# Patient Record
Sex: MALE | Race: WHITE | NOT HISPANIC OR LATINO | Employment: FULL TIME | URBAN - METROPOLITAN AREA
[De-identification: names, ages, dates, MRNs, and addresses within clinical notes are randomized per-mention and may not be internally consistent; named-entity substitution may affect disease eponyms.]

---

## 2017-01-04 ENCOUNTER — ALLSCRIPTS OFFICE VISIT (OUTPATIENT)
Dept: OTHER | Facility: OTHER | Age: 27
End: 2017-01-04

## 2017-01-20 ENCOUNTER — ALLSCRIPTS OFFICE VISIT (OUTPATIENT)
Dept: OTHER | Facility: OTHER | Age: 27
End: 2017-01-20

## 2017-02-16 ENCOUNTER — ALLSCRIPTS OFFICE VISIT (OUTPATIENT)
Dept: OTHER | Facility: OTHER | Age: 27
End: 2017-02-16

## 2017-03-17 ENCOUNTER — ALLSCRIPTS OFFICE VISIT (OUTPATIENT)
Dept: OTHER | Facility: OTHER | Age: 27
End: 2017-03-17

## 2017-04-07 ENCOUNTER — ALLSCRIPTS OFFICE VISIT (OUTPATIENT)
Dept: OTHER | Facility: OTHER | Age: 27
End: 2017-04-07

## 2017-04-27 ENCOUNTER — TRANSCRIBE ORDERS (OUTPATIENT)
Dept: ADMINISTRATIVE | Facility: HOSPITAL | Age: 27
End: 2017-04-27

## 2017-04-27 DIAGNOSIS — G47.33 OBSTRUCTIVE SLEEP APNEA (ADULT) (PEDIATRIC): Primary | ICD-10-CM

## 2017-04-28 ENCOUNTER — ALLSCRIPTS OFFICE VISIT (OUTPATIENT)
Dept: OTHER | Facility: OTHER | Age: 27
End: 2017-04-28

## 2017-05-11 ENCOUNTER — ALLSCRIPTS OFFICE VISIT (OUTPATIENT)
Dept: OTHER | Facility: OTHER | Age: 27
End: 2017-05-11

## 2017-05-30 ENCOUNTER — ALLSCRIPTS OFFICE VISIT (OUTPATIENT)
Dept: OTHER | Facility: OTHER | Age: 27
End: 2017-05-30

## 2017-06-08 ENCOUNTER — ALLSCRIPTS OFFICE VISIT (OUTPATIENT)
Dept: OTHER | Facility: OTHER | Age: 27
End: 2017-06-08

## 2017-06-23 ENCOUNTER — ALLSCRIPTS OFFICE VISIT (OUTPATIENT)
Dept: OTHER | Facility: OTHER | Age: 27
End: 2017-06-23

## 2017-07-26 ENCOUNTER — ALLSCRIPTS OFFICE VISIT (OUTPATIENT)
Dept: OTHER | Facility: OTHER | Age: 27
End: 2017-07-26

## 2017-07-27 ENCOUNTER — TRANSCRIBE ORDERS (OUTPATIENT)
Dept: ADMINISTRATIVE | Facility: HOSPITAL | Age: 27
End: 2017-07-27

## 2017-07-27 DIAGNOSIS — R20.0 TACTILE ANESTHESIA: Primary | ICD-10-CM

## 2017-08-09 ENCOUNTER — OFFICE VISIT (OUTPATIENT)
Dept: RADIOLOGY | Facility: CLINIC | Age: 27
End: 2017-08-09
Payer: COMMERCIAL

## 2017-08-09 DIAGNOSIS — R20.0 TACTILE ANESTHESIA: ICD-10-CM

## 2017-08-09 PROCEDURE — 95909 NRV CNDJ TST 5-6 STUDIES: CPT

## 2017-08-09 PROCEDURE — 95886 MUSC TEST DONE W/N TEST COMP: CPT

## 2017-09-12 ENCOUNTER — ALLSCRIPTS OFFICE VISIT (OUTPATIENT)
Dept: OTHER | Facility: OTHER | Age: 27
End: 2017-09-12

## 2017-09-14 ENCOUNTER — GENERIC CONVERSION - ENCOUNTER (OUTPATIENT)
Dept: OTHER | Facility: OTHER | Age: 27
End: 2017-09-14

## 2017-09-14 ENCOUNTER — LAB CONVERSION - ENCOUNTER (OUTPATIENT)
Dept: OTHER | Facility: OTHER | Age: 27
End: 2017-09-14

## 2017-09-14 LAB — QUESTION/PROBLEM (HISTORICAL): NORMAL

## 2017-09-16 ENCOUNTER — GENERIC CONVERSION - ENCOUNTER (OUTPATIENT)
Dept: OTHER | Facility: OTHER | Age: 27
End: 2017-09-16

## 2017-09-16 ENCOUNTER — LAB CONVERSION - ENCOUNTER (OUTPATIENT)
Dept: OTHER | Facility: OTHER | Age: 27
End: 2017-09-16

## 2017-09-16 LAB
CONTACT: (HISTORICAL): NORMAL
CULTURE RESULT (HISTORICAL): NORMAL
TEST INFORMATION (HISTORICAL): NORMAL
TEST NAME (HISTORICAL): NORMAL

## 2017-11-21 ENCOUNTER — ALLSCRIPTS OFFICE VISIT (OUTPATIENT)
Dept: OTHER | Facility: OTHER | Age: 27
End: 2017-11-21

## 2017-11-22 ENCOUNTER — GENERIC CONVERSION - ENCOUNTER (OUTPATIENT)
Dept: OTHER | Facility: OTHER | Age: 27
End: 2017-11-22

## 2017-11-22 ENCOUNTER — LAB CONVERSION - ENCOUNTER (OUTPATIENT)
Dept: OTHER | Facility: OTHER | Age: 27
End: 2017-11-22

## 2017-11-22 LAB
A/G RATIO (HISTORICAL): 1.1 (CALC) (ref 1–2.5)
ALBUMIN SERPL BCP-MCNC: 4 G/DL (ref 3.6–5.1)
ALP SERPL-CCNC: 84 U/L (ref 40–115)
ALT SERPL W P-5'-P-CCNC: 27 U/L (ref 9–46)
AST SERPL W P-5'-P-CCNC: 18 U/L (ref 10–40)
BASOPHILS # BLD AUTO: 0.3 %
BASOPHILS # BLD AUTO: 17 CELLS/UL (ref 0–200)
BILIRUB SERPL-MCNC: 0.5 MG/DL (ref 0.2–1.2)
BUN SERPL-MCNC: 18 MG/DL (ref 7–25)
BUN/CREA RATIO (HISTORICAL): NORMAL (CALC) (ref 6–22)
CALCIUM SERPL-MCNC: 9.4 MG/DL (ref 8.6–10.3)
CHLORIDE SERPL-SCNC: 103 MMOL/L (ref 98–110)
CO2 SERPL-SCNC: 31 MMOL/L (ref 20–31)
CREAT SERPL-MCNC: 0.98 MG/DL (ref 0.6–1.35)
DEPRECATED RDW RBC AUTO: 12.8 % (ref 11–15)
EGFR AFRICAN AMERICAN (HISTORICAL): 122 ML/MIN/1.73M2
EGFR-AMERICAN CALC (HISTORICAL): 105 ML/MIN/1.73M2
EOSINOPHIL # BLD AUTO: 1.4 %
EOSINOPHIL # BLD AUTO: 80 CELLS/UL (ref 15–500)
GAMMA GLOBULIN (HISTORICAL): 3.7 G/DL (CALC) (ref 1.9–3.7)
GLUCOSE (HISTORICAL): 87 MG/DL (ref 65–99)
HCT VFR BLD AUTO: 45.6 % (ref 38.5–50)
HGB BLD-MCNC: 14.9 G/DL (ref 13.2–17.1)
LYMPHOCYTES # BLD AUTO: 1351 CELLS/UL (ref 850–3900)
LYMPHOCYTES # BLD AUTO: 23.7 %
MCH RBC QN AUTO: 28.6 PG (ref 27–33)
MCHC RBC AUTO-ENTMCNC: 32.7 G/DL (ref 32–36)
MCV RBC AUTO: 87.5 FL (ref 80–100)
MONOCYTES # BLD AUTO: 456 CELLS/UL (ref 200–950)
MONOCYTES (HISTORICAL): 8 %
NEUTROPHILS # BLD AUTO: 3796 CELLS/UL (ref 1500–7800)
NEUTROPHILS # BLD AUTO: 66.6 %
PLATELET # BLD AUTO: 250 THOUSAND/UL (ref 140–400)
PMV BLD AUTO: 9.5 FL (ref 7.5–12.5)
POTASSIUM SERPL-SCNC: 4.5 MMOL/L (ref 3.5–5.3)
RBC # BLD AUTO: 5.21 MILLION/UL (ref 4.2–5.8)
SODIUM SERPL-SCNC: 139 MMOL/L (ref 135–146)
T4 FREE SERPL-MCNC: 1.1 NG/DL (ref 0.8–1.8)
TOTAL PROTEIN (HISTORICAL): 7.7 G/DL (ref 6.1–8.1)
TSH SERPL DL<=0.05 MIU/L-ACNC: 4.63 MIU/L (ref 0.4–4.5)
VIT B12 SERPL-MCNC: 481 PG/ML (ref 200–1100)
WBC # BLD AUTO: 5.7 THOUSAND/UL (ref 3.8–10.8)

## 2017-12-04 ENCOUNTER — APPOINTMENT (OUTPATIENT)
Dept: PHYSICAL THERAPY | Facility: REHABILITATION | Age: 27
End: 2017-12-04
Payer: COMMERCIAL

## 2017-12-04 ENCOUNTER — GENERIC CONVERSION - ENCOUNTER (OUTPATIENT)
Dept: FAMILY MEDICINE CLINIC | Facility: CLINIC | Age: 27
End: 2017-12-04

## 2017-12-04 PROCEDURE — G8991 OTHER PT/OT GOAL STATUS: HCPCS

## 2017-12-04 PROCEDURE — 97162 PT EVAL MOD COMPLEX 30 MIN: CPT

## 2017-12-04 PROCEDURE — G8990 OTHER PT/OT CURRENT STATUS: HCPCS

## 2017-12-12 ENCOUNTER — APPOINTMENT (OUTPATIENT)
Dept: PHYSICAL THERAPY | Facility: REHABILITATION | Age: 27
End: 2017-12-12
Payer: COMMERCIAL

## 2017-12-13 ENCOUNTER — APPOINTMENT (OUTPATIENT)
Dept: PHYSICAL THERAPY | Facility: REHABILITATION | Age: 27
End: 2017-12-13
Payer: COMMERCIAL

## 2017-12-20 ENCOUNTER — APPOINTMENT (OUTPATIENT)
Dept: PHYSICAL THERAPY | Facility: REHABILITATION | Age: 27
End: 2017-12-20
Payer: COMMERCIAL

## 2017-12-22 ENCOUNTER — GENERIC CONVERSION - ENCOUNTER (OUTPATIENT)
Dept: OTHER | Facility: OTHER | Age: 27
End: 2017-12-22

## 2018-01-09 NOTE — RESULT NOTES
Message   Can you please let patient know that his back x-ray was normal?   Thank you     Verified Results  * XR SPINE LUMBAR MINIMUM 4 VIEWS NON INJURY 10Oct2016 04:30PM Sandro Finn Order Number: [id number]     Test Name Result Flag Reference   XR SPINE LUMBAR MINIMUM 4 VIEWS (Report)     LUMBAR SPINE     INDICATION: Lower back pain  COMPARISON: CT AP 1/21/2016     VIEWS: AP, lateral and bilateral oblique projections; 4 images     FINDINGS:     Alignment is unremarkable  There is no radiographic evidence of acute fracture or destructive osseous lesion  No significant lumbar degenerative change noted  Visualized soft tissues appear unremarkable  IMPRESSION:     Normal examination         Workstation performed: [id number]     Signed by:   Marimar Barnard MD   10/11/16

## 2018-01-10 ENCOUNTER — ALLSCRIPTS OFFICE VISIT (OUTPATIENT)
Dept: OTHER | Facility: OTHER | Age: 28
End: 2018-01-10

## 2018-01-10 ENCOUNTER — TRANSCRIBE ORDERS (OUTPATIENT)
Dept: ADMINISTRATIVE | Facility: HOSPITAL | Age: 28
End: 2018-01-10

## 2018-01-10 ENCOUNTER — HOSPITAL ENCOUNTER (OUTPATIENT)
Dept: RADIOLOGY | Facility: HOSPITAL | Age: 28
Discharge: HOME | End: 2018-01-10
Payer: COMMERCIAL

## 2018-01-10 ENCOUNTER — GENERIC CONVERSION - ENCOUNTER (OUTPATIENT)
Dept: FAMILY MEDICINE CLINIC | Facility: CLINIC | Age: 28
End: 2018-01-10

## 2018-01-10 DIAGNOSIS — M54.5 LOW BACK PAIN, UNSPECIFIED BACK PAIN LATERALITY, UNSPECIFIED CHRONICITY, WITH SCIATICA PRESENCE UNSPECIFIED: ICD-10-CM

## 2018-01-10 DIAGNOSIS — M54.5 LOW BACK PAIN, UNSPECIFIED BACK PAIN LATERALITY, UNSPECIFIED CHRONICITY, WITH SCIATICA PRESENCE UNSPECIFIED: Primary | ICD-10-CM

## 2018-01-10 LAB
BILIRUB UR QL STRIP: NORMAL
CLARITY UR: NORMAL
COLOR UR: YELLOW
GLUCOSE (HISTORICAL): NORMAL
HGB UR QL STRIP.AUTO: NORMAL
KETONES UR STRIP-MCNC: NORMAL MG/DL
LEUKOCYTE ESTERASE UR QL STRIP: NORMAL
NITRITE UR QL STRIP: NORMAL
PH UR STRIP.AUTO: 8 [PH]
PROT UR STRIP-MCNC: NORMAL MG/DL
SP GR UR STRIP.AUTO: 1
UROBILINOGEN UR QL STRIP.AUTO: 0.2

## 2018-01-10 PROCEDURE — 72110 X-RAY EXAM L-2 SPINE 4/>VWS: CPT

## 2018-01-10 NOTE — PSYCH
History of Present Illness  [facility]:   Specialized Services Documentation - Therapist must complete separate progress note for each specific clinical activity in which the client participated during the day  ([phone]) Group Psychotherapy: (9:30-10:30) Janae John participated in psychotherapy group focused on finding emotional comfort when dealing with difficult emotions  Janae John identified his finances as a stressor  He was an active participant in group discussion, and talked about how he utilizes quiet time with his cat to help self-soothe when he is anxious  Some mild progress noted toward goals  Continue psychotherapy group to encourage Janae John to explore stressors and coping  Treatment Plan Problem(s): 1 1, 1 2  Leonora CARRENO, LSW     (831) Group Psychotherapy: ([phone])  Janae John sat quietly throughout most of the group on fair fighting/communication skills, and only felt comfortable sharing with a peer  He made minimal progress towards his goal    Gloria Diego [facility]/AKILA  Treatment Plan Problem(s): 1 2      (567) Group Psychotherapy: 2078-8507 Janae John participated in wellness group where each individual completed a handout with statements explaining what their mental illness is and what they have been working on to make improvements to their wellness  Janae John briefly shared that he has both anxiety as well as depression and has been coping with both, on and off, since childhood  He stated that he is trying to learn how to "not think about hurting myself when I get depressed but that it comes automatically " Janae John made mild progress toward goal  Continue group to provide both education and opportunity to practice verbalizing individual illness, coping strategies and improvements made toward recovery  Treatment Plan Problem(s): 1 1,1 2  Alex Trivedi RN     (895) Education Therapy Goals set - write, clean    Treatment Plan Problem(s): 1 1, 1 2     Education Therapy Time - 0900 - 8611 Previous goal was met  Readiness to Learning:  He is receptive to learning  There are  no barriers to learning  Learning Assessment Time - 1330 - 1400   Education completed on  illness and wellness tools  The teaching method was  verbal and written  Shared area of learning: Yes  Madonna Nageotte MSW, LSW         Case Management Note:   [id number] Rickey Alvarado met with this CM to discuss progress in [address] [place] [address] Main stated that he feels tired "all the time " He mentioned that he does not adhere to a regular sleeping routine, although he had been working on this with his OP therapist  He stated that he is often fatigued and feeling "drained of energy" and added that he was not sure if it was depression or not enough quality sleep  Rickey Alvarado was encouraged to return to sleeping, according to the routine he originally was using, to go to bed at 10PM every night and he agreed that he would try to resume this schedule  Rickey Alvarado stated that he feels he is learning new strategies to cope with his depression and anxiety at [facility]  Discussed D/C plan which is for Rickey Alvarado to return to his OP therapist at [address] [place] [address] and to his PCP for continued medication monitoring  Rickey Alvarado stated that he had started the Wellbutrin XL as directed and would report how he is tolerating this additional medication  Discussed that this CM is scheduled to review tomorrow with Aniya Herman and Rickey Alvarado had requested to stay in Program and continuing working on his goals  Will advise Rickey Alvarado of [facility] after reviewing 12/16/16  TREATMENT SESSION NUMBER: 3   Current suicide risk is low  Medications not changed/added/denied  Chau Martin RN      Active Problems    1  Abdominal pain (789 00) (R10 9)   2  Acute pharyngitis (462) (J02 9)   3  Allergic rhinitis (477 9) (J30 9)   4  Ankle pain, right (719 47) (M25 571)   5  Anxiety disorder, unspecified type (300 00) (F41 9)   6  Arthralgia of right shoulder region (719 41) (M25 511)   7   Atypical chest pain (786 59) (R07 89)   8  Back muscle spasm (724 8) (M62 830)   9  Back strain (847 9) (S39 012A)   10  BMI 45 0-49 9, adult (V85 42) (Z68 42)   11  Chest pain (786 50) (R07 9)   12  Constipation (564 00) (K59 00)   13  Contact dermatitis (692 9) (L25 9)   14  Cyst of spleen (289 59) (D73 4)   15  Depression with anxiety (300 4) (F41 8)   16  Encounter for dietary counseling and surveillance (V65 3) (Z71 3)   17  Fatigue (780 79) (R53 83)   18  Foamy urine (791 9) (R82 99)   19  Gastroenteritis (558 9) (K52 9)   20  GERD without esophagitis (530 81) (K21 9)   21  Hematochezia (578 1) (K92 1)   22  Insomnia (780 52) (G47 00)   23  Low back pain (724 2) (M54 5)   24  Major depressive disorder, recurrent severe without psychotic features (296 33) (F33 2)   25  Nausea and vomiting (787 01) (R11 2)   26  Obesity (278 00) (E66 9)   27  Pain on swallowing (787 20) (R13 10)   28  Postprandial epigastric pain (789 06) (R10 13)   29  Proteinuria (791 0) (R80 9)   30  Rectal bleeding (569 3) (K62 5)   31  Screening for lipoid disorders (V77 91) (Z13 220)   32  Skin lesion (709 9) (L98 9)   33  Skin rash (782 1) (R21)   34  Skipped heart beats (427 9) (I45 9)   35  URTI (acute upper respiratory infection) (465 9) (J06 9)   36  Vitamin D deficiency disease (268 9) (E55 9)   37  Weight gain (783 1) (R63 5)    Past Medical History    1  Denied: History of Head trauma   2  History of abscess of skin and subcutaneous tissue (V13 3) (Z87 2)   3  History of pilonidal cyst (V13 3) (Z87 2)   4  History of Pilonidal cyst with abscess (685 0) (L05 01)   5  Denied: History of Seizure    Allergies    1  No Known Drug Allergies    Current Meds   1  Amoxicillin-Pot Clavulanate 875-125 MG Oral Tablet; TAKE 1 TABLET EVERY 12 HOURS   DAILY; Therapy: 88ZDU8713 to (Evaluate:23Omu7307)  Requested for: 25GBE9902; Last   Rx:28Gxx2626 Ordered   2  BuPROPion HCl ER (XL) 150 MG Oral Tablet Extended Release 24 Hour; Take 1 tablet   daily;    Therapy: 09OAL3800 to (Evaluate:2017)  Requested for: 57Veo8079; Last   Rx:57Dnz1058 Ordered   3  Fluticasone Propionate 50 MCG/ACT Nasal Suspension; USE 2 SPRAYS IN EACH   NOSTRIL ONCE DAILY; Therapy: 98XSV5255 to (Last Rx:2015)  Requested for: 2015 Ordered   4  Meloxicam 15 MG Oral Tablet; TAKE 1 TABLET DAILY WITH FOOD; Therapy: 73EEF0379 to (Complete:2017); Last Rx:58Ayz7042 Ordered   5  Methocarbamol 500 MG Oral Tablet; one po q hs;   Therapy: 18JCU3739 to (Last Rx:50Jzo9693) Ordered   6  Omeprazole 40 MG Oral Capsule Delayed Release; take 1 capsule daily; Therapy: 95TEN1507 to (Evaluate:2017)  Requested for: 48JCS5535; Last   Rx:2016 Ordered   7  One-A-Day Mens Oral Tablet; Therapy: 55Uza1588 to Recorded   8  Sertraline HCl - 100 MG Oral Tablet; Take 1 tablet daily; Therapy: 71GPG4340 to (Last Rx:94Pjv2216) Ordered   9  Sertraline HCl - 50 MG Oral Tablet; TAKE 1/2 TABLET DAILY for 1 week then take 1 tab   daily; Therapy: 80OSW0332 to (Last Rx:2016)  Requested for: 38Nzi0311; Status: ACTIVE   - Renewal Denied Ordered   10  Vitamin D 2000 UNIT Oral Capsule; take 1 capsule daily; Therapy: 19TED0272 to (Last Rx:2015)  Requested for: 83ZEK6028 Ordered    Family Psych History  Mother    1  Family history of depression (V17 0) (Z81 8)   2  Family history of Father  At Age 48   1  Family history of Obesity   4  Family history of Stroke Syndrome (V17 1)  Father    5  Family history of Alcohol Abuse   6  Family history of Father  At Age ___   9  Family history of Hepatic Failure   8  Family history of Hypertension (V17 49)  Brother    5  Family history of depression (V17 0) (Z81 8)  Maternal Uncle    10   Family history of depression (V17 0) (Z81 8)    Social History    · Daily caffeine consumption   · Education Level: Some college   · Employed   ·    · Never A Smoker   · Never Drank Alcohol    Future Appointments    Date/Time Provider Specialty Site 12/16/2016 10:00 AM MARCIANO Gil   Psychiatry [facility]   12/19/2016 03:30 PM Jeovany Anna MD Family Medicine   [facility] [address]   Electronically signed by : Pauly Parekh LCSW; Dec 15 2016 12:37PM EST                       (Author)    Electronically signed by : COLIN Price; Dec 15 2016  2:37PM EST                       (Author)    Electronically signed by : COLIN Price; Dec 15 2016  2:38PM EST                       (Author)    Electronically signed by : Neel Berg RN; Dec 16 2016  3:41PM EST                       (Author)

## 2018-01-10 NOTE — PSYCH
History of Present Illness  [facility] Progress [facility]:   Specialized Services Documentation - Therapist must complete separate progress note for each specific clinical activity in which the client participated during the day  (811 20 021) Group Psychotherapy: (9:30-10:30) Maty Damian participated in group  The theme was self-sabotage  Maty Damian was engaging and active in exploring the ways in which he creates obstacles for himself  Moderate progress noted toward goals  Continue group to encourage Maty Damian to explore coping strategies and learn varied self-help tools  Kathi Gerard, MSW Candidate)  Treatment Plan Problem(s): 1 1, 1 2      (227) Group Psychotherapy: 10:45 to 11:45 Maty Damian participated in a group playing dooub's Box  The question asked was about the difficulties of [place]  He participated in the discussion but it did not appear that he was very connected to the others in the group  After some thought, he stated that it was good to learn different ways to deal with the holidays  He liked being able to discuss the holiday  He could benefit from continued participation in a group  [facility]  Treatment Plan Problem(s): 1 1, 1 2      (404) Group Psychotherapy: 2640-2443 Maty Damian participated in wellness group how they spend their free time, outside of structure of [facility], especially on the weekends  Maty Damian shared that he is planning to get some rest this weekend as he has not been sleeping well  He shared that his wife is visiting her sister in [place] and he will be getting back on a sleeping schedule he was previously following and did well on at one time but stayed up late several times this week and this has affected not only his sleep but his mood  Discussed healthier ways to pass the time rather than "sleeping all day" and being up all night   Maty Damian made mild progress toward goal  Continue to offer this group that teaches recognizing how free time is utilized in both healthy and unhealthy ways and peer discussion to raise individual consideration of more healthy ways to spend time in healthy ways, especially unstructured time such as weekends  Treatment Plan Problem(s): 1 1,1 2  Vinay Mendoza RN     (222) Education Therapy Goals set - try and clean bird cage    Treatment Plan Problem(s): 1 1, 1 2  Education Therapy Time - 0900 - 0930 Previous goal was not met  Readiness to Learning:  He is receptive to learning  There are  no barriers to learning  Learning Assessment Time - 1330 - 1400   Education completed on  illness and wellness tools  The teaching method was  verbal and written  Shared area of learning: Yes  Liss Ashley MSW, LSW       ( ) Other [phone] This CM reviewed today with Laura Brink at [facility] and five additional days were authorized  LCD will be Friday, 12/23/16  Patient was informed of same  Vinay Mendoza RN     Case Management Note:   [phone] Kirsten Anglin met with this CM to review progress in Program  He also wrote on his CM update that "Still having thoughts about being better off dead, in the back of my mind " Kirsten Anglin stated he does not have ideas, plan or intent of hurting himself or anyone else at this time and denied manic or psychotic symptoms but stated that when something stressful happens to him he has this thought automatically "just come into my mind--I am not going to do anything and I am working on not thinking this way but I thought I should tell you " Kirsten Anglin stated that he is working on using cognitive techniques to talk back to this negative thoughts and it does work most of the time  Kirsten Anglin related that he feels stressed due to his mother asking if they will have enough money to pay the mortgage 1/2017  Kirsten Anglin stated that it makes him anxious to think about money as he has not received his STD payments as yet and the majority of the mortgage money comes from his salary  He will follow up on when he will receive this money today   He stated that finances are very tight at this time with his wife also out on disability as she has had back surgery  Elton Duncan stated that he was thinking about other things such as quitting his job, that he does not like, and just returning to school to study something that he would like to do but that "I am not stupid--I wouldn't quit my job  We need the money now but sometimes I think about doing these things " Elton Duncan was encouraged to look at how he can problem solve with wellness strategies learned in Program and to include these strategies in his WRAP    Mariela 25 was informed that this CM reviewed with his insurance company this afternoon and that LOS has been extended with D/C planned for Friday, 12/23/16  Elton Duncan stated that he has arranged to return to work on 12/26/16 and would make OP provider appointments to plan for D/C follow ups  TREATMENT SESSION NUMBER: 4   Current suicide risk is low  Medications not changed/added/denied  Kati Magana RN      Active Problems    1  Abdominal pain (789 00) (R10 9)   2  Acute pharyngitis (462) (J02 9)   3  Allergic rhinitis (477 9) (J30 9)   4  Ankle pain, right (719 47) (M25 571)   5  Anxiety disorder, unspecified type (300 00) (F41 9)   6  Arthralgia of right shoulder region (719 41) (M25 511)   7  Atypical chest pain (786 59) (R07 89)   8  Back muscle spasm (724 8) (M62 830)   9  Back strain (847 9) (S39 012A)   10  BMI 45 0-49 9, adult (V85 42) (Z68 42)   11  Chest pain (786 50) (R07 9)   12  Constipation (564 00) (K59 00)   13  Contact dermatitis (692 9) (L25 9)   14  Cyst of spleen (289 59) (D73 4)   15  Depression with anxiety (300 4) (F41 8)   16  Encounter for dietary counseling and surveillance (V65 3) (Z71 3)   17  Fatigue (780 79) (R53 83)   18  Foamy urine (791 9) (R82 99)   19  Gastroenteritis (558 9) (K52 9)   20  GERD without esophagitis (530 81) (K21 9)   21  Hematochezia (578 1) (K92 1)   22  Insomnia (780 52) (G47 00)   23  Low back pain (724 2) (M54 5)   24   Major depressive disorder, recurrent severe without psychotic features (296 33) (F33 2)   25  Nausea and vomiting (787 01) (R11 2)   26  Obesity (278 00) (E66 9)   27  Pain on swallowing (787 20) (R13 10)   28  Postprandial epigastric pain (789 06) (R10 13)   29  Proteinuria (791 0) (R80 9)   30  Rectal bleeding (569 3) (K62 5)   31  Screening for lipoid disorders (V77 91) (Z13 220)   32  Skin lesion (709 9) (L98 9)   33  Skin rash (782 1) (R21)   34  Skipped heart beats (427 9) (I45 9)   35  URTI (acute upper respiratory infection) (465 9) (J06 9)   36  Vitamin D deficiency disease (268 9) (E55 9)   37  Weight gain (783 1) (R63 5)    Past Medical History    1  Denied: History of Head trauma   2  History of abscess of skin and subcutaneous tissue (V13 3) (Z87 2)   3  History of pilonidal cyst (V13 3) (Z87 2)   4  History of Pilonidal cyst with abscess (685 0) (L05 01)   5  Denied: History of Seizure    Allergies    1  No Known Drug Allergies    Current Meds   1  Amoxicillin-Pot Clavulanate 875-125 MG Oral Tablet; TAKE 1 TABLET EVERY 12 HOURS   DAILY; Therapy: 92ZJE2119 to (Evaluate:12Dec2016)  Requested for: 27WGY9394; Last   Rx:86Xkz8308 Ordered   2  BuPROPion HCl ER (XL) 150 MG Oral Tablet Extended Release 24 Hour; Take 1 tablet   daily; Therapy: 34WLZ0567 to (Evaluate:12Jan2017)  Requested for: 87Jsj0447; Last   Rx:13Gdm5672 Ordered   3  Fluticasone Propionate 50 MCG/ACT Nasal Suspension; USE 2 SPRAYS IN EACH   NOSTRIL ONCE DAILY; Therapy: 04TWH1248 to (Last Rx:21Oct2015)  Requested for: 21Oct2015 Ordered   4  Meloxicam 15 MG Oral Tablet; TAKE 1 TABLET DAILY WITH FOOD; Therapy: 92XUV2507 to (Complete:01Feb2017); Last Rx:87Oxg2695 Ordered   5  Methocarbamol 500 MG Oral Tablet; one po q hs;   Therapy: 83ITW1417 to (Last Rx:69Gtz1516) Ordered   6  Omeprazole 40 MG Oral Capsule Delayed Release; take 1 capsule daily; Therapy: 02RJB0222 to (Evaluate:30Mar2017)  Requested for: 63STT3978; Last   Rx:30Nov2016 Ordered   7  One-A-Day Mens Oral Tablet; Therapy: 80Ria1786 to Recorded   8  Sertraline HCl - 100 MG Oral Tablet; Take 1 tablet daily; Therapy: 21KJN4763 to (Last Rx:84Rkg0228) Ordered   9  Sertraline HCl - 50 MG Oral Tablet; TAKE 1/2 TABLET DAILY for 1 week then take 1 tab   daily; Therapy: 61DRQ2582 to (Last Rx:17Iis6157)  Requested for: 72Pgf0577; Status: ACTIVE   - Renewal Denied Ordered   10  Vitamin D 2000 UNIT Oral Capsule; take 1 capsule daily; Therapy: 85EYQ7150 to (Last Rx:2015)  Requested for: 66IQN0621 Ordered    Family Psych History  Mother    1  Family history of depression (V17 0) (Z81 8)   2  Family history of Father  At Age 48   1  Family history of Obesity   4  Family history of Stroke Syndrome (V17 1)  Father    5  Family history of Alcohol Abuse   6  Family history of Father  At Age ___   9  Family history of Hepatic Failure   8  Family history of Hypertension (V17 49)  Brother    5  Family history of depression (V17 0) (Z81 8)  Maternal Uncle    10  Family history of depression (V17 0) (Z81 8)    Social History    · Daily caffeine consumption   · Education Level: Some college   · Employed   ·    · Never A Smoker   · Never Drank Alcohol    Future Appointments    Date/Time Provider Specialty Site   2016 03:30 PM Kailash Gomez MD Family Medicine   [facility] [address]   Electronically signed by : DONALDO Wyatt; Dec 16 2016  1:04PM EST                       (Author)    Electronically signed by : NEIL Reyes; Dec 16 2016  2:06PM EST                       (Author)    Electronically signed by :  Yifan Avila [facility]; Dec 16 2016  3:36PM EST                       (Author)    Electronically signed by : Servando Hewitt RN; Dec 16 2016  3:44PM EST                       (Author)    Electronically signed by : Servando Hewitt RN; Dec 19 2016  4:21PM EST                       (Author)

## 2018-01-11 ENCOUNTER — GENERIC CONVERSION - ENCOUNTER (OUTPATIENT)
Dept: OTHER | Facility: OTHER | Age: 28
End: 2018-01-11
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
## 2018-01-11 NOTE — MISCELLANEOUS
Message  Patient is excused from work on 11/21/17  Return to work or school:   Allan Rice is under my professional care   He was seen in my office on 11/21/17             Signatures   Electronically signed by : Lebron Savage; Nov 21 2017 12:45PM EST                       (Author)

## 2018-01-11 NOTE — PSYCH
History of Present Illness  [facility] Clinical Progress Note [facility]:   Specialized Services Documentation - Therapist must complete separate progress note for each specific clinical activity in which the client participated during the day  ([phone]) Group Psychotherapy: (9:30-10:30) Arley Riedel participated in psychotherapy group focused on managing anxiety during the holidays and feelings of being a burden on supports  Arley Riedel identified his finances as a stressor  He was mostly quiet during group discussion, but did appear to be attentive to peers, as he added agreement to certain talking points at times  Minimal progress toward goals noted  Continue psychotherapy group to encourage Arley Riedel to explore stressors and coping  Treatment Plan Problem(s): 1 1, 1 2  [facility] MSW, LSW     (163) Group Psychotherapy: [phone] Arley Riedel participated in wellness group focused on things that patient has put off this year and considering how procrastination affects you individually; emotionally, physically, psychologically and recovery-wise  Included in discussion was being putting off working on treatment goals and WRAP  shared  Arley Riedel shared that he has been putting off physical activity and exercising and identified "I feel like crap that I haven't been doing anything " Arley Riedel discussed exercising for a brief time then letting it go and not returning to it  Arley Riedel is having challenges motivating himself he stated as well as staying with goals identified  Arley Riedel made mild progress toward goal  Continue group to provide both education, and practice in skills that foster wellness and decrease self-defeating behaviors such procrastination  Treatment Plan Problem(s): 1 1,1 2  Sonya Carter RN       (075) Education Therapy Goals set - work on relapse prevention plan    Treatment Plan Problem(s): 1 1, 1 2  Education Therapy Time - 0900 - 0930 Previous goal was not met  Readiness to Learning:  He is receptive to learning     There are no barriers to learning  Learning Assessment Time - 1330 - 1400   Education completed on  illness and wellness tools  The teaching method was  verbal  Shared area of learning: Yes  Cristino Sales MSW, LSW     [phone] actively shared in [facility] group exploring self-worth  He engaged in [employer] art experience exploring aspects of self  During discussion, he identified he is more aware of his own need to actively work toward his own self-worth  âDeclaration of Self-Esteemâ by KATIE Marcano read and given to him with encouragement to read consistently  Beginning progress toward goal noted  Continue AT to increase self-awareness and skills that promote wellness  Oliver Caba MT-BC       Case Management Note:   [phone] Betito Alonso met with this CM to review treatment progress and D/C plan  Betito Alonso was given Relapse Prevention Plan to complete for D/C tomorrow  Treatment plan was updated with Betito Alonso who signed and received a copy  TREATMENT SESSION NUMBER: 7   Current suicide risk is low  Medications not changed/added/denied  Quyen Holley, RN      Active Problems    1  Abdominal pain (789 00) (R10 9)   2  Acute pharyngitis (462) (J02 9)   3  Allergic rhinitis (477 9) (J30 9)   4  Ankle pain, right (719 47) (M25 571)   5  Anxiety disorder, unspecified type (300 00) (F41 9)   6  Arthralgia of right shoulder region (719 41) (M25 511)   7  Atypical chest pain (786 59) (R07 89)   8  Back muscle spasm (724 8) (M62 830)   9  Back strain (847 9) (S39 012A)   10  BMI 45 0-49 9, adult (V85 42) (Z68 42)   11  Chest pain (786 50) (R07 9)   12  Constipation (564 00) (K59 00)   13  Contact dermatitis (692 9) (L25 9)   14  Cyst of spleen (289 59) (D73 4)   15  Depression with anxiety (300 4) (F41 8)   16  Encounter for dietary counseling and surveillance (V65 3) (Z71 3)   17  Fatigue (780 79) (R53 83)   18  Foamy urine (791 9) (R82 99)   19  Gastroenteritis (558 9) (K52 9)   20   GERD without esophagitis (530 81) (K21 9)   21  Hematochezia (578 1) (K92 1)   22  Insomnia (780 52) (G47 00)   23  Low back pain (724 2) (M54 5)   24  Major depressive disorder, recurrent severe without psychotic features (296 33) (F33 2)   25  Nausea and vomiting (787 01) (R11 2)   26  Obesity (278 00) (E66 9)   27  Pain on swallowing (787 20) (R13 10)   28  Postprandial epigastric pain (789 06) (R10 13)   29  Proteinuria (791 0) (R80 9)   30  Rectal bleeding (569 3) (K62 5)   31  Screening for lipoid disorders (V77 91) (Z13 220)   32  Skin lesion (709 9) (L98 9)   33  Skin rash (782 1) (R21)   34  Skipped heart beats (427 9) (I45 9)   35  URTI (acute upper respiratory infection) (465 9) (J06 9)   36  Vitamin D deficiency disease (268 9) (E55 9)   37  Weight gain (783 1) (R63 5)    Past Medical History    1  Denied: History of Head trauma   2  History of abscess of skin and subcutaneous tissue (V13 3) (Z87 2)   3  History of pilonidal cyst (V13 3) (Z87 2)   4  History of Pilonidal cyst with abscess (685 0) (L05 01)   5  Denied: History of Seizure    Allergies    1  No Known Drug Allergies    Current Meds   1  Amoxicillin-Pot Clavulanate 875-125 MG Oral Tablet; TAKE 1 TABLET EVERY 12 HOURS   DAILY; Therapy: 21KHX5091 to (Evaluate:80Kyg5058)  Requested for: 07OGY3326; Last   Rx:18Jia0193 Ordered   2  BuPROPion HCl ER (XL) 150 MG Oral Tablet Extended Release 24 Hour; Take 1 tablet   daily; Therapy: 05VDF1021 to (Evaluate:12Jan2017)  Requested for: 74Wky9096; Last   Rx:71Yry8774 Ordered   3  Fluticasone Propionate 50 MCG/ACT Nasal Suspension; USE 2 SPRAYS IN EACH   NOSTRIL ONCE DAILY; Therapy: 49JID8379 to (Last Rx:21Oct2015)  Requested for: 21Oct2015 Ordered   4  Meloxicam 15 MG Oral Tablet; TAKE 1 TABLET DAILY WITH FOOD; Therapy: 91IVA6154 to (Complete:01Feb2017); Last Rx:03Dec2016 Ordered   5  Methocarbamol 500 MG Oral Tablet; one po q hs;   Therapy: 37GFK3660 to (Last Rx:03Dec2016) Ordered   6   Omeprazole 40 MG Oral Capsule Delayed Release; take 1 capsule daily; Therapy: 71LGB3906 to (Evaluate:2017)  Requested for: 32BTU7254; Last   Rx:2016 Ordered   7  One-A-Day Mens Oral Tablet; Therapy: 06Hqh5051 to Recorded   8  Sertraline HCl - 100 MG Oral Tablet; Take 1 tablet daily; Therapy: 79SET4172 to (Last Rx:95Axu6792) Ordered   9  Sertraline HCl - 50 MG Oral Tablet; TAKE 1/2 TABLET DAILY for 1 week then take 1 tab   daily; Therapy: 30GXP2498 to (Last Rx:2016)  Requested for: 50Zfv9904; Status: ACTIVE   - Renewal Denied Ordered   10  Vitamin D 2000 UNIT Oral Capsule; take 1 capsule daily; Therapy: 14XDP9192 to (Last Rx:2015)  Requested for: 70WWB8532 Ordered    Family Psych History  Mother    1  Family history of depression (V17 0) (Z81 8)   2  Family history of Father  At Age 48   1  Family history of Obesity   4  Family history of Stroke Syndrome (V17 1)  Father    5  Family history of Alcohol Abuse   6  Family history of Father  At Age ___   9  Family history of Hepatic Failure   8  Family history of Hypertension (V17 49)  Brother    5  Family history of depression (V17 0) (Z81 8)  Maternal Uncle    10  Family history of depression (V17 0) (Z81 8)    Social History    · Daily caffeine consumption   · Education Level: Some college   · Employed   ·    · Never A Smoker   · Never Drank Alcohol    Future Appointments    Date/Time Provider Specialty Site   2016 10:00 AM MARCIANO Duckworth   Psychiatry [facility] PARTIAL HOSPITALIZATION   2016 02:30 PM Dmitriy Peoples MD Family Medicine [address],   Electronically signed by : COLIN Ricks; Dec 22 2016 11:12AM EST                       (Author)    Electronically signed by : Kati Magana RN; Dec 22 2016 12:03PM EST                       (Author)    Electronically signed by : Kati Magana RN; Dec 22 2016 12:47PM EST                       (Author)    Electronically signed by : GERALDINE Beck; Dec 22 2016  1:52PM EST                       (Author)    Electronically signed by : COLIN Martinez; Dec 22 2016  1:55PM EST                       (Author)

## 2018-01-12 VITALS
TEMPERATURE: 96.8 F | HEIGHT: 73 IN | BODY MASS INDEX: 41.75 KG/M2 | DIASTOLIC BLOOD PRESSURE: 62 MMHG | HEART RATE: 68 BPM | WEIGHT: 315 LBS | SYSTOLIC BLOOD PRESSURE: 128 MMHG | RESPIRATION RATE: 16 BRPM

## 2018-01-12 VITALS
SYSTOLIC BLOOD PRESSURE: 122 MMHG | HEART RATE: 82 BPM | BODY MASS INDEX: 41.75 KG/M2 | WEIGHT: 315 LBS | TEMPERATURE: 98.5 F | HEIGHT: 73 IN | DIASTOLIC BLOOD PRESSURE: 76 MMHG

## 2018-01-12 NOTE — RESULT NOTES
Verified Results  (Q) LIPID PANEL WITH REFLEX TO DIRECT LDL [id number] 11:19AM Ramandeep Moore     Test Name Result Flag Reference   CHOLESTEROL, TOTAL 150 mg/dL  125-200   HDL CHOLESTEROL 41 mg/dL  > OR = 40   TRIGLICERIDES 69 mg/dL  <832   LDL-CHOLESTEROL 95 mg/dL (calc)  <130   Desirable range <100 mg/dL for patients with CHD or  diabetes and <70 mg/dL for diabetic patients with  known heart disease  CHOL/HDLC RATIO 3 7 (calc)  < OR = 5 0   NON HDL CHOLESTEROL 109 mg/dL (calc)     Target for non-HDL cholesterol is 30 mg/dL higher than   LDL cholesterol target  (1) COMPREHENSIVE METABOLIC PANEL 62KRL8678 97:44IC Elizabeth Kumar     Test Name Result Flag Reference   GLUCOSE 88 mg/dL  65-99   Fasting reference interval   UREA NITROGEN (BUN) 22 mg/dL  7-25   CREATININE 1 07 mg/dL  0 60-1 35   eGFR NON-AFR   AMERICAN 96 mL/min/1 73m2  > OR = 60   eGFR AFRICAN AMERICAN 111 mL/min/1 73m2  > OR = 60   BUN/CREATININE RATIO   7-45   NOT APPLICABLE (calc)   SODIUM 139 mmol/L  135-146   POTASSIUM 3 9 mmol/L  3 5-5 3   CHLORIDE 106 mmol/L     CARBON DIOXIDE 23 mmol/L  19-30   CALCIUM 9 2 mg/dL  8 6-10 3   PROTEIN, TOTAL 7 6 g/dL  6 1-8 1   ALBUMIN 4 4 g/dL  3 6-5 1   GLOBULIN 3 2 g/dL (calc)  1 9-3 7   ALBUMIN/GLOBULIN RATIO 1 4 (calc)  1 0-2 5   BILIRUBIN, TOTAL 0 6 mg/dL  0 2-1 2   ALKALINE PHOSPHATASE 69 U/L     AST 17 U/L  10-40   ALT 20 U/L  9-46     (1) CBC/PLT/DIFF [id number] 11:19AM Ramandeep Moore     Test Name Result Flag Reference   WHITE BLOOD CELL COUNT 5 2 Thousand/uL  3 8-10 8   RED BLOOD CELL COUNT 5 28 Million/uL  4 20-5 80   HEMOGLOBIN 15 2 g/dL  13 2-17 1   HEMATOCRIT 46 6 %  38 5-50 0   MCV 88 2 fL  80 0-100 0   MCH 28 7 pg  27 0-33 0   MCHC 32 5 g/dL  32 0-36 0   RDW 13 5 %  11 0-15 0   PLATELET COUNT 695 Thousand/uL  140-400   MPV 7 9 fL  7 5-11 5   ABSOLUTE NEUTROPHILS 3338 cells/uL  5773-0919   ABSOLUTE LYMPHOCYTES 1404 cells/uL  850-3900   ABSOLUTE MONOCYTES 374 cells/uL  200-950 ABSOLUTE EOSINOPHILS 62 cells/uL     ABSOLUTE BASOPHILS 21 cells/uL  0-200   NEUTROPHILS 64 2 %     LYMPHOCYTES 27 0 %     MONOCYTES 7 2 %     EOSINOPHILS 1 2 %     BASOPHILS 0 4 %       *(Q) VITAMIN D, 25-HYDROXY, LC/MS/MS [id number] 11:19AM Ramandeep Moore     Test Name Result Flag Reference   VITAMIN D, 25-OH, TOTAL 40 ng/mL     Vitamin D Status         25-OH Vitamin D:     Deficiency:                    <20 ng/mL  Insufficiency:             20 - 29 ng/mL  Optimal:                 > or = 30 ng/mL     For 25-OH Vitamin D testing on patients on   D2-supplementation and patients for whom quantitation   of D2 and D3 fractions is required, the QuestAssureD(TM)  25-OH VIT D, (D2,D3), LC/MS/MS is recommended: order   code 18946 (patients >2yrs)  For more information on this test, go to:  http://TechShop/faq/RUA737  (This link is being provided for   informational/educational purposes only )     (Q) TSH, 3RD GENERATION W/REFLEX TO FT4 [id number] 11:19AM Ramandeep Moore   REPORT COMMENT:  FASTING:YES     Test Name Result Flag Reference   TSH W/REFLEX TO FT4 1 92 mIU/L  0 40-4 50

## 2018-01-12 NOTE — RESULT NOTES
Verified Results  (1) THROAT CULTURE (CULTURE, UPPER RESPIRATORY) 12Sep2017 12:00AM Ramandeep Moore     Test Name Result Flag Reference   CULTURE, THROAT      CULTURE, THROAT         MICRO NUMBER:      [id number]    TEST STATUS:       FINAL    SPECIMEN SOURCE:   THROAT    SPECIMEN QUALITY:  ADEQUATE    RESULT:            No oropharyngeal pathogens recovered  (Q) TEST AUTHORIZATION 12Sep2017 12:00AM Oscar Ramandeep     Test Name Result Flag Reference   TEST(S) ORDERED ON$REQUISITION CULTURE, THROAT     TEST CODE: 394X     CLIENT CONTACT: HUYEN PIPER     REPORT ALWAYS MESSAGE$SIGNATURE See Below     The laboratory testing on this patient was verbally requested  or confirmed by the ordering physician or his or her authorized  representative after contact with an employee of [employer]  Federal regulations require that we maintain on file written  authorization for all laboratory testing  Accordingly we are asking  that the ordering physician or his or her authorized representative  sign a copy of this report and promptly return it to the client            Signature:____________________________________________________

## 2018-01-12 NOTE — PSYCH
History of Present Illness  [facility] Clinical Progress Note [facility]:   Specialized Services Documentation - Therapist must complete separate progress note for each specific clinical activity in which the client participated during the day  Case Management Note:   0800 Daniel Aguilera called and stated he will not be in Program today due to a problem with his dog  TREATMENT SESSION NUMBER: 5     Medications not changed/added/denied  Sangeeta Aburto RN      Active Problems    1  Abdominal pain (789 00) (R10 9)   2  Acute pharyngitis (462) (J02 9)   3  Allergic rhinitis (477 9) (J30 9)   4  Ankle pain, right (719 47) (M25 571)   5  Anxiety disorder, unspecified type (300 00) (F41 9)   6  Arthralgia of right shoulder region (719 41) (M25 511)   7  Atypical chest pain (786 59) (R07 89)   8  Back muscle spasm (724 8) (M62 830)   9  Back strain (847 9) (S39 012A)   10  BMI 45 0-49 9, adult (V85 42) (Z68 42)   11  Chest pain (786 50) (R07 9)   12  Constipation (564 00) (K59 00)   13  Contact dermatitis (692 9) (L25 9)   14  Cyst of spleen (289 59) (D73 4)   15  Depression with anxiety (300 4) (F41 8)   16  Encounter for dietary counseling and surveillance (V65 3) (Z71 3)   17  Fatigue (780 79) (R53 83)   18  Foamy urine (791 9) (R82 99)   19  Gastroenteritis (558 9) (K52 9)   20  GERD without esophagitis (530 81) (K21 9)   21  Hematochezia (578 1) (K92 1)   22  Insomnia (780 52) (G47 00)   23  Low back pain (724 2) (M54 5)   24  Major depressive disorder, recurrent severe without psychotic features (296 33) (F33 2)   25  Nausea and vomiting (787 01) (R11 2)   26  Obesity (278 00) (E66 9)   27  Pain on swallowing (787 20) (R13 10)   28  Postprandial epigastric pain (789 06) (R10 13)   29  Proteinuria (791 0) (R80 9)   30  Rectal bleeding (569 3) (K62 5)   31  Screening for lipoid disorders (V71 91) (Z13 220)   32  Skin lesion (709 9) (L98 9)   33  Skin rash (782 1) (R21)   34   Skipped heart beats (427 9) (I45 9) 35  URTI (acute upper respiratory infection) (465 9) (J06 9)   36  Vitamin D deficiency disease (268 9) (E55 9)   37  Weight gain (783 1) (R63 5)    Past Medical History    1  Denied: History of Head trauma   2  History of abscess of skin and subcutaneous tissue (V13 3) (Z87 2)   3  History of pilonidal cyst (V13 3) (Z87 2)   4  History of Pilonidal cyst with abscess (685 0) (L05 01)   5  Denied: History of Seizure    Allergies    1  No Known Drug Allergies    Current Meds   1  Amoxicillin-Pot Clavulanate 875-125 MG Oral Tablet; TAKE 1 TABLET EVERY 12 HOURS   DAILY; Therapy: 39JJY9342 to (Evaluate:12Dec2016)  Requested for: 56VIJ7467; Last   Rx:64Qqt5593 Ordered   2  BuPROPion HCl ER (XL) 150 MG Oral Tablet Extended Release 24 Hour; Take 1 tablet   daily; Therapy: 54NTY6085 to (Evaluate:12Jan2017)  Requested for: 54Jms3244; Last   Rx:97Xyq9832 Ordered   3  Fluticasone Propionate 50 MCG/ACT Nasal Suspension; USE 2 SPRAYS IN EACH   NOSTRIL ONCE DAILY; Therapy: 36RVM5728 to (Last Rx:21Oct2015)  Requested for: 21Oct2015 Ordered   4  Meloxicam 15 MG Oral Tablet; TAKE 1 TABLET DAILY WITH FOOD; Therapy: 80YZE4918 to (Complete:01Feb2017); Last Rx:94Omj8806 Ordered   5  Methocarbamol 500 MG Oral Tablet; one po q hs;   Therapy: 11KPW4363 to (Last Rx:57Ils3831) Ordered   6  Omeprazole 40 MG Oral Capsule Delayed Release; take 1 capsule daily; Therapy: 39ZMB0682 to (Evaluate:30Mar2017)  Requested for: 20MKN1034; Last   Rx:30Nov2016 Ordered   7  One-A-Day Mens Oral Tablet; Therapy: 45Wju8299 to Recorded   8  Sertraline HCl - 100 MG Oral Tablet; Take 1 tablet daily; Therapy: 29PCA0179 to (Last Rx:21Sbn0138) Ordered   9  Sertraline HCl - 50 MG Oral Tablet; TAKE 1/2 TABLET DAILY for 1 week then take 1 tab   daily; Therapy: 82IKM3784 to (Last Rx:10Oct2016)  Requested for: 41Wwd3505; Status: ACTIVE   - Renewal Denied Ordered   10  Vitamin D 2000 UNIT Oral Capsule; take 1 capsule daily;     Therapy: 33YFE0992 to (Last Rx:48Det0372)  Requested for: 25DMU7526 Ordered    Family Psych History  Mother    1  Family history of depression (V17 0) (Z81 8)   2  Family history of Father  At Age 48   1  Family history of Obesity   4  Family history of Stroke Syndrome (V17 1)  Father    5  Family history of Alcohol Abuse   6  Family history of Father  At Age ___   9  Family history of Hepatic Failure   8  Family history of Hypertension (V17 49)  Brother    5  Family history of depression (V17 0) (Z81 8)  Maternal Uncle    10  Family history of depression (V17 0) (Z81 8)    Social History    · Daily caffeine consumption   · Education Level: Some college   · Employed   ·    · Never A Smoker   · Never Drank Alcohol    Future Appointments    Date/Time Provider Specialty Site   2016 10:00 AM MARCIANO Hoyos  Psychiatry [facility]   2016 10:00 AM MARCIANO Hoyos  Psychiatry [facility]   2016 10:00 AM MARCIANO Hoyos  Psychiatry [facility]   2016 10:00 AM MARCIANO Hoyos   [facility]iatry [facility]   2016 02:30 PM Yonis Ramírez MD Family Medicine [address] [place] [address]     Signatures   Electronically signed by : Alex Trivedi RN; Dec 19 2016  2:44PM EST                       (Author)

## 2018-01-13 VITALS
SYSTOLIC BLOOD PRESSURE: 136 MMHG | DIASTOLIC BLOOD PRESSURE: 82 MMHG | TEMPERATURE: 98.4 F | HEIGHT: 73 IN | HEART RATE: 92 BPM | WEIGHT: 315 LBS | BODY MASS INDEX: 41.75 KG/M2 | RESPIRATION RATE: 16 BRPM

## 2018-01-13 NOTE — PROGRESS NOTES
Message  Reviewed VLCD diet principles  Developed meal plan and reviewed product use  Ordered 2 week supply  Gave patient written VLCD packet and left patient contact number  Will call patent in 2-3 days to see how tolerating the VLCD diet  Active Problems    1  Abdominal pain (789 00) (R10 9)   2  Acute pharyngitis (462) (J02 9)   3  Allergic rhinitis (477 9) (J30 9)   4  Ankle pain, right (719 47) (M25 571)   5  Anxiety disorder, unspecified type (300 00) (F41 9)   6  Arthralgia of right shoulder region (719 41) (M25 511)   7  Atypical chest pain (786 59) (R07 89)   8  Back muscle spasm (724 8) (M62 830)   9  Back strain (847 9) (S39 012A)   10  Blood typing encounter (V72 86) (Z01 83)   11  BMI 45 0-49 9, adult (V85 42) (Z68 42)   12  Chest pain (786 50) (R07 9)   13  Constipation (564 00) (K59 00)   14  Contact dermatitis (692 9) (L25 9)   15  Cough (786 2) (R05)   16  Cyst   17  Cyst of spleen (289 59) (D73 4)   18  Depression with anxiety (300 4) (F41 8)   19  Encounter for dietary counseling and surveillance (V65 3) (Z71 3)   20  Fatigue (780 79) (R53 83)   21  Foamy urine (791 9) (R82 99)   22  Frequent bowel movements (787 99) (R19 4)   23  Gastroenteritis (558 9) (K52 9)   24  GERD without esophagitis (530 81) (K21 9)   25  Hematochezia (578 1) (K92 1)   26  Insomnia (780 52) (G47 00)   27  Low back pain (724 2) (M54 5)   28  Major depressive disorder, recurrent severe without psychotic features (296 33) (F33 2)   29  Morbid obesity due to excess calories (278 01) (E66 01)   30  Nausea (787 02) (R11 0)   31  Nausea and vomiting (787 01) (R11 2)   32  Need for prophylactic vaccination and inoculation against influenza (V04 81) (Z23)   33  Numbness and tingling in both hands (782 0) (R20 0,R20 2)   34  Pain on swallowing (787 20) (R13 10)   35  Postprandial epigastric pain (789 06) (R10 13)   36  Proteinuria (791 0) (R80 9)   37  Rectal bleeding (569 3) (K62 5)   38   Screening for lipoid disorders (V77 91) (Z13 220)   39  Skin lesion (709 9) (L98 9)   40  Skin rash (782 1) (R21)   41  Skipped heart beats (427 9) (I45 9)   42  Snoring (786 09) (R06 83)   43  Tonsillitis (463) (J03 90)   44  URTI (acute upper respiratory infection) (465 9) (J06 9)   45  Vitamin D deficiency disease (268 9) (E55 9)   46  Weight gain (783 1) (R63 5)    Current Meds   1  BuPROPion HCl ER (XL) 150 MG Oral Tablet Extended Release 24 Hour; Take 1 tablet   daily; Therapy: 42VVS7808 to (Evaluate:59Igd3459)  Requested for: 02FPJ7411; Last   Rx:32Dnt5829 Ordered   2  Dicyclomine HCl - 10 MG Oral Capsule; TAKE 1 CAPSULE 3 times daily PRN; Therapy: 98WAC3015 to (Evaluate:12Apr2017)  Requested for: 76Wgi5208; Last   Rx:09Ovk5083 Ordered   3  Fluticasone Propionate 50 MCG/ACT Nasal Suspension; USE 2 SPRAYS IN EACH   NOSTRIL ONCE DAILY; Therapy: 25NTG1043 to (Last Rx:21Oct2015)  Requested for: 51Hcs1964 Ordered   4  Omeprazole 20 MG Oral Capsule Delayed Release; take 1 capsule by mouth every   morning BEFORE BREAKFAST; Therapy: 26SLL9335 to (Evaluate:04Tfv5062)  Requested for: 35UIT1133; Last   Rx:66Owe6213 Ordered   5  Ondansetron 4 MG Oral Tablet Disintegrating (Zofran ODT); TAKE 1 TABLET EVERY 8   HOURS AS NEEDED FOR NAUSEA; Therapy: 25Pfc4622 to (Last Rx:10Fqo5706)  Requested for: 17Ixx8805 Ordered   6  One-A-Day Mens Oral Tablet; Therapy: 45Elg0511 to Recorded   7  ProAir  (90 Base) MCG/ACT Inhalation Aerosol Solution; INHALE 1 TO 2 PUFFS   EVERY 4 TO 6 HOURS AS NEEDED; Therapy: 03FCB8166 to (Last JY:67KAK8713)  Requested for: 22SGU4990 Ordered   8  Sertraline HCl - 100 MG Oral Tablet; Take 1 tablet daily; Therapy: 14KEH1157 to (Evaluate:06Nov2017)  Requested for: 06GHL9611; Last   Rx:87Ghk6499 Ordered   9  Vitamin D 2000 UNIT Oral Capsule; take 1 capsule daily; Therapy: 89TMN9226 to (Last Rx:17Mar2015)  Requested for: 33POG5010 Ordered    Allergies    1   No Known Drug Allergies    Signatures   Electronically signed by : Antonio Tanner, ; May 30 2017 10:47AM EST                       (Author)

## 2018-01-13 NOTE — PSYCH
Assessment    1  Anxiety disorder, unspecified type (300 00) (F41 9)   2  Family history of depression (V17 0) (Z81 8) : Brother, Maternal Uncle, Mother   3  Education Level: Some college   4  Daily caffeine consumption   5  Employed   6      Plan    1  BuPROPion HCl ER (XL) 150 MG Oral Tablet Extended Release 24 Hour; TAKE 1   TABLET Daily    [facility] Physician's Orders  ADMIT TO: Partial Hospitalization 5 x per week for 15 days  Vital signs routine  Diet: regular  Group Psychotherapy 9 x per week    Allied Therapy Group 6 x per week     Diagnosis: F 33 2, F 41 9  Medications: As per medication list     âI certify that the continuation of Partial Hospitalization services is medically necessary to improve and/or maintain the patient's condition and functional level, and to prevent relapse or hospitalization, and that this could not be done at a less intensive level of care  â     Physician Signature: Eamon Vasquez MD     Nurse Signature: Urszula Montaño RN      Chief Complaint  This is a 32year old male referred by Wagner Nash LCSW because he is feeling very anxious and depressed  History of Present Illness  Yashira Diaz is a 32year old male with depression and anxiety referred by Wagner Nash LCSW after he was seen in the outpatient because patient is feeling very anxious and depressed for some time but recently has become more difficult to manage  He has increased in vague suicidal thoughts about been better off dead and not wanted to live like that any longer, denies any plan or intent  He has been under lot of stress due to financial and his wife medical issues  He has poor concentration and sleep disturbances  He is in medical leave from his job at this moment  Today he feels very anxious, low energy level, he denies suicidal thoughts, denies any plan or intent, he denies any homicidal ideas, plan or intent, denies any psychotic symptoms   He denies any history of manic or hypomanic episodes  His PHQ-9 is 25  Review of Systems  depression, sleep disturbances and decreased functioning ability    The patient presents with complaints of gradual onset of intermittent episodes of moderate anxiety  His symptoms are probably caused by family event  Symptoms are improved by meditation  Symptoms are made worse by stress  Constitutional: no fever or chills, feels well, no tiredness, no recent weight loss or weight gain  ENT: no complaints of earache, no loss of hearing, no nosebleeds or nasal discharge, no sore throat or hoarseness  Cardiovascular: no complaints of slow or fast heart rate, no chest pain, no palpitations, no leg claudication or lower extremity edema  Respiratory: no complaints of shortness of breath, no wheezing or cough, no dyspnea on exertion, no orthopnea or PND  Gastrointestinal: no complaints of abdominal pain, no constipation, no nausea or vomiting, no diarrhea or bloody stools  Genitourinary: no complaints of dysuria or incontinence, no hesitancy, no nocturia, no genital lesion, no inadequacy of penile erection  Musculoskeletal: myalgias  Integumentary: no complaints of skin rash or lesion, no itching or dry skin, no skin wounds  Neurological: no complaints of headache, no confusion, no numbness or tingling, no dizziness or fainting  Other Symptoms: Endocrine is negative  ROS reviewed  Past Psychiatric History    Past Psychiatric History: The patient has depression and anxiety since age 15, denies any impatient psychiatric admission  He denies any history of suicide attempt, denies any violence history  He follows with his PCP and sees a therapist at [facility]  He has been in [facility]  Substance Abuse     Substance Abuse History: The patient denies any history of alcohol,denies any drugs and tobacco           Active Problems    1  Abdominal pain (789 00) (R10 9)   2  Acute pharyngitis (462) (J02 9)   3   Allergic rhinitis (477 9) (J30 9)   4  Ankle pain, right (719 47) (M25 571)   5  Arthralgia of right shoulder region (719 41) (M25 511)   6  Atypical chest pain (786 59) (R07 89)   7  Back muscle spasm (724 8) (M62 830)   8  Back strain (847 9) (S39 012A)   9  BMI 45 0-49 9, adult (V85 42) (Z68 42)   10  Chest pain (786 50) (R07 9)   11  Constipation (564 00) (K59 00)   12  Contact dermatitis (692 9) (L25 9)   13  Cyst of spleen (289 59) (D73 4)   14  Depression with anxiety (300 4) (F41 8)   15  Encounter for dietary counseling and surveillance (V65 3) (Z71 3)   16  Fatigue (780 79) (R53 83)   17  Foamy urine (791 9) (R82 99)   18  Gastroenteritis (558 9) (K52 9)   19  GERD without esophagitis (530 81) (K21 9)   20  Hematochezia (578 1) (K92 1)   21  Insomnia (780 52) (G47 00)   22  Low back pain (724 2) (M54 5)   23  Nausea and vomiting (787 01) (R11 2)   24  Obesity (278 00) (E66 9)   25  Pain on swallowing (787 20) (R13 10)   26  Postprandial epigastric pain (789 06) (R10 13)   27  Proteinuria (791 0) (R80 9)   28  Rectal bleeding (569 3) (K62 5)   29  Screening for lipoid disorders (V77 91) (Z13 220)   30  Skin lesion (709 9) (L98 9)   31  Skin rash (782 1) (R21)   32  Skipped heart beats (427 9) (I45 9)   33  URTI (acute upper respiratory infection) (465 9) (J06 9)   34  Vitamin D deficiency disease (268 9) (E55 9)   35  Weight gain (783 1) (R63 5)    Past Medical History    1  Denied: History of Head trauma   2  History of abscess of skin and subcutaneous tissue (V13 3) (Z87 2)   3  History of pilonidal cyst (V13 3) (Z87 2)   4  History of Pilonidal cyst with abscess (685 0) (L05 01)   5  Denied: History of Seizure    The active problems and past medical history were reviewed and updated today  Surgical History    The surgical history was reviewed and updated today  Allergies    1  No Known Drug Allergies    Current Meds   1   Amoxicillin-Pot Clavulanate 875-125 MG Oral Tablet; TAKE 1 TABLET EVERY 12 HOURS   DAILY; Therapy: 67LEC7730 to (Evaluate:46Tfa9602)  Requested for: 10VUR0694; Last   Rx:70Igs1567 Ordered   2  Fluticasone Propionate 50 MCG/ACT Nasal Suspension; USE 2 SPRAYS IN EACH   NOSTRIL ONCE DAILY; Therapy: 24TDP6309 to (Last Rx:2015)  Requested for: 2015 Ordered   3  Meloxicam 15 MG Oral Tablet; TAKE 1 TABLET DAILY WITH FOOD; Therapy: 28HGK4736 to (Complete:2017); Last Rx:09Uud4917 Ordered   4  Methocarbamol 500 MG Oral Tablet; one po q hs;   Therapy: 94OXO2592 to (Last Rx:00Tbo3199) Ordered   5  Omeprazole 40 MG Oral Capsule Delayed Release; take 1 capsule daily; Therapy: 54QOT4757 to (Evaluate:2017)  Requested for: 69USL6463; Last   Rx:2016 Ordered   6  One-A-Day Mens Oral Tablet; Therapy: 97Qbw0291 to Recorded   7  Sertraline HCl - 100 MG Oral Tablet; Take 1 tablet daily; Therapy: 03HOP6987 to (Last Rx:50Byu2336) Ordered   8  Sertraline HCl - 50 MG Oral Tablet; TAKE 1/2 TABLET DAILY for 1 week then take 1 tab   daily; Therapy: 43YDD8854 to (Last Rx:2016)  Requested for: 10Hwl4377; Status: ACTIVE   - Renewal Denied Ordered   9  Vitamin D 2000 UNIT Oral Capsule; take 1 capsule daily; Therapy: 55GEF9006 to (Last Rx:2015)  Requested for: 39BEE0181 Ordered    The medication list was reviewed and updated today  Family Psych History  Mother    1  Family history of depression (V17 0) (Z81 8)   2  Family history of Father  At Age 48   1  Family history of Obesity   4  Family history of Stroke Syndrome (V17 1)  Father    5  Family history of Alcohol Abuse   6  Family history of Father  At Age ___   9  Family history of Hepatic Failure   8  Family history of Hypertension (V17 49)  Brother    5  Family history of depression (V17 0) (Z81 8)  Maternal Uncle    10  Family history of depression (V17 0) (Z81 8)  His father had issues with alcohol  Mother, brother and maternal uncle all depression  His maternal uncle completed suicide       The family history was reviewed and updated today  Social History    · Daily caffeine consumption   · Education Level: Some college   · Employed   ·    · Never A Smoker   · Never Drank Alcohol  The social history was reviewed and updated today  The patient is , lives with his wife, no children, 2 brothers , some college and is employed at [employer]  He denies history of legal problems  No  history  History Of Phys/Sex Abuse Or Perpetration    History Of Phys/Sex Abuse or Perpetration: He denies any history of physical abuse, denies history sexual abuse  Vitals  Signs   Recorded: 27Guc1497 10:12AM   Temperature: 97 9 F, Oral  Heart Rate: 67, L Radial  Pulse Quality: Regular, L Radial  Respiration Quality: Normal  Respiration: 18  Systolic: 968, LUE, Sitting  Diastolic: 85, LUE, Sitting  Height: 6 ft 1 in  Weight: 351 lb   BMI Calculated: 46 31  BSA Calculated: 2 73    Physical Exam    Appearance: was calm and cooperative, adequate hygiene and grooming and good eye contact  Observed mood: depressed and anxious  Observed mood: affect was constricted  Speech: a normal rate  Thought processes: coherent/organized  Hallucinations: no hallucinations present  Thought Content: no delusions  Abnormal Thoughts: The patient has death wish, but no suicidal thoughts and no homicidal thoughts  Orientation: The patient is oriented to person, place and time  Recent and Remote Memory: short term memory intact and long term memory intact  Attention Span And Concentration: concentration impaired  Insight: Insight intact  Judgment: His judgment was intact  Muscle Strength And Tone  Muscle strength and tone were normal  Normal gait and station  Language: no difficulty naming common objects, no difficulty repeating a phrase and no difficulty writing a sentence     Fund of knowledge: Patient displays adequate knowledge of current events, adequate fund of knowledge regarding past history and adequate fund of knowledge regarding vocabulary  The patient is experiencing no localized pain  On a scale of 0 - 10 the pain severity is a 0  Treatment Recommendations: 1  Admit to [place] 2  Medication Management 3  Group Therapy  Risks, Benefits And Possible Side Effects Of Medications: Risks, benefits, and possible side effects of medications explained to patient and patient verbalizes understanding  No records found for controlled prescriptions according to [facility]         Results/Data  (Q) LIPID PANEL WITH REFLEX TO DIRECT LDL 70ZCP5342 11:19AM Ramandeep Moore     Test Name Result Flag Reference   CHOLESTEROL, TOTAL 150 mg/dL  125-200   HDL CHOLESTEROL 41 mg/dL  > OR = 40   TRIGLICERIDES 69 mg/dL  <892   LDL-CHOLESTEROL 95 mg/dL (calc)  <130   Desirable range <100 mg/dL for patients with CHD or  diabetes and <70 mg/dL for diabetic patients with  known heart disease  CHOL/HDLC RATIO 3 7 (calc)  < OR = 5 0   NON HDL CHOLESTEROL 109 mg/dL (calc)     Target for non-HDL cholesterol is 30 mg/dL higher than   LDL cholesterol target  (1) COMPREHENSIVE METABOLIC PANEL 90CNI8493 00:98DY Nohemi Silence     Test Name Result Flag Reference   GLUCOSE 88 mg/dL  65-99   Fasting reference interval   UREA NITROGEN (BUN) 22 mg/dL  7-25   CREATININE 1 07 mg/dL  0 60-1 35   eGFR NON-AFR   AMERICAN 96 mL/min/1 73m2  > OR = 60   eGFR AFRICAN AMERICAN 111 mL/min/1 73m2  > OR = 60   BUN/CREATININE RATIO   7-59   NOT APPLICABLE (calc)   SODIUM 139 mmol/L  135-146   POTASSIUM 3 9 mmol/L  3 5-5 3   CHLORIDE 106 mmol/L     CARBON DIOXIDE 23 mmol/L  19-30   CALCIUM 9 2 mg/dL  8 6-10 3   PROTEIN, TOTAL 7 6 g/dL  6 1-8 1   ALBUMIN 4 4 g/dL  3 6-5 1   GLOBULIN 3 2 g/dL (calc)  1 9-3 7   ALBUMIN/GLOBULIN RATIO 1 4 (calc)  1 0-2 5   BILIRUBIN, TOTAL 0 6 mg/dL  0 2-1 2   ALKALINE PHOSPHATASE 69 U/L     AST 17 U/L  10-40   ALT 20 U/L  9-46     (1) CBC/PLT/DIFF 40UIC9274 11:19AM Ramandeep Moore     Test Name Result Flag Reference   WHITE BLOOD CELL COUNT 5 2 Thousand/uL  3 8-10 8   RED BLOOD CELL COUNT 5 28 Million/uL  4 20-5 80   HEMOGLOBIN 15 2 g/dL  13 2-17 1   HEMATOCRIT 46 6 %  38 5-50 0   MCV 88 2 fL  80 0-100 0   MCH 28 7 pg  27 0-33 0   MCHC 32 5 g/dL  32 0-36 0   RDW 13 5 %  11 0-15 0   PLATELET COUNT 125 Thousand/uL  140-400   MPV 7 9 fL  7 5-11 5   ABSOLUTE NEUTROPHILS 3338 cells/uL  5172-7936   ABSOLUTE LYMPHOCYTES 1404 cells/uL  850-3900   ABSOLUTE MONOCYTES 374 cells/uL  200-950   ABSOLUTE EOSINOPHILS 62 cells/uL     ABSOLUTE BASOPHILS 21 cells/uL  0-200   NEUTROPHILS 64 2 %     LYMPHOCYTES 27 0 %     MONOCYTES 7 2 %     EOSINOPHILS 1 2 %     BASOPHILS 0 4 %         DSM    Provisional Diagnosis: Major Depression recurrent severe without psychotic symptoms    Anxiety Disorder unspecified     End of Encounter Meds    1  Amoxicillin-Pot Clavulanate 875-125 MG Oral Tablet; TAKE 1 TABLET EVERY 12 HOURS   DAILY; Therapy: 15XFO1161 to (Evaluate:62Zol4595)  Requested for: 61FRD7942; Last   Rx:14Yla6859 Ordered    2  Fluticasone Propionate 50 MCG/ACT Nasal Suspension; USE 2 SPRAYS IN EACH   NOSTRIL ONCE DAILY; Therapy: 25UXX3831 to (Last Rx:21Oct2015)  Requested for: 21Oct2015 Ordered    3  Meloxicam 15 MG Oral Tablet; TAKE 1 TABLET DAILY WITH FOOD; Therapy: 99ZDD8989 to (Complete:88Wer8044); Last Rx:39Pst1368 Ordered   4  Methocarbamol 500 MG Oral Tablet; one po q hs;   Therapy: 35XMC5703 to (Last Rx:88Tyw3604) Ordered    5  Sertraline HCl - 100 MG Oral Tablet; Take 1 tablet daily; Therapy: 77QJO1827 to (Last Rx:90Asc6017) Ordered   6  Sertraline HCl - 50 MG Oral Tablet (Zoloft); TAKE 1/2 TABLET DAILY for 1 week then take 1   tab daily; Therapy: 05VLQ6021 to (Last Rx:10Oct2016)  Requested for: 81Pda8890; Status: ACTIVE   - Renewal Denied Ordered    7  Omeprazole 40 MG Oral Capsule Delayed Release; take 1 capsule daily;    Therapy: 40QMN6255 to (Evaluate:30Mar2017)  Requested for: 44EFO6049; Last   Rx:30Nov2016 Ordered    8  One-A-Day Mens Oral Tablet; Therapy: 60Qxj1988 to Recorded    9  BuPROPion HCl ER (XL) 150 MG Oral Tablet Extended Release 24 Hour; TAKE 1   TABLET Daily; Therapy: 24UOR9951 to (Evaluate:12Jan2017); Last Rx:10Hrg5076 Ordered    10  Vitamin D 2000 UNIT Oral Capsule; take 1 capsule daily;     Therapy: 91EPY5896 to (Last Rx:17Mar2015)  Requested for: 64YWQ6905 Ordered    Future Appointments    Date/Time Provider Specialty Site   12/19/2016 03:30 PM Daksha Cantu MD Family Medicine [address] [place] [address]     Signatures   Electronically signed by : MARCIANO Sibley ; Dec 13 2016 10:28AM EST                       (Author)    Electronically signed by : Urszula Montaño RN; Dec 13 2016 10:37AM EST                       (Author)    Electronically signed by : MARCIANO Sibley ; Dec 13 2016 10:39AM EST                       (Author)    Electronically signed by : MARCIANO Sibley ; Dec 13 2016 10:39AM EST                       (Author)

## 2018-01-13 NOTE — PSYCH
Assessment    1  Anxiety disorder, unspecified type (300 00) (F41 9)   2  Family history of depression (V17 0) (Z81 8) : Brother, Maternal Uncle, Mother   3  Education Level: Some college   4  Daily caffeine consumption   5  Employed   6      Plan    1  BuPROPion HCl ER (XL) 150 MG Oral Tablet Extended Release 24 Hour; Take 1   tablet daily    [facility] Treatment Plan    [facility] Treatment Plan   AREAS OF NEEDS: Anxiety as manifested by an increase in anxiety and depression and having more difficulty in managing depression and anxiety including increased suicidal thoughts about "being better off dead and not wanting to live this way any longer", poor sleep, poor concentration, on medical leave from job at this time, feels stressed by wife's medical issues and financial issues  Date Initiated: 12/13/16     LONG TERM GOAL: 1 0 I will identify three signs my mood is more stable and I am in more productive in my daily life  Date Initiated: 12/13/16   Target Date: 1/24/17      Date Initiated: 12/13/16    1 1 I will identify three things I need to do to support my own wellness every day and I will begin to list these things in my WRAP  Target Date: 12/22/16    Date Initiated: 12/13/16    1 2 I will identify two triggers for my depression and anxiety and state three ways I can cope, in healthy ways, with heightened symptoms  Target Date: 12/22/16    Date Initiated: 12/13/16    1 3 I will begin to take Wellbutrin XR as ordered and report any side effects from my medications when and if they arise  Target Date: 12/22/16    Date Initiated: 12/13/16    1 4 I will name four supports and describe how each support will participate in my recovery  Target Date: 12/22/16           7 DAY REVISION: 1 1,1 2,1 3,1 4 Continue goals as applicable but unmet  Date Initiated: 12/22/16    Target Date: 1/3/17                  PSYCHIATRY: Medication management and education   Continue medication management and education  Date Initiated: 2016   Revision Date: 2016   The person(s) responsible for carrying out the plan is Tree Colón MD    NURSIN 1,1 2,1 3,1 4 This RN will provide daily wellness group to educate Brissa Vogel on his diagnoses and medications used in treatment  1 1,1 2,1 3,1 4 This RN will continue to provide daily wellness group, Mon to Fri, to [employer] on S/S of his diagnoses and medications used in his treatment  Date Initiated: 16     Revision Date: 16     The person(s) responsible for carrying out the plan is Miriam Bah RN    PSYCHOLOGY: 1 1, 1 2, 1 4 Provide psychotherapy group 5 times per week to allow opportunity for patient to explore stressors and ways of coping  1 1, 1 2, 1 4 Continue to provide psychotherapy group each program day to encourage Brissa Vogel to further explore stressors and healthy ways of coping  Date Initiated: 2016   Revision Date: 2016   The person(s) responsible for carrying out the plan is [facility]  ALLIED THERAPY: 1 1, 1 2 Engage Reynaldo in AT group 5 times weekly to address the development and use of wellness tools to encourage management of symptoms and support recovery through meaningful activity  1 1,1 2 Continue to encourage Brissa Vogel to recognize personal role in skill development and application through healthy tasks in and out of program  SMM     Date Initiated: 16   Revision Date: 16   The person(s) responsible for carrying out the plan is GERALDINE Brock  CASE MANAGEMENT: 1 0 This CM will meet with Brissa Vogel three times weekly to assess treatment progress, D/C planning, UR and to build supports  2 0 This CM will meet with Brissa Vogel three times weekly to continue to assess treatment progress, D/C planning, UR and building supports for recovery   Date Initiated: 16   Revision Date: 16   The person(s) responsible for carrying out the plan is Miriam Bah RN  DISCHARGE CRITERIA: I will identify three signs that I am doing better and I will complete Relapse Prevention Plan   DISCHARGE PLAN: Will return to PCP for medication management and OP therapist   Estimated Length of Stay: 7-10 days   Strengths:   Limitations:     CLIENT COMMENTS / Please share your thoughts, feelings, need and/or experiences regarding your treatment plan: _____________________________________________________________________________________________________________________________________________________________________________________________________________________________________________________________________________________________________________________ Date/Time: ______________           Patient Signature: _________________________________ Date/Time: ______________            Signatures   Electronically signed by : MARCIANO Humphreys ; Dec 13 2016 10:29AM EST                       (Author)    Electronically signed by : Emaline Holstein, MSWLSW; Dec 13 2016 11:25AM EST                       (Author)    Electronically signed by : GERALDINE Stephens; Dec 13 2016  1:36PM EST                       (Author)    Electronically signed by : Lasalle Oppenheim, RN; Dec 14 2016  3:50PM EST                       (Author)    Electronically signed by : Lasalle Oppenheim, RN; Dec 22 2016 12:11PM EST                       (Author)    Electronically signed by : GERALDINE Stephens; Dec 22 2016 12:26PM EST                       (Author)    Electronically signed by : Emaline Holstein, MSWLSW; Dec 22 2016 12:57PM EST                       (Author)    Electronically signed by : MARCIANO Humphreys ; Dec 23 2016  7:28AM EST                       (Author)

## 2018-01-13 NOTE — PSYCH
History of Present Illness  [facility] Clinical Progress Note [facility]:   Specialized Services Documentation - Therapist must complete separate progress note for each specific clinical activity in which the client participated during the day  [phone] Group Psychotherapy: (9:30-10:30) Bry Whitney was excused from psychotherapy group due to initial evaluation with psychiatrist  Emaline Holstein MSW, LSW     (236) Group Psychotherapy: [id number] Bry Whitney participated in wellness group focused on working on recovery from mental illness; anticipating potential things that can cause relapses and identifying healthy coping strategies to replace unhealthy strategies  Bry Whitney listened attentively to educational session and referred to handouts used in group but did not contribute to peer discussion  Bry Whitney made no visible progress toward goals  Continue group and offer encourage to educate patient on the recovery process visualized as a road to better wellness and how to [facility] on or to get back onâ the road to recovery and overcome obstacles along the way  Treatment Plan Problem(s): 1 1,1 2  Lasalle Oppenheim, RN       (041) Education Therapy Goals set - work on poetry for 5-10 minutes    Treatment Plan Problem(s): 1 1, 1 2  Education Therapy Time - 0900 - 0930, Time first treatment day   Readiness to Learning:  He is receptive to learning  There are  no barriers to learning  Learning Assessment Time - 1330 - 1400   Education completed on  illness and wellness tools  The teaching method was  verbal and written  Shared area of learning: Yes  Emaline Holstein MSW, LSW     [phone] [facility] quietly shared in [facility] group focused on holiday stress and use of relaxation strategies  He appeared engaged in PMR, breath counting and visualization exercises  During discussion, he was attentive yet did not share  Group reinforced strategies to manage holiday season and value of daily relaxation   Slow initial progress toward goal noted  Continue AT to encourage skill development and personal practice  Treatment Plan Problem(s): 1 2  Rebecca Kinney, MT-BC     ( ) Other 1500 CM received return call from Kalin Hale at [place] to pre-auth  My [facility] for [facility] services  She took clinical information and stated that she would have to discuss case with "her doctor"   before authorizing any PHP days  Waiting for return call from [place]  1600 VM left by Kalin Hale at [place] that four days LOS has been approved from 12/13/16 to LCD of 12/16/16 to review on LCD  Auth #[id number]  Macarena Banuelos RN     Case Management Note:   [id number] My Evans met with this CM to complete initial evaluation and review [facility] Program  ROIs signed for wife, emergency contact, PCP and OP therapist  My Evans received a card with emergency contact information  Dr Maritza Wolfe has added Wellbutrin ER  mg PO  yM Evans is to take one tablet every morning  He stated that he understands the medication purposes and will advise when/if any side effects are experienced  My Evans denies any SE from Sertraline at this time  TREATMENT SESSION NUMBER: 1   Current suicide risk is low  Medications changed/added/denied: See Above Macarena Banuelos RN      Active Problems    1  Abdominal pain (789 00) (R10 9)   2  Acute pharyngitis (462) (J02 9)   3  Allergic rhinitis (477 9) (J30 9)   4  Ankle pain, right (719 47) (M25 571)   5  Arthralgia of right shoulder region (719 41) (M25 511)   6  Atypical chest pain (786 59) (R07 89)   7  Back muscle spasm (724 8) (M62 830)   8  Back strain (847 9) (S39 012A)   9  BMI 45 0-49 9, adult (V85 42) (Z68 42)   10  Chest pain (786 50) (R07 9)   11  Constipation (564 00) (K59 00)   12  Contact dermatitis (692 9) (L25 9)   13  Cyst of spleen (289 59) (D73 4)   14  Depression with anxiety (300 4) (F41 8)   15  Encounter for dietary counseling and surveillance (V65 3) (Z71 3)   16  Fatigue (780 79) (R53 83)   17  Foamy urine (791 9) (R82 99)   18  Gastroenteritis (558 9) (K52 9)   19  GERD without esophagitis (530 81) (K21 9)   20  Hematochezia (578 1) (K92 1)   21  Insomnia (780 52) (G47 00)   22  Low back pain (724 2) (M54 5)   23  Nausea and vomiting (787 01) (R11 2)   24  Obesity (278 00) (E66 9)   25  Pain on swallowing (787 20) (R13 10)   26  Postprandial epigastric pain (789 06) (R10 13)   27  Proteinuria (791 0) (R80 9)   28  Rectal bleeding (569 3) (K62 5)   29  Screening for lipoid disorders (V77 91) (Z13 220)   30  Skin lesion (709 9) (L98 9)   31  Skin rash (782 1) (R21)   32  Skipped heart beats (427 9) (I45 9)   33  URTI (acute upper respiratory infection) (465 9) (J06 9)   34  Vitamin D deficiency disease (268 9) (E55 9)   35  Weight gain (783 1) (R63 5)    Past Medical History    1  History of abscess of skin and subcutaneous tissue (V13 3) (Z87 2)   2  History of pilonidal cyst (V13 3) (Z87 2)   3  History of Pilonidal cyst with abscess (685 0) (L05 01)    Allergies    1  No Known Drug Allergies    Current Meds   1  Amoxicillin-Pot Clavulanate 875-125 MG Oral Tablet; TAKE 1 TABLET EVERY 12 HOURS   DAILY; Therapy: 65GXB9188 to (Evaluate:89Olv2983)  Requested for: 47IST1970; Last   Rx:06Opx6166 Ordered   2  Fluticasone Propionate 50 MCG/ACT Nasal Suspension; USE 2 SPRAYS IN EACH   NOSTRIL ONCE DAILY; Therapy: 55BEV6257 to (Last Rx:21Oct2015)  Requested for: 21Oct2015 Ordered   3  Meloxicam 15 MG Oral Tablet; TAKE 1 TABLET DAILY WITH FOOD; Therapy: 87NFG2788 to (Complete:42Sxs5317); Last Rx:79Cmj1384 Ordered   4  Methocarbamol 500 MG Oral Tablet; one po q hs;   Therapy: 36JCQ3388 to (Last Rx:72App0676) Ordered   5  Omeprazole 40 MG Oral Capsule Delayed Release; take 1 capsule daily; Therapy: 62HQX8077 to (Evaluate:30Mar2017)  Requested for: 06JRH1765; Last   Rx:30Nov2016 Ordered   6  One-A-Day Mens Oral Tablet; Therapy: 55Hir5556 to Recorded   7  Sertraline HCl - 100 MG Oral Tablet; Take 1 tablet daily;    Therapy: 12ELY4070 to (Last Rx:86Iti4104) Ordered   8  Sertraline HCl - 50 MG Oral Tablet; TAKE 1/2 TABLET DAILY for 1 week then take 1 tab   daily; Therapy: 19EYU9268 to (Last Rx:2016)  Requested for: 41Bju2762; Status: ACTIVE   - Renewal Denied Ordered   9  Vitamin D 2000 UNIT Oral Capsule; take 1 capsule daily; Therapy: 86WRZ6957 to (Last Rx:2015)  Requested for: 63UOC0730 Ordered    Family Psych History  Mother    1  Family history of Father  At Age 50   2  Family history of Obesity   3  Family history of Stroke Syndrome (V17 1)  Father    4  Family history of Alcohol Abuse   5  Family history of Father  At Age ___   10  Family history of Hepatic Failure   7   Family history of Hypertension (V17 49)    Social History    · Never A Smoker   · Never Drank Alcohol    Future Appointments    Date/Time Provider Specialty Site   2016 03:30 PM Aniya Roberts MD Family Medicine [address] [place] [address]     Signatures   Electronically signed by : Trey Nation RN; Dec 13 2016  9:18AM EST                       (Author)    Electronically signed by : GERALDINE Mazariegos; Dec 13 2016  1:36PM EST                       (Author)    Electronically signed by : GERALDINE Mazariegos; Dec 13 2016  1:42PM EST                       (Author)    Electronically signed by : COLIN Aguilar; Dec 13 2016  2:03PM EST                       (Author)    Electronically signed by : Trey Nation RN; Dec 13 2016  2:43PM EST                       (Author)    Electronically signed by : Trey Nation RN; Dec 13 2016  3:08PM EST                       (Author)    Electronically signed by : Trey Nation RN; Dec 13 2016  4:02PM EST                       (Author)

## 2018-01-13 NOTE — RESULT NOTES
Verified Results  Urine Dip Non-Automated- POC 64UPC8746 03:46PM Ramandeep Lubin     Test Name Result Flag Reference   Color Yellow     Clarity Transparent     Leukocytes negative     Nitrite negative     Blood negative     Bilirubin negative     Urobilinogen 0 2     Protein negative     Ph 5 0     Specific Gravity 1 020     Ketone negative     Glucose negative     Color Yellow     Clarity Transparent     Leukocytes negative     Nitrite negative     Blood negative     Bilirubin negative     Urobilinogen 0 2     Protein negative     Ph 5 0     Specific Gravity 1 020     Ketone negative     Glucose negative             EKG/ECG- POC 10SBU4346 03:46PM June Olea     Test Name Result Flag Reference   EKG/ECG 6/8/2016

## 2018-01-13 NOTE — PSYCH
Risk Assessment    The following ratings are based on my interview(s) with Initial Evaluation  Risk of Harm to Self:   Demographic risk factors include , alaskan, or native [place], lowest socioeconomic class and male  Historical Risk Factors include: a relative or close friend who  by suicide, chronic psychiatric problems and self-mutilating behaviors  Recent Specific Risk Factors include: passive death wishes, made an attempt of Denied lethality, sense of hopelessness/helplessness, unable to visualize a realistic positive future, feelings of guilt or self blame, recent losses: Denied, worries about finances or work, chronic pain or health problems and diagnosis of depression  Risk of Harm to Others:   Demographic Risk Factors include: male  Recent Specific Risk Factors include: multiple stressors  Access to Weapons: The patient has access to the following weapons: [place] states there are guns and knives pocket knife ([employer]) in house  Retayo states he would never use them to hurt himself or someone else  He is going to buy a safe  The following interventions are recommended: referral to [facility]  Active Problems    1  Abdominal pain (789 00) (R10 9)   2  Acute pharyngitis (462) (J02 9)   3  Allergic rhinitis (477 9) (J30 9)   4  Ankle pain, right (719 47) (M25 571)   5  Arthralgia of right shoulder region (719 41) (M25 511)   6  Atypical chest pain (786 59) (R07 89)   7  Back muscle spasm (724 8) (M62 830)   8  Back strain (847 9) (S39 012A)   9  BMI 45 0-49 9, adult (V85 42) (Z68 42)   10  Chest pain (786 50) (R07 9)   11  Constipation (564 00) (K59 00)   12  Contact dermatitis (692 9) (L25 9)   13  Cyst of spleen (289 59) (D73 4)   14  Depression with anxiety (300 4) (F41 8)   15  Encounter for dietary counseling and surveillance (V65 3) (Z71 3)   16  Fatigue (780 79) (R53 83)   17  Foamy urine (791 9) (R82 99)   18  Gastroenteritis (558 9) (K52 9)   19   GERD without esophagitis (530 81) (K21 9)   20  Hematochezia (578 1) (K92 1)   21  Insomnia (780 52) (G47 00)   22  Low back pain (724 2) (M54 5)   23  Nausea and vomiting (787 01) (R11 2)   24  Obesity (278 00) (E66 9)   25  Pain on swallowing (787 20) (R13 10)   26  Postprandial epigastric pain (789 06) (R10 13)   27  Proteinuria (791 0) (R80 9)   28  Rectal bleeding (569 3) (K62 5)   29  Screening for lipoid disorders (V77 91) (Z13 220)   30  Skin lesion (709 9) (L98 9)   31  Skin rash (782 1) (R21)   32  Skipped heart beats (427 9) (I45 9)   33  URTI (acute upper respiratory infection) (465 9) (J06 9)   34  Vitamin D deficiency disease (268 9) (E55 9)   35  Weight gain (783 1) (R63 5)    Past Medical History    1  History of abscess of skin and subcutaneous tissue (V13 3) (Z87 2)   2  History of pilonidal cyst (V13 3) (Z87 2)   3   History of Pilonidal cyst with abscess (685 0) (L05 01)    Future Appointments    Date/Time Provider Specialty Site   12/19/2016 03:30 PM Kailash Gomez MD Family Medicine [facility]   Electronically signed by : Servando Hewitt RN; Dec 13 2016 12:49PM EST                       (Author)

## 2018-01-13 NOTE — PROGRESS NOTES
Assessment   1  Low back pain (724 2) (M54 5)   2  BMI 45 0-49 9, adult (V85 42) (Z68 42)   3  Elevated TSH (794 5) (R94 6)    Plan   Health Maintenance    · (1) ABO/RH(D); Status:Active; Requested UZD:[id number]; Low back pain    · Meloxicam 15 MG Oral Tablet; TAKE 1 TABLET DAILY WITH FOOD   · Methocarbamol 500 MG Oral Tablet; TAKE 1 TABLET Bedtime PRN Back pain   · Ketorolac Tromethamine 30 MG/ML Injection Solution   · * XR SPINE LUMBAR MINIMUM 4 VIEWS NON INJURY; Status:Active; Requested    AZS:[id number];    · Urine Dip Non-Automated- POC; Status:Complete;   Done: 99RJO9268 02:10PM   · *1 - [facility]  *  Status: Active  Requested for:    10NYF3487  Care Summary provided  : Yes    Discussion/Summary      49-year-old male presents with low back pain x2 months  was in lower back in mid spine region  started on meloxicam, methocarbamol  Did go to PT once, was given exercises to do  Pt cannot afford deductibles ibuprofen 2 days ago   the holidays, LBP improved as he was not working as much Monday, 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, back started hurting again   early from work to [employer] to do training for minute drills  After the drills, the back pain worsened  in and out of the car, bending over, lifting things, going to the bathroom worsens back pain  pain is localized and described as throbbing dull  bowel or bladder incontinence, tingling, weakness, numbness in lower extremities  I would like to order a lumbosacral spine x-ray  I will refer to Orthopedics for further evaluation  I feel patient would benefit from further physical therapy as patient only went once  No red flag signs noted  I have strongly urged patient to work on weight loss and follow up with [facility] weight loss management, Dr Kenneth Mc  is agreeable with this plan  states his work is very active and is requesting restrictions  would like a note for this   after specialist appointments or sooner if needed notify patient of x-ray results  spent 25 minutes with the patient with greater than 50 percent of the time spent on counseling  The patient was counseled regarding diagnostic results,-- instructions for management,-- risk factor reductions,-- prognosis,-- patient and family education,-- impressions,-- risks and benefits of treatment options,-- importance of compliance with treatment  Possible side effects of new medications were reviewed with the patient/guardian today  The treatment plan was reviewed with the patient/guardian  The patient/guardian understands and agrees with the treatment plan      Chief Complaint   Pt is here w/ c/o lower back pain times 2 months  All meds/allergies reviewed and updated w/ pt  History of Present Illness   HPI: 49-year-old male presents with low back pain x2 months  was in lower back in mid spine region  started on meloxicam, methocarbamol  Did go to PT once, was given exercises to do  Pt cannot afford deductibles ibuprofen 2 days ago   the holidays, LBP improved as he was not working as much Monday, 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, back started hurting again   [facility] to do training for minute drills  After the drills, the back pain worsened  in and out of the car, bending over, lifting things, going to the bathroom worsens back pain  pain is localized and described as throbbing dull  bowel or bladder incontinence, tingling, weakness, numbness in lower extremities  Review of Systems        Constitutional: recent weight gain  Gastrointestinal: no abdominal pain-- and-- no constipation  Genitourinary: no dysuria-- and-- no incontinence  Musculoskeletal: arthralgias-- and-- myalgias, but-- as noted in HPI  Neurological: no numbness-- and-- no tingling  Active Problems   1  Acute pharyngitis, unspecified etiology (462) (J02 9)   2  Allergic rhinitis (477 9) (J30 9)   3   Anxiety disorder, unspecified type (300 00) (F41 9)   4  Bilateral carpal tunnel syndrome (354 0) (G56 03)   5  BMI 45 0-49 9, adult (V85 42) (Z68 42)   6  Cyst of spleen (289 59) (D73 4)   7  Depression with anxiety (300 4) (F41 8)   8  Elevated TSH (794 5) (R94 6)   9  GERD without esophagitis (530 81) (K21 9)   10  Left otitis media (382 9) (H66 92)   11  Low back pain (724 2) (M54 5)   12  Major depressive disorder, recurrent severe without psychotic features (296 33) (F33 2)   13  Morbid obesity due to excess calories (278 01) (E66 01)   14  Numbness and tingling in both hands (782 0) (R20 0,R20 2)   15  Proteinuria (791 0) (R80 9)   16  Skipped heart beats (427 9) (I45 9)   17  Snoring (786 09) (R06 83)   18  Vitamin D deficiency disease (268 9) (E55 9)   19  Weight gain (783 1) (R63 5)    Past Medical History   1  History of Ankle pain, right (719 47) (M25 571)   2  History of Arthralgia of right shoulder region (719 41) (M25 511)   3  History of Atypical chest pain (786 59) (R07 89)   4  History of Back muscle spasm (724 8) (M62 830)   5  History of Bilateral carpal tunnel syndrome (354 0) (G56 03)   6  History of Blood typing encounter (V72 86) (Z01 83)   7  History of Cyst   8  History of Encounter for dietary counseling and surveillance (V65 3) (Z71 3)   9  History of Foamy urine (791 9) (R82 99)   10  History of Frequent bowel movements (787 99) (R19 4)   11  Denied: History of Head trauma   12  History of abdominal pain (V13 89) (Z87 898)   13  History of abscess of skin and subcutaneous tissue (V13 3) (Z87 2)   14  History of bloody stools (V12 79) (Z87 19)   15  History of chest pain (V13 89) (Z87 898)   16  History of constipation (V12 79) (Z87 19)   17  History of contact dermatitis (V13 3) (Z87 2)   18  History of fatigue (V13 89) (Z87 898)   19  History of gastroenteritis (V12 79) (Z87 19)   20  History of gastroenteritis (V12 79) (Z87 19)   21  History of influenza vaccination (V49 89) (Z92 29)   22   History of insomnia (V13 89) (Z87 898)   23  History of nausea (V12 79) (Z87 898)   24  History of nausea and vomiting (V12 79) (Z87 898)   25  History of odynophagia (V15 89) (Z87 898)   26  History of pilonidal cyst (V13 3) (Z87 2)   27  History of rectal bleeding (V12 79) (Z87 19)   28  History of strain of back (V13 59) (Z87 39)   29  History of tonsillitis (V12 69) (Z87 09)   30  History of Pilonidal cyst with abscess (685 0) (L05 01)   31  History of Postprandial epigastric pain (789 06) (R10 13)   32  History of Screening for lipoid disorders (V77 91) (Z13 220)   33  Denied: History of Seizure   34  History of Skin lesion (709 9) (L98 9)   35  History of Skin lesion (709 9) (L98 9)   36  History of Skin rash (782 1) (R21)   37  History of URTI (acute upper respiratory infection) (465 9) (J06 9)    Family History   Mother    1  Family history of depression (V17 0) (Z81 8)   2  Family history of Father  At Age 48   1  Family history of Obesity   4  Family history of Stroke Syndrome (V17 1)  Father    5  Family history of Alcohol Abuse   6  Family history of Father  At Age ___   9  Family history of Hepatic Failure   8  Family history of Hypertension (V17 49)  Brother    5  Family history of depression (V17 0) (Z81 8)  Maternal Uncle    10  Family history of depression (V17 0) (Z81 8)    Social History    · Daily caffeine consumption   · Education Level: Some college   · Employed   ·    · Never A Smoker   · Never Drank Alcohol    Surgical History   1  History of Foot Surgery   2  History of Incision And Drainage Of Pilonidal Cyst    Current Meds    1  BuPROPion HCl ER (XL) 150 MG Oral Tablet Extended Release 24 Hour; Take 1 tablet     daily; Therapy: 05BEY0335 to (Evaluate:05Kpp8164)  Requested for: 84Wbh4580; Last     Rx:62Wwb2208 Ordered   2  Fluticasone Propionate 50 MCG/ACT Nasal Suspension; USE 2 SPRAYS IN EACH     NOSTRIL ONCE DAILY;      Therapy: 79NZL7297 to (Last SA:93NSV3202)  Requested for: 049 51 30 85 Ordered   3  One-A-Day Mens Oral Tablet; Therapy: 37Pfu5894 to Recorded   4  ProAir  (90 Base) MCG/ACT Inhalation Aerosol Solution; INHALE 1 TO 2 PUFFS     EVERY 4 TO 6 HOURS AS NEEDED; Therapy: 84GHA7038 to (Last SH:30TEN1189)  Requested for: 61ALP2450 Ordered   5  Sertraline HCl - 100 MG Oral Tablet; Take 1 tablet daily; Therapy: 77PFM5824 to (Evaluate:68Wnm5030)  Requested for: 21Mmz4933; Last     Rx:97Dix1497 Ordered   6  Vitamin D 2000 UNIT Oral Capsule; take 1 capsule daily; Therapy: 68WWD3308 to (Last Rx:17Mar2015)  Requested for: 27DFZ4871 Ordered    Allergies   1  No Known Drug Allergies    Vitals    Recorded: 88MOJ0511 12:26PM Recorded: 44KPQ1548 11:44AM   Temperature  96 7 F   Heart Rate  72   Respiration  16   Systolic 356 699   Diastolic 86 90   Height  6 ft 0 5 in   Weight  371 lb    BMI Calculated  49 63   BSA Calculated  2 78     Physical Exam        Constitutional      General appearance: No acute distress, well appearing and well nourished  Ears, Nose, Mouth, and Throat      Oropharynx: Normal with no erythema, edema, exudate or lesions  Pulmonary      Respiratory effort: No increased work of breathing or signs of respiratory distress  Auscultation of lungs: Clear to auscultation, equal breath sounds bilaterally, no wheezes, no rales, no rhonci  Cardiovascular      Auscultation of heart: Normal rate and rhythm, normal S1 and S2, without murmurs  Abdomen      Abdomen: Non-tender, no masses  Liver and spleen: No hepatomegaly or splenomegaly  Lymphatic      Palpation of lymph nodes in neck: No lymphadenopathy  Musculoskeletal Minimally tender palpation of low mid back  motor strength 5/5  Neurologic      Cranial nerves: Cranial nerves 2-12 intact         Psychiatric      Mood and affect: Normal           Results/Data   Urine Dip Non-Automated- POC 14XFG4860 02:10PM Jese Argueta      Test Name Result Flag Reference   Color Yellow     Clarity Transparent     Leukocytes -     Nitrite -     Blood -     Bilirubin -     Urobilinogen 0 2     Protein -     Ph 8 0     Specific Gravity 1 000     Ketone -     Glucose -          Future Appointments      Date/Time Provider Specialty Site   01/17/2018 11:20 AM Pool [facility] [place]     Signatures    Electronically signed by : Julio Baker MD; Jan 12 2018  6:28PM EST                       (Author)

## 2018-01-13 NOTE — RESULT NOTES
Message   can you please let pt know that his holter monitor was overall normal  Thank you     Verified Results  HOLTER MONITOR - 24 HOUR 09SAL1952 02:40PM Ramandeep Moore     Test Name Result Flag Reference   HOLTER MONITOR - 24 HOUR (Report)     Indication: conduction disorder     Patient was monitored for a total of 24 hours from 2:54 PM on 6/28/2016  A total of 032030 beats were monitored  The image quality was Good  The predominant rhythm was Normal sinus rhythm  Average heart rate was 78 bpm  Minimum heart rate was 45 bpm which was Sinus bradycardia at 4:28 AM  Maximum heart rate was 130 bpm which was Sinus tachycardia at 7:37 PM       There was a total of 2 hours of tachycardia which was Sinus tachycardia  There was a total of 3 hours of bradycardia which was Sinus bradycardia at night  Ventricular ectopy consisted of One PVC only  Supraventricular ectopy consisted of 32 PACs only  There was no Atrial fibrillation during the monitored period  There were no other arrhythmias other than as described above  A symptom diary was not returned for correlation  Impression:     Overall Normal 24 hrs of holter monitoring  Predominant rhythm was Normal sinus rhythm  Normal diurnal variation of heart rate   Minimal supraventricular ectopy during the monitored period  Minimal ventricular ectopy during the monitored period  A symptom diary was not returned for correlation         Signatures   Electronically signed by : Dillan Macdonald MD; Jul 6 2016 12:17PM EST                       (Author)

## 2018-01-13 NOTE — PSYCH
History of Present Illness    Pre-morbid level of function and History of Present Illness:  Maya Aleman is a 32year old male with depression and anxiety referred by Leo Milton LCSW after he was seen in the outpatient because patient is feeling very anxious and depressed for some time but recently has become more difficult to manage  He has increased in vague suicidal thoughts about been better off dead and not wanted to live like that any longer, denies any plan or intent  He has been under lot of stress due to financial and his wife medical issues  He has poor concentration and sleep disturbances  He is in medical leave from his job at this moment  Today he feels very anxious, low energy level, he denies suicidal thoughts, denies any plan or intent, he denies any homicidal ideas, plan or intent, denies any psychotic symptoms  He denies any history of manic or hypomanic episodes  His PHQ-9 is 25  Reason for evaluation and partial hospitalization as an alternative to inpatient hospitalization:   PHP is medically necessary to prevent IP admission as depressive S/S are not stabilizing in OP level of care  Milieu therapy to address wellness tools, monitor medication, connection to supports and explore stressors in group therapy, case management and psychiatric medication monitoring  Previous Psychiatric/psychological treatment/year: None  Current Psychiatrist/Therapist: [facility] - Kymberly Altamirano, OP Counselor - Patient is going to return to her  Outpatient and/or Partial and Other [employer] Used ([facility], ICM, [facility]NA): Inpatient: Denied , Outpatient: See Above - In past saw someone OP therapist for anger issues  and Partial: Denied   Problem Assessment:   SOCIAL/VOCATION:   Family Constellation (include parents, relationship with each and pertinent Psych/Medical History):    Mother: Fransisca Hart - Lives with patient - Works at [employer] with developmentally delayed children and adults    Spouse: Issac - 16 underwent spinal fusion for fractured spine - walking now - not working now    Father:  of liver failure  - Alcoholism, depression and anxiety    Children: Denied    Sibling: Brothers; 2 older - Opal Robertson is the youngest  Second oldest is supportive but oldest brother not talking to him for a while because he is "an addict" on house arrest and Opla Robertson was helping him out and he OD'd on Easter and they are not speaking    The patient relates best to Combination of four people; wife, mother, Bhavna Carrizales and friend Gio Kohler He lives with wife and mother  He does not live alone  Domestic Violence: No past history of domestic violence  The patient is not currently experiencing domestic violence  There is not suspected domestic violence  There is no history of child abuse  Denied  There is no history of sexual abuse  Denied  If yes, options/resources discussed  Denied   Additional Comments related to family/relationships/peer support: Recently bought a house with his mother and wife  All three live in home together  Wife and mother get along well  Finances are very tight due to purchase of new home and furniture and wife not presently working (recovering from back surgery  )  School or Work History (strengths/limitations/needs: Would like to return to college  His highest grade level achieved was  graduated from high school, Was attending college for psychology  Wanted to be a counselor  Wants to return to college   history includes Denied  Financial status includes A little strained on finances being he is on disability and wife not working   LEISURE ASSESSMENT (Include past and present hobbies/interests and level of involvement (Ex: Group/Club Affiliations): Poetry, music, video games, working out but not now, being with friends, wants to train for wrestling -did this in HS  Lack of energy  Sits around watches TV and tries not to spend money  His primary language is  [place]   Ethnic considerations are   Religions affiliations and level of involvement - Angelic but spiritual   Spirituality and anne have not helped him cope with difficult situations in his life  FUNCTIONAL STATUS: There has been a recent change in the patient's ability to do the following:  He does not need [employer]  Level of Assistance Needed/By Whom?: Independent  SUBSTANCE ABUSE ASSESSMENT: no current substance abuse and no past substance abuse  Substance/Route/Age/Amount/Frequency/Last Use: Denied  Denied  HEALTH ASSESSMENT: He has lost 10 lbs or more in the last 6 months without trying  He has not had decreased appetite for 5 days or more  He has not gained 10 lbs or more in the last 6 months without trying  no nausea  no vomiting  diarrhea  no referral to PCP needed  no referral to nutritionist needed  no pregnancy  He is not receiving prenatal care  not referred to PCP  Current PCP: Dr Hansen Rather  PCP notified  LEGAL: No Mental Health Advance Directive or Power of  on file  He does not want an information packet about Mental Health Advance Directives  Diagnosis and Treatment Plan explained to patient, patient relates understanding diagnosis and is agreeable to Treatment Plan  Prognosis: Strengths - Sense of humor, willing to work on goals in Program      Weakness - To decrease depression, anxiety and stress and manage it better  Active Problems    1  Abdominal pain (789 00) (R10 9)   2  Acute pharyngitis (462) (J02 9)   3  Allergic rhinitis (477 9) (J30 9)   4  Ankle pain, right (719 47) (M25 571)   5  Arthralgia of right shoulder region (719 41) (M25 511)   6  Atypical chest pain (786 59) (R07 89)   7  Back muscle spasm (724 8) (M62 830)   8  Back strain (847 9) (S39 012A)   9  BMI 45 0-49 9, adult (V85 42) (Z68 42)   10  Chest pain (786 50) (R07 9)   11  Constipation (564 00) (K59 00)   12  Contact dermatitis (692 9) (L25 9)   13   Cyst of spleen (289 59) (D73 4)   14  Depression with anxiety (300 4) (F41 8)   15  Encounter for dietary counseling and surveillance (V65 3) (Z71 3)   16  Fatigue (780 79) (R53 83)   17  Foamy urine (791 9) (R82 99)   18  Gastroenteritis (558 9) (K52 9)   19  GERD without esophagitis (530 81) (K21 9)   20  Hematochezia (578 1) (K92 1)   21  Insomnia (780 52) (G47 00)   22  Low back pain (724 2) (M54 5)   23  Nausea and vomiting (787 01) (R11 2)   24  Obesity (278 00) (E66 9)   25  Pain on swallowing (787 20) (R13 10)   26  Postprandial epigastric pain (789 06) (R10 13)   27  Proteinuria (791 0) (R80 9)   28  Rectal bleeding (569 3) (K62 5)   29  Screening for lipoid disorders (V77 91) (Z13 220)   30  Skin lesion (709 9) (L98 9)   31  Skin rash (782 1) (R21)   32  Skipped heart beats (427 9) (I45 9)   33  URTI (acute upper respiratory infection) (465 9) (J06 9)   34  Vitamin D deficiency disease (268 9) (E55 9)   35  Weight gain (783 1) (R63 5)    Past Medical History    1  History of abscess of skin and subcutaneous tissue (V13 3) (Z87 2)   2  History of pilonidal cyst (V13 3) (Z87 2)   3  History of Pilonidal cyst with abscess (685 0) (L05 01)    Allergies    1  No Known Drug Allergies    Current Meds   1  Amoxicillin-Pot Clavulanate 875-125 MG Oral Tablet; TAKE 1 TABLET EVERY 12 HOURS   DAILY; Therapy: 93FVN2313 to (Evaluate:41Zsz5770)  Requested for: 77IMM2627; Last   Rx:22Pzf7978 Ordered   2  Fluticasone Propionate 50 MCG/ACT Nasal Suspension; USE 2 SPRAYS IN EACH   NOSTRIL ONCE DAILY; Therapy: 49UQZ0479 to (Last Rx:21Oct2015)  Requested for: 21Oct2015 Ordered   3  Meloxicam 15 MG Oral Tablet; TAKE 1 TABLET DAILY WITH FOOD; Therapy: 89HOU3385 to (Complete:20Tmf6116); Last Rx:03Dec2016 Ordered   4  Methocarbamol 500 MG Oral Tablet; one po q hs;   Therapy: 52FHJ9956 to (Last Rx:03Dec2016) Ordered   5  Omeprazole 40 MG Oral Capsule Delayed Release; take 1 capsule daily;    Therapy: 82YFD9014 to (Evaluate:30Mar2017)  Requested for: 45DXK4630; Last   Rx:2016 Ordered   6  One-A-Day Mens Oral Tablet; Therapy: 68Mxk1211 to Recorded   7  Sertraline HCl - 100 MG Oral Tablet; Take 1 tablet daily; Therapy: 21KLG1066 to (Last Rx:76Dax3099) Ordered   8  Sertraline HCl - 50 MG Oral Tablet; TAKE 1/2 TABLET DAILY for 1 week then take 1 tab   daily; Therapy: 49GFG1296 to (Last Rx:2016)  Requested for: 35Ixc0222; Status: ACTIVE   - Renewal Denied Ordered   9  Vitamin D 2000 UNIT Oral Capsule; take 1 capsule daily; Therapy: 23ZTG7013 to (Last Rx:2015)  Requested for: 78IJU4828 Ordered    Family Psych History  Mother    1  Family history of Father  At Age 50   2  Family history of Obesity   3  Family history of Stroke Syndrome (V17 1)  Father    4  Family history of Alcohol Abuse   5  Family history of Father  At Age ___   10  Family history of Hepatic Failure   7  Family history of Hypertension (V17 49)    Social History    · Never A Smoker   · Never Drank Alcohol    Vitals  Signs   Recorded: 2016 10:12AM   Temperature: 97 9 F, Oral  Heart Rate: 67, L Radial  Pulse Quality: Regular, L Radial  Respiration Quality: Normal  Respiration: 18  Systolic: 148, LUE, Sitting  Diastolic: 85, LUE, Sitting  Height: 6 ft 1 in  Weight: 351 lb   BMI Calculated: 46 31  BSA Calculated: 2 73    Assessment    1  Anxiety disorder, unspecified type (300 00) (F41 9)   2  Family history of depression (V17 0) (Z81 8) : Brother, Maternal Uncle, Mother   3  Education Level: Some college   4  Daily caffeine consumption   5  Employed   6       Future Appointments    Date/Time Provider Specialty Site   2016 03:30 PM Stan Leiva MD Family Medicine [address] [place] [address]     Signatures   Electronically signed by : Colt Barnhart RN; Dec 13 2016 12:53PM EST                       (Author)    Electronically signed by : Colt Barnhart RN; Dec 13 2016  2:32PM EST                       (Author)    Electronically signed by : Bhavna Blanchard MARCIANO Colón ; Dec 13 2016  3:55PM EST                       (Author)

## 2018-01-13 NOTE — CONSULTS
Chief Complaint  Chief Complaint Free Text Note Form: Patient is here today for MWM consultation  Stop Ban/8  History of Present Illness  Free Text HPI: Obesity-  Severity: Severe  Onset: Since childhood  Modifiers: Worse with emotional eating, worse with night shift  Associated Symptoms: Get short of breath with activity feels tired  Past Medical History    1  Denied: History of Head trauma   2  History of abscess of skin and subcutaneous tissue (V13 3) (Z87 2)   3  History of gastroenteritis (V12 79) (Z87 19)   4  History of pilonidal cyst (V13 3) (Z87 2)   5  History of Pilonidal cyst with abscess (685 0) (L05 01)   6  Denied: History of Seizure   7  History of Skin lesion (709 9) (L98 9)  Active Problems And Past Medical History Reviewed: The active problems and past medical history were reviewed and updated today  Assessment    1  Morbid obesity due to excess calories (278 01) (E66 01)   2  Depression with anxiety (300 4) (F41 8)   3  GERD without esophagitis (530 81) (K21 9)   4  Weight gain (783 1) (R63 5)    Discussion/Summary  Discussion Summary:   26-year-old male with depression/anxiety, GERD and morbid obesity here to pursue medical weight management to improve weight and health  Obesity class 3:  -discussed options of conservative vs MAXIMO program +/- meal replacement vs VLCD and bariatric surgery  -initial focus of 5-10% weight loss over 3-6 mos for improved health  -screening labs-labs nml from 2016, consider insulin level    Depression/anxiety:  -on welbutrin and zoloft    GERD:  -on PPI  -diet changes and weight loss may help    Patient is interested in very low calorie diet  We will set up visit with dietitian  Patient's Capacity to Self-Care: Patient is able to Self-Care  Understands and agrees with treatment plan: The treatment plan was reviewed with the patient/guardian   The patient/guardian understands and agrees with the treatment plan      Review of Systems  Focused-Male:   Constitutional: feeling tired  ENT: no sore throat  Cardiovascular: chest pain  Respiratory: shortness of breath during exertion  Gastrointestinal: constipation  Genitourinary: no dysuria  Musculoskeletal: arthralgias  Integumentary: a rash  Neurological: headache  Other Symptoms: Psych:+ Depression/anxiety  Active Problems    1  Abdominal pain (789 00) (R10 9)   2  Acute pharyngitis (462) (J02 9)   3  Allergic rhinitis (477 9) (J30 9)   4  Ankle pain, right (719 47) (M25 571)   5  Anxiety disorder, unspecified type (300 00) (F41 9)   6  Arthralgia of right shoulder region (719 41) (M25 511)   7  Atypical chest pain (786 59) (R07 89)   8  Back muscle spasm (724 8) (M62 830)   9  Back strain (847 9) (S39 012A)   10  Blood typing encounter (V72 86) (Z01 83)   11  BMI 45 0-49 9, adult (V85 42) (Z68 42)   12  Chest pain (786 50) (R07 9)   13  Constipation (564 00) (K59 00)   14  Contact dermatitis (692 9) (L25 9)   15  Cough (786 2) (R05)   16  Cyst   17  Cyst of spleen (289 59) (D73 4)   18  Depression with anxiety (300 4) (F41 8)   19  Encounter for dietary counseling and surveillance (V65 3) (Z71 3)   20  Fatigue (780 79) (R53 83)   21  Foamy urine (791 9) (R82 99)   22  Frequent bowel movements (787 99) (R19 4)   23  Gastroenteritis (558 9) (K52 9)   24  GERD without esophagitis (530 81) (K21 9)   25  Hematochezia (578 1) (K92 1)   26  Insomnia (780 52) (G47 00)   27  Low back pain (724 2) (M54 5)   28  Major depressive disorder, recurrent severe without psychotic features (296 33) (F33 2)   29  Morbid obesity due to excess calories (278 01) (E66 01)   30  Nausea (787 02) (R11 0)   31  Nausea and vomiting (787 01) (R11 2)   32  Need for prophylactic vaccination and inoculation against influenza (V04 81) (Z23)   33  Numbness and tingling in both hands (782 0) (R20 0,R20 2)   34  Pain on swallowing (787 20) (R13 10)   35   Postprandial epigastric pain (789 06) (R10 13) 36  Proteinuria (791 0) (R80 9)   37  Rectal bleeding (569 3) (K62 5)   38  Screening for lipoid disorders (V77 91) (Z13 220)   39  Skin lesion (709 9) (L98 9)   40  Skin rash (782 1) (R21)   41  Skipped heart beats (427 9) (I45 9)   42  Snoring (786 09) (R06 83)   43  Tonsillitis (463) (J03 90)   44  URTI (acute upper respiratory infection) (465 9) (J06 9)   45  Vitamin D deficiency disease (268 9) (E55 9)   46  Weight gain (783 1) (R63 5)    Surgical History    1  Encounter for dietary counseling and surveillance (V65 3) (Z71 3)   2  History of Foot Surgery   3  History of Incision And Drainage Of Pilonidal Cyst  Surgical History Reviewed: The surgical history was reviewed and updated today  Family History  Mother    1  Family history of depression (V17 0) (Z81 8)   2  Family history of Father  At Age 48   1  Family history of Obesity   4  Family history of Stroke Syndrome (V17 1)  Father    5  Family history of Alcohol Abuse   6  Family history of Father  At Age ___   9  Family history of Hepatic Failure   8  Family history of Hypertension (V17 49)  Brother    5  Family history of depression (V17 0) (Z81 8)  Maternal Uncle    10  Family history of depression (V17 0) (Z81 8)  Family History Reviewed: The family history was reviewed and updated today  Social History    · Daily caffeine consumption   · Education Level: Some college   · Employed   ·    · Never A Smoker   · Never Drank Alcohol  Social History Reviewed: The social history was reviewed and updated today  Current Meds   1  BuPROPion HCl ER (XL) 150 MG Oral Tablet Extended Release 24 Hour; Take 1 tablet   daily; Therapy: 51CUN2390 to (Evaluate:80Wey2337)  Requested for: 14MDC1222; Last   Rx:86Car2309 Ordered   2  Dicyclomine HCl - 10 MG Oral Capsule; TAKE 1 CAPSULE 3 times daily PRN; Therapy: 65AZB8924 to (Evaluate:18Wvi5224)  Requested for: 41Lnt3909; Last   Rx:2017 Ordered   3   Fluticasone Propionate 50 MCG/ACT Nasal Suspension; USE 2 SPRAYS IN EACH   NOSTRIL ONCE DAILY; Therapy: 46RER2256 to (Last Rx:55Fwx6475)  Requested for: 21Oct2015 Ordered   4  Omeprazole 20 MG Oral Capsule Delayed Release; take 1 capsule by mouth every   morning BEFORE BREAKFAST; Therapy: 27DSI3240 to (Evaluate:01Gqs6374)  Requested for: 92JTS4162; Last   Rx:17Mar2017 Ordered   5  Ondansetron 4 MG Oral Tablet Dispersible; TAKE 1 TABLET EVERY 8 HOURS AS   NEEDED FOR NAUSEA; Therapy: 15Jal7764 to (Last Rx:14Zrv0838)  Requested for: 43Akm0826 Ordered   6  One-A-Day Mens Oral Tablet; Therapy: 46Dmb4713 to Recorded   7  ProAir  (90 Base) MCG/ACT Inhalation Aerosol Solution; INHALE 1 TO 2 PUFFS   EVERY 4 TO 6 HOURS AS NEEDED; Therapy: 34DDL0424 to (Last MZ:01ZAR5200)  Requested for: 72OJV2286 Ordered   8  Sertraline HCl - 100 MG Oral Tablet; Take 1 tablet daily; Therapy: 70GPX7382 to (Evaluate:06Nov2017)  Requested for: 24FZF3446; Last   Rx:61Zki5956 Ordered   9  Vitamin D 2000 UNIT Oral Capsule; take 1 capsule daily; Therapy: 49DPZ3272 to (Last Rx:17Mar2015)  Requested for: 17RXU1014 Ordered    Allergies    1  No Known Drug Allergies    Vitals  Vital Signs    Recorded: 04MAI1739 01:36PM   Temperature 97 1 F, Tympanic    Heart Rate 76    Systolic 340, LUE, Sitting    Diastolic 64, LUE, Sitting    Height 6 ft 0 5 in    Weight 356 lb 8 0 oz    BMI Calculated 47 69    BSA Calculated 2 74    Waist Circumference  56 in  Neck Circumference  18 in  Physical Exam    Constitutional   General appearance: Abnormal   well developed and morbidly obese  Eyes No conjunctival pallor  Ears, Nose, Mouth, and Throat Moist oral mucosa  Pulmonary   Respiratory effort: No increased work of breathing or signs of respiratory distress  Cardiovascular   Auscultation of heart: Normal rate and rhythm, normal S1 and S2, without murmurs  Abdomen   Abdomen: Abnormal   The abdomen was obese  The abdomen was soft and nontender  Musculoskeletal   Gait and station: Normal     Psychiatric   Orientation to person, place and time: Normal     Mood and affect: Normal          Results/Data  STOP BANG Questionnaire [id number] 01:48PM User, Ahs     Test Name Result Flag Reference   STOP BANG Questionnaire Risk of JIMI Intermediate Risk     Snoring: No  Tired: Yes  Observed: No  Blood Pressure: No  BMI: Yes  Age: No  Neck Circumference: Yes  Gender: Yes   STOP BANG Questionnaire JIMI Total Score 4     Snoring: No  Tired: Yes  Observed: No  Blood Pressure: No  BMI: Yes  Age: No  Neck Circumference: Yes  Gender: Yes       Signatures   Electronically signed by : MARCIANO Berg ; May 11 2017  2:47PM EST                       (Author)

## 2018-01-13 NOTE — RESULT NOTES
Message   spoke with pt re  ct abd/pelvis whuch shows cyst vs lymphangioma of the spleen  I would like patient to schedule appointment with GI, Dr Venus Haines for further assessment of this  Patient is agreeable and will schedule an appointment     Verified Results  * CT ABDOMEN PELVIS W CONTRAST 21Jun2016 02:50PM Robyn Batch ORDER NUMBER: [id number]     Test Name Result Flag Reference   CT ABDOMEN PELVIS W CONTRAST (Report)     CT ABDOMEN AND PELVIS WITH IV CONTRAST     INDICATION: Left lower quadrant pain for about a year     COMPARISON: None  TECHNIQUE: CT examination of the abdomen and pelvis was performed after the administration of intravenous contrast  This examination, like all CT scans performed in the [facility], was performed utilizing techniques to minimize    radiation dose exposure, including the use of iterative reconstruction and automated exposure control  Axial, sagittal, and coronal reformatted images were submitted for interpretation  100 cc of intravenous Omnipaque 350 was administered for this    examination  Enteric contrast was administered  FINDINGS:     ABDOMEN     LOWER CHEST: No significant abnormalities identified in the lower chest      LIVER/BILIARY TREE: Unremarkable  GALLBLADDER: No calcified gallstones  No pericholecystic inflammatory change  SPLEEN: Towards the lower margin of the spleen there is a small round low-density lesion which measures 12 mm and is probably a cyst or lymphangioma or other benign type lesion  It is circumscribed  PANCREAS: Unremarkable  ADRENAL GLANDS: Unremarkable  KIDNEYS/URETERS: There are no mass lesions  There is no hydronephrosis or nephrolithiasis  The left kidney has more renal sinus fat than the right  This is probably not of any clinical significance  STOMACH AND BOWEL: Unremarkable  APPENDIX: A normal appendix was visualized       ABDOMINOPELVIC CAVITY: No ascites or free intraperitoneal air  No lymphadenopathy  VESSELS: Unremarkable for patient's age  PELVIS     REPRODUCTIVE ORGANS: Unremarkable for patient's age  URINARY BLADDER: Empty  ABDOMINAL WALL/INGUINAL REGIONS: Unremarkable  OSSEOUS STRUCTURES: No acute fracture or destructive osseous lesion  IMPRESSION:     No suspicious findings  Small cyst or lymphangioma inferiorly in the spleen         Workstation performed: [id number]     Signed by:   Ibrahima Grove MD   6/23/16       Signatures   Electronically signed by : See Correia MD; Jun 24 2016  4:23PM EST                       (Author)

## 2018-01-13 NOTE — PSYCH
History of Present Illness  [facility] Clinical Progress Note [facility]:   Specialized Services Documentation - Therapist must complete separate progress note for each specific clinical activity in which the client participated during the day  [phone]) Group Psychotherapy: 10:45-11:45 Deidre Cheng participated in psychotherapy group focused on managing anxiety during the holidays and communicating with supports  Deidre Cheng identified his financing and needing to return to work as a stressor  He was mostly quiet throughout group discussion, but did relate to peers regarding topic of people being insensitive at times  No progress noted toward goals  Continue psychotherapy group to encourage Deidre Cheng to explore stressors and coping  Treatment Plan Problem(s): 1 1, 1 2, 1 4  Anton Velez MSW, LSW     (532) Group Psychotherapy: [phone] Deidre Cheng participated in wellness group focused on talking about your mental health difficulties to friends, family, peers, or neighbors  Deidre Cheng shared that he feels he can discuss his recovery with his wife primarily and perhaps his mother, who Deidre Cheng and his wife share one home with  Deidre Cheng participated only briefly in discussion about educating supports on potential relapse yet appeared to be listening to education although distracted at times during group  Deidre Cheng made slow progress toward goals  Continue group to explore challenges on what patient feels comfortable in sharing with supports and other individuals and how to handle challenging situations regarding sharing information on your diagnosis and treatment and boundary setting  Treatment Plan Problem(s): 1 1,1 2,1 4  Alva Steele RN       (072) Education Therapy Goals set - work on poetry book    Treatment Plan Problem(s): 1 2  Education Therapy Time - 0900 - 0930 Previous goal was not met  Readiness to Learning:  He is receptive to learning  There are  no barriers to learning    Learning Assessment Time - 8710 - 1400   Education completed on  illness and wellness tools  The teaching method was  verbal  Shared area of learning: Yes  GERALDINE Ulrich     (597) Allied Therapy [id number] Wilber Leóntt moderately shared in [facility] group focused on relaxation techniques and anxiety reduction  He was not observed to be engaged in therapist led relaxation exercises and he did not share feedback (he sat quietly with eyes closed)  Group discussed specific items that could help self soothe if in an âanxiety crisisâ with encouragement to use these things regularly to manage stressors consistently  When prompted he stated he benefited from the muscle relaxation exercise  Slow effort noted toward tx goal  Continue AT to encourage development of wellness skills and consistent practice  Treatment Plan Problem(s): 1 2  GERALDINE Ulrich       Case Management Note:   [phone] Wilberedwin Hope met with this CM briefly to review progress in Program  Wilber Hope discussed that he did not have a good day prior and was absent from Program on Monday due to his dog being injured and that he could not be left alone  Discussed investment in working goals at [facility] and only two days of treatment remain with D/C planned for Friday, 12/23/16  Wilberedwin Leóntt denied any suicidal or homicidal ideas and denied any psychotic or manic symptoms  He stated that he is taking medication as prescribed and denied any SE  He stated that his wife has returned from [facility] and that his mood is "better " Wilberedwin Leóntt stated that he has only worked "a little" on URX and will work more on URX this evening  TREATMENT SESSION NUMBER: 6   Current suicide risk is low  Medications not changed/added/denied  Kannan Arellano RN      Active Problems    1  Abdominal pain (789 00) (R10 9)   2  Acute pharyngitis (462) (J02 9)   3  Allergic rhinitis (477 9) (J30 9)   4  Ankle pain, right (719 47) (M25 571)   5  Anxiety disorder, unspecified type (300 00) (F41 9)   6   Arthralgia of right shoulder region (719 41) (M25 511)   7  Atypical chest pain (786 59) (R07 89)   8  Back muscle spasm (724 8) (M62 830)   9  Back strain (847 9) (S39 012A)   10  BMI 45 0-49 9, adult (V85 42) (Z68 42)   11  Chest pain (786 50) (R07 9)   12  Constipation (564 00) (K59 00)   13  Contact dermatitis (692 9) (L25 9)   14  Cyst of spleen (289 59) (D73 4)   15  Depression with anxiety (300 4) (F41 8)   16  Encounter for dietary counseling and surveillance (V65 3) (Z71 3)   17  Fatigue (780 79) (R53 83)   18  Foamy urine (791 9) (R82 99)   19  Gastroenteritis (558 9) (K52 9)   20  GERD without esophagitis (530 81) (K21 9)   21  Hematochezia (578 1) (K92 1)   22  Insomnia (780 52) (G47 00)   23  Low back pain (724 2) (M54 5)   24  Major depressive disorder, recurrent severe without psychotic features (296 33) (F33 2)   25  Nausea and vomiting (787 01) (R11 2)   26  Obesity (278 00) (E66 9)   27  Pain on swallowing (787 20) (R13 10)   28  Postprandial epigastric pain (789 06) (R10 13)   29  Proteinuria (791 0) (R80 9)   30  Rectal bleeding (569 3) (K62 5)   31  Screening for lipoid disorders (V77 91) (Z13 220)   32  Skin lesion (709 9) (L98 9)   33  Skin rash (782 1) (R21)   34  Skipped heart beats (427 9) (I45 9)   35  URTI (acute upper respiratory infection) (465 9) (J06 9)   36  Vitamin D deficiency disease (268 9) (E55 9)   37  Weight gain (783 1) (R63 5)    Past Medical History    1  Denied: History of Head trauma   2  History of abscess of skin and subcutaneous tissue (V13 3) (Z87 2)   3  History of pilonidal cyst (V13 3) (Z87 2)   4  History of Pilonidal cyst with abscess (685 0) (L05 01)   5  Denied: History of Seizure    Allergies    1  No Known Drug Allergies    Current Meds   1  Amoxicillin-Pot Clavulanate 875-125 MG Oral Tablet; TAKE 1 TABLET EVERY 12 HOURS   DAILY; Therapy: 03AWQ8278 to (Evaluate:83Twq2307)  Requested for: 54JDQ6177; Last   Rx:25Win5108 Ordered   2  BuPROPion HCl ER (XL) 150 MG Oral Tablet Extended Release 24 Hour;  Take 1 tablet   daily; Therapy: 47TIE8608 to (Evaluate:2017)  Requested for: 20Twa1366; Last   Rx:62Zwa6891 Ordered   3  Fluticasone Propionate 50 MCG/ACT Nasal Suspension; USE 2 SPRAYS IN EACH   NOSTRIL ONCE DAILY; Therapy: 42LGN4229 to (Last Rx:2015)  Requested for: 2015 Ordered   4  Meloxicam 15 MG Oral Tablet; TAKE 1 TABLET DAILY WITH FOOD; Therapy: 20SZP3286 to (Complete:2017); Last Rx:65Gqd4695 Ordered   5  Methocarbamol 500 MG Oral Tablet; one po q hs;   Therapy: 42XSQ7281 to (Last Rx:60Plq1417) Ordered   6  Omeprazole 40 MG Oral Capsule Delayed Release; take 1 capsule daily; Therapy: 11ZFA4530 to (Evaluate:2017)  Requested for: 40IBK6234; Last   Rx:2016 Ordered   7  One-A-Day Mens Oral Tablet; Therapy: 80Uwt5440 to Recorded   8  Sertraline HCl - 100 MG Oral Tablet; Take 1 tablet daily; Therapy: 82PTD8228 to (Last Rx:26Ekg4291) Ordered   9  Sertraline HCl - 50 MG Oral Tablet; TAKE 1/2 TABLET DAILY for 1 week then take 1 tab   daily; Therapy: 18JMQ7346 to (Last Rx:2016)  Requested for: 10Flh2938; Status: ACTIVE   - Renewal Denied Ordered   10  Vitamin D 2000 UNIT Oral Capsule; take 1 capsule daily; Therapy: 08AVT7041 to (Last Rx:2015)  Requested for: 19SRW9136 Ordered    Family Psych History  Mother    1  Family history of depression (V17 0) (Z81 8)   2  Family history of Father  At Age 48   1  Family history of Obesity   4  Family history of Stroke Syndrome (V17 1)  Father    5  Family history of Alcohol Abuse   6  Family history of Father  At Age ___   9  Family history of Hepatic Failure   8  Family history of Hypertension (V17 49)  Brother    5  Family history of depression (V17 0) (Z81 8)  Maternal Uncle    10   Family history of depression (V17 0) (Z81 8)    Social History    · Daily caffeine consumption   · Education Level: Some college   · Employed   ·    · Never A Smoker   · Never Drank Alcohol    Future Appointments    Date/Time Provider Specialty Site   12/22/2016 10:00 AM MARCIANO Quinn  Psychiatry  [facility]   12/23/2016 10:00 AM MARCIANO Quinn   Psychiatry [facility]   12/30/2016 02:30 PM López Almonte MD Family Medicine   [address]   Electronically signed by : GERALDINE Ramirez; Dec 21 2016  2:11PM EST                       (Author)    Electronically signed by : COLIN Kincaid; Dec 21 2016  2:30PM EST                       (Author)    Electronically signed by : Lizy Stephens RN; Dec 22 2016 10:02AM EST                       (Author)    Electronically signed by : Lizy Stephens RN; Dec 22 2016 10:09AM EST                       (Author)

## 2018-01-14 VITALS
HEART RATE: 86 BPM | TEMPERATURE: 98.2 F | WEIGHT: 315 LBS | RESPIRATION RATE: 16 BRPM | BODY MASS INDEX: 41.75 KG/M2 | HEIGHT: 73 IN | SYSTOLIC BLOOD PRESSURE: 142 MMHG | DIASTOLIC BLOOD PRESSURE: 84 MMHG

## 2018-01-14 VITALS
SYSTOLIC BLOOD PRESSURE: 120 MMHG | HEIGHT: 73 IN | TEMPERATURE: 97.1 F | DIASTOLIC BLOOD PRESSURE: 64 MMHG | BODY MASS INDEX: 41.75 KG/M2 | HEART RATE: 76 BPM | WEIGHT: 315 LBS

## 2018-01-14 VITALS
DIASTOLIC BLOOD PRESSURE: 84 MMHG | BODY MASS INDEX: 41.75 KG/M2 | TEMPERATURE: 96.7 F | SYSTOLIC BLOOD PRESSURE: 138 MMHG | HEIGHT: 73 IN | WEIGHT: 315 LBS | HEART RATE: 82 BPM

## 2018-01-14 VITALS
BODY MASS INDEX: 41.75 KG/M2 | SYSTOLIC BLOOD PRESSURE: 140 MMHG | DIASTOLIC BLOOD PRESSURE: 82 MMHG | HEIGHT: 73 IN | WEIGHT: 315 LBS | HEART RATE: 84 BPM | RESPIRATION RATE: 18 BRPM | TEMPERATURE: 98.3 F

## 2018-01-14 VITALS
BODY MASS INDEX: 41.75 KG/M2 | HEIGHT: 73 IN | HEART RATE: 96 BPM | RESPIRATION RATE: 18 BRPM | WEIGHT: 315 LBS | DIASTOLIC BLOOD PRESSURE: 82 MMHG | TEMPERATURE: 98.7 F | SYSTOLIC BLOOD PRESSURE: 136 MMHG

## 2018-01-14 VITALS
HEART RATE: 74 BPM | WEIGHT: 315 LBS | BODY MASS INDEX: 41.75 KG/M2 | TEMPERATURE: 96.4 F | RESPIRATION RATE: 16 BRPM | DIASTOLIC BLOOD PRESSURE: 72 MMHG | HEIGHT: 73 IN | SYSTOLIC BLOOD PRESSURE: 116 MMHG

## 2018-01-14 VITALS
DIASTOLIC BLOOD PRESSURE: 80 MMHG | SYSTOLIC BLOOD PRESSURE: 136 MMHG | HEIGHT: 73 IN | BODY MASS INDEX: 41.75 KG/M2 | WEIGHT: 315 LBS | TEMPERATURE: 98.2 F | HEART RATE: 78 BPM

## 2018-01-14 NOTE — RESULT NOTES
Verified Results  TEST IN QUESTION- AMBIGUOUS ORDER [id number] 12:00AM Diana Yadav     Test Name Result Flag Reference   We are unable to ascertain the test(s) you desire  for the irreplaceable specimen you submitted     UNCLEAR ORDER:      CULTURE, UPPER RESPIRATORY   SPECIMEN(S) SUBMITTED: BLUE TOP AMIES GEL     RESOLUTION:      **     **     *******     *******     *********    **     **   ******   **     **     **   **     **          **           ****   **     **   **     **     ** ***      **          **           **  ** **     **   **     **     *****       ** *****    *******      **    ***     **   **     **     **   **     **     **   **           **     **     **   **     **     **    **    **     **   **           **     **     **   *********     **    **    *********   *********    **     **     **

## 2018-01-14 NOTE — RESULT NOTES
Message   Can you please let patient know that his echocardiogram was overall normal?   Thank you     Verified Results  ECHO COMPLETE WITH CONTRAST IF INDICATED [id number] 02:36PM Larry Curtis Ramandeep     Test Name Result Flag Reference   ECHO COMPLETE WITH CONTRAST IF INDICATED (Report)     [facility] [address]   [phone]     Transthoracic Echocardiogram   2D, M-mode, Doppler, and Color Doppler     Study date: 18-Aug-2016     Patient: Ash Oliveira   MR number: [id number]   Account number: [de-identified]   : 1990   Age: 32 years   Gender: Male   Status: Outpatient   Location: [facility]   Height: 70 in   Weight: 356 2 lb   BP: 126/ 78 mmHg     Indications: Murmur     Diagnoses: R01 1 - Cardiac murmur, unspecified     Sonographer: EVE Montesinos   Interpreting Physician: Boo Samayoa MD   Primary Physician: Abdiaziz Hannah MD   Referring Physician: Abdiaziz Hannah MD   Group: [facility]     SUMMARY     LEFT VENTRICLE:   Systolic function was normal by visual assessment  Ejection fraction was   estimated to be 60 %  There were no regional wall motion abnormalities  Wall thickness was at the upper limits of normal      TRICUSPID VALVE:   There was mild regurgitation  PULMONIC VALVE:   There was trace regurgitation  HISTORY: PRIOR HISTORY: Palpitations, fatigue  PROCEDURE: The study was performed in the [address] [place] [address]  This was a routine study  The transthoracic approach was used  The study   included complete 2D imaging, M-mode, complete spectral Doppler, and color   Doppler  The heart rate was 62 bpm, at the start of the study  Images were   obtained from the parasternal, apical, subcostal, and suprasternal notch   acoustic windows  Image quality was adequate  LEFT VENTRICLE: Size was normal  Systolic function was normal by visual   assessment   Ejection fraction was estimated to be 60 %  There were no regional   wall motion abnormalities  Wall thickness was at the upper limits of normal    DOPPLER: The ratio of early ventricular filling to atrial contraction   velocities was within the normal range  Left ventricular diastolic function   parameters were normal      RIGHT VENTRICLE: The size was normal  Systolic function was normal  DOPPLER:   Systolic pressure was within the normal range  Estimated peak pressure was 24   mmHg  LEFT ATRIUM: Size was normal      RIGHT ATRIUM: Size was normal      MITRAL VALVE: Valve structure was normal  There was normal leaflet separation  DOPPLER: The transmitral velocity was within the normal range  There was no   evidence for stenosis  There was no regurgitation  AORTIC VALVE: The valve was trileaflet  Leaflets exhibited normal thickness and   normal cuspal separation  DOPPLER: Transaortic velocity was within the normal   range  There was no evidence for stenosis  There was no regurgitation  TRICUSPID VALVE: The valve structure was normal  There was normal leaflet   separation  DOPPLER: The transtricuspid velocity was within the normal range  There was no evidence for stenosis  There was mild regurgitation  PULMONIC VALVE: Leaflets exhibited normal thickness, no calcification, and   normal cuspal separation  DOPPLER: The transpulmonic velocity was within the   normal range  There was trace regurgitation  PERICARDIUM: There was no pericardial effusion  AORTA: The root exhibited normal size  There was no evidence of coarctation,   either anatomically, or by Doppler flow parameters  SYSTEMIC VEINS: IVC: The inferior vena cava was normal in size and course     Respirophasic changes were normal      SYSTEM MEASUREMENT TABLES     2D   %FS: 49 56 %   EF(Teich): 80 22 %   IVSd: 0 98 cm   LA Diam: 4 39 cm   LVIDd: 5 84 cm   LVIDs: 2 94 cm   LVPWd: 1 cm     CW   TR MaxP 67 mmHg   TR Vmax: 2 16 m/s MM   TAPSE: 3 2 cm     [facility]     Prepared and electronically signed by     Nehemiah Musa MD   Signed 18-Aug-2016 17:10:30

## 2018-01-15 ENCOUNTER — GENERIC CONVERSION - ENCOUNTER (OUTPATIENT)
Dept: FAMILY MEDICINE CLINIC | Facility: CLINIC | Age: 28
End: 2018-01-15

## 2018-01-15 ENCOUNTER — APPOINTMENT (OUTPATIENT)
Dept: PHYSICAL THERAPY | Facility: REHABILITATION | Age: 28
End: 2018-01-15
Payer: COMMERCIAL

## 2018-01-15 PROCEDURE — 97162 PT EVAL MOD COMPLEX 30 MIN: CPT

## 2018-01-15 PROCEDURE — G8990 OTHER PT/OT CURRENT STATUS: HCPCS

## 2018-01-15 PROCEDURE — G8991 OTHER PT/OT GOAL STATUS: HCPCS

## 2018-01-15 NOTE — PROGRESS NOTES
History of Present Illness  HPI: Jean Griggs is here for f/u VLCD  Current wt: 348 lbs  Loss of 14 3 lbs x just under 4 wks however has lost less than 1 lb in the last 2 wks  States he was on vacation and not following VLCD  Wants to continue keeping carbs low and utilize 2 meal replacements/day  Meal plan provided however he does not seem very motivated  Was on his phone during the session  He will f/u menu planning in ~1 month  Instructions given for home ordering  Bariatric MNT [facility]:   Weight Assessment: IBW: 181 lbs, ABW: 222 8 lbs, Weight Change: 14 3 lb loss x 1 m, Goal Weight: <300 lbs  Weight loss attempts: Atkins: lbs  Excess calories come from in between meal snacking and high calorie high fat food choices  Dietary Recall:   Beverages: water  Estimated fluid intake: 100 oz  Food allergies/intolerances: n/a  Exercise Frequency:  He does not exercise  His obesity/being overweight is related to excess energy intake and as evidenced by BMI=46 5  Nutrition Prescription: Estimated calories for weight loss: eCalc BMR 2604, wt loss w/o exercise 3190-6563, Estimated daily protein needs:  gm (1 2-1 5 gm/kg IBW) and Estimated daily fluid needs: 96 oz (35 cc/kg IBW)  Breakfast: replacement + hard boiled egg or 3 turkey sausage links  Snack: bar or food snack  Lunch: replacement  Snack: skip  Dinner: 395-570, 42 gm: 6 oz lean pro,  gus carb, veggies, 2 fat  Snack: 200, 15 gm: 1 egg + 2 whites + 2 slice kohler   Nutrition Intervention: Counseling provided with emphasis on meal planning, portion sizes, healthy snack choices, hydration, protein intake and food journaling  Comprehension: fair  Expected Compliance: fair  Possible Barriers to Change: ?motivation     Goals: follow meal plan prescribed, reduce portion sizes at mealtimes, plan meals and snacks daily, Choose lower-calorie, lower-fat meal options at home and when dining out, food journal, do not skip meals or snacks and 6407-3453 calories using 2 replacements and 1 bar  Monitor/Evaluation: weekly weight, food journal, fluid intake and exercise level  Active Problems    1  Abdominal pain (789 00) (R10 9)   2  Acute pharyngitis (462) (J02 9)   3  Allergic rhinitis (477 9) (J30 9)   4  Ankle pain, right (719 47) (M25 571)   5  Anxiety disorder, unspecified type (300 00) (F41 9)   6  Arthralgia of right shoulder region (719 41) (M25 511)   7  Atypical chest pain (786 59) (R07 89)   8  Back muscle spasm (724 8) (M62 830)   9  Back strain (847 9) (S39 012A)   10  Blood typing encounter (V72 86) (Z01 83)   11  BMI 45 0-49 9, adult (V85 42) (Z68 42)   12  Chest pain (786 50) (R07 9)   13  Constipation (564 00) (K59 00)   14  Contact dermatitis (692 9) (L25 9)   15  Cough (786 2) (R05)   16  Cyst   17  Cyst of spleen (289 59) (D73 4)   18  Depression with anxiety (300 4) (F41 8)   19  Encounter for dietary counseling and surveillance (V65 3) (Z71 3)   20  Fatigue (780 79) (R53 83)   21  Foamy urine (791 9) (R82 99)   22  Frequent bowel movements (787 99) (R19 4)   23  Gastroenteritis (558 9) (K52 9)   24  GERD without esophagitis (530 81) (K21 9)   25  Hematochezia (578 1) (K92 1)   26  Insomnia (780 52) (G47 00)   27  Low back pain (724 2) (M54 5)   28  Major depressive disorder, recurrent severe without psychotic features (296 33) (F33 2)   29  Morbid obesity due to excess calories (278 01) (E66 01)   30  Nausea (787 02) (R11 0)   31  Nausea and vomiting (787 01) (R11 2)   32  Need for prophylactic vaccination and inoculation against influenza (V04 81) (Z23)   33  Numbness and tingling in both hands (782 0) (R20 0,R20 2)   34  Pain on swallowing (787 20) (R13 10)   35  Postprandial epigastric pain (789 06) (R10 13)   36  Proteinuria (791 0) (R80 9)   37  Rectal bleeding (569 3) (K62 5)   38  Screening for lipoid disorders (V77 91) (Z13 220)   39  Skin lesion (709 9) (L98 9)   40  Skin rash (782 1) (R21)   41   Skipped heart beats (427 9) (I45 9)   42  Snoring (786 09) (R06 83)   43  Tonsillitis (463) (J03 90)   44  URTI (acute upper respiratory infection) (465 9) (J06 9)   45  Vitamin D deficiency disease (268 9) (E55 9)   46  Weight gain (783 1) (R63 5)    Past Medical History    1  Denied: History of Head trauma   2  History of abscess of skin and subcutaneous tissue (V13 3) (Z87 2)   3  History of gastroenteritis (V12 79) (Z87 19)   4  History of pilonidal cyst (V13 3) (Z87 2)   5  History of Pilonidal cyst with abscess (685 0) (L05 01)   6  Denied: History of Seizure   7  History of Skin lesion (709 9) (L98 9)    Surgical History    1  Encounter for dietary counseling and surveillance (V65 3) (Z71 3)   2  History of Foot Surgery   3  History of Incision And Drainage Of Pilonidal Cyst    Family History  Mother    1  Family history of depression (V17 0) (Z81 8)   2  Family history of Father  At Age 48   1  Family history of Obesity   4  Family history of Stroke Syndrome (V17 1)  Father    5  Family history of Alcohol Abuse   6  Family history of Father  At Age ___   9  Family history of Hepatic Failure   8  Family history of Hypertension (V17 49)  Brother    5  Family history of depression (V17 0) (Z81 8)  Maternal Uncle    10  Family history of depression (V17 0) (Z81 8)    Social History    · Daily caffeine consumption   · Education Level: Some college   · Employed   ·    · Never A Smoker   · Never Drank Alcohol    Current Meds   1  BuPROPion HCl ER (XL) 150 MG Oral Tablet Extended Release 24 Hour; Take 1 tablet   daily; Therapy: 60KJW6582 to (Evaluate:52Xtb9899)  Requested for: 42EMK0149; Last   Rx:49Fdq6556 Ordered   2  Dicyclomine HCl - 10 MG Oral Capsule; TAKE 1 CAPSULE 3 times daily PRN; Therapy: 92UPJ7865 to (Evaluate:2017)  Requested for: 2017; Last   Rx:2017 Ordered   3  Fluticasone Propionate 50 MCG/ACT Nasal Suspension; USE 2 SPRAYS IN EACH   NOSTRIL ONCE DAILY;    Therapy: 48YWX6203 to (Last WW:70UZZ9075)  Requested for: 46Svz6053 Ordered   4  Omeprazole 20 MG Oral Capsule Delayed Release; take 1 capsule by mouth every   morning BEFORE BREAKFAST; Therapy: 89MAS7615 to (Evaluate:78Iyc5916)  Requested for: 09GHH1511; Last   Rx:17Mar2017 Ordered   5  Ondansetron 4 MG Oral Tablet Disintegrating; TAKE 1 TABLET EVERY 8 HOURS AS   NEEDED FOR NAUSEA; Therapy: 23Gtn1087 to (Last Rx:90Zwc6291)  Requested for: 43Nzt8019 Ordered   6  One-A-Day Mens Oral Tablet; Therapy: 82Drv6426 to Recorded   7  ProAir  (90 Base) MCG/ACT Inhalation Aerosol Solution; INHALE 1 TO 2 PUFFS   EVERY 4 TO 6 HOURS AS NEEDED; Therapy: 97TTT4387 to (Last FU:23TGX7388)  Requested for: 59KKM8933 Ordered   8  Sertraline HCl - 100 MG Oral Tablet; Take 1 tablet daily; Therapy: 60ORY9097 to (Debbie Flowers)  Requested for: 41FRT2552; Last   Rx:08Jun2017 Ordered   9  Vitamin D 2000 UNIT Oral Capsule; take 1 capsule daily; Therapy: 71HFC2997 to (Last Rx:17Mar2015)  Requested for: 42ATF6942 Ordered    Allergies    1  No Known Drug Allergies    Vitals  Signs   Recorded: 42DIV5477 02:10PM   Height: 6 ft 0 5 in  Weight: 348 lb   BMI Calculated: 46 55  BSA Calculated: 2 71    Future Appointments    Date/Time Provider Specialty Site   07/31/2017 09:30 AM Murtaza Rojas  [facility]     Signatures   Electronically signed by :  Mirna Garay, ; Jun 23 2017  2:17PM EST                       (Author)    Electronically signed by : MARCIANO Persaud ; Jun 23 2017  4:00PM EST                       (Co-author)

## 2018-01-15 NOTE — RESULT NOTES
Message   Can you please let pt saima that his urine and thyroid bw were normal? Thank you     Verified Results  (1) URINALYSIS w URINE C/S REFLEX (will reflex a microscopy if leukocytes, occult blood, or nitrites are not within normal limits) [id number] 11:17AM Ramandeep Moore     Test Name Result Flag Reference   COLOR YELLOW  YELLOW   APPEARANCE TURBID A CLEAR   SPECIFIC GRAVITY 1 030  1 001-1 035   PH 5 0  5 0-8 0   GLUCOSE NEGATIVE  NEGATIVE   BILIRUBIN NEGATIVE  NEGATIVE   KETONES NEGATIVE  NEGATIVE   OCCULT BLOOD NEGATIVE  NEGATIVE   PROTEIN NEGATIVE  NEGATIVE   NITRITE NEGATIVE  NEGATIVE   LEUKOCYTE ESTERASE NEGATIVE  NEGATIVE   WBC 0-5 /HPF  < OR = 5   RBC 0-2 /HPF  < OR = 2   SQUAMOUS EPITHELIAL CELLS 0-5 /HPF  < OR = 5   BACTERIA NONE SEEN /HPF  NONE SEEN   CALCIUM OXALATE CRYSTALS FEW /HPF  NONE OR FEW   HYALINE CAST NONE SEEN /LPF  NONE SEEN   COMMENTS      MODERATE MUCOUS THREADS   REFLEXIVE URINE CULTURE NO CULTURE INDICATED       (Q) TSH, 3RD GENERATION W/REFLEX TO FT4 [id number] 11:17AM Ramandeep Moore   REPORT COMMENT:  FASTING:YES     Test Name Result Flag Reference   TSH W/REFLEX TO FT4 2 59 mIU/L  0 40-4 50       Signatures   Electronically signed by : Sandra Jiang MD; Feb 15 2016 11:23AM EST                       (Author)

## 2018-01-15 NOTE — RESULT NOTES
Verified Results  (1) VITAMIN B12 21Nov2017 11:22AM Ramandeep Moore     Test Name Result Flag Reference   VITAMIN B12 481 pg/mL  200-1100     (1) CBC/PLT/DIFF 16MBN0337 11:22AM Ramandeep Moore     Test Name Result Flag Reference   WHITE BLOOD CELL COUNT 5 7 Thousand/uL  3 8-10 8   RED BLOOD CELL COUNT 5 21 Million/uL  4 20-5 80   HEMOGLOBIN 14 9 g/dL  13 2-17 1   HEMATOCRIT 45 6 %  38 5-50 0   MCV 87 5 fL  80 0-100 0   MCH 28 6 pg  27 0-33 0   MCHC 32 7 g/dL  32 0-36 0   RDW 12 8 %  11 0-15 0   PLATELET COUNT 679 Thousand/uL  140-400   ABSOLUTE NEUTROPHILS 3796 cells/uL  0818-1757   ABSOLUTE LYMPHOCYTES 1351 cells/uL  850-3900   ABSOLUTE MONOCYTES 456 cells/uL  200-950   ABSOLUTE EOSINOPHILS 80 cells/uL     ABSOLUTE BASOPHILS 17 cells/uL  0-200   NEUTROPHILS 66 6 %     LYMPHOCYTES 23 7 %     MONOCYTES 8 0 %     EOSINOPHILS 1 4 %     BASOPHILS 0 3 %     MPV 9 5 fL  7 5-12 5     (1) COMPREHENSIVE METABOLIC PANEL [id number] 71:87UM Ramandeep Moore     Test Name Result Flag Reference   GLUCOSE 87 mg/dL  65-99   Fasting reference interval   UREA NITROGEN (BUN) 18 mg/dL  7-25   CREATININE 0 98 mg/dL  0 60-1 35   eGFR NON-AFR   AMERICAN 105 mL/min/1 73m2  > OR = 60   eGFR AFRICAN AMERICAN 122 mL/min/1 73m2  > OR = 60   BUN/CREATININE RATIO   2-83   NOT APPLICABLE (calc)   SODIUM 139 mmol/L  135-146   POTASSIUM 4 5 mmol/L  3 5-5 3   CHLORIDE 103 mmol/L     CARBON DIOXIDE 31 mmol/L  20-31   CALCIUM 9 4 mg/dL  8 6-10 3   PROTEIN, TOTAL 7 7 g/dL  6 1-8 1   ALBUMIN 4 0 g/dL  3 6-5 1   GLOBULIN 3 7 g/dL (calc)  1 9-3 7   ALBUMIN/GLOBULIN RATIO 1 1 (calc)  1 0-2 5   BILIRUBIN, TOTAL 0 5 mg/dL  0 2-1 2   ALKALINE PHOSPHATASE 84 U/L     AST 18 U/L  10-40   ALT 27 U/L  9-46     (Q) TSH, 3RD GENERATION W/REFLEX TO FT4 [id number] 11:22AM Ramandeep Moore   REPORT COMMENT:  FASTING:YES     Test Name Result Flag Reference   TSH W/REFLEX TO FT4 4 63 mIU/L H 0 40-4 50   T4, FREE 1 1 ng/dL  0 8-1 8

## 2018-01-16 NOTE — MISCELLANEOUS
Message   Recorded as Task   Date: 12/02/2016 12:32 PM, Created By: Wilberto Krause   Task Name: Follow Up   Assigned To: Ramandeep Moore   Regarding Patient: Thomas Morrissey, Status: Active   Comment:    Ary Razo - 02 Dec 2016 12:32 PM     TASK CREATED  Caller: Self; General Medical Question; [phone] (Home); [phone] (Work)  PATIENT WAS PUT ON [place] AND HE FEELS LIKE HE IS HAVING SOME SIDE EFFECTS OF SUICIDAL THOUGHT AND INCREASED DEPRESSION  I TRANSFERRED HIM TO Jerlean Dandy R TO TRIAGE   Laxmi Salazar - 02 Dec 2016 1:06 PM     TASK EDITED  Spoke with pt, he does not have a suicidal plan in place  He stated, " I am just thinking of death"  He stated," I will not hurt myself"  Appt made with Dr Beverley Greenberg for tomorrow at 10:30A  Instructed if he has any thoughts of suicide/death that is life threating to call 911 and proceed to the nearest ER  Ramandeep Moore - 02 Dec 2016 4:49 PM     TASK REPLIED TO: Previously Assigned To Cindy Perdue to patient who states that he has been depressed and down from time to time  He has had thoughts of death but does not have a plan and states he would never hurt himself  Patient scheduled to see Dr Beverley Greenberg tomorrow  I have also offered patient a partial program that we can look in to  He has a follow-up appointment with me next Wednesday  I strongly feel patient would benefit from a partial program   Patient denies a plan  If patient has any thoughts of hurting himself, I have asked patient to call 911 immediately  Patient is agreeable with the plan          Signatures   Electronically signed by : Sandra Jiang MD; Dec  2 2016  4:49PM EST                       (Author)

## 2018-01-16 NOTE — DISCHARGE SUMMARY
Discharge Summary  [facility]charge Summary [facility]:   Admission Date: 12/13/16  Patient was referred by Antoinette Xavier LCSW  Discharge Date: 12/23/16  This was a routine discharge  Diagnosis: Axis I: Anxiety disorder, unspecified type (41 9)  Treating Physician: Dr Lily Talbot MD    Treatment Complications: None  Presenting Problem: Alfredo Zhu is a 32year old male with depression and anxiety referred by Antoinette Xavier LCSW after he was seen in the outpatient because patient is feeling very anxious and depressed for some time but recently has become more difficult to manage  He has increased in vague suicidal thoughts about been better off dead and not wanted to live like that any longer, denies any plan or intent  He has been under lot of stress due to financial and his wife medical issues  He has poor concentration and sleep disturbances  He is in medical leave from his job at this moment  Today he feels very anxious, low energy level, he denies suicidal thoughts, denies any plan or intent, he denies any homicidal ideas, plan or intent, denies any psychotic symptoms  He denies any history of manic or hypomanic episodes  His PHQ-9 is 25  Course of treatment includes group counseling, pharmacotherapy, individual case management, allied therapy, psychoeducation and psychiatric evaluation  Treatment Progress: Mr Kathy Miller completed 8 treatment days at [facility]  During this time he worked on treatment goals of decreasing anxiety and depression  He utilized his WRAP instrument and participated in groups learning and practicing cognitive and behavioral strategies for wellness  Mr Kathy Miller also worked on goal of improving self-care and identified following up OP with his therapist at [address] [place] [address] in [place] and with his PCP, Dr Dariel Luis, as two significant ways he could continue his journey toward wellness  Mr Kathy Miller denied suicidal or homicidal ideas or plans on discharge  He reported that the introduction of Wellbutrin  mg daily started at [facility] was well tolerated and helpful for his mood  Mr  Bhakti Nguyễn denied side effects from any of his other medications  Aftercare recommendations include  See Below  Discharge Medications include: See Below  Behavioral Health Treatment Plan Maksim Emmanuel: Diagnosis and Treatment Plan explained to patient, patient relates understanding diagnosis and is agreeable to Treatment Plan  Discharge [address] Discharge Instructions  [facility]:   Disposition: Home/Family  Address: [place] [address], [place], [address]  Diagnosis: Anxiety disorder, unspecified type (F41 9)  Allergies (Drug/Food): No Known Drug Allergies  Activity: you may not return to work until released by your OP providers, but you may drive  Diet: Regular  Follow-up appointments/Referrals:   Familia Foss has made the following appointment with his outpatient therapist, at [facility], [place], [place] - Tuesday, 12/ 2716 at [facility] and medication monitoring will continue after D/C with Reynaldo's PCP NASIMA Underwood  Next appointment is 12/30/16  ICM/CTT: Depression/DBSA support group handouts explained and given to Familia Foss at discharge  Intake/Referral/Evaluation (Non-Emergency) *NON INSURED FOR FUNDING: [facility]: [phone]  Crisis Intervention (Emergency) [email or web address]: [place]: [phone]  National Crisis Intervention Hotline: 3-883.679.4797  National Suicide Crisis Hotline: 2-477.132.5420  I, the undersigned, have received and understand the above instructions  Patient/Rep Signature: __________________________________ Date/Time: ______________         Physician Signature: ____________________________________ Date/Time: ______________          Signature: ________________________________ Date/Time: ______________          Current Meds   1   Amoxicillin-Pot Clavulanate 875-125 MG Oral Tablet; TAKE 1 TABLET EVERY 12 HOURS   DAILY; Therapy: 73SMV7233 to (Evaluate:12Dec2016)  Requested for: 29ADS1364; Last   Rx:85Qcf5278 Ordered   2  BuPROPion HCl ER (XL) 150 MG Oral Tablet Extended Release 24 Hour; Take 1 tablet   daily; Therapy: 38KQI7644 to (Evaluate:12Jan2017)  Requested for: 96Dyz8602; Last   Rx:51Ljy7767 Ordered   3  Fluticasone Propionate 50 MCG/ACT Nasal Suspension; USE 2 SPRAYS IN EACH   NOSTRIL ONCE DAILY; Therapy: 15YUW6653 to (Last Rx:21Oct2015)  Requested for: 21Oct2015 Ordered   4  Meloxicam 15 MG Oral Tablet; TAKE 1 TABLET DAILY WITH FOOD; Therapy: 92CIN2090 to (Complete:01Feb2017); Last Rx:84Uer4138 Ordered   5  Methocarbamol 500 MG Oral Tablet; one po q hs;   Therapy: 19OIR5548 to (Last Rx:62Ygc8676) Ordered   6  Omeprazole 40 MG Oral Capsule Delayed Release; take 1 capsule daily; Therapy: 00ARA2880 to (Evaluate:30Mar2017)  Requested for: 63MIO9598; Last   Rx:16Lyr0755 Ordered   7  One-A-Day Mens Oral Tablet; Therapy: 55Dai1200 to Recorded   8  Sertraline HCl - 100 MG Oral Tablet; Take 1 tablet daily; Therapy: 68WMG8607 to (Last Rx:49Bum4324) Ordered   9  Sertraline HCl - 50 MG Oral Tablet; TAKE 1/2 TABLET DAILY for 1 week then take 1 tab   daily; Therapy: 15QIX8139 to (Last Rx:10Oct2016)  Requested for: 12Rjr1667; Status: ACTIVE   - Renewal Denied Ordered   10  Vitamin D 2000 UNIT Oral Capsule; take 1 capsule daily; Therapy: 40BZQ7602 to (Last Rx:17Mar2015)  Requested for: 89EXU7241 Ordered    Allergies    1  No Known Drug Allergies    Immunizations  Tdap --- Peña Anne: 11[birth year]Feb[birth year]2013     Future Appointments    Date/Time Provider Specialty Site   12/16/2016 10:00 AM MARCIANO Murray   [facility]   12/19/2016 03:30 PM Medardo Villarreal MD Family Medicine [facility]     Signatures   Electronically signed by : Erinn Oliver RN; Dec 15 2016  4:28PM EST                       (Author) Electronically signed by : Erenest Baumgarten, RN; Dec 22 2016  3:41PM EST                       (Author)    Electronically signed by : Erenest Baumgarten, RN; Jan 11 2017  4:02PM EST                       (Author)    Electronically signed by : MARCIANO Sloan ; Jan 12 2017  7:51AM EST                       (Author)

## 2018-01-16 NOTE — PSYCH
History of Present Illness  [facility] Clinical Progress Note [facility]:   Specialized Services Documentation - Therapist must complete separate progress note for each specific clinical activity in which the client participated during the day  [phone]) Group Psychotherapy: 10:45-11:45 Deysi Abel participated in psychotherapy group focused on coping with anxiety and depression  Deysi Abel identified his finances as a stressor  HE participated in group discussion about ways to manage depression and anxiety, stating that he tries to distract himself with funny videos or movies, or will play with his cat to help him feel better  He stated that he also wants to try and learn how to play the drums at some point  He provided some feedback and support to peers who were struggling with ways to cope with their symptoms  Some mild progress toward goals today  Continue psychotherapy group to encourage Deysi Abel to further explore stressors and healthy ways of coping  Treatment Plan Problem(s): 1 1, 1 2  KAT GoW     (052) Group Psychotherapy: [id number] Deysi Abel participated in creating a Crisis Card according to a format listed on handout and to personalize with individual information  Deysi Abel also completed an educational handout to self-assess what additional information would be necessary to add to Crisis Card such as medications, warning signs and triggers  Deysi Abel stated that he did not know the names, dosages, or what his medications are used for  A list of medications was given to Deysi Abel by this RN/CM and reviewed with him to educate him on his medications  Deysi Abel made moderate progress toward goals  Continue group to educate Deysi Abel to take responsibility for his recovery and potential for relapse as well as learning healthy relapse prevention strategies  Treatment Plan Problem(s): 1 1,1 2  Sharon Celis RN       (570) Education Therapy Goals set - read    Treatment Plan Problem(s): 1 1,1 2     Education Therapy Time - 0900 - 2840 Previous goal was not met  Readiness to Learning:  He is receptive to learning  There are  no barriers to learning  Learning Assessment Time - 1330 - 1400   Education completed on  illness and wellness tools  The teaching method was  verbal  Shared area of learning: Yes  GERALDINE Estrada     (130) Allied Therapy 9817-3912 Deidre Cheng moderately shared in [facility] group focused on boundary setting  He engaged when prompted in improvisation and discussion exploring value of and ways to set healthy limits with self and others  Group discussed reality of feeling guilty at times, yet the anxiety and chaos left if one chooses not to set limits  He did not share personal feedback; group discussed ways to address work return  He was encouraged to participate in practicing boundaries with given scenarios, however he was more into sharing âwhat not to sayâ  Minimal work toward goal noted  Continue AT to encourage skill development and use  Treatment Plan Problem(s): 1 1  GERALDINE Estrada       Case Management Note:   [id number] Deidre Cheng met with this CM to review progress in [facility] stated that he still felt "all around depressed " He stated "I can't help it " Deidre Cheng identified his weight gain as one reason why he is depressed and the holidays as another trigger for depression  Discussed Deidre Cheng identifying one goal to complete to decrease depression such as turning negative to more positive thoughts or following a healthier diet by making small changes and increasing exercise  Deidre Cheng stated that he currently "overeats junk food--I love [employer] burgers and fries-- I just can't help myself " Discussed eating routinely healthy and allowing himself small indulgences in moderation  Deidre Cheng stated he could not do this  Deidre Cehng was encouraged to use remaining time in [facility] to begin to practice cognitive and behavioral strategies for change  TREATMENT SESSION NUMBER: 5   Current suicide risk is low  Medications not changed/added/denied  Neel Berg, RN      Active Problems    1  Abdominal pain (789 00) (R10 9)   2  Acute pharyngitis (462) (J02 9)   3  Allergic rhinitis (477 9) (J30 9)   4  Ankle pain, right (719 47) (M25 571)   5  Anxiety disorder, unspecified type (300 00) (F41 9)   6  Arthralgia of right shoulder region (719 41) (M25 511)   7  Atypical chest pain (786 59) (R07 89)   8  Back muscle spasm (724 8) (M62 830)   9  Back strain (847 9) (S39 012A)   10  BMI 45 0-49 9, adult (V85 42) (Z68 42)   11  Chest pain (786 50) (R07 9)   12  Constipation (564 00) (K59 00)   13  Contact dermatitis (692 9) (L25 9)   14  Cyst of spleen (289 59) (D73 4)   15  Depression with anxiety (300 4) (F41 8)   16  Encounter for dietary counseling and surveillance (V65 3) (Z71 3)   17  Fatigue (780 79) (R53 83)   18  Foamy urine (791 9) (R82 99)   19  Gastroenteritis (558 9) (K52 9)   20  GERD without esophagitis (530 81) (K21 9)   21  Hematochezia (578 1) (K92 1)   22  Insomnia (780 52) (G47 00)   23  Low back pain (724 2) (M54 5)   24  Major depressive disorder, recurrent severe without psychotic features (296 33) (F33 2)   25  Nausea and vomiting (787 01) (R11 2)   26  Obesity (278 00) (E66 9)   27  Pain on swallowing (787 20) (R13 10)   28  Postprandial epigastric pain (789 06) (R10 13)   29  Proteinuria (791 0) (R80 9)   30  Rectal bleeding (569 3) (K62 5)   31  Screening for lipoid disorders (V77 91) (Z13 220)   32  Skin lesion (709 9) (L98 9)   33  Skin rash (782 1) (R21)   34  Skipped heart beats (427 9) (I45 9)   35  URTI (acute upper respiratory infection) (465 9) (J06 9)   36  Vitamin D deficiency disease (268 9) (E55 9)   37  Weight gain (783 1) (R63 5)    Past Medical History    1  Denied: History of Head trauma   2  History of abscess of skin and subcutaneous tissue (V13 3) (Z87 2)   3  History of pilonidal cyst (V13 3) (Z87 2)   4  History of Pilonidal cyst with abscess (685 0) (L05 01)   5   Denied: History of Seizure    Allergies    1  No Known Drug Allergies    Current Meds   1  Amoxicillin-Pot Clavulanate 875-125 MG Oral Tablet; TAKE 1 TABLET EVERY 12 HOURS   DAILY; Therapy: 85QVI4928 to (Evaluate:2016)  Requested for: 43VBN3427; Last   Rx:32Cyl5368 Ordered   2  BuPROPion HCl ER (XL) 150 MG Oral Tablet Extended Release 24 Hour; Take 1 tablet   daily; Therapy: 92LLF1337 to (Evaluate:2017)  Requested for: 63Mym3608; Last   Rx:31Dxe0205 Ordered   3  Fluticasone Propionate 50 MCG/ACT Nasal Suspension; USE 2 SPRAYS IN EACH   NOSTRIL ONCE DAILY; Therapy: 77END6224 to (Last Rx:2015)  Requested for: 2015 Ordered   4  Meloxicam 15 MG Oral Tablet; TAKE 1 TABLET DAILY WITH FOOD; Therapy: 68KIW5981 to (Complete:2017); Last Rx:10Nem6494 Ordered   5  Methocarbamol 500 MG Oral Tablet; one po q hs;   Therapy: 74MTO4697 to (Last Rx:54Jgq6294) Ordered   6  Omeprazole 40 MG Oral Capsule Delayed Release; take 1 capsule daily; Therapy: 78ONU2467 to (Evaluate:2017)  Requested for: 57PUD9557; Last   Rx:2016 Ordered   7  One-A-Day Mens Oral Tablet; Therapy: 37Jqk7294 to Recorded   8  Sertraline HCl - 100 MG Oral Tablet; Take 1 tablet daily; Therapy: 83PKS7496 to (Last Rx:15Hqt2191) Ordered   9  Sertraline HCl - 50 MG Oral Tablet; TAKE 1/2 TABLET DAILY for 1 week then take 1 tab   daily; Therapy: 59EXB4958 to (Last Rx:2016)  Requested for: 31Adl9863; Status: ACTIVE   - Renewal Denied Ordered   10  Vitamin D 2000 UNIT Oral Capsule; take 1 capsule daily; Therapy: 04MHU4646 to (Last Rx:2015)  Requested for: 73DIE2960 Ordered    Family Psych History  Mother    1  Family history of depression (V17 0) (Z81 8)   2  Family history of Father  At Age 48   1  Family history of Obesity   4  Family history of Stroke Syndrome (V17 1)  Father    5  Family history of Alcohol Abuse   6  Family history of Father  At Age ___   9  Family history of Hepatic Failure   8   Family history of Hypertension (V17 49)  Brother    9  Family history of depression (V17 0) (Z81 8)  Maternal Uncle    10  Family history of depression (V17 0) (Z81 8)    Social History    · Daily caffeine consumption   · Education Level: Some college   · Employed   ·    · Never A Smoker   · Never Drank Alcohol    Future Appointments    Date/Time Provider Specialty Site   12/21/2016 10:00 AM MARCIANO Gilbert  Psychiatry [facility]   12/22/2016 10:00 AM MARCIANO Gilbert  Psychiatry [facility]   12/23/2016 10:00 AM MARCIANO Gilbert   Psychiatry [facility]   12/30/2016 02:30 PM Geremias Rockwell MD Family Medicine [facility] 10   Electronically signed by : COLIN Grey; Dec 20 2016 12:57PM EST                       (Author)    Electronically signed by : GERALDINE Spivey; Dec 20 2016  2:16PM EST                       (Author)    Electronically signed by : Sangeeta Aburto RN; Dec 20 2016  5:26PM EST                       (Author)

## 2018-01-17 ENCOUNTER — GENERIC CONVERSION - ENCOUNTER (OUTPATIENT)
Dept: OTHER | Facility: OTHER | Age: 28
End: 2018-01-17

## 2018-01-17 NOTE — PSYCH
History of Present Illness  Psychotherapy Provided [facility]: Individual Psychotherapy 20 minutes provided today  Goals addressed in session:   Met with pt and his wife for initial session  Pt reports that he has struggled with anxiety and depression for a while but that recently it has been more difficult to manage  Pt reports that he has been having an increased amount of vague suicidal ideation about being better off dead and not wanting to live like this any more  Pt denies plan or intent  Pt's wife reports that the pt has been more stressed recently due to some of her health issues and finances  HPI - Psych: Pt reports that he has been seeing a therapist at [address] [place] [address] for a while but that he feels he could benefit at this time from a more intensive program  Pt is interested in the [facility] program at [facility]  Discussed the program in depth  Pt is currently on short term disability from work due to being ill and wanting to attend the [facility] program        Note   Note:   Discussed the [facility] program and what the pt is hoping to get from it  This worker will take care of making the referral for the pt to the program  Encouraged the pt to follow up with this worker in the future should he need anything else  Assessment    1   Depression with anxiety (300 4) (F41 8)    Signatures   Electronically signed by : Aayush Nagy LCSW; Dec  8 2016  1:42PM EST                       (Author)

## 2018-01-18 ENCOUNTER — APPOINTMENT (OUTPATIENT)
Dept: PHYSICAL THERAPY | Facility: REHABILITATION | Age: 28
End: 2018-01-18
Payer: COMMERCIAL

## 2018-01-18 PROCEDURE — 97014 ELECTRIC STIMULATION THERAPY: CPT

## 2018-01-18 PROCEDURE — 97110 THERAPEUTIC EXERCISES: CPT

## 2018-01-18 NOTE — PROGRESS NOTES
History of Present Illness  HPI: Brain patient to follow-up on initiation of the VLCD diet  He has lost 13 7# in the past 8 days  Patient states they are doing well  Tolerating meal replacements without difficulty  Consuming at least 80oz of water in addition to water used to mix replacements  Patient not experiencing constipation  Patient stated he often 3-4 x week will add some lean protein at night  Discussed proper amounts and     Bariatric MNT [facility]O [facility]:   Weight Assessment: IBW: 181#, Goal Weight: under 300#  Excess calories come from in between meal snacking and high calorie high fat food choices  Dietary Recall:   Breakfast: 9-10 am Shake or Soup  Lunch: 1:30-2pm bar  Snack: 4pm   Pudding or Shake  Dinner: 7pm  Shake  Beverages: water  Estimated fluid intake: 100oz or more  He dines out seldom  Exercise Frequency:  He does not exercise  His obesity/being overweight is related to excessive energy intake and as evidenced by BMI: 46 6  Nutrition Prescription: Estimated calories for weight loss: Ecal BMR: 2607 calories 2# loss per week : 2128 calories , Estimated daily protein needs: 99-123gm ( 1 2-1 5 gm/kg IBW) and Estimated daily fluid needs: 96oz ( 35ml/kg IBW)  Nutrition Intervention: Counseling provided with emphasis on meal planning and hydration  Comprehension: good  Expected Compliance: good  Goals: follow meal plan prescribed, food journal and VLCD Diet : 800 calories / 50 grams carb  Monitor/Evaluation: weekly weight, food journal, fluid intake and exercise level  Active Problems    1  Abdominal pain (789 00) (R10 9)   2  Acute pharyngitis (462) (J02 9)   3  Allergic rhinitis (477 9) (J30 9)   4  Ankle pain, right (719 47) (M25 571)   5  Anxiety disorder, unspecified type (300 00) (F41 9)   6  Arthralgia of right shoulder region (719 41) (M25 511)   7  Atypical chest pain (786 59) (R07 89)   8  Back muscle spasm (724 8) (M62 830)   9   Back strain (847  9) (S39 012A)   10  Blood typing encounter (V72 86) (Z01 83)   11  BMI 45 0-49 9, adult (V85 42) (Z68 42)   12  Chest pain (786 50) (R07 9)   13  Constipation (564 00) (K59 00)   14  Contact dermatitis (692 9) (L25 9)   15  Cough (786 2) (R05)   16  Cyst   17  Cyst of spleen (289 59) (D73 4)   18  Depression with anxiety (300 4) (F41 8)   19  Encounter for dietary counseling and surveillance (V65 3) (Z71 3)   20  Fatigue (780 79) (R53 83)   21  Foamy urine (791 9) (R82 99)   22  Frequent bowel movements (787 99) (R19 4)   23  Gastroenteritis (558 9) (K52 9)   24  GERD without esophagitis (530 81) (K21 9)   25  Hematochezia (578 1) (K92 1)   26  Insomnia (780 52) (G47 00)   27  Low back pain (724 2) (M54 5)   28  Major depressive disorder, recurrent severe without psychotic features (296 33) (F33 2)   29  Morbid obesity due to excess calories (278 01) (E66 01)   30  Nausea (787 02) (R11 0)   31  Nausea and vomiting (787 01) (R11 2)   32  Need for prophylactic vaccination and inoculation against influenza (V04 81) (Z23)   33  Numbness and tingling in both hands (782 0) (R20 0,R20 2)   34  Pain on swallowing (787 20) (R13 10)   35  Postprandial epigastric pain (789 06) (R10 13)   36  Proteinuria (791 0) (R80 9)   37  Rectal bleeding (569 3) (K62 5)   38  Screening for lipoid disorders (V77 91) (Z13 220)   39  Skin lesion (709 9) (L98 9)   40  Skin rash (782 1) (R21)   41  Skipped heart beats (427 9) (I45 9)   42  Snoring (786 09) (R06 83)   43  Tonsillitis (463) (J03 90)   44  URTI (acute upper respiratory infection) (465 9) (J06 9)   45  Vitamin D deficiency disease (268 9) (E55 9)   46  Weight gain (783 1) (R63 5)    Past Medical History    1  Denied: History of Head trauma   2  History of abscess of skin and subcutaneous tissue (V13 3) (Z87 2)   3  History of gastroenteritis (V12 79) (Z87 19)   4  History of pilonidal cyst (V13 3) (Z87 2)   5  History of Pilonidal cyst with abscess (685 0) (L05 01)   6   Denied: History of Seizure   7  History of Skin lesion (709 9) (L98 9)    Surgical History    1  Encounter for dietary counseling and surveillance (V65 3) (Z71 3)   2  History of Foot Surgery   3  History of Incision And Drainage Of Pilonidal Cyst    Family History  Mother    1  Family history of depression (V17 0) (Z81 8)   2  Family history of Father  At Age 48   1  Family history of Obesity   4  Family history of Stroke Syndrome (V17 1)  Father    5  Family history of Alcohol Abuse   6  Family history of Father  At Age ___   9  Family history of Hepatic Failure   8  Family history of Hypertension (V17 49)  Brother    5  Family history of depression (V17 0) (Z81 8)  Maternal Uncle    10  Family history of depression (V17 0) (Z81 8)    Social History    · Daily caffeine consumption   · Education Level: Some college   · Employed   ·    · Never A Smoker   · Never Drank Alcohol    Current Meds   1  BuPROPion HCl ER (XL) 150 MG Oral Tablet Extended Release 24 Hour; Take 1 tablet   daily; Therapy: 44HAO6717 to (Evaluate:2017)  Requested for: 34SNH0720; Last   Rx:18Svj5076 Ordered   2  Dicyclomine HCl - 10 MG Oral Capsule; TAKE 1 CAPSULE 3 times daily PRN; Therapy: 48JEO2306 to (Evaluate:2017)  Requested for: 2017; Last   Rx:2017 Ordered   3  Fluticasone Propionate 50 MCG/ACT Nasal Suspension; USE 2 SPRAYS IN EACH   NOSTRIL ONCE DAILY; Therapy: 46GZY6959 to (Last Rx:2015)  Requested for: 2015 Ordered   4  Omeprazole 20 MG Oral Capsule Delayed Release; take 1 capsule by mouth every   morning BEFORE BREAKFAST; Therapy: 87EHH9533 to (Evaluate:09Ncm3513)  Requested for: 19KMR1124; Last   Rx:2017 Ordered   5  Ondansetron 4 MG Oral Tablet Disintegrating; TAKE 1 TABLET EVERY 8 HOURS AS   NEEDED FOR NAUSEA; Therapy: 46Aoo4247 to (Last Rx:2017)  Requested for: 2017 Ordered   6  One-A-Day Mens Oral Tablet; Therapy: 03Cha0713 to Recorded   7   ProAir  (90 Base) MCG/ACT Inhalation Aerosol Solution; INHALE 1 TO 2 PUFFS   EVERY 4 TO 6 HOURS AS NEEDED; Therapy: 58TPD4503 to (Last EV:19YBS5682)  Requested for: 11FUO2833 Ordered   8  Sertraline HCl - 100 MG Oral Tablet; Take 1 tablet daily; Therapy: 32MHC1336 to (Evaluate:06Nov2017)  Requested for: 87GFD1438; Last   Rx:01Tqd8444 Ordered   9  Vitamin D 2000 UNIT Oral Capsule; take 1 capsule daily; Therapy: 77KEK3312 to (Last Rx:17Mar2015)  Requested for: 70SEQ6678 Ordered    Allergies    1   No Known Drug Allergies    Vitals  Signs   Recorded: 26VVD7799 34:42DO   Systolic: 321  Diastolic: 68  Height: 6 ft 0 5 in  Weight: 348 lb 9 6 oz  BMI Calculated: 46 63  BSA Calculated: 2 71  Recorded: 15UCZ0775 10:47AM   Height: 6 ft 0 5 in  Weight: 362 lb 4 8 oz  BMI Calculated: 48 46  BSA Calculated: 2 76    Future Appointments    Date/Time Provider Specialty Site   07/07/2017 09:00 AM Carmella Freeman [facility]   06/23/2017 10:00 AM Anton Archuleta  [facility]     Signatures   Electronically signed by : Vilma Geller, ; Jun 8 2017  4:02PM EST                       (Author)    Electronically signed by : MARCIANO De La Vega ; Jun 8 2017  4:10PM EST                       (Co-author)

## 2018-01-18 NOTE — PSYCH
History of Present Illness  [facility] Clinical Progress Note [facility]:   Specialized Services Documentation - Therapist must complete separate progress note for each specific clinical activity in which the client participated during the day  (215) Group Psychotherapy: ([id number]) [facility] participated in psychotherapy group focused on stressors, self-care, and coping skills  [facility] disclosed that he has been feeling stressed today by money and the tasks ahead of him  [facility] participated in group discussions about coping skills and self-care strategies and identified that he can manage self-care by setting goals in order of importance, and also relfected on how his own health is a goal of the utmost importance  Some mild progress noted toward goals today  Continue psychotherapy group to encourage [facility] to explore stressors and coping  MARCIANO Brown  Ed  Treatment Plan Problem(s): 1 1      (309) Group Psychotherapy: (9:30-10:30) [facility] participated in psychotherapy group dealing with managing anxiety  [facility] identified silence as a stress[facility]  He was an active participant in group discussion, and talked about writing poetry and listening to music as ways that he deals with his anxiety  He was very receptive to peer recommendations for additional healthy coping skills  Some moderate progress noted toward goals  Continue psychotherapy group to encourage [facility] to explore stressors and coping  Treatment Plan Problem(s): 1 1, 1 2  Noemy Moss MSW, LSW     (572) Group Psychotherapy: [id number] [facility] participated in wellness group using the WRAP workbook received, at beginning of Program, to learn how to use the WRAP to help in recovery as well as stay well, track difficult feelings and behaviors and develop goals to increase wellness  [facility] listened attentively to education but shared only minimally that he has not been working on [employer]   He was encouraged to learn and practice new cognitive and behavioral skills for recovery by this writer as well as peers who shared "what works for them " Deidre Cheng made minimal progress toward goals  Continue to offer group to educate on how WRAP can be utilized most effectively to achieve improved wellness and recovery from mental illness  Treatment Plan Problem(s): 1 1,1 2  Alva Steele RN     (834) Education Therapy Goals set - go to bank    Treatment Plan Problem(s): 1 1, 1 2  Education Therapy Time - 0900 - 0930 Previous goal was not met  Readiness to Learning:  He is receptive to learning  There are  no barriers to learning  Learning Assessment Time - 1330 - 1400   Education completed on  illness and wellness tools  The teaching method was  verbal and written  Shared area of learning: Yes  Anton Velez MSW, LSW         Case Management Note:   [id number] Deidre Cheng met with this CM to review and sign treatment plan and was given a copy of same  Deidre Cheng discussed wanting to return to college and study psychology as a "back up plan" to being a  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 and would like "to get back into it " Discussed Reynaldo's increased anxiety and depression related to feeling he is not doing work that he wants to do but needing the money  He was able to list several coping skills he has developed to decrease high level of anxiety he experiences such as listening to music and writing poetry  He stated he has not learned breathing strategies but finds listening to music with earbuds at work does help to decrease anxiety that has led to panic attacks in the past    TREATMENT SESSION NUMBER: 2   Current suicide risk is low  Medications not changed/added/denied  Alva Steele RN      Active Problems    1  Abdominal pain (789 00) (R10 9)   2  Acute pharyngitis (462) (J02 9)   3  Allergic rhinitis (477 9) (J30 9)   4  Ankle pain, right (719 47) (M29 571)   5   Anxiety disorder, unspecified type (300 00) (F41 9)   6  Arthralgia of right shoulder region (719 41) (M25 511)   7  Atypical chest pain (786 59) (R07 89)   8  Back muscle spasm (724 8) (M62 830)   9  Back strain (847 9) (S39 012A)   10  BMI 45 0-49 9, adult (V85 42) (Z68 42)   11  Chest pain (786 50) (R07 9)   12  Constipation (564 00) (K59 00)   13  Contact dermatitis (692 9) (L25 9)   14  Cyst of spleen (289 59) (D73 4)   15  Depression with anxiety (300 4) (F41 8)   16  Encounter for dietary counseling and surveillance (V65 3) (Z71 3)   17  Fatigue (780 79) (R53 83)   18  Foamy urine (791 9) (R82 99)   19  Gastroenteritis (558 9) (K52 9)   20  GERD without esophagitis (530 81) (K21 9)   21  Hematochezia (578 1) (K92 1)   22  Insomnia (780 52) (G47 00)   23  Low back pain (724 2) (M54 5)   24  Major depressive disorder, recurrent severe without psychotic features (296 33) (F33 2)   25  Nausea and vomiting (787 01) (R11 2)   26  Obesity (278 00) (E66 9)   27  Pain on swallowing (787 20) (R13 10)   28  Postprandial epigastric pain (789 06) (R10 13)   29  Proteinuria (791 0) (R80 9)   30  Rectal bleeding (569 3) (K62 5)   31  Screening for lipoid disorders (V77 91) (Z13 220)   32  Skin lesion (709 9) (L98 9)   33  Skin rash (782 1) (R21)   34  Skipped heart beats (427 9) (I45 9)   35  URTI (acute upper respiratory infection) (465 9) (J06 9)   36  Vitamin D deficiency disease (268 9) (E55 9)   37  Weight gain (783 1) (R63 5)    Past Medical History    1  Denied: History of Head trauma   2  History of abscess of skin and subcutaneous tissue (V13 3) (Z87 2)   3  History of pilonidal cyst (V13 3) (Z87 2)   4  History of Pilonidal cyst with abscess (685 0) (L05 01)   5  Denied: History of Seizure    Allergies    1  No Known Drug Allergies    Current Meds   1  Amoxicillin-Pot Clavulanate 875-125 MG Oral Tablet; TAKE 1 TABLET EVERY 12 HOURS   DAILY; Therapy: 46AEN2970 to (Evaluate:01Lbm2155)  Requested for: 01EHF6470; Last   Rx:79Kjo0540 Ordered   2  BuPROPion HCl ER (XL) 150 MG Oral Tablet Extended Release 24 Hour; Take 1 tablet   daily; Therapy: 01DNB9887 to (Evaluate:2017)  Requested for: 15Qfq1880; Last   Rx:93Zft3584 Ordered   3  Fluticasone Propionate 50 MCG/ACT Nasal Suspension; USE 2 SPRAYS IN EACH   NOSTRIL ONCE DAILY; Therapy: 74IVP6755 to (Last Rx:2015)  Requested for: 2015 Ordered   4  Meloxicam 15 MG Oral Tablet; TAKE 1 TABLET DAILY WITH FOOD; Therapy: 03VOX1106 to (Complete:2017); Last Rx:60Smw2927 Ordered   5  Methocarbamol 500 MG Oral Tablet; one po q hs;   Therapy: 63XNQ2791 to (Last Rx:16Rdt7996) Ordered   6  Omeprazole 40 MG Oral Capsule Delayed Release; take 1 capsule daily; Therapy: 67LDD6008 to (Evaluate:2017)  Requested for: 89BDT3148; Last   Rx:2016 Ordered   7  One-A-Day Mens Oral Tablet; Therapy: 44Fzr3357 to Recorded   8  Sertraline HCl - 100 MG Oral Tablet; Take 1 tablet daily; Therapy: 76LAN3890 to (Last Rx:70Vmq2993) Ordered   9  Sertraline HCl - 50 MG Oral Tablet; TAKE 1/2 TABLET DAILY for 1 week then take 1 tab   daily; Therapy: 87MWE9375 to (Last Rx:2016)  Requested for: 71Zhf7218; Status: ACTIVE   - Renewal Denied Ordered   10  Vitamin D 2000 UNIT Oral Capsule; take 1 capsule daily; Therapy: 17CZB8078 to (Last Rx:2015)  Requested for: 05MLL3303 Ordered    Family Psych History  Mother    1  Family history of depression (V17 0) (Z81 8)   2  Family history of Father  At Age 48   1  Family history of Obesity   4  Family history of Stroke Syndrome (V17 1)  Father    5  Family history of Alcohol Abuse   6  Family history of Father  At Age ___   9  Family history of Hepatic Failure   8  Family history of Hypertension (V17 49)  Brother    5  Family history of depression (V17 0) (Z81 8)  Maternal Uncle    10   Family history of depression (V17 0) (Z81 8)    Social History    · Daily caffeine consumption   · Education Level: Some college   · Employed   ·    · Never A Smoker   · Never Drank Alcohol    Future Appointments    Date/Time Provider Specialty Site   12/15/2016 10:00 AM MARCIANO Brand  Psychiatry [facility]   12/16/2016 10:00 AM MARCIANO Brand   [facility]iatry [facility]   12/19/2016 03:30 PM Mile Pro MD Family Medicine [address] [place] [address]     Signatures   Electronically signed by : Claudell Keep, MEd; Dec 14 2016 12:18PM EST                       (Author)    Electronically signed by : COLIN Swanson; Dec 14 2016  2:08PM EST                       (Author)    Electronically signed by : Guille Landry RN; Dec 14 2016  3:10PM EST                       (Author)    Electronically signed by : Guille Landry RN; Dec 19 2016  3:00PM EST                       (Author)    Electronically signed by : Guille Landry RN; Dec 19 2016  3:38PM EST                       (Author)

## 2018-01-22 ENCOUNTER — APPOINTMENT (OUTPATIENT)
Dept: PHYSICAL THERAPY | Facility: REHABILITATION | Age: 28
End: 2018-01-22
Payer: COMMERCIAL

## 2018-01-22 VITALS
BODY MASS INDEX: 41.75 KG/M2 | SYSTOLIC BLOOD PRESSURE: 134 MMHG | WEIGHT: 315 LBS | DIASTOLIC BLOOD PRESSURE: 68 MMHG | HEIGHT: 73 IN

## 2018-01-22 VITALS — WEIGHT: 315 LBS | BODY MASS INDEX: 41.75 KG/M2 | HEIGHT: 73 IN

## 2018-01-22 PROCEDURE — 97110 THERAPEUTIC EXERCISES: CPT

## 2018-01-23 VITALS
SYSTOLIC BLOOD PRESSURE: 120 MMHG | TEMPERATURE: 96.7 F | DIASTOLIC BLOOD PRESSURE: 86 MMHG | HEIGHT: 73 IN | WEIGHT: 315 LBS | BODY MASS INDEX: 41.75 KG/M2 | HEART RATE: 72 BPM | RESPIRATION RATE: 16 BRPM

## 2018-01-23 NOTE — RESULT NOTES
Verified Results  Urine Dip Non-Automated- POC [id number] 02:10PM Ramandeep Moore     Test Name Result Flag Reference   Color Yellow     Clarity Transparent     Leukocytes -     Nitrite -     Blood -     Bilirubin -     Urobilinogen 0 2     Protein -     Ph 8 0     Specific Gravity 1 000     Ketone -     Glucose -         Plan  Health Maintenance    · (1) ABO/RH(D); Status:Active; Requested MKL:07DVK3971; Low back pain    · Meloxicam 15 MG Oral Tablet; TAKE 1 TABLET DAILY WITH FOOD   · Methocarbamol 500 MG Oral Tablet; TAKE 1 TABLET Bedtime PRN Back pain   · Ketorolac Tromethamine 30 MG/ML Injection Solution   · * XR SPINE LUMBAR MINIMUM 4 VIEWS NON INJURY; Status:Active;  Requested  BKR:18MGM5045;    · Urine Dip Non-Automated- POC; Status:Complete;   Done: 79JOJ8674 02:10PM   · *1 - [facility]  *  Status: Active  Requested for:  64LGZ4143  Care Summary provided  : Yes

## 2018-01-23 NOTE — MISCELLANEOUS
Message  Patient is excused from work on 1/10/18  Return to work or school:   Maggy Card is under my professional care   He was seen in my office on 1/10/18             Signatures   Electronically signed by : Luciana Neri MD; Julio 10 2018  1:38PM EST                       (Author)

## 2018-01-24 ENCOUNTER — TELEPHONE (OUTPATIENT)
Dept: FAMILY MEDICINE CLINIC | Facility: CLINIC | Age: 28
End: 2018-01-24

## 2018-01-24 VITALS
SYSTOLIC BLOOD PRESSURE: 138 MMHG | HEART RATE: 72 BPM | HEIGHT: 73 IN | BODY MASS INDEX: 41.75 KG/M2 | DIASTOLIC BLOOD PRESSURE: 88 MMHG | TEMPERATURE: 96.7 F | RESPIRATION RATE: 16 BRPM | WEIGHT: 315 LBS

## 2018-01-24 VITALS
WEIGHT: 315 LBS | SYSTOLIC BLOOD PRESSURE: 122 MMHG | BODY MASS INDEX: 49.94 KG/M2 | HEART RATE: 93 BPM | DIASTOLIC BLOOD PRESSURE: 86 MMHG

## 2018-01-25 ENCOUNTER — APPOINTMENT (OUTPATIENT)
Dept: PHYSICAL THERAPY | Facility: REHABILITATION | Age: 28
End: 2018-01-25
Payer: COMMERCIAL

## 2018-01-29 ENCOUNTER — OFFICE VISIT (OUTPATIENT)
Dept: PHYSICAL THERAPY | Facility: REHABILITATION | Age: 28
End: 2018-01-29
Payer: COMMERCIAL

## 2018-01-29 DIAGNOSIS — M54.50 ACUTE MIDLINE LOW BACK PAIN WITHOUT SCIATICA: Primary | ICD-10-CM

## 2018-01-29 PROCEDURE — 97110 THERAPEUTIC EXERCISES: CPT | Performed by: PHYSICAL THERAPIST

## 2018-01-29 NOTE — PROGRESS NOTES
Daily Note     Today's date: 2018  Patient name: Juan Mcmullen  : 1990  MRN: [id number]  Referring provider: David Claire MD  Dx: No diagnosis found  Subjective:  Pt reports intermittent achiness in his back  Objective: See treatment diary below  Precautions:  Anxiety, depression    Specialty Daily Treatment Diary     Manual         Lower T/S PA JM's 5 mins                                           Exercise Diary         TM amb 10 mins       HS stretch 10"x10       DLS ab bracing 5"x2 mins       DLS marches 15       DLS kicks 15       Bridges 3"x15       clamshells 15ea       Quad alt UE 15       Quad alt LE 10       Posture training-chair 2 mins       TB LPD gtb 20       TB rows gtb 20       TB multifidus gtb 20ea       Mini squats                                                            Modalities        MH PRN NP       Pt has home TENS unit                          Assessment: Tolerated treatment well  Patient exhibited good technqiue with therapeutic exercises  Difficulty with core control with quad alt Le  Plan: Progress treatment as tolerated

## 2018-02-02 ENCOUNTER — TELEPHONE (OUTPATIENT)
Dept: FAMILY MEDICINE CLINIC | Facility: CLINIC | Age: 28
End: 2018-02-02

## 2018-02-02 NOTE — TELEPHONE ENCOUNTER
Pt called asking who you may recommend for ear nose and throat doctor due to getting sore throats? Please advise [phone]

## 2018-02-06 ENCOUNTER — EVALUATION (OUTPATIENT)
Dept: PHYSICAL THERAPY | Facility: REHABILITATION | Age: 28
End: 2018-02-06
Payer: COMMERCIAL

## 2018-02-06 DIAGNOSIS — M54.50 ACUTE MIDLINE LOW BACK PAIN WITHOUT SCIATICA: Primary | ICD-10-CM

## 2018-02-06 PROCEDURE — 97110 THERAPEUTIC EXERCISES: CPT

## 2018-02-06 NOTE — PROGRESS NOTES
Daily Note     Today's date: 2018  Patient name: Brandt Tai  : 1990  MRN: [id number]  Referring provider: Sagar Breaux MD  Dx:   Encounter Diagnosis   Name Primary?  Acute midline low back pain without sciatica Yes                  Subjective: Pt reported intermittent pain and fatigue today  Getting over having strep throat and the flu  Objective: See treatment diary below  Specialty Daily Treatment Diary      Manual           Lower T/S PA JM's 5 mins  5 min                                                                       Exercise Diary           TM amb 10 mins  10 min         HS stretch 10"x10  10"x10         DLS ab bracing 5"x2 mins  5"x2 min         DLS marches 15  90         DLS kicks 15  58         Bridges 3"x15  3"x15         clamshells 15ea  15 ea          Quad alt UE 15  15         Quad alt LE 10  10         Posture training-chair 2 mins  2 min         TB LPD gtb 20 gtb 20        TB rows gtb 20  gtb 20         TB multifidus gtb 20ea  gtb 20ea          Mini squats                                                                                                       Modalities   2/6         MH PRN NP  np         Pt has home TENS unit                                 Assessment: Tolerated treatment well  Patient exhibited good technique with therapeutic exercises  Fatigue noted throughout session  Plan: Continue per plan of care   1  with PTA for 15 mins, performed remaining exercises as part of fitness program

## 2018-02-15 ENCOUNTER — APPOINTMENT (OUTPATIENT)
Dept: PHYSICAL THERAPY | Facility: REHABILITATION | Age: 28
End: 2018-02-15
Payer: COMMERCIAL

## 2018-02-19 ENCOUNTER — OFFICE VISIT (OUTPATIENT)
Dept: PHYSICAL THERAPY | Facility: REHABILITATION | Age: 28
End: 2018-02-19
Payer: COMMERCIAL

## 2018-02-19 DIAGNOSIS — M54.50 ACUTE MIDLINE LOW BACK PAIN WITHOUT SCIATICA: Primary | ICD-10-CM

## 2018-02-19 PROCEDURE — 97110 THERAPEUTIC EXERCISES: CPT

## 2018-02-19 NOTE — PROGRESS NOTES
Daily Note     Today's date: 2018  Patient name: Nury Tompkins  : 1990  MRN: [id number]  Referring provider: Leonidas Cain MD  Dx:   Encounter Diagnosis     ICD-10-CM    1  Acute midline low back pain without sciatica M54 5               Subjective: Pt reports decreased pain since starting therapy and has been compliant with HEP  Objective: See treatment diary below    Manual         Lower T/S PA JM's 5 mins  5 min                             Exercise Diary         TM amb 10 mins  10 min  10 mins       HS stretch 10"x10  10"x10  10"x10       DLS ab bracing 5"x2 mins  5"x2 min  5"x2 mins       DLS marches 15  29  87 ea       DLS kicks 15  89  33 ea       Bridges 3"x15  3"x15  5"x20       clamshells 15ea  15 ea   15 ea       Quad alt UE 15  15  15 ea       Quad alt LE 10  10  10 ea       Posture training-chair 2 mins  2 min  2 mins       TB LPD gtb 20 gtb 20 gtb 20       TB rows gtb 20  gtb 20 gtb 20       TB multifidus gtb 20ea  gtb 20ea  gtb 20       Reverse clams     5"x20                       Assessment: Tolerated treatment well  Patient would benefit from continued PT      Plan: Progress treatment as tolerated          Zee Edyd PTA

## 2018-02-26 NOTE — MISCELLANEOUS
Message  Return to work or school:   Steffanie Ward is under my professional care  He was seen in my office on 01/10/2018   He is able to return to work on  2018    He is able to work with limitations (Lifting restricted to 10lbs )      Tera Blackburn MD       Signatures   Electronically signed by : Tera Blackburn MD; 2018  9:28PM EST                       (Author)

## 2018-02-26 NOTE — RESULT NOTES
Verified Results  * XR SPINE LUMBAR MINIMUM 4 VIEWS NON INJURY [id number] 12:58PM Ramandeep Moore     Test Name Result Flag Reference   XR SPINE LUMBAR MINIMUM 4 VIEWS (Report)     LUMBAR SPINE     INDICATION: Dx: Low back pain, unspecified back pain laterality, unspecified chronicity, with sciatica presence unspecified [M54 5 (ICD-10-CM)]     COMPARISON: Lumbar spine plain films from 10/10/2016  VIEWS: AP, lateral, bilateral oblique and coned down projections     IMAGES: 6     FINDINGS:     Alignment is unremarkable  There is no radiographic evidence of acute fracture or destructive osseous lesion  No significant lumbar degenerative change noted  Mild degenerative disc space narrowing is seen at T9-T10 and T10-T11  Visualized soft tissues appear unremarkable  IMPRESSION:     Normal plain film appearance of the lumbar spine  Mild degenerative disc space narrowing is seen at T9-T10 and T10-T11         Workstation performed: [id number]     Signed by:   Deirdre Richards MD   1/10/18

## 2018-02-27 ENCOUNTER — OFFICE VISIT (OUTPATIENT)
Dept: PHYSICAL THERAPY | Facility: REHABILITATION | Age: 28
End: 2018-02-27
Payer: COMMERCIAL

## 2018-02-27 DIAGNOSIS — M54.50 ACUTE MIDLINE LOW BACK PAIN WITHOUT SCIATICA: Primary | ICD-10-CM

## 2018-02-27 PROCEDURE — 97110 THERAPEUTIC EXERCISES: CPT

## 2018-02-27 NOTE — PROGRESS NOTES
Daily Note     Today's date: 2018  Patient name: Hayden Tse  : 1990  MRN: [id number]  Referring provider: Nura Raman MD  Dx:   Encounter Diagnosis     ICD-10-CM    1  Acute midline low back pain without sciatica M54 5                   Subjective: Pt reported intermittent discomfort in low back persists  Objective: See treatment diary below  Manual        Lower T/S PA JM's 5 mins  5 min  np  5 min w/TP                           Exercise Diary        TM amb 10 mins  10 min  10 mins  10 min      HS stretch 10"x10  10"x10  10"x10  10"x10      DLS ab bracing 5"x2 mins  5"x2 min  5"x2 mins  5"x2 min      DLS marches 15  61  01 ea  15 ea       DLS kicks 15  08  38 ea  15 ea       Bridges 3"x15  3"x15  5"x20  5"x20      clamshells 15ea  15 ea   15 ea  15 ea       Quad alt UE 15  15  15 ea  15 ea       Quad alt LE 10  10  10 ea  10 ea       Posture training-chair 2 mins  2 min  2 mins  2 min      TB LPD gtb 20 gtb 20 gtb 20  gtb 20      TB rows gtb 20  gtb 20 gtb 20  gtb 20      TB multifidus gtb 20ea  gtb 20ea  gtb 20  gtb 20      Reverse clams     5"x20                            Assessment: Tolerated treatment well  Patient demonstrated fatigue post treatment and exhibited good technique with therapeutic exercises  Vc"s needed for correct technique  Challenged with quad alt UE and LE exercises  Plan: Continue per plan of care

## 2018-03-07 ENCOUNTER — APPOINTMENT (OUTPATIENT)
Dept: PHYSICAL THERAPY | Facility: REHABILITATION | Age: 28
End: 2018-03-07
Payer: COMMERCIAL

## 2018-03-07 NOTE — PSYCH
History of Present Illness    Presenting Problems: Stressors: DEPRESSION WITH ANXIETY  Referral Source: Peyton Clay  He is employed    He is not a smoker  Symptoms: suicidal ideation, depressed mood, anxiety and sleep disturbances  Provisional Diagnosis: Axis I: DEPRESSION WITH ANXIETY F41 8  Substance Abuse: No substance abuse  Psychiatric Treatment History: [facility]   The patient does not require ambulatory assistance  Legal Issues: The patient does not have legal issues  ACCEPTED  Appointment Date: 12/13/16        Signatures   Electronically signed by : Lori Acosta, ; Dec  9 2016 11:37AM EST                       (Author)

## 2018-03-16 ENCOUNTER — EVALUATION (OUTPATIENT)
Dept: PHYSICAL THERAPY | Facility: REHABILITATION | Age: 28
End: 2018-03-16
Payer: COMMERCIAL

## 2018-03-16 DIAGNOSIS — M54.50 ACUTE MIDLINE LOW BACK PAIN WITHOUT SCIATICA: Primary | ICD-10-CM

## 2018-03-16 DIAGNOSIS — F33.9 RECURRENT MAJOR DEPRESSIVE DISORDER, REMISSION STATUS UNSPECIFIED (HCC): Primary | ICD-10-CM

## 2018-03-16 PROCEDURE — G8991 OTHER PT/OT GOAL STATUS: HCPCS | Performed by: PHYSICAL THERAPIST

## 2018-03-16 PROCEDURE — 97110 THERAPEUTIC EXERCISES: CPT | Performed by: PHYSICAL THERAPIST

## 2018-03-16 PROCEDURE — G8992 OTHER PT/OT  D/C STATUS: HCPCS | Performed by: PHYSICAL THERAPIST

## 2018-03-16 RX ORDER — BUPROPION HYDROCHLORIDE 150 MG/1
TABLET ORAL
Qty: 30 TABLET | Refills: 5 | Status: SHIPPED | OUTPATIENT
Start: 2018-03-16 | End: 2018-09-26 | Stop reason: SDUPTHER

## 2018-03-16 NOTE — PROGRESS NOTES
PT Discharge    Today's date: 3/16/2018  Patient name: Dae Cole  : 1990  MRN: [id number]  Referring provider: Logan Randall MD  Dx:   Encounter Diagnosis     ICD-10-CM    1  Acute midline low back pain without sciatica M54 5                   Assessment    Assessment details: Pt has progressed well, demonstrating improvement in lumbar AROM, LE flexibility, hip and core strength, and function with a decrease in pain  Pt will be d/c to ongoing HEP at this time  Thank you for the referral   Updated HEP was issued to pt  Goals  Short Term:  Pt will report decreased levels of pain by at least 2 subjective ratings in 4 weeks-met  Pt will demonstrate improved ROM by 25% in 4 weeks-met  Pt will demonstrate improved strength by 1/2 grade MMT in 4 weeks-met  Long Term:   Pt will be independent in their HEP in 8 weeks-met  Pt will demonstrate improved FOTO, >57-met   Pt will be independent with all ADL's-met        Subjective Evaluation    History of Present Illness  Mechanism of injury: Pt reports 100% improvement at this time  FOTO improved to 94 (was 37 at eval)  Pt reports no significant functional limitations at this time  Pt reports improvement with sitting, bending down, prolonged standing, household chores, prolonged driving, car transfers, work duties  Pt notes improved tolerance to lifting things if he remembers to use correct body mechanics  Pt states he has some stiffness upon waking in the morning  Pt is not taking any pain medication  Pain  At best pain ratin  At worst pain ratin  Location: lumbar spine          Objective     Tenderness     Additional Tenderness Details  No TTP noted       Active Range of Motion     Additional Active Range of Motion Details  L/S AROM wfl; mild pain    Strength/Myotome Testing     Left Hip   Planes of Motion   Flexion: 5  Extension: 5  Abduction: 5    Right Hip   Planes of Motion   Flexion: 5  Extension: 5  Abduction: 5          Precautions: anxiety, depression  Daily Treatment Diary   Manual  1/29 2/6 2/19 2/27       Lower T/S PA JM's 5 mins  5 min  np  5 min w/TP                             Exercise Diary  1/29 2/6 2/19 2/27       TM amb 10 mins  10 min  10 mins  10 min       HS stretch 10"x10  10"x10  10"x10  10"x10       DLS ab bracing 5"x2 mins  5"x2 min  5"x2 mins  5"x2 min       DLS marches 15  05  10 ea  15 ea        DLS kicks 15  83  18 ea  15 ea        Bridges 3"x15  3"x15  5"x20  5"x20       clamshells 15ea  15 ea   15 ea  15 ea        Quad alt UE 15  15  15 ea  15 ea        Quad alt LE 10  10  10 ea  10 ea        Posture training-chair 2 mins  2 min  2 mins  2 min       TB LPD gtb 20 gtb 20 gtb 20  gtb 20       TB rows gtb 20  gtb 20 gtb 20  gtb 20       TB multifidus gtb 20ea  gtb 20ea  gtb 20  gtb 20       Reverse clams     5"x20                               Updated measurements and functional status taken this session  Reviewed TE's this session for d/c

## 2018-04-24 RX ORDER — MELOXICAM 15 MG/1
1 TABLET ORAL DAILY
COMMUNITY
Start: 2018-01-10 | End: 2018-04-27 | Stop reason: ALTCHOICE

## 2018-04-24 RX ORDER — MULTIVIT-MIN/IRON/FOLIC ACID/K 18-600-40
1 CAPSULE ORAL EVERY EVENING
COMMUNITY
Start: 2015-03-17 | End: 2018-09-17

## 2018-04-24 RX ORDER — METHOCARBAMOL 500 MG/1
1 TABLET, FILM COATED ORAL
COMMUNITY
Start: 2017-11-21 | End: 2018-06-04

## 2018-04-24 RX ORDER — ALBUTEROL SULFATE 90 UG/1
2 AEROSOL, METERED RESPIRATORY (INHALATION) EVERY 4 HOURS PRN
COMMUNITY
Start: 2017-01-04 | End: 2018-09-17

## 2018-04-24 RX ORDER — PREDNISONE 20 MG/1
2 TABLET ORAL
COMMUNITY
Start: 2018-01-17 | End: 2018-04-27 | Stop reason: ALTCHOICE

## 2018-04-24 RX ORDER — FLUTICASONE PROPIONATE 50 MCG
2 SPRAY, SUSPENSION (ML) NASAL DAILY PRN
COMMUNITY
Start: 2015-10-21 | End: 2018-09-17

## 2018-04-24 RX ORDER — SERTRALINE HYDROCHLORIDE 100 MG/1
1 TABLET, FILM COATED ORAL EVERY EVENING
COMMUNITY
Start: 2016-12-03 | End: 2018-07-19 | Stop reason: SDUPTHER

## 2018-04-27 ENCOUNTER — OFFICE VISIT (OUTPATIENT)
Dept: FAMILY MEDICINE CLINIC | Facility: CLINIC | Age: 28
End: 2018-04-27
Payer: COMMERCIAL

## 2018-04-27 VITALS
DIASTOLIC BLOOD PRESSURE: 86 MMHG | HEART RATE: 80 BPM | SYSTOLIC BLOOD PRESSURE: 130 MMHG | RESPIRATION RATE: 16 BRPM | BODY MASS INDEX: 42.66 KG/M2 | WEIGHT: 315 LBS | TEMPERATURE: 95.9 F | HEIGHT: 72 IN

## 2018-04-27 DIAGNOSIS — R53.83 FATIGUE, UNSPECIFIED TYPE: ICD-10-CM

## 2018-04-27 DIAGNOSIS — R79.89 ELEVATED TSH: ICD-10-CM

## 2018-04-27 DIAGNOSIS — G56.00 CARPAL TUNNEL SYNDROME, UNSPECIFIED LATERALITY: ICD-10-CM

## 2018-04-27 DIAGNOSIS — F32.A ANXIETY AND DEPRESSION: ICD-10-CM

## 2018-04-27 DIAGNOSIS — F41.9 ANXIETY AND DEPRESSION: ICD-10-CM

## 2018-04-27 PROCEDURE — 99214 OFFICE O/P EST MOD 30 MIN: CPT | Performed by: FAMILY MEDICINE

## 2018-04-27 NOTE — PROGRESS NOTES
FAMILY PRACTICE OFFICE VISIT       NAME: Maria L Salazar  AGE: 32 y o  SEX: male       : 1990        MRN: [id number]    DATE: 2018  TIME: 1:05 PM    Assessment and Plan     Problem List Items Addressed This Visit     BMI 50 0-59 9, adult [facility]) - Primary      Patient is interested in seeing  Weight , Dr [facility]ule[facility]  Referral has been provided  Have strongly encouraged on working on lifestyle modifications including diet, starting routine exercise regimen  Patient has also been advised to avoid bringing foods into the home that are high in carbohydrate content as well  Relevant Orders    CBC and differential    Comprehensive metabolic panel    Lipid Panel with Direct LDL reflex    Ambulatory referral to Bariatric Surgery    Anxiety and depression       Patient states his mood has improved since they got a puppy recently  He continues to take Wellbutrin, sertraline  Relevant Medications    sertraline (ZOLOFT) 100 mg tablet    Elevated TSH       Will recheck TSH  Relevant Orders    CBC and differential    Lipid Panel with Direct LDL reflex    TSH, 3rd generation with T4 reflex    Carpal tunnel syndrome       Patient requesting referral to orthopedic surgery  This has been provided  Relevant Orders    Ambulatory referral to Orthopedic Surgery      Other Visit Diagnoses     Fatigue, unspecified type        Relevant Orders    Vitamin D 25 hydroxy        I have spent 25 minutes with Patient  today in which greater than 50% of this time was spent in counseling/coordination of care regarding Prognosis, Risks and benefits of tx options, Intructions for management, Patient and family education, Risk factor reductions and Impressions  There are no Patient Instructions on file for this visit  Chief Complaint     Chief Complaint   Patient presents with    Obesity     Patient presents today with concerns about his weight         History of Present Illness     HPI    59-year-old male here to discuss weight loss options  Patient states he has had difficulty losing weight recently  He admits to not eating as healthy over the past few months  Patient states his mood has improved  He had a recently  Got a puppy and is looking forward to walking him more  Review of Systems   Review of Systems   Constitutional:         Weight gain   Respiratory: Negative for shortness of breath  Cardiovascular: Negative  Negative for chest pain, palpitations and leg swelling  Gastrointestinal: Negative for abdominal pain  Psychiatric/Behavioral: Negative for dysphoric mood  Mood is overall stable       Active Problem List     Patient Active Problem List   Diagnosis    BMI 50 0-59 9, adult ([facility] [address] )    Anxiety and depression    Elevated TSH    Carpal tunnel syndrome       Past Medical History:  Past Medical History:   Diagnosis Date    Bloody stools     LA    6/8/16   R   11/21/17     Carpal tunnel syndrome, bilateral     LA  Caswell Piles Caswell Piles 9/12/17   R   9/12/17     Insomnia     LA   11/7/16   R   11/21/17     Pilonidal cyst with abscess     LA   10/25/13   R   6/10/14     Rectal bleeding     R   11/21/17     Seizure ([facility] [address] )     LA   12/13/16     Skin lesion     LA    2/3/15   R  Caswell Piles Caswell Piles 3/17/17  /   LA    3/17/17   R   12/22/17        Past Surgical History:  Past Surgical History:   Procedure Laterality Date    FOOT SURGERY      PILONIDAL CYST DRAINAGE      Incision and drainage of pilonidal cyst        Family History:  Family History   Problem Relation Age of Onset    Depression Mother     Obesity Mother     Stroke Mother      Syndrome     Alcohol abuse Father     Liver disease Father      hepatic failure     Hypertension Father     Depression Brother     Depression Maternal Uncle        Social History:  Social History     Social History    Marital status: /Civil Union     Spouse name: N/A    Number of children: N/A    Years of education: some college     Occupational History          employed      Social History Main Topics    Smoking status: Never Smoker    Smokeless tobacco: Never Used    Alcohol use No    Drug use: No    Sexual activity: Not on file     Other Topics Concern    Not on file     Social History Narrative    Daily caffeine consumption      I have reviewed the patient's medical history in detail; there are no changes to the history as noted in the electronic medical record  Objective     Vitals:    04/27/18 0940   BP: 130/86   Pulse:    Resp:    Temp:      Wt Readings from Last 3 Encounters:   04/27/18 (!) 169 kg (373 lb 9 6 oz)   01/17/18 (!) 169 kg (373 lb 6 oz)   01/10/18 (!) 168 kg (370 lb 15 9 oz)     Vitals:    04/27/18 0910 04/27/18 0940   BP: 132/90 130/86   BP Location: Left arm    Patient Position: Sitting    Cuff Size: Large    Pulse: 80    Resp: 16    Temp: (!) 95 9 °F (35 5 °C)    TempSrc: Tympanic    Weight: (!) 169 kg (373 lb 9 6 oz)    Height: 6' (1 829 m)        Physical Exam   Constitutional: He is oriented to person, place, and time  He appears well-developed and well-nourished  HENT:   Head: Normocephalic and atraumatic  Mouth/Throat: Oropharynx is clear and moist    Eyes: Conjunctivae and EOM are normal  Pupils are equal, round, and reactive to light  Neck: Normal range of motion  Neck supple  No thyromegaly present  Cardiovascular: Normal rate, regular rhythm and normal heart sounds  Pulmonary/Chest: Effort normal and breath sounds normal    Abdominal: Soft  Bowel sounds are normal  He exhibits no distension  There is no tenderness  Musculoskeletal: Normal range of motion  He exhibits no edema  Lymphadenopathy:     He has no cervical adenopathy  Neurological: He is alert and oriented to person, place, and time  Psychiatric: He has a normal mood and affect  Nursing note and vitals reviewed        Pertinent Laboratory/Diagnostic Studies:  Lab Results   Component Value Date    BUN 18 11/21/2017 CREATININE 0 98 11/21/2017    CALCIUM 9 4 11/21/2017     11/21/2017    K 4 5 11/21/2017    CO2 31 11/21/2017     11/21/2017     Lab Results   Component Value Date    ALT 27 11/21/2017    AST 18 11/21/2017    ALKPHOS 84 11/21/2017    BILITOT 0 5 11/21/2017       Lab Results   Component Value Date    WBC 5 7 11/21/2017    HGB 14 9 11/21/2017    HCT 45 6 11/21/2017    MCV 87 5 11/21/2017     11/21/2017       No results found for: TSH    Lab Results   Component Value Date    CHOL 150 06/17/2016     Lab Results   Component Value Date    TRIG 69 06/17/2016     Lab Results   Component Value Date    HDL 41 06/17/2016     No results found for: LDLCALC  No results found for: HGBA1C    Results for orders placed or performed in visit on 01/10/18   POCT urinalysis dipstick (Historical)   Result Value Ref Range    Color, UA Yellow     Clarity, UA Transparent     Leukocytes, UA -     Nitrite, UA -     Blood, UA -     Bilirubin, UA -     Urobilinogen, UA 0 2     Protein, UA -     pH, UA 8 0     Specific Gravity, UA 1 000     Ketones, UA -     Glucose -        Orders Placed This Encounter   Procedures    CBC and differential    Comprehensive metabolic panel    Lipid Panel with Direct LDL reflex    TSH, 3rd generation with T4 reflex    Vitamin D 25 hydroxy    Ambulatory referral to Orthopedic Surgery    Ambulatory referral to Bariatric Surgery       ALLERGIES:  No Known Allergies    Current Medications     Current Outpatient Prescriptions   Medication Sig Dispense Refill    albuterol (PROAIR HFA) 90 mcg/act inhaler Inhale 2 puffs      Cholecalciferol (VITAMIN D) 2000 units CAPS Take 1 capsule by mouth daily      fluticasone (FLONASE) 50 mcg/act nasal spray 2 sprays into each nostril daily      methocarbamol (ROBAXIN) 500 mg tablet Take 1 tablet by mouth      sertraline (ZOLOFT) 100 mg tablet Take 1 tablet by mouth daily      buPROPion (WELLBUTRIN XL) 150 mg 24 hr tablet TAKE 1 TABLET DAILY 30 tablet 5    Multiple Vitamin (ONE-A-DAY MENS PO) Take by mouth       No current facility-administered medications for this visit            Health Maintenance     Health Maintenance   Topic Date Due    HIV SCREENING  1990    PT PLAN OF CARE  1990    Depression Screening PHQ-9  07/10/2002    INFLUENZA VACCINE  09/01/2017    DTaP,Tdap,and Td Vaccines (2 - Td) 02/11/2023     Immunization History   Administered Date(s) Administered    Influenza TIV (IM) 01/04/2017    Tdap 02/11/2013       Ncik Gonzalez MD

## 2018-04-28 PROBLEM — G56.00 CARPAL TUNNEL SYNDROME: Status: ACTIVE | Noted: 2018-04-28

## 2018-04-28 PROBLEM — F32.A ANXIETY AND DEPRESSION: Status: ACTIVE | Noted: 2018-04-28

## 2018-04-28 PROBLEM — F41.9 ANXIETY AND DEPRESSION: Status: ACTIVE | Noted: 2018-04-28

## 2018-04-28 PROBLEM — R79.89 ELEVATED TSH: Status: ACTIVE | Noted: 2018-04-28

## 2018-04-28 LAB
ALBUMIN SERPL-MCNC: 4 G/DL (ref 3.6–5.1)
ALBUMIN/GLOB SERPL: 1.1 (CALC) (ref 1–2.5)
ALP SERPL-CCNC: 89 U/L (ref 40–115)
ALT SERPL-CCNC: 29 U/L (ref 9–46)
AST SERPL-CCNC: 24 U/L (ref 10–40)
BASOPHILS # BLD AUTO: 32 CELLS/UL (ref 0–200)
BASOPHILS NFR BLD AUTO: 0.5 %
BILIRUB SERPL-MCNC: 0.5 MG/DL (ref 0.2–1.2)
BUN SERPL-MCNC: 22 MG/DL (ref 7–25)
BUN/CREAT SERPL: NORMAL (CALC) (ref 6–22)
CALCIUM SERPL-MCNC: 9.2 MG/DL (ref 8.6–10.3)
CHLORIDE SERPL-SCNC: 104 MMOL/L (ref 98–110)
CHOLEST SERPL-MCNC: 168 MG/DL
CHOLEST/HDLC SERPL: 3.7 (CALC)
CO2 SERPL-SCNC: 29 MMOL/L (ref 20–31)
CREAT SERPL-MCNC: 0.9 MG/DL (ref 0.6–1.35)
EOSINOPHIL # BLD AUTO: 82 CELLS/UL (ref 15–500)
EOSINOPHIL NFR BLD AUTO: 1.3 %
ERYTHROCYTE [DISTWIDTH] IN BLOOD BY AUTOMATED COUNT: 12.7 % (ref 11–15)
GLOBULIN SER CALC-MCNC: 3.6 G/DL (CALC) (ref 1.9–3.7)
GLUCOSE SERPL-MCNC: 89 MG/DL (ref 65–99)
HCT VFR BLD AUTO: 44.8 % (ref 38.5–50)
HDLC SERPL-MCNC: 46 MG/DL
HGB BLD-MCNC: 15 G/DL (ref 13.2–17.1)
LDLC SERPL CALC-MCNC: 104 MG/DL (CALC)
LYMPHOCYTES # BLD AUTO: 1544 CELLS/UL (ref 850–3900)
LYMPHOCYTES NFR BLD AUTO: 24.5 %
MCH RBC QN AUTO: 28.9 PG (ref 27–33)
MCHC RBC AUTO-ENTMCNC: 33.5 G/DL (ref 32–36)
MCV RBC AUTO: 86.3 FL (ref 80–100)
MONOCYTES # BLD AUTO: 498 CELLS/UL (ref 200–950)
MONOCYTES NFR BLD AUTO: 7.9 %
NEUTROPHILS # BLD AUTO: 4145 CELLS/UL (ref 1500–7800)
NEUTROPHILS NFR BLD AUTO: 65.8 %
NONHDLC SERPL-MCNC: 122 MG/DL (CALC)
PLATELET # BLD AUTO: 238 THOUSAND/UL (ref 140–400)
PMV BLD REES-ECKER: 9.6 FL (ref 7.5–12.5)
POTASSIUM SERPL-SCNC: 4.5 MMOL/L (ref 3.5–5.3)
PROT SERPL-MCNC: 7.6 G/DL (ref 6.1–8.1)
RBC # BLD AUTO: 5.19 MILLION/UL (ref 4.2–5.8)
SL AMB EGFR AFRICAN AMERICAN: 135 ML/MIN/1.73M2
SL AMB EGFR NON AFRICAN AMERICAN: 117 ML/MIN/1.73M2
SODIUM SERPL-SCNC: 139 MMOL/L (ref 135–146)
TRIGL SERPL-MCNC: 88 MG/DL
TSH SERPL-ACNC: 3.17 MIU/L (ref 0.4–4.5)
WBC # BLD AUTO: 6.3 THOUSAND/UL (ref 3.8–10.8)

## 2018-04-28 NOTE — ASSESSMENT & PLAN NOTE
Patient is interested in seeing  Weight , Dr Nahomy Greenwood  Referral has been provided  Have strongly encouraged on working on lifestyle modifications including diet, starting routine exercise regimen  Patient has also been advised to avoid bringing foods into the home that are high in carbohydrate content as well

## 2018-04-28 NOTE — ASSESSMENT & PLAN NOTE
Patient states his mood has improved since they got a puppy recently  He continues to take Wellbutrin, sertraline

## 2018-04-30 ENCOUNTER — TELEPHONE (OUTPATIENT)
Dept: FAMILY MEDICINE CLINIC | Facility: CLINIC | Age: 28
End: 2018-04-30

## 2018-04-30 NOTE — TELEPHONE ENCOUNTER
----- Message from See Correia MD sent at 4/30/2018 10:22 AM EDT -----  Can you please let patient know that his blood work including blood sugar, kidneys, liver, thyroid, cholesterol was within normal range    Thank you

## 2018-06-04 ENCOUNTER — OFFICE VISIT (OUTPATIENT)
Dept: OBGYN CLINIC | Facility: CLINIC | Age: 28
End: 2018-06-04
Payer: COMMERCIAL

## 2018-06-04 VITALS — DIASTOLIC BLOOD PRESSURE: 90 MMHG | HEART RATE: 100 BPM | HEIGHT: 73 IN | SYSTOLIC BLOOD PRESSURE: 139 MMHG

## 2018-06-04 DIAGNOSIS — G56.00 CARPAL TUNNEL SYNDROME, UNSPECIFIED LATERALITY: ICD-10-CM

## 2018-06-04 PROCEDURE — 99213 OFFICE O/P EST LOW 20 MIN: CPT | Performed by: ORTHOPAEDIC SURGERY

## 2018-06-04 NOTE — H&P
ASSESSMENT/PLAN:    Diagnoses and all orders for this visit:    Carpal tunnel syndrome, unspecified laterality  -     Ambulatory referral to Orthopedic Surgery  -     Case request operating room: RELEASE CARPAL TUNNEL ENDOSCOPIC; Standing  -     Case request operating room: RELEASE CARPAL TUNNEL ENDOSCOPIC  -     Case request operating room: RELEASE CARPAL TUNNEL ENDOSCOPIC; Standing  -     Case request operating room: RELEASE CARPAL TUNNEL ENDOSCOPIC    Other orders  -     Diet NPO; Sips with meds; Standing  -     Height and weight upon arrival; Standing  -     Void on call to OR; Standing  -     Insert peripheral IV; Standing  -     ceFAZolin (ANCEF) 3,000 mg in dextrose 5 % 100 mL IVPB; Infuse 3,000 mg into a venous catheter once   -     Diet NPO; Sips with meds; Standing  -     Height and weight upon arrival; Standing  -     Void on call to OR; Standing  -     Insert peripheral IV; Standing  -     ceFAZolin (ANCEF) 3,000 mg in dextrose 5 % 100 mL IVPB; Infuse 3,000 mg into a venous catheter once         Assessment:   Carpal Tunnel Syndrome  bilateral    Plan:   B/L ECTRs under sedation    Follow Up: After Surgery    To Do Next Visit:  Sutures out    General Discussions:       Operative Discussions:  Endoscopic Carpal Tunnel Release: The anatomy and physiology of carpal tunnel syndrome was discussed with the patient today  Increase pressure localized under the transverse carpal ligament can cause pain, numbness, tingling, or dysesthesias within the median nerve distribution as well as feelings of fatigue, clumsiness, or awkwardness  These symptoms typically occur at night and worse in the morning upon waking  Eventually, untreated carpal tunnel syndrome can result in weakness and permanent loss of muscle within the thenar compartment of the hand  Treatment options were discussed with the patient    Conservative treatment includes nocturnal resting splints to keep the nerve in a neutral position, ergonomic changes within the work or home environment, activity modification, and tendon gliding exercises  Steroid injections within the carpal canal can help a majority of patients, however this is often self-limited in a majority of patients  Surgical intervention to divide the transverse carpal ligament typically results in a long-lasting relief of the patient's complaints, with the recurrence rate of less than 1%  The patient has elected to undergo an endoscopic carpal tunnel release  The 2 incision technique was discussed with the patient, which results in approximately a two-week less recovery time, and less wound complications  In the postoperative period, light activities are allowed immediately, driving is allowed when narcotic medication has stopped, and the bandages may be removed and incision may get wet after 2 days  Heavy activities (lifting more than approximately 10 pounds) will be allowed after follow up appointment in 1-2 weeks  While the pain and discomfort in the hands generally improves rapidly, the numbness and tingling as well as the strength will slowly improve over weeks to months depending on the severity of the carpal tunnel syndrome  Pillar pain was discussed with the patient, which is typically a common but self-limiting condition  The risks of bleeding and infection from the surgery are less than 1%  Risk of recurrence is approximately 0 5%  The risks of nerve injury or nerve damage or damage to the blood vessels is approximately 1 in 1200  The patient has an understanding of the above mentioned discussion  The risks and benefits of the procedure were explained to the patient, which include, but are not limited to: Bleeding, infection, recurrence, pain, scar, damage to tendons, damage to nerves, and damage to blood vessels, failure to give desired results and complications related to anesthesia   These risks, along with alternative conservative treatment options, and postoperative protocols were voiced back and understood by the patient   All questions were answered to the patient's satisfaction   The patient agrees to comply with a standard postoperative protocol, and is willing to proceed   Education was provided via written and auditory forms   There were no barriers to learning  Written handouts regarding wound care, incision and scar care, and general preoperative information was provided to the patient   Prior to surgery, the patient may be requested to stop all anti-inflammatory medications   Prophylactic aspirin, Plavix, and Coumadin may be allowed to be continued   Medications including vitamin E , ginkgo, and fish oil are requested to be stopped approximately one week prior to surgery   Hypertensive medications and beta blockers, if taken, should be continued  _____________________________________________________  CHIEF COMPLAINT:  No chief complaint on file  SUBJECTIVE:  Elena Oliver is a 32y o  year old male who presents with complaints of n/t to b/l hands, right worse than left  States this has been going on for approximately 1 year, getting worse with time  Describes nocturnal symptoms  Has tried splints without relief  Also describes difficulty with fine motor motion  PAST MEDICAL HISTORY:  Past Medical History:   Diagnosis Date    Asthma     Bloody stools     LA    6/8/16   R   11/21/17     Carpal tunnel syndrome, bilateral     LA  Tiffanie Sinks Tiffanie Sinks 9/12/17   R   9/12/17     Insomnia     LA   11/7/16   R   11/21/17     Pilonidal cyst with abscess     LA   10/25/13   R   6/10/14     Rectal bleeding     R   11/21/17     Seizure ([address] )     LA   12/13/16     Skin lesion     LA    2/3/15   R  Tiffanie Sinks Tiffanie Sinks 3/17/17  /   LA    3/17/17   R   12/22/17        PAST SURGICAL HISTORY:  Past Surgical History:   Procedure Laterality Date    FOOT SURGERY      PILONIDAL CYST DRAINAGE      Incision and drainage of pilonidal cyst        FAMILY HISTORY:  Family History   Problem Relation Age of Onset    Depression Mother     Obesity Mother     Stroke Mother      Syndrome     Alcohol abuse Father     Liver disease Father      hepatic failure     Hypertension Father     Depression Brother     Depression Maternal Uncle        SOCIAL HISTORY:  Social History   Substance Use Topics    Smoking status: Never Smoker    Smokeless tobacco: Never Used    Alcohol use No       MEDICATIONS:    Current Outpatient Prescriptions:     albuterol (PROAIR HFA) 90 mcg/act inhaler, Inhale 2 puffs, Disp: , Rfl:     buPROPion (WELLBUTRIN XL) 150 mg 24 hr tablet, TAKE 1 TABLET DAILY, Disp: 30 tablet, Rfl: 5    Cholecalciferol (VITAMIN D) 2000 units CAPS, Take 1 capsule by mouth daily, Disp: , Rfl:     fluticasone (FLONASE) 50 mcg/act nasal spray, 2 sprays into each nostril daily, Disp: , Rfl:     Multiple Vitamin (ONE-A-DAY MENS PO), Take by mouth, Disp: , Rfl:     sertraline (ZOLOFT) 100 mg tablet, Take 1 tablet by mouth daily, Disp: , Rfl:     ALLERGIES:  No Known Allergies    REVIEW OF SYSTEMS:  Pertinent items are noted in HPI  A comprehensive review of systems was negative      LABS:  HgA1c: No results found for: HGBA1C  BMP:   Lab Results   Component Value Date    CALCIUM 9 2 04/27/2018     11/21/2017    K 4 5 11/21/2017    CO2 31 11/21/2017     11/21/2017    BUN 22 04/27/2018    CREATININE 0 90 04/27/2018         _____________________________________________________  PHYSICAL EXAMINATION:  General: well developed and well nourished, alert, oriented times 3 and appears comfortable  Psychiatric: Normal  HEENT: Trachea Midline, No torticollis  Cardiovascular: No discernable arrhythmia  Pulmonary: No wheezing or stridor  Skin: No masses, erthema, lacerations, fluctation, ulcerations  Neurovascular: Pulses Intact    MUSCULOSKELETAL EXAMINATION:  LEFT SIDE:  Carpal tunnel:  No weakness APB, No atrophy thenar muscles, Postive Tinel's, Positive Derkin's Compression Test and Positive Phalan's Test  RIGHT SIDE:  Carpal tunnel:  No atrophy thenar muscles, Weakness APB (4+/5), Postive Tinel's, Positive Derkin's Compression Test and Positive Phalan's Test    _____________________________________________________  STUDIES REVIEWED:  EMG: EMG read as severe carpal tunnel on the right and moderate on the left, however based off of patient's physical exam, do believe this is not as severe bilaterally      PROCEDURES PERFORMED:  Procedures  No Procedures performed today   Scribe Attestation    I,:   Geetha Richards PA-C am acting as a scribe while in the presence of the attending physician :        I,:   Kayla Davies MD personally performed the services described in this documentation    as scribed in my presence :

## 2018-06-04 NOTE — PROGRESS NOTES
ASSESSMENT/PLAN:    Diagnoses and all orders for this visit:    Carpal tunnel syndrome, unspecified laterality  -     Ambulatory referral to Orthopedic Surgery  -     Case request operating room: RELEASE CARPAL TUNNEL ENDOSCOPIC; Standing  -     Case request operating room: RELEASE CARPAL TUNNEL ENDOSCOPIC  -     Case request operating room: RELEASE CARPAL TUNNEL ENDOSCOPIC; Standing  -     Case request operating room: RELEASE CARPAL TUNNEL ENDOSCOPIC    Other orders  -     Diet NPO; Sips with meds; Standing  -     Height and weight upon arrival; Standing  -     Void on call to OR; Standing  -     Insert peripheral IV; Standing  -     ceFAZolin (ANCEF) 3,000 mg in dextrose 5 % 100 mL IVPB; Infuse 3,000 mg into a venous catheter once   -     Diet NPO; Sips with meds; Standing  -     Height and weight upon arrival; Standing  -     Void on call to OR; Standing  -     Insert peripheral IV; Standing  -     ceFAZolin (ANCEF) 3,000 mg in dextrose 5 % 100 mL IVPB; Infuse 3,000 mg into a venous catheter once         Assessment:   Carpal Tunnel Syndrome  bilateral    Plan:   B/L ECTRs under sedation    Follow Up: After Surgery    To Do Next Visit:  Sutures out    General Discussions:       Operative Discussions:  Endoscopic Carpal Tunnel Release: The anatomy and physiology of carpal tunnel syndrome was discussed with the patient today  Increase pressure localized under the transverse carpal ligament can cause pain, numbness, tingling, or dysesthesias within the median nerve distribution as well as feelings of fatigue, clumsiness, or awkwardness  These symptoms typically occur at night and worse in the morning upon waking  Eventually, untreated carpal tunnel syndrome can result in weakness and permanent loss of muscle within the thenar compartment of the hand  Treatment options were discussed with the patient    Conservative treatment includes nocturnal resting splints to keep the nerve in a neutral position, ergonomic changes within the work or home environment, activity modification, and tendon gliding exercises  Steroid injections within the carpal canal can help a majority of patients, however this is often self-limited in a majority of patients  Surgical intervention to divide the transverse carpal ligament typically results in a long-lasting relief of the patient's complaints, with the recurrence rate of less than 1%  The patient has elected to undergo an endoscopic carpal tunnel release  The 2 incision technique was discussed with the patient, which results in approximately a two-week less recovery time, and less wound complications  In the postoperative period, light activities are allowed immediately, driving is allowed when narcotic medication has stopped, and the bandages may be removed and incision may get wet after 2 days  Heavy activities (lifting more than approximately 10 pounds) will be allowed after follow up appointment in 1-2 weeks  While the pain and discomfort in the hands generally improves rapidly, the numbness and tingling as well as the strength will slowly improve over weeks to months depending on the severity of the carpal tunnel syndrome  Pillar pain was discussed with the patient, which is typically a common but self-limiting condition  The risks of bleeding and infection from the surgery are less than 1%  Risk of recurrence is approximately 0 5%  The risks of nerve injury or nerve damage or damage to the blood vessels is approximately 1 in 1200  The patient has an understanding of the above mentioned discussion  The risks and benefits of the procedure were explained to the patient, which include, but are not limited to: Bleeding, infection, recurrence, pain, scar, damage to tendons, damage to nerves, and damage to blood vessels, failure to give desired results and complications related to anesthesia   These risks, along with alternative conservative treatment options, and postoperative protocols were voiced back and understood by the patient   All questions were answered to the patient's satisfaction   The patient agrees to comply with a standard postoperative protocol, and is willing to proceed   Education was provided via written and auditory forms   There were no barriers to learning  Written handouts regarding wound care, incision and scar care, and general preoperative information was provided to the patient   Prior to surgery, the patient may be requested to stop all anti-inflammatory medications   Prophylactic aspirin, Plavix, and Coumadin may be allowed to be continued   Medications including vitamin E , ginkgo, and fish oil are requested to be stopped approximately one week prior to surgery   Hypertensive medications and beta blockers, if taken, should be continued  _____________________________________________________  CHIEF COMPLAINT:  No chief complaint on file  SUBJECTIVE:  Eze Gruber is a 32y o  year old male who presents with complaints of n/t to b/l hands, right worse than left  States this has been going on for approximately 1 year, getting worse with time  Describes nocturnal symptoms  Has tried splints without relief  Also describes difficulty with fine motor motion  PAST MEDICAL HISTORY:  Past Medical History:   Diagnosis Date    Asthma     Bloody stools     LA    6/8/16   R   11/21/17     Carpal tunnel syndrome, bilateral     LA  Jerryl Rm Jerryl Rm 9/12/17   R   9/12/17     Insomnia     LA   11/7/16   R   11/21/17     Pilonidal cyst with abscess     LA   10/25/13   R   6/10/14     Rectal bleeding     R   11/21/17     Seizure ([address] )     LA   12/13/16     Skin lesion     LA    2/3/15   R  Jerryl Rm Jerryl Rm 3/17/17  /   LA    3/17/17   R   12/22/17        PAST SURGICAL HISTORY:  Past Surgical History:   Procedure Laterality Date    FOOT SURGERY      PILONIDAL CYST DRAINAGE      Incision and drainage of pilonidal cyst        FAMILY HISTORY:  Family History   Problem Relation Age of Onset    Depression Mother     Obesity Mother     Stroke Mother      Syndrome     Alcohol abuse Father     Liver disease Father      hepatic failure     Hypertension Father     Depression Brother     Depression Maternal Uncle        SOCIAL HISTORY:  Social History   Substance Use Topics    Smoking status: Never Smoker    Smokeless tobacco: Never Used    Alcohol use No       MEDICATIONS:    Current Outpatient Prescriptions:     albuterol (PROAIR HFA) 90 mcg/act inhaler, Inhale 2 puffs, Disp: , Rfl:     buPROPion (WELLBUTRIN XL) 150 mg 24 hr tablet, TAKE 1 TABLET DAILY, Disp: 30 tablet, Rfl: 5    Cholecalciferol (VITAMIN D) 2000 units CAPS, Take 1 capsule by mouth daily, Disp: , Rfl:     fluticasone (FLONASE) 50 mcg/act nasal spray, 2 sprays into each nostril daily, Disp: , Rfl:     Multiple Vitamin (ONE-A-DAY MENS PO), Take by mouth, Disp: , Rfl:     sertraline (ZOLOFT) 100 mg tablet, Take 1 tablet by mouth daily, Disp: , Rfl:     ALLERGIES:  No Known Allergies    REVIEW OF SYSTEMS:  Pertinent items are noted in HPI  A comprehensive review of systems was negative      LABS:  HgA1c: No results found for: HGBA1C  BMP:   Lab Results   Component Value Date    CALCIUM 9 2 04/27/2018     11/21/2017    K 4 5 11/21/2017    CO2 31 11/21/2017     11/21/2017    BUN 22 04/27/2018    CREATININE 0 90 04/27/2018         _____________________________________________________  PHYSICAL EXAMINATION:  General: well developed and well nourished, alert, oriented times 3 and appears comfortable  Psychiatric: Normal  HEENT: Trachea Midline, No torticollis  Cardiovascular: No discernable arrhythmia  Pulmonary: No wheezing or stridor  Skin: No masses, erthema, lacerations, fluctation, ulcerations  Neurovascular: Pulses Intact    MUSCULOSKELETAL EXAMINATION:  LEFT SIDE:  Carpal tunnel:  No weakness APB, No atrophy thenar muscles, Postive Tinel's, Positive Derkin's Compression Test and Positive Phalan's Test  RIGHT SIDE:  Carpal tunnel:  No atrophy thenar muscles, Weakness APB (4+/5), Postive Tinel's, Positive Derkin's Compression Test and Positive Phalan's Test    _____________________________________________________  STUDIES REVIEWED:  EMG: EMG read as severe carpal tunnel on the right and moderate on the left, however based off of patient's physical exam, do believe this is not as severe bilaterally      PROCEDURES PERFORMED:  Procedures  No Procedures performed today   Scribe Attestation    I,:   Corey Glass PA-C am acting as a scribe while in the presence of the attending physician :        I,:   Loi Barry MD personally performed the services described in this documentation    as scribed in my presence :

## 2018-06-22 RX ORDER — SODIUM CHLORIDE, SODIUM LACTATE, POTASSIUM CHLORIDE, CALCIUM CHLORIDE 600; 310; 30; 20 MG/100ML; MG/100ML; MG/100ML; MG/100ML
75 INJECTION, SOLUTION INTRAVENOUS CONTINUOUS
Status: CANCELLED | OUTPATIENT
Start: 2018-07-12

## 2018-06-27 ENCOUNTER — OFFICE VISIT (OUTPATIENT)
Dept: FAMILY MEDICINE CLINIC | Facility: CLINIC | Age: 28
End: 2018-06-27
Payer: COMMERCIAL

## 2018-06-27 VITALS
HEIGHT: 73 IN | RESPIRATION RATE: 16 BRPM | BODY MASS INDEX: 41.75 KG/M2 | TEMPERATURE: 96.5 F | DIASTOLIC BLOOD PRESSURE: 82 MMHG | HEART RATE: 72 BPM | WEIGHT: 315 LBS | SYSTOLIC BLOOD PRESSURE: 118 MMHG

## 2018-06-27 DIAGNOSIS — R52 BODY ACHES: Primary | ICD-10-CM

## 2018-06-27 DIAGNOSIS — J02.9 SORE THROAT: ICD-10-CM

## 2018-06-27 DIAGNOSIS — H53.9 VISUAL CHANGES: ICD-10-CM

## 2018-06-27 DIAGNOSIS — R53.83 FATIGUE, UNSPECIFIED TYPE: ICD-10-CM

## 2018-06-27 DIAGNOSIS — R41.0 EPISODE OF CONFUSION: ICD-10-CM

## 2018-06-27 LAB — S PYO AG THROAT QL: NEGATIVE

## 2018-06-27 PROCEDURE — 99214 OFFICE O/P EST MOD 30 MIN: CPT | Performed by: NURSE PRACTITIONER

## 2018-06-27 PROCEDURE — 87147 CULTURE TYPE IMMUNOLOGIC: CPT | Performed by: NURSE PRACTITIONER

## 2018-06-27 PROCEDURE — 87070 CULTURE OTHR SPECIMN AEROBIC: CPT | Performed by: NURSE PRACTITIONER

## 2018-06-27 PROCEDURE — 87880 STREP A ASSAY W/OPTIC: CPT | Performed by: NURSE PRACTITIONER

## 2018-06-27 NOTE — PROGRESS NOTES
FAMILY PRACTICE OFFICE VISIT       NAME: Pelon Herring  AGE: 32 y o   SEX: male       : 1990        MRN: [id number]    DATE: 2018  TIME: 8:31 PM    Assessment and Plan     Problem List Items Addressed This Visit     None      Visit Diagnoses     Body aches    -  Primary    Relevant Orders    CBC and differential    Comprehensive metabolic panel    CLARA Screen w/ Reflex to Titer/Pattern    RF Screen w/ Reflex to Titer    Sedimentation rate, automated    C-reactive protein    CMV IgG/IgM Antibodies    EBV acute panel    Lyme Antibody Profile with reflex to WB    POCT rapid strepA (Completed)    Throat culture    Fatigue, unspecified type        Relevant Orders    CBC and differential    Comprehensive metabolic panel    CLARA Screen w/ Reflex to Titer/Pattern    RF Screen w/ Reflex to Titer    Sedimentation rate, automated    C-reactive protein    CMV IgG/IgM Antibodies    EBV acute panel    Lyme Antibody Profile with reflex to WB    Throat culture    Visual changes        Relevant Orders    CBC and differential    Comprehensive metabolic panel    CLARA Screen w/ Reflex to Titer/Pattern    RF Screen w/ Reflex to Titer    Sedimentation rate, automated    C-reactive protein    CMV IgG/IgM Antibodies    EBV acute panel    Lyme Antibody Profile with reflex to WB    MRI brain wo contrast    Episode of confusion        Relevant Orders    CBC and differential    Comprehensive metabolic panel    CLARA Screen w/ Reflex to Titer/Pattern    RF Screen w/ Reflex to Titer    Sedimentation rate, automated    C-reactive protein    CMV IgG/IgM Antibodies    EBV acute panel    Lyme Antibody Profile with reflex to WB    MRI brain wo contrast    Sore throat        Relevant Orders    Throat culture            1  Body aches  CBC and differential    Comprehensive metabolic panel    CLARA Screen w/ Reflex to Titer/Pattern    RF Screen w/ Reflex to Titer    Sedimentation rate, automated    C-reactive protein    CMV IgG/IgM Antibodies EBV acute panel    Lyme Antibody Profile with reflex to WB    POCT rapid strepA    Throat culture    Throat culture   2  Fatigue, unspecified type  CBC and differential    Comprehensive metabolic panel    CLARA Screen w/ Reflex to Titer/Pattern    RF Screen w/ Reflex to Titer    Sedimentation rate, automated    C-reactive protein    CMV IgG/IgM Antibodies    EBV acute panel    Lyme Antibody Profile with reflex to WB    Throat culture    Throat culture   3  Visual changes  CBC and differential    Comprehensive metabolic panel    CLARA Screen w/ Reflex to Titer/Pattern    RF Screen w/ Reflex to Titer    Sedimentation rate, automated    C-reactive protein    CMV IgG/IgM Antibodies    EBV acute panel    Lyme Antibody Profile with reflex to WB    MRI brain wo contrast   4  Episode of confusion  CBC and differential    Comprehensive metabolic panel    CLARA Screen w/ Reflex to Titer/Pattern    RF Screen w/ Reflex to Titer    Sedimentation rate, automated    C-reactive protein    CMV IgG/IgM Antibodies    EBV acute panel    Lyme Antibody Profile with reflex to WB    MRI brain wo contrast   5  Sore throat  Throat culture    Throat culture     Patient presents today with fatigue, mild sore throat, stumbling over words, dim vision, body aches, and an episode of confusion  Etiology is unclear, possibly viral or neuropathic  In office rapid strep test negative  Will send throat culture to check for strep pharyngitis  Will check blood work as noted  He will complete blood work today  Recommend MRI of the brain without contrast, given difficulty with word finding, vision change, and episode of confusion  Note provided excusing him from work today or tomorrow to rest  He will increase fluid intake  Office will call with testing results  He will call if symptoms are persistent  Instructed to go to the emergency room for any worsening of symptoms             Chief Complaint     Chief Complaint   Patient presents with    Fatigue     Pt is here w/ c/o of fatigue, weakness  Pt states that vision seems to be darker and not as bright and disorientation at times     Stuttering       History of Present Illness     Judy Barragan is a 32year old male presenting today with symptoms present for the past 1 week  Symptoms include:  Fatigue-feeling exhausted and weak as if he has to sit down or he will fall asleep  Mild sore throat  Stumbling over words and difficulty finding the right words  Vision seems darker or dimmer than usual    Simone night [place] [address] [place] frequently and although he knew [place] [address] [place] [address], he felt confused like he had never been there before  Generalized body aches-"everything hurts "     Denies fevers, chills, nausea, vomiting, diarrhea  Denies dizziness or lightheadedness  Denies cold or allergy symptoms  Review of Systems   Review of Systems   Constitutional: Positive for fatigue  Negative for chills, diaphoresis and fever  HENT: Negative for congestion, postnasal drip, rhinorrhea, sinus pressure, sore throat and voice change  Eyes: Positive for visual disturbance  Respiratory: Negative for cough, chest tightness, shortness of breath and wheezing  Cardiovascular: Negative for chest pain, palpitations and leg swelling  Gastrointestinal: Negative for abdominal pain, constipation, diarrhea, nausea and vomiting  Genitourinary: Negative for difficulty urinating, dysuria and flank pain  Musculoskeletal: Positive for myalgias  Neurological: Positive for weakness  Negative for dizziness, light-headedness and headaches  Active Problem List     Patient Active Problem List   Diagnosis    BMI 50 0-59 9, adult (HCC)    Anxiety and depression    Elevated TSH    Carpal tunnel syndrome       Past Medical History:  Past Medical History:   Diagnosis Date    Asthma     Bloody stools     LA    6/8/16   R   11/21/17     Carpal tunnel syndrome, bilateral     TRACY Velez 9/12/17 R 9/12/17     Insomnia     LA   11/7/16   R   11/21/17     Pilonidal cyst with abscess     LA   10/25/13   R   6/10/14     Rectal bleeding     R   11/21/17     Seizure ([address] )     LA   12/13/16     Skin lesion     LA    2/3/15   R  Cary Carter Machado 3/17/17  /   LA    3/17/17   R   12/22/17        Past Surgical History:  Past Surgical History:   Procedure Laterality Date    FOOT SURGERY      PILONIDAL CYST DRAINAGE      Incision and drainage of pilonidal cyst        Family History:  Family History   Problem Relation Age of Onset    Depression Mother     Obesity Mother     Stroke Mother         Syndrome     Alcohol abuse Father     Liver disease Father         hepatic failure     Hypertension Father     Depression Brother     Depression Maternal Uncle        Social History:  Social History     Social History    Marital status: /Civil Union     Spouse name: N/A    Number of children: N/A    Years of education: some college     Occupational History          employed      Social History Main Topics    Smoking status: Never Smoker    Smokeless tobacco: Never Used    Alcohol use No    Drug use: No    Sexual activity: Not on file     Other Topics Concern    Not on file     Social History Narrative    Daily caffeine consumption        I have reviewed the patient's medical history in detail; there are no changes to the history as noted in the electronic medical record  Objective     Vitals:    06/27/18 1006   BP: 118/82   BP Location: Left arm   Patient Position: Sitting   Cuff Size: Large   Pulse: 72   Resp: 16   Temp: (!) 96 5 °F (35 8 °C)   TempSrc: Tympanic   Weight: (!) 170 kg (375 lb 12 8 oz)   Height: 6' 1" (1 854 m)     Wt Readings from Last 3 Encounters:   06/27/18 (!) 170 kg (375 lb 12 8 oz)   04/27/18 (!) 169 kg (373 lb 9 6 oz)   01/17/18 (!) 169 kg (373 lb 6 oz)     Body mass index is 49 58 kg/m²  Physical Exam   Constitutional: He is oriented to person, place, and time   He appears well-developed and well-nourished  No distress  HENT:   Head: Normocephalic and atraumatic  Right Ear: Tympanic membrane and ear canal normal    Left Ear: Tympanic membrane and ear canal normal    Nose: Nose normal    Mouth/Throat: Posterior oropharyngeal edema (grade 2 tonsils) and posterior oropharyngeal erythema present  No oropharyngeal exudate  Eyes: Conjunctivae are normal    Neck: Normal range of motion  Neck supple  Cardiovascular: Normal rate, regular rhythm and normal heart sounds  No murmur heard  Pulmonary/Chest: Effort normal and breath sounds normal    Musculoskeletal: He exhibits no edema  Lymphadenopathy:     He has no cervical adenopathy  Neurological: He is alert and oriented to person, place, and time  No cranial nerve deficit  Skin: No rash noted  Psychiatric: He has a normal mood and affect  Nursing note and vitals reviewed  ALLERGIES:  No Known Allergies    Current Medications     Current Outpatient Prescriptions   Medication Sig Dispense Refill    albuterol (PROAIR HFA) 90 mcg/act inhaler Inhale 2 puffs      buPROPion (WELLBUTRIN XL) 150 mg 24 hr tablet TAKE 1 TABLET DAILY 30 tablet 5    Cholecalciferol (VITAMIN D) 2000 units CAPS Take 1 capsule by mouth daily      fluticasone (FLONASE) 50 mcg/act nasal spray 2 sprays into each nostril daily      Multiple Vitamin (ONE-A-DAY MENS PO) Take by mouth      sertraline (ZOLOFT) 100 mg tablet Take 1 tablet by mouth daily       No current facility-administered medications for this visit            Health Maintenance     Health Maintenance   Topic Date Due    HIV SCREENING  1990    PT PLAN OF CARE  1990    PNEUMOCOCCAL POLYSACCHARIDE VACCINE AGE 2-64 HIGH RISK  07/10/1992    INFLUENZA VACCINE  09/01/2018    DTaP,Tdap,and Td Vaccines (2 - Td) 02/11/2023     Immunization History   Administered Date(s) Administered    Influenza TIV (IM) 01/04/2017    Tdap 02/11/2013       AMRITA Pool

## 2018-06-27 NOTE — LETTER
June 27, 2018     Patient: Parish De La Fuente   YOB: 1990   Date of Visit: 6/27/2018       To Whom it May Concern:    Tarun Bernal is under my professional care  He was seen in my office on 6/27/2018  He may return to work on 06/29/2018  Please excuse patient from work on 6/27/2018 and 6/28/2018  If you have any questions or concerns, please don't hesitate to call           Sincerely,          AMRITA Vogel        CC: No Recipients

## 2018-06-30 LAB — BACTERIA THROAT CULT: ABNORMAL

## 2018-07-02 ENCOUNTER — TELEPHONE (OUTPATIENT)
Dept: FAMILY MEDICINE CLINIC | Facility: CLINIC | Age: 28
End: 2018-07-02

## 2018-07-02 DIAGNOSIS — J02.0 STREP PHARYNGITIS: Primary | ICD-10-CM

## 2018-07-02 RX ORDER — AMOXICILLIN 875 MG/1
875 TABLET, COATED ORAL 2 TIMES DAILY
Qty: 20 TABLET | Refills: 0 | Status: SHIPPED | OUTPATIENT
Start: 2018-07-02 | End: 2018-07-12

## 2018-07-02 NOTE — PROGRESS NOTES
Please contact patient: his throat culture is positive for strep  I sent a prescription for antibiotics to his pharmacy  Please have him complete the entire course

## 2018-07-02 NOTE — TELEPHONE ENCOUNTER
----- [id number] 7[facility]2/2018  7:50 AM EDT -----  Please contact patient: his throat culture is positive for strep  I sent a prescription for antibiotics to his pharmacy  Please have him complete the entire course

## 2018-07-06 ENCOUNTER — OFFICE VISIT (OUTPATIENT)
Dept: FAMILY MEDICINE CLINIC | Facility: CLINIC | Age: 28
End: 2018-07-06
Payer: COMMERCIAL

## 2018-07-06 VITALS
WEIGHT: 315 LBS | BODY MASS INDEX: 41.75 KG/M2 | SYSTOLIC BLOOD PRESSURE: 120 MMHG | RESPIRATION RATE: 18 BRPM | HEART RATE: 80 BPM | HEIGHT: 73 IN | DIASTOLIC BLOOD PRESSURE: 86 MMHG | TEMPERATURE: 97.8 F

## 2018-07-06 DIAGNOSIS — G56.03 BILATERAL CARPAL TUNNEL SYNDROME: ICD-10-CM

## 2018-07-06 DIAGNOSIS — Z86.19 H/O STREPTOCOCCAL INFECTION: ICD-10-CM

## 2018-07-06 DIAGNOSIS — Z01.818 PREOP EXAMINATION: Primary | ICD-10-CM

## 2018-07-06 DIAGNOSIS — Z01.818 PREOPERATIVE CLEARANCE: ICD-10-CM

## 2018-07-06 LAB
SL AMB  POCT GLUCOSE, UA: NEGATIVE
SL AMB LEUKOCYTE ESTERASE,UA: NEGATIVE
SL AMB POCT BILIRUBIN,UA: NEGATIVE
SL AMB POCT BLOOD,UA: NEGATIVE
SL AMB POCT CLARITY,UA: CLEAR
SL AMB POCT COLOR,UA: YELLOW
SL AMB POCT KETONES,UA: NEGATIVE
SL AMB POCT NITRITE,UA: NEGATIVE
SL AMB POCT PH,UA: 5
SL AMB POCT SPECIFIC GRAVITY,UA: 1.02
SL AMB POCT URINE PROTEIN: NEGATIVE
SL AMB POCT UROBILINOGEN: 0.2

## 2018-07-06 PROCEDURE — 93000 ELECTROCARDIOGRAM COMPLETE: CPT | Performed by: FAMILY MEDICINE

## 2018-07-06 PROCEDURE — 81002 URINALYSIS NONAUTO W/O SCOPE: CPT | Performed by: FAMILY MEDICINE

## 2018-07-06 PROCEDURE — 99243 OFF/OP CNSLTJ NEW/EST LOW 30: CPT | Performed by: FAMILY MEDICINE

## 2018-07-06 NOTE — PROGRESS NOTES
FAMILY PRACTICE OFFICE VISIT       NAME: Tito Howard  AGE: 32 y o  SEX: male       : 1990        MRN: [id number]    DATE: 2018  TIME: 2:32 PM    Assessment and Plan     Problem List Items Addressed This Visit     Carpal tunnel syndrome    Relevant Orders    Protime-INR (Completed)    APTT (Completed)    Hemoglobin A1C (Completed)    Preop examination - Primary    Relevant Orders    POCT ECG (Completed)    POCT urine dip (Completed)    Protime-INR (Completed)    APTT (Completed)    Hemoglobin A1C (Completed)    Preoperative clearance     44-year-old male here for preop exam and clearance for bilateral carpal tunnel repair  Right CTR scheduled for  and left CTR scheduled for    No prior adverse reactions to anesthesia  Is able to walk an equivalent of 4 city blocks and climb flight of stairs without experiencing shortness of breath  Denies easy bleeding or bruising  Patient is low to moderate risk for intermediate risk procedure  Patient is at acceptable risk  May proceed  Reviewed ekg, bw and urine  Relevant Orders    Protime-INR (Completed)    APTT (Completed)    Hemoglobin A1C (Completed)      Other Visit Diagnoses     H/O streptococcal infection        Relevant Orders    Ambulatory Referral to Otolaryngology            There are no Patient Instructions on file for this visit  Chief Complaint     Chief Complaint   Patient presents with    Pre-op Exam     Patient is here for a preoperative evaluation for carpal tunnel surgery dated 18 and 18  History of Present Illness     HPI   44-year-old male here for preop exam and clearance for bilateral carpal tunnel repair  Right CTR scheduled for  and left CTR scheduled for    No prior adverse reactions to anesthesia  Is able to walk an equivalent of 4 city blocks and climb flight of stairs without experiencing shortness of breath  Denies easy bleeding or bruising    Is currently taking abx for strp throat, is feeling better       Review of Systems   Review of Systems   Constitutional: Negative for unexpected weight change  HENT: Negative  Eyes: Negative for visual disturbance  Respiratory: Negative for shortness of breath  Cardiovascular: Negative  Gastrointestinal: Negative for abdominal pain and constipation  Genitourinary: Negative for dysuria  Hematological: Does not bruise/bleed easily  Active Problem List     Patient Active Problem List   Diagnosis    BMI 50 0-59 9, adult (HCC)    Anxiety and depression    Elevated TSH    Carpal tunnel syndrome    Preop examination    Preoperative clearance       Past Medical History:  Past Medical History:   Diagnosis Date    Anxiety     Asthma     Bloody stools     LA    6/8/16   R   11/21/17     Carpal tunnel syndrome, bilateral     LA  Jerryl Rm Jerryl Rm 9/12/17   R   9/12/17     Depression     Insomnia     LA   11/7/16   R   11/21/17     Pilonidal cyst with abscess     LA   10/25/13   R   6/10/14     Rectal bleeding     R   11/21/17     Seasonal allergies     Skin lesion     LA    2/3/15   R  Jerryl Rm Jerryl Rm 3/17/17  /   LA    3/17/17   R   12/22/17     Strep throat        Past Surgical History:  Past Surgical History:   Procedure Laterality Date    FOOT SURGERY      PILONIDAL CYST DRAINAGE      Incision and drainage of pilonidal cyst        Family History:  Family History   Problem Relation Age of Onset    Depression Mother     Obesity Mother     Stroke Mother         Syndrome     Alcohol abuse Father     Liver disease Father         hepatic failure     Hypertension Father     Depression Brother     Depression Maternal Uncle        Social History:  Social History     Social History    Marital status: /Civil Union     Spouse name: N/A    Number of children: N/A    Years of education: some college     Occupational History          employed      Social History Main Topics    Smoking status: Never Smoker    Smokeless tobacco: Never Used    Alcohol use Yes      Comment: rarely    Drug use: No    Sexual activity: Not on file     Other Topics Concern    Not on file     Social History Narrative    Daily caffeine consumption    I have reviewed the patient's medical history in detail; there are no changes to the history as noted in the electronic medical record  Objective     Vitals:    07/06/18 0958   BP: 120/86   Pulse: 80   Resp: 18   Temp: 97 8 °F (36 6 °C)     Wt Readings from Last 3 Encounters:   07/06/18 (!) 169 kg (373 lb)   06/27/18 (!) 170 kg (375 lb 12 8 oz)   04/27/18 (!) 169 kg (373 lb 9 6 oz)       Physical Exam   Constitutional: He is oriented to person, place, and time  He appears well-developed and well-nourished  HENT:   Head: Normocephalic and atraumatic  Mouth/Throat: Oropharynx is clear and moist    Eyes: Conjunctivae and EOM are normal  Pupils are equal, round, and reactive to light  Neck: Normal range of motion  Neck supple  Cardiovascular: Normal rate, regular rhythm and normal heart sounds  Pulmonary/Chest: Effort normal and breath sounds normal    Musculoskeletal: Normal range of motion  He exhibits no edema  Lymphadenopathy:     He has no cervical adenopathy  Neurological: He is alert and oriented to person, place, and time  Psychiatric: He has a normal mood and affect  Nursing note and vitals reviewed        Pertinent Laboratory/Diagnostic Studies:  Lab Results   Component Value Date    BUN 20 07/07/2018    CREATININE 0 91 07/07/2018    CALCIUM 8 6 07/07/2018     07/07/2018    K 4 1 07/07/2018    CO2 27 07/07/2018     07/07/2018     Lab Results   Component Value Date    ALT 38 07/07/2018    AST 14 07/07/2018    ALKPHOS 84 07/07/2018    BILITOT 0 36 07/07/2018       Lab Results   Component Value Date    WBC 6 07 07/07/2018    HGB 15 4 07/07/2018    HCT 47 7 07/07/2018    MCV 89 07/07/2018     07/07/2018       No results found for: TSH    Lab Results   Component Value Date CHOL 164 07/07/2018     Lab Results   Component Value Date    TRIG 92 07/07/2018     Lab Results   Component Value Date    HDL 43 07/07/2018     Lab Results   Component Value Date    LDLCALC 103 (H) 07/07/2018     Lab Results   Component Value Date    HGBA1C 5 1 07/07/2018       Results for orders placed or performed in visit on 07/06/18   POCT urine dip   Result Value Ref Range    LEUKOCYTE ESTERASE,UA negative      NITRITE,UA negative     SL AMB POCT UROBILINOGEN 0 2     SL AMB POCT URINE PROTEIN negative      PH,UA 5 0      BLOOD,UA negative      SPECIFIC GRAVITY,UA 1 025      KETONES,UA negative      BILIRUBIN,UA negative     GLUCOSE, UA negative      COLOR,UA yellow      CLARITY,UA clear        Orders Placed This Encounter   Procedures    Protime-INR    APTT    Hemoglobin A1C    Ambulatory Referral to Otolaryngology    POCT urine dip    POCT ECG       ALLERGIES:  No Known Allergies    Current Medications     Current Outpatient Prescriptions   Medication Sig Dispense Refill    albuterol (PROAIR HFA) 90 mcg/act inhaler Inhale 2 puffs every 4 (four) hours as needed for wheezing        amoxicillin (AMOXIL) 875 mg tablet Take 1 tablet (875 mg total) by mouth 2 (two) times a day for 10 days 20 tablet 0    buPROPion (WELLBUTRIN XL) 150 mg 24 hr tablet TAKE 1 TABLET DAILY (Patient taking differently: TAKE 1 TABLET EVERY EVENING) 30 tablet 5    Cholecalciferol (VITAMIN D) 2000 units CAPS Take 1 capsule by mouth every evening        fluticasone (FLONASE) 50 mcg/act nasal spray 2 sprays into each nostril daily as needed for allergies        sertraline (ZOLOFT) 100 mg tablet Take 1 tablet by mouth every evening         No current facility-administered medications for this visit            Health Maintenance     Health Maintenance   Topic Date Due    HIV SCREENING  1990    PT PLAN OF CARE  1990    PNEUMOCOCCAL POLYSACCHARIDE VACCINE AGE 2-64 HIGH RISK  07/10/1992    INFLUENZA VACCINE  09/01/2018  DTaP,Tdap,and Td Vaccines (2 - Td) 02/11/2023     Immunization History   Administered Date(s) Administered    Influenza TIV (IM) 01/04/2017    Tdap 02/11/2013       Dillan Macdonald MD

## 2018-07-06 NOTE — PRE-PROCEDURE INSTRUCTIONS
Pre-Surgery Instructions:   Medication Instructions    albuterol (PROAIR HFA) 90 mcg/act inhaler Instructed patient per Anesthesia Guidelines   amoxicillin (AMOXIL) 875 mg tablet Patient was instructed by Physician and understands   buPROPion (WELLBUTRIN XL) 150 mg 24 hr tablet Instructed patient per Anesthesia Guidelines   Cholecalciferol (VITAMIN D) 2000 units CAPS Instructed patient per Anesthesia Guidelines   fluticasone (FLONASE) 50 mcg/act nasal spray Instructed patient per Anesthesia Guidelines   sertraline (ZOLOFT) 100 mg tablet Instructed patient per Anesthesia Guidelines  Pre-procedure instructions given without any questions or concerns at this time  Pt will obtain CHG wash and review written instructions from Dr Archie Jenkins office prior to use

## 2018-07-07 ENCOUNTER — LAB (OUTPATIENT)
Dept: LAB | Facility: CLINIC | Age: 28
End: 2018-07-07
Payer: COMMERCIAL

## 2018-07-07 DIAGNOSIS — R53.83 FATIGUE, UNSPECIFIED TYPE: ICD-10-CM

## 2018-07-07 DIAGNOSIS — Z01.818 PREOPERATIVE CLEARANCE: ICD-10-CM

## 2018-07-07 DIAGNOSIS — R52 BODY ACHES: ICD-10-CM

## 2018-07-07 DIAGNOSIS — H53.9 VISUAL CHANGES: ICD-10-CM

## 2018-07-07 DIAGNOSIS — R41.0 EPISODE OF CONFUSION: ICD-10-CM

## 2018-07-07 DIAGNOSIS — G56.03 BILATERAL CARPAL TUNNEL SYNDROME: ICD-10-CM

## 2018-07-07 DIAGNOSIS — R79.89 ELEVATED TSH: ICD-10-CM

## 2018-07-07 DIAGNOSIS — Z01.818 PREOP EXAMINATION: ICD-10-CM

## 2018-07-07 LAB
25(OH)D3 SERPL-MCNC: 23.3 NG/ML (ref 30–100)
ALBUMIN SERPL BCP-MCNC: 3.7 G/DL (ref 3.5–5)
ALP SERPL-CCNC: 84 U/L (ref 46–116)
ALT SERPL W P-5'-P-CCNC: 38 U/L (ref 12–78)
ANION GAP SERPL CALCULATED.3IONS-SCNC: 5 MMOL/L (ref 4–13)
APTT PPP: 30 SECONDS (ref 24–36)
AST SERPL W P-5'-P-CCNC: 14 U/L (ref 5–45)
BASOPHILS # BLD AUTO: 0.02 THOUSANDS/ΜL (ref 0–0.1)
BASOPHILS NFR BLD AUTO: 0 % (ref 0–1)
BILIRUB SERPL-MCNC: 0.36 MG/DL (ref 0.2–1)
BUN SERPL-MCNC: 20 MG/DL (ref 5–25)
CALCIUM SERPL-MCNC: 8.6 MG/DL (ref 8.3–10.1)
CHLORIDE SERPL-SCNC: 107 MMOL/L (ref 100–108)
CHOLEST SERPL-MCNC: 164 MG/DL (ref 50–200)
CO2 SERPL-SCNC: 27 MMOL/L (ref 21–32)
CREAT SERPL-MCNC: 0.91 MG/DL (ref 0.6–1.3)
CRP SERPL QL: 7.4 MG/L
EOSINOPHIL # BLD AUTO: 0.07 THOUSAND/ΜL (ref 0–0.61)
EOSINOPHIL NFR BLD AUTO: 1 % (ref 0–6)
ERYTHROCYTE [DISTWIDTH] IN BLOOD BY AUTOMATED COUNT: 12.6 % (ref 11.6–15.1)
ERYTHROCYTE [SEDIMENTATION RATE] IN BLOOD: 16 MM/HOUR (ref 0–10)
EST. AVERAGE GLUCOSE BLD GHB EST-MCNC: 100 MG/DL
GFR SERPL CREATININE-BSD FRML MDRD: 115 ML/MIN/1.73SQ M
GLUCOSE P FAST SERPL-MCNC: 92 MG/DL (ref 65–99)
HBA1C MFR BLD: 5.1 % (ref 4.2–6.3)
HCT VFR BLD AUTO: 47.7 % (ref 36.5–49.3)
HDLC SERPL-MCNC: 43 MG/DL (ref 40–60)
HGB BLD-MCNC: 15.4 G/DL (ref 12–17)
IMM GRANULOCYTES # BLD AUTO: 0.02 THOUSAND/UL (ref 0–0.2)
IMM GRANULOCYTES NFR BLD AUTO: 0 % (ref 0–2)
INR PPP: 1.01 (ref 0.86–1.17)
LDLC SERPL CALC-MCNC: 103 MG/DL (ref 0–100)
LYMPHOCYTES # BLD AUTO: 1.38 THOUSANDS/ΜL (ref 0.6–4.47)
LYMPHOCYTES NFR BLD AUTO: 23 % (ref 14–44)
MCH RBC QN AUTO: 28.6 PG (ref 26.8–34.3)
MCHC RBC AUTO-ENTMCNC: 32.3 G/DL (ref 31.4–37.4)
MCV RBC AUTO: 89 FL (ref 82–98)
MONOCYTES # BLD AUTO: 0.4 THOUSAND/ΜL (ref 0.17–1.22)
MONOCYTES NFR BLD AUTO: 7 % (ref 4–12)
NEUTROPHILS # BLD AUTO: 4.18 THOUSANDS/ΜL (ref 1.85–7.62)
NEUTS SEG NFR BLD AUTO: 69 % (ref 43–75)
NRBC BLD AUTO-RTO: 0 /100 WBCS
PLATELET # BLD AUTO: 271 THOUSANDS/UL (ref 149–390)
PMV BLD AUTO: 9.1 FL (ref 8.9–12.7)
POTASSIUM SERPL-SCNC: 4.1 MMOL/L (ref 3.5–5.3)
PROT SERPL-MCNC: 8.3 G/DL (ref 6.4–8.2)
PROTHROMBIN TIME: 13.4 SECONDS (ref 11.8–14.2)
RBC # BLD AUTO: 5.39 MILLION/UL (ref 3.88–5.62)
SODIUM SERPL-SCNC: 139 MMOL/L (ref 136–145)
TRIGL SERPL-MCNC: 92 MG/DL
TSH SERPL DL<=0.05 MIU/L-ACNC: 2.46 UIU/ML (ref 0.36–3.74)
WBC # BLD AUTO: 6.07 THOUSAND/UL (ref 4.31–10.16)

## 2018-07-07 PROCEDURE — 86664 EPSTEIN-BARR NUCLEAR ANTIGEN: CPT

## 2018-07-07 PROCEDURE — 86645 CMV ANTIBODY IGM: CPT

## 2018-07-07 PROCEDURE — 80061 LIPID PANEL: CPT

## 2018-07-07 PROCEDURE — 86038 ANTINUCLEAR ANTIBODIES: CPT

## 2018-07-07 PROCEDURE — 86618 LYME DISEASE ANTIBODY: CPT

## 2018-07-07 PROCEDURE — 86665 EPSTEIN-BARR CAPSID VCA: CPT

## 2018-07-07 PROCEDURE — 86644 CMV ANTIBODY: CPT

## 2018-07-07 PROCEDURE — 80053 COMPREHEN METABOLIC PANEL: CPT

## 2018-07-07 PROCEDURE — 86140 C-REACTIVE PROTEIN: CPT

## 2018-07-07 PROCEDURE — 85025 COMPLETE CBC W/AUTO DIFF WBC: CPT

## 2018-07-07 PROCEDURE — 85652 RBC SED RATE AUTOMATED: CPT

## 2018-07-07 PROCEDURE — 82306 VITAMIN D 25 HYDROXY: CPT

## 2018-07-07 PROCEDURE — 85610 PROTHROMBIN TIME: CPT

## 2018-07-07 PROCEDURE — 84443 ASSAY THYROID STIM HORMONE: CPT

## 2018-07-07 PROCEDURE — 36415 COLL VENOUS BLD VENIPUNCTURE: CPT

## 2018-07-07 PROCEDURE — 86430 RHEUMATOID FACTOR TEST QUAL: CPT

## 2018-07-07 PROCEDURE — 83036 HEMOGLOBIN GLYCOSYLATED A1C: CPT

## 2018-07-07 PROCEDURE — 86663 EPSTEIN-BARR ANTIBODY: CPT

## 2018-07-07 PROCEDURE — 85730 THROMBOPLASTIN TIME PARTIAL: CPT

## 2018-07-08 LAB
B BURGDOR IGG SER IA-ACNC: 0.66
B BURGDOR IGM SER IA-ACNC: 0.19

## 2018-07-09 LAB
CMV IGG SERPL IA-ACNC: <0.6 U/ML (ref 0–0.59)
CMV IGM SERPL IA-ACNC: <30 AU/ML (ref 0–29.9)
EBV EA IGG SER-ACNC: <9 U/ML (ref 0–8.9)
EBV NA IGG SER IA-ACNC: <18 U/ML (ref 0–17.9)
EBV PATRN SPEC IB-IMP: ABNORMAL
EBV VCA IGG SER IA-ACNC: 396 U/ML (ref 0–17.9)
EBV VCA IGM SER IA-ACNC: <36 U/ML (ref 0–35.9)
RHEUMATOID FACT SER QL LA: NEGATIVE
RYE IGE QN: NEGATIVE

## 2018-07-10 ENCOUNTER — TELEPHONE (OUTPATIENT)
Dept: FAMILY MEDICINE CLINIC | Facility: CLINIC | Age: 28
End: 2018-07-10

## 2018-07-10 DIAGNOSIS — R70.0 ELEVATED SED RATE: ICD-10-CM

## 2018-07-10 DIAGNOSIS — R79.82 ELEVATED C-REACTIVE PROTEIN (CRP): Primary | ICD-10-CM

## 2018-07-10 NOTE — TELEPHONE ENCOUNTER
----- Message from [id number] Jar[address] Dyer sent at 7/10/2018  8:58 AM EDT -----  Please let Patient know his blood work for Lyme, mono, and autoimmune disorders is negative  His inflammatory markers are elevated, this is most likely because he was sick  I would recommend repeating these in 2-3 weeks to make sure they normalize  Please have him call if his symptoms have not improved

## 2018-07-10 NOTE — TELEPHONE ENCOUNTER
----- Message from James Ronquillo MD sent at 7/9/2018  5:46 PM EDT -----  Can you please let patient know that his blood work was overall normal   His vitamin-D was lower and I would like him to take 2000 international units daily    Thank you

## 2018-07-10 NOTE — PROGRESS NOTES
Please let Patient know his blood work for Lyme, mono, and autoimmune disorders is negative  His inflammatory markers are elevated, this is most likely because he was sick  I would recommend repeating these in 2-3 weeks to make sure they normalize  Please have him call if his symptoms have not improved

## 2018-07-11 ENCOUNTER — ANESTHESIA EVENT (OUTPATIENT)
Dept: PERIOP | Facility: HOSPITAL | Age: 28
End: 2018-07-11
Payer: COMMERCIAL

## 2018-07-11 ENCOUNTER — HOSPITAL ENCOUNTER (OUTPATIENT)
Dept: MRI IMAGING | Facility: HOSPITAL | Age: 28
Discharge: HOME | End: 2018-07-11
Payer: COMMERCIAL

## 2018-07-11 DIAGNOSIS — R41.0 EPISODE OF CONFUSION: ICD-10-CM

## 2018-07-11 DIAGNOSIS — H53.9 VISUAL CHANGES: ICD-10-CM

## 2018-07-11 PROCEDURE — 70551 MRI BRAIN STEM W/O DYE: CPT

## 2018-07-12 ENCOUNTER — TELEPHONE (OUTPATIENT)
Dept: OBGYN CLINIC | Facility: HOSPITAL | Age: 28
End: 2018-07-12

## 2018-07-12 ENCOUNTER — ANESTHESIA (OUTPATIENT)
Dept: PERIOP | Facility: HOSPITAL | Age: 28
End: 2018-07-12
Payer: COMMERCIAL

## 2018-07-12 ENCOUNTER — HOSPITAL ENCOUNTER (OUTPATIENT)
Facility: HOSPITAL | Age: 28
Setting detail: OUTPATIENT SURGERY
Discharge: HOME | End: 2018-07-12
Attending: ORTHOPAEDIC SURGERY | Admitting: ORTHOPAEDIC SURGERY
Payer: COMMERCIAL

## 2018-07-12 VITALS
RESPIRATION RATE: 18 BRPM | TEMPERATURE: 97.1 F | HEART RATE: 57 BPM | SYSTOLIC BLOOD PRESSURE: 119 MMHG | DIASTOLIC BLOOD PRESSURE: 74 MMHG | OXYGEN SATURATION: 98 %

## 2018-07-12 DIAGNOSIS — G56.03 BILATERAL CARPAL TUNNEL SYNDROME: Primary | ICD-10-CM

## 2018-07-12 DIAGNOSIS — Z47.89 AFTERCARE FOLLOWING SURGERY OF THE MUSCULOSKELETAL SYSTEM: Primary | ICD-10-CM

## 2018-07-12 PROCEDURE — 29848 WRIST ENDOSCOPY/SURGERY: CPT | Performed by: ORTHOPAEDIC SURGERY

## 2018-07-12 RX ORDER — HYDROCODONE BITARTRATE AND ACETAMINOPHEN 5; 325 MG/1; MG/1
1 TABLET ORAL EVERY 4 HOURS PRN
Qty: 20 TABLET | Refills: 0 | Status: SHIPPED | OUTPATIENT
Start: 2018-07-12 | End: 2018-07-22

## 2018-07-12 RX ORDER — MIDAZOLAM HYDROCHLORIDE 1 MG/ML
INJECTION INTRAMUSCULAR; INTRAVENOUS AS NEEDED
Status: DISCONTINUED | OUTPATIENT
Start: 2018-07-12 | End: 2018-07-12 | Stop reason: SURG

## 2018-07-12 RX ORDER — FENTANYL CITRATE/PF 50 MCG/ML
25 SYRINGE (ML) INJECTION
Status: DISCONTINUED | OUTPATIENT
Start: 2018-07-12 | End: 2018-07-12

## 2018-07-12 RX ORDER — PROPOFOL 10 MG/ML
INJECTION, EMULSION INTRAVENOUS CONTINUOUS PRN
Status: DISCONTINUED | OUTPATIENT
Start: 2018-07-12 | End: 2018-07-12 | Stop reason: SURG

## 2018-07-12 RX ORDER — NAPROXEN 500 MG/1
500 TABLET ORAL 2 TIMES DAILY WITH MEALS
Qty: 10 TABLET | Refills: 0 | Status: SHIPPED | OUTPATIENT
Start: 2018-07-12 | End: 2018-07-12

## 2018-07-12 RX ORDER — HYDROCODONE BITARTRATE AND ACETAMINOPHEN 5; 325 MG/1; MG/1
1 TABLET ORAL EVERY 6 HOURS PRN
Status: DISCONTINUED | OUTPATIENT
Start: 2018-07-12 | End: 2018-07-12 | Stop reason: HOSPADM

## 2018-07-12 RX ORDER — SODIUM CHLORIDE, SODIUM LACTATE, POTASSIUM CHLORIDE, CALCIUM CHLORIDE 600; 310; 30; 20 MG/100ML; MG/100ML; MG/100ML; MG/100ML
75 INJECTION, SOLUTION INTRAVENOUS CONTINUOUS
Status: DISCONTINUED | OUTPATIENT
Start: 2018-07-12 | End: 2018-07-12 | Stop reason: HOSPADM

## 2018-07-12 RX ORDER — PROPOFOL 10 MG/ML
INJECTION, EMULSION INTRAVENOUS AS NEEDED
Status: DISCONTINUED | OUTPATIENT
Start: 2018-07-12 | End: 2018-07-12 | Stop reason: SURG

## 2018-07-12 RX ORDER — KETOROLAC TROMETHAMINE 30 MG/ML
INJECTION, SOLUTION INTRAMUSCULAR; INTRAVENOUS AS NEEDED
Status: DISCONTINUED | OUTPATIENT
Start: 2018-07-12 | End: 2018-07-12 | Stop reason: SURG

## 2018-07-12 RX ORDER — ONDANSETRON 2 MG/ML
4 INJECTION INTRAMUSCULAR; INTRAVENOUS ONCE AS NEEDED
Status: DISCONTINUED | OUTPATIENT
Start: 2018-07-12 | End: 2018-07-12 | Stop reason: HOSPADM

## 2018-07-12 RX ORDER — FENTANYL CITRATE/PF 50 MCG/ML
25 SYRINGE (ML) INJECTION
Status: DISCONTINUED | OUTPATIENT
Start: 2018-07-12 | End: 2018-07-12 | Stop reason: HOSPADM

## 2018-07-12 RX ORDER — FENTANYL CITRATE 50 UG/ML
INJECTION, SOLUTION INTRAMUSCULAR; INTRAVENOUS AS NEEDED
Status: DISCONTINUED | OUTPATIENT
Start: 2018-07-12 | End: 2018-07-12 | Stop reason: SURG

## 2018-07-12 RX ORDER — NAPROXEN 500 MG/1
500 TABLET ORAL 2 TIMES DAILY WITH MEALS
Qty: 10 TABLET | Refills: 0 | Status: SHIPPED | OUTPATIENT
Start: 2018-07-12 | End: 2018-07-25 | Stop reason: ALTCHOICE

## 2018-07-12 RX ORDER — SENNOSIDES 8.6 MG
650 CAPSULE ORAL EVERY 8 HOURS
Qty: 15 TABLET | Refills: 0 | Status: ON HOLD | OUTPATIENT
Start: 2018-07-12 | End: 2018-07-26

## 2018-07-12 RX ORDER — MAGNESIUM HYDROXIDE 1200 MG/15ML
LIQUID ORAL AS NEEDED
Status: DISCONTINUED | OUTPATIENT
Start: 2018-07-12 | End: 2018-07-12 | Stop reason: HOSPADM

## 2018-07-12 RX ORDER — LIDOCAINE HYDROCHLORIDE AND EPINEPHRINE 10; 10 MG/ML; UG/ML
INJECTION, SOLUTION INFILTRATION; PERINEURAL AS NEEDED
Status: DISCONTINUED | OUTPATIENT
Start: 2018-07-12 | End: 2018-07-12 | Stop reason: HOSPADM

## 2018-07-12 RX ADMIN — HYDROCODONE BITARTRATE AND ACETAMINOPHEN 1 TABLET: 5; 325 TABLET ORAL at 15:15

## 2018-07-12 RX ADMIN — FENTANYL CITRATE 100 MCG: 50 INJECTION, SOLUTION INTRAMUSCULAR; INTRAVENOUS at 13:31

## 2018-07-12 RX ADMIN — PROPOFOL 50 MG: 10 INJECTION, EMULSION INTRAVENOUS at 13:38

## 2018-07-12 RX ADMIN — SODIUM CHLORIDE, SODIUM LACTATE, POTASSIUM CHLORIDE, AND CALCIUM CHLORIDE 75 ML/HR: .6; .31; .03; .02 INJECTION, SOLUTION INTRAVENOUS at 12:32

## 2018-07-12 RX ADMIN — KETOROLAC TROMETHAMINE 30 MG: 30 INJECTION, SOLUTION INTRAMUSCULAR at 14:03

## 2018-07-12 RX ADMIN — CEFAZOLIN SODIUM 1000 MG: 1 SOLUTION INTRAVENOUS at 13:35

## 2018-07-12 RX ADMIN — CEFAZOLIN SODIUM 2000 MG: 2 SOLUTION INTRAVENOUS at 13:31

## 2018-07-12 RX ADMIN — PROPOFOL 55 MCG/KG/MIN: 10 INJECTION, EMULSION INTRAVENOUS at 13:37

## 2018-07-12 RX ADMIN — PROPOFOL 50 MG: 10 INJECTION, EMULSION INTRAVENOUS at 13:33

## 2018-07-12 RX ADMIN — MIDAZOLAM HYDROCHLORIDE 2 MG: 1 INJECTION, SOLUTION INTRAMUSCULAR; INTRAVENOUS at 13:31

## 2018-07-12 NOTE — TELEPHONE ENCOUNTER
Caller: Bandar Jiang [facility]  Call back number: [phone]    Patient had a script for naprocen EC 500mg  This medication is on backorder  They do have regular naprocen  If that is alright, please send new script for this

## 2018-07-12 NOTE — DISCHARGE INSTRUCTIONS

## 2018-07-12 NOTE — ANESTHESIA POSTPROCEDURE EVALUATION
Post-Op Assessment Note      CV Status:  Stable    Mental Status:  Alert and awake    Hydration Status:  Euvolemic    PONV Controlled:  Controlled    Airway Patency:  Patent    Post Op Vitals Reviewed: Yes          Staff: Anesthesiologist           /65 (07/12/18 1456)    Temp (!) 97 1 °F (36 2 °C) (07/12/18 1456)    Pulse 65 (07/12/18 1456)   Resp 16 (07/12/18 1456)    SpO2 99 % (07/12/18 1456)

## 2018-07-12 NOTE — OP NOTE
OPERATIVE REPORT  PATIENT NAME: Izabel Talley  :  1990  MRN: [id number]  Pt Location: [facility] MAIN OR    SURGERY DATE: 18    Surgeon(s) and Role:     * Isabelle Proctor MD - Primary     * Wilber Nova PA-C - Assisting    Pre-Op Diagnosis:  Carpal tunnel syndrome, unspecified laterality [G56 00]    Post-Op Diagnosis Codes:     * Carpal tunnel syndrome, unspecified laterality [G56 00]    Procedure(s):  RELEASE CARPAL TUNNEL ENDOSCOPIC (Right)    Specimen(s):  * No orders in the log *    Estimated Blood Loss:   Minimal      Anesthesia Type:   Regional with Sedation    Operative Indications: The patient has a history of Carpal Tunnel Syndrome  right that was recalcitrant to conservative management  The decision was made to bring the patient to the operating room for Endoscopic Carpal Tunnel Release  right  Risks of the procedure were explained which include, but are not limited to bleeding; infection; damage to nerves, arteries,veins, tendons; scar; pain; need for reoperation; failure to give desired result; and risks of anaesthesia  All questions were answered to satisfaction and they were willing to proceed  Operative Findings:  Right carpal tunnel    Complications:   None    Procedure and Technique:  After the patient, site, and procedure were identified, the patient was brought into the operating room in a supine position  Local anaesthesia and sedation were provided  A well padded tourniquet was applied to the extremity, set at 250 mmHg  The  right upper extremity was then prepped and drapped in a normal, sterile, orthopedic fashion  After reconfirmation of the patient, site, and surgical procedure, which was agreed upon by the entire surgical team, attention was turned to the right wrist   The sites of the proximal and distal incisions were marked    The lui of the proximal incision was placed horizontally at the midline of the wrist   The distal incision lui was longitudinal extending distally from the point of intersection of the line between the long finger and ring finger and the line along the distal border of the fully abducted thumb  The proximal incision was performed  Subcutaneous tissues were dissected  Then the transverse volar antebrachial fascia was perforated with a scalpel  The edges of the skin incision where retracted and the forearm fascia was incised for approximately 1 5 cm proximally with care taken to identify and protect the median nerve  Retractors were used to inspect the transverse carpal ligament distally  A curved Martinez dissector was used to glide under the transverse carpal ligament and superficial to the median nerve with confirmation via the washboard feeling  Then the curved Martinez was pushed into the palm toward the distal incision site  When the location of the distal skin lui was adequate, the distal incision was made  Then with retraction of the skin, further dissection and perforation of the palmar fascia was performed with the use of tenotomy scissors  The curved Martinez was guided from proximal to distal out the distal incisions without any twisting to allow for dilation of the tract  The curved Martinez was removed, and the cannula for the camera was inserted along the same tract, making sure to keep the alignment post on the cannula perpendicular to the plane of the hand without twisting  Then while keeping the wrist in extension, and holding the cannula of the camera in place, the wrist was placed on the hyperextension board  The scope was inserted distally, and a cotton-tip applicator was used proximally to clean the tract as well as the scope  A curved cutting knife was introduced from proximal to distal while keeping visualization with the use of the camera  Without twisting of the canula, the knife was used to cut the transverse carpal ligament completely, making sure there were no remnant fibers    Then after this was accomplished, the hand was removed from the extension block  Three maneuvers were used to confirm the full release of the transverse carpal ligament  First, the ease of twisting the trocar of the camera confirmed the release of the ligament  Second, the curved Martinez was introduced to make sure there were no remnant fibers that could be felt palmarly  Third, the scope was introduced again to visualize that the whole ligament was released proximally to distally  Additional confirmation of full release included retraction and inspection in the distal and proximal incisions to make sure there were no remnant fibers distally or proximally respectively  At the completion of the procedure, hemostasis was obtained with cautery and direct pressure  The wounds were copiously irrigated with sterile solution  The wounds were closed with Prolene  Sterile dressings were applied, including Xeroform, gauze, tweeners, webril, ACE  Please note, all sponge, needle, and instrument counts were correct prior to closure  Loupe magnification was utilized  The patient tolerated the procedure well       I was present for the entire procedure and A qualified resident physician was not available    Patient Disposition:  APU and hemodynamically stable    SIGNATURE: Yuli Abernathy MD  DATE: 07/12/18  TIME: 2:05 PM

## 2018-07-12 NOTE — ANESTHESIA PREPROCEDURE EVALUATION
Review of Systems/Medical History  Patient summary reviewed  Chart reviewed  No history of anesthetic complications     Cardiovascular  Exercise tolerance (METS): >4,  Dysrhythmias ,    Pulmonary  Asthma , well controlled/ stable Last rescue: < 1 month ago Asthma type of rescue: PRN inhaler, Recent URI resolved, Sleep apnea ,        GI/Hepatic    GERD well controlled,        Negative  ROS        Endo/Other    Obesity  super morbid obesity   GYN  Negative gynecology ROS          Hematology  Negative hematology ROS      Musculoskeletal  Negative musculoskeletal ROS        Neurology  No seizures ,  Paresthesias,   Comment: Numbness B/L hands/ CTS  Denies Hx Seizures (although listed in PMHx) Psychology   Anxiety, Depression ,              Physical Exam    Airway    Mallampati score: I  TM Distance: >3 FB  Neck ROM: full     Dental   No notable dental hx     Cardiovascular  Rhythm: regular, Rate: normal, Cardiovascular exam normal    Pulmonary  Breath sounds clear to auscultation, Decreased breath sounds,     Other Findings        Anesthesia Plan  ASA Score- 3     Anesthesia Type- IV sedation with anesthesia and general with ASA Monitors  Additional Monitors:   Airway Plan: LMA  Comment: Plan: TIVA with local, poss GA with LMA  R/B/A d/w patient  Pt aware he may have awareness while sedated, may require GA  Pt wishes to proceed with sedation/local if [id number] [address] with Dr Aidan Espinoza        Plan Factors-    Induction- intravenous  Postoperative Plan- Plan for postoperative opioid use  Informed Consent- Anesthetic plan and risks discussed with patient  I personally reviewed this patient with the CRNA  Discussed and agreed on the Anesthesia Plan with the CRNA  Nini Canseco

## 2018-07-12 NOTE — H&P
H&P Exam - Orthopedics   Ady Tesfaye 29 y o  male MRN: [id number]  Unit/Bed#: OR POOL    Assessment/Plan   Assessment:  B/L CTS  Plan:  Right ECTR to be performed today, left ECTR in 2 weeks    History of Present Illness   HPI:  Ady Tesfaye is a 29 y o  y o  male who presents with continued complaints of n/t to b/l hands, right worse than left  No changes since his last office visit  This has been present for approximately 1 year  Difficulty with fine motor motion  Positive nocturnal symptoms  Splints have not provided relief  Historical Information  Review Of Systems:   · Skin: Normal  · Neuro: See HPI  · Musculoskeletal: See HPI  · 14 point review of systems negative except as stated above     Past Medical History:   Past Medical History:   Diagnosis Date    Abdominal pain     Ankle pain     Anxiety     Arthralgia     Asthma     Blood typing encounter     Bloody stools     LA    6/8/16   R   11/21/17     Carpal tunnel syndrome, bilateral     LA  Teresa Peaarjun Trotterana Peaarjun 9/12/17   R   9/12/17     Chest pain     Constipation     Depression     Dermatitis     Fatigue     Frequent bowel movements     Gastroenteritis     GERD without esophagitis     Insomnia     LA   11/7/16   R   11/21/17     Irregular heart beats     Muscle spasm     Nausea     Numbness and tingling in both hands     Odynophagia     Otitis     Pharyngitis     Pilonidal cyst with abscess     LA   10/25/13   R   6/10/14     Proteinuria     Rectal bleeding     R   11/21/17     Rhinitis     Seasonal allergies     Seizure ([address] )     Skin lesion     LA    2/3/15   R  Teresa Peals Teresa Peals 3/17/17  /   LA    3/17/17   R   12/22/17     Snoring     Strep throat     Tonsillitis     URI (upper respiratory infection)     Vitamin D deficiency        Past Surgical History:   Past Surgical History:   Procedure Laterality Date    FOOT SURGERY      PILONIDAL CYST DRAINAGE      Incision and drainage of pilonidal cyst        Family History:  Family history reviewed and non-contributory  Family History   Problem Relation Age of Onset    Depression Mother     Obesity Mother     Stroke Mother         Syndrome     Alcohol abuse Father     Liver disease Father         hepatic failure     Hypertension Father     Depression Brother     Depression Maternal Uncle        Social History:  Social History     Social History    Marital status: /Civil Union     Spouse name: N/A    Number of children: N/A    Years of education: some college     Occupational History          employed      Social History Main Topics    Smoking status: Never Smoker    Smokeless tobacco: Never Used    Alcohol use Yes      Comment: rarely    Drug use: No    Sexual activity: Not Asked     Other Topics Concern    None     Social History Narrative    Daily caffeine consumption        Allergies:   No Known Allergies        Labs:    0  Lab Value Date/Time   HCT 47 7 07/07/2018 1119   HCT 44 8 04/27/2018 0953   HCT 45 6 11/21/2017 1122   HCT 46 6 06/17/2016 1119   HGB 15 4 07/07/2018 1119   HGB 15 0 04/27/2018 0953   HGB 14 9 11/21/2017 1122   HGB 15 2 06/17/2016 1119   INR 1 01 07/07/2018 1119   WBC 6 07 07/07/2018 1119   WBC 5 7 11/21/2017 1122   WBC 5 2 06/17/2016 1119   WBC 0-5 02/13/2016 1117   ESR 16 (H) 07/07/2018 1119   CRP 7 4 (H) 07/07/2018 1119       Meds:    Current Facility-Administered Medications:     ceFAZolin (ANCEF) IVPB (premix) 2,000 mg, 2,000 mg, Intravenous, Once **AND** ceFAZolin (ANCEF) IVPB (premix) 1,000 mg, 1,000 mg, Intravenous, Once, MARTINA Vargas    lactated ringers infusion, 75 mL/hr, Intravenous, Continuous, Shawnee Kaufman MD, Last Rate: 75 mL/hr at 07/12/18 1232, 75 mL/hr at 07/12/18 1232    Blood Culture:   No results found for: BLOODCX    Wound Culture:   No results found for: WOUNDCULT    Ins and Outs:  No intake/output data recorded              Physical Exam  /86   Pulse 76   Temp 98 °F (36 7 °C) (Oral)   Resp 20   SpO2 96% Gen: Alert and oriented to person, place, time  HEENT: EOMI, eyes clear, moist mucus membranes, hearing intact  Respiratory: Bilateral chest rise   No audible wheezing found  Cardiovascular: Regular Rate and Rhythm  Abdomen: soft nontender/nondistended  Ortho Exam: FROM wrists  Neuro Exam: Right: + Tinel's at carpal tunnel, + Derkin's compression/Phalen's testing, + APB weakness, - atrophy   Left: + Tinel's at carpal tunnel, + Derkin's compression/Phalen's testing, - APB weakness, - atrophy       Lab Results: Reviewed  Imaging: Reviewed  EMG: Severe CTS on right, moderate CTS on left

## 2018-07-19 ENCOUNTER — TELEPHONE (OUTPATIENT)
Dept: FAMILY MEDICINE CLINIC | Facility: CLINIC | Age: 28
End: 2018-07-19

## 2018-07-19 DIAGNOSIS — F41.9 ANXIETY AND DEPRESSION: Primary | ICD-10-CM

## 2018-07-19 DIAGNOSIS — E34.8 PINEAL GLAND CYST: Primary | ICD-10-CM

## 2018-07-19 DIAGNOSIS — F32.A ANXIETY AND DEPRESSION: Primary | ICD-10-CM

## 2018-07-19 RX ORDER — SERTRALINE HYDROCHLORIDE 100 MG/1
TABLET, FILM COATED ORAL
Qty: 30 TABLET | Refills: 5 | Status: SHIPPED | OUTPATIENT
Start: 2018-07-19 | End: 2019-03-26 | Stop reason: SDUPTHER

## 2018-07-19 NOTE — PROGRESS NOTES
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

## 2018-07-19 NOTE — TELEPHONE ENCOUNTER
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

## 2018-07-24 PROBLEM — E66.01 MORBID OBESITY WITH BMI OF 50.0-59.9, ADULT (HCC): Status: ACTIVE | Noted: 2018-04-28

## 2018-07-24 NOTE — ASSESSMENT & PLAN NOTE
-Discussed options of [facility]-Intensive Lifestyle Intervention Program, Very Low Calorie Diet-VLCD, Conservative Program, Karen-En-Y Gastric Bypass and Vertical Sleeve Gastrectomy and the role of weight loss medications   -Pt expressed interest in bariatric surgery  Ufortunately at this time is not a candidate for bariatric surgery due to SI  Encouraged him to establish care with a psychiatrist and if he is SI free for 2 years may be a candidate for bariatric surgery   -Would be cautious with sympathomimetics due to mood altering side effects and hx of SI   -Initial weight loss goal of 5-10% weight loss for improved health  -Screening labs: consider fasting insulin, otherwise within acceptable limits  -Patient is interested in pursuing the conservative program    +STOP BANG (4/8 at initial consult in May 2017, but has since gained 16 lbs):  -reviewed risks of undiagnosed/untreated sleep apnea    -Recommended referral to sleep medicine provider for further evaluation, which patient was agreeable to especially due to fatigue

## 2018-07-24 NOTE — PROGRESS NOTES
Assessment/Plan: Morbid obesity with BMI of 50 0-59 9, adult ([facility] [address] )  -Discussed options of HealthyCORE-Intensive Lifestyle Intervention Program, Very Low Calorie Diet-VLCD, Conservative Program, Karen-En-Y Gastric Bypass and Vertical Sleeve Gastrectomy and the role of weight loss medications   -Pt expressed interest in bariatric surgery  Ufortunately at this time is not a candidate for bariatric surgery due to SI  Encouraged him to establish care with a psychiatrist and if he is SI free for 2 years may be a candidate for bariatric surgery   -Would be cautious with sympathomimetics due to mood altering side effects and hx of SI   -Initial weight loss goal of 5-10% weight loss for improved health  -Screening labs: consider fasting insulin, otherwise within acceptable limits  -Patient is interested in pursuing the conservative program    +STOP BANG (4/8 at initial consult in May 2017, but has since gained 16 lbs):  -reviewed risks of undiagnosed/untreated sleep apnea  -Recommended referral to sleep medicine provider for further evaluation, which patient was agreeable to especially due to fatigue    Anxiety and depression  -no longer seeing counseling due to work schedule  -states he is managed by his PCP, but has not had routine f/u with them since April  Encouraged he f/u with them in the next month  Recommend he establish care with psychiatry by calling the mental health/behavioral health number on the back of his insurance card  He reports his PCP is aware he does have SI, but never developed a plan  Reports these thoughts are rare and a result of increased stress  He reports he does have the information for the suicide prevention hotline  Encouraged ER evaluation if thoughts worsen or develops a plan   Will reach out to PCP via staff message to recommend they call and schedule a routine appointment for August      Vitamin D deficiency  -Start vitamin D 04142 IU weekly x4 weeks and then return to 2000 IU daily or 39308 IU weekly  -recheck in 4 months    Follow up in approximately 4-6 weeks with Non-Surgical Physician/Advanced Practitioner  Goals:  Food log (ie ) www myfitnesspal com,sparkpeople  com,loseit com,calorieking  com,etc  baritastic  No sugary beverages  At least 64oz of water daily  Increase physical activity by 10 minutes daily  Gradually increase physical activity to a goal of 5 days per week for 30 minutes of MODERATE intensity PLUS 2 days per week of FULL BODY resistance training  Exercise goals for next visit: Walk dog for 30 minutes at least 3 day per week  0322-7628 calories per day, see sample menu  5-10 servings of fruits and vegetables per day  Start prescription vitamin d x4 week then return to OTC vitamin D 2000 IU daily or 51816 IU weekly  Repeat level in 4 months  Recommend checking lab coverage before having labs drawn  Follow up in August with PCP for routine visit    Diagnoses and all orders for this visit:    Morbid obesity with BMI of 50 0-59 9, adult ([facility] )  -     Ambulatory referral to Bariatric Surgery  -     ergocalciferol (VITAMIN D2) 50,000 units; Take 1 capsule (50,000 Units total) by mouth once a week  -     Vitamin D 25 hydroxy; Future  -     Insulin, fasting; Future  -     Ambulatory referral to Sleep Medicine; Future    Vitamin D deficiency  -     ergocalciferol (VITAMIN D2) 50,000 units; Take 1 capsule (50,000 Units total) by mouth once a week  -     Vitamin D 25 hydroxy; Future    Anxiety and depression    Abnormal weight gain  -     Insulin, fasting; Future  -     Ambulatory referral to Sleep Medicine; Future    At risk for sleep apnea  -     Ambulatory referral to Sleep Medicine; Future    Other fatigue  -     Ambulatory referral to Sleep Medicine; Future    Subjective:   Chief Complaint   Patient presents with    Follow-up     Pt is here for MWM follow up   Last seen 6/2017     Patient ID: Parish De La Fuente  is a 29 y o  male with excess weight/obesity here to pursue weight management  Past Medical History:   Diagnosis Date    Abdominal pain     Ankle pain     Anxiety     Arthralgia     Asthma     Blood typing encounter     Bloody stools     LA    6/8/16   R   11/21/17     Carpal tunnel syndrome, bilateral     LA  Dorathy Infield Dorathy Infield 9/12/17   R   9/12/17     Chest pain     Constipation     Depression     Dermatitis     Fatigue     Frequent bowel movements     Gastroenteritis     GERD without esophagitis     Insomnia     LA   11/7/16   R   11/21/17     Irregular heart beats     Muscle spasm     Nausea     Numbness and tingling in both hands     Odynophagia     Otitis     Pharyngitis     Pilonidal cyst with abscess     LA   10/25/13   R   6/10/14     Proteinuria     Rectal bleeding     R   11/21/17     Rhinitis     Seasonal allergies     Seizure ([facility] [address] )     Skin lesion     LA    2/3/15   R  Dorathy Infield Dorathy Infield 3/17/17  /   LA    3/17/17   R   12/22/17     Snoring     Strep throat     Tonsillitis     URI (upper respiratory infection)     Vitamin D deficiency      HPI:  Obesity/Excess Weight:  Severity: Severe   Seen May 2017 and has since experienced ~16 lb weight gain  At the time of consult patient's STOP BANG was 4/8  Associated symptoms: fatigue, increased joint pain and increased shortness of breath    Goals: short term- under 300 lbs, long term- 240 lbs  Hydration: minimal water, but drinks 3 bottles of seltzer water on work days  Increased sugary beverages, working on reducing  Alcohol: rarely    The following portions of the patient's history were reviewed and updated as appropriate: allergies, current medications, past family history, past medical history, past social history, past surgical history and problem list     Review of Systems   Respiratory: Positive for shortness of breath (attributed to excess to weight, recommend eval if sx persist or worsen)  Negative for cough  Cardiovascular: Negative for chest pain and palpitations     Gastrointestinal: Negative for abdominal pain, constipation, diarrhea, nausea and vomiting  GERD about once a week  Avoid triggers as discussed  Psychiatric/Behavioral: Positive for suicidal ideas (Ideations rarely occur  Never has had a plan, Denies HI  Managed by his PCP)  Objective:    /78 (BP Location: Left arm, Patient Position: Sitting, Cuff Size: Extra-Large)   Pulse 97   Temp 97 9 °F (36 6 °C) (Tympanic)   Resp 14   Ht 6' (1 829 m)   Wt (!) 168 kg (371 lb 5 oz)   BMI 50 36 kg/m²     Physical Exam   Nursing note and vitals reviewed  Constitutional   General appearance: Abnormal   well developed and morbidly obese  Eyes No conjunctival pallor  Ears, Nose, Mouth, and Throat Oral mucosa moist    Pulmonary   Respiratory effort: No increased work of breathing or signs of respiratory distress  Auscultation of lungs: Clear to auscultation, equal breath sounds bilaterally, no wheezes, no rales, no rhonci  Cardiovascular   Auscultation of heart: Normal rate and rhythm, normal S1 and S2, without murmurs  Examination of extremities for edema and/or varicosities: Normal   no edema  Abdomen   Abdomen: Abnormal   The abdomen was obese  Bowel sounds were normal  The abdomen was soft and nontender     Musculoskeletal   Gait and station: Normal     Psychiatric   Orientation to person, place and time: Normal     Affect: appropriate

## 2018-07-25 ENCOUNTER — OFFICE VISIT (OUTPATIENT)
Dept: BARIATRICS | Facility: CLINIC | Age: 28
End: 2018-07-25
Payer: COMMERCIAL

## 2018-07-25 ENCOUNTER — ANESTHESIA EVENT (OUTPATIENT)
Dept: PERIOP | Facility: HOSPITAL | Age: 28
End: 2018-07-25
Payer: COMMERCIAL

## 2018-07-25 VITALS
TEMPERATURE: 97.9 F | SYSTOLIC BLOOD PRESSURE: 120 MMHG | WEIGHT: 315 LBS | RESPIRATION RATE: 14 BRPM | HEART RATE: 97 BPM | DIASTOLIC BLOOD PRESSURE: 78 MMHG | HEIGHT: 72 IN | BODY MASS INDEX: 42.66 KG/M2

## 2018-07-25 DIAGNOSIS — Z91.89 AT RISK FOR SLEEP APNEA: ICD-10-CM

## 2018-07-25 DIAGNOSIS — E66.01 MORBID OBESITY WITH BMI OF 50.0-59.9, ADULT (HCC): Primary | ICD-10-CM

## 2018-07-25 DIAGNOSIS — R63.5 ABNORMAL WEIGHT GAIN: ICD-10-CM

## 2018-07-25 DIAGNOSIS — F32.A ANXIETY AND DEPRESSION: ICD-10-CM

## 2018-07-25 DIAGNOSIS — E55.9 VITAMIN D DEFICIENCY: ICD-10-CM

## 2018-07-25 DIAGNOSIS — F41.9 ANXIETY AND DEPRESSION: ICD-10-CM

## 2018-07-25 DIAGNOSIS — R53.83 OTHER FATIGUE: ICD-10-CM

## 2018-07-25 PROCEDURE — 99214 OFFICE O/P EST MOD 30 MIN: CPT | Performed by: PHYSICIAN ASSISTANT

## 2018-07-25 RX ORDER — ERGOCALCIFEROL 1.25 MG/1
50000 CAPSULE ORAL WEEKLY
Qty: 4 CAPSULE | Refills: 0 | Status: SHIPPED | OUTPATIENT
Start: 2018-07-25 | End: 2019-04-01

## 2018-07-25 NOTE — PATIENT INSTRUCTIONS
Goals: Food log (ie ) www myfitnesspal com,sparkpeople  com,loseit com,calorieking  com,etc  baritastic  No sugary beverages  At least 64oz of water daily  Increase physical activity by 10 minutes daily  Gradually increase physical activity to a goal of 5 days per week for 30 minutes of MODERATE intensity PLUS 2 days per week of FULL BODY resistance training  Exercise goals for next visit: Walk dog for 30 minutes at least 3 day per week  9443-8214 calories per day, see sample menu  5-10 servings of fruits and vegetables per day  Start prescription vitamin d x4 week then return to OTC vitamin D 2000 IU daily or 52027 IU weekly  Repeat level in 4 months   Recommend checking lab coverage before having labs drawn  Follow up in August with PCP for routine visit

## 2018-07-25 NOTE — ASSESSMENT & PLAN NOTE
-no longer seeing counseling due to work schedule  -states he is managed by his PCP, but has not had routine f/u with them since April  Encouraged he f/u with them in the next month  Recommend he establish care with psychiatry by calling the mental health/behavioral health number on the back of his insurance card  He reports his PCP is aware he does have SI, but never developed a plan  Reports these thoughts are rare and a result of increased stress  He reports he does have the information for the suicide prevention hotline  Encouraged ER evaluation if thoughts worsen or develops a plan   Will reach out to PCP via staff message to recommend they call and schedule a routine appointment for August

## 2018-07-25 NOTE — ASSESSMENT & PLAN NOTE
-Start vitamin D 53893 IU weekly x4 weeks and then return to 2000 IU daily or 21397 IU weekly  -recheck in 4 months

## 2018-07-26 ENCOUNTER — HOSPITAL ENCOUNTER (OUTPATIENT)
Facility: HOSPITAL | Age: 28
Setting detail: OUTPATIENT SURGERY
Discharge: HOME | End: 2018-07-26
Attending: ORTHOPAEDIC SURGERY | Admitting: ORTHOPAEDIC SURGERY
Payer: COMMERCIAL

## 2018-07-26 ENCOUNTER — ANESTHESIA (OUTPATIENT)
Dept: PERIOP | Facility: HOSPITAL | Age: 28
End: 2018-07-26
Payer: COMMERCIAL

## 2018-07-26 VITALS
HEART RATE: 78 BPM | TEMPERATURE: 97.8 F | RESPIRATION RATE: 20 BRPM | BODY MASS INDEX: 41.75 KG/M2 | SYSTOLIC BLOOD PRESSURE: 120 MMHG | WEIGHT: 315 LBS | OXYGEN SATURATION: 100 % | DIASTOLIC BLOOD PRESSURE: 58 MMHG | HEIGHT: 73 IN

## 2018-07-26 DIAGNOSIS — G56.03 BILATERAL CARPAL TUNNEL SYNDROME: ICD-10-CM

## 2018-07-26 PROCEDURE — 29848 WRIST ENDOSCOPY/SURGERY: CPT | Performed by: ORTHOPAEDIC SURGERY

## 2018-07-26 RX ORDER — SENNOSIDES 8.6 MG
650 CAPSULE ORAL EVERY 8 HOURS
Qty: 15 TABLET | Refills: 0 | Status: SHIPPED | OUTPATIENT
Start: 2018-07-26 | End: 2018-09-17

## 2018-07-26 RX ORDER — MAGNESIUM HYDROXIDE 1200 MG/15ML
LIQUID ORAL AS NEEDED
Status: DISCONTINUED | OUTPATIENT
Start: 2018-07-26 | End: 2018-07-26 | Stop reason: HOSPADM

## 2018-07-26 RX ORDER — PROPOFOL 10 MG/ML
INJECTION, EMULSION INTRAVENOUS CONTINUOUS PRN
Status: DISCONTINUED | OUTPATIENT
Start: 2018-07-26 | End: 2018-07-26 | Stop reason: SURG

## 2018-07-26 RX ORDER — FENTANYL CITRATE 50 UG/ML
INJECTION, SOLUTION INTRAMUSCULAR; INTRAVENOUS AS NEEDED
Status: DISCONTINUED | OUTPATIENT
Start: 2018-07-26 | End: 2018-07-26 | Stop reason: SURG

## 2018-07-26 RX ORDER — SODIUM CHLORIDE, SODIUM LACTATE, POTASSIUM CHLORIDE, CALCIUM CHLORIDE 600; 310; 30; 20 MG/100ML; MG/100ML; MG/100ML; MG/100ML
75 INJECTION, SOLUTION INTRAVENOUS CONTINUOUS
Status: DISCONTINUED | OUTPATIENT
Start: 2018-07-26 | End: 2018-07-26 | Stop reason: HOSPADM

## 2018-07-26 RX ORDER — HYDROCODONE BITARTRATE AND ACETAMINOPHEN 5; 325 MG/1; MG/1
1 TABLET ORAL ONCE AS NEEDED
Status: DISCONTINUED | OUTPATIENT
Start: 2018-07-26 | End: 2018-07-26 | Stop reason: HOSPADM

## 2018-07-26 RX ORDER — FENTANYL CITRATE/PF 50 MCG/ML
25 SYRINGE (ML) INJECTION
Status: DISCONTINUED | OUTPATIENT
Start: 2018-07-26 | End: 2018-07-26 | Stop reason: HOSPADM

## 2018-07-26 RX ORDER — LIDOCAINE HYDROCHLORIDE AND EPINEPHRINE 10; 10 MG/ML; UG/ML
INJECTION, SOLUTION INFILTRATION; PERINEURAL AS NEEDED
Status: DISCONTINUED | OUTPATIENT
Start: 2018-07-26 | End: 2018-07-26 | Stop reason: HOSPADM

## 2018-07-26 RX ORDER — PROPOFOL 10 MG/ML
INJECTION, EMULSION INTRAVENOUS AS NEEDED
Status: DISCONTINUED | OUTPATIENT
Start: 2018-07-26 | End: 2018-07-26 | Stop reason: SURG

## 2018-07-26 RX ORDER — MIDAZOLAM HYDROCHLORIDE 1 MG/ML
INJECTION INTRAMUSCULAR; INTRAVENOUS AS NEEDED
Status: DISCONTINUED | OUTPATIENT
Start: 2018-07-26 | End: 2018-07-26 | Stop reason: SURG

## 2018-07-26 RX ORDER — HYDROCODONE BITARTRATE AND ACETAMINOPHEN 5; 325 MG/1; MG/1
1 TABLET ORAL EVERY 6 HOURS PRN
Qty: 10 TABLET | Refills: 0 | Status: SHIPPED | OUTPATIENT
Start: 2018-07-26 | End: 2018-09-17

## 2018-07-26 RX ADMIN — FENTANYL CITRATE 100 MCG: 50 INJECTION, SOLUTION INTRAMUSCULAR; INTRAVENOUS at 09:15

## 2018-07-26 RX ADMIN — CEFAZOLIN SODIUM 1000 MG: 1 SOLUTION INTRAVENOUS at 09:20

## 2018-07-26 RX ADMIN — SODIUM CHLORIDE, SODIUM LACTATE, POTASSIUM CHLORIDE, AND CALCIUM CHLORIDE 75 ML/HR: .6; .31; .03; .02 INJECTION, SOLUTION INTRAVENOUS at 08:10

## 2018-07-26 RX ADMIN — SODIUM CHLORIDE, SODIUM LACTATE, POTASSIUM CHLORIDE, AND CALCIUM CHLORIDE: .6; .31; .03; .02 INJECTION, SOLUTION INTRAVENOUS at 08:50

## 2018-07-26 RX ADMIN — PROPOFOL 60 MCG/KG/MIN: 10 INJECTION, EMULSION INTRAVENOUS at 09:20

## 2018-07-26 RX ADMIN — MIDAZOLAM HYDROCHLORIDE 2 MG: 1 INJECTION, SOLUTION INTRAMUSCULAR; INTRAVENOUS at 09:12

## 2018-07-26 RX ADMIN — CEFAZOLIN SODIUM 2000 MG: 2 SOLUTION INTRAVENOUS at 09:15

## 2018-07-26 RX ADMIN — PROPOFOL 50 MG: 10 INJECTION, EMULSION INTRAVENOUS at 09:22

## 2018-07-26 NOTE — DISCHARGE INSTRUCTIONS

## 2018-07-26 NOTE — INTERVAL H&P NOTE
H&P updated  The patient was examined and doing well after right endoscopic carpal tunnel release with good relief of numbness and tingling    Here today for left endoscopic carpal tunnel release under sedation

## 2018-07-26 NOTE — H&P (VIEW-ONLY)
H&P Exam - Orthopedics   Malathi Torres 29 y o  male MRN: [id number]  Unit/Bed#: OR POOL    Assessment/Plan   Assessment:  B/L CTS  Plan:  Right ECTR to be performed today, left ECTR in 2 weeks    History of Present Illness   HPI:  Malathi Torres is a 29 y o  y o  male who presents with continued complaints of n/t to b/l hands, right worse than left  No changes since his last office visit  This has been present for approximately 1 year  Difficulty with fine motor motion  Positive nocturnal symptoms  Splints have not provided relief  Historical Information  Review Of Systems:   · Skin: Normal  · Neuro: See HPI  · Musculoskeletal: See HPI  · 14 point review of systems negative except as stated above     Past Medical History:   Past Medical History:   Diagnosis Date    Abdominal pain     Ankle pain     Anxiety     Arthralgia     Asthma     Blood typing encounter     Bloody stools     LA    6/8/16   R   11/21/17     Carpal tunnel syndrome, bilateral     LA  Zach Kaska Zach Kaska 9/12/17   R   9/12/17     Chest pain     Constipation     Depression     Dermatitis     Fatigue     Frequent bowel movements     Gastroenteritis     GERD without esophagitis     Insomnia     LA   11/7/16   R   11/21/17     Irregular heart beats     Muscle spasm     Nausea     Numbness and tingling in both hands     Odynophagia     Otitis     Pharyngitis     Pilonidal cyst with abscess     LA   10/25/13   R   6/10/14     Proteinuria     Rectal bleeding     R   11/21/17     Rhinitis     Seasonal allergies     Seizure ([address] )     Skin lesion     LA    2/3/15   R  Zach Kaska Zach Kaska 3/17/17  /   LA    3/17/17   R   12/22/17     Snoring     Strep throat     Tonsillitis     URI (upper respiratory infection)     Vitamin D deficiency        Past Surgical History:   Past Surgical History:   Procedure Laterality Date    FOOT SURGERY      PILONIDAL CYST DRAINAGE      Incision and drainage of pilonidal cyst        Family History:  Family history reviewed and non-contributory  Family History   Problem Relation Age of Onset    Depression Mother     Obesity Mother     Stroke Mother         Syndrome     Alcohol abuse Father     Liver disease Father         hepatic failure     Hypertension Father     Depression Brother     Depression Maternal Uncle        Social History:  Social History     Social History    Marital status: /Civil Union     Spouse name: N/A    Number of children: N/A    Years of education: some college     Occupational History          employed      Social History Main Topics    Smoking status: Never Smoker    Smokeless tobacco: Never Used    Alcohol use Yes      Comment: rarely    Drug use: No    Sexual activity: Not Asked     Other Topics Concern    None     Social History Narrative    Daily caffeine consumption        Allergies:   No Known Allergies        Labs:    0  Lab Value Date/Time   HCT 47 7 07/07/2018 1119   HCT 44 8 04/27/2018 0953   HCT 45 6 11/21/2017 1122   HCT 46 6 06/17/2016 1119   HGB 15 4 07/07/2018 1119   HGB 15 0 04/27/2018 0953   HGB 14 9 11/21/2017 1122   HGB 15 2 06/17/2016 1119   INR 1 01 07/07/2018 1119   WBC 6 07 07/07/2018 1119   WBC 5 7 11/21/2017 1122   WBC 5 2 06/17/2016 1119   WBC 0-5 02/13/2016 1117   ESR 16 (H) 07/07/2018 1119   CRP 7 4 (H) 07/07/2018 1119       Meds:    Current Facility-Administered Medications:     ceFAZolin (ANCEF) IVPB (premix) 2,000 mg, 2,000 mg, Intravenous, Once **AND** ceFAZolin (ANCEF) IVPB (premix) 1,000 mg, 1,000 mg, Intravenous, Once, Franklyn Gutierrez PA-C    lactated ringers infusion, 75 mL/hr, Intravenous, Continuous, Garry Mitchell MD, Last Rate: 75 mL/hr at 07/12/18 1232, 75 mL/hr at 07/12/18 1232    Blood Culture:   No results found for: BLOODCX    Wound Culture:   No results found for: WOUNDCULT    Ins and Outs:  No intake/output data recorded              Physical Exam  /86   Pulse 76   Temp 98 °F (36 7 °C) (Oral)   Resp 20   SpO2 96% Gen: Alert and oriented to person, place, time  HEENT: EOMI, eyes clear, moist mucus membranes, hearing intact  Respiratory: Bilateral chest rise   No audible wheezing found  Cardiovascular: Regular Rate and Rhythm  Abdomen: soft nontender/nondistended  Ortho Exam: FROM wrists  Neuro Exam: Right: + Tinel's at carpal tunnel, + Derkin's compression/Phalen's testing, + APB weakness, - atrophy   Left: + Tinel's at carpal tunnel, + Derkin's compression/Phalen's testing, - APB weakness, - atrophy       Lab Results: Reviewed  Imaging: Reviewed  EMG: Severe CTS on right, moderate CTS on left

## 2018-07-26 NOTE — OP NOTE
OPERATIVE REPORT  PATIENT NAME: Nemo Johansen  :  1990  MRN: [id number]  Pt Location: [facility] MAIN OR    SURGERY DATE: 18    Surgeon(s) and Role:     * Jonathan Washington MD - Primary     * Jolly Tolliver MD - Assisting    Pre-Op Diagnosis:  Carpal tunnel syndrome, unspecified laterality [G56 00]    Post-Op Diagnosis Codes:     * Carpal tunnel syndrome, unspecified laterality [G56 00]    Procedure(s):  RELEASE CARPAL TUNNEL ENDOSCOPIC (Left)    Specimen(s):  * No orders in the log *    Estimated Blood Loss:   Minimal      Anesthesia Type:   Regional with Sedation    Operative Indications: The patient has a history of Carpal Tunnel Syndrome  left that was recalcitrant to conservative management  The decision was made to bring the patient to the operating room for Endoscopic Carpal Tunnel Release  left  Risks of the procedure were explained which include, but are not limited to bleeding; infection; damage to nerves, arteries,veins, tendons; scar; pain; need for reoperation; failure to give desired result; and risks of anaesthesia  All questions were answered to satisfaction and they were willing to proceed  Operative Findings:  Left carpal tunnel syndrome, healed right carpal tunnel release sutures were removed without complication    Complications:   None    Procedure and Technique:  After the patient, site, and procedure were identified, the patient was brought into the operating room in a supine position  Local anaesthesia and sedation were provided  A tourniquet was not used  The  left upper extremity was then prepped and drapped in a normal, sterile, orthopedic fashion  After reconfirmation of the patient, site, and surgical procedure, which was agreed upon by the entire surgical team, attention was turned to the left wrist   The sites of the proximal and distal incisions were marked    The lui of the proximal incision was placed horizontally at the midline of the wrist   The distal incision lui was longitudinal extending distally from the point of intersection of the line between the long finger and ring finger and the line along the distal border of the fully abducted thumb  The proximal incision was performed  Subcutaneous tissues were dissected  Then the transverse volar antebrachial fascia was perforated with a scalpel  The edges of the skin incision where retracted and the forearm fascia was incised for approximately 1 5 cm proximally with care taken to identify and protect the median nerve  Retractors were used to inspect the transverse carpal ligament distally  A curved Martinez dissector was used to glide under the transverse carpal ligament and superficial to the median nerve with confirmation via the washboard feeling  Then the curved Martinez was pushed into the palm toward the distal incision site  When the location of the distal skin lui was adequate, the distal incision was made  Then with retraction of the skin, further dissection and perforation of the palmar fascia was performed with the use of tenotomy scissors  The curved Martinez was guided from proximal to distal out the distal incisions without any twisting to allow for dilation of the tract  The curved Martinez was removed, and the cannula for the camera was inserted along the same tract, making sure to keep the alignment post on the cannula perpendicular to the plane of the hand without twisting  Then while keeping the wrist in extension, and holding the cannula of the camera in place, the wrist was placed on the hyperextension board  The scope was inserted distally, and a cotton-tip applicator was used proximally to clean the tract as well as the scope  A curved cutting knife was introduced from proximal to distal while keeping visualization with the use of the camera  Without twisting of the canula, the knife was used to cut the transverse carpal ligament completely, making sure there were no remnant fibers    Then after this was accomplished, the hand was removed from the extension block  Three maneuvers were used to confirm the full release of the transverse carpal ligament  First, the ease of twisting the trocar of the camera confirmed the release of the ligament  Second, the curved Martinez was introduced to make sure there were no remnant fibers that could be felt palmarly  Third, the scope was introduced again to visualize that the whole ligament was released proximally to distally  Additional confirmation of full release included retraction and inspection in the distal and proximal incisions to make sure there were no remnant fibers distally or proximally respectively  At the completion of the procedure, hemostasis was obtained with cautery and direct pressure  The wounds were copiously irrigated with sterile solution  The wounds were closed with Prolene  Sterile dressings were applied, including Xeroform, gauze, tweeners, webril, ACE  Please note, all sponge, needle, and instrument counts were correct prior to closure  Loupe magnification was utilized  The patient tolerated the procedure well       I was present for the entire procedure    Patient Disposition:  APU and hemodynamically stable    SIGNATURE: Corrie Lezama MD  DATE: 07/26/18  TIME: 9:48 AM

## 2018-07-26 NOTE — ANESTHESIA PREPROCEDURE EVALUATION
Review of Systems/Medical History  Patient summary reviewed  Chart reviewed  No history of anesthetic complications     Cardiovascular  Exercise tolerance (METS): >4,  Dysrhythmias ,    Pulmonary  Asthma , well controlled/ stable Last rescue: < 1 month ago Asthma type of rescue: PRN inhaler, Recent URI resolved, Sleep apnea ,        GI/Hepatic    GERD (meds only prn) well controlled,        Negative  ROS        Endo/Other    Obesity  morbid obesity and super morbid obesity   GYN  Negative gynecology ROS          Hematology  Negative hematology ROS      Musculoskeletal  Negative musculoskeletal ROS        Neurology  No seizures ,  Paresthesias,   Comment: Numbness B/L hands/ CTS  Denies Hx Seizures (although listed in PMHx) Psychology   Anxiety, Depression ,              Physical Exam    Airway    Mallampati score: I  TM Distance: >3 FB  Neck ROM: full     Dental   No notable dental hx     Cardiovascular  Rhythm: regular, Rate: normal, Cardiovascular exam normal    Pulmonary  Breath sounds clear to auscultation, Decreased breath sounds,     Other Findings        Anesthesia Plan  ASA Score- 3     Anesthesia Type- IV sedation with anesthesia and general with ASA Monitors  Additional Monitors:   Airway Plan:     Comment: Plan: TIVA with local, poss GA with LMA  R/B/A d/w patient  Pt aware he may have awareness while sedated, may require GA  Pt wishes to proceed with sedation/local if [place] with Dr Stephon Stevens        Plan Factors-    Induction- intravenous  Postoperative Plan- Plan for postoperative opioid use  Informed Consent- Anesthetic plan and risks discussed with patient

## 2018-08-06 ENCOUNTER — OFFICE VISIT (OUTPATIENT)
Dept: OBGYN CLINIC | Facility: CLINIC | Age: 28
End: 2018-08-06

## 2018-08-06 VITALS
BODY MASS INDEX: 41.75 KG/M2 | DIASTOLIC BLOOD PRESSURE: 75 MMHG | HEIGHT: 73 IN | SYSTOLIC BLOOD PRESSURE: 117 MMHG | HEART RATE: 80 BPM | WEIGHT: 315 LBS

## 2018-08-06 DIAGNOSIS — Z48.89 AFTERCARE FOLLOWING SURGERY: Primary | ICD-10-CM

## 2018-08-06 PROCEDURE — 99024 POSTOP FOLLOW-UP VISIT: CPT | Performed by: ORTHOPAEDIC SURGERY

## 2018-08-06 NOTE — LETTER
August 6, 2018     Patient: Millicent Capps   YOB: 1990   Date of Visit: 8/6/2018       To Whom it May Concern:    Sharmaine Krabbe is under my professional care  He was seen in my office on 8/6/2018  No work for one week  OK to return to work on Monday august 13th, 2018 without restrictions    If you have any questions or concerns, please don't hesitate to call           Sincerely,          Yuli Abernathy MD        CC: Millicent Génesis

## 2018-08-06 NOTE — PROGRESS NOTES
ASSESSMENT/PLAN:    Assessment:     45-year-old male s/p B/L ECTR most recently on L side done 11 days ago (dos: 7/26/2018)    Plan:   - no work x1 week  - work note provided  - f/u prn          _____________________________________________________  CHIEF COMPLAINT:  Chief Complaint   Patient presents with    Left Hand - Post-op         SUBJECTIVE:  Mahi Bruno is a 29y o  year old male who presents  11 days left endoscopic carpal tunnel release  Patient also had a right endoscopic carpal tunnel release done approximately 10 days prior  He is doing well with regard to carpal tunnel symptoms in bilateral upper extremities  He does continue to have mild palmar pain left side which she did experience on the right side as well but which has since subsided on the right side  He works with his hands lifting heavy things     PAST MEDICAL HISTORY:  Past Medical History:   Diagnosis Date    Abdominal pain     Ankle pain     Anxiety     Arthralgia     Asthma     Blood typing encounter     Bloody stools     LA    6/8/16   R   11/21/17     Carpal tunnel syndrome, bilateral     LA  Kiersten Goldmann Kiersten Goldmann 9/12/17   R   9/12/17     Chest pain     Constipation     Depression     Dermatitis     Fatigue     Frequent bowel movements     Gastroenteritis     GERD without esophagitis     Insomnia     LA   11/7/16   R   11/21/17     Irregular heart beats     Muscle spasm     Nausea     Numbness and tingling in both hands     Odynophagia     Otitis     Pharyngitis     Pilonidal cyst with abscess     LA   10/25/13   R   6/10/14     Proteinuria     Rectal bleeding     R   11/21/17     Rhinitis     Seasonal allergies     Seizure ([facility] [address] [facility]     Skin lesion     LA    2/3/15   R  Kiersten Goldmann Kiersten Goldmann 3/17/17  /   LA    3/17/17   R   12/22/17     Snoring     Strep throat     Tonsillitis     URI (upper respiratory infection)     Vitamin D deficiency        PAST SURGICAL HISTORY:  Past Surgical History:   Procedure Laterality Date    FOOT SURGERY      PILONIDAL CYST DRAINAGE      Incision and drainage of pilonidal cyst     MD WRIST ARTHROSCOP,RELEASE Lanie Campos LIG Right 7/12/2018    Procedure: RELEASE CARPAL TUNNEL ENDOSCOPIC;  Surgeon: Blane Mishra MD;  [facility];  Service: Orthopedics    MD WRIST Suad Credit LIG Left 7/26/2018    Procedure: RELEASE CARPAL TUNNEL ENDOSCOPIC;  Surgeon: Blane Mishra MD;  [facility];  Service: Orthopedics       FAMILY HISTORY:  Family History   Problem Relation Age of Onset    Depression Mother     Obesity Mother     Stroke Mother         Syndrome     Diabetes Mother     Alcohol abuse Father     Liver disease Father         hepatic failure     Hypertension Father     Depression Brother     Thyroid disease Brother     Depression Maternal Uncle     Heart disease Neg Hx     Cancer Neg Hx     Thyroid cancer Neg Hx        SOCIAL HISTORY:  Social History   Substance Use Topics    Smoking status: Never Smoker    Smokeless tobacco: Never Used    Alcohol use Yes      Comment: rarely       MEDICATIONS:    Current Outpatient Prescriptions:     acetaminophen (TYLENOL 8 HOUR) 650 mg CR tablet, Take 1 tablet (650 mg total) by mouth every 8 (eight) hours, Disp: 15 tablet, Rfl: 0    albuterol (PROAIR HFA) 90 mcg/act inhaler, Inhale 2 puffs every 4 (four) hours as needed for wheezing  , Disp: , Rfl:     buPROPion (WELLBUTRIN XL) 150 mg 24 hr tablet, TAKE 1 TABLET DAILY (Patient taking differently: TAKE 1 TABLET EVERY EVENING), Disp: 30 tablet, Rfl: 5    Cholecalciferol (VITAMIN D) 2000 units CAPS, Take 1 capsule by mouth every evening  , Disp: , Rfl:     ergocalciferol (VITAMIN D2) 50,000 units, Take 1 capsule (50,000 Units total) by mouth once a week, Disp: 4 capsule, Rfl: 0    fluticasone (FLONASE) 50 mcg/act nasal spray, 2 sprays into each nostril daily as needed for allergies  , Disp: , Rfl:     sertraline (ZOLOFT) 100 mg tablet, TAKE 1 TABLET EVERY DAY, Disp: 30 tablet, Rfl: 5    HYDROcodone-acetaminophen (NORCO) 5-325 mg per tablet, Take 1 tablet by mouth every 6 (six) hours as needed for pain for up to 10 doses Max Daily Amount: 4 tablets, Disp: 10 tablet, Rfl: 0    ALLERGIES:  No Known Allergies    REVIEW OF SYSTEMS:  Pertinent items are noted in HPI  A comprehensive review of systems was negative  LABS:  HgA1c:   Lab Results   Component Value Date    HGBA1C 5 1 07/07/2018     BMP:   Lab Results   Component Value Date    CALCIUM 8 6 07/07/2018     07/07/2018    K 4 1 07/07/2018    CO2 27 07/07/2018     07/07/2018    BUN 20 07/07/2018    CREATININE 0 91 07/07/2018       _____________________________________________________  PHYSICAL EXAMINATION:  General: well developed and well nourished, alert, oriented times 3 and appears comfortable  Psychiatric: Normal  HEENT: Trachea Midline, No torticollis  Cardiovascular: No discernable arrhythmia  Pulmonary: No wheezing or stridor  Skin: No masses, erthema, lacerations, fluctation, ulcerations  Neurovascular: Sensation Intact to the Median, Ulnar, Radial Nerve, Motor Intact to the Median, Ulnar, Radial Nerve and Pulses Intact    MUSCULOSKELETAL EXAMINATION:  Extremities:    RUE:  Healed volar palmar incisions, +AIN/PIN/Ulnar Motor  LUE: Healing volar palmar incisions, +AIN/PIN/Ulnar Motor      _____________________________________________________  STUDIES REVIEWED:  No Studies to review      PROCEDURES PERFORMED:  Procedures  No Procedures performed today     I interviewed, took the history and examined the patient  I discuss the case with the resident and reviewed the resident's note  I supervised the resident and I agree with the resident management plan as it was presented to me  I was present in the clinic and examined the patient

## 2018-08-22 PROBLEM — F32.A DEPRESSION: Status: ACTIVE | Noted: 2018-08-22

## 2018-08-24 DIAGNOSIS — E55.9 VITAMIN D DEFICIENCY: ICD-10-CM

## 2018-08-24 DIAGNOSIS — E66.01 MORBID OBESITY WITH BMI OF 50.0-59.9, ADULT (HCC): ICD-10-CM

## 2018-08-24 RX ORDER — ERGOCALCIFEROL 1.25 MG/1
CAPSULE ORAL
Qty: 4 CAPSULE | Refills: 0 | OUTPATIENT
Start: 2018-08-24

## 2018-08-24 NOTE — TELEPHONE ENCOUNTER
Please call pt and recommend he take OTC vitamin D 2000 IU daily or 47892 IU weekly once the Rx is complete  TY!

## 2018-09-11 ENCOUNTER — TELEPHONE (OUTPATIENT)
Dept: BARIATRICS | Facility: CLINIC | Age: 28
End: 2018-09-11

## 2018-09-11 NOTE — TELEPHONE ENCOUNTER
Thony Monroy,   can you please call this patient and ask him to come in today for an appointment with me  I am receiving this message and am concerned    Thank you

## 2018-09-11 NOTE — TELEPHONE ENCOUNTER
Spoke with pt, He states he has a new job, with new working hours and totally forgot about his apt  Pt states he is doing ok and denies any homicidal or suicidal ideations  I offered an apt with PCP for today and he said he was getting ready for work and is unable to come in today or anytime this week because of his new employment  Pt said he would be available next week  Apt scheduled for Monday, 9/17/2018 @ 10 am    ER parameters discussed with pt  Pt also advised to call wt management office and reschedule apt

## 2018-09-11 NOTE — TELEPHONE ENCOUNTER
Gerhard Terry is a mutual patient of ours  I wanted to reach out to you to see if you could contact and follow up with him  Last time he was here for an appointment (7/25/2018) to see our other MARTINA [address] he expressed some uncontrolled depression with occasional suicidal ideations but no plan  At that appt he was instructed to follow up with you for management but from the chart review he has not done so  He was also to establish care with psychiatry at his last visit which it does not appear he did either  He canceled an appt with me on 8/24/2018 and no showed an appt today  It would be much appreciated if you would reach out to the patient to address these issues     Thank you  Carmen Lowe PA-C

## 2018-09-17 ENCOUNTER — DOCUMENTATION (OUTPATIENT)
Dept: PSYCHIATRY | Facility: CLINIC | Age: 28
End: 2018-09-17

## 2018-09-17 ENCOUNTER — OFFICE VISIT (OUTPATIENT)
Dept: FAMILY MEDICINE CLINIC | Facility: CLINIC | Age: 28
End: 2018-09-17
Payer: COMMERCIAL

## 2018-09-17 ENCOUNTER — HOSPITAL ENCOUNTER (EMERGENCY)
Facility: HOSPITAL | Age: 28
Discharge: HOME | End: 2018-09-17
Attending: EMERGENCY MEDICINE | Admitting: EMERGENCY MEDICINE
Payer: COMMERCIAL

## 2018-09-17 VITALS
BODY MASS INDEX: 49.48 KG/M2 | RESPIRATION RATE: 16 BRPM | WEIGHT: 315 LBS | TEMPERATURE: 97.5 F | SYSTOLIC BLOOD PRESSURE: 165 MMHG | DIASTOLIC BLOOD PRESSURE: 96 MMHG | OXYGEN SATURATION: 97 % | HEART RATE: 76 BPM

## 2018-09-17 VITALS
SYSTOLIC BLOOD PRESSURE: 132 MMHG | RESPIRATION RATE: 16 BRPM | HEIGHT: 73 IN | TEMPERATURE: 97.7 F | HEART RATE: 84 BPM | BODY MASS INDEX: 41.75 KG/M2 | DIASTOLIC BLOOD PRESSURE: 82 MMHG | WEIGHT: 315 LBS

## 2018-09-17 DIAGNOSIS — F32.A ANXIETY AND DEPRESSION: Primary | ICD-10-CM

## 2018-09-17 DIAGNOSIS — F41.9 ANXIETY AND DEPRESSION: Primary | ICD-10-CM

## 2018-09-17 DIAGNOSIS — R45.851 SUICIDAL IDEATION: ICD-10-CM

## 2018-09-17 DIAGNOSIS — R45.851 SUICIDAL IDEATION: Primary | ICD-10-CM

## 2018-09-17 LAB — ETHANOL EXG-MCNC: 0 MG/DL

## 2018-09-17 PROCEDURE — 3008F BODY MASS INDEX DOCD: CPT | Performed by: FAMILY MEDICINE

## 2018-09-17 PROCEDURE — 82075 ASSAY OF BREATH ETHANOL: CPT | Performed by: EMERGENCY MEDICINE

## 2018-09-17 PROCEDURE — 99214 OFFICE O/P EST MOD 30 MIN: CPT | Performed by: FAMILY MEDICINE

## 2018-09-17 PROCEDURE — 99284 EMERGENCY DEPT VISIT MOD MDM: CPT

## 2018-09-17 NOTE — PROGRESS NOTES
FAMILY PRACTICE OFFICE VISIT       NAME: Elena Oliver  AGE: 29 y o  SEX: male       : 1990        MRN: [id number]    DATE: 2018  TIME: 8:31 AM    Assessment and Plan     Problem List Items Addressed This Visit     Anxiety and depression - Primary      Other Visit Diagnoses     Suicidal ideation            27-year-old male presents for follow-up to discuss anxiety, depression and thoughts of hurting himself  Patient states the last time he started thinking about hurting himself was a couple of minutes ago  He is presently very depressed, hopeless and is agreeable on going to the emergency room for further evaluation  Have discussed this with our behavioral health specialist Sharmila Wagner who is currently in the office and will reach out to crisis worker in the emergency room  Have also reached out to ER physician as well  Patient's wife will bring him to the emergency room  Patient is agreeable with this plan  There are no Patient Instructions on file for this visit  I have spent 35 minutes with Patient  today in which greater than 50% of this time was spent in counseling/coordination of care regarding Diagnostic results, Prognosis, Risks and benefits of tx options, Intructions for management, Patient and family education, Importance of tx compliance, Risk factor reductions and Impressions  Chief Complaint     Chief Complaint   Patient presents with    Follow-up     Pt is here for follow up for depression        History of Present Illness     HPI    27-year-old male presents today for follow-up  Patient states he started a new job, usually works 12 hour shifts 3-4 days a week from 6:00 p m  To 6:00 a m  Has been tired  Has been more depressed recently  He finds himself more irritable, gets angry easily  Patient states he is having suicidal ideations every day  He thinks of scenarios to end his life so his wife can get life insurance    At present, does have thoughts of hurting himself  Patient states his family has addiction issues and his addiction as food and he was disappointed that he could not proceed with bariatric procedure  He felt this would of been helpful for him and his self-esteem  Was seeing a therapist in the past Raquel Balderrama, now at [facility] counseling, having a hard time getting appt  Will schedule sleep study   Playing video games is an outlet for him  Has been turning to food as comfort  Review of Systems   Review of Systems   Constitutional:        Weight gain  Addiction with food  Psychiatric/Behavioral: Positive for dysphoric mood and suicidal ideas  The patient is nervous/anxious  Irritability       Active Problem List     Patient Active Problem List   Diagnosis    Morbid obesity with BMI of 50 0-59 9, adult ([facility] [address] )    Anxiety and depression    Elevated TSH    Carpal tunnel syndrome    Preop examination    Preoperative clearance    Pineal gland cyst    Vitamin D deficiency    Depression       Past Medical History:  Past Medical History:   Diagnosis Date    Abdominal pain     Ankle pain     Anxiety     Arthralgia     Asthma     Blood typing encounter     Bloody stools     LA    6/8/16   R   11/21/17     Carpal tunnel syndrome, bilateral     LA  Ele Lightning Lee Lightning 9/12/17   R   9/12/17     Chest pain     Constipation     Depression     Dermatitis     Fatigue     Frequent bowel movements     Gastroenteritis     GERD without esophagitis     Insomnia     LA   11/7/16   R   11/21/17     Irregular heart beats     Muscle spasm     Nausea     Numbness and tingling in both hands     Odynophagia     Otitis     Pharyngitis     Pilonidal cyst with abscess     LA   10/25/13   R   6/10/14     Proteinuria     Rectal bleeding     R   11/21/17     Rhinitis     Seasonal allergies     Seizure ([facility] [address] [facility]     Skin lesion     LA    2/3/15   R  Ele Lightning Ele Lightning 3/17/17  /   LA    3/17/17   R   12/22/17     Snoring     Strep throat     Tonsillitis     URI (upper respiratory infection)     Vitamin D deficiency        Past Surgical History:  Past Surgical History:   Procedure Laterality Date    FOOT SURGERY      PILONIDAL CYST DRAINAGE      Incision and drainage of pilonidal cyst     UT WRIST Nadege Gamma LIG Right 7/12/2018    Procedure: RELEASE CARPAL TUNNEL ENDOSCOPIC;  Surgeon: Dmitriy Bell MD;  [facility];  Service: Orthopedics    UT WRIST Nadege Gamma LIG Left 7/26/2018    Procedure: RELEASE CARPAL TUNNEL ENDOSCOPIC;  Surgeon: Dmitriy Bell MD;  [facility];  Service: Orthopedics       Family History:  Family History   Problem Relation Age of Onset    Depression Mother     Obesity Mother     Stroke Mother         Syndrome     Diabetes Mother     Alcohol abuse Father     Liver disease Father         hepatic failure     Hypertension Father     Depression Brother     Thyroid disease Brother     Depression Maternal Uncle     Heart disease Neg Hx     Cancer Neg Hx     Thyroid cancer Neg Hx        Social History:  Social History     Social History    Marital status: /Civil Union     Spouse name: N/A    Number of children: N/A    Years of education: some college     Occupational History          employed      Social History Main Topics    Smoking status: Never Smoker    Smokeless tobacco: Never Used    Alcohol use Yes      Comment: rarely    Drug use: No    Sexual activity: Not on file     Other Topics Concern    Not on file     Social History Narrative    Daily caffeine consumption      I have reviewed the patient's medical history in detail; there are no changes to the history as noted in the electronic medical record      Objective     Vitals:    09/17/18 1010   BP: 132/82   Pulse: 84   Resp: 16   Temp: 97 7 °F (36 5 °C)     Wt Readings from Last 3 Encounters:   09/17/18 (!) 170 kg (375 lb)   09/17/18 (!) 171 kg (376 lb 6 4 oz)   08/06/18 (!) 171 kg (378 lb)     PHQ-9 Depression Screening    PHQ-9:    Frequency of the following problems over the past two weeks:       Little interest or pleasure in doing things:  3 - nearly every day  Feeling down, depressed, or hopeless:  3 - nearly every day  Trouble falling or staying asleep, or sleeping too much:  3 - nearly every day  Feeling tired or having little energy:  3 - nearly every day  Poor appetite or overeating:  3 - nearly every day  Feeling bad about yourself - or that you are a failure or have let yourself or your family down:  3 - nearly every day  Trouble concentrating on things, such as reading the newspaper or watching television:  0 - not at all  Moving or speaking so slowly that other people could have noticed  Or the opposite - being so fidgety or restless that you have been moving around a lot more than usual:  1 - several days  Thoughts that you would be better off dead, or of hurting yourself in some way:  3 - nearly every day  PHQ-2 Score:  6  PHQ-9 Score:  22           Physical Exam   Constitutional: He is oriented to person, place, and time  He appears well-developed and well-nourished  Neurological: He is alert and oriented to person, place, and time  Psychiatric: He exhibits a depressed mood  Nursing note and vitals reviewed        Pertinent Laboratory/Diagnostic Studies:  Lab Results   Component Value Date    BUN 20 07/07/2018    CREATININE 0 91 07/07/2018    CALCIUM 8 6 07/07/2018     07/07/2018    K 4 1 07/07/2018    CO2 27 07/07/2018     07/07/2018     Lab Results   Component Value Date    ALT 38 07/07/2018    AST 14 07/07/2018    ALKPHOS 84 07/07/2018    BILITOT 0 5 11/21/2017       Lab Results   Component Value Date    WBC 6 07 07/07/2018    HGB 15 4 07/07/2018    HCT 47 7 07/07/2018    MCV 89 07/07/2018     07/07/2018       No results found for: TSH    Lab Results   Component Value Date    CHOL 150 06/17/2016     Lab Results   Component Value Date    TRIG 92 07/07/2018     Lab Results Component Value Date    HDL 43 07/07/2018     Lab Results   Component Value Date    LDLCALC 103 (H) 07/07/2018     Lab Results   Component Value Date    HGBA1C 5 1 07/07/2018       Results for orders placed or performed in visit on 07/07/18   CBC and differential   Result Value Ref Range    WBC 6 07 4 31 - 10 16 Thousand/uL    RBC 5 39 3 88 - 5 62 Million/uL    Hemoglobin 15 4 12 0 - 17 0 g/dL    Hematocrit 47 7 36 5 - 49 3 %    MCV 89 82 - 98 fL    MCH 28 6 26 8 - 34 3 pg    MCHC 32 3 31 4 - 37 4 g/dL    RDW 12 6 11 6 - 15 1 %    MPV 9 1 8 9 - 12 7 fL    Platelets 363 392 - 060 Thousands/uL    nRBC 0 /100 WBCs    Neutrophils Relative 69 43 - 75 %    Immat GRANS % 0 0 - 2 %    Lymphocytes Relative 23 14 - 44 %    Monocytes Relative 7 4 - 12 %    Eosinophils Relative 1 0 - 6 %    Basophils Relative 0 0 - 1 %    Neutrophils Absolute 4 18 1 85 - 7 62 Thousands/µL    Immature Grans Absolute 0 02 0 00 - 0 20 Thousand/uL    Lymphocytes Absolute 1 38 0 60 - 4 47 Thousands/µL    Monocytes Absolute 0 40 0 17 - 1 22 Thousand/µL    Eosinophils Absolute 0 07 0 00 - 0 61 Thousand/µL    Basophils Absolute 0 02 0 00 - 0 10 Thousands/µL   Comprehensive metabolic panel   Result Value Ref Range    Sodium 139 136 - 145 mmol/L    Potassium 4 1 3 5 - 5 3 mmol/L    Chloride 107 100 - 108 mmol/L    CO2 27 21 - 32 mmol/L    ANION GAP 5 4 - 13 mmol/L    BUN 20 5 - 25 mg/dL    Creatinine 0 91 0 60 - 1 30 mg/dL    Glucose, Fasting 92 65 - 99 mg/dL    Calcium 8 6 8 3 - 10 1 mg/dL    AST 14 5 - 45 U/L    ALT 38 12 - 78 U/L    Alkaline Phosphatase 84 46 - 116 U/L    Total Protein 8 3 (H) 6 4 - 8 2 g/dL    Albumin 3 7 3 5 - 5 0 g/dL    Total Bilirubin 0 36 0 20 - 1 00 mg/dL    eGFR 115 ml/min/1 73sq m   Lipid Panel with Direct LDL reflex   Result Value Ref Range    Cholesterol 164 50 - 200 mg/dL    Triglycerides 92 <=150 mg/dL    HDL, Direct 43 40 - 60 mg/dL    LDL Calculated 103 (H) 0 - 100 mg/dL   TSH, 3rd generation with T4 reflex   Result Value Ref Range    TSH 3RD GENERATON 2 460 0 358 - 3 740 uIU/mL   Vitamin D 25 hydroxy   Result Value Ref Range    Vit D, 25-Hydroxy 23 3 (L) 30 0 - 100 0 ng/mL   CLARA Screen w/ Reflex to Titer/Pattern   Result Value Ref Range    CLARA Negative Negative   RF Screen w/ Reflex to Titer   Result Value Ref Range    Rheumatoid Factor Negative Negative   Sedimentation rate, automated   Result Value Ref Range    Sed Rate 16 (H) 0 - 10 mm/hour   C-reactive protein   Result Value Ref Range    CRP 7 4 (H) <3 0 mg/L   CMV IgG/IgM Antibodies   Result Value Ref Range    CMV IGG <0 60 0 00 - 0 59 U/mL    CMV IgM <30 0 0 0 - 29 9 AU/mL   EBV acute panel   Result Value Ref Range    EBV Early Antigen Ab, IgG <9 0 0 0 - 8 9 U/mL    EBV VCA IgG 396 0 (H) 0 0 - 17 9 U/mL    EBV VCA IgM <36 0 0 0 - 35 9 U/mL    EBV Nuclear Ag Ab <18 0 0 0 - 17 9 U/mL    EBV Interp  Comment    Lyme Antibody Profile with reflex to WB   Result Value Ref Range    LYME AB IGG 0 66 0 00 - 0 79    LYME AB IGM 0 19 0 00 - 0 79   Protime-INR   Result Value Ref Range    Protime 13 4 11 8 - 14 2 seconds    INR 1 01 0 86 - 1 17   APTT   Result Value Ref Range    PTT 30 24 - 36 seconds   Hemoglobin A1C   Result Value Ref Range    Hemoglobin A1C 5 1 4 2 - 6 3 %     mg/dl       No orders of the defined types were placed in this encounter  ALLERGIES:  No Known Allergies    Current Medications     Current Outpatient Prescriptions   Medication Sig Dispense Refill    buPROPion (WELLBUTRIN XL) 150 mg 24 hr tablet TAKE 1 TABLET DAILY (Patient taking differently: TAKE 1 TABLET EVERY EVENING) 30 tablet 5    ergocalciferol (VITAMIN D2) 50,000 units Take 1 capsule (50,000 Units total) by mouth once a week 4 capsule 0    sertraline (ZOLOFT) 100 mg tablet TAKE 1 TABLET EVERY DAY 30 tablet 5     No current facility-administered medications for this visit            Health Maintenance     Health Maintenance   Topic Date Due    PT PLAN OF CARE  1990    INFLUENZA VACCINE  09/01/2018    DTaP,Tdap,and Td Vaccines (2 - Td) 02/11/2023     Immunization History   Administered Date(s) Administered    Influenza TIV (IM) 01/04/2017    Tdap 02/11/2013       Lex Mckeon MD

## 2018-09-17 NOTE — ED ATTENDING ATTESTATION
Denisse Course, DO, saw and evaluated the patient  I have discussed the patient with the resident/non-physician practitioner and agree with the resident's/non-physician practitioner's findings, Plan of Care, and MDM as documented in the resident's/non-physician practitioner's note, except where noted  All available labs and Radiology studies were reviewed  At this point I agree with the current assessment done in the Emergency Department  I have conducted an independent evaluation of this patient a history and physical is as follows:      77-year-old female presents for psychiatric evaluation  Patient presents because his doctor called him because he did not show up for appointment and they know he had a history of depression and one him evaluated  The patient does admit to being depressed recently and had suicidal thoughts transiently due to loss of income from recovering from a carpal tunnel syndrome  Currently has no specific plan and does not think he is suicidal   Exam is unremarkable  Assessment plan psychiatric evaluation, will have crisis evaluate for risk assessment is well patient currently is not suicidal when I speak with him    Does not need 302 at this point but may sign 201    Critical Care Time  CritCare Time    Procedures

## 2018-09-17 NOTE — PROGRESS NOTES
Judy Parker expressing vague SI with thoughts [place] during today's session  Met with him and explained my role in practice  He is agreeable to seek eval via ED for possible inpatient psychiatric admission  Wife will be transporting  Contacted the network inpatient psychiatric intake and referral line  Let demographic and clinical information as well as my direct contact information  Informed that Judy Parker will be coming to ED in [place] via his wife

## 2018-09-17 NOTE — ED NOTES
Pt comes to the ed with his wife  Pt reports being on zoloft for depression  Pt denies any current si plan  Pt denies hi or hallucinations  Pt and his wife agree that pt is safe for d/c home with outpatient resources  Pt would like to make a psychiatrist appointment   He agrees to return to the ed if symptoms worsen

## 2018-09-17 NOTE — ED PROVIDER NOTES
History  Chief Complaint   Patient presents with    Depression     Pt states he is suicidal and was thinking of a plan  28-year-old male presents to the emergency department evaluation suicidal ideation  Patient states that he has had a long history of depression takes BuSpar as well as Zoloft, however, has not taken his off due to riding on his medication  Patient states that over the past month he has undergone increased financial hardship due to loss of income while recovering from his carpal tunnel syndrome  Patient states that he was called by his family Medicine physician's office to come in to be evaluated for psychiatric illness due to him missing an appointment  Patient states that he has no specific plan, however, states that felt that he would be better off dead than alive and was afraid that he might soon kill himself  States that if he did kill himself he would make it appear as if he was murdered for his wife can collect life insurance  Patient is fever, chills, nausea, vomiting, diarrhea, hematochezia, hemoptysis, hematochezia, abdominal pain, chest pain, shortness of breath, hallucinations  Prior to Admission Medications   Prescriptions Last Dose Informant Patient Reported? Taking? buPROPion (WELLBUTRIN XL) 150 mg 24 hr tablet 9/16/2018 at evening Self No Yes   Sig: TAKE 1 TABLET DAILY   Patient taking differently: TAKE 1 TABLET EVERY EVENING   ergocalciferol (VITAMIN D2) 50,000 units   No Yes   Sig: Take 1 capsule (50,000 Units total) by mouth once a week   sertraline (ZOLOFT) 100 mg tablet 9/16/2018 at evening Self No Yes   Sig: TAKE 1 TABLET EVERY DAY      Facility-Administered Medications: None       Past Medical History:   Diagnosis Date    Abdominal pain     Ankle pain     Anxiety     Arthralgia     Asthma     Blood typing encounter     Bloody stools     LA    6/8/16   R   11/21/17     Carpal tunnel syndrome, bilateral     LA  Demetrice Mcneal 9/12/17   R   9/12/17     Chest pain     Constipation     Depression     Dermatitis     Fatigue     Frequent bowel movements     Gastroenteritis     GERD without esophagitis     Insomnia     LA   11/7/16   R   11/21/17     Irregular heart beats     Muscle spasm     Nausea     Numbness and tingling in both hands     Odynophagia     Otitis     Pharyngitis     Pilonidal cyst with abscess     LA   10/25/13   R   6/10/14     Proteinuria     Rectal bleeding     R   11/21/17     Rhinitis     Seasonal allergies     Seizure ([address] )     Skin lesion     LA    2/3/15   R  Lynita Ped Lynita Ped 3/17/17  /   LA    3/17/17   R   12/22/17     Snoring     Strep throat     Tonsillitis     URI (upper respiratory infection)     Vitamin D deficiency        Past Surgical History:   Procedure Laterality Date    FOOT SURGERY      PILONIDAL CYST DRAINAGE      Incision and drainage of pilonidal cyst     [place] WRIST Olesya Mcgowan LIG Right 7/12/2018    Procedure: RELEASE CARPAL TUNNEL ENDOSCOPIC;  Surgeon: Lynn Manning MD;  [facility];  Service: Orthopedics    [place] WRIST Olesya Mcgowan LIG Left 7/26/2018    Procedure: RELEASE CARPAL TUNNEL ENDOSCOPIC;  Surgeon: Lynn Manning MD;  [facility];  Service: Orthopedics       Family History   Problem Relation Age of Onset    Depression Mother     Obesity Mother     Stroke Mother         Syndrome     Diabetes Mother     Alcohol abuse Father     Liver disease Father         hepatic failure     Hypertension Father     Depression Brother     Thyroid disease Brother     Depression Maternal Uncle     Heart disease Neg Hx     Cancer Neg Hx     Thyroid cancer Neg Hx      I have reviewed and agree with the history as documented      Social History   Substance Use Topics    Smoking status: Never Smoker    Smokeless tobacco: Never Used    Alcohol use Yes      Comment: rarely        Review of Systems   Constitutional: Negative for chills, diaphoresis, fatigue and fever    HENT: Negative for congestion, ear discharge, facial swelling, hearing loss, rhinorrhea, sinus pain, sinus pressure, sneezing, sore throat, tinnitus and trouble swallowing  Eyes: Negative for pain, discharge and redness  Respiratory: Negative for cough, choking, chest tightness, shortness of breath, wheezing and stridor  Cardiovascular: Negative for chest pain, palpitations and leg swelling  Gastrointestinal: Negative for abdominal distention, abdominal pain, blood in stool, constipation, diarrhea, nausea and vomiting  Endocrine: Negative for cold intolerance, polydipsia and polyuria  Genitourinary: Negative for difficulty urinating, dysuria, enuresis, flank pain, frequency and hematuria  Musculoskeletal: Negative for arthralgias, back pain, gait problem and neck stiffness  Skin: Negative for rash and wound  Neurological: Negative for dizziness, seizures, syncope, weakness, numbness and headaches  Hematological: Negative for adenopathy  Psychiatric/Behavioral: Positive for behavioral problems and suicidal ideas  Negative for agitation, confusion, hallucinations and sleep disturbance  All other systems reviewed and are negative  Physical Exam  ED Triage Vitals [09/17/18 1232]   Temperature Pulse Respirations Blood Pressure SpO2   97 5 °F (36 4 °C) 76 16 165/96 97 %      Temp Source Heart Rate Source Patient Position - Orthostatic VS BP Location FiO2 (%)   Tympanic -- -- -- --      Pain Score       No Pain           Orthostatic Vital Signs  Vitals:    09/17/18 1232   BP: 165/96   Pulse: 76       Physical Exam   Constitutional: He is oriented to person, place, and time  He appears well-developed and well-nourished  No distress  HENT:   Head: Normocephalic and atraumatic  Eyes: Conjunctivae and EOM are normal  Pupils are equal, round, and reactive to light  Neck: Normal range of motion  Cardiovascular: Normal rate and regular rhythm    Exam reveals no gallop and no friction rub     No murmur heard  Pulmonary/Chest: Breath sounds normal  No respiratory distress  He has no wheezes  He has no rales  Abdominal: Soft  Normal appearance and bowel sounds are normal  There is no tenderness  There is no rigidity, no rebound, no guarding, no CVA tenderness, no tenderness at McBurney's point and negative Mcneal's sign  Musculoskeletal: Normal range of motion  He exhibits no edema, tenderness or deformity  Neurological: He is alert and oriented to person, place, and time  No cranial nerve deficit or sensory deficit  GCS eye subscore is 4  GCS verbal subscore is 5  GCS motor subscore is 6  Reflex Scores:       Bicep reflexes are 2+ on the right side and 2+ on the left side  Patellar reflexes are 2+ on the right side and 2+ on the left side  Patient has equal 5/5 strength b/l UE and Le  No focal neuro deficits noted  Skin: Skin is warm and dry  Capillary refill takes less than 2 seconds  He is not diaphoretic  Psychiatric: He has a normal mood and affect  His behavior is normal  Judgment and thought content normal    Nursing note and vitals reviewed  ED Medications  Medications - No data to display    Diagnostic Studies  Results Reviewed     Procedure Component Value Units Date/Time    POCT alcohol breath test [[id number]]  (Normal) Resulted:  09/17/18 1320    Lab Status:  Final result Updated:  09/17/18 1321     EXTBreath Alcohol 0 000    Rapid drug screen, urine [[id number]]     Lab Status:  No result Specimen:  Urine                  No orders to display         Procedures  Procedures      Phone Consults  ED Phone Contact    ED Course                               MDM  Number of Diagnoses or Management Options  Diagnosis management comments: Crisis consult for evaluation the patient  Breath alcohol test, UDS patient denies specific plan to kill himself states he does not have any current intent to harm himself or others    Dandy Balbuena evaluated the patient and also feels the patient is safe to be discharged with outpatient resources  The patient in the patient's wife agreed with this decision and will return emergency department as needed for HI/SI, if develops    1  Suicidal ideation  -discharge with outpatient resources    CritCare Time    Disposition  Final diagnoses:   Suicidal ideation     Time reflects when diagnosis was documented in both MDM as applicable and the Disposition within this note     Time User Action Codes Description Comment    9/17/2018  2:47 PM [place] Add [R17 114] Suicidal ideation       ED Disposition     ED Disposition Condition Comment    Discharge  oN Christopher discharge to home/self care  Condition at discharge: Good        MD Documentation      Most Recent Value   Sending MD Irving Housemsted      Follow-up Information     Follow up With Specialties Details Why Contact Info Additional Information    Reinier Hamlin MD Family Medicine Schedule an appointment as soon as possible for a visit in 1 day  [address]       [address] Emergency Department Emergency Medicine  If symptoms worsen [address] [facility] ED, [address] [facility] [address], [place], [address] [place] [address], [id number]          Patient's Medications   Discharge Prescriptions    No medications on file     No discharge procedures on file  ED Provider  Attending physically available and evaluated No Christopher I managed the patient along with the ED Attending      Electronically Signed by         Justin Casillas DO  09/17/18 7163

## 2018-09-17 NOTE — DISCHARGE INSTRUCTIONS
Suicide Prevention for Adults   Beatris REYNOLDS , Mi Barahona , & Alanis Xiong EA : Suicide and Attempted Suicide  In: Marilyn RM, Renaidan Hole, [address] Nayla MARCANO et al, eds  Padmini Villar of Pediatrics, 20th ed  [address], [place], [place], 2016  International Association for Suicide Prevention (IASP): IASP guidelines for suicide prevention  IASP  [address], [facility] Sarah  n rachel  Available from URL: http://michaels org/  info/suicide_guidelines  php, As accessed 2016-05-11  [address] ([address]): Suicide in [facility]: frequently asked questions  [address] [facility])  MD Luann Mason  Available from URL: PowderCalcium fr  shtml  As accessed 2016-05-10  Lucero SA : Suicide  In: Izaiah Chaigabemarychuymariana MARIANA, [facility], 8th ed  [address], [place], [place], 2014  American Association of Suicidology: Risk Factors for Suicide and Suicidal Behaviors  American Association of Suicidology  [address], [address] [facility]  Available from URL: SettlementContracts gl  org/c/document_library/get_file?oahilwKr=962&name=DLFE- 486  pdf  As accessed 2012-01-03  [employer] for Suicide Prevention (AFSP): Facts and figures, by sex  [employer] for Suicide Prevention (AFSP)  [place], [place]  2012  Available from URL: SpiritShop.com [facility]?fuseaction=home  viewPage&page_ID=[email or web address]  As accessed 2012-01-03  The Joint Commission: National Patient Safety Goals Effective July 1, 2011: [facility] [place] Accreditation Program  The Joint Commission  [place], [place]  2011  Available from URL: PermaCloud es  org/assets/1/6/NPSG_EPs_Scoring_[facility]_20110707  pdf  As accessed 2011-11-01  [facility] gov: Men's health: suicide  Office on [employer], [facility]  [address] [facility] Sarah  2011   Available from URL: NeverFamous is  As accessed 2016-05-10  © 2017 [address] Information is for End User's use only and may not be sold, redistributed or otherwise used for commercial purposes  All illustrations and images included in CareNotes® are the copyrighted property of A D A M , Inc  or Oliver Austin  The above information is an  only  It is not intended as medical advice for individual conditions or treatments  Talk to your doctor, nurse or pharmacist before following any medical regimen to see if it is safe and effective for you

## 2018-09-17 NOTE — LETTER
September 17, 2018     [address] [place] [address] [address] [facility]    Patient: Ady Tesfaye   YOB: 1990   Date of Visit: 9/17/2018       Dear Dr Rushing More: Thank you for referring Isabel Malik  Please keep in contact and let me know how you are doing moving forward  My number is [phone]           Sincerely,        Donna Lujan        CC: No Recipients

## 2018-09-26 DIAGNOSIS — F33.9 RECURRENT MAJOR DEPRESSIVE DISORDER, REMISSION STATUS UNSPECIFIED (HCC): ICD-10-CM

## 2018-09-26 RX ORDER — BUPROPION HYDROCHLORIDE 150 MG/1
TABLET ORAL
Qty: 30 TABLET | Refills: 2 | Status: SHIPPED | OUTPATIENT
Start: 2018-09-26 | End: 2019-04-01 | Stop reason: SDUPTHER

## 2018-11-05 ENCOUNTER — TELEPHONE (OUTPATIENT)
Dept: FAMILY MEDICINE CLINIC | Facility: CLINIC | Age: 28
End: 2018-11-05

## 2018-11-05 ENCOUNTER — OFFICE VISIT (OUTPATIENT)
Dept: FAMILY MEDICINE CLINIC | Facility: CLINIC | Age: 28
End: 2018-11-05
Payer: COMMERCIAL

## 2018-11-05 ENCOUNTER — TRANSCRIBE ORDERS (OUTPATIENT)
Dept: LAB | Facility: CLINIC | Age: 28
End: 2018-11-05

## 2018-11-05 ENCOUNTER — LAB (OUTPATIENT)
Dept: LAB | Facility: CLINIC | Age: 28
End: 2018-11-05
Payer: COMMERCIAL

## 2018-11-05 VITALS
HEIGHT: 73 IN | RESPIRATION RATE: 16 BRPM | SYSTOLIC BLOOD PRESSURE: 140 MMHG | HEART RATE: 70 BPM | DIASTOLIC BLOOD PRESSURE: 80 MMHG | BODY MASS INDEX: 41.75 KG/M2 | WEIGHT: 315 LBS | TEMPERATURE: 97.8 F

## 2018-11-05 DIAGNOSIS — E55.9 VITAMIN D DEFICIENCY: ICD-10-CM

## 2018-11-05 DIAGNOSIS — R53.83 FATIGUE, UNSPECIFIED TYPE: ICD-10-CM

## 2018-11-05 DIAGNOSIS — L29.9 SKIN PRURITUS: ICD-10-CM

## 2018-11-05 DIAGNOSIS — M25.562 LEFT KNEE PAIN, UNSPECIFIED CHRONICITY: ICD-10-CM

## 2018-11-05 DIAGNOSIS — M25.561 RIGHT KNEE PAIN, UNSPECIFIED CHRONICITY: ICD-10-CM

## 2018-11-05 DIAGNOSIS — F41.9 ANXIETY AND DEPRESSION: Primary | ICD-10-CM

## 2018-11-05 DIAGNOSIS — F32.A ANXIETY AND DEPRESSION: Primary | ICD-10-CM

## 2018-11-05 LAB
25(OH)D3 SERPL-MCNC: 26.6 NG/ML (ref 30–100)
ALBUMIN SERPL BCP-MCNC: 3.6 G/DL (ref 3.5–5)
ALP SERPL-CCNC: 89 U/L (ref 46–116)
ALT SERPL W P-5'-P-CCNC: 35 U/L (ref 12–78)
ANION GAP SERPL CALCULATED.3IONS-SCNC: 4 MMOL/L (ref 4–13)
AST SERPL W P-5'-P-CCNC: 15 U/L (ref 5–45)
BILIRUB SERPL-MCNC: 0.26 MG/DL (ref 0.2–1)
BUN SERPL-MCNC: 17 MG/DL (ref 5–25)
CALCIUM SERPL-MCNC: 9.3 MG/DL (ref 8.3–10.1)
CHLORIDE SERPL-SCNC: 107 MMOL/L (ref 100–108)
CO2 SERPL-SCNC: 27 MMOL/L (ref 21–32)
CREAT SERPL-MCNC: 1.03 MG/DL (ref 0.6–1.3)
GFR SERPL CREATININE-BSD FRML MDRD: 98 ML/MIN/1.73SQ M
GLUCOSE P FAST SERPL-MCNC: 84 MG/DL (ref 65–99)
POTASSIUM SERPL-SCNC: 4.2 MMOL/L (ref 3.5–5.3)
PROT SERPL-MCNC: 8.2 G/DL (ref 6.4–8.2)
SODIUM SERPL-SCNC: 138 MMOL/L (ref 136–145)
TSH SERPL DL<=0.05 MIU/L-ACNC: 3.07 UIU/ML (ref 0.36–3.74)

## 2018-11-05 PROCEDURE — 1036F TOBACCO NON-USER: CPT | Performed by: FAMILY MEDICINE

## 2018-11-05 PROCEDURE — 84443 ASSAY THYROID STIM HORMONE: CPT

## 2018-11-05 PROCEDURE — 99214 OFFICE O/P EST MOD 30 MIN: CPT | Performed by: FAMILY MEDICINE

## 2018-11-05 PROCEDURE — 3008F BODY MASS INDEX DOCD: CPT | Performed by: FAMILY MEDICINE

## 2018-11-05 PROCEDURE — 82306 VITAMIN D 25 HYDROXY: CPT

## 2018-11-05 PROCEDURE — 80053 COMPREHEN METABOLIC PANEL: CPT

## 2018-11-05 PROCEDURE — 36415 COLL VENOUS BLD VENIPUNCTURE: CPT

## 2018-11-05 RX ORDER — TRIAMCINOLONE ACETONIDE 5 MG/G
OINTMENT TOPICAL 2 TIMES DAILY
Qty: 30 G | Refills: 1 | Status: SHIPPED | OUTPATIENT
Start: 2018-11-05 | End: 2019-01-28 | Stop reason: ALTCHOICE

## 2018-11-05 NOTE — PROGRESS NOTES
Can you please let patient know that his blood work including blood sugar, kidneys, electrolytes, thyroid was within normal range  His vitamin-D was lower and I would like him to start taking 2000 international units daily    Thank you

## 2018-11-05 NOTE — PROGRESS NOTES
FAMILY PRACTICE OFFICE VISIT       NAME: Tapan Miller  AGE: 29 y o  SEX: male       : 1990        MRN: [id number]    DATE: 2018  TIME: 9:54 PM    Assessment and Plan     Problem List Items Addressed This Visit     BMI 45 0-49 9, adult [facility])     Patient will try to work on dietary modifications and yoga exercises  He will consider going back to nutritionist at the weight management Center as well  Anxiety and depression - Primary     Patient does have symptoms of depression however overall stable  Denies S I  He is interested in seeing a psychiatrist as well as a therapist   He feels his depression stems from being overweight and would like to have bariatric surgery however states he has not approved for surgery as he did have suicidal ideation in the past   I have provided patient with numbers for psychiatrist as well as therapist   He will also call his insurance company to inquire for coverage  He will continue with Wellbutrin and sertraline  Have recommended working on dietary modifications, trying to get adequate amount of sleep and working on light exercises as tolerated  He is interested in restarting the yoga as well  Patient may consider going back to nutritionist at weight management Center  Relevant Orders    Ambulatory referral to Psychiatry    Vitamin D deficiency     Continue supplementation  Will recheck vitamin-D level  Relevant Orders    Vitamin D 25 hydroxy (Completed)    Left knee pain    Relevant Orders    XR knee 3 vw left non injury    Right knee pain     Patient has bilateral knee pain  Unclear etiology  I will start off by obtaining x-rays  Patient is aware that his weight may be contributing to his pain  There is no obvious injury or trauma  Exam is overall WNL  May benefit from physical therapy as well  Will notify patient of x-ray results  May consider referral to Orthopedics           Relevant Orders    XR knee 3 vw right non injury Skin pruritus     Unclear whether this is secondary to eczema versus contact dermatitis  I will call in Rx for triamcinolone  I would like patient to apply adequate moisture therapy, wear pair of cotton gloves at nighttime to trap in the moisture  If symptoms do persist, would recommend evaluation by dermatologist          Relevant Medications    triamcinolone (KENALOG) 0 5 % ointment      Other Visit Diagnoses     Fatigue, unspecified type        Relevant Orders    Comprehensive metabolic panel (Completed)    TSH, 3rd generation with Free T4 reflex (Completed)            There are no Patient Instructions on file for this visit  Chief Complaint     Chief Complaint   Patient presents with    Depression     Patient would like to discuss his depression   Knee Pain     Patient is here due to b/l knee pain x 1-2 weeks  History of Present Illness     HPI   63-year-old male here for routine follow-up  Patient states his depression is stable however does feel depressed that he cannot have his bariatric surgery  He feels if he were to lose weight, his symptoms would improve  He may consider going back to the nutritionist   He is also looking into seeing a therapist as well as Psychiatry  He continues to take bupropion and sertraline  Patient has had B/l knee pain, worsened when he bends over, crouchs, squats   Clicking when he walks  Has occasional sharp pain    Has a rash in between fingers, dry, itchy, can blister, peels, cracks    Review of Systems   Review of Systems   Constitutional: Negative for unexpected weight change  Respiratory: Negative for shortness of breath  Cardiovascular: Negative  Gastrointestinal: Negative for abdominal pain  Musculoskeletal:        Bilateral knee pain  Skin: Positive for rash  Psychiatric/Behavioral: Negative for suicidal ideas  History of depressed with anxiety  Gets approximately 6 hr of sleep usually         Active Problem List Patient Active Problem List   Diagnosis    BMI 45 0-49 9, adult (HCC)    Anxiety and depression    Elevated TSH    Carpal tunnel syndrome    Preop examination    Preoperative clearance    Pineal gland cyst    Vitamin D deficiency    Depression    Left knee pain    Right knee pain    Skin pruritus       Past Medical History:  Past Medical History:   Diagnosis Date    Abdominal pain     Ankle pain     Anxiety     Arthralgia     Asthma     Blood typing encounter     Bloody stools     LA    6/8/16   R   11/21/17     Carpal tunnel syndrome, bilateral     LA  Don Malone 9/12/17   R   9/12/17     Chest pain     Constipation     Depression     Dermatitis     Fatigue     Frequent bowel movements     Gastroenteritis     GERD without esophagitis     Insomnia     LA   11/7/16   R   11/21/17     Irregular heart beats     Muscle spasm     Nausea     Numbness and tingling in both hands     Odynophagia     Otitis     Pharyngitis     Pilonidal cyst with abscess     LA   10/25/13   R   6/10/14     Proteinuria     Rectal bleeding     R   11/21/17     Rhinitis     Seasonal allergies     Seizure ([address] )     Skin lesion     LA    2/3/15   R  Don Malone 3/17/17  /   LA    3/17/17   R   12/22/17     Snoring     Strep throat     Tonsillitis     URI (upper respiratory infection)     Vitamin D deficiency        Past Surgical History:  Past Surgical History:   Procedure Laterality Date    FOOT SURGERY      PILONIDAL CYST DRAINAGE      Incision and drainage of pilonidal cyst     [place] WRIST Cecily Herrera LIG Right 7/12/2018    Procedure: RELEASE CARPAL TUNNEL ENDOSCOPIC;  Surgeon: Rylie Khoury MD;  Location:  MAIN OR;  Service: Orthopedics    [place] WRIST Cecily Herrera LIG Left 7/26/2018    Procedure: RELEASE CARPAL TUNNEL ENDOSCOPIC;  Surgeon: Rylie Khoury MD;  [facility];  Service: Orthopedics       Family History:  Family History   Problem Relation Age of Onset  Depression Mother     Obesity Mother     Stroke Mother         Syndrome     Diabetes Mother     Alcohol abuse Father     Liver disease Father         hepatic failure     Hypertension Father     Depression Brother     Thyroid disease Brother     Depression Maternal Uncle     Heart disease Neg Hx     Cancer Neg Hx     Thyroid cancer Neg Hx        Social History:  Social History     Social History    Marital status: /Civil Union     Spouse name: N/A    Number of children: N/A    Years of education: some college     Occupational History          employed      Social History Main Topics    Smoking status: Never Smoker    Smokeless tobacco: Never Used    Alcohol use Yes      Comment: rarely    Drug use: No    Sexual activity: Not on file     Other Topics Concern    Not on file     Social History Narrative    Daily caffeine consumption      I have reviewed the patient's medical history in detail; there are no changes to the history as noted in the electronic medical record  Objective     Vitals:    11/05/18 1036   BP: 140/80   Pulse: 70   Resp: 16   Temp: 97 8 °F (36 6 °C)     Wt Readings from Last 3 Encounters:   11/05/18 (!) 170 kg (374 lb 3 2 oz)   09/17/18 (!) 170 kg (375 lb)   09/17/18 (!) 171 kg (376 lb 6 4 oz)         Physical Exam   Constitutional: He is oriented to person, place, and time  He appears well-developed and well-nourished  HENT:   Head: Normocephalic and atraumatic  Mouth/Throat: Oropharynx is clear and moist    Eyes: Pupils are equal, round, and reactive to light  Conjunctivae and EOM are normal    Neck: Normal range of motion  Neck supple  Cardiovascular: Normal rate, regular rhythm and normal heart sounds  Pulmonary/Chest: Effort normal and breath sounds normal    Musculoskeletal: Normal range of motion  He exhibits no edema  Both knees with full range of motion  No edema, erythema, warmth noted    Minimally tender to palpation over the patellar region of both knees  Motor strength 5/5 in lower extremities bilaterally  Lymphadenopathy:     He has no cervical adenopathy  Neurological: He is alert and oriented to person, place, and time  Skin:   Xerosis cutis at the webs of the fingers with scaliness  There is mild peeling  Psychiatric: He has a normal mood and affect  Nursing note and vitals reviewed        Pertinent Laboratory/Diagnostic Studies:  Lab Results   Component Value Date    BUN 17 11/05/2018    CREATININE 1 03 11/05/2018    CALCIUM 9 3 11/05/2018     11/21/2017    K 4 2 11/05/2018    CO2 27 11/05/2018     11/05/2018     Lab Results   Component Value Date    ALT 35 11/05/2018    AST 15 11/05/2018    ALKPHOS 89 11/05/2018    BILITOT 0 5 11/21/2017       Lab Results   Component Value Date    WBC 6 07 07/07/2018    HGB 15 4 07/07/2018    HCT 47 7 07/07/2018    MCV 89 07/07/2018     07/07/2018       No results found for: TSH    Lab Results   Component Value Date    CHOL 150 06/17/2016     Lab Results   Component Value Date    TRIG 92 07/07/2018     Lab Results   Component Value Date    HDL 43 07/07/2018     Lab Results   Component Value Date    LDLCALC 103 (H) 07/07/2018     Lab Results   Component Value Date    HGBA1C 5 1 07/07/2018       Results for orders placed or performed during the hospital encounter of 09/17/18   POCT alcohol breath test   Result Value Ref Range    EXTBreath Alcohol 0 000        Orders Placed This Encounter   Procedures    XR knee 3 vw left non injury    XR knee 3 vw right non injury    Vitamin D 25 hydroxy    Comprehensive metabolic panel    TSH, 3rd generation with Free T4 reflex    Ambulatory referral to Psychiatry       ALLERGIES:  No Known Allergies    Current Medications     Current Outpatient Prescriptions   Medication Sig Dispense Refill    buPROPion (WELLBUTRIN XL) 150 mg 24 hr tablet TAKE 1 TABLET DAILY 30 tablet 2    ergocalciferol (VITAMIN D2) 50,000 units Take 1 capsule (50,000 Units total) by mouth once a week 4 capsule 0    sertraline (ZOLOFT) 100 mg tablet TAKE 1 TABLET EVERY DAY 30 tablet 5    triamcinolone (KENALOG) 0 5 % ointment Apply topically 2 (two) times a day 30 g 1     No current facility-administered medications for this visit            Health Maintenance     Health Maintenance   Topic Date Due    PT PLAN OF CARE  1990    INFLUENZA VACCINE  07/01/2018    DTaP,Tdap,and Td Vaccines (2 - Td) 02/11/2023     Immunization History   Administered Date(s) Administered    Influenza TIV (IM) 01/04/2017    Tdap 02/11/2013       Rohini Rolle MD

## 2018-11-05 NOTE — TELEPHONE ENCOUNTER
----- Message from Alveta Hammans, MD sent at 11/5/2018  5:32 PM EST -----  Can you please let patient know that his blood work including blood sugar, kidneys, electrolytes, thyroid was within normal range  His vitamin-D was lower and I would like him to start taking 2000 international units daily    Thank you

## 2018-11-06 ENCOUNTER — HOSPITAL ENCOUNTER (OUTPATIENT)
Dept: RADIOLOGY | Facility: HOSPITAL | Age: 28
Discharge: HOME | End: 2018-11-06
Payer: COMMERCIAL

## 2018-11-06 ENCOUNTER — TELEPHONE (OUTPATIENT)
Dept: FAMILY MEDICINE CLINIC | Facility: CLINIC | Age: 28
End: 2018-11-06

## 2018-11-06 ENCOUNTER — TRANSCRIBE ORDERS (OUTPATIENT)
Dept: ADMINISTRATIVE | Facility: HOSPITAL | Age: 28
End: 2018-11-06

## 2018-11-06 DIAGNOSIS — M25.562 LEFT KNEE PAIN, UNSPECIFIED CHRONICITY: ICD-10-CM

## 2018-11-06 DIAGNOSIS — M25.561 RIGHT KNEE PAIN, UNSPECIFIED CHRONICITY: ICD-10-CM

## 2018-11-06 PROBLEM — L29.9 SKIN PRURITUS: Status: ACTIVE | Noted: 2018-11-06

## 2018-11-06 PROCEDURE — 73562 X-RAY EXAM OF KNEE 3: CPT

## 2018-11-06 NOTE — TELEPHONE ENCOUNTER
----- Message from Tera Blackburn MD sent at 2018  5:32 PM EST -----  Can you please let patient know that his blood work including blood sugar, kidneys, electrolytes, thyroid was within normal range  His vitamin-D was lower and I would like him to start taking 2000 international units daily    Thank you

## 2018-11-07 ENCOUNTER — TELEPHONE (OUTPATIENT)
Dept: FAMILY MEDICINE CLINIC | Facility: CLINIC | Age: 28
End: 2018-11-07

## 2018-11-07 NOTE — TELEPHONE ENCOUNTER
----- Message from Chasity Hernandez MD sent at 11/7/2018 12:36 PM EST -----  Can you please let patient know that his knee x-rays for overall normal and did not show any acute abnormalities    Thank you

## 2018-11-07 NOTE — ASSESSMENT & PLAN NOTE
Unclear whether this is secondary to eczema versus contact dermatitis  I will call in Rx for triamcinolone  I would like patient to apply adequate moisture therapy, wear pair of cotton gloves at nighttime to trap in the moisture    If symptoms do persist, would recommend evaluation by dermatologist

## 2018-11-07 NOTE — TELEPHONE ENCOUNTER
Theresa Henderson would like to know if he could take a protein shake to kick start his diet    He would like dr Nell Alonzo input

## 2018-11-07 NOTE — TELEPHONE ENCOUNTER
----- Message from Shasta Kussmaul, MD sent at 11/7/2018 12:36 PM EST -----  Can you please let patient know that his knee x-rays for overall normal and did not show any acute abnormalities    Thank you

## 2018-11-07 NOTE — ASSESSMENT & PLAN NOTE
Patient does have symptoms of depression however overall stable  Denies S I  He is interested in seeing a psychiatrist as well as a therapist   He feels his depression stems from being overweight and would like to have bariatric surgery however states he has not approved for surgery as he did have suicidal ideation in the past   I have provided patient with numbers for psychiatrist as well as therapist   He will also call his insurance company to inquire for coverage  He will continue with Wellbutrin and sertraline  Have recommended working on dietary modifications, trying to get adequate amount of sleep and working on light exercises as tolerated  He is interested in restarting the yoga as well  Patient may consider going back to [facility]ist [facility]

## 2018-11-07 NOTE — PROGRESS NOTES
Can you please let patient know that his knee x-rays for overall normal and did not show any acute abnormalities    Thank you

## 2018-11-07 NOTE — ASSESSMENT & PLAN NOTE
Patient has bilateral knee pain  Unclear etiology  I will start off by obtaining x-rays  Patient is aware that his weight may be contributing to his pain  There is no obvious injury or trauma  Exam is overall WNL  May benefit from physical therapy as well  Will notify patient of x-ray results  May consider referral to Orthopedics

## 2019-01-28 ENCOUNTER — OFFICE VISIT (OUTPATIENT)
Dept: FAMILY MEDICINE CLINIC | Facility: CLINIC | Age: 29
End: 2019-01-28
Payer: COMMERCIAL

## 2019-01-28 VITALS
RESPIRATION RATE: 16 BRPM | BODY MASS INDEX: 41.75 KG/M2 | SYSTOLIC BLOOD PRESSURE: 122 MMHG | HEIGHT: 73 IN | TEMPERATURE: 98.7 F | HEART RATE: 80 BPM | DIASTOLIC BLOOD PRESSURE: 70 MMHG | WEIGHT: 315 LBS

## 2019-01-28 DIAGNOSIS — R21 SKIN RASH: Primary | ICD-10-CM

## 2019-01-28 DIAGNOSIS — R23.4 PEELING SKIN: ICD-10-CM

## 2019-01-28 PROCEDURE — 3008F BODY MASS INDEX DOCD: CPT | Performed by: FAMILY MEDICINE

## 2019-01-28 PROCEDURE — 99213 OFFICE O/P EST LOW 20 MIN: CPT | Performed by: FAMILY MEDICINE

## 2019-01-28 PROCEDURE — 1036F TOBACCO NON-USER: CPT | Performed by: FAMILY MEDICINE

## 2019-01-28 NOTE — PROGRESS NOTES
FAMILY PRACTICE OFFICE VISIT       NAME: Brenda Aguiar  AGE: 29 y o  SEX: male       : 1990        MRN: [id number]    DATE: 2019  TIME: 4:09 PM    Assessment and Plan     Problem List Items Addressed This Visit     BMI 45 0-49 9, adult ([address] )    Relevant Orders    Ambulatory referral to Endocrinology    Skin rash - Primary    Peeling skin        Skin peeling, xerosis cutis:  Unclear whether this is secondary to contact dermatitis  I would like patient to start moisture therapy  I would like him to purchase either Eucerin or udderly smooth, apply over hands and cover with cotton gloves at nighttime  If symptoms do persist, I would like him to be further evaluated by Dermatology  I also would like patient to avoid gloves that he uses at work to see if this does help avoid symptoms  BMI 49:  Have provided referral to endocrine in [facility] to join [employer] program   Patient will call  Patient will continue to work on lifestyle modifications  There are no Patient Instructions on file for this visit  Chief Complaint     Chief Complaint   Patient presents with    Dermatitis     Patient is here due to having blisters and a rash on b/l hands x 3 weeks  History of Present Illness     HPI  Patient presents today to discuss possible contact dermatitis over his hands, ankles, and between his fingers  Started after he was lifting heavy boxes and putting them on the line approximately 3 weeks ago  He does wear gloves and does not know if they irritated his hands  Patient would like to see weight management however would like to go somewhere closer  Review of Systems   Review of Systems   Constitutional: Negative for fever and unexpected weight change  Skin: Positive for rash         Active Problem List     Patient Active Problem List   Diagnosis    BMI 45 0-49 9, adult (HCC)    Anxiety and depression    Elevated TSH    Carpal tunnel syndrome    Preop examination  Preoperative clearance    Pineal gland cyst    Vitamin D deficiency    Depression    Left knee pain    Right knee pain    Skin pruritus    Skin rash    Peeling skin       Past Medical History:  Past Medical History:   Diagnosis Date    Abdominal pain     Ankle pain     Anxiety     Arthralgia     Asthma     Blood typing encounter     Bloody stools     LA    6/8/16   R   11/21/17     Carpal tunnel syndrome, bilateral     LA  Daniel Gentile Daniel Gentile 9/12/17   R   9/12/17     Chest pain     Constipation     Depression     Dermatitis     Fatigue     Frequent bowel movements     Gastroenteritis     GERD without esophagitis     Insomnia     LA   11/7/16   R   11/21/17     Irregular heart beats     Muscle spasm     Nausea     Numbness and tingling in both hands     Odynophagia     Otitis     Pharyngitis     Pilonidal cyst with abscess     LA   10/25/13   R   6/10/14     Proteinuria     Rectal bleeding     R   11/21/17     Rhinitis     Seasonal allergies     Seizure [facility] [address]     Skin lesion     LA    2/3/15   R  Daniel Krupa Gentile 3/17/17  /   LA    3/17/17   R   12/22/17     Snoring     Strep throat     Tonsillitis     URI (upper respiratory infection)     Vitamin D deficiency        Past Surgical History:  Past Surgical History:   Procedure Laterality Date    FOOT SURGERY      PILONIDAL CYST DRAINAGE      Incision and drainage of pilonidal cyst     CT WRIST Alverto Greening LIG Right 7/12/2018    Procedure: RELEASE CARPAL TUNNEL ENDOSCOPIC;  Surgeon: Chris Mendoza MD;  [facility];  Service: Orthopedics    CT WRIST Jarrell Greening LIG Left 7/26/2018    Procedure: RELEASE CARPAL TUNNEL ENDOSCOPIC;  Surgeon: Chris Mendoza MD;  [facility];  Service: Orthopedics       Family History:  Family History   Problem Relation Age of Onset    Depression Mother     Obesity Mother     Stroke Mother         Syndrome     Diabetes Mother     Alcohol abuse Father     Liver disease Father         hepatic failure     Hypertension Father     Depression Brother     Thyroid disease Brother     Depression Maternal Uncle     Heart disease Neg Hx     Cancer Neg Hx     Thyroid cancer Neg Hx        Social History:  Social History     Social History    Marital status: /Civil Union     Spouse name: N/A    Number of children: N/A    Years of education: some college     Occupational History          employed      Social History Main Topics    Smoking status: Never Smoker    Smokeless tobacco: Never Used    Alcohol use Yes      Comment: rarely    Drug use: No    Sexual activity: Not on file     Other Topics Concern    Not on file     Social History Narrative    Daily caffeine consumption      I have reviewed the patient's medical history in detail; there are no changes to the history as noted in the electronic medical record  Objective     Vitals:    01/28/19 1634   BP: 122/70   Pulse: 80   Resp: 16   Temp: 98 7 °F (37 1 °C)     Wt Readings from Last 3 Encounters:   01/28/19 (!) 171 kg (376 lb 9 6 oz)   11/05/18 (!) 170 kg (374 lb 3 2 oz)   09/17/18 (!) 170 kg (375 lb)       Physical Exam   Constitutional: He appears well-developed and well-nourished  Skin:   Skin peeling and xerosis cutis noted over knuckles as well as anterior part of hands in between fingers  No acute signs of infection noted  Nursing note and vitals reviewed        Pertinent Laboratory/Diagnostic Studies:  Lab Results   Component Value Date    BUN 17 11/05/2018    CREATININE 1 03 11/05/2018    CALCIUM 9 3 11/05/2018     11/21/2017    K 4 2 11/05/2018    CO2 27 11/05/2018     11/05/2018     Lab Results   Component Value Date    ALT 35 11/05/2018    AST 15 11/05/2018    ALKPHOS 89 11/05/2018    BILITOT 0 5 11/21/2017       Lab Results   Component Value Date    WBC 6 07 07/07/2018    HGB 15 4 07/07/2018    HCT 47 7 07/07/2018    MCV 89 07/07/2018     07/07/2018       No results found for: TSH    Lab Results   Component Value Date    CHOL 150 06/17/2016     Lab Results   Component Value Date    TRIG 92 07/07/2018     Lab Results   Component Value Date    HDL 43 07/07/2018     Lab Results   Component Value Date    LDLCALC 103 (H) 07/07/2018     Lab Results   Component Value Date    HGBA1C 5 1 07/07/2018       Results for orders placed or performed in visit on 11/05/18   Vitamin D 25 hydroxy   Result Value Ref Range    Vit D, 25-Hydroxy 26 6 (L) 30 0 - 100 0 ng/mL   Comprehensive metabolic panel   Result Value Ref Range    Sodium 138 136 - 145 mmol/L    Potassium 4 2 3 5 - 5 3 mmol/L    Chloride 107 100 - 108 mmol/L    CO2 27 21 - 32 mmol/L    ANION GAP 4 4 - 13 mmol/L    BUN 17 5 - 25 mg/dL    Creatinine 1 03 0 60 - 1 30 mg/dL    Glucose, Fasting 84 65 - 99 mg/dL    Calcium 9 3 8 3 - 10 1 mg/dL    AST 15 5 - 45 U/L    ALT 35 12 - 78 U/L    Alkaline Phosphatase 89 46 - 116 U/L    Total Protein 8 2 6 4 - 8 2 g/dL    Albumin 3 6 3 5 - 5 0 g/dL    Total Bilirubin 0 26 0 20 - 1 00 mg/dL    eGFR 98 ml/min/1 73sq m   TSH, 3rd generation with Free T4 reflex   Result Value Ref Range    TSH 3RD GENERATON 3 070 0 358 - 3 740 uIU/mL       Orders Placed This Encounter   Procedures    Ambulatory referral to Endocrinology       ALLERGIES:  No Known Allergies    Current Medications     Current Outpatient Prescriptions   Medication Sig Dispense Refill    buPROPion (WELLBUTRIN XL) 150 mg 24 hr tablet TAKE 1 TABLET DAILY 30 tablet 2    ergocalciferol (VITAMIN D2) 50,000 units Take 1 capsule (50,000 Units total) by mouth once a week 4 capsule 0    sertraline (ZOLOFT) 100 mg tablet TAKE 1 TABLET EVERY DAY 30 tablet 5     No current facility-administered medications for this visit            Health Maintenance     Health Maintenance   Topic Date Due    PT PLAN OF CARE  1990    INFLUENZA VACCINE  07/01/2018    DTaP,Tdap,and Td Vaccines (2 - Td) 02/11/2023     Immunization History   Administered Date(s) Administered    Influenza TIV (IM) 01/04/2017    Tdap 02/11/2013       Joanie Maynard MD

## 2019-01-29 ENCOUNTER — TELEPHONE (OUTPATIENT)
Dept: FAMILY MEDICINE CLINIC | Facility: CLINIC | Age: 29
End: 2019-01-29

## 2019-01-29 PROBLEM — R21 SKIN RASH: Status: ACTIVE | Noted: 2019-01-29

## 2019-01-29 PROBLEM — R23.4 PEELING SKIN: Status: ACTIVE | Noted: 2019-01-29

## 2019-03-26 DIAGNOSIS — F41.9 ANXIETY AND DEPRESSION: ICD-10-CM

## 2019-03-26 DIAGNOSIS — F32.A ANXIETY AND DEPRESSION: ICD-10-CM

## 2019-03-26 RX ORDER — SERTRALINE HYDROCHLORIDE 100 MG/1
TABLET, FILM COATED ORAL
Qty: 30 TABLET | Refills: 5 | Status: SHIPPED | OUTPATIENT
Start: 2019-03-26 | End: 2019-08-06

## 2019-04-01 ENCOUNTER — OFFICE VISIT (OUTPATIENT)
Dept: FAMILY MEDICINE CLINIC | Facility: CLINIC | Age: 29
End: 2019-04-01
Payer: COMMERCIAL

## 2019-04-01 VITALS
BODY MASS INDEX: 41.75 KG/M2 | HEIGHT: 73 IN | HEART RATE: 84 BPM | DIASTOLIC BLOOD PRESSURE: 70 MMHG | WEIGHT: 315 LBS | TEMPERATURE: 97.3 F | RESPIRATION RATE: 20 BRPM | SYSTOLIC BLOOD PRESSURE: 106 MMHG

## 2019-04-01 DIAGNOSIS — F32.A ANXIETY AND DEPRESSION: ICD-10-CM

## 2019-04-01 DIAGNOSIS — R21 SKIN RASH: Primary | ICD-10-CM

## 2019-04-01 DIAGNOSIS — L29.9 SKIN PRURITUS: ICD-10-CM

## 2019-04-01 DIAGNOSIS — R23.4 PEELING SKIN: ICD-10-CM

## 2019-04-01 DIAGNOSIS — F33.9 RECURRENT MAJOR DEPRESSIVE DISORDER, REMISSION STATUS UNSPECIFIED (HCC): ICD-10-CM

## 2019-04-01 DIAGNOSIS — F41.9 ANXIETY AND DEPRESSION: ICD-10-CM

## 2019-04-01 PROCEDURE — 3008F BODY MASS INDEX DOCD: CPT | Performed by: FAMILY MEDICINE

## 2019-04-01 PROCEDURE — 99214 OFFICE O/P EST MOD 30 MIN: CPT | Performed by: FAMILY MEDICINE

## 2019-04-01 RX ORDER — CHOLECALCIFEROL (VITAMIN D3) 25 MCG
2 CAPSULE ORAL DAILY
COMMUNITY

## 2019-04-01 RX ORDER — BUPROPION HYDROCHLORIDE 150 MG/1
TABLET ORAL
Qty: 30 TABLET | Refills: 5 | Status: SHIPPED | OUTPATIENT
Start: 2019-04-01 | End: 2019-09-24 | Stop reason: SDUPTHER

## 2019-04-01 RX ORDER — TRIAMCINOLONE ACETONIDE 5 MG/G
OINTMENT TOPICAL 2 TIMES DAILY
Qty: 30 G | Refills: 3 | Status: SHIPPED | OUTPATIENT
Start: 2019-04-01 | End: 2020-05-29 | Stop reason: ALTCHOICE

## 2019-04-01 RX ORDER — SERTRALINE HYDROCHLORIDE 25 MG/1
25 TABLET, FILM COATED ORAL DAILY
Qty: 30 TABLET | Refills: 5 | Status: SHIPPED | OUTPATIENT
Start: 2019-04-01 | End: 2019-08-06

## 2019-05-06 ENCOUNTER — OFFICE VISIT (OUTPATIENT)
Dept: FAMILY MEDICINE CLINIC | Facility: CLINIC | Age: 29
End: 2019-05-06
Payer: COMMERCIAL

## 2019-05-06 DIAGNOSIS — F41.9 ANXIETY AND DEPRESSION: ICD-10-CM

## 2019-05-06 DIAGNOSIS — F32.A ANXIETY AND DEPRESSION: ICD-10-CM

## 2019-05-06 DIAGNOSIS — R21 SKIN RASH: Primary | ICD-10-CM

## 2019-05-06 PROCEDURE — 99214 OFFICE O/P EST MOD 30 MIN: CPT | Performed by: FAMILY MEDICINE

## 2019-05-06 RX ORDER — BETAMETHASONE DIPROPIONATE 0.5 MG/G
CREAM TOPICAL
Refills: 4 | COMMUNITY
Start: 2019-04-15 | End: 2019-12-09 | Stop reason: SDUPTHER

## 2019-05-07 VITALS
HEIGHT: 73 IN | SYSTOLIC BLOOD PRESSURE: 130 MMHG | HEART RATE: 65 BPM | RESPIRATION RATE: 18 BRPM | OXYGEN SATURATION: 97 % | TEMPERATURE: 98.1 F | DIASTOLIC BLOOD PRESSURE: 88 MMHG | WEIGHT: 315 LBS | BODY MASS INDEX: 41.75 KG/M2

## 2019-05-21 ENCOUNTER — OFFICE VISIT (OUTPATIENT)
Dept: ENDOCRINOLOGY | Facility: CLINIC | Age: 29
End: 2019-05-21
Payer: COMMERCIAL

## 2019-05-21 VITALS
WEIGHT: 315 LBS | HEART RATE: 84 BPM | HEIGHT: 73 IN | DIASTOLIC BLOOD PRESSURE: 88 MMHG | SYSTOLIC BLOOD PRESSURE: 126 MMHG | BODY MASS INDEX: 41.75 KG/M2

## 2019-05-21 DIAGNOSIS — E55.9 VITAMIN D DEFICIENCY: ICD-10-CM

## 2019-05-21 DIAGNOSIS — F34.1 DYSTHYMIA: ICD-10-CM

## 2019-05-21 DIAGNOSIS — G47.33 OBSTRUCTIVE SLEEP APNEA SYNDROME: ICD-10-CM

## 2019-05-21 PROCEDURE — 99244 OFF/OP CNSLTJ NEW/EST MOD 40: CPT | Performed by: INTERNAL MEDICINE

## 2019-05-21 RX ORDER — DEXAMETHASONE 1 MG
1 TABLET ORAL ONCE
Qty: 1 TABLET | Refills: 0 | Status: SHIPPED | OUTPATIENT
Start: 2019-05-21 | End: 2019-05-21

## 2019-06-04 ENCOUNTER — OFFICE VISIT (OUTPATIENT)
Dept: FAMILY MEDICINE CLINIC | Facility: CLINIC | Age: 29
End: 2019-06-04
Payer: COMMERCIAL

## 2019-06-04 VITALS
BODY MASS INDEX: 41.75 KG/M2 | HEART RATE: 95 BPM | DIASTOLIC BLOOD PRESSURE: 60 MMHG | SYSTOLIC BLOOD PRESSURE: 110 MMHG | HEIGHT: 73 IN | OXYGEN SATURATION: 97 % | RESPIRATION RATE: 16 BRPM | TEMPERATURE: 95.9 F | WEIGHT: 315 LBS

## 2019-06-04 DIAGNOSIS — N50.811 PAIN IN RIGHT TESTICLE: Primary | ICD-10-CM

## 2019-06-04 LAB
BILIRUB UR QL STRIP: ABNORMAL
CLARITY UR: ABNORMAL
COLOR UR: YELLOW
GLUCOSE UR STRIP-MCNC: NEGATIVE MG/DL
HGB UR QL STRIP.AUTO: NEGATIVE
KETONES UR STRIP-MCNC: NEGATIVE MG/DL
LEUKOCYTE ESTERASE UR QL STRIP: NEGATIVE
NITRITE UR QL STRIP: NEGATIVE
PH UR STRIP.AUTO: 5.5 [PH]
PROT UR STRIP-MCNC: NEGATIVE MG/DL
SL AMB  POCT GLUCOSE, UA: NORMAL
SL AMB LEUKOCYTE ESTERASE,UA: NORMAL
SL AMB POCT BILIRUBIN,UA: NORMAL
SL AMB POCT BLOOD,UA: NORMAL
SL AMB POCT CLARITY,UA: CLEAR
SL AMB POCT COLOR,UA: YELLOW
SL AMB POCT KETONES,UA: NORMAL
SL AMB POCT NITRITE,UA: NORMAL
SL AMB POCT PH,UA: 5
SL AMB POCT SPECIFIC GRAVITY,UA: 1.03
SL AMB POCT URINE PROTEIN: 15
SL AMB POCT UROBILINOGEN: 0.2
SP GR UR STRIP.AUTO: 1.03 (ref 1–1.03)
UROBILINOGEN UR QL STRIP.AUTO: 0.2 E.U./DL

## 2019-06-04 PROCEDURE — 99213 OFFICE O/P EST LOW 20 MIN: CPT | Performed by: FAMILY MEDICINE

## 2019-06-04 PROCEDURE — 81003 URINALYSIS AUTO W/O SCOPE: CPT | Performed by: FAMILY MEDICINE

## 2019-06-04 RX ORDER — DEXAMETHASONE 1 MG
TABLET ORAL
Refills: 0 | COMMUNITY
Start: 2019-05-21 | End: 2019-08-30 | Stop reason: ALTCHOICE

## 2019-06-05 ENCOUNTER — TELEPHONE (OUTPATIENT)
Dept: FAMILY MEDICINE CLINIC | Facility: CLINIC | Age: 29
End: 2019-06-05

## 2019-06-05 ENCOUNTER — HOSPITAL ENCOUNTER (OUTPATIENT)
Dept: ULTRASOUND IMAGING | Facility: HOSPITAL | Age: 29
Discharge: HOME | End: 2019-06-05
Payer: COMMERCIAL

## 2019-06-05 DIAGNOSIS — N50.811 PAIN IN RIGHT TESTICLE: ICD-10-CM

## 2019-06-05 PROCEDURE — 76870 US EXAM SCROTUM: CPT

## 2019-06-07 ENCOUNTER — TELEPHONE (OUTPATIENT)
Dept: FAMILY MEDICINE CLINIC | Facility: CLINIC | Age: 29
End: 2019-06-07

## 2019-06-07 DIAGNOSIS — R82.90 ABNORMAL URINE: ICD-10-CM

## 2019-06-07 PROBLEM — N50.811 PAIN IN RIGHT TESTICLE: Status: ACTIVE | Noted: 2019-06-07

## 2019-06-11 ENCOUNTER — TELEPHONE (OUTPATIENT)
Dept: FAMILY MEDICINE CLINIC | Facility: CLINIC | Age: 29
End: 2019-06-11

## 2019-06-13 ENCOUNTER — OFFICE VISIT (OUTPATIENT)
Dept: FAMILY MEDICINE CLINIC | Facility: CLINIC | Age: 29
End: 2019-06-13
Payer: COMMERCIAL

## 2019-06-13 VITALS
HEART RATE: 82 BPM | DIASTOLIC BLOOD PRESSURE: 86 MMHG | WEIGHT: 315 LBS | OXYGEN SATURATION: 96 % | BODY MASS INDEX: 41.75 KG/M2 | TEMPERATURE: 98.1 F | HEIGHT: 73 IN | SYSTOLIC BLOOD PRESSURE: 130 MMHG | RESPIRATION RATE: 18 BRPM

## 2019-06-13 DIAGNOSIS — F41.9 ANXIETY AND DEPRESSION: Primary | ICD-10-CM

## 2019-06-13 DIAGNOSIS — F32.A ANXIETY AND DEPRESSION: Primary | ICD-10-CM

## 2019-06-13 DIAGNOSIS — E55.9 VITAMIN D DEFICIENCY: ICD-10-CM

## 2019-06-13 PROCEDURE — 3008F BODY MASS INDEX DOCD: CPT | Performed by: FAMILY MEDICINE

## 2019-06-13 PROCEDURE — 99213 OFFICE O/P EST LOW 20 MIN: CPT | Performed by: FAMILY MEDICINE

## 2019-06-21 ENCOUNTER — CONSULT (OUTPATIENT)
Dept: UROLOGY | Facility: MEDICAL CENTER | Age: 29
End: 2019-06-21
Payer: COMMERCIAL

## 2019-06-21 VITALS
BODY MASS INDEX: 41.75 KG/M2 | HEART RATE: 85 BPM | WEIGHT: 315 LBS | SYSTOLIC BLOOD PRESSURE: 120 MMHG | HEIGHT: 73 IN | DIASTOLIC BLOOD PRESSURE: 60 MMHG

## 2019-06-21 DIAGNOSIS — N50.811 PAIN IN RIGHT TESTICLE: ICD-10-CM

## 2019-06-21 PROCEDURE — 99244 OFF/OP CNSLTJ NEW/EST MOD 40: CPT | Performed by: UROLOGY

## 2019-06-21 RX ORDER — DOXYCYCLINE HYCLATE 100 MG/1
100 CAPSULE ORAL EVERY 12 HOURS SCHEDULED
Qty: 14 CAPSULE | Refills: 0 | Status: SHIPPED | OUTPATIENT
Start: 2019-06-21 | End: 2019-06-28

## 2019-07-01 ENCOUNTER — OFFICE VISIT (OUTPATIENT)
Dept: SLEEP CENTER | Facility: CLINIC | Age: 29
End: 2019-07-01
Payer: COMMERCIAL

## 2019-07-01 VITALS
OXYGEN SATURATION: 98 % | HEIGHT: 73 IN | HEART RATE: 95 BPM | BODY MASS INDEX: 41.75 KG/M2 | SYSTOLIC BLOOD PRESSURE: 122 MMHG | WEIGHT: 315 LBS | DIASTOLIC BLOOD PRESSURE: 77 MMHG

## 2019-07-01 DIAGNOSIS — G47.00 FREQUENT NOCTURNAL AWAKENING: ICD-10-CM

## 2019-07-01 DIAGNOSIS — G47.19 EXCESSIVE DAYTIME SLEEPINESS: ICD-10-CM

## 2019-07-01 DIAGNOSIS — R06.83 SNORING: Primary | ICD-10-CM

## 2019-07-01 DIAGNOSIS — F51.3 SLEEPWALKING: ICD-10-CM

## 2019-07-01 DIAGNOSIS — G47.8 NON-RESTORATIVE SLEEP: ICD-10-CM

## 2019-07-01 DIAGNOSIS — G47.33 OBSTRUCTIVE SLEEP APNEA SYNDROME: ICD-10-CM

## 2019-07-01 PROCEDURE — 99244 OFF/OP CNSLTJ NEW/EST MOD 40: CPT | Performed by: PSYCHIATRY & NEUROLOGY

## 2019-07-01 NOTE — LETTER
July 1, 2019     [facility], [address]    Patient: Pieter Fox   YOB: 1990   Date of Visit: 7/1/2019       Dear Dr Parth Astudillo:    Thank you for referring Jacquelyn Olivera to me for evaluation  Below are my notes for this consultation  If you have questions, please do not hesitate to call me  I look forward to following your patient along with you  Sincerely,        Ronda De La Fuente MD        CC: MD Ronda Samuels MD  7/1/2019  3:29 PM  Sign at close encounter  Sleep Medicine Consultation Note    HPI:  Mr Pieter Fox is a 29 y o  male seen at the request of Jorge A Sung MD for advice regarding suspected sleep disordered breathing  The patient stated that Dr Melo Marin was seeing him for weight management  He is looking to go through [facility] now for bariatric work up and he is here as part of the evaluation pre-op  The patient endorsed poor sleep  He will wake up from pain numerous times at night due to having to void, pain, reposition  He is always tired, low energy  He has been having these symptoms for a long time  These have not worsened over time  He denied anything making it better or worse  He has no previous sleep consultations       Please see below for continuation of the HPI:      Sleep Disordered Breathing:  -Snoring: once in a while   -Severity: not loud, but have caught himself snoring   -Frequency: not every night   -Duration: 10 years   -Over time: same   -Modifying factors: n/a  -Observed Apneas: denied  -Mouth Breathing at night: no  -Dry Mouth in morning: yes   -Nocturnal Gasping: no  -Nasal Obstruction: no  -Weight: gained about 50 lbs over the last year    Sleep Pattern:  -Location: bedroom  -Bed/Recliner/Wedge: bed  -Bed Partner: yes  -HOB: flat  -# of pillows under head: 1  -Position: prone and side  -Bedtime: 9-10am  -Lights out: same time but does not have black out curtains  -Environmental: No lights/TV  -Latency: 0-15 mins usually sometimes to an hour  -Awakenings: 2-3   -Reason: void, pain, reposition   -Duration: quickly  -Wake time:  4pm   -Alarm: yes  -Rise time: same time  -Days off: 9-10pm and wakes at12pm  -Shift Work: third shift 6p-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  -Patient's estimate of total sleep time: 4-6h    Daytime Symptoms:  -Upon Awakening: tired  -Daytime fatigue/sleepiness: tired  -Naps: 3/week  -Involuntary Dozing: yes, watching TV, on his phone, at work when idle  -Cognitive Symptoms: denied  -Driving: Difficulty with sleepiness and driving:  Gets tired   -- Close calls related to sleepiness: no   -- Accidents related to sleepiness: no      Questionnaires:   Sitting and reading: Moderate chance of dozing  Watching TV: High chance of dozing  Sitting, inactive in a public place (e g  a theatre or a meeting): Moderate chance of dozing  As a passenger in a car for an hour without a break: Moderate chance of dozing  Lying down to rest in the afternoon when circumstances permit: High chance of dozing  Sitting and talking to someone: Slight chance of dozing  Sitting quietly after a lunch without alcohol:  Moderate chance of dozing  In a car, while stopped for a few minutes in traffic: Slight chance of dozing  Total score: 16      Sleep Review of Symptoms:  -Parasomnias:  --Sleep Walking: no  --Dream Enactment: no  --Bruxism: no  -Motor:  --RLS: no  --PLMS: no  -Narcolepsy:  --Hallucinations: no  --Paralysis: no  --Cataplexy: no    Childhood Sleep History: sleep walking    Prior Sleep Studies/Evaluations:  denied    Family History:  Family history of sleep disorders: dad snored    Patient Active Problem List   Diagnosis    BMI 50 0-59 9, adult ([facility] [address]    Anxiety and depression    Elevated TSH    Carpal tunnel syndrome    Preop examination    Preoperative clearance    Pineal gland cyst    Vitamin D deficiency    Depression    Left knee pain    Right knee pain    Skin pruritus    Skin rash    Peeling skin    Obstructive sleep apnea syndrome    Pain in right testicle     Past Medical History:   Diagnosis Date    Abdominal pain     Ankle pain     Anxiety     Arthralgia     Asthma     Blood typing encounter     Bloody stools     LA    6/8/16   R   11/21/17     Carpal tunnel syndrome, bilateral     LA Sherre Soulier Sherre Soulier 9/12/17   R   9/12/17     Chest pain     Constipation     Depression     Dermatitis     Fatigue     Frequent bowel movements     Gastroenteritis     GERD without esophagitis     Insomnia     LA   11/7/16   R   11/21/17     Irregular heart beats     Muscle spasm     Nausea     Numbness and tingling in both hands     Odynophagia     Otitis     Pharyngitis     Pilonidal cyst with abscess     LA   10/25/13   R   6/10/14     Proteinuria     Rectal bleeding     R   11/21/17     Rhinitis     Seasonal allergies     Seizure ([facility] [address] )     Skin lesion     LA    2/3/15   R  Sherre Soulier Sherre Soulier 3/17/17  /   LA    3/17/17   R   12/22/17     Snoring     Strep throat     Tonsillitis     URI (upper respiratory infection)     Vitamin D deficiency      --> Denies any cardiopulmonary disease  --> Seizure hx: denies  Stated that he does not know why he has seizures or asthma in his medical record as he does not suffer from either  --> Head injury with LOC: yes, while playing football  --> Supplemental Oxygen Use: denies    Labs   Results for Berna Osgood (MRN [id number]) as of 7/1/2019 14:53   Ref   Range 7/7/2018 11:19 9/17/2018 13:20 11/5/2018 11:40   Sodium Latest Ref Range: 136 - 145 mmol/L 139  138   Potassium Latest Ref Range: 3 5 - 5 3 mmol/L 4 1  4 2   Chloride Latest Ref Range: 100 - 108 mmol/L 107  107   CO2 Latest Ref Range: 21 - 32 mmol/L 27  27   Anion Gap Latest Ref Range: 4 - 13 mmol/L 5  4   BUN Latest Ref Range: 5 - 25 mg/dL 20  17   Creatinine Latest Ref Range: 0 60 - 1 30 mg/dL 0 91  1 03   GLUCOSE FASTING Latest Ref Range: 65 - 99 mg/dL 92  84   Calcium Latest Ref Range: 8 3 - 10 1 mg/dL 8 6  9 3   AST Latest Ref Range: 5 - 45 U/L 14  15   ALT Latest Ref Range: 12 - 78 U/L 38  35   Alkaline Phosphatase Latest Ref Range: 46 - 116 U/L 84  89   Total Protein Latest Ref Range: 6 4 - 8 2 g/dL 8 3 (H)  8 2   Albumin Latest Ref Range: 3 5 - 5 0 g/dL 3 7  3 6   TOTAL BILIRUBIN Latest Ref Range: 0 20 - 1 00 mg/dL 0 36  0 26   eGFR Latest Units: ml/min/1 73sq m 115  98   Cholesterol Latest Ref Range: 50 - 200 mg/dL 164     Triglycerides Latest Ref Range: <=150 mg/dL 92     HDL Latest Ref Range: 40 - 60 mg/dL 43     LDL Direct Latest Ref Range: 0 - 100 mg/dL 103 (H)     Vit D, 25-Hydroxy Latest Ref Range: 30 0 - 100 0 ng/mL 23 3 (L)  26 6 (L)   WBC Latest Ref Range: 4 31 - 10 16 Thousand/uL 6 07     Red Blood Cell Count Latest Ref Range: 3 88 - 5 62 Million/uL 5 39     Hemoglobin Latest Ref Range: 12 0 - 17 0 g/dL 15 4     HCT Latest Ref Range: 36 5 - 49 3 % 47 7     MCV Latest Ref Range: 82 - 98 fL 89     MCH Latest Ref Range: 26 8 - 34 3 pg 28 6     MCHC Latest Ref Range: 31 4 - 37 4 g/dL 32 3     RDW Latest Ref Range: 11 6 - 15 1 % 12 6     Platelet Count Latest Ref Range: 149 - 390 Thousands/uL 271     MPV Latest Ref Range: 8 9 - 12 7 fL 9 1     nRBC Latest Units: /100 WBCs 0     Neutrophils % Latest Ref Range: 43 - 75 % 69     Immat GRANS % Latest Ref Range: 0 - 2 % 0     Lymphocytes Relative Latest Ref Range: 14 - 44 % 23     Monocytes Relative Latest Ref Range: 4 - 12 % 7     Eosinophils Latest Ref Range: 0 - 6 % 1     Basophils Relative Latest Ref Range: 0 - 1 % 0     Immature Grans Absolute Latest Ref Range: 0 00 - 0 20 Thousand/uL 0 02     Absolute Neutrophils Latest Ref Range: 1 85 - 7 62 Thousands/µL 4 18     Lymphocytes Absolute Latest Ref Range: 0 60 - 4 47 Thousands/µL 1 38     Absolute Monocytes Latest Ref Range: 0 17 - 1 22 Thousand/µL 0 40     Absolute Eosinophils Latest Ref Range: 0 00 - 0 61 Thousand/µL 0 07     Basophils Absolute Latest Ref Range: 0 00 - 0 10 Thousands/µL 0 02     Sed Rate Latest Ref Range: 0 - 10 mm/hour 16 (H)     Protime Latest Ref Range: 11 8 - 14 2 seconds 13 4     INR Latest Ref Range: 0 86 - 1 17  1 01     PTT Latest Ref Range: 24 - 36 seconds 30     Hemoglobin A1C Latest Ref Range: 4 2 - 6 3 % 5 1     EAG Latest Units: mg/dl 100     TSH 3RD GENERATON Latest Ref Range: 0 358 - 3 740 uIU/mL 2 460  3 070   RODOLFO-LOMAX VCA IGG Latest Ref Range: 0 0 - 17 9 U/mL 396 0 (H)     RODOLFO-BARR VCA IGM Latest Ref Range: 0 0 - 35 9 U/mL <36 0     CMV IGG Latest Ref Range: 0 00 - 0 59 U/mL <0 60     CMV IGM Latest Ref Range: 0 0 - 29 9 AU/mL <30 0     EBV EARLY ANTIGEN AB, IGG Latest Ref Range: 0 0 - 8 9 U/mL <9 0     RODOLFO-BARR NUCLEAR ANTIGEN AB IGG Latest Ref Range: 0 0 - 17 9 U/mL <18 0     LYME AB IGG Latest Ref Range: 0 00 - 0 79  0 66     LYME AB IGM Latest Ref Range: 0 00 - 0 79  0 19     ANTI-NUCLEAR ANTIBODY (CLARA) Latest Ref Range: Negative  Negative     EXTBreath Alcohol Unknown  0 000    C-REACTIVE PROTEIN Latest Ref Range: <3 0 mg/L 7 4 (H)     RHEUMATOID FACTOR Latest Ref Range: Negative  Negative         Past Surgical History:   Procedure Laterality Date    FOOT SURGERY      PILONIDAL CYST DRAINAGE      Incision and drainage of pilonidal cyst     NV WRIST ARTHROSCOP,RELEASE XVERS LIG Right 7/12/2018    Procedure: RELEASE CARPAL TUNNEL ENDOSCOPIC;  Surgeon: Zane Beltre MD;  Location:  MAIN OR;  Service: Orthopedics    NV WRIST Irine Bake LIG Left 7/26/2018    Procedure: RELEASE CARPAL TUNNEL ENDOSCOPIC;  Surgeon: Zane Beltre MD;  Location:  MAIN OR;  Service: Orthopedics       --> ENT procedures: denies    Current Outpatient Medications   Medication Sig Dispense Refill    betamethasone dipropionate (DIPROSONE) 0 05 % cream APPLY TO AFFECTED AREA TWICE A DAY FOR 2 WEEKS TAKE 1 WEEK OF AND REPEAT  4    buPROPion (WELLBUTRIN XL) 150 mg 24 hr tablet TAKE 1 TABLET BY MOUTH EVERY DAY 30 tablet 5    Cholecalciferol (VITAMIN D-3) 1000 units CAPS Take 2 capsules by mouth daily      dexamethasone (DECADRON) 1 mg tablet TAKE 1 TABLET (1 MG TOTAL) BY MOUTH ONCE FOR 1 DOSE  0    sertraline (ZOLOFT) 100 mg tablet TAKE 1 TABLET BY MOUTH EVERY DAY 30 tablet 5    sertraline (ZOLOFT) 25 mg tablet Take 1 tablet (25 mg total) by mouth daily Take one take 25 mg tablet with one 100 mg totaling 125 mg daily 30 tablet 5    triamcinolone (KENALOG) 0 5 % ointment Apply topically 2 (two) times a day 30 g 3     No current facility-administered medications for this visit  Social History:  -Employment: [employer]   -Smoking: denied  -Caffeine: 16-24 oz a day, rare soda, 16oz of energy drinks daily  -Alcohol: once in a while has a beer  -THC: no  -OTC/Supplements/herbals: no  -Illicits:  denies  -Family: lives at home with wife and mother    ROS:  Genitourinary none   Cardiology none   Gastrointestinal none   Neurology none   Constitutional none   Integumentary none   Psychiatry anxiety, depression and aggressiveness or irritability   Musculoskeletal back pain   Pulmonary shortness of breath with activity   ENT none   Endocrine none   Hematological none       MSE:  -Alert and appropriate: alert, calm, cooperative  -Oriented to person, place and time:  name, age, location, day/date/mon/yr  -Behavior: good, sustained eye contact  -Speech: Unremarkable rate/rhythm/volume  -Mood: "wonderful"  -Affect: full range  -Thought Processes: linear, logical, goal directed  PE:  Body mass index is 50 79 kg/m²    Vitals:    07/01/19 1400   BP: 122/77   Pulse: 95   SpO2: 98%   Weight: (!) 175 kg (385 lb)   Height: 6' 1" (1 854 m)       -General:  In NAD    -Eyes: Conjunctival injection: none     -EOM:  PERRLA, EOMI   -Eyelid hooding: none    -ENT: MP: 4/4   -Facial deformity: no retrognathia   -Hard palate: moderate arch   -Soft palate:  Crowding with 2-3+ tonsils bilaterally   -Gums and teeth: normal dentition   -Tongue:  Scalloping   -Nares: Patent    -Neck/Lymphatics: Lymphadenopathy:  none appreciated   -Masses:  none appreciated   -Circumference: Neck Circumference: 47cm    -Cardiac: Auscultation:  RRR   - LE edema over shins: none appreciated    -Pulm: -Respirations: unlaboured         -Auscultation:  CTA bilaterally, posterior fields    -Neuro: No resting tremor     -Musculoskeletal: Gait and stance: normal turning and ambulation; unremarkable  Assessment:  Mr Fahad Marcano is a 29 y o  male who is seen to evaluate for possible obstructive sleep apnea  Given the patient's symptoms of snoring, excessive daytime sleepiness, non-restorative sleep, and nocturia in the context of a narrow airway, enlarged tonsils, obesity, and large neck girth, a diagnosis of obstructive sleep apnea is very likely  The pathophysiology of, the reasons to treat and treatment options for obstructive sleep apnea were all reviewed with the patient today  Discussed the testing options and reviewed the benefits and downsides of both, patient opted for a home sleep apnea tst  He is amenable to treatment with PAP therapy  Discussed keeping nasal passages clear, abstaining from alcohol, and other sedating drugs at night- which will worsen symptoms of JIMI  --History provided by: patient   --Records reviewed: in chart        Recommendations:  1) HST with in lab PSG if non-diagnostic  2) Driving safety was reviewed with patient  If the patient feels too sleepy to drive he knows not to drive  If he becomes sleepy while driving he will pull over and nap  3) Follow-up after initiating treatment  4) Call with any questions or concerns  5) black out curtains    All questions answered for the patient, who indicated understanding and agreed with the plan         Fátima Ellis MD  Psychiatry/ Sleep medicine

## 2019-07-01 NOTE — PROGRESS NOTES
Sleep Medicine Consultation Note    HPI:  Mr Geronimo Beyer is a 29 y o  male seen at the request of Zafar Harrison MD for advice regarding suspected sleep disordered breathing  The patient stated that Dr Smitha Pendleton was seeing him for weight management  He is looking to go through [facility] now for bariatric work up and he is here as part of the evaluation pre-op  The patient endorsed poor sleep  He will wake up from pain numerous times at night due to having to void, pain, reposition  He is always tired, low energy  He has been having these symptoms for a long time  These have not worsened over time  He denied anything making it better or worse  He has no previous sleep consultations       Please see below for continuation of the HPI:      Sleep Disordered Breathing:  -Snoring: once in a while   -Severity: not loud, but have caught himself snoring   -Frequency: not every night   -Duration: 10 years   -Over time: same   -Modifying factors: n/a  -Observed Apneas: denied  -Mouth Breathing at night: no  -Dry Mouth in morning: yes   -Nocturnal Gasping: no  -Nasal Obstruction: no  -Weight: gained about 50 lbs over the last year    Sleep Pattern:  -Location: bedroom  -Bed/Recliner/Wedge: bed  -Bed Partner: yes  -HOB: flat  -# of pillows under head: 1  -Position: prone and side  -Bedtime: 9-10am  -Lights out: same time but does not have black out curtains  -Environmental: No lights/TV  -Latency: 0-15 mins usually sometimes to an hour  -Awakenings: 2-3   -Reason: void, pain, reposition   -Duration: quickly  -Wake time:  4pm   -Alarm: yes  -Rise time: same time  -Days off: 9-10pm and wakes at12pm  -Shift Work: third shift 6p-6am  -Patient's estimate of total sleep time: 4-6h    Daytime Symptoms:  -Upon Awakening: tired  -Daytime fatigue/sleepiness: tired  -Naps: 3/week  -Involuntary Dozing: yes, watching TV, on his phone, at work when idle  -Cognitive Symptoms: denied  -Driving: Difficulty with sleepiness and driving:  Gets tired   -- Close calls related to sleepiness: no   -- Accidents related to sleepiness: no      Questionnaires:   Sitting and reading: Moderate chance of dozing  Watching TV: High chance of dozing  Sitting, inactive in a public place (e g  a theatre or a meeting): Moderate chance of dozing  As a passenger in a car for an hour without a break: Moderate chance of dozing  Lying down to rest in the afternoon when circumstances permit: High chance of dozing  Sitting and talking to someone: Slight chance of dozing  Sitting quietly after a lunch without alcohol: Moderate chance of dozing  In a car, while stopped for a few minutes in traffic: Slight chance of dozing  Total score: 16      Sleep Review of Symptoms:  -Parasomnias:  --Sleep Walking: no  --Dream Enactment: no  --Bruxism: no  -Motor:  --RLS: no  --PLMS: no  -Narcolepsy:  --Hallucinations: no  --Paralysis: no  --Cataplexy: no    Childhood Sleep History: sleep walking    Prior Sleep Studies/Evaluations:  denied    Family History:  Family history of sleep disorders: dad snored    Patient Active Problem List   Diagnosis    BMI 50 0-59 9, adult ([address] )    Anxiety and depression    Elevated TSH    Carpal tunnel syndrome    Preop examination    Preoperative clearance    Pineal gland cyst    Vitamin D deficiency    Depression    Left knee pain    Right knee pain    Skin pruritus    Skin rash    Peeling skin    Obstructive sleep apnea syndrome    Pain in right testicle     Past Medical History:   Diagnosis Date    Abdominal pain     Ankle pain     Anxiety     Arthralgia     Asthma     Blood typing encounter     Bloody stools     LA    6/8/16   R   11/21/17     Carpal tunnel syndrome, bilateral     LA  Marcine Grow Marcine Grow 9/12/17   R   9/12/17     Chest pain     Constipation     Depression     Dermatitis     Fatigue     Frequent bowel movements     Gastroenteritis     GERD without esophagitis     Insomnia     LA   11/7/16   R   11/21/17     Irregular heart beats     Muscle spasm     Nausea     Numbness and tingling in both hands     Odynophagia     Otitis     Pharyngitis     Pilonidal cyst with abscess     LA   10/25/13   R   6/10/14     Proteinuria     Rectal bleeding     R   11/21/17     Rhinitis     Seasonal allergies     Seizure ([address] )     Skin lesion     LA    2/3/15   R  Harris Spinner Harris Spinner 3/17/17  /   LA    3/17/17   R   12/22/17     Snoring     Strep throat     Tonsillitis     URI (upper respiratory infection)     Vitamin D deficiency      --> Denies any cardiopulmonary disease  --> Seizure hx: denies  Stated that he does not know why he has seizures or asthma in his medical record as he does not suffer from either  --> Head injury with LOC: yes, while playing football  --> Supplemental Oxygen Use: denies    Labs   Results for Dusty Bullard (MRN [id number]) as of 7/1/2019 14:53   Ref   Range 7/7/2018 11:19 9/17/2018 13:20 11/5/2018 11:40   Sodium Latest Ref Range: 136 - 145 mmol/L 139  138   Potassium Latest Ref Range: 3 5 - 5 3 mmol/L 4 1  4 2   Chloride Latest Ref Range: 100 - 108 mmol/L 107  107   CO2 Latest Ref Range: 21 - 32 mmol/L 27  27   Anion Gap Latest Ref Range: 4 - 13 mmol/L 5  4   BUN Latest Ref Range: 5 - 25 mg/dL 20  17   Creatinine Latest Ref Range: 0 60 - 1 30 mg/dL 0 91  1 03   GLUCOSE FASTING Latest Ref Range: 65 - 99 mg/dL 92  84   Calcium Latest Ref Range: 8 3 - 10 1 mg/dL 8 6  9 3   AST Latest Ref Range: 5 - 45 U/L 14  15   ALT Latest Ref Range: 12 - 78 U/L 38  35   Alkaline Phosphatase Latest Ref Range: 46 - 116 U/L 84  89   Total Protein Latest Ref Range: 6 4 - 8 2 g/dL 8 3 (H)  8 2   Albumin Latest Ref Range: 3 5 - 5 0 g/dL 3 7  3 6   TOTAL BILIRUBIN Latest Ref Range: 0 20 - 1 00 mg/dL 0 36  0 26   eGFR Latest Units: ml/min/1 73sq m 115  98   Cholesterol Latest Ref Range: 50 - 200 mg/dL 164     Triglycerides Latest Ref Range: <=150 mg/dL 92     HDL Latest Ref Range: 40 - 60 mg/dL 43     LDL Direct Latest Ref Range: 0 - 100 mg/dL 103 (H)     Vit D, 25-Hydroxy Latest Ref Range: 30 0 - 100 0 ng/mL 23 3 (L)  26 6 (L)   WBC Latest Ref Range: 4 31 - 10 16 Thousand/uL 6 07     Red Blood Cell Count Latest Ref Range: 3 88 - 5 62 Million/uL 5 39     Hemoglobin Latest Ref Range: 12 0 - 17 0 g/dL 15 4     HCT Latest Ref Range: 36 5 - 49 3 % 47 7     MCV Latest Ref Range: 82 - 98 fL 89     MCH Latest Ref Range: 26 8 - 34 3 pg 28 6     MCHC Latest Ref Range: 31 4 - 37 4 g/dL 32 3     RDW Latest Ref Range: 11 6 - 15 1 % 12 6     Platelet Count Latest Ref Range: 149 - 390 Thousands/uL 271     MPV Latest Ref Range: 8 9 - 12 7 fL 9 1     nRBC Latest Units: /100 WBCs 0     Neutrophils % Latest Ref Range: 43 - 75 % 69     Immat GRANS % Latest Ref Range: 0 - 2 % 0     Lymphocytes Relative Latest Ref Range: 14 - 44 % 23     Monocytes Relative Latest Ref Range: 4 - 12 % 7     Eosinophils Latest Ref Range: 0 - 6 % 1     Basophils Relative Latest Ref Range: 0 - 1 % 0     Immature Grans Absolute Latest Ref Range: 0 00 - 0 20 Thousand/uL 0 02     Absolute Neutrophils Latest Ref Range: 1 85 - 7 62 Thousands/µL 4 18     Lymphocytes Absolute Latest Ref Range: 0 60 - 4 47 Thousands/µL 1 38     Absolute Monocytes Latest Ref Range: 0 17 - 1 22 Thousand/µL 0 40     Absolute Eosinophils Latest Ref Range: 0 00 - 0 61 Thousand/µL 0 07     Basophils Absolute Latest Ref Range: 0 00 - 0 10 Thousands/µL 0 02     Sed Rate Latest Ref Range: 0 - 10 mm/hour 16 (H)     Protime Latest Ref Range: 11 8 - 14 2 seconds 13 4     INR Latest Ref Range: 0 86 - 1 17  1 01     PTT Latest Ref Range: 24 - 36 seconds 30     Hemoglobin A1C Latest Ref Range: 4 2 - 6 3 % 5 1     EAG Latest Units: mg/dl 100     TSH 3RD GENERATON Latest Ref Range: 0 358 - 3 740 uIU/mL 2 460  3 070   RODOLFO-LOMAX VCA IGG Latest Ref Range: 0 0 - 17 9 U/mL 396 0 (H)     RODOLFO-BARR VCA IGM Latest Ref Range: 0 0 - 35 9 U/mL <36 0     CMV IGG Latest Ref Range: 0 00 - 0 59 U/mL <0 60     CMV IGM Latest Ref Range: 0 0 - 29 9 AU/mL <30 0     EBV EARLY ANTIGEN AB, IGG Latest Ref Range: 0 0 - 8 9 U/mL <9 0     RODOLFO-BARR NUCLEAR ANTIGEN AB IGG Latest Ref Range: 0 0 - 17 9 U/mL <18 0     LYME AB IGG Latest Ref Range: 0 00 - 0 79  0 66     LYME AB IGM Latest Ref Range: 0 00 - 0 79  0 19     ANTI-NUCLEAR ANTIBODY (CLARA) Latest Ref Range: Negative  Negative     EXTBreath Alcohol Unknown  0 000    C-REACTIVE PROTEIN Latest Ref Range: <3 0 mg/L 7 4 (H)     RHEUMATOID FACTOR Latest Ref Range: Negative  Negative         Past Surgical History:   Procedure Laterality Date    FOOT SURGERY      PILONIDAL CYST DRAINAGE      Incision and drainage of pilonidal cyst     ND WRIST ARTHROSCOP,RELEASE XVERS LIG Right 7/12/2018    Procedure: RELEASE CARPAL TUNNEL ENDOSCOPIC;  Surgeon: Loi Barry MD;  [facility]: [facility];  Service: Orthopedics    ND WRIST Tiny Blake LIG Left 7/26/2018    Procedure: RELEASE CARPAL TUNNEL ENDOSCOPIC;  Surgeon: Loi aBrry MD;  Location[facility];  Service: Orthopedics       --> ENT procedures: denies    Current Outpatient Medications   Medication Sig Dispense Refill    betamethasone dipropionate (DIPROSONE) 0 05 % cream APPLY TO AFFECTED AREA TWICE A DAY FOR 2 WEEKS TAKE 1 WEEK OF AND REPEAT  4    buPROPion (WELLBUTRIN XL) 150 mg 24 hr tablet TAKE 1 TABLET BY MOUTH EVERY DAY 30 tablet 5    Cholecalciferol (VITAMIN D-3) 1000 units CAPS Take 2 capsules by mouth daily      dexamethasone (DECADRON) 1 mg tablet TAKE 1 TABLET (1 MG TOTAL) BY MOUTH ONCE FOR 1 DOSE  0    sertraline (ZOLOFT) 100 mg tablet TAKE 1 TABLET BY MOUTH EVERY DAY 30 tablet 5    sertraline (ZOLOFT) 25 mg tablet Take 1 tablet (25 mg total) by mouth daily Take one take 25 mg tablet with one 100 mg totaling 125 mg daily 30 tablet 5    triamcinolone (KENALOG) 0 5 % ointment Apply topically 2 (two) times a day 30 g 3     No current facility-administered medications for this visit          Social History:  -Employment: [employer] a [employer]   -Smoking: denied  -Caffeine: 16-24 oz a day, rare soda, 16oz of energy drinks daily  -Alcohol: once in a while has a beer  -THC: no  -OTC/Supplements/herbals: no  -Illicits:  denies  -Family: lives at home with wife and mother    ROS:  Genitourinary none   Cardiology none   Gastrointestinal none   Neurology none   Constitutional none   Integumentary none   Psychiatry anxiety, depression and aggressiveness or irritability   Musculoskeletal back pain   Pulmonary shortness of breath with activity   ENT none   Endocrine none   Hematological none       MSE:  -Alert and appropriate: alert, calm, cooperative  -Oriented to person, place and time:  name, age, location, day/date/mon/yr  -Behavior: good, sustained eye contact  -Speech: Unremarkable rate/rhythm/volume  -Mood: "wonderful"  -Affect: full range  -Thought Processes: linear, logical, goal directed  PE:  Body mass index is 50 79 kg/m²  Vitals:    07/01/19 1400   BP: 122/77   Pulse: 95   SpO2: 98%   Weight: (!) 175 kg (385 lb)   Height: 6' 1" (1 854 m)       -General:  In NAD    -Eyes: Conjunctival injection: none     -EOM:  PERRLA, EOMI   -Eyelid hooding: none    -ENT: MP: 4/4   -Facial deformity: no retrognathia   -Hard palate: moderate arch   -Soft palate:  Crowding with 2-3+ tonsils bilaterally   -Gums and teeth: normal dentition   -Tongue:  Scalloping   -Nares:  Patent    -Neck/Lymphatics: Lymphadenopathy:  none appreciated   -Masses:  none appreciated   -Circumference: Neck Circumference: 47cm    -Cardiac: Auscultation:  RRR   - LE edema over shins: none appreciated    -Pulm: -Respirations: unlaboured         -Auscultation:  CTA bilaterally, posterior fields    -Neuro: No resting tremor     -Musculoskeletal: Gait and stance: normal turning and ambulation; unremarkable  Assessment:  Mr Fili Bearden is a 29 y o  male who is seen to evaluate for possible obstructive sleep apnea    Given the patient's symptoms of snoring, excessive daytime sleepiness, non-restorative sleep, and nocturia in the context of a narrow airway, enlarged tonsils, obesity, and large neck girth, a diagnosis of obstructive sleep apnea is very likely  The pathophysiology of, the reasons to treat and treatment options for obstructive sleep apnea were all reviewed with the patient today  Discussed the testing options and reviewed the benefits and downsides of both, patient opted for a home sleep apnea tst  He is amenable to treatment with PAP therapy  Discussed keeping nasal passages clear, abstaining from alcohol, and other sedating drugs at night- which will worsen symptoms of JIMI  --History provided by: patient   --Records reviewed: in chart        Recommendations:  1) HST with in lab PSG if non-diagnostic  2) Driving safety was reviewed with patient  If the patient feels too sleepy to drive he knows not to drive  If he becomes sleepy while driving he will pull over and nap  3) Follow-up after initiating treatment  4) Call with any questions or concerns  5) black out curtains    All questions answered for the patient, who indicated understanding and agreed with the plan         Dariana Molina MD  Psychiatry/ Sleep medicine

## 2019-07-01 NOTE — PATIENT INSTRUCTIONS
Recommendations:  1) HST with in lab PSG if non-diagnostic  2) Driving safety was reviewed with patient  If the patient feels too sleepy to drive he knows not to drive  If he becomes sleepy while driving he will pull over and nap  3) Follow-up after initiating treatment  4) Call with any questions or concerns     5) black out curtains

## 2019-07-10 ENCOUNTER — OFFICE VISIT (OUTPATIENT)
Dept: FAMILY MEDICINE CLINIC | Facility: CLINIC | Age: 29
End: 2019-07-10
Payer: COMMERCIAL

## 2019-07-10 VITALS
HEART RATE: 109 BPM | TEMPERATURE: 100.9 F | BODY MASS INDEX: 41.75 KG/M2 | OXYGEN SATURATION: 96 % | DIASTOLIC BLOOD PRESSURE: 90 MMHG | HEIGHT: 73 IN | RESPIRATION RATE: 18 BRPM | WEIGHT: 315 LBS | SYSTOLIC BLOOD PRESSURE: 134 MMHG

## 2019-07-10 DIAGNOSIS — J02.9 PHARYNGITIS, UNSPECIFIED ETIOLOGY: Primary | ICD-10-CM

## 2019-07-10 PROCEDURE — 99213 OFFICE O/P EST LOW 20 MIN: CPT | Performed by: NURSE PRACTITIONER

## 2019-07-10 PROCEDURE — 87070 CULTURE OTHR SPECIMN AEROBIC: CPT | Performed by: NURSE PRACTITIONER

## 2019-07-10 PROCEDURE — 87147 CULTURE TYPE IMMUNOLOGIC: CPT | Performed by: NURSE PRACTITIONER

## 2019-07-10 RX ORDER — AMOXICILLIN 875 MG/1
875 TABLET, COATED ORAL 2 TIMES DAILY
Qty: 20 TABLET | Refills: 0 | Status: SHIPPED | OUTPATIENT
Start: 2019-07-10 | End: 2019-07-20

## 2019-07-10 NOTE — LETTER
July 10, 2019     Patient: Shruthi Hillman   YOB: 1990   Date of Visit: 7/10/2019       To Whom it May Concern:    Atiya Saucedo is under my professional care  He was seen in my office on 7/10/2019  He may return to work on 07/15/2019  If you have any questions or concerns, please don't hesitate to call           Sincerely,          AMRITA Sage        CC: No Recipients

## 2019-07-10 NOTE — PROGRESS NOTES
FAMILY PRACTICE OFFICE VISIT       NAME: Shruthi Hillman  AGE: 34 y o  SEX: male       : 1990        MRN: [id number]    DATE: 7/10/2019    Assessment and Plan     Problem List Items Addressed This Visit     None      Visit Diagnoses     Pharyngitis, unspecified etiology    -  Primary    Relevant Medications    amoxicillin (AMOXIL) 875 mg tablet    Other Relevant Orders    Throat culture          1  Pharyngitis, unspecified etiology  amoxicillin (AMOXIL) 875 mg tablet    Throat culture     This 27-year-old male presents today with symptoms and exam concerning for strep pharyngitis  Will send throat culture  Will begin amoxicillin 875 mg twice daily for 10 days  Office will call with results of throat culture when available  Reviewed prevention of spread of illness  Change toothbrush after 24 hours of taking antibiotics  May return to work after completing 24 hours of antibiotics  Continue supportive care:  Rest, fluids, Tylenol, or ibuprofen as needed  Warm salt water gargles, and throat lozenges as needed  If symptoms worsen, or not improving over the next 48 hours, instructed to call  Chief Complaint     Chief Complaint   Patient presents with    Sore Throat     x 1  day        History of Present Illness     Shruthi Hillman is a 27-year-old male presenting today with fevers, chills, body aches, fatigue, and sore throat that began last night  Denies nasal congestion, rhinorrhea or cough  He does have a tendency to get strep throat  Denies nausea, vomiting, or rash  No known sick contacts  Review of Systems   Review of Systems   Constitutional: Positive for chills, diaphoresis, fatigue and fever  HENT: Positive for sore throat  Negative for congestion, ear pain, postnasal drip, rhinorrhea and sinus pain  Respiratory: Negative for cough, chest tightness, shortness of breath and wheezing  Cardiovascular: Negative  Gastrointestinal: Negative for nausea and vomiting  Musculoskeletal: Positive for myalgias  Neurological: Negative for dizziness and headaches  Active Problem List     Patient Active Problem List   Diagnosis    BMI 50 0-59 9, adult ([facility])    Anxiety and depression    Elevated TSH    Carpal tunnel syndrome    Preop examination    Preoperative clearance    Pineal gland cyst    Vitamin D deficiency    Depression    Left knee pain    Right knee pain    Skin pruritus    Skin rash    Peeling skin    Obstructive sleep apnea syndrome    Pain in right testicle       Past Medical History:  Past Medical History:   Diagnosis Date    Abdominal pain     Ankle pain     Anxiety     Arthralgia     Asthma     Blood typing encounter     Bloody stools     LA    6/8/16   R   11/21/17     Carpal tunnel syndrome, bilateral     LA  Jamie Glass Jamie Glass 9/12/17   R   9/12/17     Chest pain     Constipation     Depression     Dermatitis     Fatigue     Frequent bowel movements     Gastroenteritis     GERD without esophagitis     Insomnia     LA   11/7/16   R   11/21/17     Irregular heart beats     Muscle spasm     Nausea     Numbness and tingling in both hands     Odynophagia     Otitis     Pharyngitis     Pilonidal cyst with abscess     LA   10/25/13   R   6/10/14     Proteinuria     Rectal bleeding     R   11/21/17     Rhinitis     Seasonal allergies     Seizure ([address] )     Skin lesion     LA    2/3/15   R  Jamie Glass Jamie Glass 3/17/17  /   LA    3/17/17   R   12/22/17     Snoring     Strep throat     Tonsillitis     URI (upper respiratory infection)     Vitamin D deficiency        Past Surgical History:  Past Surgical History:   Procedure Laterality Date    FOOT SURGERY      PILONIDAL CYST DRAINAGE      Incision and drainage of pilonidal cyst     MD WRIST Jock Postal LIG Right 7/12/2018    Procedure: RELEASE CARPAL TUNNEL ENDOSCOPIC;  Surgeon: Rosa Maria Bello MD;  Location:  MAIN OR;  Service: Orthopedics    MD WRIST Jock Postal LIG Left 7/26/2018    Procedure: RELEASE CARPAL TUNNEL ENDOSCOPIC;  Surgeon: Steve Allison MD;  Location:  MAIN OR;  Service: Orthopedics       Family History:  Family History   Problem Relation Age of Onset    Depression Mother     Obesity Mother     Stroke Mother         Syndrome     Diabetes Mother     Alcohol abuse Father     Liver disease Father         hepatic failure     Hypertension Father     Depression Brother     Thyroid disease Brother     Depression Maternal Uncle     Heart disease Neg Hx     Cancer Neg Hx     Thyroid cancer Neg Hx        Social History:  Social History     Socioeconomic History    Marital status: /Civil Union     Spouse name: Not on file    Number of children: Not on file    Years of education: some college    Highest education level: Not on file   Occupational History     Comment: employed    Social Needs    Financial resource strain: Not on file    Food insecurity:     Worry: Not on file     Inability: Not on file   PassportParking needs:     Medical: Not on file     Non-medical: Not on file   Tobacco Use    Smoking status: Never Smoker    Smokeless tobacco: Never Used   Substance and Sexual Activity    Alcohol use: Yes     Comment: rarely    Drug use: No    Sexual activity: Not on file   Lifestyle    Physical activity:     Days per week: Not on file     Minutes per session: Not on file    Stress: Not on file   Relationships    Social connections:     Talks on phone: Not on file     Gets together: Not on file     Attends Jainism service: Not on file     Active member of club or organization: Not on file     Attends meetings of clubs or organizations: Not on file     Relationship status: Not on file    Intimate partner violence:     Fear of current or ex partner: Not on file     Emotionally abused: Not on file     Physically abused: Not on file     Forced sexual activity: Not on file   Other Topics Concern    Not on file   Social History Narrative    Daily caffeine consumption        I have reviewed the patient's medical history in detail; there are no changes to the history as noted in the electronic medical record  Objective     Vitals:    07/10/19 0820   BP: 134/90   BP Location: Left arm   Patient Position: Sitting   Cuff Size: Large   Pulse: (!) 109   Resp: 18   Temp: (!) 100 9 °F (38 3 °C)   TempSrc: Tympanic   SpO2: 96%   Weight: (!) 175 kg (385 lb)   Height: 6' 1" (1 854 m)     Wt Readings from Last 3 Encounters:   07/10/19 (!) 175 kg (385 lb)   07/01/19 (!) 175 kg (385 lb)   06/21/19 (!) 174 kg (384 lb)     Body mass index is 50 79 kg/m²  PHQ-9 Depression Screening    PHQ-9:    Frequency of the following problems over the past two weeks:            Physical Exam   Constitutional: He appears well-developed and well-nourished  He appears ill  No distress  HENT:   Right Ear: Tympanic membrane and ear canal normal    Left Ear: Tympanic membrane and ear canal normal    Mouth/Throat: Posterior oropharyngeal edema and posterior oropharyngeal erythema present  Tonsils are 3+ on the right  Tonsils are 3+ on the left  Tonsillar exudate  Cardiovascular: Normal rate and regular rhythm  HR 88   Pulmonary/Chest: Effort normal    Lymphadenopathy:     He has no cervical adenopathy  Skin: No rash noted  Psychiatric: He has a normal mood and affect  Nursing note and vitals reviewed        ALLERGIES:  No Known Allergies    Current Medications     Current Outpatient Medications   Medication Sig Dispense Refill    betamethasone dipropionate (DIPROSONE) 0 05 % cream APPLY TO AFFECTED AREA TWICE A DAY FOR 2 WEEKS TAKE 1 WEEK OF AND REPEAT  4    buPROPion (WELLBUTRIN XL) 150 mg 24 hr tablet TAKE 1 TABLET BY MOUTH EVERY DAY 30 tablet 5    Cholecalciferol (VITAMIN D-3) 1000 units CAPS Take 2 capsules by mouth daily      sertraline (ZOLOFT) 100 mg tablet TAKE 1 TABLET BY MOUTH EVERY DAY 30 tablet 5    sertraline (ZOLOFT) 25 mg tablet Take 1 tablet (25 mg total) by mouth daily Take one take 25 mg tablet with one 100 mg totaling 125 mg daily 30 tablet 5    triamcinolone (KENALOG) 0 5 % ointment Apply topically 2 (two) times a day 30 g 3    amoxicillin (AMOXIL) 875 mg tablet Take 1 tablet (875 mg total) by mouth 2 (two) times a day for 10 days 20 tablet 0    dexamethasone (DECADRON) 1 mg tablet TAKE 1 TABLET (1 MG TOTAL) BY MOUTH ONCE FOR 1 DOSE  0     No current facility-administered medications for this visit            Health Maintenance     Health Maintenance   Topic Date Due    PT PLAN OF CARE  1990    BMI: Followup Plan  07/10/2008    INFLUENZA VACCINE  07/01/2019    BMI: Adult  07/10/2020    DTaP,Tdap,and Td Vaccines (2 - Td) 02/11/2023    Pneumococcal Vaccine: 65+ Years (1 of 2 - PCV13) 07/10/2055    Pneumococcal Vaccine: Pediatrics (0 to 5 Years) and At-Risk Patients (6 to 59 Years)  Aged Out    HEPATITIS B VACCINES  Aged [employer] History   Administered Date(s) Administered     Influenza (IM) Preservative Free 10/01/2018    Influenza TIV (IM) 01/04/2017    Tdap 02/11/2013       AMRITA Santana

## 2019-07-13 LAB — BACTERIA THROAT CULT: ABNORMAL

## 2019-07-15 NOTE — RESULT ENCOUNTER NOTE
Please let patient know his throat culture is positive for strep  Please complete the entire course of antibiotics as prescribed  Please call for persistent symptoms

## 2019-07-17 ENCOUNTER — SOCIAL WORK (OUTPATIENT)
Dept: BEHAVIORAL/MENTAL HEALTH CLINIC | Facility: CLINIC | Age: 29
End: 2019-07-17
Payer: COMMERCIAL

## 2019-07-17 DIAGNOSIS — F33.2 SEVERE EPISODE OF RECURRENT MAJOR DEPRESSIVE DISORDER, WITHOUT PSYCHOTIC FEATURES (HCC): ICD-10-CM

## 2019-07-17 DIAGNOSIS — F41.1 GENERALIZED ANXIETY DISORDER: Primary | ICD-10-CM

## 2019-07-17 PROCEDURE — 90834 PSYTX W PT 45 MINUTES: CPT | Performed by: PSYCHIATRY & NEUROLOGY

## 2019-07-17 NOTE — PSYCH
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  He has struggled with anxiety and depression since late teens, beginning when his brother had a serious ATV accident that nearly killed him, and his father  a month later suddenly  He has been on medications and in therapy on and off since then, including PHP at [facility] several years ago and one trip to the ED for severe depression with some SI but no plan or intent  He currently feels that his weight and possible sleep apnea are contributing to his depression, and that the depression is more of an issue for him than his anxiety  He sometimes struggles with motivation, concentration and finding pleasure in things  He also worries about his job, as he is about a half point away from potentially being fired due to his frequent illnesses (gets strep very easily several times per year)  He has a very good relationship with his mom and two older brothers, and has been  to his wife for 7 years (known her his whole life, as their fathers were best friends)  He uses music and writing to cope, as well as puzzles that he can do on his phone  He is able to push himself to get to work and can mainly function right now, but wants to engage in counseling to help prepare him for upcoming weight loss surgery and find healthy coping skills to manage depression      Review of Systems      Objective:     Physical Exam    Psychiatric: calm and cooperative with good eye contact; mood anxious, affect congruent with mood; thought process tangential at times; concentration mildly impaired; speech and behavior normal; good insight and judgment; endorses periodic SI, but has no plan or intent, ("too much death in my family to add to that"), denies HI and psychosis

## 2019-07-22 ENCOUNTER — TELEPHONE (OUTPATIENT)
Dept: FAMILY MEDICINE CLINIC | Facility: CLINIC | Age: 29
End: 2019-07-22

## 2019-07-22 NOTE — TELEPHONE ENCOUNTER
I would like him to restart his medications  He can start the same dose of Wellbutrin  The Zoloft- he will have to take 50 mg for the next couple of weeks and increase it to 100 mg on week 3 and eventually build up to 125 mg by week 3 to week 4  I do not see that anybody has told him to stop taking the medication  He should follow up with us in the next 1-2 weeks to see how he is doing or sooner if he needs

## 2019-07-22 NOTE — TELEPHONE ENCOUNTER
Wife called and she s concerned about patient not taking his Bupropion and his Sertraline  He stopped it on 7/12/2019 due to taking antibiotics,  He thought they would counteract, and he was supposed to restart them but he never has since he "feels Good"  Fausto Pinzon would like to know if he is supposed to restart them? He stated someone here told him he didn't need them anymore  Patient has been short tempered a little and wife would like to know what is going on and if you can call her first  Please call Fausto Pinzon and let her know whats going on  She does not want him to know she called as he would get upset

## 2019-07-30 ENCOUNTER — HOSPITAL ENCOUNTER (OUTPATIENT)
Dept: SLEEP CENTER | Facility: CLINIC | Age: 29
Discharge: HOME | End: 2019-07-30
Payer: COMMERCIAL

## 2019-07-30 DIAGNOSIS — F51.3 SLEEPWALKING: ICD-10-CM

## 2019-07-30 DIAGNOSIS — G47.00 FREQUENT NOCTURNAL AWAKENING: ICD-10-CM

## 2019-07-30 DIAGNOSIS — G47.19 EXCESSIVE DAYTIME SLEEPINESS: ICD-10-CM

## 2019-07-30 DIAGNOSIS — R06.83 SNORING: ICD-10-CM

## 2019-07-30 DIAGNOSIS — G47.8 NON-RESTORATIVE SLEEP: ICD-10-CM

## 2019-07-30 PROCEDURE — G0399 HOME SLEEP TEST/TYPE 3 PORTA: HCPCS | Performed by: PSYCHIATRY & NEUROLOGY

## 2019-07-30 PROCEDURE — G0399 HOME SLEEP TEST/TYPE 3 PORTA: HCPCS

## 2019-08-05 DIAGNOSIS — G47.33 OSA (OBSTRUCTIVE SLEEP APNEA): Primary | ICD-10-CM

## 2019-08-06 ENCOUNTER — TELEPHONE (OUTPATIENT)
Dept: SLEEP CENTER | Facility: CLINIC | Age: 29
End: 2019-08-06

## 2019-08-06 ENCOUNTER — OFFICE VISIT (OUTPATIENT)
Dept: FAMILY MEDICINE CLINIC | Facility: CLINIC | Age: 29
End: 2019-08-06
Payer: COMMERCIAL

## 2019-08-06 VITALS
BODY MASS INDEX: 41.75 KG/M2 | DIASTOLIC BLOOD PRESSURE: 70 MMHG | SYSTOLIC BLOOD PRESSURE: 118 MMHG | WEIGHT: 315 LBS | TEMPERATURE: 98.7 F | HEART RATE: 82 BPM | HEIGHT: 73 IN | OXYGEN SATURATION: 97 % | RESPIRATION RATE: 16 BRPM

## 2019-08-06 DIAGNOSIS — G47.33 OBSTRUCTIVE SLEEP APNEA: ICD-10-CM

## 2019-08-06 DIAGNOSIS — F41.9 ANXIETY AND DEPRESSION: Primary | ICD-10-CM

## 2019-08-06 DIAGNOSIS — F32.A ANXIETY AND DEPRESSION: Primary | ICD-10-CM

## 2019-08-06 PROCEDURE — 99214 OFFICE O/P EST MOD 30 MIN: CPT | Performed by: FAMILY MEDICINE

## 2019-08-06 PROCEDURE — 3008F BODY MASS INDEX DOCD: CPT | Performed by: FAMILY MEDICINE

## 2019-08-06 RX ORDER — ESCITALOPRAM OXALATE 10 MG/1
10 TABLET ORAL DAILY
Qty: 30 TABLET | Refills: 5 | Status: SHIPPED | OUTPATIENT
Start: 2019-08-06 | End: 2019-11-04

## 2019-08-06 NOTE — PROGRESS NOTES
FAMILY PRACTICE OFFICE VISIT       NAME: Nemo Johansen  AGE: 34 y o  SEX: male       : 1990        MRN: [id number]    DATE: 2019  TIME: 11:49 PM    Assessment and Plan     Problem List Items Addressed This Visit        Respiratory    Obstructive sleep apnea       Other    BMI 50 0-59 9, adult (HCC)    Anxiety and depression - Primary    Relevant Medications    escitalopram (LEXAPRO) 10 mg tablet        Anxiety and depression:  Patient feels more tired on Zoloft  He feels he also has gained weight on the Zoloft  He would like to switch to another medication  I will go ahead and switched to Lexapro  He may continue with bupropion  He will follow up with our behavioral health specialist Carlos Eduardo Montejo as well  I would like him to also be seen by a psychiatrist and have encouraged him to call for an appointment  He is agreeable with this  Follow-up in 6 weeks or sooner if needed    BMI 51:  Patient was evaluated by bariatric  He will hold off for now and consider by the end of the year  His wife will be undergoing this surgery this year  He will continue with lifestyle modifications including diet as well as exercise  Sleep apnea:  Patient was diagnosed with sleep apnea, is scheduled for a CPAP fitting on   There are no Patient Instructions on file for this visit  BMI Counseling: Body mass index is 51 03 kg/m²  Discussed the patient's BMI with him  The BMI is above average  BMI counseling and education was provided to the patient  Nutrition recommendations include reducing portion sizes  Exercise recommendations include moderate aerobic physical activity for 150 minutes/week  Chief Complaint     Chief Complaint   Patient presents with    Follow-up     Pt is here for f/u fertility and meds       History of Present Illness     HPI   59-year-old male presents today for routine follow-up  He is here today with his wife    Patient states he stopped taking his sertraline and bupropion on 07/12 when he developed strep throat thinking there would be an interaction with the antibiotic and also forgot to take it for few days  He stopped both medications for 3 weeks and resumed them approximately 1 week ago  He also wanted to see how he would feel without the 2 medications  As per patient's wife, patient was more irritable off the medications  Patient states however he did not feel depressed being off the medications  He felt he had more energy  He also feels he has gained weight on the Zoloft and would like to switch to another medication  Was diagnosed with sleep apnea, will start using cpap  Has an appt to get fitted on 8/19     Regarding BMI, patient was evaluated by psychiatrist at bariatric program, patient was advised against bariatric surgery until after end of September  Review of Systems   Review of Systems   Constitutional: Negative for appetite change and unexpected weight change  Respiratory: Negative for shortness of breath  Cardiovascular: Negative  Genitourinary: Negative for dysuria  Neurological: Negative for dizziness and light-headedness  Psychiatric/Behavioral: Negative for sleep disturbance and suicidal ideas          Depression, anxiety       Active Problem List     Patient Active Problem List   Diagnosis    BMI 50 0-59 9, adult (HCC)    Generalized anxiety disorder    Elevated TSH    Carpal tunnel syndrome    Preop examination    Preoperative clearance    Pineal gland cyst    Vitamin D deficiency    Severe episode of recurrent major depressive disorder, without psychotic features ([facility] [address] )    Left knee pain    Right knee pain    Skin pruritus    Skin rash    Peeling skin    Obstructive sleep apnea syndrome    Pain in right testicle    Obstructive sleep apnea    Excessive daytime sleepiness    Anxiety and depression       Past Medical History:  Past Medical History:   Diagnosis Date    Abdominal pain     Ankle pain     Anxiety  Arthralgia     Asthma     Blood typing encounter     Bloody stools     [place]    6/8/16   R   11/21/17     Carpal tunnel syndrome, bilateral     LA  Jackson Araiza Jackson Araiza 9/12/17   R   9/12/17     Chest pain     Constipation     Depression     Dermatitis     Fatigue     Frequent bowel movements     Gastroenteritis     GERD without esophagitis     Insomnia     LA   11/7/16   R   11/21/17     Irregular heart beats     Muscle spasm     Nausea     Numbness and tingling in both hands     Odynophagia     Otitis     Pharyngitis     Pilonidal cyst with abscess     LA   10/25/13   R   6/10/14     Proteinuria     Rectal bleeding     R   11/21/17     Rhinitis     Seasonal allergies     Seizure ([facility] [address] )     Skin lesion     LA    2/3/15   R  Jackson Araiza Jackson Araiza 3/17/17  /   [place]    3/17/17   R   12/22/17     Snoring     Strep throat     Tonsillitis     URI (upper respiratory infection)     Vitamin D deficiency        Past Surgical History:  Past Surgical History:   Procedure Laterality Date    FOOT SURGERY      PILONIDAL CYST DRAINAGE      Incision and drainage of pilonidal cyst     CO WRIST Edmonia Quiles LIG Right 7/12/2018    Procedure: RELEASE CARPAL TUNNEL ENDOSCOPIC;  Surgeon: Rose Hernandez MD;  Location: [facility];  Service: Orthopedics    CO WRIST Edmonia Quiles LIG Left 7/26/2018    Procedure: RELEASE CARPAL TUNNEL ENDOSCOPIC;  Surgeon: Rose Hernandez MD;  Location: [facility];  Service: Orthopedics       Family History:  Family History   Problem Relation Age of Onset    Depression Mother     Obesity Mother     Stroke Mother         Syndrome     Diabetes Mother     Alcohol abuse Father     Liver disease Father         hepatic failure     Hypertension Father     Depression Brother     Thyroid disease Brother     Depression Maternal Uncle     Heart disease Neg Hx     Cancer Neg Hx     Thyroid cancer Neg Hx        Social History:  Social History     Socioeconomic History    Marital status: /Civil Union     Spouse name: Not on file    Number of children: Not on file    Years of education: some college    Highest education level: Not on file   Occupational History     Comment: employed    Social Needs    Financial resource strain: Not on file    Food insecurity:     Worry: Not on file     Inability: Not on file   HASH needs:     Medical: Not on file     Non-medical: Not on file   Tobacco Use    Smoking status: Never Smoker    Smokeless tobacco: Never Used   Substance and Sexual Activity    Alcohol use: Yes     Comment: rarely    Drug use: No    Sexual activity: Not on file   Lifestyle    Physical activity:     Days per week: Not on file     Minutes per session: Not on file    Stress: Not on file   Relationships    Social connections:     Talks on phone: Not on file     Gets together: Not on file     Attends Mu-ism service: Not on file     Active member of club or organization: Not on file     Attends meetings of clubs or organizations: Not on file     Relationship status: Not on file    Intimate partner violence:     Fear of current or ex partner: Not on file     Emotionally abused: Not on file     Physically abused: Not on file     Forced sexual activity: Not on file   Other Topics Concern    Not on file   Social History Narrative    Daily caffeine consumption      I have reviewed the patient's medical history in detail; there are no changes to the history as noted in the electronic medical record  Objective     Vitals:    08/06/19 1538   BP: 118/70   Pulse: 82   Resp: 16   Temp: 98 7 °F (37 1 °C)   SpO2: 97%     Wt Readings from Last 3 Encounters:   08/06/19 (!) 175 kg (386 lb 12 8 oz)   07/10/19 (!) 175 kg (385 lb)   07/01/19 (!) 175 kg (385 lb)         Physical Exam   Constitutional: He appears well-developed and well-nourished  HENT:   Head: Normocephalic and atraumatic     Mouth/Throat: Oropharynx is clear and moist    Eyes: Pupils are equal, round, and reactive to light  Conjunctivae and EOM are normal    Neck: Normal range of motion  Neck supple  No thyromegaly present  Cardiovascular: Normal rate, regular rhythm and normal heart sounds  Pulmonary/Chest: Effort normal and breath sounds normal    Musculoskeletal: Normal range of motion  Lymphadenopathy:     He has no cervical adenopathy  Neurological: He is alert  Psychiatric: He has a normal mood and affect  Nursing note and vitals reviewed  Pertinent Laboratory/Diagnostic Studies:  Lab Results   Component Value Date    BUN 17 11/05/2018    CREATININE 1 03 11/05/2018    CALCIUM 9 3 11/05/2018     11/21/2017    K 4 2 11/05/2018    CO2 27 11/05/2018     11/05/2018     Lab Results   Component Value Date    ALT 35 11/05/2018    AST 15 11/05/2018    ALKPHOS 89 11/05/2018    BILITOT 0 5 11/21/2017       Lab Results   Component Value Date    WBC 6 07 07/07/2018    HGB 15 4 07/07/2018    HCT 47 7 07/07/2018    MCV 89 07/07/2018     07/07/2018       No results found for: TSH    Lab Results   Component Value Date    CHOL 150 06/17/2016     Lab Results   Component Value Date    TRIG 92 07/07/2018     Lab Results   Component Value Date    HDL 43 07/07/2018     Lab Results   Component Value Date    LDLCALC 103 (H) 07/07/2018     Lab Results   Component Value Date    HGBA1C 5 1 07/07/2018       Results for orders placed or performed in visit on 07/10/19   Throat culture   Result Value Ref Range    Throat Culture 1+ Growth of Beta Hemolytic Streptococcus Group G (A)        No orders of the defined types were placed in this encounter        ALLERGIES:  No Known Allergies    Current Medications     Current Outpatient Medications   Medication Sig Dispense Refill    betamethasone dipropionate (DIPROSONE) 0 05 % cream APPLY TO AFFECTED AREA TWICE A DAY FOR 2 WEEKS TAKE 1 WEEK OF AND REPEAT  4    buPROPion (WELLBUTRIN XL) 150 mg 24 hr tablet TAKE 1 TABLET BY MOUTH EVERY DAY 30 tablet 5  Cholecalciferol (VITAMIN D-3) 1000 units CAPS Take 2 capsules by mouth daily      dexamethasone (DECADRON) 1 mg tablet TAKE 1 TABLET (1 MG TOTAL) BY MOUTH ONCE FOR 1 DOSE  0    triamcinolone (KENALOG) 0 5 % ointment Apply topically 2 (two) times a day 30 g 3    escitalopram (LEXAPRO) 10 mg tablet Take 1 tablet (10 mg total) by mouth daily 30 tablet 5     No current facility-administered medications for this visit            Health Maintenance     Health Maintenance   Topic Date Due    PT PLAN OF CARE  1990    BMI: Followup Plan  07/10/2008    INFLUENZA VACCINE  07/01/2019    BMI: Adult  08/06/2020    DTaP,Tdap,and Td Vaccines (2 - Td) 02/11/2023    Pneumococcal Vaccine: 65+ Years (1 of 2 - PCV13) 07/10/2055    Pneumococcal Vaccine: Pediatrics (0 to 5 Years) and At-Risk Patients (6 to 59 Years)  Aged Out    HEPATITIS B VACCINES  Aged [employer] History   Administered Date(s) Administered     Influenza (IM) Preservative Free 10/01/2018    Influenza TIV (IM) 01/04/2017    Tdap 02/11/2013       Dixie Cortes MD

## 2019-08-06 NOTE — TELEPHONE ENCOUNTER
Discussed study results- scheduled for DME set-up & compliance follow-up   Paperwork to [employer]

## 2019-08-09 PROBLEM — F41.9 ANXIETY AND DEPRESSION: Status: ACTIVE | Noted: 2019-08-09

## 2019-08-09 PROBLEM — F32.A ANXIETY AND DEPRESSION: Status: ACTIVE | Noted: 2019-08-09

## 2019-08-21 ENCOUNTER — SOCIAL WORK (OUTPATIENT)
Dept: BEHAVIORAL/MENTAL HEALTH CLINIC | Facility: CLINIC | Age: 29
End: 2019-08-21
Payer: COMMERCIAL

## 2019-08-21 ENCOUNTER — LAB (OUTPATIENT)
Dept: LAB | Facility: CLINIC | Age: 29
End: 2019-08-21
Payer: COMMERCIAL

## 2019-08-21 DIAGNOSIS — E55.9 VITAMIN D DEFICIENCY: ICD-10-CM

## 2019-08-21 DIAGNOSIS — F41.9 ANXIETY AND DEPRESSION: ICD-10-CM

## 2019-08-21 DIAGNOSIS — R82.90 ABNORMAL URINE: ICD-10-CM

## 2019-08-21 DIAGNOSIS — F32.A ANXIETY AND DEPRESSION: ICD-10-CM

## 2019-08-21 DIAGNOSIS — F41.1 GENERALIZED ANXIETY DISORDER: ICD-10-CM

## 2019-08-21 DIAGNOSIS — F33.2 SEVERE EPISODE OF RECURRENT MAJOR DEPRESSIVE DISORDER, WITHOUT PSYCHOTIC FEATURES (HCC): Primary | ICD-10-CM

## 2019-08-21 LAB
25(OH)D3 SERPL-MCNC: 29.7 NG/ML (ref 30–100)
ALBUMIN SERPL BCP-MCNC: 3.9 G/DL (ref 3.5–5)
ALP SERPL-CCNC: 103 U/L (ref 46–116)
ALT SERPL W P-5'-P-CCNC: 43 U/L (ref 12–78)
ANION GAP SERPL CALCULATED.3IONS-SCNC: 7 MMOL/L (ref 4–13)
AST SERPL W P-5'-P-CCNC: 23 U/L (ref 5–45)
BASOPHILS # BLD AUTO: 0.03 THOUSANDS/ΜL (ref 0–0.1)
BASOPHILS NFR BLD AUTO: 0 % (ref 0–1)
BILIRUB SERPL-MCNC: 0.32 MG/DL (ref 0.2–1)
BUN SERPL-MCNC: 21 MG/DL (ref 5–25)
CALCIUM SERPL-MCNC: 9 MG/DL (ref 8.3–10.1)
CHLORIDE SERPL-SCNC: 108 MMOL/L (ref 100–108)
CO2 SERPL-SCNC: 29 MMOL/L (ref 21–32)
CORTIS AM PEAK SERPL-MCNC: 7.8 UG/DL (ref 4.2–22.4)
CREAT SERPL-MCNC: 1.04 MG/DL (ref 0.6–1.3)
EOSINOPHIL # BLD AUTO: 0.1 THOUSAND/ΜL (ref 0–0.61)
EOSINOPHIL NFR BLD AUTO: 1 % (ref 0–6)
ERYTHROCYTE [DISTWIDTH] IN BLOOD BY AUTOMATED COUNT: 12.6 % (ref 11.6–15.1)
GFR SERPL CREATININE-BSD FRML MDRD: 97 ML/MIN/1.73SQ M
GLUCOSE SERPL-MCNC: 101 MG/DL (ref 65–140)
HCT VFR BLD AUTO: 45 % (ref 36.5–49.3)
HCYS SERPL-SCNC: 5.8 UMOL/L (ref 5.3–14.2)
HGB BLD-MCNC: 14.6 G/DL (ref 12–17)
IMM GRANULOCYTES # BLD AUTO: 0.02 THOUSAND/UL (ref 0–0.2)
IMM GRANULOCYTES NFR BLD AUTO: 0 % (ref 0–2)
LYMPHOCYTES # BLD AUTO: 2.41 THOUSANDS/ΜL (ref 0.6–4.47)
LYMPHOCYTES NFR BLD AUTO: 33 % (ref 14–44)
MCH RBC QN AUTO: 28.2 PG (ref 26.8–34.3)
MCHC RBC AUTO-ENTMCNC: 32.4 G/DL (ref 31.4–37.4)
MCV RBC AUTO: 87 FL (ref 82–98)
MONOCYTES # BLD AUTO: 0.48 THOUSAND/ΜL (ref 0.17–1.22)
MONOCYTES NFR BLD AUTO: 7 % (ref 4–12)
NEUTROPHILS # BLD AUTO: 4.23 THOUSANDS/ΜL (ref 1.85–7.62)
NEUTS SEG NFR BLD AUTO: 59 % (ref 43–75)
NRBC BLD AUTO-RTO: 0 /100 WBCS
PLATELET # BLD AUTO: 257 THOUSANDS/UL (ref 149–390)
PMV BLD AUTO: 9.4 FL (ref 8.9–12.7)
POTASSIUM SERPL-SCNC: 3.5 MMOL/L (ref 3.5–5.3)
PROT SERPL-MCNC: 8.1 G/DL (ref 6.4–8.2)
RBC # BLD AUTO: 5.17 MILLION/UL (ref 3.88–5.62)
SODIUM SERPL-SCNC: 144 MMOL/L (ref 136–145)
T4 FREE SERPL-MCNC: 0.99 NG/DL (ref 0.76–1.46)
TSH SERPL DL<=0.05 MIU/L-ACNC: 4.83 UIU/ML (ref 0.36–3.74)
VIT B12 SERPL-MCNC: 522 PG/ML (ref 100–900)
WBC # BLD AUTO: 7.27 THOUSAND/UL (ref 4.31–10.16)

## 2019-08-21 PROCEDURE — 85025 COMPLETE CBC W/AUTO DIFF WBC: CPT

## 2019-08-21 PROCEDURE — 82306 VITAMIN D 25 HYDROXY: CPT

## 2019-08-21 PROCEDURE — 90834 PSYTX W PT 45 MINUTES: CPT | Performed by: PSYCHIATRY & NEUROLOGY

## 2019-08-21 PROCEDURE — 83090 ASSAY OF HOMOCYSTEINE: CPT

## 2019-08-21 PROCEDURE — 84630 ASSAY OF ZINC: CPT

## 2019-08-21 PROCEDURE — 84443 ASSAY THYROID STIM HORMONE: CPT

## 2019-08-21 PROCEDURE — 82747 ASSAY OF FOLIC ACID RBC: CPT

## 2019-08-21 PROCEDURE — 82607 VITAMIN B-12: CPT

## 2019-08-21 PROCEDURE — 82533 TOTAL CORTISOL: CPT

## 2019-08-21 PROCEDURE — 84439 ASSAY OF FREE THYROXINE: CPT

## 2019-08-21 PROCEDURE — 80053 COMPREHEN METABOLIC PANEL: CPT

## 2019-08-21 PROCEDURE — 36415 COLL VENOUS BLD VENIPUNCTURE: CPT

## 2019-08-21 NOTE — PSYCH
Assessment/Plan:      Diagnoses and all orders for this visit:    Severe episode of recurrent major depressive disorder, without psychotic features ([facility] [address] )    Generalized anxiety disorder      Session time [phone] (total time 44 minutes)    Subjective:     Patient ID: Fahad Marcano is a 34 y o  male  HPI Met with Wilber Hope for follow up  He shared that doctor changed his medication at last visit and he has been on the new med for about two weeks  He has not yet noticed much of a difference, but he feels good, not depressed at the moment  He talked about not doing weight loss surgery now, and instead will be focusing on a yoga/nutrition program to try and lose weight on his own  He talked about the obstacles that he thinks might be hard for him, mainly surrounding food and sweets  Discussed the benefits of good nutrition and weight loss, including 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, and having more energy  He shared some initial motivation to lose weight and that he feels positive about this particular program, and hopes for success  He also was just recently fitted for CPAP mask, and slept with it for the first time last night  He said that it was uncomfortable, mainly because he is a stomach sleeper  However, he will continue to try and use it, trying to sleep on his back instead, knowing that it will help him feel better  Discussed how all of his efforts will contribute to overall feeling better, both physically and emotionally, and provided encouragement and support in his efforts  Review of Systems      Objective:     Physical Exam    Psychiatric: calm and cooperative with good eye contact; mood euthymic, affect bright; thought process logical and organized; concentration and memory grossly intact; speech and behavior normal; good insight and judgment; denies SI HI and psychosis

## 2019-08-22 ENCOUNTER — TELEPHONE (OUTPATIENT)
Dept: ENDOCRINOLOGY | Facility: CLINIC | Age: 29
End: 2019-08-22

## 2019-08-22 NOTE — RESULT ENCOUNTER NOTE
If the cortisol level was drawn after taking dexamethasone then he needs further workup for Cushing's  He does have an elevated TSH  Check thyroid antibodies  Discuss further at the visit next week  Aline Sales to my chart

## 2019-08-22 NOTE — TELEPHONE ENCOUNTER
----- Message from Shreya Mosley MD sent at 8/22/2019  8:04 AM EDT -----  If the cortisol level was drawn after taking dexamethasone then he needs further workup for Cushing's  He does have an elevated TSH  Check thyroid antibodies  Discuss further at the visit next week  Aditya Mcneal to my chart

## 2019-08-23 LAB
FOLATE BLD-MCNC: 507.1 NG/ML
FOLATE RBC-MCNC: 1137 NG/ML
HCT VFR BLD AUTO: 44.6 % (ref 37.5–51)
ZINC SERPL-MCNC: 61 UG/DL (ref 56–134)

## 2019-08-28 ENCOUNTER — TELEPHONE (OUTPATIENT)
Dept: FAMILY MEDICINE CLINIC | Facility: CLINIC | Age: 29
End: 2019-08-28

## 2019-08-28 NOTE — RESULT ENCOUNTER NOTE
Can you please let patient know that his blood work including folate, vitamin B12, kidneys, liver was within normal range  Vitamin-D was lower and I would like him to take 2000 international units daily and double this in the winter months  In addition, his zinc was low normal and I would like him to increase foods that are rich in zinc such as nuts, seeds, check Ps and other lentils    Thank you

## 2019-08-28 NOTE — TELEPHONE ENCOUNTER
----- Message from Crystal Field MD sent at 8/27/2019  8:26 PM EDT -----  Can you please let patient know that his blood work including folate, vitamin B12, kidneys, liver was within normal range  Vitamin-D was lower and I would like him to take 2000 international units daily and double this in the winter months  In addition, his zinc was low normal and I would like him to increase foods that are rich in zinc such as nuts, seeds, check Ps and other lentils    Thank you

## 2019-08-30 ENCOUNTER — OFFICE VISIT (OUTPATIENT)
Dept: FAMILY MEDICINE CLINIC | Facility: CLINIC | Age: 29
End: 2019-08-30
Payer: COMMERCIAL

## 2019-08-30 VITALS
DIASTOLIC BLOOD PRESSURE: 90 MMHG | OXYGEN SATURATION: 97 % | WEIGHT: 315 LBS | BODY MASS INDEX: 41.75 KG/M2 | HEIGHT: 73 IN | RESPIRATION RATE: 16 BRPM | HEART RATE: 86 BPM | TEMPERATURE: 97.8 F | SYSTOLIC BLOOD PRESSURE: 130 MMHG

## 2019-08-30 DIAGNOSIS — F41.9 ANXIETY AND DEPRESSION: ICD-10-CM

## 2019-08-30 DIAGNOSIS — M54.16 LUMBAR RADICULOPATHY: Primary | ICD-10-CM

## 2019-08-30 DIAGNOSIS — G47.33 OBSTRUCTIVE SLEEP APNEA: ICD-10-CM

## 2019-08-30 DIAGNOSIS — F32.A ANXIETY AND DEPRESSION: ICD-10-CM

## 2019-08-30 PROCEDURE — 99214 OFFICE O/P EST MOD 30 MIN: CPT | Performed by: NURSE PRACTITIONER

## 2019-08-30 RX ORDER — METHYLPREDNISOLONE 4 MG/1
TABLET ORAL
Qty: 21 EACH | Refills: 0 | Status: SHIPPED | OUTPATIENT
Start: 2019-08-30 | End: 2020-01-13

## 2019-08-30 NOTE — PROGRESS NOTES
FAMILY PRACTICE OFFICE VISIT       NAME: Gabriel Lang  AGE: 34 y o  SEX: male       : 1990        MRN: [id number]    DATE: 2019    Assessment and Plan     Problem List Items Addressed This Visit        Respiratory    Obstructive sleep apnea       Other    Anxiety and depression      Other Visit Diagnoses     Lumbar radiculopathy    -  Primary    Relevant Medications    methylPREDNISolone 4 MG tablet therapy pack    Other Relevant Orders    Ambulatory referral to Physical Therapy        1  Lumbar radiculopathy  methylPREDNISolone 4 MG tablet therapy pack    Ambulatory referral to Physical Therapy   2  Obstructive sleep apnea     3  Anxiety and depression       This 80-year-old male presents today for left lateral thigh numbness, tingling, and pain for the past 3 weeks that has been worsening in frequency and severity  Denies any low back pain, or weakness in lower extremities  Suspect symptoms are lumbar radiculopathy, will treat with a Medrol Dosepak  Take this medication with food in the morning  Over the weekend, balance activity and rest, no heavy lifting >10 pounds  If symptoms are not improving over the next few days, instructed to proceed with physical therapy  If symptoms should worsen, instructed to go to the emergency room  His job requires heavy lifting, frequent bending in a lot of walking  We discussed his ability to take a few days off of work verses light duty  Light duty is not an option at his work  He reports he has to take 8 days off in order to get short-term disability, if he takes off any less days even with an office note, he would be at risk for losing his job  Note provided excusing patient from work until   Recently started wearing CPAP for obstructive sleep apnea  He is still adjusting, but overall is doing well  Feels more rested in less daytime sleepiness  Anxiety and depression, tolerating Lexapro 10 mg daily    He was recently changed from sertraline to Lexapro  He is currently not taking bupropion, because he was not sure if he could take this with Lexapro  Advised to resume bupropion, which he is agreeable to  Chief Complaint     Chief Complaint   Patient presents with    Numbness on thigh     getting worse     Leg Pain       History of Present Illness     Fili Bearden is a 80-year-old male presenting today for left lateral thigh numbness, tingling and pain for the past 3 weeks  Pain has increased in severity and frequency  Initially pain was a 2 on a 0-10 pain scale, now is a 4-5 on pain scale  Wakes up with pain at night  Pain is worse at work, he has a job that requires a lot of walking, bending, and lifting heavy items  Denies any back pain  Denies weakness in lower extremities  No loss of bowel or bladder control  No known injuries past or present  Recently changed from sertraline to Lexapro  Tolerating Lexapro well  Has not been taking bupropion, because he was not sure he could take this with Lexapro  Recently started CPAP for obstructive sleep apnea  He is still getting used to the machine, but overall is sleeping better and is not as tired  Review of Systems   Review of Systems   Constitutional: Negative  Respiratory: Negative  Cardiovascular: Negative  Gastrointestinal: Negative  Musculoskeletal: Negative for back pain  Skin: Negative for rash  Neurological: Positive for numbness (Left outer thigh as noted in HPI)  Psychiatric/Behavioral: Negative for dysphoric mood  The patient is not nervous/anxious          Active Problem List     Patient Active Problem List   Diagnosis    BMI 50 0-59 9, adult (HCC)    Generalized anxiety disorder    Elevated TSH    Carpal tunnel syndrome    Preop examination    Preoperative clearance    Pineal gland cyst    Vitamin D deficiency    Severe episode of recurrent major depressive disorder, without psychotic features ([facility] [address] )    Left knee pain    Right knee pain    Skin pruritus    Skin rash    Peeling skin    Obstructive sleep apnea syndrome    Pain in right testicle    Obstructive sleep apnea    Excessive daytime sleepiness    Anxiety and depression       Past Medical History:  Past Medical History:   Diagnosis Date    Abdominal pain     Ankle pain     Anxiety     Arthralgia     Asthma     Blood typing encounter     Bloody stools     LA    6/8/16   R   11/21/17     Carpal tunnel syndrome, bilateral     LA  Cary Townsend Jamisonning 9/12/17   R   9/12/17     Chest pain     Constipation     Depression     Dermatitis     Fatigue     Frequent bowel movements     Gastroenteritis     GERD without esophagitis     Insomnia     LA   11/7/16   R   11/21/17     Irregular heart beats     Muscle spasm     Nausea     Numbness and tingling in both hands     Odynophagia     Otitis     Pharyngitis     Pilonidal cyst with abscess     LA   10/25/13   R   6/10/14     Proteinuria     Rectal bleeding     R   11/21/17     Rhinitis     Seasonal allergies     Seizure ([address] )     Skin lesion     LA    2/3/15   R  Cary Jamisonpatricia Cary Jamisonning 3/17/17  /   LA    3/17/17   R   12/22/17     Snoring     Strep throat     Tonsillitis     URI (upper respiratory infection)     Vitamin D deficiency        Past Surgical History:  Past Surgical History:   Procedure Laterality Date    FOOT SURGERY      PILONIDAL CYST DRAINAGE      Incision and drainage of pilonidal cyst     [place] WRIST Mc Fam LIG Right 7/12/2018    Procedure: RELEASE CARPAL TUNNEL ENDOSCOPIC;  Surgeon: Isabelle Proctor MD;  Location:  MAIN OR;  Service: Orthopedics    NC WRIST Mc Fam LIG Left 7/26/2018    Procedure: RELEASE CARPAL TUNNEL ENDOSCOPIC;  Surgeon: Isabelle Proctor MD;  Location:  MAIN OR;  Service: Orthopedics       Family History:  Family History   Problem Relation Age of Onset    Depression Mother     Obesity Mother     Stroke Mother         Syndrome     Diabetes Mother     Alcohol abuse Father     Liver disease Father         hepatic failure     Hypertension Father     Depression Brother     Thyroid disease Brother     Depression Maternal Uncle     Heart disease Neg Hx     Cancer Neg Hx     Thyroid cancer Neg Hx        Social History:  Social History     Socioeconomic History    Marital status: /Civil Union     Spouse name: Not on file    Number of children: Not on file    Years of education: some college    Highest education level: Not on file   Occupational History     Comment: employed    Social Needs    Financial resource strain: Not on file    Food insecurity:     Worry: Not on file     Inability: Not on file   [employer] needs:     Medical: Not on file     Non-medical: Not on file   Tobacco Use    Smoking status: Never Smoker    Smokeless tobacco: Never Used   Substance and Sexual Activity    Alcohol use: Yes     Comment: rarely    Drug use: No    Sexual activity: Not on file   Lifestyle    Physical activity:     Days per week: Not on file     Minutes per session: Not on file    Stress: Not on file   Relationships    Social connections:     Talks on phone: Not on file     Gets together: Not on file     Attends [employer] service: Not on file     Active member of club or organization: Not on file     Attends meetings of clubs or organizations: Not on file     Relationship status: Not on file    Intimate partner violence:     Fear of current or ex partner: Not on file     Emotionally abused: Not on file     Physically abused: Not on file     Forced sexual activity: Not on file   Other Topics Concern    Not on file   Social History Narrative    Daily caffeine consumption        I have reviewed the patient's medical history in detail; there are no changes to the history as noted in the electronic medical record      Objective     Vitals:    08/30/19 1152   BP: 130/90   BP Location: Left arm   Patient Position: Sitting   Cuff Size: Large   Pulse: 86   Resp: 16   Temp: 97 8 °F (36 6 °C)   TempSrc: Tympanic   SpO2: 97%   Weight: (!) 179 kg (394 lb 3 oz)   Height: 6' 1" (1 854 m)     Wt Readings from Last 3 Encounters:   08/30/19 (!) 179 kg (394 lb 3 oz)   08/06/19 (!) 175 kg (386 lb 12 8 oz)   07/10/19 (!) 175 kg (385 lb)     Body mass index is 52 01 kg/m²  PHQ-9 Depression Screening    PHQ-9:    Frequency of the following problems over the past two weeks:            Physical Exam   Constitutional: He is oriented to person, place, and time  He appears well-developed and well-nourished  No distress  Morbidly obese   Cardiovascular: Normal rate, regular rhythm and normal heart sounds  No murmur heard  Pulmonary/Chest: Effort normal and breath sounds normal  No respiratory distress  Musculoskeletal:        Lumbar back: He exhibits tenderness (Point tenderness mid lumbar spine)  He exhibits normal range of motion, no deformity and no spasm  No lumbar paraspinous tenderness  Neurological: He is alert and oriented to person, place, and time  Reflex Scores:       Patellar reflexes are 1+ on the right side and 1+ on the left side  Reported decreased sensation in left lateral thigh in comparison to right  Bilateral lower extremity strength 5/5  Antalgic gait  Skin: No rash noted  Psychiatric: He has a normal mood and affect  Nursing note and vitals reviewed        ALLERGIES:  No Known Allergies    Current Medications     Current Outpatient Medications   Medication Sig Dispense Refill    betamethasone dipropionate (DIPROSONE) 0 05 % cream APPLY TO AFFECTED AREA TWICE A DAY FOR 2 WEEKS TAKE 1 WEEK OF AND REPEAT  4    Cholecalciferol (VITAMIN D-3) 1000 units CAPS Take 2 capsules by mouth daily      escitalopram (LEXAPRO) 10 mg tablet Take 1 tablet (10 mg total) by mouth daily 30 tablet 5    triamcinolone (KENALOG) 0 5 % ointment Apply topically 2 (two) times a day 30 g 3    buPROPion (WELLBUTRIN XL) 150 mg 24 hr tablet TAKE 1 TABLET BY MOUTH EVERY DAY 30 tablet 5    methylPREDNISolone 4 MG tablet therapy pack Use as directed on package 21 each 0     No current facility-administered medications for this visit            Health Maintenance     Health Maintenance   Topic Date Due    PT PLAN OF CARE  1990    INFLUENZA VACCINE  07/01/2019    BMI: Adult  08/06/2020    BMI: Followup Plan  08/09/2020    DTaP,Tdap,and Td Vaccines (2 - Td) 02/11/2023    Pneumococcal Vaccine: 65+ Years (1 of 2 - PCV13) 07/10/2055    Pneumococcal Vaccine: Pediatrics (0 to 5 Years) and At-Risk Patients (6 to 59 Years)  Aged Out    HEPATITIS B VACCINES  Aged [employer] History   Administered Date(s) Administered     Influenza (IM) Preservative Free 10/01/2018    Influenza TIV (IM) 01/04/2017    Tdap 02/11/2013       AMRITA Vogel

## 2019-08-30 NOTE — LETTER
August 30, 2019     Patient: Elise Martines   YOB: 1990   Date of Visit: 8/30/2019       To Whom it May Concern:    Priscilla Gaxiola is under my professional care  He was seen in my office on 8/30/2019  He may return to work on 9/9/2019  If you have any questions or concerns, please don't hesitate to call           Sincerely,          AMRITA Chaudhry        CC: No Recipients

## 2019-09-24 DIAGNOSIS — F33.9 RECURRENT MAJOR DEPRESSIVE DISORDER, REMISSION STATUS UNSPECIFIED (HCC): ICD-10-CM

## 2019-09-24 RX ORDER — BUPROPION HYDROCHLORIDE 150 MG/1
TABLET ORAL
Qty: 90 TABLET | Refills: 1 | Status: SHIPPED | OUTPATIENT
Start: 2019-09-24 | End: 2020-04-21 | Stop reason: DRUGHIGH

## 2019-11-04 ENCOUNTER — OFFICE VISIT (OUTPATIENT)
Dept: FAMILY MEDICINE CLINIC | Facility: CLINIC | Age: 29
End: 2019-11-04
Payer: COMMERCIAL

## 2019-11-04 VITALS
WEIGHT: 315 LBS | HEART RATE: 94 BPM | TEMPERATURE: 97.8 F | BODY MASS INDEX: 41.75 KG/M2 | DIASTOLIC BLOOD PRESSURE: 70 MMHG | HEIGHT: 73 IN | SYSTOLIC BLOOD PRESSURE: 118 MMHG | OXYGEN SATURATION: 97 % | RESPIRATION RATE: 18 BRPM

## 2019-11-04 DIAGNOSIS — F41.9 ANXIETY AND DEPRESSION: Primary | ICD-10-CM

## 2019-11-04 DIAGNOSIS — F32.A ANXIETY AND DEPRESSION: Primary | ICD-10-CM

## 2019-11-04 DIAGNOSIS — G47.33 OBSTRUCTIVE SLEEP APNEA: ICD-10-CM

## 2019-11-04 DIAGNOSIS — Z23 FLU VACCINE NEED: ICD-10-CM

## 2019-11-04 PROCEDURE — 99214 OFFICE O/P EST MOD 30 MIN: CPT | Performed by: FAMILY MEDICINE

## 2019-11-04 PROCEDURE — 90471 IMMUNIZATION ADMIN: CPT | Performed by: FAMILY MEDICINE

## 2019-11-04 PROCEDURE — 90682 RIV4 VACC RECOMBINANT DNA IM: CPT | Performed by: FAMILY MEDICINE

## 2019-11-04 PROCEDURE — 3008F BODY MASS INDEX DOCD: CPT | Performed by: FAMILY MEDICINE

## 2019-11-04 RX ORDER — ESCITALOPRAM OXALATE 20 MG/1
20 TABLET ORAL DAILY
Qty: 30 TABLET | Refills: 3 | Status: SHIPPED | OUTPATIENT
Start: 2019-11-04 | End: 2020-01-27

## 2019-11-04 NOTE — PROGRESS NOTES
FAMILY PRACTICE OFFICE VISIT       NAME: Jenny Perez  AGE: 34 y o  SEX: male       : 1990        MRN: [id number]    DATE: 2019  TIME: 9:44 PM    Assessment and Plan     Problem List Items Addressed This Visit        Respiratory    Obstructive sleep apnea       Other    BMI 50 0-59 9, adult ([address] )    Relevant Orders    Ambulatory referral to Weight Management    Anxiety and depression - Primary    Relevant Medications    escitalopram (LEXAPRO) 20 mg tablet      Other Visit Diagnoses     Flu vaccine need        Relevant Orders    FLUBLOK: influenza vaccine, quadrivalent, recombinant, PF, 0 5 mL (Completed)        Anxiety and depression:  Patient does unfortunately continues experience depression  He has been taking Lexapro 10 mg as well as Wellbutrin  I would like to increase this to 20 mg of Lexapro  He will continue to see his therapist to he recently establish care with  Have strongly encouraged on the importance of maintaining well balanced diet, avoiding processed foods as well as gluten and dairy, starting routine exercise regimen  He would benefit from working on stress reduction techniques including breathing exercises and meditation as well  Denies SI at present  Return parameters discussed  Follow-up in 1 month or sooner if needed  BMI 53:  I feel patient would benefit from seeing weight    Referral has been provided  Have discussed the importance of lifestyle modifications  May benefit from intermittent fasting as well  Sleep apnea:  Have strongly urged on restarting CPAP  There are no Patient Instructions on file for this visit  Machine  BMI Counseling: Body mass index is 52 11 kg/m²  The BMI is above normal  Nutrition recommendations include consuming healthier snacks  Exercise recommendations include exercising 3-5 times per week          Chief Complaint     Chief Complaint   Patient presents with    Follow-up       History of Present Illness     HPI 77-year-old male here with his wife to discuss depression  Patient has been taking Lexapro and Wellbutrin however still continues to experience depression  He would like to increase his Lexapro toes  He has started to see a certified therapist by the name of Maddi Downs at [facility] counseling patient states he started to see her to weeks ago and will go every Monday  Patient states he connects well with her  Was started on cpap, however has not used it recently as he has lost his filters  He will look for him   will restart vit 4000 iu   numbers for psychiatrists given  Review of Systems   Review of Systems   Constitutional: Negative for activity change and appetite change  Respiratory: Negative for shortness of breath  Cardiovascular: Negative  Gastrointestinal: Negative for abdominal pain  Genitourinary: Negative for dysuria  Neurological: Negative for dizziness and light-headedness  Psychiatric/Behavioral: Positive for dysphoric mood  Active Problem List     Patient Active Problem List   Diagnosis    BMI 50 0-59 9, adult (HCC)    Generalized anxiety disorder    Elevated TSH    Carpal tunnel syndrome    Preop examination    Preoperative clearance    Pineal gland cyst    Vitamin D deficiency    Severe episode of recurrent major depressive disorder, without psychotic features (HCC)    Left knee pain    Right knee pain    Skin pruritus    Skin rash    Peeling skin    Obstructive sleep apnea syndrome    Pain in right testicle    Obstructive sleep apnea    Excessive daytime sleepiness    Anxiety and depression       Past Medical History:  Past Medical History:   Diagnosis Date    Abdominal pain     Ankle pain     Anxiety     Arthralgia     Asthma     Blood typing encounter     Bloody stools     LA    6/8/16   R   11/21/17     Carpal tunnel syndrome, bilateral     LA  Komal Arbour Komal Arbour 9/12/17   R   9/12/17     Chest pain     Constipation     Depression     Dermatitis     Fatigue  Frequent bowel movements     Gastroenteritis     GERD without esophagitis     Insomnia     LA   11/7/16   R   11/21/17     Irregular heart beats     Muscle spasm     Nausea     Numbness and tingling in both hands     Odynophagia     Otitis     Pharyngitis     Pilonidal cyst with abscess     LA   10/25/13   R   6/10/14     Proteinuria     Rectal bleeding     R   11/21/17     Rhinitis     Seasonal allergies     Seizure ([address] )     Skin lesion     LA    2/3/15   R  Casey Mayer 3/17/17  /   LA    3/17/17   R   12/22/17     Snoring     Strep throat     Tonsillitis     URI (upper respiratory infection)     Vitamin D deficiency        Past Surgical History:  Past Surgical History:   Procedure Laterality Date    FOOT SURGERY      PILONIDAL CYST DRAINAGE      Incision and drainage of pilonidal cyst     [place] WRIST Ronni Ax LIG Right 7/12/2018    Procedure: RELEASE CARPAL TUNNEL ENDOSCOPIC;  Surgeon: Roby Sarabia MD;  [facility]: [facility];  Service: Orthopedics    [place] WRIST Ronni Ax LIG Left 7/26/2018    Procedure: RELEASE CARPAL TUNNEL ENDOSCOPIC;  Surgeon: Roby Sarabia MD;  [facility]: [facility];  Service: Orthopedics       Family History:  Family History   Problem Relation Age of Onset    Depression Mother     Obesity Mother     Stroke Mother         Syndrome     Diabetes Mother     Alcohol abuse Father     Liver disease Father         hepatic failure     Hypertension Father     Depression Brother     Thyroid disease Brother     Depression Maternal Uncle     Heart disease Neg Hx     Cancer Neg Hx     Thyroid cancer Neg Hx        Social History:  Social History     Socioeconomic History    Marital status: /Civil Union     Spouse name: Not on file    Number of children: Not on file    Years of education: some college    Highest education level: Not on file   Occupational History     Comment: employed    Social Needs    Financial resource strain: Not on file    Food insecurity:     Worry: Not on file     Inability: Not on file    Transportation needs:     Medical: Not on file     Non-medical: Not on file   Tobacco Use    Smoking status: Never Smoker    Smokeless tobacco: Never Used   Substance and Sexual Activity    Alcohol use: Yes     Comment: rarely    Drug use: No    Sexual activity: Not on file   Lifestyle    Physical activity:     Days per week: Not on file     Minutes per session: Not on file    Stress: Not on file   Relationships    Social connections:     Talks on phone: Not on file     Gets together: Not on file     Attends Anabaptism service: Not on file     Active member of club or organization: Not on file     Attends meetings of clubs or organizations: Not on file     Relationship status: Not on file    Intimate partner violence:     Fear of current or ex partner: Not on file     Emotionally abused: Not on file     Physically abused: Not on file     Forced sexual activity: Not on file   Other Topics Concern    Not on file   Social History Narrative    Daily caffeine consumption      I have reviewed the patient's medical history in detail; there are no changes to the history as noted in the electronic medical record  Objective     Vitals:    11/04/19 1716   BP: 118/70   Pulse:    Resp:    Temp:    SpO2:      Wt Readings from Last 3 Encounters:   11/04/19 (!) 179 kg (395 lb)   08/30/19 (!) 179 kg (394 lb 3 oz)   08/06/19 (!) 175 kg (386 lb 12 8 oz)     Vitals:    11/04/19 1632 11/04/19 1716   BP: 132/88 118/70   BP Location: Left arm    Patient Position: Sitting    Cuff Size: Large    Pulse: 94    Resp: 18    Temp: 97 8 °F (36 6 °C)    TempSrc: Tympanic    SpO2: 97%    Weight: (!) 179 kg (395 lb)    Height: 6' 1" (1 854 m)          Physical Exam   Constitutional: He appears well-developed and well-nourished  HENT:   Head: Normocephalic and atraumatic     Mouth/Throat: Oropharynx is clear and moist    Eyes: Pupils are equal, round, and reactive to light  Conjunctivae and EOM are normal    Neck: Normal range of motion  Neck supple  No thyromegaly present  Cardiovascular: Normal rate, regular rhythm and normal heart sounds  Pulmonary/Chest: Effort normal and breath sounds normal    Musculoskeletal: Normal range of motion  He exhibits no edema  Lymphadenopathy:     He has no cervical adenopathy  Neurological: He is alert  Psychiatric: He has a normal mood and affect  Nursing note and vitals reviewed        Pertinent Laboratory/Diagnostic Studies:  Lab Results   Component Value Date    BUN 21 08/21/2019    CREATININE 1 04 08/21/2019    CALCIUM 9 0 08/21/2019     11/21/2017    K 3 5 08/21/2019    CO2 29 08/21/2019     08/21/2019     Lab Results   Component Value Date    ALT 43 08/21/2019    AST 23 08/21/2019    ALKPHOS 103 08/21/2019    BILITOT 0 5 11/21/2017       Lab Results   Component Value Date    WBC 7 27 08/21/2019    HGB 14 6 08/21/2019    HCT 45 0 08/21/2019    HCT 44 6 08/21/2019    MCV 87 08/21/2019     08/21/2019       No results found for: TSH    Lab Results   Component Value Date    CHOL 150 06/17/2016     Lab Results   Component Value Date    TRIG 92 07/07/2018     Lab Results   Component Value Date    HDL 43 07/07/2018     Lab Results   Component Value Date    LDLCALC 103 (H) 07/07/2018     Lab Results   Component Value Date    HGBA1C 5 1 07/07/2018       Results for orders placed or performed in visit on 08/21/19   T4, free Lab Collect   Result Value Ref Range    Free T4 0 99 0 76 - 1 46 ng/dL   TSH, 3rd generation Lab Collect   Result Value Ref Range    TSH 3RD GENERATON 4 830 (H) 0 358 - 3 740 uIU/mL   Cortisol Level, AM Specimen Lab Collect   Result Value Ref Range    Cortisol - AM 7 8 4 2 - 22 4 ug/dL   CBC and differential   Result Value Ref Range    WBC 7 27 4 31 - 10 16 Thousand/uL    RBC 5 17 3 88 - 5 62 Million/uL    Hemoglobin 14 6 12 0 - 17 0 g/dL    Hematocrit 45 0 36 5 - 49 3 % MCV 87 82 - 98 fL    MCH 28 2 26 8 - 34 3 pg    MCHC 32 4 31 4 - 37 4 g/dL    RDW 12 6 11 6 - 15 1 %    MPV 9 4 8 9 - 12 7 fL    Platelets 042 966 - 806 Thousands/uL    nRBC 0 /100 WBCs    Neutrophils Relative 59 43 - 75 %    Immat GRANS % 0 0 - 2 %    Lymphocytes Relative 33 14 - 44 %    Monocytes Relative 7 4 - 12 %    Eosinophils Relative 1 0 - 6 %    Basophils Relative 0 0 - 1 %    Neutrophils Absolute 4 23 1 85 - 7 62 Thousands/µL    Immature Grans Absolute 0 02 0 00 - 0 20 Thousand/uL    Lymphocytes Absolute 2 41 0 60 - 4 47 Thousands/µL    Monocytes Absolute 0 48 0 17 - 1 22 Thousand/µL    Eosinophils Absolute 0 10 0 00 - 0 61 Thousand/µL    Basophils Absolute 0 03 0 00 - 0 10 Thousands/µL   Comprehensive metabolic panel   Result Value Ref Range    Sodium 144 136 - 145 mmol/L    Potassium 3 5 3 5 - 5 3 mmol/L    Chloride 108 100 - 108 mmol/L    CO2 29 21 - 32 mmol/L    ANION GAP 7 4 - 13 mmol/L    BUN 21 5 - 25 mg/dL    Creatinine 1 04 0 60 - 1 30 mg/dL    Glucose 101 65 - 140 mg/dL    Calcium 9 0 8 3 - 10 1 mg/dL    AST 23 5 - 45 U/L    ALT 43 12 - 78 U/L    Alkaline Phosphatase 103 46 - 116 U/L    Total Protein 8 1 6 4 - 8 2 g/dL    Albumin 3 9 3 5 - 5 0 g/dL    Total Bilirubin 0 32 0 20 - 1 00 mg/dL    eGFR 97 ml/min/1 73sq m   Vitamin B12   Result Value Ref Range    Vitamin B-12 522 100 - 900 pg/mL   Folate RBC   Result Value Ref Range    RBC Folate 1137 >498 ng/mL    Folate, Hemolysate 507 1 Not Estab  ng/mL    HCT 44 6 37 5 - 51 0 %   Zinc   Result Value Ref Range    Zinc 61 56 - 134 ug/dL   Homocysteine, serum   Result Value Ref Range    Homocyst(e)ine, P/S 5 8 5 3 - 14 2 umol/L   Vitamin D 25 hydroxy   Result Value Ref Range    Vit D, 25-Hydroxy 29 7 (L) 30 0 - 100 0 ng/mL       Orders Placed This Encounter   Procedures    FLUBLOK: influenza vaccine, quadrivalent, recombinant, PF, 0 5 mL    Ambulatory referral to Weight Management       ALLERGIES:  No Known Allergies    Current Medications Current Outpatient Medications   Medication Sig Dispense Refill    betamethasone dipropionate (DIPROSONE) 0 05 % cream APPLY TO AFFECTED AREA TWICE A DAY FOR 2 WEEKS TAKE 1 WEEK OF AND REPEAT  4    buPROPion (WELLBUTRIN XL) 150 mg 24 hr tablet TAKE 1 TABLET BY MOUTH EVERY DAY 90 tablet 1    Cholecalciferol (VITAMIN D-3) 1000 units CAPS Take 2 capsules by mouth daily      methylPREDNISolone 4 MG tablet therapy pack Use as directed on package 21 each 0    triamcinolone (KENALOG) 0 5 % ointment Apply topically 2 (two) times a day 30 g 3    escitalopram (LEXAPRO) 20 mg tablet Take 1 tablet (20 mg total) by mouth daily 30 tablet 3     No current facility-administered medications for this visit            Health Maintenance     Health Maintenance   Topic Date Due    BMI: Followup Plan  08/09/2020    BMI: Adult  11/04/2020    DTaP,Tdap,and Td Vaccines (2 - Td) 02/11/2023    Pneumococcal Vaccine: 65+ Years (1 of 2 - PCV13) 07/10/2055    INFLUENZA VACCINE  Completed    Pneumococcal Vaccine: Pediatrics (0 to 5 Years) and At-Risk Patients (6 to 59 Years)  Aged Out    HEPATITIS B VACCINES  Aged [employer] History   Administered Date(s) Administered     Influenza (IM) Preservative Free 10/01/2018    Influenza TIV (IM) 01/04/2017    Influenza, recombinant, quadrivalent,injectable, preservative free 11/04/2019    Tdap 02/11/2013       Gabriela Unger MD

## 2019-12-09 ENCOUNTER — OFFICE VISIT (OUTPATIENT)
Dept: FAMILY MEDICINE CLINIC | Facility: CLINIC | Age: 29
End: 2019-12-09
Payer: COMMERCIAL

## 2019-12-09 VITALS
WEIGHT: 315 LBS | HEIGHT: 74 IN | SYSTOLIC BLOOD PRESSURE: 128 MMHG | HEART RATE: 82 BPM | TEMPERATURE: 97.9 F | RESPIRATION RATE: 16 BRPM | DIASTOLIC BLOOD PRESSURE: 80 MMHG | BODY MASS INDEX: 40.43 KG/M2

## 2019-12-09 DIAGNOSIS — F41.9 ANXIETY AND DEPRESSION: Primary | ICD-10-CM

## 2019-12-09 DIAGNOSIS — G47.33 OBSTRUCTIVE SLEEP APNEA: ICD-10-CM

## 2019-12-09 DIAGNOSIS — L30.9 ECZEMA, UNSPECIFIED TYPE: ICD-10-CM

## 2019-12-09 DIAGNOSIS — F32.A ANXIETY AND DEPRESSION: Primary | ICD-10-CM

## 2019-12-09 PROCEDURE — 99214 OFFICE O/P EST MOD 30 MIN: CPT | Performed by: FAMILY MEDICINE

## 2019-12-09 PROCEDURE — 3008F BODY MASS INDEX DOCD: CPT | Performed by: FAMILY MEDICINE

## 2019-12-09 RX ORDER — BETAMETHASONE DIPROPIONATE 0.5 MG/G
CREAM TOPICAL 2 TIMES DAILY
Qty: 30 G | Refills: 3 | Status: SHIPPED | OUTPATIENT
Start: 2019-12-09 | End: 2021-06-30 | Stop reason: ALTCHOICE

## 2019-12-09 NOTE — PROGRESS NOTES
FAMILY PRACTICE OFFICE VISIT       NAME: Sidra Hackett  AGE: 34 y o  SEX: male       : 1990        MRN: [id number]    DATE: 12/15/2019  TIME: 1:02 PM    Assessment and Plan     Problem List Items Addressed This Visit        Respiratory    Obstructive sleep apnea    Relevant Orders    Ambulatory referral to Neurology       Musculoskeletal and Integument    Eczema    Relevant Medications    betamethasone dipropionate (DIPROSONE) 0 05 % cream       Other    BMI 50 0-59 9, adult ([facility] [address] )    Relevant Orders    TSH, 3rd generation with Free T4 reflex    Ambulatory referral to Endocrinology    Anxiety and depression - Primary    Relevant Orders    Ambulatory referral to Neurology    TSH, 3rd generation with Free T4 reflex    Ambulatory referral to Endocrinology        Anxiety and depression:  63-year-old male here for routine follow-up  Patient states his mood has improved after increasing Lexapro to 20 mg  He continues to take Wellbutrin 150 mg daily as well  He does continue to see his therapist, Miguel A Camarillo  Obstructive sleep apnea:  He will schedule appoint with his neurologist   He had to send back CPAP machine as he missed his appointment with Dr Abdullahi Mora which was a requirement by his insurance company  BMI 50:   Patient is not interested in evaluation for bariatric surgery however will schedule appointment with weight management Center  Have strongly encouraged him to work on avoiding sweets, carbohydrates, starting routine exercise regimen  Eczema:    Refill for steroid cream provided  Would benefit from increasing moisture therapy  There are no Patient Instructions on file for this visit  Chief Complaint     Chief Complaint   Patient presents with    Follow-up     1  month  weight loss       History of Present Illness     HPI   63-year-old male here for routine follow-up  Patient states his mood has improved after increasing Lexapro    He does continue to see his therapist, Eliane Haider Ailyn Starr  He will schedule appoint with his neurologist   He had to send back CPAP machine as he missed his appointment with Dr Brit Osei which was a requirement by his insurance company  He is interested in seeing weight management again  Review of Systems   Review of Systems   Constitutional: Negative for appetite change  Eyes: Negative for visual disturbance  Respiratory: Negative for shortness of breath  Cardiovascular: Negative  Gastrointestinal: Negative for abdominal pain  Neurological: Negative for dizziness and light-headedness  Psychiatric/Behavioral:        Mood is improved       Active Problem List     Patient Active Problem List   Diagnosis    BMI 50 0-59 9, adult (HCC)    Generalized anxiety disorder    Elevated TSH    Carpal tunnel syndrome    Preop examination    Preoperative clearance    Pineal gland cyst    Vitamin D deficiency    Severe episode of recurrent major depressive disorder, without psychotic features (HCC)    Left knee pain    Right knee pain    Skin pruritus    Skin rash    Peeling skin    Obstructive sleep apnea syndrome    Pain in right testicle    Obstructive sleep apnea    Excessive daytime sleepiness    Anxiety and depression    Eczema       Past Medical History:  Past Medical History:   Diagnosis Date    Abdominal pain     Ankle pain     Anxiety     Arthralgia     Asthma     Blood typing encounter     Bloody stools     [place]    6/8/16   R   11/21/17     Carpal tunnel syndrome, bilateral     [place]  Simon Roulettcandace Montes Rouozzie 9/12/17   R   9/12/17     Chest pain     Constipation     Depression     Dermatitis     Fatigue     Frequent bowel movements     Gastroenteritis     GERD without esophagitis     Insomnia     [place]   11/7/16   R   11/21/17     Irregular heart beats     Muscle spasm     Nausea     Numbness and tingling in both hands     Odynophagia     Otitis     Pharyngitis     Pilonidal cyst with abscess     LA   10/25/13   R   6/10/14     Proteinuria     Rectal bleeding     R   11/21/17     Rhinitis     Seasonal allergies     Seizure ([address] )     Skin lesion     [place]    2/3/15   R  Sharlee Freud Radameslecandace Freud 3/17/17  /   [place]    3/17/17   R   12/22/17     Snoring     Strep throat     Tonsillitis     URI (upper respiratory infection)     Vitamin D deficiency        Past Surgical History:  Past Surgical History:   Procedure Laterality Date    FOOT SURGERY      PILONIDAL CYST DRAINAGE      Incision and drainage of pilonidal cyst     DC WRIST Jamal Bailer LIG Right 7/12/2018    Procedure: RELEASE CARPAL TUNNEL ENDOSCOPIC;  Surgeon: Peyton Lim MD;  Location: [facility];  Service: Orthopedics    DC WRIST Jamal Bailer LIG Left 7/26/2018    Procedure: RELEASE CARPAL TUNNEL ENDOSCOPIC;  Surgeon: Peyton Lim MD;  Location: [facility];  Service: Orthopedics       Family History:  Family History   Problem Relation Age of Onset    Depression Mother     Obesity Mother     Stroke Mother         Syndrome     Diabetes Mother     Alcohol abuse Father     Liver disease Father         hepatic failure     Hypertension Father     Depression Brother     Thyroid disease Brother     Depression Maternal Uncle     Heart disease Neg Hx     Cancer Neg Hx     Thyroid cancer Neg Hx        Social History:  Social History     Socioeconomic History    Marital status: /Civil Union     Spouse name: Not on file    Number of children: Not on file    Years of education: some college    Highest education level: Not on file   Occupational History     Comment: employed    Social Needs    Financial resource strain: Not on file    Food insecurity:     Worry: Not on file     Inability: Not on file   Gaudena needs:     Medical: Not on file     Non-medical: Not on file   Tobacco Use    Smoking status: Never Smoker    Smokeless tobacco: Never Used   Substance and Sexual Activity    Alcohol use: Yes     Comment: rarely    Drug use: No    Sexual activity: Not on file   Lifestyle    Physical activity:     Days per week: Not on file     Minutes per session: Not on file    Stress: Not on file   Relationships    Social connections:     Talks on phone: Not on file     Gets together: Not on file     Attends [employer] service: Not on file     Active member of club or organization: Not on file     Attends meetings of clubs or organizations: Not on file     Relationship status: Not on file    Intimate partner violence:     Fear of current or ex partner: Not on file     Emotionally abused: Not on file     Physically abused: Not on file     Forced sexual activity: Not on file   Other Topics Concern    Not on file   Social History Narrative    Daily caffeine consumption      I have reviewed the patient's medical history in detail; there are no changes to the history as noted in the electronic medical record  Objective     Vitals:    12/09/19 1531   BP: 128/80   Pulse: 82   Resp: 16   Temp: 97 9 °F (36 6 °C)     Wt Readings from Last 3 Encounters:   12/09/19 (!) 177 kg (390 lb)   11/04/19 (!) 179 kg (395 lb)   08/30/19 (!) 179 kg (394 lb 3 oz)       Physical Exam   Constitutional: He appears well-developed and well-nourished  HENT:   Head: Normocephalic and atraumatic  Mouth/Throat: Oropharynx is clear and moist    Eyes: Pupils are equal, round, and reactive to light  Conjunctivae and EOM are normal    Neck: Normal range of motion  Neck supple  No thyromegaly present  Cardiovascular: Normal rate, regular rhythm and normal heart sounds  Pulmonary/Chest: Effort normal and breath sounds normal    Musculoskeletal: Normal range of motion  Lymphadenopathy:     He has no cervical adenopathy  Neurological: He is alert  Psychiatric: He has a normal mood and affect  Nursing note and vitals reviewed        Pertinent Laboratory/Diagnostic Studies:  Lab Results   Component Value Date    BUN 21 08/21/2019    CREATININE 1 04 08/21/2019    CALCIUM 9 0 08/21/2019     11/21/2017    K 3 5 08/21/2019    CO2 29 08/21/2019     08/21/2019     Lab Results   Component Value Date    ALT 43 08/21/2019    AST 23 08/21/2019    ALKPHOS 103 08/21/2019    BILITOT 0 5 11/21/2017       Lab Results   Component Value Date    WBC 7 27 08/21/2019    HGB 14 6 08/21/2019    HCT 45 0 08/21/2019    HCT 44 6 08/21/2019    MCV 87 08/21/2019     08/21/2019       No results found for: TSH    Lab Results   Component Value Date    CHOL 150 06/17/2016     Lab Results   Component Value Date    TRIG 92 07/07/2018     Lab Results   Component Value Date    HDL 43 07/07/2018     Lab Results   Component Value Date    LDLCALC 103 (H) 07/07/2018     Lab Results   Component Value Date    HGBA1C 5 1 07/07/2018       Results for orders placed or performed in visit on 08/21/19   T4, free Lab Collect   Result Value Ref Range    Free T4 0 99 0 76 - 1 46 ng/dL   TSH, 3rd generation Lab Collect   Result Value Ref Range    TSH 3RD GENERATON 4 830 (H) 0 358 - 3 740 uIU/mL   Cortisol Level, AM Specimen Lab Collect   Result Value Ref Range    Cortisol - AM 7 8 4 2 - 22 4 ug/dL   CBC and differential   Result Value Ref Range    WBC 7 27 4 31 - 10 16 Thousand/uL    RBC 5 17 3 88 - 5 62 Million/uL    Hemoglobin 14 6 12 0 - 17 0 g/dL    Hematocrit 45 0 36 5 - 49 3 %    MCV 87 82 - 98 fL    MCH 28 2 26 8 - 34 3 pg    MCHC 32 4 31 4 - 37 4 g/dL    RDW 12 6 11 6 - 15 1 %    MPV 9 4 8 9 - 12 7 fL    Platelets 521 289 - 231 Thousands/uL    nRBC 0 /100 WBCs    Neutrophils Relative 59 43 - 75 %    Immat GRANS % 0 0 - 2 %    Lymphocytes Relative 33 14 - 44 %    Monocytes Relative 7 4 - 12 %    Eosinophils Relative 1 0 - 6 %    Basophils Relative 0 0 - 1 %    Neutrophils Absolute 4 23 1 85 - 7 62 Thousands/µL    Immature Grans Absolute 0 02 0 00 - 0 20 Thousand/uL    Lymphocytes Absolute 2 41 0 60 - 4 47 Thousands/µL    Monocytes Absolute 0 48 0 17 - 1 22 Thousand/µL    Eosinophils Absolute 0 10 0 00 - 0 61 Thousand/µL    Basophils Absolute 0 03 0 00 - 0 10 Thousands/µL   Comprehensive metabolic panel   Result Value Ref Range    Sodium 144 136 - 145 mmol/L    Potassium 3 5 3 5 - 5 3 mmol/L    Chloride 108 100 - 108 mmol/L    CO2 29 21 - 32 mmol/L    ANION GAP 7 4 - 13 mmol/L    BUN 21 5 - 25 mg/dL    Creatinine 1 04 0 60 - 1 30 mg/dL    Glucose 101 65 - 140 mg/dL    Calcium 9 0 8 3 - 10 1 mg/dL    AST 23 5 - 45 U/L    ALT 43 12 - 78 U/L    Alkaline Phosphatase 103 46 - 116 U/L    Total Protein 8 1 6 4 - 8 2 g/dL    Albumin 3 9 3 5 - 5 0 g/dL    Total Bilirubin 0 32 0 20 - 1 00 mg/dL    eGFR 97 ml/min/1 73sq m   Vitamin B12   Result Value Ref Range    Vitamin B-12 522 100 - 900 pg/mL   Folate RBC   Result Value Ref Range    RBC Folate 1137 >498 ng/mL    Folate, Hemolysate 507 1 Not Estab  ng/mL    HCT 44 6 37 5 - 51 0 %   Zinc   Result Value Ref Range    Zinc 61 56 - 134 ug/dL   Homocysteine, serum   Result Value Ref Range    Homocyst(e)ine, P/S 5 8 5 3 - 14 2 umol/L   Vitamin D 25 hydroxy   Result Value Ref Range    Vit D, 25-Hydroxy 29 7 (L) 30 0 - 100 0 ng/mL       Orders Placed This Encounter   Procedures    TSH, 3rd generation with Free T4 reflex    Ambulatory referral to Neurology    Ambulatory referral to Endocrinology       ALLERGIES:  No Known Allergies    Current Medications     Current Outpatient Medications   Medication Sig Dispense Refill    betamethasone dipropionate (DIPROSONE) 0 05 % cream Apply topically 2 (two) times a day 30 g 3    buPROPion (WELLBUTRIN XL) 150 mg 24 hr tablet TAKE 1 TABLET BY MOUTH EVERY DAY 90 tablet 1    Cholecalciferol (VITAMIN D-3) 1000 units CAPS Take 2 capsules by mouth daily      escitalopram (LEXAPRO) 20 mg tablet Take 1 tablet (20 mg total) by mouth daily 30 tablet 3    triamcinolone (KENALOG) 0 5 % ointment Apply topically 2 (two) times a day 30 g 3    methylPREDNISolone 4 MG tablet therapy pack Use as directed on package (Patient not taking: Reported on 12/9/2019) 21 each 0     No current facility-administered medications for this visit            Health Maintenance     Health Maintenance   Topic Date Due    HIV Screening  07/10/2005    BMI: Followup Plan  11/05/2020    BMI: Adult  12/09/2020    DTaP,Tdap,and Td Vaccines (2 - Td) 02/11/2023    Pneumococcal Vaccine: 65+ Years (1 of 2 - PCV13) 07/10/2055    Influenza Vaccine  Completed    Pneumococcal Vaccine: Pediatrics (0 to 5 Years) and At-Risk Patients (6 to 59 Years)  Aged Out    HIB Vaccine  Aged Out    Hepatitis B Vaccine  Aged Out    IPV Vaccine  Aged Out    Hepatitis A Vaccine  Aged Out    Meningococcal ACWY Vaccine  Aged Out    HPV Vaccine  Aged [employer] History   Administered Date(s) Administered     Influenza (IM) Preservative Free 10/01/2018    Influenza TIV (IM) 01/04/2017    Influenza, recombinant, quadrivalent,injectable, preservative free 11/04/2019    Tdap 02/11/2013       Shaw Jones MD

## 2019-12-15 PROBLEM — L30.9 ECZEMA: Status: ACTIVE | Noted: 2019-12-15

## 2020-01-13 ENCOUNTER — OFFICE VISIT (OUTPATIENT)
Dept: FAMILY MEDICINE CLINIC | Facility: CLINIC | Age: 30
End: 2020-01-13
Payer: COMMERCIAL

## 2020-01-13 VITALS
RESPIRATION RATE: 18 BRPM | SYSTOLIC BLOOD PRESSURE: 110 MMHG | HEIGHT: 74 IN | BODY MASS INDEX: 40.43 KG/M2 | WEIGHT: 315 LBS | DIASTOLIC BLOOD PRESSURE: 80 MMHG | TEMPERATURE: 98.1 F | HEART RATE: 87 BPM | OXYGEN SATURATION: 96 %

## 2020-01-13 DIAGNOSIS — L29.9 SKIN PRURITUS: ICD-10-CM

## 2020-01-13 DIAGNOSIS — R23.4 PEELING SKIN: ICD-10-CM

## 2020-01-13 DIAGNOSIS — R19.7 DIARRHEA, UNSPECIFIED TYPE: Primary | ICD-10-CM

## 2020-01-13 DIAGNOSIS — R21 SKIN RASH: ICD-10-CM

## 2020-01-13 PROCEDURE — 3008F BODY MASS INDEX DOCD: CPT | Performed by: FAMILY MEDICINE

## 2020-01-13 PROCEDURE — 99214 OFFICE O/P EST MOD 30 MIN: CPT | Performed by: FAMILY MEDICINE

## 2020-01-13 NOTE — PROGRESS NOTES
FAMILY PRACTICE OFFICE VISIT       NAME: Maya Barrera  AGE: 34 y o  SEX: male       : 1990        MRN: [id number]    DATE: 2020  TIME: 12:50 PM    Assessment and Plan     Problem List Items Addressed This Visit        Musculoskeletal and Integument    Skin pruritus    Relevant Orders    Ambulatory referral to Dermatology    Skin rash    Relevant Orders    Ambulatory referral to Dermatology       Other    BMI 50 0-59 9, adult ([facility] [address] )    Peeling skin    Relevant Orders    Ambulatory referral to Dermatology    Diarrhea - Primary    Relevant Orders    Stool culture    White Blood Cells, Stool by Gram Stain    Ova and parasite examination    Clostridium difficile toxin by PCR    Comprehensive metabolic panel    CBC and differential        Diarrhea:  Unclear etiology  I would like him to start a food diary, investigate whether there is anything that may be aggravating his symptoms  I will also go ahead and check blood work as well as stool studies  If symptoms do persist or worsen, I would like him to let me know, will also consider sending to Gastroenterology  Eczema, skin pruritus, peeling skin:  Patient has tried multiple treatments including topical steroids, coconut oil, eczema relief treatments  He has also worn gloves at nighttime with adequate moisturization therapy without much relief  I feel he would benefit from evaluation by dermatology at this point  He is agreeable  Referral has been provided  BMI 51:  Patient will start dietary modification tomorrow  He will also keep scheduled appointment with weight management as well  There are no Patient Instructions on file for this visit    I have spent 25 minutes with Patient  today in which greater than 50% of this time was spent in counseling/coordination of care regarding Diagnostic results, Prognosis, Risks and benefits of tx options, Intructions for management, Patient and family education, Importance of tx compliance, Risk factor reductions and Impressions  Chief Complaint     Chief Complaint   Patient presents with    Follow-up    Rash     on hand getting worse        History of Present Illness     HPI   Patient presents today to discuss a couple of issues  Patient states he has had diarrhea for the past month, 3 6 times per day, loose watery, occasionally soft  Has not noticed any blood  Denies abdominal pain, cramping  He does have occasional bloating  Denies any change in dietary habits  Patient is interested in losing weight, will start 2 protein shakes and 1 meal daily as well as fruit and vegetable starting tomorrow  Patient states he has been experiencing worsening dry skin , peeling of his palms as well as itching and pain  He is exposed to alcohol such as beer, cleaning chemicals, hands of, cardboard, plastic, vital cans, aluminum, glass at work  Tries to wear medical gloves as much as he can, non latex or kevlar gloves  When he was off for a week, started to heal  Has used Coconut oil, eczema relief cream which does not help    Patient states his palm blisters, peels   Is scaly as well as dry   Review of Systems   Review of Systems   Gastrointestinal: Positive for diarrhea  Negative for abdominal pain, blood in stool, nausea and vomiting  Occasional bloating and cramping   Genitourinary: Negative for dysuria  Skin: Positive for rash         Active Problem List     Patient Active Problem List   Diagnosis    BMI 50 0-59 9, adult (HCC)    Generalized anxiety disorder    Elevated TSH    Carpal tunnel syndrome    Preop examination    Preoperative clearance    Pineal gland cyst    Vitamin D deficiency    Severe episode of recurrent major depressive disorder, without psychotic features ([facility] [address] )    Left knee pain    Right knee pain    Skin pruritus    Skin rash    Peeling skin    Obstructive sleep apnea syndrome    Pain in right testicle    Obstructive sleep apnea    Excessive daytime sleepiness    Anxiety and depression    Eczema    Diarrhea       Past Medical History:  Past Medical History:   Diagnosis Date    Abdominal pain     Ankle pain     Anxiety     Arthralgia     Asthma     Blood typing encounter     Bloody stools     LA    6/8/16   R   11/21/17     Carpal tunnel syndrome, bilateral     [place]  Marily [place] Marily Aspen 9/12/17   R   9/12/17     Chest pain     Constipation     Depression     Dermatitis     Fatigue     Frequent bowel movements     Gastroenteritis     GERD without esophagitis     Insomnia     LA   11/7/16   R   11/21/17     Irregular heart beats     Muscle spasm     Nausea     Numbness and tingling in both hands     Odynophagia     Otitis     Pharyngitis     Pilonidal cyst with abscess     LA   10/25/13   R   6/10/14     Proteinuria     Rectal bleeding     R   11/21/17     Rhinitis     Seasonal allergies     Seizure ([facility] [address] )     Skin lesion     LA    2/3/15   R  Marily [place] Marily Aspen 3/17/17  /   LA    3/17/17   R   12/22/17     Snoring     Strep throat     Tonsillitis     URI (upper respiratory infection)     Vitamin D deficiency        Past Surgical History:  Past Surgical History:   Procedure Laterality Date    FOOT SURGERY      PILONIDAL CYST DRAINAGE      Incision and drainage of pilonidal cyst     MS WRIST Maria L Reek LIG Right 7/12/2018    Procedure: RELEASE CARPAL TUNNEL ENDOSCOPIC;  Surgeon: Renny Finn MD;  [facility];  Service: Orthopedics    MS WRIST Maria L Reek LIG Left 7/26/2018    Procedure: RELEASE CARPAL TUNNEL ENDOSCOPIC;  Surgeon: Renny Finn MD;  [facility];  Service: Orthopedics       Family History:  Family History   Problem Relation Age of Onset    Depression Mother     Obesity Mother     Stroke Mother         Syndrome     Diabetes Mother     Alcohol abuse Father     Liver disease Father         hepatic failure     Hypertension Father     Depression Brother     Thyroid disease Brother  Depression Maternal Uncle     Heart disease Neg Hx     Cancer Neg Hx     Thyroid cancer Neg Hx        Social History:  Social History     Socioeconomic History    Marital status: /Civil Union     Spouse name: Not on file    Number of children: Not on file    Years of education: some college    Highest education level: Not on file   Occupational History     Comment: employed    Social Needs    Financial resource strain: Not on file    Food insecurity:     Worry: Not on file     Inability: Not on file   [employer] needs:     Medical: Not on file     Non-medical: Not on file   Tobacco Use    Smoking status: Never Smoker    Smokeless tobacco: Never Used   Substance and Sexual Activity    Alcohol use: Yes     Comment: rarely    Drug use: No    Sexual activity: Not on file   Lifestyle    Physical activity:     Days per week: Not on file     Minutes per session: Not on file    Stress: Not on file   Relationships    Social connections:     Talks on phone: Not on file     Gets together: Not on file     Attends Hinduism service: Not on file     Active member of club or organization: Not on file     Attends meetings of clubs or organizations: Not on file     Relationship status: Not on file    Intimate partner violence:     Fear of current or ex partner: Not on file     Emotionally abused: Not on file     Physically abused: Not on file     Forced sexual activity: Not on file   Other Topics Concern    Not on file   Social History Narrative    Daily caffeine consumption      I have reviewed the patient's medical history in detail; there are no changes to the history as noted in the electronic medical record      Objective     Vitals:    01/13/20 1518   BP: 110/80   Pulse: 87   Resp: 18   Temp: 98 1 °F (36 7 °C)   SpO2: 96%     Wt Readings from Last 3 Encounters:   01/13/20 (!) 178 kg (393 lb 2 oz)   12/09/19 (!) 177 kg (390 lb)   11/04/19 (!) 179 kg (395 lb)       Physical Exam Constitutional: He appears well-developed and well-nourished  HENT:   Head: Normocephalic and atraumatic  Mouth/Throat: Oropharynx is clear and moist    Neck: Normal range of motion  Neck supple  Cardiovascular: Normal rate, regular rhythm and normal heart sounds  Pulmonary/Chest: Effort normal and breath sounds normal    Abdominal: Soft  Bowel sounds are normal  There is no tenderness  Musculoskeletal: Normal range of motion  Lymphadenopathy:     He has no cervical adenopathy  Neurological: He is alert  Skin: Rash noted  Palms of hands with peeling skin noted, xerosis cutis   Nursing note and vitals reviewed        Pertinent Laboratory/Diagnostic Studies:  Lab Results   Component Value Date    BUN 21 08/21/2019    CREATININE 1 04 08/21/2019    CALCIUM 9 0 08/21/2019     11/21/2017    K 3 5 08/21/2019    CO2 29 08/21/2019     08/21/2019     Lab Results   Component Value Date    ALT 43 08/21/2019    AST 23 08/21/2019    ALKPHOS 103 08/21/2019    BILITOT 0 5 11/21/2017       Lab Results   Component Value Date    WBC 7 27 08/21/2019    HGB 14 6 08/21/2019    HCT 45 0 08/21/2019    HCT 44 6 08/21/2019    MCV 87 08/21/2019     08/21/2019       No results found for: TSH    Lab Results   Component Value Date    CHOL 150 06/17/2016     Lab Results   Component Value Date    TRIG 92 07/07/2018     Lab Results   Component Value Date    HDL 43 07/07/2018     Lab Results   Component Value Date    LDLCALC 103 (H) 07/07/2018     Lab Results   Component Value Date    HGBA1C 5 1 07/07/2018       Results for orders placed or performed in visit on 08/21/19   T4, free Lab Collect   Result Value Ref Range    Free T4 0 99 0 76 - 1 46 ng/dL   TSH, 3rd generation Lab Collect   Result Value Ref Range    TSH 3RD GENERATON 4 830 (H) 0 358 - 3 740 uIU/mL   Cortisol Level, AM Specimen Lab Collect   Result Value Ref Range    Cortisol - AM 7 8 4 2 - 22 4 ug/dL   CBC and differential   Result Value Ref Range WBC 7 27 4 31 - 10 16 Thousand/uL    RBC 5 17 3 88 - 5 62 Million/uL    Hemoglobin 14 6 12 0 - 17 0 g/dL    Hematocrit 45 0 36 5 - 49 3 %    MCV 87 82 - 98 fL    MCH 28 2 26 8 - 34 3 pg    MCHC 32 4 31 4 - 37 4 g/dL    RDW 12 6 11 6 - 15 1 %    MPV 9 4 8 9 - 12 7 fL    Platelets 427 543 - 768 Thousands/uL    nRBC 0 /100 WBCs    Neutrophils Relative 59 43 - 75 %    Immat GRANS % 0 0 - 2 %    Lymphocytes Relative 33 14 - 44 %    Monocytes Relative 7 4 - 12 %    Eosinophils Relative 1 0 - 6 %    Basophils Relative 0 0 - 1 %    Neutrophils Absolute 4 23 1 85 - 7 62 Thousands/µL    Immature Grans Absolute 0 02 0 00 - 0 20 Thousand/uL    Lymphocytes Absolute 2 41 0 60 - 4 47 Thousands/µL    Monocytes Absolute 0 48 0 17 - 1 22 Thousand/µL    Eosinophils Absolute 0 10 0 00 - 0 61 Thousand/µL    Basophils Absolute 0 03 0 00 - 0 10 Thousands/µL   Comprehensive metabolic panel   Result Value Ref Range    Sodium 144 136 - 145 mmol/L    Potassium 3 5 3 5 - 5 3 mmol/L    Chloride 108 100 - 108 mmol/L    CO2 29 21 - 32 mmol/L    ANION GAP 7 4 - 13 mmol/L    BUN 21 5 - 25 mg/dL    Creatinine 1 04 0 60 - 1 30 mg/dL    Glucose 101 65 - 140 mg/dL    Calcium 9 0 8 3 - 10 1 mg/dL    AST 23 5 - 45 U/L    ALT 43 12 - 78 U/L    Alkaline Phosphatase 103 46 - 116 U/L    Total Protein 8 1 6 4 - 8 2 g/dL    Albumin 3 9 3 5 - 5 0 g/dL    Total Bilirubin 0 32 0 20 - 1 00 mg/dL    eGFR 97 ml/min/1 73sq m   Vitamin B12   Result Value Ref Range    Vitamin B-12 522 100 - 900 pg/mL   Folate RBC   Result Value Ref Range    RBC Folate 1137 >498 ng/mL    Folate, Hemolysate 507 1 Not Estab  ng/mL    HCT 44 6 37 5 - 51 0 %   Zinc   Result Value Ref Range    Zinc 61 56 - 134 ug/dL   Homocysteine, serum   Result Value Ref Range    Homocyst(e)ine, P/S 5 8 5 3 - 14 2 umol/L   Vitamin D 25 hydroxy   Result Value Ref Range    Vit D, 25-Hydroxy 29 7 (L) 30 0 - 100 0 ng/mL       Orders Placed This Encounter   Procedures    Stool culture    White Blood Cells, Stool by Gram Stain    Ova and parasite examination    Clostridium difficile toxin by PCR    Comprehensive metabolic panel    CBC and differential    Ambulatory referral to Dermatology       ALLERGIES:  No Known Allergies    Current Medications     Current Outpatient Medications   Medication Sig Dispense Refill    betamethasone dipropionate (DIPROSONE) 0 05 % cream Apply topically 2 (two) times a day 30 g 3    buPROPion (WELLBUTRIN XL) 150 mg 24 hr tablet TAKE 1 TABLET BY MOUTH EVERY DAY 90 tablet 1    Cholecalciferol (VITAMIN D-3) 1000 units CAPS Take 2 capsules by mouth daily      escitalopram (LEXAPRO) 20 mg tablet Take 1 tablet (20 mg total) by mouth daily 30 tablet 3    triamcinolone (KENALOG) 0 5 % ointment Apply topically 2 (two) times a day 30 g 3     No current facility-administered medications for this visit            Health Maintenance     Health Maintenance   Topic Date Due    HIV Screening  07/10/2005    Annual Physical  07/10/2008    BMI: Followup Plan  11/05/2020    BMI: Adult  01/13/2021    DTaP,Tdap,and Td Vaccines (2 - Td) 02/11/2023    Pneumococcal Vaccine: 65+ Years (1 of 2 - PCV13) 07/10/2055    Influenza Vaccine  Completed    Pneumococcal Vaccine: Pediatrics (0 to 5 Years) and At-Risk Patients (6 to 59 Years)  Aged Out    HIB Vaccine  Aged Out    Hepatitis B Vaccine  Aged Out    IPV Vaccine  Aged Out    Hepatitis A Vaccine  Aged Out    Meningococcal ACWY Vaccine  Aged Out    HPV Vaccine  Aged [employer] History   Administered Date(s) Administered     Influenza (IM) Preservative Free 10/01/2018    Influenza TIV (IM) 01/04/2017    Influenza, recombinant, quadrivalent,injectable, preservative free 11/04/2019    Tdap 02/11/2013       Merly Perera MD

## 2020-01-21 ENCOUNTER — TELEPHONE (OUTPATIENT)
Dept: SLEEP CENTER | Facility: CLINIC | Age: 30
End: 2020-01-21

## 2020-01-21 ENCOUNTER — LAB (OUTPATIENT)
Dept: LAB | Facility: CLINIC | Age: 30
End: 2020-01-21
Payer: COMMERCIAL

## 2020-01-21 DIAGNOSIS — R19.7 DIARRHEA, UNSPECIFIED TYPE: ICD-10-CM

## 2020-01-21 LAB
ALBUMIN SERPL BCP-MCNC: 3.9 G/DL (ref 3.5–5)
ALP SERPL-CCNC: 106 U/L (ref 46–116)
ALT SERPL W P-5'-P-CCNC: 41 U/L (ref 12–78)
ANION GAP SERPL CALCULATED.3IONS-SCNC: 4 MMOL/L (ref 4–13)
AST SERPL W P-5'-P-CCNC: 18 U/L (ref 5–45)
BASOPHILS # BLD AUTO: 0.03 THOUSANDS/ΜL (ref 0–0.1)
BASOPHILS NFR BLD AUTO: 0 % (ref 0–1)
BILIRUB SERPL-MCNC: 0.4 MG/DL (ref 0.2–1)
BUN SERPL-MCNC: 17 MG/DL (ref 5–25)
CALCIUM SERPL-MCNC: 9.6 MG/DL (ref 8.3–10.1)
CHLORIDE SERPL-SCNC: 105 MMOL/L (ref 100–108)
CO2 SERPL-SCNC: 29 MMOL/L (ref 21–32)
CREAT SERPL-MCNC: 1.09 MG/DL (ref 0.6–1.3)
EOSINOPHIL # BLD AUTO: 0.08 THOUSAND/ΜL (ref 0–0.61)
EOSINOPHIL NFR BLD AUTO: 1 % (ref 0–6)
ERYTHROCYTE [DISTWIDTH] IN BLOOD BY AUTOMATED COUNT: 12.4 % (ref 11.6–15.1)
GFR SERPL CREATININE-BSD FRML MDRD: 91 ML/MIN/1.73SQ M
GLUCOSE SERPL-MCNC: 85 MG/DL (ref 65–140)
HCT VFR BLD AUTO: 47.5 % (ref 36.5–49.3)
HGB BLD-MCNC: 15.5 G/DL (ref 12–17)
IMM GRANULOCYTES # BLD AUTO: 0.02 THOUSAND/UL (ref 0–0.2)
IMM GRANULOCYTES NFR BLD AUTO: 0 % (ref 0–2)
LYMPHOCYTES # BLD AUTO: 2.15 THOUSANDS/ΜL (ref 0.6–4.47)
LYMPHOCYTES NFR BLD AUTO: 30 % (ref 14–44)
MCH RBC QN AUTO: 28.2 PG (ref 26.8–34.3)
MCHC RBC AUTO-ENTMCNC: 32.6 G/DL (ref 31.4–37.4)
MCV RBC AUTO: 87 FL (ref 82–98)
MONOCYTES # BLD AUTO: 0.54 THOUSAND/ΜL (ref 0.17–1.22)
MONOCYTES NFR BLD AUTO: 8 % (ref 4–12)
NEUTROPHILS # BLD AUTO: 4.33 THOUSANDS/ΜL (ref 1.85–7.62)
NEUTS SEG NFR BLD AUTO: 61 % (ref 43–75)
NRBC BLD AUTO-RTO: 0 /100 WBCS
PLATELET # BLD AUTO: 277 THOUSANDS/UL (ref 149–390)
PMV BLD AUTO: 9.5 FL (ref 8.9–12.7)
POTASSIUM SERPL-SCNC: 3.8 MMOL/L (ref 3.5–5.3)
PROT SERPL-MCNC: 8.7 G/DL (ref 6.4–8.2)
RBC # BLD AUTO: 5.49 MILLION/UL (ref 3.88–5.62)
SODIUM SERPL-SCNC: 138 MMOL/L (ref 136–145)
WBC # BLD AUTO: 7.15 THOUSAND/UL (ref 4.31–10.16)

## 2020-01-21 PROCEDURE — 36415 COLL VENOUS BLD VENIPUNCTURE: CPT

## 2020-01-21 PROCEDURE — 80053 COMPREHEN METABOLIC PANEL: CPT

## 2020-01-21 PROCEDURE — 85025 COMPLETE CBC W/AUTO DIFF WBC: CPT

## 2020-01-23 ENCOUNTER — TELEPHONE (OUTPATIENT)
Dept: FAMILY MEDICINE CLINIC | Facility: CLINIC | Age: 30
End: 2020-01-23

## 2020-01-23 DIAGNOSIS — R77.8 ELEVATED TOTAL PROTEIN: Primary | ICD-10-CM

## 2020-01-23 NOTE — RESULT ENCOUNTER NOTE
Please let patient know that his blood work including blood sugar, kidneys, liver was within normal range  His total protein however was mildly elevated  I would like to repeat this and have him drink 2-3 glasses of water prior to having the blood work done    Thank you

## 2020-01-27 DIAGNOSIS — F41.9 ANXIETY AND DEPRESSION: ICD-10-CM

## 2020-01-27 DIAGNOSIS — F32.A ANXIETY AND DEPRESSION: ICD-10-CM

## 2020-01-27 RX ORDER — ESCITALOPRAM OXALATE 20 MG/1
TABLET ORAL
Qty: 90 TABLET | Refills: 3 | Status: SHIPPED | OUTPATIENT
Start: 2020-01-27 | End: 2021-02-05

## 2020-02-27 ENCOUNTER — OFFICE VISIT (OUTPATIENT)
Dept: FAMILY MEDICINE CLINIC | Facility: CLINIC | Age: 30
End: 2020-02-27
Payer: COMMERCIAL

## 2020-02-27 VITALS
HEART RATE: 76 BPM | TEMPERATURE: 97.6 F | HEIGHT: 74 IN | OXYGEN SATURATION: 97 % | SYSTOLIC BLOOD PRESSURE: 110 MMHG | RESPIRATION RATE: 18 BRPM | BODY MASS INDEX: 40.43 KG/M2 | DIASTOLIC BLOOD PRESSURE: 70 MMHG | WEIGHT: 315 LBS

## 2020-02-27 DIAGNOSIS — J01.90 ACUTE NON-RECURRENT SINUSITIS, UNSPECIFIED LOCATION: Primary | ICD-10-CM

## 2020-02-27 PROCEDURE — 1036F TOBACCO NON-USER: CPT | Performed by: NURSE PRACTITIONER

## 2020-02-27 PROCEDURE — 3008F BODY MASS INDEX DOCD: CPT | Performed by: NURSE PRACTITIONER

## 2020-02-27 PROCEDURE — 99213 OFFICE O/P EST LOW 20 MIN: CPT | Performed by: NURSE PRACTITIONER

## 2020-02-27 RX ORDER — AMOXICILLIN AND CLAVULANATE POTASSIUM 875; 125 MG/1; MG/1
1 TABLET, FILM COATED ORAL EVERY 12 HOURS SCHEDULED
Qty: 14 TABLET | Refills: 0 | Status: SHIPPED | OUTPATIENT
Start: 2020-02-27 | End: 2020-03-05

## 2020-02-27 NOTE — PROGRESS NOTES
FAMILY PRACTICE OFFICE VISIT       NAME: Eriberto Melchor  AGE: 34 y o  SEX: male       : 1990        MRN: [id number]    DATE: 2020    Assessment and Plan     Problem List Items Addressed This Visit     None      Visit Diagnoses     Acute non-recurrent sinusitis, unspecified location    -  Primary    Relevant Medications    amoxicillin-clavulanate (AUGMENTIN) 875-125 mg per tablet        1  Acute non-recurrent sinusitis, unspecified location  amoxicillin-clavulanate (AUGMENTIN) 875-125 mg per tablet     This 20-year-old male with obesity, anxiety, depression, JIMI, presents today for symptoms concerning for sinusitis  Recommend treatment with Augmentin 875-125 mg twice daily for 7 days  Take this medication with food continue supportive care:  Rest, fluids, warm fluids, humidification  Nasal saline rinses  May continue use Tylenol cold and flu as needed  If symptoms should worsen, or if symptoms are not improving over the next 3-4 days he will call  Chief Complaint     Chief Complaint   Patient presents with    Cold Like Symptoms     Pt is here for sore throat, cough, nasal, chest congestion and body aches 7 + days       History of Present Illness     Eriberto Melchor is a 34year old male presenting today for cold symptoms for the past one week       Symptoms began with nasal congestion, sore throat, post nasal drip  Feels weak and tired  Last night felt chest was congested and it is was a little harder to breath  Has not taken his temperature, has had some chills and sweats  Is taking Tylenol cold and flu  No known sick contacts  Review of Systems   Review of Systems   Constitutional: Positive for chills, diaphoresis and fatigue  Negative for fever  HENT: Positive for congestion, postnasal drip, rhinorrhea, sinus pressure and sore throat  Negative for ear pain  Respiratory: Positive for cough and chest tightness  Negative for shortness of breath and wheezing  Cardiovascular: Negative  Musculoskeletal: Negative for myalgias  Neurological: Negative for dizziness and headaches  Active Problem List     Patient Active Problem List   Diagnosis    Morbid obesity with BMI of 50 0-59 9, adult (HCC)    Generalized anxiety disorder    Elevated TSH    Carpal tunnel syndrome    Preop examination    Preoperative clearance    Pineal gland cyst    Vitamin D deficiency    Severe episode of recurrent major depressive disorder, without psychotic features ([facility])    Left knee pain    Right knee pain    Skin pruritus    Skin rash    Peeling skin    Obstructive sleep apnea syndrome    Pain in right testicle    Obstructive sleep apnea    Excessive daytime sleepiness    Anxiety and depression    Eczema    Diarrhea       Past Medical History:  Past Medical History:   Diagnosis Date    Abdominal pain     Ankle pain     Anxiety     Arthralgia     Asthma     Blood typing encounter     Bloody stools     LA    6/8/16   R   11/21/17     Carpal tunnel syndrome, bilateral     LA  Francella Crooked Francella Crooked 9/12/17   R   9/12/17     Chest pain     Constipation     Depression     Dermatitis     Fatigue     Frequent bowel movements     Gastroenteritis     GERD without esophagitis     Insomnia     LA   11/7/16   R   11/21/17     Irregular heart beats     Muscle spasm     Nausea     Numbness and tingling in both hands     Odynophagia     Otitis     Pharyngitis     Pilonidal cyst with abscess     LA   10/25/13   R   6/10/14     Proteinuria     Rectal bleeding     R   11/21/17     Rhinitis     Seasonal allergies     Seizure ([address] )     Skin lesion     LA    2/3/15   R  Francella Crooked Francella Crooked 3/17/17  /   LA    3/17/17   R   12/22/17     Snoring     Strep throat     Tonsillitis     URI (upper respiratory infection)     Vitamin D deficiency        Past Surgical History:  Past Surgical History:   Procedure Laterality Date    FOOT SURGERY      PILONIDAL CYST DRAINAGE      Incision and drainage of pilonidal cyst     AL WRIST Kristine Lord LIG Right 7/12/2018    Procedure: RELEASE CARPAL TUNNEL ENDOSCOPIC;  Surgeon: Sameer George MD;  [facility];  Service: Orthopedics    AL WRIST Kristine Lord LIG Left 7/26/2018    Procedure: RELEASE CARPAL TUNNEL ENDOSCOPIC;  Surgeon: Sameer George MD;  [facility];  Service: Orthopedics       Family History:  Family History   Problem Relation Age of Onset    Depression Mother     Obesity Mother     Stroke Mother         Syndrome     Diabetes Mother     Alcohol abuse Father     Liver disease Father         hepatic failure     Hypertension Father     Depression Brother     Thyroid disease Brother     Depression Maternal Uncle     Heart disease Neg Hx     Cancer Neg Hx     Thyroid cancer Neg Hx        Social History:  Social History     Socioeconomic History    Marital status: /Civil Union     Spouse name: Not on file    Number of children: Not on file    Years of education: some college    Highest education level: Not on file   Occupational History     Comment: employed    Social Needs    Financial resource strain: Not on file    Food insecurity:     Worry: Not on file     Inability: Not on file   [employer] needs:     Medical: Not on file     Non-medical: Not on file   Tobacco Use    Smoking status: Never Smoker    Smokeless tobacco: Never Used   Substance and Sexual Activity    Alcohol use: Yes     Comment: rarely    Drug use: No    Sexual activity: Not on file   Lifestyle    Physical activity:     Days per week: Not on file     Minutes per session: Not on file    Stress: Not on file   Relationships    Social connections:     Talks on phone: Not on file     Gets together: Not on file     Attends Pentecostalism service: Not on file     Active member of club or organization: Not on file     Attends meetings of clubs or organizations: Not on file     Relationship status: Not on file    Intimate partner violence:     Fear of current or ex partner: Not on file     Emotionally abused: Not on file     Physically abused: Not on file     Forced sexual activity: Not on file   Other Topics Concern    Not on file   Social History Narrative    Daily caffeine consumption        I have reviewed the patient's medical history in detail; there are no changes to the history as noted in the electronic medical record  Objective     Vitals:    02/27/20 1030   BP: 110/70   Pulse: 76   Resp: 18   Temp: 97 6 °F (36 4 °C)   TempSrc: Tympanic   SpO2: 97%   Weight: (!) 181 kg (398 lb 3 2 oz)   Height: 6' 1 5" (1 867 m)     Wt Readings from Last 3 Encounters:   02/27/20 (!) 181 kg (398 lb 3 2 oz)   01/13/20 (!) 178 kg (393 lb 2 oz)   12/09/19 (!) 177 kg (390 lb)     Physical Exam   Constitutional: He appears well-developed and well-nourished  He appears ill  No distress  HENT:   Head: Normocephalic and atraumatic  Right Ear: Tympanic membrane and ear canal normal    Left Ear: Tympanic membrane and ear canal normal    Nose: Mucosal edema (crusted nasal drainage) present  Mouth/Throat: Uvula is midline  Posterior oropharyngeal erythema present  No oropharyngeal exudate or posterior oropharyngeal edema  Eyes: Conjunctivae are normal    Neck: Normal range of motion  Neck supple  Cardiovascular: Normal rate, regular rhythm and normal heart sounds  No murmur heard  Pulmonary/Chest: Effort normal and breath sounds normal    Musculoskeletal: He exhibits no edema  Lymphadenopathy:     He has cervical adenopathy  Psychiatric: He has a normal mood and affect  Nursing note and vitals reviewed           ALLERGIES:  No Known Allergies    Current Medications     Current Outpatient Medications   Medication Sig Dispense Refill    betamethasone dipropionate (DIPROSONE) 0 05 % cream Apply topically 2 (two) times a day 30 g 3    buPROPion (WELLBUTRIN XL) 150 mg 24 hr tablet TAKE 1 TABLET BY MOUTH EVERY DAY 90 tablet 1    Cholecalciferol (VITAMIN D-3) 1000 units CAPS Take 2 capsules by mouth daily      escitalopram (LEXAPRO) 20 mg tablet TAKE 1 TABLET BY MOUTH EVERY DAY 90 tablet 3    triamcinolone (KENALOG) 0 5 % ointment Apply topically 2 (two) times a day 30 g 3    amoxicillin-clavulanate (AUGMENTIN) 875-125 mg per tablet Take 1 tablet by mouth every 12 (twelve) hours for 7 days 14 tablet 0     No current facility-administered medications for this visit            Health Maintenance     Health Maintenance   Topic Date Due    HIV Screening  07/10/2005    Annual Physical  07/10/2008    BMI: Followup Plan  11/05/2020    BMI: Adult  02/27/2021    DTaP,Tdap,and Td Vaccines (2 - Td) 02/11/2023    Pneumococcal Vaccine: 65+ Years (1 of 2 - PCV13) 07/10/2055    Influenza Vaccine  Completed    Pneumococcal Vaccine: Pediatrics (0 to 5 Years) and At-Risk Patients (6 to 59 Years)  Aged Out    HIB Vaccine  Aged Out    Hepatitis B Vaccine  Aged Out    IPV Vaccine  Aged Out    Hepatitis A Vaccine  Aged Out    Meningococcal ACWY Vaccine  Aged Out    HPV Vaccine  Aged [employer] History   Administered Date(s) Administered     Influenza (IM) Preservative Free 10/01/2018    Influenza TIV (IM) 01/04/2017    Influenza, recombinant, quadrivalent,injectable, preservative free 11/04/2019    Tdap 02/11/2013       AMRITA Cerrato

## 2020-03-05 ENCOUNTER — OFFICE VISIT (OUTPATIENT)
Dept: FAMILY MEDICINE CLINIC | Facility: CLINIC | Age: 30
End: 2020-03-05
Payer: COMMERCIAL

## 2020-03-05 VITALS
BODY MASS INDEX: 40.43 KG/M2 | RESPIRATION RATE: 18 BRPM | HEIGHT: 74 IN | DIASTOLIC BLOOD PRESSURE: 80 MMHG | WEIGHT: 315 LBS | SYSTOLIC BLOOD PRESSURE: 120 MMHG | HEART RATE: 81 BPM | OXYGEN SATURATION: 97 % | TEMPERATURE: 97.3 F

## 2020-03-05 DIAGNOSIS — M25.562 CHRONIC PAIN OF BOTH KNEES: Primary | ICD-10-CM

## 2020-03-05 DIAGNOSIS — F32.A ANXIETY AND DEPRESSION: ICD-10-CM

## 2020-03-05 DIAGNOSIS — G89.29 CHRONIC PAIN OF BOTH KNEES: Primary | ICD-10-CM

## 2020-03-05 DIAGNOSIS — F41.9 ANXIETY AND DEPRESSION: ICD-10-CM

## 2020-03-05 DIAGNOSIS — M25.561 CHRONIC PAIN OF BOTH KNEES: Primary | ICD-10-CM

## 2020-03-05 PROCEDURE — 99214 OFFICE O/P EST MOD 30 MIN: CPT | Performed by: NURSE PRACTITIONER

## 2020-03-05 PROCEDURE — 1036F TOBACCO NON-USER: CPT | Performed by: NURSE PRACTITIONER

## 2020-03-05 NOTE — PROGRESS NOTES
FAMILY PRACTICE OFFICE VISIT       NAME: Wilber Rico  AGE: 34 y o  SEX: male       : 1990        MRN: [id number]    Assessment and Plan     Problem List Items Addressed This Visit        Other    Anxiety and depression      Other Visit Diagnoses     Chronic pain of both knees    -  Primary    Relevant Medications    diclofenac sodium (VOLTAREN) 1 %        1  Chronic pain of both knees  Chronic bilateral knee pain, has been worsening over the past month  Normal knee x-rays completed in 2018  Discussed impact weight has on knees  He verbalizes understanding that weight loss would improve knee pain  Will trial voltaren gel, may apply 4 times daily as needed for pain  Ice as needed  Discussed exercises such as swimming and biking, which would place less stress on knees  He will return to office in about 10 days for follow up  If pain is not improving, will consider repeating x-rays  diclofenac sodium (VOLTAREN) 1 %   2  Anxiety and depression  Anxiety and depression  Is satisfied with Wellbutrin and Lexapro  He is following with a counselor  Is planning to establish with psychiatry  Feels multiple current stressors are impacting his mood  Note provided excusing him from work for the next 10 days, so he can work on self care, creating new habits, exercise and eating better  He will follow up with Dr Angelic Avila around , prior to returning to work  Chief Complaint     Chief Complaint   Patient presents with    Knee Pain     Pt is here for b/l knee pain worse       History of Present Illness     Wilber Rico is a 34year old male presenting today for knee pain  Bilateral knee pain is chronic, but worsening over the past month  Going up stairs, standing up, sitting down is painful  Crepitus bilaterally  Left knee is worse than left knee  No swelling or redness  No injuries  Also notes depression continues to be a struggle  He feels mentally exhausted  He has a counselor  Is satisfied with current medications  He is planning to see a psychiatrist      Current circumstances are weighing on him  Family stressors  Working long hours 3-12 hours shifts one week, and 4-12 hour shifts the next week  Often is required to work overtime  Works night shift as a fork   Works 6 pm- 6 am, then goes to his friend's house to babysit their children until they go to school  Then goes home to sleep  Is having difficulty falling asleep, racing thoughts  Also is a , and goes on calls as needed  He is also concerned and feels sad about his weight  States he is able to eat well for about 2 days when he is off of work, but as soon as he returns to work, he falls back into bad eating habits  He wants to work on eating better and exercising, feels he needs some time to develop new routines  He does have thoughts of just wanting to be dead  States he would never do it, he is too afraid, and doesn't really want to die  Recognizes he uses food to cope with feelings  Alcoholism and addiction runs in his family  Father suffers from depression  He has two brothers suffering from substance abuse  Requests a short time off of work to try to get refocused on self care  Review of Systems   Review of Systems   Constitutional: Negative  Musculoskeletal: Positive for arthralgias  Psychiatric/Behavioral: Positive for dysphoric mood and sleep disturbance  Negative for self-injury and suicidal ideas         Active Problem List     Patient Active Problem List   Diagnosis    Morbid obesity with BMI of 50 0-59 9, adult (HCC)    Generalized anxiety disorder    Elevated TSH    Carpal tunnel syndrome    Preop examination    Preoperative clearance    Pineal gland cyst    Vitamin D deficiency    Severe episode of recurrent major depressive disorder, without psychotic features ([facility] [address] )    Left knee pain    Right knee pain    Skin pruritus    Skin rash    Peeling skin    Obstructive sleep apnea syndrome    Pain in right testicle    Obstructive sleep apnea    Excessive daytime sleepiness    Anxiety and depression    Eczema    Diarrhea       Past Medical History:  Past Medical History:   Diagnosis Date    Abdominal pain     Ankle pain     Anxiety     Arthralgia     Asthma     Blood typing encounter     Bloody stools     [place]    6/8/16   R   11/21/17     Carpal tunnel syndrome, bilateral     [place]  Sim Weinberguquet Sim Weinberguquet 9/12/17   R   9/12/17     Chest pain     Constipation     Depression     Dermatitis     Fatigue     Frequent bowel movements     Gastroenteritis     GERD without esophagitis     Insomnia     [place]   11/7/16   R   11/21/17     Irregular heart beats     Muscle spasm     Nausea     Numbness and tingling in both hands     Odynophagia     Otitis     Pharyngitis     Pilonidal cyst with abscess     LA   10/25/13   R   6/10/14     Proteinuria     Rectal bleeding     R   11/21/17     Rhinitis     Seasonal allergies     Seizure ([address] )     Skin lesion     [place]    2/3/15   R  Sim Bouquet Sim Bouquet 3/17/17  /   [place]    3/17/17   R   12/22/17     Snoring     Strep throat     Tonsillitis     URI (upper respiratory infection)     Vitamin D deficiency        Past Surgical History:  Past Surgical History:   Procedure Laterality Date    FOOT SURGERY      PILONIDAL CYST DRAINAGE      Incision and drainage of pilonidal cyst     [place] WRIST [place] Mary LIG Right 7/12/2018    Procedure: RELEASE CARPAL TUNNEL ENDOSCOPIC;  Surgeon: Criselda Walters MD;  Location: [facility];  Service: Orthopedics    [place] WRIST [place] Mary LIG Left 7/26/2018    Procedure: RELEASE CARPAL TUNNEL ENDOSCOPIC;  Surgeon: Criselda Walters MD;  Location: [facility];  Service: Orthopedics       Family History:  Family History   Problem Relation Age of Onset    Depression Mother     Obesity Mother     Stroke Mother         Syndrome     Diabetes Mother     Alcohol abuse Father     Liver disease Father         hepatic failure     Hypertension Father     Depression Brother     Thyroid disease Brother     Alcohol abuse Brother     Depression Maternal Uncle     Substance Abuse Brother     Heart disease Neg Hx     Cancer Neg Hx     Thyroid cancer Neg Hx        Social History:  Social History     Socioeconomic History    Marital status: /Civil Union     Spouse name: Not on file    Number of children: Not on file    Years of education: some college    Highest education level: Not on file   Occupational History     Comment: employed    Social Needs    Financial resource strain: Not on file    Food insecurity:     Worry: Not on file     Inability: Not on file   Yostro needs:     Medical: Not on file     Non-medical: Not on file   Tobacco Use    Smoking status: Never Smoker    Smokeless tobacco: Never Used   Substance and Sexual Activity    Alcohol use: Yes     Comment: rarely    Drug use: No    Sexual activity: Not on file   Lifestyle    Physical activity:     Days per week: Not on file     Minutes per session: Not on file    Stress: Not on file   Relationships    Social connections:     Talks on phone: Not on file     Gets together: Not on file     Attends Oriental orthodox service: Not on file     Active member of club or organization: Not on file     Attends meetings of clubs or organizations: Not on file     Relationship status: Not on file    Intimate partner violence:     Fear of current or ex partner: Not on file     Emotionally abused: Not on file     Physically abused: Not on file     Forced sexual activity: Not on file   Other Topics Concern    Not on file   Social History Narrative    Daily caffeine consumption        I have reviewed the patient's medical history in detail; there are no changes to the history as noted in the electronic medical record      Objective     Vitals:    03/05/20 0903   BP: 120/80   Pulse: 81   Resp: 18   Temp: (!) 97 3 °F (36 3 °C)   TempSrc: Tympanic   SpO2: 97%   Weight: (!) 181 kg (398 lb)   Height: 6' 1 5" (1 867 m)     Wt Readings from Last 3 Encounters:   03/05/20 (!) 181 kg (398 lb)   02/27/20 (!) 181 kg (398 lb 3 2 oz)   01/13/20 (!) 178 kg (393 lb 2 oz)     Physical Exam   Constitutional: He appears well-developed and well-nourished  No distress  Cardiovascular: Normal rate, regular rhythm and normal heart sounds  No murmur heard  Pulmonary/Chest: Effort normal and breath sounds normal  No respiratory distress  He has no wheezes  He has no rales  Musculoskeletal:        Right knee: He exhibits normal range of motion, no swelling, no ecchymosis and no erythema  No tenderness found  Left knee: He exhibits normal range of motion, no swelling, no deformity and no erythema  No tenderness found  Psychiatric: He has a normal mood and affect  His behavior is normal  Judgment and thought content normal    Nursing note and vitals reviewed  BMI Counseling: Body mass index is 51 8 kg/m²  The BMI is above normal  Nutrition recommendations include encouraging healthy choices of fruits and vegetables, moderation in carbohydrate intake and increasing intake of lean protein  Exercise recommendations include moderate physical activity 150 minutes/week   Follows with bariatrics          ALLERGIES:  No Known Allergies    Current Medications     Current Outpatient Medications   Medication Sig Dispense Refill    betamethasone dipropionate (DIPROSONE) 0 05 % cream Apply topically 2 (two) times a day 30 g 3    buPROPion (WELLBUTRIN XL) 150 mg 24 hr tablet TAKE 1 TABLET BY MOUTH EVERY DAY 90 tablet 1    Cholecalciferol (VITAMIN D-3) 1000 units CAPS Take 2 capsules by mouth daily      escitalopram (LEXAPRO) 20 mg tablet TAKE 1 TABLET BY MOUTH EVERY DAY 90 tablet 3    triamcinolone (KENALOG) 0 5 % ointment Apply topically 2 (two) times a day 30 g 3    diclofenac sodium (VOLTAREN) 1 % Apply 2 g topically 4 (four) times a day 100 g 1     No current facility-administered medications for this visit            Health Maintenance     Health Maintenance   Topic Date Due    HIV Screening  07/10/2005    Annual Physical  07/10/2008    BMI: Followup Plan  11/05/2020    BMI: Adult  03/05/2021    DTaP,Tdap,and Td Vaccines (2 - Td) 02/11/2023    Pneumococcal Vaccine: 65+ Years (1 of 2 - PCV13) 07/10/2055    Influenza Vaccine  Completed    Pneumococcal Vaccine: Pediatrics (0 to 5 Years) and At-Risk Patients (6 to 59 Years)  Aged Out    HIB Vaccine  Aged Out    Hepatitis B Vaccine  Aged Out    IPV Vaccine  Aged Out    Hepatitis A Vaccine  Aged Out    Meningococcal ACWY Vaccine  Aged Out    HPV Vaccine  Aged [employer] History   Administered Date(s) Administered     Influenza (IM) Preservative Free 10/01/2018    Influenza TIV (IM) 01/04/2017    Influenza, recombinant, quadrivalent,injectable, preservative free 11/04/2019    Tdap 02/11/2013       AMRITA Hassan

## 2020-03-05 NOTE — LETTER
March 5, 2020     Patient: Shanice Bateman   YOB: 1990   Date of Visit: 3/5/2020       To Whom it May Concern:    Heri Arias is under my professional care  He was seen in my office on 3/5/2020  He may return to work on 03/16/2020  If you have any questions or concerns, please don't hesitate to call           Sincerely,          AMRITA Braxton        CC: No Recipients Unknown if ever smoked

## 2020-03-16 ENCOUNTER — OFFICE VISIT (OUTPATIENT)
Dept: FAMILY MEDICINE CLINIC | Facility: CLINIC | Age: 30
End: 2020-03-16
Payer: COMMERCIAL

## 2020-03-16 ENCOUNTER — LAB (OUTPATIENT)
Dept: LAB | Facility: CLINIC | Age: 30
End: 2020-03-16
Payer: COMMERCIAL

## 2020-03-16 ENCOUNTER — TRANSCRIBE ORDERS (OUTPATIENT)
Dept: LAB | Facility: CLINIC | Age: 30
End: 2020-03-16

## 2020-03-16 VITALS
OXYGEN SATURATION: 97 % | HEIGHT: 74 IN | TEMPERATURE: 97.2 F | RESPIRATION RATE: 18 BRPM | SYSTOLIC BLOOD PRESSURE: 110 MMHG | DIASTOLIC BLOOD PRESSURE: 80 MMHG | BODY MASS INDEX: 40.43 KG/M2 | WEIGHT: 315 LBS | HEART RATE: 93 BPM

## 2020-03-16 DIAGNOSIS — F41.9 ANXIETY AND DEPRESSION: ICD-10-CM

## 2020-03-16 DIAGNOSIS — R82.90 ABNORMAL FINDING IN URINE: ICD-10-CM

## 2020-03-16 DIAGNOSIS — R77.8 ELEVATED TOTAL PROTEIN: ICD-10-CM

## 2020-03-16 DIAGNOSIS — G47.33 OBSTRUCTIVE SLEEP APNEA: ICD-10-CM

## 2020-03-16 DIAGNOSIS — R79.89 ELEVATED TSH: ICD-10-CM

## 2020-03-16 DIAGNOSIS — M25.561 PAIN IN BOTH KNEES, UNSPECIFIED CHRONICITY: ICD-10-CM

## 2020-03-16 DIAGNOSIS — F32.A ANXIETY AND DEPRESSION: ICD-10-CM

## 2020-03-16 DIAGNOSIS — Z00.00 ROUTINE HEALTH MAINTENANCE: Primary | ICD-10-CM

## 2020-03-16 DIAGNOSIS — M25.562 PAIN IN BOTH KNEES, UNSPECIFIED CHRONICITY: ICD-10-CM

## 2020-03-16 LAB
BACTERIA UR QL AUTO: ABNORMAL /HPF
BILIRUB UR QL STRIP: NEGATIVE
CLARITY UR: ABNORMAL
COLOR UR: ABNORMAL
GLUCOSE UR STRIP-MCNC: NEGATIVE MG/DL
HGB UR QL STRIP.AUTO: NEGATIVE
HYALINE CASTS #/AREA URNS LPF: ABNORMAL /LPF
KETONES UR STRIP-MCNC: NEGATIVE MG/DL
LEUKOCYTE ESTERASE UR QL STRIP: NEGATIVE
NITRITE UR QL STRIP: NEGATIVE
NON-SQ EPI CELLS URNS QL MICRO: ABNORMAL /HPF
PH UR STRIP.AUTO: 5.5 [PH]
PROT SERPL-MCNC: 8.9 G/DL (ref 6.4–8.2)
PROT UR STRIP-MCNC: NEGATIVE MG/DL
RBC #/AREA URNS AUTO: ABNORMAL /HPF
SL AMB  POCT GLUCOSE, UA: ABNORMAL
SL AMB LEUKOCYTE ESTERASE,UA: ABNORMAL
SL AMB POCT BILIRUBIN,UA: ABNORMAL
SL AMB POCT BLOOD,UA: ABNORMAL
SL AMB POCT CLARITY,UA: CLEAR
SL AMB POCT COLOR,UA: YELLOW
SL AMB POCT KETONES,UA: ABNORMAL
SL AMB POCT NITRITE,UA: ABNORMAL
SL AMB POCT PH,UA: 6
SL AMB POCT SPECIFIC GRAVITY,UA: 1.03
SL AMB POCT URINE PROTEIN: ABNORMAL
SL AMB POCT UROBILINOGEN: 0.2
SP GR UR STRIP.AUTO: 1.03 (ref 1–1.03)
T4 FREE SERPL-MCNC: 1.14 NG/DL (ref 0.76–1.46)
TSH SERPL DL<=0.05 MIU/L-ACNC: 4.59 UIU/ML (ref 0.36–3.74)
UROBILINOGEN UR QL STRIP.AUTO: 0.2 E.U./DL
WBC #/AREA URNS AUTO: ABNORMAL /HPF

## 2020-03-16 PROCEDURE — 84439 ASSAY OF FREE THYROXINE: CPT

## 2020-03-16 PROCEDURE — 99395 PREV VISIT EST AGE 18-39: CPT | Performed by: FAMILY MEDICINE

## 2020-03-16 PROCEDURE — 84155 ASSAY OF PROTEIN SERUM: CPT

## 2020-03-16 PROCEDURE — 84443 ASSAY THYROID STIM HORMONE: CPT

## 2020-03-16 PROCEDURE — 81003 URINALYSIS AUTO W/O SCOPE: CPT | Performed by: FAMILY MEDICINE

## 2020-03-16 PROCEDURE — 36415 COLL VENOUS BLD VENIPUNCTURE: CPT

## 2020-03-16 PROCEDURE — 81001 URINALYSIS AUTO W/SCOPE: CPT | Performed by: FAMILY MEDICINE

## 2020-03-16 NOTE — PATIENT INSTRUCTIONS

## 2020-03-16 NOTE — PROGRESS NOTES
FAMILY PRACTICE OFFICE VISIT       NAME: Wilber Rico  AGE: 34 y o  SEX: male       : 1990        MRN: [id number]    DATE: 3/19/2020  TIME: 12:12 PM    Assessment and Plan     Problem List Items Addressed This Visit        Respiratory    Obstructive sleep apnea       Other    BMI 50 0-59 9, adult (HCC)    Elevated TSH    Relevant Orders    TSH, 3rd generation with Free T4 reflex (Completed)    Pain in both knees    Relevant Orders    XR knee 3 vw left non injury    XR knee 3 vw right non injury    Ambulatory referral to Orthopedic Surgery    Anxiety and depression    Relevant Orders    Ambulatory referral to Psychiatry    Routine health maintenance - Primary    Relevant Orders    POCT urine dip auto non-scope (Completed)      Other Visit Diagnoses     Abnormal finding in urine        Relevant Orders    Urinalysis with microscopic (Completed)        Routine health maintenance:  Patient will continue to work on maintaining healthy diet  Have strongly encouraged on starting routine exercise regimen  Up-to-date with Tdap, flu vaccines  Care guided provided  Anxiety and depression: This appears to be stable however I feel patient would benefit from is steps in care with psychiatry as well  Referral provided  Patient is agreeable  Continue with bupropion and Lexapro  Patient will continue disease therapist,Ayse Beck    BMI 50:  Have strongly encouraged and advised patient to work on weight loss  He is scheduled to see bariatrics and will keep his scheduled appointment  Bilateral knee pain:  This could be multifactorial   I feel patient's weight is contributing to his pain  I will start off by obtaining x-rays  I will refer to Orthopedics as well  Patient may benefit from physical therapy as well  Elevated TSH:  I will go ahead and recheck this  Sleep apnea:  Patient will schedule follow-up appointment with sleep specialist to obtain cpap    Patient Instructions     Wellness Visit for Adults   AMBULATORY CARE:   A wellness visit  is when you see your healthcare provider to get screened for health problems  You can also get advice on how to stay healthy  Write down your questions so you remember to ask them  Ask your healthcare provider how often you should have a wellness visit  What happens at a wellness visit:  Your healthcare provider will ask about your health, and your family history of health problems  This includes high blood pressure, heart disease, and cancer  He or she will ask if you have symptoms that concern you, if you smoke, and about your mood  You may also be asked about your intake of medicines, supplements, food, and alcohol  Any of the following may be done:  · Your weight  will be checked  Your height may also be checked so your body mass index (BMI) can be calculated  Your BMI shows if you are at a healthy weight  · Your blood pressure  and heart rate will be checked  Your temperature may also be checked  · Blood and urine tests  may be done  Blood tests may be done to check your cholesterol levels  Abnormal cholesterol levels increase your risk for heart disease and stroke  You may also need a blood or urine test to check for diabetes if you are at increased risk  Urine tests may be done to look for signs of an infection or kidney disease  · A physical exam  includes checking your heartbeat and lungs with a stethoscope  Your healthcare provider may also check your skin to look for sun damage  · Screening tests  may be recommended  A screening test is done to check for diseases that may not cause symptoms  The screening tests you may need depend on your age, gender, family history, and lifestyle habits  For example, colorectal screening may be recommended if you are 48years old or older  Screening tests you need if you are a woman:   · A Pap smear  is used to screen for cervical cancer  Pap smears are usually done every 3 to 5 years depending on your age  You may need them more often if you have had abnormal Pap smear test results in the past  Ask your healthcare provider how often you should have a Pap smear  · A mammogram  is an x-ray of your breasts to screen for breast cancer  Experts recommend mammograms every 2 years starting at age 48 years  You may need a mammogram at age 52 years or younger if you have an increased risk for breast cancer  Talk to your healthcare provider about when you should start having mammograms and how often you need them  Vaccines you may need:   · Get an influenza vaccine  every year  The influenza vaccine protects you from the flu  Several types of viruses cause the flu  The viruses change over time, so new vaccines are made each year  · Get a tetanus-diphtheria (Td) booster vaccine  every 10 years  This vaccine protects you against tetanus and diphtheria  Tetanus is a severe infection that may cause painful muscle spasms and lockjaw  Diphtheria is a severe bacterial infection that causes a thick covering in the back of your mouth and throat  · Get a human papillomavirus (HPV) vaccine  if you are female and aged 23 to 32 or male 23 to 24 and never received it  This vaccine protects you from HPV infection  HPV is the most common infection spread by sexual contact  HPV may also cause vaginal, penile, and anal cancers  · Get a pneumococcal vaccine  if you are aged 72 years or older  The pneumococcal vaccine is an injection given to protect you from pneumococcal disease  Pneumococcal disease is an infection caused by pneumococcal bacteria  The infection may cause pneumonia, meningitis, or an ear infection  · Get a shingles vaccine  if you are aged 61 or older, even if you have had shingles before  The shingles vaccine is an injection to protect you from the varicella-zoster virus  This is the same virus that causes chickenpox   Shingles is a painful rash that develops in people who had chickenpox or have been exposed to the virus  How to eat healthy:  My Plate is a model for planning healthy meals  It shows the types and amounts of foods that should go on your plate  Fruits and vegetables make up about half of your plate, and grains and protein make up the other half  A serving of dairy is included on the side of your plate  The amount of calories and serving sizes you need depends on your age, gender, weight, and height  Examples of healthy foods are listed below:  · Eat a variety of vegetables  such as dark green, red, and orange vegetables  You can also include canned vegetables low in sodium (salt) and frozen vegetables without added butter or sauces  · Eat a variety of fresh fruits , canned fruit in 100% juice, frozen fruit, and dried fruit  · Include whole grains  At least half of the grains you eat should be whole grains  Examples include whole-wheat bread, wheat pasta, brown rice, and whole-grain cereals such as oatmeal     · Eat a variety of protein foods such as seafood (fish and shellfish), lean meat, and poultry without skin (turkey and chicken)  Examples of lean meats include pork leg, shoulder, or tenderloin, and beef round, sirloin, tenderloin, and extra lean ground beef  Other protein foods include eggs and egg substitutes, beans, peas, soy products, nuts, and seeds  · Choose low-fat dairy products such as skim or 1% milk or low-fat yogurt, cheese, and cottage cheese  · Limit unhealthy fats  such as butter, hard margarine, and shortening  Exercise:  Exercise at least 30 minutes per day on most days of the week  Some examples of exercise include walking, biking, dancing, and swimming  You can also fit in more physical activity by taking the stairs instead of the elevator or parking farther away from stores  Include muscle strengthening activities 2 days each week  Regular exercise provides many health benefits   It helps you manage your weight, and decreases your risk for type 2 diabetes, heart disease, stroke, and high blood pressure  Exercise can also help improve your mood  Ask your healthcare provider about the best exercise plan for you  General health and safety guidelines:   · Do not smoke  Nicotine and other chemicals in cigarettes and cigars can cause lung damage  Ask your healthcare provider for information if you currently smoke and need help to quit  E-cigarettes or smokeless tobacco still contain nicotine  Talk to your healthcare provider before you use these products  · Limit alcohol  A drink of alcohol is 12 ounces of beer, 5 ounces of wine, or 1½ ounces of liquor  · Lose weight, if needed  Being overweight increases your risk of certain health conditions  These include heart disease, high blood pressure, type 2 diabetes, and certain types of cancer  · Protect your skin  Do not sunbathe or use tanning beds  Use sunscreen with a SPF 15 or higher  Apply sunscreen at least 15 minutes before you go outside  Reapply sunscreen every 2 hours  Wear protective clothing, hats, and sunglasses when you are outside  · Drive safely  Always wear your seatbelt  Make sure everyone in your car wears a seatbelt  A seatbelt can save your life if you are in an accident  Do not use your cell phone when you are driving  This could distract you and cause an accident  Pull over if you need to make a call or send a text message  · Practice safe sex  Use latex condoms if are sexually active and have more than one partner  Your healthcare provider may recommend screening tests for sexually transmitted infections (STIs)  · Wear helmets, lifejackets, and protective gear  Always wear a helmet when you ride a bike or motorcycle, go skiing, or play sports that could cause a head injury  Wear protective equipment when you play sports  Wear a lifejacket when you are on a boat or doing water sports    © 2017 [employer] Information is for End User's use only and may not be sold, redistributed or otherwise used for commercial purposes  All illustrations and images included in CareNotes® are the copyrighted property of [employer]  or [employer]  The above information is an  only  It is not intended as medical advice for individual conditions or treatments  Talk to your doctor, nurse or pharmacist before following any medical regimen to see if it is safe and effective for you  Chief Complaint     Chief Complaint   Patient presents with    Follow-up    Annual Exam       History of Present Illness     HPI  66-year-old male here for routine health maintenance exam and for routine follow-up  Patient has an appointment with bariatrics on 04/27  Has been experiencing b/l knee pain that is cracking and grinding  Aggravated with walking or standing for a long periof of time, going up and down stairs  Both knees are equally painful  Patient states he feels his knees can give out on him  Has been using voltaren gel  Helps mildly  Has an appt with counselor today Yemi Kruger  Has been off work recently as he is trying to get rest and feel better  Will schedule am appt with sleep specalist to obtain cpap    Review of Systems   Review of Systems   Constitutional: Negative for appetite change and unexpected weight change  Respiratory: Negative for shortness of breath  Cardiovascular: Negative  Gastrointestinal: Negative for abdominal pain  Genitourinary: Negative for dysuria  Neurological: Negative for dizziness and light-headedness     Psychiatric/Behavioral:        Mood is stable       Active Problem List     Patient Active Problem List   Diagnosis    BMI 50 0-59 9, adult (HCC)    Generalized anxiety disorder    Elevated TSH    Carpal tunnel syndrome    Preop examination    Preoperative clearance    Pineal gland cyst    Vitamin D deficiency    Severe episode of recurrent major depressive disorder, without psychotic features ([facility] [address] )    Pain in both knees    Right knee pain    Skin pruritus    Skin rash    Peeling skin    Obstructive sleep apnea syndrome    Pain in right testicle    Obstructive sleep apnea    Excessive daytime sleepiness    Anxiety and depression    Eczema    Diarrhea    Routine health maintenance       Past Medical History:  Past Medical History:   Diagnosis Date    Abdominal pain     Ankle pain     Anxiety     Arthralgia     Asthma     Blood typing encounter     Bloody stools     [place]    6/8/16   R   11/21/17     [facility] tunnel syndrome, bilateral     [place]  [place] [facility]   R   9/12/17     Chest pain     Constipation     Depression     Dermatitis     Fatigue     Frequent bowel movements     Gastroenteritis     GERD without esophagitis     Insomnia     [place]   11/7/16   R   11/21/17     Irregular heart beats     Muscle spasm     Nausea     Numbness and tingling in both hands     Odynophagia     Otitis     Pharyngitis     Pilonidal cyst with abscess     [place]   10/25/13   R   6/10/14     Proteinuria     Rectal bleeding     R   11/21/17     Rhinitis     Seasonal allergies     Seizure ([address] )     Skin lesion     [place]    2/3/15   R  [place] [facility]  /   [place]    3/17/17   R   12/22/17     Snoring     Strep throat     Tonsillitis     URI (upper respiratory infection)     Vitamin D deficiency        Past Surgical History:  Past Surgical History:   Procedure Laterality Date    FOOT SURGERY      PILONIDAL CYST DRAINAGE      Incision and drainage of pilonidal cyst     [place] WRIST Greg Henle LIG Right 7/12/2018    Procedure: RELEASE CARPAL TUNNEL ENDOSCOPIC;  Surgeon: Hanane Sagastume MD;  Location:  MAIN OR;  Service: Orthopedics    [place] WRIST Greg Henle LIG Left 7/26/2018    Procedure: RELEASE CARPAL TUNNEL ENDOSCOPIC;  Surgeon: Hanane Sagastume MD;  Location:  MAIN OR;  Service: Orthopedics       Family History:  Family History   Problem Relation Age of Onset    Depression Mother  Obesity Mother     Stroke Mother         Syndrome     Diabetes Mother     Alcohol abuse Father     Liver disease Father         hepatic failure     Hypertension Father     Depression Brother     Thyroid disease Brother     Alcohol abuse Brother     Depression Maternal Uncle     Substance Abuse Brother     Heart disease Neg Hx     Cancer Neg Hx     Thyroid cancer Neg Hx        Social History:  Social History     Socioeconomic History    Marital status: /Civil Union     Spouse name: Not on file    Number of children: Not on file    Years of education: some college    Highest education level: Not on file   Occupational History     Comment: employed    Social Needs    Financial resource strain: Not on file    Food insecurity:     Worry: Not on file     Inability: Not on file   [place] needs:     Medical: Not on file     Non-medical: Not on file   Tobacco Use    Smoking status: Never Smoker    Smokeless tobacco: Never Used   Substance and Sexual Activity    Alcohol use: Yes     Comment: rarely    Drug use: No    Sexual activity: Not on file   Lifestyle    Physical activity:     Days per week: Not on file     Minutes per session: Not on file    Stress: Not on file   Relationships    Social connections:     Talks on phone: Not on file     Gets together: Not on file     Attends Oriental orthodox service: Not on file     Active member of club or organization: Not on file     Attends meetings of clubs or organizations: Not on file     Relationship status: Not on file    Intimate partner violence:     Fear of current or ex partner: Not on file     Emotionally abused: Not on file     Physically abused: Not on file     Forced sexual activity: Not on file   Other Topics Concern    Not on file   Social History Narrative    Daily caffeine consumption      I have reviewed the patient's medical history in detail; there are no changes to the history as noted in the electronic medical record  Objective     Vitals:    03/16/20 0924   BP: 110/80   Pulse: 93   Resp: 18   Temp: (!) 97 2 °F (36 2 °C)   SpO2: 97%     Wt Readings from Last 3 Encounters:   03/16/20 (!) 177 kg (389 lb 6 oz)   03/05/20 (!) 181 kg (398 lb)   02/27/20 (!) 181 kg (398 lb 3 2 oz)       Physical Exam   Constitutional: He appears well-developed and well-nourished  HENT:   Head: Normocephalic and atraumatic  Mouth/Throat: Oropharynx is clear and moist    TMs intact and clear   Eyes: Pupils are equal, round, and reactive to light  Conjunctivae and EOM are normal    Neck: Normal range of motion  Neck supple  No thyromegaly present  Cardiovascular: Normal rate, regular rhythm and normal heart sounds  Pulmonary/Chest: Effort normal and breath sounds normal    Abdominal: Soft  Bowel sounds are normal  He exhibits no distension  There is no tenderness  Musculoskeletal: Normal range of motion  He exhibits no edema  Lymphadenopathy:     He has no cervical adenopathy  Neurological: He is alert  Psychiatric: He has a normal mood and affect  Nursing note and vitals reviewed        Pertinent Laboratory/Diagnostic Studies:  Lab Results   Component Value Date    BUN 17 01/21/2020    CREATININE 1 09 01/21/2020    CALCIUM 9 6 01/21/2020     11/21/2017    K 3 8 01/21/2020    CO2 29 01/21/2020     01/21/2020     Lab Results   Component Value Date    ALT 41 01/21/2020    AST 18 01/21/2020    ALKPHOS 106 01/21/2020    BILITOT 0 5 11/21/2017       Lab Results   Component Value Date    WBC 7 15 01/21/2020    HGB 15 5 01/21/2020    HCT 47 5 01/21/2020    MCV 87 01/21/2020     01/21/2020       No results found for: TSH    Lab Results   Component Value Date    CHOL 150 06/17/2016     Lab Results   Component Value Date    TRIG 92 07/07/2018     Lab Results   Component Value Date    HDL 43 07/07/2018     Lab Results   Component Value Date    LDLCALC 103 (H) 07/07/2018     Lab Results   Component Value Date    HGBA1C 5 1 07/07/2018       Results for orders placed or performed in visit on 03/16/20   Urinalysis with microscopic   Result Value Ref Range    Clarity, UA Turbid     Color, UA Dk Yellow     Specific [place], UA 1 030 1 003 - 1 030    pH, UA 5 5 4 5, 5 0, 5 5, 6 0, 6 5, 7 0, 7 5, 8 0    Glucose, UA Negative Negative mg/dl    Ketones, UA Negative Negative mg/dl    Blood, UA Negative Negative    Protein, UA Negative Negative mg/dl    Nitrite, UA Negative Negative    Bilirubin, UA Negative Negative    Urobilinogen, UA 0 2 0 2, 1 0 E U /dl E U /dl    Leukocytes, UA Negative Negative    WBC, UA 4-10 (A) None Seen, 0-5, 5-55, 5-65 /hpf    RBC, UA None Seen None Seen, 0-5 /hpf    Hyaline Casts, UA None Seen None Seen /lpf    Bacteria, UA None Seen None Seen, Occasional /hpf    Epithelial Cells None Seen None Seen, Occasional /hpf   POCT urine dip auto non-scope   Result Value Ref Range     COLOR,UA yellow     CLARITY,UA clear     SPECIFIC GRAVITY,UA 1 030      PH,UA 6 0     LEUKOCYTE ESTERASE,UA neg     NITRITE,UA neg     GLUCOSE, UA neg     KETONES,UA neg     BILIRUBIN,UA neg     BLOOD,UA hemo trace     POCT URINE PROTEIN trace     SL AMB POCT UROBILINOGEN 0 2        Orders Placed This Encounter   Procedures    XR knee 3 vw left non injury    XR knee 3 vw right non injury    TSH, 3rd generation with Free T4 reflex    Urinalysis with microscopic    Ambulatory referral to Orthopedic Surgery    Ambulatory referral to Psychiatry    POCT urine dip auto non-scope       ALLERGIES:  No Known Allergies    Current Medications     Current Outpatient Medications   Medication Sig Dispense Refill    betamethasone dipropionate (DIPROSONE) 0 05 % cream Apply topically 2 (two) times a day 30 g 3    buPROPion (WELLBUTRIN XL) 150 mg 24 hr tablet TAKE 1 TABLET BY MOUTH EVERY DAY 90 tablet 1    Cholecalciferol (VITAMIN D-3) 1000 units CAPS Take 2 capsules by mouth daily      diclofenac sodium (VOLTAREN) 1 % Apply 2 g topically 4 (four) times a day 100 g 1    escitalopram (LEXAPRO) 20 mg tablet TAKE 1 TABLET BY MOUTH EVERY DAY 90 tablet 3    triamcinolone (KENALOG) 0 5 % ointment Apply topically 2 (two) times a day 30 g 3     No current facility-administered medications for this visit            Health Maintenance     Health Maintenance   Topic Date Due    HIV Screening  07/10/2005    Annual Physical  07/10/2008    BMI: Followup Plan  03/08/2021    BMI: Adult  03/16/2021    DTaP,Tdap,and Td Vaccines (2 - Td) 02/11/2023    Pneumococcal Vaccine: 65+ Years (1 of 2 - PCV13) 07/10/2055    Influenza Vaccine  Completed    Pneumococcal Vaccine: Pediatrics (0 to 5 Years) and At-Risk Patients (6 to 59 Years)  Aged Out    HIB Vaccine  Aged Out    Hepatitis B Vaccine  Aged Out    IPV Vaccine  Aged Out    Hepatitis A Vaccine  Aged Out    Meningococcal ACWY Vaccine  Aged Out    HPV Vaccine  Aged [employer] History   Administered Date(s) Administered     Influenza (IM) Preservative Free 10/01/2018    Influenza TIV (IM) 01/04/2017    Influenza, recombinant, quadrivalent,injectable, preservative free 11/04/2019    Tdap 02/11/2013       Colby Tsai MD

## 2020-03-19 PROBLEM — M25.561 PAIN IN BOTH KNEES: Status: ACTIVE | Noted: 2018-11-06

## 2020-03-19 PROBLEM — Z00.00 ROUTINE HEALTH MAINTENANCE: Status: ACTIVE | Noted: 2020-03-19

## 2020-03-20 ENCOUNTER — TELEPHONE (OUTPATIENT)
Dept: FAMILY MEDICINE CLINIC | Facility: CLINIC | Age: 30
End: 2020-03-20

## 2020-03-20 DIAGNOSIS — M25.562 PAIN IN BOTH KNEES, UNSPECIFIED CHRONICITY: ICD-10-CM

## 2020-03-20 DIAGNOSIS — M25.561 PAIN IN BOTH KNEES, UNSPECIFIED CHRONICITY: ICD-10-CM

## 2020-03-20 DIAGNOSIS — R79.89 ELEVATED TSH: Primary | ICD-10-CM

## 2020-03-20 DIAGNOSIS — R77.8 ELEVATED TOTAL PROTEIN: ICD-10-CM

## 2020-03-20 NOTE — TELEPHONE ENCOUNTER
----- Message from Víctor Johnston MD sent at 3/20/2020  1:59 PM EDT -----  Can you please let patient know that his thyroid blood test remains in the underactive range  Signs and symptoms of underactive thyroid are weight gain, fatigue, depression, cold intolerance  Can you please ask him if he has any of the symptoms  If he does, he may benefit from starting a low-dose thyroid medication  Either way, I would like to recheck this again in 6 weeks and will print this out  In addition, his total protein remains elevated  I would like to check another blood test to ensure that it is back to normal and will print this out    Thank you

## 2020-03-20 NOTE — RESULT ENCOUNTER NOTE
Can you please let patient know that his thyroid blood test remains in the underactive range  Signs and symptoms of underactive thyroid are weight gain, fatigue, depression, cold intolerance  Can you please ask him if he has any of the symptoms  If he does, he may benefit from starting a low-dose thyroid medication  Either way, I would like to recheck this again in 6 weeks and will print this out  In addition, his total protein remains elevated  I would like to check another blood test to ensure that it is back to normal and will print this out    Thank you

## 2020-03-20 NOTE — TELEPHONE ENCOUNTER
----- Message from Enrique Bustamante MD sent at 3/20/2020  1:59 PM EDT -----  Can you please let patient know that his thyroid blood test remains in the underactive range  Signs and symptoms of underactive thyroid are weight gain, fatigue, depression, cold intolerance  Can you please ask him if he has any of the symptoms  If he does, he may benefit from starting a low-dose thyroid medication  Either way, I would like to recheck this again in 6 weeks and will print this out  In addition, his total protein remains elevated  I would like to check another blood test to ensure that it is back to normal and will print this out    Thank you

## 2020-03-26 ENCOUNTER — TELEPHONE (OUTPATIENT)
Dept: PSYCHIATRY | Facility: CLINIC | Age: 30
End: 2020-03-26

## 2020-04-06 ENCOUNTER — TELEMEDICINE (OUTPATIENT)
Dept: FAMILY MEDICINE CLINIC | Facility: CLINIC | Age: 30
End: 2020-04-06
Payer: COMMERCIAL

## 2020-04-06 DIAGNOSIS — F32.A ANXIETY AND DEPRESSION: ICD-10-CM

## 2020-04-06 DIAGNOSIS — R79.89 ELEVATED TSH: Primary | ICD-10-CM

## 2020-04-06 DIAGNOSIS — R53.83 FATIGUE, UNSPECIFIED TYPE: ICD-10-CM

## 2020-04-06 DIAGNOSIS — F41.9 ANXIETY AND DEPRESSION: ICD-10-CM

## 2020-04-06 DIAGNOSIS — R77.8 ELEVATED TOTAL PROTEIN: ICD-10-CM

## 2020-04-06 PROCEDURE — 99214 OFFICE O/P EST MOD 30 MIN: CPT | Performed by: FAMILY MEDICINE

## 2020-04-06 RX ORDER — LEVOTHYROXINE SODIUM 0.03 MG/1
25 TABLET ORAL
Qty: 30 TABLET | Refills: 5 | Status: SHIPPED | OUTPATIENT
Start: 2020-04-06 | End: 2020-08-02

## 2020-04-21 ENCOUNTER — TELEMEDICINE (OUTPATIENT)
Dept: PSYCHIATRY | Facility: CLINIC | Age: 30
End: 2020-04-21
Payer: COMMERCIAL

## 2020-04-21 DIAGNOSIS — F33.41 RECURRENT MAJOR DEPRESSIVE DISORDER, IN PARTIAL REMISSION (HCC): Primary | ICD-10-CM

## 2020-04-21 DIAGNOSIS — F41.1 GENERALIZED ANXIETY DISORDER: ICD-10-CM

## 2020-04-21 DIAGNOSIS — F41.9 ANXIETY AND DEPRESSION: ICD-10-CM

## 2020-04-21 DIAGNOSIS — F32.A ANXIETY AND DEPRESSION: ICD-10-CM

## 2020-04-21 PROCEDURE — 99244 OFF/OP CNSLTJ NEW/EST MOD 40: CPT | Performed by: PSYCHIATRY & NEUROLOGY

## 2020-04-21 RX ORDER — BUPROPION HYDROCHLORIDE 300 MG/1
300 TABLET ORAL DAILY
Qty: 30 TABLET | Refills: 1 | Status: SHIPPED | OUTPATIENT
Start: 2020-04-21 | End: 2020-06-02 | Stop reason: SDUPTHER

## 2020-04-21 RX ORDER — TRAZODONE HYDROCHLORIDE 50 MG/1
TABLET ORAL
Qty: 45 TABLET | Refills: 1 | Status: SHIPPED | OUTPATIENT
Start: 2020-04-21 | End: 2020-05-11 | Stop reason: ALTCHOICE

## 2020-05-11 ENCOUNTER — TELEMEDICINE (OUTPATIENT)
Dept: BARIATRICS | Facility: CLINIC | Age: 30
End: 2020-05-11
Payer: COMMERCIAL

## 2020-05-11 VITALS — BODY MASS INDEX: 40.43 KG/M2 | HEIGHT: 74 IN | WEIGHT: 315 LBS

## 2020-05-11 DIAGNOSIS — E66.01 MORBID OBESITY WITH BMI OF 50.0-59.9, ADULT (HCC): Primary | ICD-10-CM

## 2020-05-11 DIAGNOSIS — R63.5 ABNORMAL WEIGHT GAIN: ICD-10-CM

## 2020-05-11 DIAGNOSIS — G47.33 OBSTRUCTIVE SLEEP APNEA: ICD-10-CM

## 2020-05-11 DIAGNOSIS — F33.41 RECURRENT MAJOR DEPRESSIVE DISORDER, IN PARTIAL REMISSION (HCC): ICD-10-CM

## 2020-05-11 PROCEDURE — 99214 OFFICE O/P EST MOD 30 MIN: CPT | Performed by: PHYSICIAN ASSISTANT

## 2020-05-14 ENCOUNTER — TELEMEDICINE (OUTPATIENT)
Dept: SLEEP CENTER | Facility: CLINIC | Age: 30
End: 2020-05-14
Payer: COMMERCIAL

## 2020-05-14 VITALS — HEIGHT: 73 IN | WEIGHT: 315 LBS | BODY MASS INDEX: 41.75 KG/M2

## 2020-05-14 DIAGNOSIS — G47.33 OBSTRUCTIVE SLEEP APNEA SYNDROME: Primary | ICD-10-CM

## 2020-05-14 PROCEDURE — 99214 OFFICE O/P EST MOD 30 MIN: CPT | Performed by: PSYCHIATRY & NEUROLOGY

## 2020-05-17 DIAGNOSIS — F41.1 GENERALIZED ANXIETY DISORDER: ICD-10-CM

## 2020-05-18 RX ORDER — TRAZODONE HYDROCHLORIDE 50 MG/1
TABLET ORAL
Qty: 60 TABLET | Refills: 1 | OUTPATIENT
Start: 2020-05-18

## 2020-05-19 DIAGNOSIS — F33.41 RECURRENT MAJOR DEPRESSIVE DISORDER, IN PARTIAL REMISSION (HCC): ICD-10-CM

## 2020-05-19 RX ORDER — BUPROPION HYDROCHLORIDE 300 MG/1
TABLET ORAL
Qty: 30 TABLET | Refills: 1 | OUTPATIENT
Start: 2020-05-19

## 2020-05-29 ENCOUNTER — TELEMEDICINE (OUTPATIENT)
Dept: FAMILY MEDICINE CLINIC | Facility: CLINIC | Age: 30
End: 2020-05-29
Payer: COMMERCIAL

## 2020-05-29 VITALS — BODY MASS INDEX: 41.75 KG/M2 | HEIGHT: 73 IN | WEIGHT: 315 LBS

## 2020-05-29 DIAGNOSIS — R21 SKIN RASH: Primary | ICD-10-CM

## 2020-05-29 DIAGNOSIS — L29.9 SKIN PRURITUS: ICD-10-CM

## 2020-05-29 PROCEDURE — 99213 OFFICE O/P EST LOW 20 MIN: CPT | Performed by: FAMILY MEDICINE

## 2020-05-29 RX ORDER — CLOTRIMAZOLE AND BETAMETHASONE DIPROPIONATE 10; .64 MG/G; MG/G
CREAM TOPICAL 2 TIMES DAILY
Qty: 45 G | Refills: 2 | Status: SHIPPED | OUTPATIENT
Start: 2020-05-29 | End: 2021-06-30 | Stop reason: ALTCHOICE

## 2020-06-02 ENCOUNTER — TELEMEDICINE (OUTPATIENT)
Dept: PSYCHIATRY | Facility: CLINIC | Age: 30
End: 2020-06-02
Payer: COMMERCIAL

## 2020-06-02 ENCOUNTER — TELEPHONE (OUTPATIENT)
Dept: SLEEP CENTER | Facility: CLINIC | Age: 30
End: 2020-06-02

## 2020-06-02 DIAGNOSIS — F41.1 GENERALIZED ANXIETY DISORDER: Primary | ICD-10-CM

## 2020-06-02 DIAGNOSIS — F33.41 RECURRENT MAJOR DEPRESSIVE DISORDER, IN PARTIAL REMISSION (HCC): ICD-10-CM

## 2020-06-02 PROCEDURE — 90833 PSYTX W PT W E/M 30 MIN: CPT | Performed by: PSYCHIATRY & NEUROLOGY

## 2020-06-02 PROCEDURE — 99214 OFFICE O/P EST MOD 30 MIN: CPT | Performed by: PSYCHIATRY & NEUROLOGY

## 2020-06-02 RX ORDER — TRAZODONE HYDROCHLORIDE 50 MG/1
50 TABLET ORAL
Qty: 30 TABLET | Refills: 1 | Status: SHIPPED | OUTPATIENT
Start: 2020-06-02 | End: 2020-08-27 | Stop reason: SDUPTHER

## 2020-06-02 RX ORDER — BUPROPION HYDROCHLORIDE 300 MG/1
300 TABLET ORAL DAILY
Qty: 30 TABLET | Refills: 1 | Status: SHIPPED | OUTPATIENT
Start: 2020-06-02 | End: 2020-08-27 | Stop reason: SDUPTHER

## 2020-06-29 ENCOUNTER — TELEMEDICINE (OUTPATIENT)
Dept: BARIATRICS | Facility: CLINIC | Age: 30
End: 2020-06-29
Payer: COMMERCIAL

## 2020-06-29 ENCOUNTER — OFFICE VISIT (OUTPATIENT)
Dept: FAMILY MEDICINE CLINIC | Facility: CLINIC | Age: 30
End: 2020-06-29
Payer: COMMERCIAL

## 2020-06-29 VITALS
DIASTOLIC BLOOD PRESSURE: 80 MMHG | SYSTOLIC BLOOD PRESSURE: 124 MMHG | HEIGHT: 73 IN | HEART RATE: 88 BPM | WEIGHT: 315 LBS | TEMPERATURE: 97.6 F | RESPIRATION RATE: 18 BRPM | BODY MASS INDEX: 41.75 KG/M2 | OXYGEN SATURATION: 99 %

## 2020-06-29 VITALS — HEIGHT: 73 IN | BODY MASS INDEX: 41.75 KG/M2 | WEIGHT: 315 LBS

## 2020-06-29 DIAGNOSIS — R77.8 ELEVATED TOTAL PROTEIN: ICD-10-CM

## 2020-06-29 DIAGNOSIS — F33.41 RECURRENT MAJOR DEPRESSIVE DISORDER, IN PARTIAL REMISSION (HCC): ICD-10-CM

## 2020-06-29 DIAGNOSIS — G47.33 OBSTRUCTIVE SLEEP APNEA: ICD-10-CM

## 2020-06-29 DIAGNOSIS — F32.A ANXIETY AND DEPRESSION: Primary | ICD-10-CM

## 2020-06-29 DIAGNOSIS — F41.9 ANXIETY AND DEPRESSION: Primary | ICD-10-CM

## 2020-06-29 DIAGNOSIS — E03.9 HYPOTHYROIDISM, UNSPECIFIED TYPE: ICD-10-CM

## 2020-06-29 DIAGNOSIS — E66.01 MORBID OBESITY WITH BMI OF 50.0-59.9, ADULT (HCC): Primary | ICD-10-CM

## 2020-06-29 PROCEDURE — 99214 OFFICE O/P EST MOD 30 MIN: CPT | Performed by: FAMILY MEDICINE

## 2020-06-29 PROCEDURE — 99213 OFFICE O/P EST LOW 20 MIN: CPT | Performed by: PHYSICIAN ASSISTANT

## 2020-06-29 PROCEDURE — 1036F TOBACCO NON-USER: CPT | Performed by: FAMILY MEDICINE

## 2020-07-08 ENCOUNTER — OFFICE VISIT (OUTPATIENT)
Dept: URGENT CARE | Facility: CLINIC | Age: 30
End: 2020-07-08
Payer: COMMERCIAL

## 2020-07-08 VITALS
TEMPERATURE: 98.6 F | HEART RATE: 92 BPM | OXYGEN SATURATION: 92 % | BODY MASS INDEX: 41.75 KG/M2 | WEIGHT: 315 LBS | HEIGHT: 73 IN

## 2020-07-08 DIAGNOSIS — R19.7 DIARRHEA, UNSPECIFIED TYPE: Primary | ICD-10-CM

## 2020-07-08 DIAGNOSIS — R53.83 FATIGUE, UNSPECIFIED TYPE: ICD-10-CM

## 2020-07-08 PROCEDURE — U0003 INFECTIOUS AGENT DETECTION BY NUCLEIC ACID (DNA OR RNA); SEVERE ACUTE RESPIRATORY SYNDROME CORONAVIRUS 2 (SARS-COV-2) (CORONAVIRUS DISEASE [COVID-19]), AMPLIFIED PROBE TECHNIQUE, MAKING USE OF HIGH THROUGHPUT TECHNOLOGIES AS DESCRIBED BY CMS-2020-01-R: HCPCS | Performed by: PHYSICIAN ASSISTANT

## 2020-07-08 PROCEDURE — 99213 OFFICE O/P EST LOW 20 MIN: CPT | Performed by: PHYSICIAN ASSISTANT

## 2020-07-08 NOTE — PATIENT INSTRUCTIONS
Advised patient to use Tylenol for headache and Imodium for diarrhea  Follow-up bland diet  Any worsening of symptoms such as high fever or difficulty breathing present to the emergency room  COVID test will be return in 2-3 days

## 2020-07-08 NOTE — PROGRESS NOTES
[id number] [facility]        NAME: Juan Mcmullen is a 34 y o  male  : 1990    MRN: [id number]  DATE: 2020  TIME: 10:28 AM    Assessment and Plan   Diarrhea, unspecified type [R19 7]  1  Diarrhea, unspecified type  MISC COVID-19 TEST- Office Collection   2  Fatigue, unspecified type  MISC COVID-19 TEST- Office Collection         Patient Instructions     Follow up with PCP in 3-5 days  Proceed to  ER if symptoms worsen  Chief Complaint     Chief Complaint   Patient presents with    Diarrhea     Pt reports for 4 days he has c/o fatigue, nausea, headache, and diarrhea 3-4 times per day  Pt is not taking any prn medications for his sxs  History of Present Illness       77-year-old male presents for a 4 day complaint of fatigue, headache, nausea, diarrhea  He is currently not taking any medications for his symptoms  Patient presents for COVID testing  Review of Systems   Review of Systems   Constitutional: Positive for fatigue  Negative for activity change, appetite change, chills and fever  HENT: Positive for congestion and sinus pressure  Negative for ear pain, postnasal drip, rhinorrhea, sneezing, sore throat and trouble swallowing  Eyes: Negative  Respiratory: Negative  Gastrointestinal: Positive for diarrhea and nausea  Negative for abdominal pain and vomiting  Skin: Negative  Neurological: Positive for headaches  Negative for dizziness and light-headedness           Current Medications       Current Outpatient Medications:     betamethasone dipropionate (DIPROSONE) 0 05 % cream, Apply topically 2 (two) times a day, Disp: 30 g, Rfl: 3    buPROPion (WELLBUTRIN XL) 300 mg 24 hr tablet, Take 1 tablet (300 mg total) by mouth daily, Disp: 30 tablet, Rfl: 1    Cholecalciferol (VITAMIN D-3) 1000 units CAPS, Take 2 capsules by mouth daily, Disp: , Rfl:     clotrimazole-betamethasone (LOTRISONE) 1-0 05 % cream, Apply topically 2 (two) times a day, Disp: 45 g, Rfl: 2   diclofenac sodium (VOLTAREN) 1 %, Apply 2 g topically 4 (four) times a day, Disp: 100 g, Rfl: 1    escitalopram (LEXAPRO) 20 mg tablet, TAKE 1 TABLET BY MOUTH EVERY DAY, Disp: 90 tablet, Rfl: 3    levothyroxine 25 mcg tablet, Take 1 tablet (25 mcg total) by mouth daily in the early morning, Disp: 30 tablet, Rfl: 5    traZODone (DESYREL) 50 mg tablet, Take 1 tablet (50 mg total) by mouth daily at bedtime as needed for sleep, Disp: 30 tablet, Rfl: 1    Current Allergies     Allergies as of 07/08/2020    (No Known Allergies)            The following portions of the patient's history were reviewed and updated as appropriate: allergies, current medications, past family history, past medical history, past social history, past surgical history and problem list     Objective   Pulse 92   Temp 98 6 °F (37 °C) (Tympanic)   Ht 6' 1" (1 854 m)   Wt (!) 179 kg (394 lb)   SpO2 92%   BMI 51 98 kg/m²        Physical Exam     Physical Exam   Constitutional: He is oriented to person, place, and time  He appears well-developed and well-nourished  No distress  Patient's pulse ox was 92%  Patient denied feeling short of breath  Patient appears to be in no respiratory distress  Patient is speaking clearly without difficulty breathing  Cardiovascular: Normal rate and regular rhythm  Pulmonary/Chest: Effort normal and breath sounds normal    Neurological: He is alert and oriented to person, place, and time  Psychiatric: He has a normal mood and affect  His speech is normal    Nursing note and vitals reviewed

## 2020-07-15 LAB — SARS-COV-2 RNA SPEC QL NAA+PROBE: NOT DETECTED

## 2020-07-17 ENCOUNTER — TELEPHONE (OUTPATIENT)
Dept: URGENT CARE | Facility: CLINIC | Age: 30
End: 2020-07-17

## 2020-08-01 DIAGNOSIS — R79.89 ELEVATED TSH: ICD-10-CM

## 2020-08-02 RX ORDER — LEVOTHYROXINE SODIUM 0.03 MG/1
TABLET ORAL
Qty: 90 TABLET | Refills: 1 | Status: SHIPPED | OUTPATIENT
Start: 2020-08-02 | End: 2021-02-04

## 2020-08-10 ENCOUNTER — OFFICE VISIT (OUTPATIENT)
Dept: SLEEP CENTER | Facility: CLINIC | Age: 30
End: 2020-08-10
Payer: COMMERCIAL

## 2020-08-10 VITALS
BODY MASS INDEX: 41.75 KG/M2 | SYSTOLIC BLOOD PRESSURE: 108 MMHG | HEIGHT: 73 IN | DIASTOLIC BLOOD PRESSURE: 60 MMHG | WEIGHT: 315 LBS

## 2020-08-10 DIAGNOSIS — G47.33 OBSTRUCTIVE SLEEP APNEA SYNDROME: Primary | ICD-10-CM

## 2020-08-10 PROCEDURE — 99214 OFFICE O/P EST MOD 30 MIN: CPT | Performed by: PSYCHIATRY & NEUROLOGY

## 2020-08-10 PROCEDURE — 3008F BODY MASS INDEX DOCD: CPT | Performed by: PSYCHIATRY & NEUROLOGY

## 2020-08-10 PROCEDURE — 1036F TOBACCO NON-USER: CPT | Performed by: PSYCHIATRY & NEUROLOGY

## 2020-08-10 NOTE — PROGRESS NOTES
HPI: 32yo M with JIMI being seen for a follow up  Treatment Summary:  HST 2019: (DONAVON) of 11 0   The lowest SpO2 recorded is 82%  APAP 5-15cm recommended  HPI:  Today, the patient stated that he initially got his CPAP and missed his follow up for compliance visit and his insurance made him give his machine back  He stated that he initially had a lot of issues with his mask  He was not able to use his machine because he waited for the different masks each time  He finally got a FFM that he likes and is using it now  He now is using it, but he is nervous about it at night and is concerned for moving the machine with him and pulling the hose  He wakes up 2-3 times a night to adjust the tubing when he is changing positions  Treatment: APAP  -Pressure: 6-20cm   -Pressure intolerance: no   -Aerophagia: no   -Air Hunger: no  -Interface: FFM  -Fit: good  -Chin strap: no  -HCC: YME  -Patient's perceived outcome: finds that he sleeps better with it       Compliance Card Data:    - Date Range: 20-20   - Settin-20cm   - Pressure: Median 7 2cm; 90th%ile = 8 9cm   - Residual AHI: 2 2   - %  Night in Large Leak: 18 sec   - Average usage days used: 3h 23m   - % Days used: 33 3%   - % Days with Usage > 4 hrs: 6 7%    Respiratory:  -Ongoing Snoring: no  -Mouth Breathing: no  -Dry Mouth: no  -Nocturnal Gasping: no    Review of Systems      Genitourinary need to urinate more than twice a night   Cardiology none   Gastrointestinal frequent heartburn/acid reflux   Neurology forgetfulness, poor concentration or confusion,  and difficulty with memory   Constitutional fatigue   Integumentary none   Psychiatry anxiety and depression   Musculoskeletal joint pain and back pain   Pulmonary none   ENT none   Endocrine none   Hematological none       Sleep Pattern:  -Position: stomach  -Bedtime: 9-10am  -Lights Out: 8-9am  -Environment: no Lights/TV  -Latency: 30 mins  -Awakenings: 1-2 to void  -Wake Time: 3-4pm  -Rise Time: same time  -Work schedule: nights and 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    -Patient's estimate of Sleep Time: 4-6h    Daytime Symptoms:  -Upon Awakening: on days that he is sleeping, he is feeling good  His psychiatrist prescribed medications to help him stay asleep, trazodone  Not groggy when he takes it    -Daytime fatigue/sleepiness: tired  -Naps: not since using CPAP  -Involuntary Dozing: not usually  -Cognitive Symptoms: yes  -Driving: Difficulty with sleepiness and driving:  Not if he has slept  -- Close calls related to sleepiness: no   -- Accidents related to sleepiness: no    Substance Use:  -Caffeine: coffee when he wakes up or at work  Will have an occasional energy drink  -Alcohol: occasional  -THC: denied    --> Denies any significant medical changes since last visit  --> Supplemental Oxygen Use: denies    Questionnaire:  Sitting and reading: Moderate chance of dozing  Watching TV: Moderate chance of dozing  Sitting, inactive in a public place (e g  a theatre or a meeting): Slight chance of dozing  As a passenger in a car for an hour without a break: Slight chance of dozing  Lying down to rest in the afternoon when circumstances permit: High chance of dozing  Sitting and talking to someone: Would never doze  Sitting quietly after a lunch without alcohol: Would never doze  In a car, while stopped for a few minutes in traffic: Would never doze  Total score: 9        PE:    /60   Ht 6' 1" (1 854 m)   Wt (!) 181 kg (398 lb)   BMI 52 51 kg/m²       General:  In NAD  Pul: Respirations: unlaboured  MS: No atrophy  Neuro: No resting tremor  Gait normal turning & station; unremarkable overall  Psych: Socially appropriate  Pleasant  No overt dysphoria  Assessment: The patient has not been compliant with his APAP and his obstructive events are well controlled with a residual AHI 2 2  He was having trouble with the masks and finally got to start using his machine about 7 days ago   He is having issues with moving around at night and moving his hose  He is nervous that he will pull it off of his face  He is deriving benefit from using his APAP as he is less tired than he was in the past and no longer has sleepiness while driving  Will change his pressure today to match his download  He will also try sleeping on his back to minimize movement side to side  He is not sleeping enough hours and understands that if he doesn't sleep enough, he will be tired regardless of PAP use  We reviewed the need to meet compliance to continue to have his insurance pay for his machine  He voiced understanding  Recommendations:    1) APAP at 7 5-15cm with FFM  Sleep and PAP extension to 6-8 hours a night  2) Safe driving reviewed  3) Follow-up 2 months  4) Call with any questions or concerns  All questions answered for the patient, who indicated understanding and agreed with the plan       Wero Mccarthy MD  Psychiatry/ Sleep medicine

## 2020-08-10 NOTE — PATIENT INSTRUCTIONS
Recommendations:    1) APAP at 7 5-15cm with FFM  Sleep and PAP extension to 6-8 hours a night  2) Safe driving reviewed  3) Follow-up 2 months  4) Call with any questions or concerns

## 2020-08-11 ENCOUNTER — TELEPHONE (OUTPATIENT)
Dept: SLEEP CENTER | Facility: CLINIC | Age: 30
End: 2020-08-11

## 2020-08-11 NOTE — TELEPHONE ENCOUNTER
Receied pressure change  Changed accordingly  Patient's APAP is now set to 7 5-15cm  Patient was informed

## 2020-08-18 ENCOUNTER — LAB (OUTPATIENT)
Dept: LAB | Facility: CLINIC | Age: 30
End: 2020-08-18
Payer: COMMERCIAL

## 2020-08-18 ENCOUNTER — TRANSCRIBE ORDERS (OUTPATIENT)
Dept: LAB | Facility: CLINIC | Age: 30
End: 2020-08-18

## 2020-08-18 DIAGNOSIS — F41.9 ANXIETY AND DEPRESSION: ICD-10-CM

## 2020-08-18 DIAGNOSIS — E66.01 MORBID OBESITY WITH BMI OF 50.0-59.9, ADULT (HCC): ICD-10-CM

## 2020-08-18 DIAGNOSIS — R63.5 ABNORMAL WEIGHT GAIN: ICD-10-CM

## 2020-08-18 DIAGNOSIS — G47.33 OBSTRUCTIVE SLEEP APNEA: ICD-10-CM

## 2020-08-18 DIAGNOSIS — F32.A ANXIETY AND DEPRESSION: ICD-10-CM

## 2020-08-18 DIAGNOSIS — R53.83 FATIGUE, UNSPECIFIED TYPE: ICD-10-CM

## 2020-08-18 DIAGNOSIS — R77.8 ELEVATED TOTAL PROTEIN: ICD-10-CM

## 2020-08-18 DIAGNOSIS — R79.89 ELEVATED TSH: ICD-10-CM

## 2020-08-18 LAB
25(OH)D3 SERPL-MCNC: 36.1 NG/ML (ref 30–100)
CHOLEST SERPL-MCNC: 165 MG/DL (ref 50–200)
HDLC SERPL-MCNC: 44 MG/DL
INSULIN SERPL-ACNC: 14.3 MU/L (ref 3–25)
LDLC SERPL CALC-MCNC: 103 MG/DL (ref 0–100)
NONHDLC SERPL-MCNC: 121 MG/DL
PROT SERPL-MCNC: 8.4 G/DL (ref 6.4–8.2)
TRIGL SERPL-MCNC: 91 MG/DL
TSH SERPL DL<=0.05 MIU/L-ACNC: 2.56 UIU/ML (ref 0.36–3.74)
VIT B12 SERPL-MCNC: 395 PG/ML (ref 100–900)

## 2020-08-18 PROCEDURE — 83525 ASSAY OF INSULIN: CPT

## 2020-08-18 PROCEDURE — 36415 COLL VENOUS BLD VENIPUNCTURE: CPT

## 2020-08-18 PROCEDURE — 84165 PROTEIN E-PHORESIS SERUM: CPT | Performed by: PATHOLOGY

## 2020-08-18 PROCEDURE — 84443 ASSAY THYROID STIM HORMONE: CPT

## 2020-08-18 PROCEDURE — 82607 VITAMIN B-12: CPT

## 2020-08-18 PROCEDURE — 80061 LIPID PANEL: CPT

## 2020-08-18 PROCEDURE — 84165 PROTEIN E-PHORESIS SERUM: CPT

## 2020-08-18 PROCEDURE — 84155 ASSAY OF PROTEIN SERUM: CPT

## 2020-08-18 PROCEDURE — 82306 VITAMIN D 25 HYDROXY: CPT

## 2020-08-20 LAB
ALBUMIN SERPL ELPH-MCNC: 4.22 G/DL (ref 3.5–5)
ALBUMIN SERPL ELPH-MCNC: 52.8 % (ref 52–65)
ALPHA1 GLOB SERPL ELPH-MCNC: 0.36 G/DL (ref 0.1–0.4)
ALPHA1 GLOB SERPL ELPH-MCNC: 4.5 % (ref 2.5–5)
ALPHA2 GLOB SERPL ELPH-MCNC: 0.66 G/DL (ref 0.4–1.2)
ALPHA2 GLOB SERPL ELPH-MCNC: 8.3 % (ref 7–13)
BETA GLOB ABNORMAL SERPL ELPH-MCNC: 0.5 G/DL (ref 0.4–0.8)
BETA1 GLOB SERPL ELPH-MCNC: 6.2 % (ref 5–13)
BETA2 GLOB SERPL ELPH-MCNC: 6.2 % (ref 2–8)
BETA2+GAMMA GLOB SERPL ELPH-MCNC: 0.5 G/DL (ref 0.2–0.5)
GAMMA GLOB ABNORMAL SERPL ELPH-MCNC: 1.76 G/DL (ref 0.5–1.6)
GAMMA GLOB SERPL ELPH-MCNC: 22 % (ref 12–22)
IGG/ALB SER: 1.12 {RATIO} (ref 1.1–1.8)
PROT PATTERN SERPL ELPH-IMP: ABNORMAL
PROT SERPL-MCNC: 8 G/DL (ref 6.4–8.2)

## 2020-08-25 ENCOUNTER — APPOINTMENT (EMERGENCY)
Dept: RADIOLOGY | Facility: HOSPITAL | Age: 30
End: 2020-08-25
Payer: COMMERCIAL

## 2020-08-25 ENCOUNTER — HOSPITAL ENCOUNTER (EMERGENCY)
Facility: HOSPITAL | Age: 30
Discharge: HOME | End: 2020-08-25
Attending: EMERGENCY MEDICINE | Admitting: EMERGENCY MEDICINE
Payer: COMMERCIAL

## 2020-08-25 ENCOUNTER — APPOINTMENT (EMERGENCY)
Dept: CT IMAGING | Facility: HOSPITAL | Age: 30
End: 2020-08-25
Payer: COMMERCIAL

## 2020-08-25 VITALS
SYSTOLIC BLOOD PRESSURE: 135 MMHG | HEIGHT: 73 IN | TEMPERATURE: 98.5 F | BODY MASS INDEX: 41.75 KG/M2 | HEART RATE: 78 BPM | RESPIRATION RATE: 19 BRPM | OXYGEN SATURATION: 97 % | DIASTOLIC BLOOD PRESSURE: 77 MMHG | WEIGHT: 315 LBS

## 2020-08-25 DIAGNOSIS — R07.89 CHEST WALL PAIN: Primary | ICD-10-CM

## 2020-08-25 LAB
ANION GAP SERPL CALCULATED.3IONS-SCNC: 6 MMOL/L (ref 4–13)
BASOPHILS # BLD AUTO: 0.03 THOUSANDS/ΜL (ref 0–0.1)
BASOPHILS NFR BLD AUTO: 0 % (ref 0–1)
BUN SERPL-MCNC: 19 MG/DL (ref 5–25)
CALCIUM SERPL-MCNC: 8.7 MG/DL (ref 8.3–10.1)
CHLORIDE SERPL-SCNC: 105 MMOL/L (ref 100–108)
CO2 SERPL-SCNC: 29 MMOL/L (ref 21–32)
CREAT SERPL-MCNC: 1.22 MG/DL (ref 0.6–1.3)
D DIMER PPP FEU-MCNC: 0.58 UG/ML FEU
EOSINOPHIL # BLD AUTO: 0.1 THOUSAND/ΜL (ref 0–0.61)
EOSINOPHIL NFR BLD AUTO: 1 % (ref 0–6)
ERYTHROCYTE [DISTWIDTH] IN BLOOD BY AUTOMATED COUNT: 12.7 % (ref 11.6–15.1)
GFR SERPL CREATININE-BSD FRML MDRD: 79 ML/MIN/1.73SQ M
GLUCOSE SERPL-MCNC: 82 MG/DL (ref 65–140)
HCT VFR BLD AUTO: 45.3 % (ref 36.5–49.3)
HGB BLD-MCNC: 14.9 G/DL (ref 12–17)
IMM GRANULOCYTES # BLD AUTO: 0.04 THOUSAND/UL (ref 0–0.2)
IMM GRANULOCYTES NFR BLD AUTO: 0 % (ref 0–2)
LYMPHOCYTES # BLD AUTO: 1.6 THOUSANDS/ΜL (ref 0.6–4.47)
LYMPHOCYTES NFR BLD AUTO: 15 % (ref 14–44)
MCH RBC QN AUTO: 28.8 PG (ref 26.8–34.3)
MCHC RBC AUTO-ENTMCNC: 32.9 G/DL (ref 31.4–37.4)
MCV RBC AUTO: 88 FL (ref 82–98)
MONOCYTES # BLD AUTO: 0.75 THOUSAND/ΜL (ref 0.17–1.22)
MONOCYTES NFR BLD AUTO: 7 % (ref 4–12)
NEUTROPHILS # BLD AUTO: 7.87 THOUSANDS/ΜL (ref 1.85–7.62)
NEUTS SEG NFR BLD AUTO: 77 % (ref 43–75)
NRBC BLD AUTO-RTO: 0 /100 WBCS
PLATELET # BLD AUTO: 264 THOUSANDS/UL (ref 149–390)
PMV BLD AUTO: 9 FL (ref 8.9–12.7)
POTASSIUM SERPL-SCNC: 3.8 MMOL/L (ref 3.5–5.3)
RBC # BLD AUTO: 5.17 MILLION/UL (ref 3.88–5.62)
SODIUM SERPL-SCNC: 140 MMOL/L (ref 136–145)
TROPONIN I SERPL-MCNC: <0.02 NG/ML
WBC # BLD AUTO: 10.39 THOUSAND/UL (ref 4.31–10.16)

## 2020-08-25 PROCEDURE — 85025 COMPLETE CBC W/AUTO DIFF WBC: CPT | Performed by: INTERNAL MEDICINE

## 2020-08-25 PROCEDURE — 71045 X-RAY EXAM CHEST 1 VIEW: CPT

## 2020-08-25 PROCEDURE — 80048 BASIC METABOLIC PNL TOTAL CA: CPT | Performed by: INTERNAL MEDICINE

## 2020-08-25 PROCEDURE — 84484 ASSAY OF TROPONIN QUANT: CPT | Performed by: INTERNAL MEDICINE

## 2020-08-25 PROCEDURE — 36415 COLL VENOUS BLD VENIPUNCTURE: CPT | Performed by: INTERNAL MEDICINE

## 2020-08-25 PROCEDURE — 93005 ELECTROCARDIOGRAM TRACING: CPT

## 2020-08-25 PROCEDURE — 85379 FIBRIN DEGRADATION QUANT: CPT | Performed by: INTERNAL MEDICINE

## 2020-08-25 PROCEDURE — 71275 CT ANGIOGRAPHY CHEST: CPT

## 2020-08-25 PROCEDURE — 99285 EMERGENCY DEPT VISIT HI MDM: CPT | Performed by: EMERGENCY MEDICINE

## 2020-08-25 PROCEDURE — 99284 EMERGENCY DEPT VISIT MOD MDM: CPT

## 2020-08-25 PROCEDURE — G1004 CDSM NDSC: HCPCS

## 2020-08-25 RX ORDER — ACETAMINOPHEN 325 MG/1
650 TABLET ORAL ONCE
Status: COMPLETED | OUTPATIENT
Start: 2020-08-25 | End: 2020-08-25

## 2020-08-25 RX ORDER — ACETAMINOPHEN 325 MG/1
650 TABLET ORAL EVERY 6 HOURS PRN
Status: DISCONTINUED | OUTPATIENT
Start: 2020-08-25 | End: 2020-08-25

## 2020-08-25 RX ORDER — LIDOCAINE 50 MG/G
1 PATCH TOPICAL ONCE
Status: DISCONTINUED | OUTPATIENT
Start: 2020-08-25 | End: 2020-08-26 | Stop reason: HOSPADM

## 2020-08-25 RX ADMIN — LIDOCAINE 5% 1 PATCH: 700 PATCH TOPICAL at 20:14

## 2020-08-25 RX ADMIN — ACETAMINOPHEN 650 MG: 325 TABLET, FILM COATED ORAL at 21:05

## 2020-08-25 RX ADMIN — IOHEXOL 100 ML: 350 INJECTION, SOLUTION INTRAVENOUS at 21:14

## 2020-08-25 NOTE — ED PROVIDER NOTES
History  Chief Complaint   Patient presents with    Pain With Breathing     Pt reports pain with deep breaths starting yesterday, worsening today when he was at work  +SOB with exertion  Tested for covid approx 1 month ago, tested negative  -fevers -cough -NVD      HPI     29 Y/O Obese male coming in with C/O chest pain, associated with deep inspirations, started around yesterday, but got worse this evening about 4 PM  Pain- stabbing quality, 10/10, positional, Gets worse with moving, but no relieving factors, limited inspiration because of pain  No H/O recent exposure to sick contacts, No pedal edema, fever, nausea or vomiting  No history of trauma  He has history of anxiety, depression and obstructive sleep apnea  EKG looks normal  Chest X ray does not show acute cardiopulmonary disease  Initial Troponin levels are not elevated  D- Dimers are mildly elevated  Prior to Admission Medications   Prescriptions Last Dose Informant Patient Reported? Taking?    Cholecalciferol (VITAMIN D-3) 1000 units CAPS  Self Yes No   Sig: Take 2 capsules by mouth daily   betamethasone dipropionate (DIPROSONE) 0 05 % cream  Self No No   Sig: Apply topically 2 (two) times a day   buPROPion (WELLBUTRIN XL) 300 mg 24 hr tablet   No No   Sig: Take 1 tablet (300 mg total) by mouth daily   clotrimazole-betamethasone (LOTRISONE) 1-0 05 % cream   No No   Sig: Apply topically 2 (two) times a day   diclofenac sodium (VOLTAREN) 1 %  Self No No   Sig: Apply 2 g topically 4 (four) times a day   escitalopram (LEXAPRO) 20 mg tablet  Self No No   Sig: TAKE 1 TABLET BY MOUTH EVERY DAY   levothyroxine 25 mcg tablet   No No   Sig: TAKE 1 TABLET BY MOUTH DAILY IN THE EARLY MORNING   traZODone (DESYREL) 50 mg tablet   No No   Sig: Take 1 tablet (50 mg total) by mouth daily at bedtime as needed for sleep      Facility-Administered Medications: None       Past Medical History:   Diagnosis Date    Abdominal pain     Ankle pain     Anxiety     Arthralgia     Asthma     Blood typing encounter     Bloody stools     [place]    6/8/16   R   11/21/17     Carpal tunnel syndrome, bilateral     [place]  [place] 9/12/17   R   9/12/17     Chest pain     Constipation     Depression     Dermatitis     Fatigue     Frequent bowel movements     Gastroenteritis     GERD (gastroesophageal reflux disease)     GERD without esophagitis     Insomnia     [place]   11/7/16   R   11/21/17     Irregular heart beats     Muscle spasm     Nausea     Numbness and tingling in both hands     Odynophagia     Otitis     Pharyngitis     Pilonidal cyst with abscess     [place]   10/25/13   R   6/10/14     Proteinuria     Rectal bleeding     R   11/21/17     Rhinitis     Seasonal allergies     Skin lesion     [place]    2/3/15   R  [place] 3/17/17  /   [place]    3/17/17   R   12/22/17     Snoring     Strep throat     Tonsillitis     URI (upper respiratory infection)     Vitamin D deficiency        Past Surgical History:   Procedure Laterality Date    FOOT SURGERY      PILONIDAL CYST DRAINAGE      Incision and drainage of pilonidal cyst     MO WRIST Nii Enter LIG Right 7/12/2018    Procedure: RELEASE CARPAL TUNNEL ENDOSCOPIC;  Surgeon: Gary Jurado MD;  Location: [facility];  Service: Orthopedics    MO WRIST Nii Enter LIG Left 7/26/2018    Procedure: RELEASE CARPAL TUNNEL ENDOSCOPIC;  Surgeon: Gary Jurado MD;  Location: [facility];  Service: Orthopedics       Family History   Problem Relation Age of Onset    Depression Mother     Obesity Mother     Stroke Mother         Syndrome     Diabetes Mother     Alcohol abuse Father     Liver disease Father         hepatic failure     Hypertension Father     Depression Brother     Thyroid disease Brother     Alcohol abuse Brother     Substance Abuse Brother     Depression Maternal Uncle     Substance Abuse Brother     Heart disease Neg Hx     Cancer Neg Hx     Thyroid cancer Neg Hx      I have reviewed and agree with the history as documented  E-Cigarette/Vaping    E-Cigarette Use Never User      E-Cigarette/Vaping Substances    Nicotine No     THC No     CBD No     Flavoring No     Other No     Unknown No      Social History     Tobacco Use    Smoking status: Never Smoker    Smokeless tobacco: Never Used   Substance Use Topics    Alcohol use: Yes     Comment: Rarely    Drug use: No        Review of Systems    Physical Exam  ED Triage Vitals [08/25/20 1930]   Temperature Pulse Respirations Blood Pressure SpO2   98 5 °F (36 9 °C) 100 22 147/68 97 %      Temp Source Heart Rate Source Patient Position - Orthostatic VS BP Location FiO2 (%)   Oral Monitor Sitting Right arm --      Pain Score       Worst Possible Pain             Orthostatic Vital Signs  Vitals:    08/25/20 1930   BP: 147/68   Pulse: 100   Patient Position - Orthostatic VS: Sitting       Physical Exam  Constitutional:       Appearance: He is obese  HENT:      Head: Normocephalic and atraumatic  Nose: Nose normal       Mouth/Throat:      Mouth: Mucous membranes are moist       Pharynx: Oropharynx is clear  Eyes:      Extraocular Movements: Extraocular movements intact  Pupils: Pupils are equal, round, and reactive to light  Neck:      Musculoskeletal: Normal range of motion  Cardiovascular:      Rate and Rhythm: Normal rate and regular rhythm  Pulses: Normal pulses  Heart sounds: Normal heart sounds  Pulmonary:      Effort: No respiratory distress  Breath sounds: Normal breath sounds  No wheezing or rales  Comments: Limited inspiratory effort because of pain  Chest:      Chest wall: No tenderness  Abdominal:      General: Bowel sounds are normal       Palpations: Abdomen is soft  Musculoskeletal: Normal range of motion  Skin:     General: Skin is warm and dry  Capillary Refill: Capillary refill takes less than 2 seconds  Neurological:      Mental Status: He is alert           ED Medications  Medications - No data to display    Diagnostic Studies  Results Reviewed     None                 No orders to display         Procedures  ECG 12 Lead Documentation Only    Date/Time: 8/25/2020 8:11 PM  Performed by: Soren Dowd MD  Authorized by: Soren oDwd MD     ECG reviewed by me, the ED Provider: yes    Patient location:  ED  Previous ECG:     Comparison to cardiac monitor: Yes    Interpretation:     Interpretation: normal    Rate:     ECG rate assessment: normal    Rhythm:     Rhythm: sinus rhythm    Ectopy:     Ectopy: none    QRS:     QRS axis:  Normal  ST segments:     ST segments:  Normal  T waves:     T waves: non-specific            ED Course       US AUDIT      Most Recent Value   Initial Alcohol Screen: US AUDIT-C    1  How often do you have a drink containing alcohol?  0 Filed at: 08/25/2020 1929   2  How many drinks containing alcohol do you have on a typical day you are drinking? 0 Filed at: 08/25/2020 1929   3a  Male UNDER 65: How often do you have five or more drinks on one occasion? 0 Filed at: 08/25/2020 1929   3b  FEMALE Any Age, or MALE 65+: How often do you have 4 or more drinks on one occassion? 0 Filed at: 08/25/2020 1929   Audit-C Score  0 Filed at: 08/25/2020 1929                  DIOGO/DAST-10      Most Recent Value   How many times in the past year have you    Used an illegal drug or used a prescription medication for non-medical reasons? Never Filed at: 08/25/2020 1929                              MDM      Disposition  Final diagnoses:   None     ED Disposition     None      Follow-up Information    None         Patient's Medications   Discharge Prescriptions    No medications on file     No discharge procedures on file  PDMP Review     None           ED Provider  Attending physically available and evaluated Shahsa Delaney I managed the patient along with the ED Attending      Electronically Signed by         Soren Dowd MD  08/25/20 2059 Juliet Osgood, MD  08/25/20 [id number]

## 2020-08-26 ENCOUNTER — APPOINTMENT (EMERGENCY)
Dept: RADIOLOGY | Facility: HOSPITAL | Age: 30
End: 2020-08-26
Payer: COMMERCIAL

## 2020-08-26 ENCOUNTER — HOSPITAL ENCOUNTER (EMERGENCY)
Facility: HOSPITAL | Age: 30
Discharge: HOME | End: 2020-08-26
Attending: EMERGENCY MEDICINE | Admitting: EMERGENCY MEDICINE
Payer: COMMERCIAL

## 2020-08-26 VITALS
DIASTOLIC BLOOD PRESSURE: 79 MMHG | BODY MASS INDEX: 53.3 KG/M2 | WEIGHT: 315 LBS | HEART RATE: 79 BPM | RESPIRATION RATE: 18 BRPM | SYSTOLIC BLOOD PRESSURE: 166 MMHG | TEMPERATURE: 99.4 F | OXYGEN SATURATION: 96 %

## 2020-08-26 DIAGNOSIS — M25.511 ACUTE PAIN OF RIGHT SHOULDER: Primary | ICD-10-CM

## 2020-08-26 LAB
ATRIAL RATE: 102 BPM
P AXIS: 29 DEGREES
PR INTERVAL: 144 MS
QRS AXIS: 55 DEGREES
QRSD INTERVAL: 76 MS
QT INTERVAL: 314 MS
QTC INTERVAL: 401 MS
T WAVE AXIS: 11 DEGREES
VENTRICULAR RATE: 98 BPM

## 2020-08-26 PROCEDURE — 99284 EMERGENCY DEPT VISIT MOD MDM: CPT | Performed by: EMERGENCY MEDICINE

## 2020-08-26 PROCEDURE — 73030 X-RAY EXAM OF SHOULDER: CPT

## 2020-08-26 PROCEDURE — 99283 EMERGENCY DEPT VISIT LOW MDM: CPT

## 2020-08-26 PROCEDURE — 93010 ELECTROCARDIOGRAM REPORT: CPT | Performed by: INTERNAL MEDICINE

## 2020-08-26 RX ORDER — OXYCODONE HYDROCHLORIDE AND ACETAMINOPHEN 5; 325 MG/1; MG/1
1 TABLET ORAL EVERY 4 HOURS PRN
Qty: 15 TABLET | Refills: 0 | Status: SHIPPED | OUTPATIENT
Start: 2020-08-26 | End: 2021-03-01 | Stop reason: ALTCHOICE

## 2020-08-26 NOTE — Clinical Note
Tasia Marinelli was seen and treated in our emergency department on 8/26/2020  Diagnosis:     Shaheed Me  may return to work on return date  He may return on this date: 08/29/2020         If you have any questions or concerns, please don't hesitate to call        Ralf Galvan MD    ______________________________           _______________          _______________  Hospital Representative                              Date                                Time

## 2020-08-26 NOTE — ED PROVIDER NOTES
History  Chief Complaint   Patient presents with    Arm Pain     pt here last night in ED for CP and discharged with muscle strain  now has right shoulder and arm pain      Patient presents to the emergency department after being evaluated last evening for new right shoulder pain in the road tater cuff area  Patient was evaluated for chest wall pain last evening the negative workup including chest x-ray and CT to rule out pulmonary embolism  Patient today woke up with this pain in the rotator cuff area  He denies trauma or new activity or fall  Denies back or flank pain  Denies scapula pain  Patient can abduct per his own account without difficulty  He denies hand wrist or elbow pain  Denies numbness or weakness in the right upper extremity  Has no neurologic complaints  Denies neck pain or headache  Prior to Admission Medications   Prescriptions Last Dose Informant Patient Reported? Taking?    Cholecalciferol (VITAMIN D-3) 1000 units CAPS  Self Yes No   Sig: Take 2 capsules by mouth daily   betamethasone dipropionate (DIPROSONE) 0 05 % cream  Self No No   Sig: Apply topically 2 (two) times a day   buPROPion (WELLBUTRIN XL) 300 mg 24 hr tablet   No No   Sig: Take 1 tablet (300 mg total) by mouth daily   clotrimazole-betamethasone (LOTRISONE) 1-0 05 % cream   No No   Sig: Apply topically 2 (two) times a day   diclofenac sodium (VOLTAREN) 1 %  Self No No   Sig: Apply 2 g topically 4 (four) times a day   escitalopram (LEXAPRO) 20 mg tablet  Self No No   Sig: TAKE 1 TABLET BY MOUTH EVERY DAY   levothyroxine 25 mcg tablet   No No   Sig: TAKE 1 TABLET BY MOUTH DAILY IN THE EARLY MORNING   traZODone (DESYREL) 50 mg tablet   No No   Sig: Take 1 tablet (50 mg total) by mouth daily at bedtime as needed for sleep      Facility-Administered Medications: None       Past Medical History:   Diagnosis Date    Abdominal pain     Ankle pain     Anxiety     Arthralgia     Asthma     Blood typing encounter     Bloody stools     LA    6/8/16   R   11/21/17     Carpal tunnel syndrome, bilateral     [place]  Margaritashayne Tinoco Margarita Tinoco 9/12/17   R   9/12/17     Chest pain     Constipation     Depression     Dermatitis     Fatigue     Frequent bowel movements     Gastroenteritis     GERD (gastroesophageal reflux disease)     GERD without esophagitis     Insomnia     LA   11/7/16   R   11/21/17     Irregular heart beats     Muscle spasm     Nausea     Numbness and tingling in both hands     Odynophagia     Otitis     Pharyngitis     Pilonidal cyst with abscess     LA   10/25/13   R   6/10/14     Proteinuria     Rectal bleeding     R   11/21/17     Rhinitis     Seasonal allergies     Skin lesion     LA    2/3/15   R  Margarita Earnest Valdezuel 3/17/17  /   LA    3/17/17   R   12/22/17     Snoring     Strep throat     Tonsillitis     URI (upper respiratory infection)     Vitamin D deficiency        Past Surgical History:   Procedure Laterality Date    FOOT SURGERY      PILONIDAL CYST DRAINAGE      Incision and drainage of pilonidal cyst     [place] WRIST Cameron Willow LIG Right 7/12/2018    Procedure: RELEASE CARPAL TUNNEL ENDOSCOPIC;  Surgeon: Danisha Delacruz MD;  [facility];  Service: Orthopedics    [place] WRIST Cameron Willow LIG Left 7/26/2018    Procedure: RELEASE CARPAL TUNNEL ENDOSCOPIC;  Surgeon: Danisha Delacruz MD;  [facility];  Service: Orthopedics       Family History   Problem Relation Age of Onset    Depression Mother     Obesity Mother     Stroke Mother         Syndrome     Diabetes Mother     Alcohol abuse Father     Liver disease Father         hepatic failure     Hypertension Father     Depression Brother     Thyroid disease Brother     Alcohol abuse Brother     Substance Abuse Brother     Depression Maternal Uncle     Substance Abuse Brother     Heart disease Neg Hx     Cancer Neg Hx     Thyroid cancer Neg Hx      I have reviewed and agree with the history as documented  E-Cigarette/Vaping    E-Cigarette Use Never User      E-Cigarette/Vaping Substances    Nicotine No     THC No     CBD No     Flavoring No     Other No     Unknown No      Social History     Tobacco Use    Smoking status: Never Smoker    Smokeless tobacco: Never Used   Substance Use Topics    Alcohol use: Yes     Comment: Rarely    Drug use: No       Review of Systems   Constitutional: Negative  Negative for activity change, appetite change, chills, fatigue and fever  HENT: Negative  Eyes: Negative  Respiratory: Negative  Negative for chest tightness  Cardiovascular: Negative  Negative for chest pain, palpitations and leg swelling  Gastrointestinal: Negative  Negative for abdominal pain  Endocrine: Negative  Genitourinary: Negative  Musculoskeletal: Positive for arthralgias and myalgias  Negative for neck pain and neck stiffness  Skin: Negative  Allergic/Immunologic: Negative  Neurological: Negative  Negative for dizziness, tremors, seizures, syncope, speech difficulty, weakness, light-headedness, numbness and headaches  Hematological: Negative  Does not bruise/bleed easily  Psychiatric/Behavioral: Negative  Negative for confusion  Physical Exam  Physical Exam  Vitals signs and nursing note reviewed  Constitutional:       Appearance: He is well-developed  Comments: Nontoxic appearance  No respiratory distress  Patient does not seem uncomfortable sitting upright on the stretcher  HENT:      Head: Normocephalic and atraumatic  Right Ear: External ear normal       Left Ear: External ear normal    Eyes:      Conjunctiva/sclera: Conjunctivae normal       Pupils: Pupils are equal, round, and reactive to light  Cardiovascular:      Rate and Rhythm: Normal rate and regular rhythm  Heart sounds: Normal heart sounds  Pulmonary:      Effort: Pulmonary effort is normal       Breath sounds: Normal breath sounds     Abdominal: General: Bowel sounds are normal  There is no distension  Palpations: Abdomen is soft  There is no mass  Tenderness: There is no abdominal tenderness  There is no right CVA tenderness, left CVA tenderness, guarding or rebound  Hernia: No hernia is present  Musculoskeletal: Normal range of motion  General: Tenderness present  No swelling, deformity or signs of injury  Right lower leg: No edema  Comments: Mild tenderness at the right rotator cuff area  No swelling ecchymosis or signs of infection  Normal abduction of the right upper extremity  Normal neurovascular status of the right upper extremity  Nontender clavicle or AC joint  No bony deformity  Skin:     General: Skin is warm and dry  Capillary Refill: Capillary refill takes less than 2 seconds  Neurological:      General: No focal deficit present  Mental Status: He is alert and oriented to person, place, and time  Mental status is at baseline  Cranial Nerves: No cranial nerve deficit  Sensory: No sensory deficit  Motor: No weakness  Coordination: Coordination normal       Gait: Gait normal       Deep Tendon Reflexes: Reflexes are normal and symmetric  Reflexes normal    Psychiatric:         Mood and Affect: Mood normal          Behavior: Behavior normal          Thought Content:  Thought content normal          Judgment: Judgment normal          Vital Signs  ED Triage Vitals [08/26/20 1630]   Temperature Pulse Respirations Blood Pressure SpO2   99 4 °F (37 4 °C) 79 18 166/79 96 %      Temp Source Heart Rate Source Patient Position - Orthostatic VS BP Location FiO2 (%)   Oral Monitor Lying Right arm --      Pain Score       --           Vitals:    08/26/20 1630   BP: 166/79   Pulse: 79   Patient Position - Orthostatic VS: Lying         Visual Acuity      ED Medications  Medications - No data to display    Diagnostic Studies  Results Reviewed     None                 XR shoulder 2+ views RIGHT    (Results Pending)              Procedures  Procedures         ED Course  ED Course as of Aug 26 1753   Wed Aug 26, 2020   1727 Patient is stable for discharge  Suspect rotator cuff strain  Will be prescribed narcotics  Discussed signs and symptoms requiring return to the emergency department  1753 Normal right shoulder x-ray  No fracture or dislocation  MDM      Disposition  Final diagnoses:   Acute pain of right shoulder     Time reflects when diagnosis was documented in both MDM as applicable and the Disposition within this note     Time User Action Codes Description Comment    8/26/2020  5:29 PM Osvaldo Pandya [F27 383] Acute pain of right shoulder       ED Disposition     ED Disposition Condition Date/Time Comment    Discharge Stable Wed Aug 26, 2020  5:29 PM Lorrane Colder discharge to home/self care  Follow-up Information     Follow up With Specialties Details Why Contact Info    Turner Pierce MD Family Medicine Schedule an appointment as soon as possible for a visit   [address]  [address] [place] [address] Surgery   [address] [facility]  [address]  [place] [address]  [phone]            Patient's Medications   Discharge Prescriptions    OXYCODONE-ACETAMINOPHEN (PERCOCET) 5-325 MG PER TABLET    Take 1 tablet by mouth every 4 (four) hours as needed for moderate painMax Daily Amount: 6 tablets       Start Date: 8/26/2020 End Date: --       Order Dose: 1 tablet       Quantity: 15 tablet    Refills: 0     No discharge procedures on file      PDMP Review     None          ED Provider  Electronically Signed by           Wilbert Lehman MD  08/26/20 6497

## 2020-08-26 NOTE — ED CARE HANDOFF
Emergency Department Sign Out Note        Sign out and transfer of care from Dr Helga Valdez  See Separate Emergency Department note  The patient, Jany Baldwin, was evaluated by the previous provider for chest pain  Workup Completed:  no    ED Course / Workup Pending (followup)      Ddimer positive  CTA chest r/o PE pending  Ranch[place] Benz Criteria for PE      Most Recent Value   Zoran' Criteria for PE   Clinical signs and symptoms of DVT  0 Filed at: 08/25/2020 2058   PE is primary diagnosis or equally likely  0 Filed at: 08/25/2020 2058   HR >100  0 Filed at: 08/25/2020 2058   Immobilization at least 3 days or Surgery in the previous 4 weeks  0 Filed at: 08/25/2020 2058   Previous, objectively diagnosed PE or DVT  0 Filed at: 08/25/2020 2058   Hemoptysis  0 Filed at: 08/25/2020 2058   Malignancy with treatment within 6 months or palliative  0 Filed at: 08/25/2020 2058   Rancho Demarco' Criteria Total  0 Filed at: 08/25/2020 2058               Procedures  MDM  Number of Diagnoses or Management Options  Chest wall pain: new and requires workup  Diagnosis management comments: 9:48 PM  CTA negative for PE  Stable for discharge per Dr Wilber Nicole instructions  F/u with PCP as outpatient  RTED instructions reviewed          Amount and/or Complexity of Data Reviewed  Tests in the radiology section of CPT®: reviewed    Risk of Complications, Morbidity, and/or Mortality  Presenting problems: high  Diagnostic procedures: high  Management options: high    Patient Progress  Patient progress: stable      Disposition  Final diagnoses:   Chest wall pain     Time reflects when diagnosis was documented in both MDM as applicable and the Disposition within this note     Time User Action Codes Description Comment    8/25/2020  9:01 PM Cason Putt Add [R07 89] Chest wall pain     8/25/2020  9:02 PM Cason Putt Remove [R07 89] Chest wall pain     8/25/2020  9:44 PM Grace Chavis Add [R07 89] Chest wall pain       ED Disposition     ED Disposition Condition Date/Time Comment    Discharge Stable Tue Aug 25, 2020  9:44 PM Kaylene Salvador discharge to home/self care  Follow-up Information     Follow up With Specialties Details Why Contact Info Additional Information    Lars Minor MD Family Medicine Schedule an appointment as soon as possible for a visit   [facility] [address] Emergency Department Emergency Medicine  If symptoms worsen [address] [facility]  AN ED, [address], [place], [place], [address]        Patient's Medications   Discharge Prescriptions    No medications on file     No discharge procedures on file         ED Provider  Electronically Signed by     Jewels Jon DO  08/25/20 6270

## 2020-08-26 NOTE — Clinical Note
Latanya Prasad was seen and treated in our emergency department on 8/26/2020  Diagnosis:     Zach Perla  may return to work on return date  He may return on this date: 08/29/2020         If you have any questions or concerns, please don't hesitate to call        Corinne Corolla, MD    ______________________________           _______________          _______________  Hospital Representative                              Date                                Time

## 2020-08-26 NOTE — ED ATTENDING ATTESTATION
8/25/2020  IBria MD, saw and evaluated the patient  I have discussed the patient with the resident/non-physician practitioner and agree with the resident's/non-physician practitioner's findings, Plan of Care, and MDM as documented in the resident's/non-physician practitioner's note, except where noted  All available labs and Radiology studies were reviewed  I was present for key portions of any procedure(s) performed by the resident/non-physician practitioner and I was immediately available to provide assistance  At this point I agree with the current assessment done in the Emergency Department  I have conducted an independent evaluation of this patient a history and physical is as follows:    ED Course  ED Course as of Aug 25 2057   Tue Aug 25, 2020   2054 D-Dimer, Quant(!): 0 58         Critical Care Time  Procedures  Patient seen independently and in conjunction with the medical resident  ED presentation and clinical exam consistent with chest wall discomfort but D-dimer elevated  CTA to rule out pulmonary embolism will be ordered  Case will be signed over to Dr Miller for definitive disposition  I suspect the CTA to rule out pulmonary embolism will be negative

## 2020-08-27 ENCOUNTER — OFFICE VISIT (OUTPATIENT)
Dept: PSYCHIATRY | Facility: CLINIC | Age: 30
End: 2020-08-27
Payer: COMMERCIAL

## 2020-08-27 DIAGNOSIS — F33.41 RECURRENT MAJOR DEPRESSIVE DISORDER, IN PARTIAL REMISSION (HCC): Primary | ICD-10-CM

## 2020-08-27 DIAGNOSIS — F41.1 GENERALIZED ANXIETY DISORDER: ICD-10-CM

## 2020-08-27 PROCEDURE — 99213 OFFICE O/P EST LOW 20 MIN: CPT | Performed by: PSYCHIATRY & NEUROLOGY

## 2020-08-27 PROCEDURE — 1036F TOBACCO NON-USER: CPT | Performed by: PSYCHIATRY & NEUROLOGY

## 2020-08-27 RX ORDER — TRAZODONE HYDROCHLORIDE 50 MG/1
50 TABLET ORAL
Qty: 30 TABLET | Refills: 2 | Status: SHIPPED | OUTPATIENT
Start: 2020-08-27 | End: 2021-05-14 | Stop reason: SDUPTHER

## 2020-08-27 RX ORDER — BUPROPION HYDROCHLORIDE 300 MG/1
300 TABLET ORAL DAILY
Qty: 30 TABLET | Refills: 2 | Status: SHIPPED | OUTPATIENT
Start: 2020-08-27 | End: 2021-04-30 | Stop reason: SDUPTHER

## 2020-08-27 NOTE — PSYCH
Virtual Regular Visit      Assessment/Plan:    Problem List Items Addressed This Visit        Other    Generalized anxiety disorder    Relevant Medications    buPROPion (WELLBUTRIN XL) 300 mg 24 hr tablet    traZODone (DESYREL) 50 mg tablet    Recurrent major depressive disorder, in partial remission (HCC) - Primary    Relevant Medications    buPROPion (WELLBUTRIN XL) 300 mg 24 hr tablet    traZODone (DESYREL) 50 mg tablet               Reason for visit is No chief complaint on file  Encounter provider Tg Wilder MD    Provider located at [facility] [address]  [place] [address] [address]  [facility] [address]  [phone]      Recent Visits  No visits were found meeting these conditions  Showing recent visits within past 7 days and meeting all other requirements     Future Appointments  No visits were found meeting these conditions  Showing future appointments within next 150 days and meeting all other requirements        The patient was identified by name and date of birth  Shwetha Akins was informed that this is a telemedicine visit and that the visit is being conducted through [facility] and patient was informed that this is a secure, HIPAA-compliant platform  He agrees to proceed     My office door was closed  No one else was in the room  He acknowledged consent and understanding of privacy and security of the video platform  The patient has agreed to participate and understands they can discontinue the visit at any time  Patient is aware this is a billable service  Levi Rosales is a 27 y o  male seen for follow up, see below   HPI     Past Medical History:   Diagnosis Date    Abdominal pain     Ankle pain     Anxiety     Arthralgia     Asthma     Blood typing encounter     Bloody stools     LA    6/8/16   R   11/21/17     Carpal tunnel syndrome, bilateral     LA  Kaylan Soulier Kaylan Souyenny 9/12/17   R   9/12/17     Chest pain     Constipation     Depression     Dermatitis     Fatigue     Frequent bowel movements     Gastroenteritis     GERD (gastroesophageal reflux disease)     GERD without esophagitis     Insomnia     LA   11/7/16   R   11/21/17     Irregular heart beats     Muscle spasm     Nausea     Numbness and tingling in both hands     Odynophagia     Otitis     Pharyngitis     Pilonidal cyst with abscess     LA   10/25/13   R   6/10/14     Proteinuria     Rectal bleeding     R   11/21/17     Rhinitis     Seasonal allergies     Skin lesion     LA    2/3/15   R  Mary Ellen Ben Mary Ellen Ben 3/17/17  /   LA    3/17/17   R   12/22/17     Snoring     Strep throat     Tonsillitis     URI (upper respiratory infection)     Vitamin D deficiency        Past Surgical History:   Procedure Laterality Date    FOOT SURGERY      PILONIDAL CYST DRAINAGE      Incision and drainage of pilonidal cyst     [place] WRIST Mirna Doles LIG Right 7/12/2018    Procedure: RELEASE CARPAL TUNNEL ENDOSCOPIC;  Surgeon: Carey Rojas MD;  [facility];  Service: Orthopedics    [place] WRIST Mirna Doles LIG Left 7/26/2018    Procedure: RELEASE CARPAL TUNNEL ENDOSCOPIC;  Surgeon: Carey Rojas MD;  [facility];  Service: Orthopedics       Current Outpatient Medications   Medication Sig Dispense Refill    betamethasone dipropionate (DIPROSONE) 0 05 % cream Apply topically 2 (two) times a day 30 g 3    buPROPion (WELLBUTRIN XL) 300 mg 24 hr tablet Take 1 tablet (300 mg total) by mouth daily 30 tablet 2    Cholecalciferol (VITAMIN D-3) 1000 units CAPS Take 2 capsules by mouth daily      clotrimazole-betamethasone (LOTRISONE) 1-0 05 % cream Apply topically 2 (two) times a day 45 g 2    diclofenac sodium (VOLTAREN) 1 % Apply 2 g topically 4 (four) times a day 100 g 1    escitalopram (LEXAPRO) 20 mg tablet TAKE 1 TABLET BY MOUTH EVERY DAY 90 tablet 3    levothyroxine 25 mcg tablet TAKE 1 TABLET BY MOUTH DAILY IN THE EARLY MORNING 90 tablet 1    oxyCODONE-acetaminophen (PERCOCET) 5-325 mg per tablet Take 1 tablet by mouth every 4 (four) hours as needed for moderate painMax Daily Amount: 6 tablets 15 tablet 0    traZODone (DESYREL) 50 mg tablet Take 1 tablet (50 mg total) by mouth daily at bedtime as needed for sleep 30 tablet 2     No current facility-administered medications for this visit  No Known Allergies    Review of Systems    Video Exam    There were no vitals filed for this visit  Physical Exam     I spent 15 minutes directly with the patient during this visit      [address] [place] [address] acknowledges that he has consented to an online visit or consultation  He understands that the online visit is based solely on information provided by him, and that, in the absence of a face-to-face physical evaluation by the physician, the diagnosis he receives is both limited and provisional in terms of accuracy and completeness  This is not intended to replace a full medical face-to-face evaluation by the physician  Jennifer Barretodavid understands and accepts these terms  Patient ID: Jennifer Monroe is a 27 y o  male  Subjective:  Patient seen for follow up, has been on Wellbutrin  mg daily, Lexapro 20 mg daily, Trazodone 50 mg hs prn  Last seen nearly 3 months ago  He has been doing fairly well, although has had recent chest and rotator cuff pain  No knowledge of injury, related to this  He has been working up until this injury  He reports that his mood and anxiety have stable  He has been taking both Wellbutrin and Lexapro in am, also takes trazodone most night, which has been helping him sleep better  Is seeing therapist outside of network, weekly  Review Of Systems:     Mood Euthymic   Thought Content Normal   General As HPI   Physical symptoms As Noted in HPI       Laboratory Results: No results found for this or any previous visit          Objective:       Mental status:  Appearance calm and cooperative  and adequate hygiene and grooming   Mood euthymic   Affect affect appropriate    Speech Normal rate and Normal volume   Thought Processes coherent/organized   Hallucinations no hallucinations present    Thought Content no delusional thoughts verbalized   Abnormal Thoughts no suicidal thoughts  and no homicidal thoughts    Orientation A+O x 3   Insight and judgement intact    Assessment/Plan:       Diagnoses and all orders for this visit:    Recurrent major depressive disorder, in partial remission (HCC)  -     buPROPion (WELLBUTRIN XL) 300 mg 24 hr tablet; Take 1 tablet (300 mg total) by mouth daily    Generalized anxiety disorder  -     traZODone (DESYREL) 50 mg tablet; Take 1 tablet (50 mg total) by mouth daily at bedtime as needed for sleep            Treatment Recommendations- Risks Benefits    Gave 3 months supply of Wellbutrin, trazodone, has 5 months more of Lexapro from PCP  Follow up in 3 months    Risks, Benefits And Possible Side Effects Of Medications:  discussed

## 2020-08-28 ENCOUNTER — TELEMEDICINE (OUTPATIENT)
Dept: FAMILY MEDICINE CLINIC | Facility: CLINIC | Age: 30
End: 2020-08-28
Payer: COMMERCIAL

## 2020-08-28 VITALS — WEIGHT: 315 LBS | BODY MASS INDEX: 41.75 KG/M2 | HEIGHT: 73 IN

## 2020-08-28 DIAGNOSIS — M79.621 PAIN IN RIGHT UPPER ARM: Primary | ICD-10-CM

## 2020-08-28 PROCEDURE — 3008F BODY MASS INDEX DOCD: CPT | Performed by: PSYCHIATRY & NEUROLOGY

## 2020-08-28 PROCEDURE — 99213 OFFICE O/P EST LOW 20 MIN: CPT | Performed by: FAMILY MEDICINE

## 2020-08-28 PROCEDURE — 1036F TOBACCO NON-USER: CPT | Performed by: FAMILY MEDICINE

## 2020-08-28 RX ORDER — NAPROXEN 500 MG/1
500 TABLET ORAL 2 TIMES DAILY WITH MEALS
Qty: 14 TABLET | Refills: 0 | Status: SHIPPED | OUTPATIENT
Start: 2020-08-28 | End: 2021-03-01 | Stop reason: ALTCHOICE

## 2020-08-28 NOTE — PROGRESS NOTES
Virtual Regular Visit      Assessment/Plan:    Problem List Items Addressed This Visit     None      Visit Diagnoses     Pain in right upper arm    -  Primary    Relevant Medications    naproxen (NAPROSYN) 500 mg tablet    Other Relevant Orders    Ambulatory referral to [address] [place] [address] LANDON complete        54-year-old male presents today via virtual video telemedicine visit presenting with right upper arm/biceps pain  Patient states on Tuesday when he went to work, had slight right-sided chest pain  This worsened throughout the day, patient went to the emergency room where he had x-rays, blood work, CTA which were normal   Patient was told that he had pulled a muscle in his chest wall as he was experiencing pain while he was moving and taking deep breaths  On Tuesday night, the pain moved to the right shoulder  Subsequently the pain moved to the biceps and patient went back to the ER the next day  He was given oxycodone, this did not help  On Thursday morning, woke up with pain over his right upper back and now has localized over his right biceps this morning  Patient states it hurts to lay down and breathe  Patient denies any further chest wall pain  The pain over his biceps is  described as a stabbing pain  Patient states she has full range of motion  Reason for visit is right biceps pain  Chief Complaint   Patient presents with    Follow-up     ER visit Tuesday and Wed   for chest pain, right side shoulder and back pain    vital signs     patient unable to obtain    Virtual Regular Visit        Encounter provider Tigist Cline MD    Provider located at [address] [place] [id number]      Recent Visits  Date Type Provider Dept   08/28/20 [address], MD [address] [place] [address],[address] recent visits within past 7 days and meeting all other requirements     Today's Visits  Date Type Provider Dept   08/31/20 Telephone 15 Emily Philip today's visits and meeting all other requirements     Future Appointments  No visits were found meeting these conditions  Showing future appointments within next 150 days and meeting all other requirements        The patient was identified by name and date of birth  Marcelapedro Nina was informed that this is a telemedicine visit and that the visit is being conducted through [facility] and patient was informed that this is not a secure, HIPAA-complaint platform  He agrees to proceed     My office door was closed  No one else was in the room  He acknowledged consent and understanding of privacy and security of the video platform  The patient has agreed to participate and understands they can discontinue the visit at any time  Patient is aware this is a billable service  Terell Taylor is a 27 y o  male presents today via virtual video telemedicine visit presenting with right upper arm/biceps pain  Patient states on Tuesday when he went to work, had slight right-sided chest pain  This worsened throughout the day, patient went to the emergency room where he had x-rays, blood work, CTA which were normal   Patient was told that he had pulled a muscle in his chest wall as he was experiencing pain while he was moving and taking deep breaths  On Tuesday night, the pain moved to the right shoulder  Subsequently the pain moved to the biceps and patient went back to the ER the next day  He was given oxycodone, this did not help  On Thursday morning, woke up with pain over his right upper back and now has localized over his right biceps this morning  Patient states it hurts to lay down and breathe  Patient denies any further chest wall pain  The pain over his biceps is  described as a stabbing pain  Patient states he has full range of motion  On exam, patient does have full range of motion  Denies weakness, tingling, numbness    I am concerned for a possible tear  I would like to obtain musculoskeletal ultrasound and refer to Orthopedics  I would like patient to start naproxen with food  He may also apply ice  If symptoms should worsen, I would like him to let me know  Patient denies any further chest wall pain or discomfort  Denies shortness of breath  HPI     Past Medical History:   Diagnosis Date    Abdominal pain     Ankle pain     Anxiety     Arthralgia     Asthma     Blood typing encounter     Bloody stools     [place]    6/8/16   R   11/21/17     Carpal tunnel syndrome, bilateral     [place]  Lulú Cornell 9/12/17   R   9/12/17     Chest pain     Constipation     Depression     Dermatitis     Fatigue     Frequent bowel movements     Gastroenteritis     GERD (gastroesophageal reflux disease)     GERD without esophagitis     Insomnia     [place]   11/7/16   R   11/21/17     Irregular heart beats     Muscle spasm     Nausea     Numbness and tingling in both hands     Odynophagia     Otitis     Pharyngitis     Pilonidal cyst with abscess     LA   10/25/13   R   6/10/14     Proteinuria     Rectal bleeding     R   11/21/17     Rhinitis     Seasonal allergies     Skin lesion     [place]    2/3/15   R  Lulú Cornell 3/17/17  /   [place]    3/17/17   R   12/22/17     Snoring     Strep throat     Tonsillitis     URI (upper respiratory infection)     Vitamin D deficiency        Past Surgical History:   Procedure Laterality Date    FOOT SURGERY      PILONIDAL CYST DRAINAGE      Incision and drainage of pilonidal cyst     [place] WRIST Nii Enter LIG Right 7/12/2018    Procedure: RELEASE CARPAL TUNNEL ENDOSCOPIC;  Surgeon: Gary Jurado MD;  Location:  MAIN OR;  Service: Orthopedics    [place] WRIST Nii Enter LIG Left 7/26/2018    Procedure: RELEASE CARPAL TUNNEL ENDOSCOPIC;  Surgeon: Gary Jurado MD;  [facility]: [facility];  Service: Orthopedics       Current Outpatient Medications   Medication Sig Dispense Refill    betamethasone dipropionate (DIPROSONE) 0 05 % cream Apply topically 2 (two) times a day 30 g 3    buPROPion (WELLBUTRIN XL) 300 mg 24 hr tablet Take 1 tablet (300 mg total) by mouth daily 30 tablet 2    Cholecalciferol (VITAMIN D-3) 1000 units CAPS Take 2 capsules by mouth daily      clotrimazole-betamethasone (LOTRISONE) 1-0 05 % cream Apply topically 2 (two) times a day 45 g 2    diclofenac sodium (VOLTAREN) 1 % Apply 2 g topically 4 (four) times a day 100 g 1    escitalopram (LEXAPRO) 20 mg tablet TAKE 1 TABLET BY MOUTH EVERY DAY 90 tablet 3    levothyroxine 25 mcg tablet TAKE 1 TABLET BY MOUTH DAILY IN THE EARLY MORNING 90 tablet 1    oxyCODONE-acetaminophen (PERCOCET) 5-325 mg per tablet Take 1 tablet by mouth every 4 (four) hours as needed for moderate painMax Daily Amount: 6 tablets 15 tablet 0    traZODone (DESYREL) 50 mg tablet Take 1 tablet (50 mg total) by mouth daily at bedtime as needed for sleep 30 tablet 2    naproxen (NAPROSYN) 500 mg tablet Take 1 tablet (500 mg total) by mouth 2 (two) times a day with meals 14 tablet 0     No current facility-administered medications for this visit  No Known Allergies    Review of Systems   Respiratory: Negative for shortness of breath  Cardiovascular: Negative  Musculoskeletal:        Right upper arm/biceps pain   Neurological: Negative for weakness and numbness  Video Exam    Vitals:    08/28/20 0905   Weight: (!) 181 kg (398 lb)   Height: 6' 1" (1 854 m)       Physical Exam  Nursing note reviewed  Constitutional:       General: He is not in acute distress  Appearance: Normal appearance  Pulmonary:      Effort: No respiratory distress  Musculoskeletal: Normal range of motion  Neurological:      Mental Status: He is alert and oriented to person, place, and time            I have spent 15 minutes with Patient  today in which greater than 50% of this time was spent in counseling/coordination of care regarding Prognosis, Risks and benefits of tx options, Intructions for management, Patient and family education, Importance of tx compliance, Risk factor reductions and Impressions  VIRTUAL VISIT DISCLAIMER    Vernida Hashimoto acknowledges that he has consented to an online visit or consultation  He understands that the online visit is based solely on information provided by him, and that, in the absence of a face-to-face physical evaluation by the physician, the diagnosis he receives is both limited and provisional in terms of accuracy and completeness  This is not intended to replace a full medical face-to-face evaluation by the physician  Vernida Hashimoto understands and accepts these terms

## 2020-08-28 NOTE — PROGRESS NOTES
1  Acute pain of right shoulder     2  Pain in both knees, unspecified chronicity  Ambulatory referral to Orthopedic Surgery     No orders of the defined types were placed in this encounter  Imaging Studies (I personally reviewed images in PACS and report):    Prior imagin  X-ray right shoulder three views 2020:  No acute osseous abnormality    2  CTA ED chest PE study 2020: No evidence of pulmonary embolus  No osseous abnormality seen  IMPRESSION:  Right rotator cuff strain - improving    PLAN:  Repeat X-ray next visit:   none  Clinical and prior radiographic findings discussed with patient today-consistent with right rotator cuff strain that is currently improving with conservative measures  I did offer home/physical therapy to prevent recurrence of this issue, however patient declined  He has full range of motion and strength was right upper extremity and no cervical issues noted on exam  I counseled to utilize acetaminophen/NSAIDs as needed if he still has discomfort and to discontinue opioids  Patient agreeable to plan  Follow up as needed  Return if symptoms worsen or fail to improve  There are no Patient Instructions on file for this visit  CHIEF COMPLAINT:  Right shoulder pain    HPI:  Azucena Morales is a 27 y o  RIGHT hand dominant male  who presents for       Visit 2020 :  Referred by his primary care physician, Dr Adarsh Wolff, for initial evaluation of right shoulder pain:  Patient reports today that pain has been improving  Ongoing issue for 1 week without precipitating event; woke up with the pain  Pain located on his right lateral shoulder and less-so across his right anterior clavicular area  Described as an aching pain, intermittent, mild intensity when it occurs (2/10)  Aggravated during certain activities like pushing off  Has been prescribed topical/oral nsaids, percocet which provide relief  Reports full range of motion of his right arm   Denies weakness, clicking/popping  Denies numbness/tingling, swelling, redness  Denies pain radiating down to hands  Denies prior injuries/surgeries of his RUE  Denies other ROS as below  ER visit 08/26/2020:  Patient returned after waking up with atraumatic right shoulder pain  Noted to have full abduction of right upper extremity and normal neurovascular status  Prescribed Percocet 5-325 tabs Q8H PRN, 15 tabs  ER visit summary 08/25/2020:  Patient reporting chest pain associated with deep inspiration  EKG unremarkable  Labwork noted normal troponins, mildly elevated D-dimer, CXR and CTA chest unremarkable  Review of Systems   Constitutional: Negative for chills, fever and unexpected weight change  HENT: Negative for rhinorrhea and sore throat  Eyes: Negative for visual disturbance  Respiratory: Negative for cough, shortness of breath and wheezing  Cardiovascular: Negative for chest pain, palpitations and leg swelling  Gastrointestinal: Negative for abdominal pain, diarrhea, nausea and vomiting  Musculoskeletal:        As per HPI   Skin: Negative for rash and wound  Neurological:        As per HPI   Psychiatric/Behavioral: The patient is not nervous/anxious  Medical, Surgical, Family, and Social History    Past Medical History:   Diagnosis Date    Abdominal pain     Ankle pain     Anxiety     Arthralgia     Asthma     Blood typing encounter     Bloody stools     [place]    6/8/16   R   11/21/17     Carpal tunnel syndrome, bilateral     [place]  Jenny Burow Jenny Burow 9/12/17   R   9/12/17     Chest pain     Constipation     Depression     Dermatitis     Fatigue     Frequent bowel movements     Gastroenteritis     GERD (gastroesophageal reflux disease)     GERD without esophagitis     Insomnia     [place]   11/7/16   R   11/21/17     Irregular heart beats     Muscle spasm     Nausea     Numbness and tingling in both hands     Odynophagia     Otitis     Pharyngitis     Pilonidal cyst with abscess     LA   10/25/13   R   6/10/14     Proteinuria     Rectal bleeding     R   11/21/17     Rhinitis     Seasonal allergies     Skin lesion     [place]    2/3/15   R  Huong Flores 3/17/17  /   [place]    3/17/17   R   12/22/17     Snoring     Strep throat     Tonsillitis     URI (upper respiratory infection)     Vitamin D deficiency      Past Surgical History:   Procedure Laterality Date    FOOT SURGERY      PILONIDAL CYST DRAINAGE      Incision and drainage of pilonidal cyst     [place] WRIST Melinda Rave LIG Right 7/12/2018    Procedure: RELEASE CARPAL TUNNEL ENDOSCOPIC;  Surgeon: Dalila Bills MD;  [facility];  Service: Orthopedics    [place] WRIST Melinda Rave LIG Left 7/26/2018    Procedure: RELEASE CARPAL TUNNEL ENDOSCOPIC;  Surgeon: Dalila Bills MD;  [facility];  Service: Orthopedics     Social History   Social History     Substance and Sexual Activity   Alcohol Use Yes    Comment: Rarely     Social History     Substance and Sexual Activity   Drug Use No     Social History     Tobacco Use   Smoking Status Never Smoker   Smokeless Tobacco Never Used     Family History   Problem Relation Age of Onset    Depression Mother     Obesity Mother     Stroke Mother         Syndrome     Diabetes Mother     Alcohol abuse Father     Liver disease Father         hepatic failure     Hypertension Father     Depression Brother     Thyroid disease Brother     Alcohol abuse Brother     Substance Abuse Brother     Depression Maternal Uncle     Substance Abuse Brother     Heart disease Neg Hx     Cancer Neg Hx     Thyroid cancer Neg Hx      No Known Allergies       Physical Exam  /98   Pulse 94   Ht 6' 1" (1 854 m)   Wt (!) 181 kg (398 lb)   BMI 52 51 kg/m²     Constitutional:  see vital signs  Gen:  Obese, normocephalic/atraumatic, well-groomed  Eyes: No inflammation or discharge of conjunctiva or lids; sclera clear   Pharynx: no inflammation, lesion, or mass of lips  Neck: supple, no masses, non-distended  MSK: no inflammation, lesion, mass, or clubbing of nails and digits except for other than mentioned below  SKIN: no visible rashes or skin lesions  Pulmonary/Chest: Effort normal  No respiratory distress  NEURO: cranial nerves grossly intact  PSYCH:  Alert and oriented to person, place, and time; recent and remote memory intact; mood normal, no depression, anxiety, or agitation, judgment and insight good and intact     Ortho Exam  Shoulder exam:       RIGHT LEFT    Inspection Erythema None None     Swelling None None     Increased Warmth None None    Rotator cuff ER 5/5 5/5     IR 5/5 5/5     Abduction 5/5 5/5    ROM  170     Abduction 170 170     ER0 60 60     ER90 90 90     IR90 40 40     IRb T8 T8    TTP:  none none    Special Tests: O'Briens  (FF 90, add 10, resist thumbs up-, resist thumbs down+) Negative Negative slap    Cross body Adduction Negative Negative     Speeds  Negative Negative     Yergason's Negative Negative     Arcelia's Negative Negative     Neer Negative Negative     Siu Negative Negative    Other: Scapular winging (push against wall) negative negative        UE NV Exam: +2 Radial pulses bilaterally  Sensation intact to light touch C5-T1 bilaterally, Radial/median/ulnar nerve motor intact    Bilateral elbow, wrist, and and forearm ROM full, painless with passive ROM, no ttp or crepitance throughout extremities below shoulder joint    Cervical ROM is full without pain, numbness or tingling

## 2020-08-31 ENCOUNTER — TELEPHONE (OUTPATIENT)
Dept: FAMILY MEDICINE CLINIC | Facility: CLINIC | Age: 30
End: 2020-08-31

## 2020-08-31 NOTE — TELEPHONE ENCOUNTER
When I spoke to the patient, he was complaining of right biceps pain  He was not complaining of chest wall discomfort  Can you please clarify with the patient?

## 2020-08-31 NOTE — TELEPHONE ENCOUNTER
Spoke to pt  He stated that the pain originated in the chest and moved to the rt arm  ER stated that he pulled a chest muscle   EKG and x-rays done

## 2020-08-31 NOTE — TELEPHONE ENCOUNTER
When I spoke with him, he told me he has right biceps pain  Is that correct? I have ordered a musculoskeletal ultrasound to rule out a biceps tear  Was he able to schedule the ultrasound?

## 2020-08-31 NOTE — TELEPHONE ENCOUNTER
Patient scheduling called stating patient is not having rt arm pain but is concerned about his chest pain and that he may have pulled a muscle, they are requesting a test to look at the muscles in his chest wall, please advise

## 2020-09-01 ENCOUNTER — OFFICE VISIT (OUTPATIENT)
Dept: OBGYN CLINIC | Facility: MEDICAL CENTER | Age: 30
End: 2020-09-01
Payer: COMMERCIAL

## 2020-09-01 VITALS
BODY MASS INDEX: 41.75 KG/M2 | WEIGHT: 315 LBS | HEART RATE: 94 BPM | DIASTOLIC BLOOD PRESSURE: 98 MMHG | SYSTOLIC BLOOD PRESSURE: 149 MMHG | HEIGHT: 73 IN

## 2020-09-01 DIAGNOSIS — M25.561 PAIN IN BOTH KNEES, UNSPECIFIED CHRONICITY: ICD-10-CM

## 2020-09-01 DIAGNOSIS — M25.562 PAIN IN BOTH KNEES, UNSPECIFIED CHRONICITY: ICD-10-CM

## 2020-09-01 DIAGNOSIS — M25.511 ACUTE PAIN OF RIGHT SHOULDER: Primary | ICD-10-CM

## 2020-09-01 PROCEDURE — 99243 OFF/OP CNSLTJ NEW/EST LOW 30: CPT | Performed by: STUDENT IN AN ORGANIZED HEALTH CARE EDUCATION/TRAINING PROGRAM

## 2020-09-01 PROCEDURE — 1036F TOBACCO NON-USER: CPT | Performed by: STUDENT IN AN ORGANIZED HEALTH CARE EDUCATION/TRAINING PROGRAM

## 2020-09-01 NOTE — LETTER
2020     Vini Mago, [address] [place], [place]    Patient: Nury Tompkins   YOB: 1990   Date of Visit: 2020       Dear Dr Mesha Ramírez:    Thank you for referring Irving Recinos to me for evaluation  Below are my notes for this consultation  If you have questions, please do not hesitate to call me  I look forward to following your patient along with you  Sincerely,        Alessio Sarmiento MD        CC: No Recipients  Alessio Sarmiento MD  2020  9:48 AM  Signed  1  Acute pain of right shoulder     2  Pain in both knees, unspecified chronicity  Ambulatory referral to Orthopedic Surgery     No orders of the defined types were placed in this encounter  Imaging Studies (I personally reviewed images in PACS and report):    Prior imagin  X-ray right shoulder three views 2020:  No acute osseous abnormality    2  CTA ED chest PE study 2020: No evidence of pulmonary embolus  No osseous abnormality seen  IMPRESSION:  Right rotator cuff strain - improving    PLAN:  Repeat X-ray next visit:   none  Clinical and prior radiographic findings discussed with patient today-consistent with right rotator cuff strain that is currently improving with conservative measures  I did offer home/physical therapy to prevent recurrence of this issue, however patient declined  He has full range of motion and strength was right upper extremity and no cervical issues noted on exam  I counseled to utilize acetaminophen/NSAIDs as needed if he still has discomfort and to discontinue opioids  Patient agreeable to plan  Follow up as needed  Return if symptoms worsen or fail to improve  There are no Patient Instructions on file for this visit  CHIEF COMPLAINT:  Right shoulder pain    HPI:  Nury Tompkins is a 27 y o   RIGHT hand dominant male  who presents for       Visit 2020 :  Referred by his primary care physician, Dr Mesha Ramírez, for initial evaluation of right shoulder pain:  Patient reports today that pain has been improving  Ongoing issue for 1 week without precipitating event; woke up with the pain  Pain located on his right lateral shoulder and less-so across his right anterior clavicular area  Described as an aching pain, intermittent, mild intensity when it occurs (2/10)  Aggravated during certain activities like pushing off  Has been prescribed topical/oral nsaids, percocet which provide relief  Reports full range of motion of his right arm  Denies weakness, clicking/popping  Denies numbness/tingling, swelling, redness  Denies pain radiating down to hands  Denies prior injuries/surgeries of his RUE  Denies other ROS as below  ER visit 08/26/2020:  Patient returned after waking up with atraumatic right shoulder pain  Noted to have full abduction of right upper extremity and normal neurovascular status  Prescribed Percocet 5-325 tabs Q8H PRN, 15 tabs  ER visit summary 08/25/2020:  Patient reporting chest pain associated with deep inspiration  EKG unremarkable  Labwork noted normal troponins, mildly elevated D-dimer, CXR and CTA chest unremarkable  Review of Systems   Constitutional: Negative for chills, fever and unexpected weight change  HENT: Negative for rhinorrhea and sore throat  Eyes: Negative for visual disturbance  Respiratory: Negative for cough, shortness of breath and wheezing  Cardiovascular: Negative for chest pain, palpitations and leg swelling  Gastrointestinal: Negative for abdominal pain, diarrhea, nausea and vomiting  Musculoskeletal:        As per HPI   Skin: Negative for rash and wound  Neurological:        As per HPI   Psychiatric/Behavioral: The patient is not nervous/anxious            Medical, Surgical, Family, and Social History    Past Medical History:   Diagnosis Date    Abdominal pain     Ankle pain     Anxiety     Arthralgia     Asthma     Blood typing encounter     Bloody stools [place]  Riky Solano 6/8/16   R   11/21/17     Carpal tunnel syndrome, bilateral     [place]  Riky Solano 9/12/17   R   9/12/17     Chest pain     Constipation     Depression     Dermatitis     Fatigue     Frequent bowel movements     Gastroenteritis     GERD (gastroesophageal reflux disease)     GERD without esophagitis     Insomnia     [place]   11/7/16   R   11/21/17     Irregular heart beats     Muscle spasm     Nausea     Numbness and tingling in both hands     Odynophagia     Otitis     Pharyngitis     Pilonidal cyst with abscess     [place]   10/25/13   R   6/10/14     Proteinuria     Rectal bleeding     R   11/21/17     Rhinitis     Seasonal allergies     Skin lesion     [place]    2/3/15   R  Riky Lan Russ 3/17/17  /   [place]    3/17/17   R   12/22/17     Snoring     Strep throat     Tonsillitis     URI (upper respiratory infection)     Vitamin D deficiency      Past Surgical History:   Procedure Laterality Date    FOOT SURGERY      PILONIDAL CYST DRAINAGE      Incision and drainage of pilonidal cyst     IL WRIST Alistair Traci LIG Right 7/12/2018    Procedure: RELEASE CARPAL TUNNEL ENDOSCOPIC;  Surgeon: Osbaldo Duffy MD;  Location:  MAIN OR;  Service: Orthopedics    IL WRIST Alistair Traci LIG Left 7/26/2018    Procedure: RELEASE CARPAL TUNNEL ENDOSCOPIC;  Surgeon: Osbaldo Duffy MD;  Location: [facility];  Service: Orthopedics     Social History   Social History     Substance and Sexual Activity   Alcohol Use Yes    Comment: Rarely     Social History     Substance and Sexual Activity   Drug Use No     Social History     Tobacco Use   Smoking Status Never Smoker   Smokeless Tobacco Never Used     Family History   Problem Relation Age of Onset    Depression Mother     Obesity Mother     Stroke Mother         Syndrome     Diabetes Mother     Alcohol abuse Father     Liver disease Father         hepatic failure     Hypertension Father     Depression Brother     Thyroid disease Brother     Alcohol abuse Brother     Substance Abuse Brother     Depression Maternal Uncle     Substance Abuse Brother     Heart disease Neg Hx     Cancer Neg Hx     Thyroid cancer Neg Hx      No Known Allergies       Physical Exam  /98   Pulse 94   Ht 6' 1" (1 854 m)   Wt (!) 181 kg (398 lb)   BMI 52 51 kg/m²     Constitutional:  see vital signs  Gen:  Obese, normocephalic/atraumatic, well-groomed  Eyes: No inflammation or discharge of conjunctiva or lids; sclera clear   Pharynx: no inflammation, lesion, or mass of lips  Neck: supple, no masses, non-distended  MSK: no inflammation, lesion, mass, or clubbing of nails and digits except for other than mentioned below  SKIN: no visible rashes or skin lesions  Pulmonary/Chest: Effort normal  No respiratory distress     NEURO: cranial nerves grossly intact  PSYCH:  Alert and oriented to person, place, and time; recent and remote memory intact; mood normal, no depression, anxiety, or agitation, judgment and insight good and intact     Ortho Exam  Shoulder exam:       RIGHT LEFT    Inspection Erythema None None     Swelling None None     Increased Warmth None None    Rotator cuff ER 5/5 5/5     IR 5/5 5/5     Abduction 5/5 5/5    ROM  170     Abduction 170 170     ER0 60 60     ER90 90 90     IR90 40 40     IRb T8 T8    TTP:  none none    Special Tests: O'Briens  (FF 90, add 10, resist thumbs up-, resist thumbs down+) Negative Negative slap    Cross body Adduction Negative Negative     Speeds  Negative Negative     Yergason's Negative Negative     Arcelia's Negative Negative     Neer Negative Negative     Siu Negative Negative    Other: Scapular winging (push against wall) negative negative        UE NV Exam: +2 Radial pulses bilaterally  Sensation intact to light touch C5-T1 bilaterally, Radial/median/ulnar nerve motor intact    Bilateral elbow, wrist, and and forearm ROM full, painless with passive ROM, no ttp or crepitance throughout extremities below shoulder joint    Cervical ROM is full without pain, numbness or tingling

## 2020-09-01 NOTE — TELEPHONE ENCOUNTER
Spoke with pt he state that they may be calling the office to get more information about pt for his ultrasound

## 2020-09-02 ENCOUNTER — TELEPHONE (OUTPATIENT)
Dept: FAMILY MEDICINE CLINIC | Facility: CLINIC | Age: 30
End: 2020-09-02

## 2020-09-02 ENCOUNTER — TRANSCRIBE ORDERS (OUTPATIENT)
Dept: ADMINISTRATIVE | Facility: HOSPITAL | Age: 30
End: 2020-09-02

## 2020-09-02 DIAGNOSIS — M79.601 RIGHT ARM PAIN: Primary | ICD-10-CM

## 2020-09-02 NOTE — TELEPHONE ENCOUNTER
Patient called stating he is still having pain he would like his note extended, would also like to know if he should have the [address] done before going to back work  Please advise, thank you!

## 2020-09-03 ENCOUNTER — TELEPHONE (OUTPATIENT)
Dept: FAMILY MEDICINE CLINIC | Facility: CLINIC | Age: 30
End: 2020-09-03

## 2020-09-03 ENCOUNTER — HOSPITAL ENCOUNTER (OUTPATIENT)
Dept: RADIOLOGY | Facility: HOSPITAL | Age: 30
Discharge: HOME | End: 2020-09-03
Admitting: RADIOLOGY
Payer: COMMERCIAL

## 2020-09-03 DIAGNOSIS — M79.601 RIGHT ARM PAIN: ICD-10-CM

## 2020-09-03 PROCEDURE — 76882 US LMTD JT/FCL EVL NVASC XTR: CPT

## 2020-09-03 NOTE — TELEPHONE ENCOUNTER
----- Message from Tera Blackburn MD sent at 9/3/2020  3:38 PM EDT -----  Please let patient know that his ultrasound of his muscles was normal   There is no tear noted of his biceps muscle  I would like him to follow-up with orthopedics for further evaluation    Thank you

## 2020-09-03 NOTE — RESULT ENCOUNTER NOTE
Please let patient know that his ultrasound of his muscles was normal   There is no tear noted of his biceps muscle  I would like him to follow-up with orthopedics for further evaluation    Thank you Patient

## 2020-09-18 ENCOUNTER — TELEPHONE (OUTPATIENT)
Dept: FAMILY MEDICINE CLINIC | Facility: CLINIC | Age: 30
End: 2020-09-18

## 2020-09-18 NOTE — TELEPHONE ENCOUNTER
----- Message from Castillo Jean Baptiste MD sent at 9/17/2020  9:20 PM EDT -----  Please let patient know that his thyroid blood test is within normal range  His vitamin-D has improved  I would like him to take 4000 International Units daily  His vitamin B12 is normal but lower normal and I would like him to take 500 mcg daily    Also, his protein is normal    Thank you

## 2020-09-18 NOTE — RESULT ENCOUNTER NOTE
Please let patient know that his thyroid blood test is within normal range  His vitamin-D has improved  I would like him to take 4000 International Units daily  His vitamin B12 is normal but lower normal and I would like him to take 500 mcg daily    Also, his protein is normal    Thank you

## 2020-09-26 DIAGNOSIS — F32.A ANXIETY AND DEPRESSION: ICD-10-CM

## 2020-09-26 DIAGNOSIS — F41.9 ANXIETY AND DEPRESSION: ICD-10-CM

## 2020-09-26 RX ORDER — SERTRALINE HYDROCHLORIDE 25 MG/1
TABLET, FILM COATED ORAL
Qty: 30 TABLET | Refills: 5 | OUTPATIENT
Start: 2020-09-26

## 2020-10-06 ENCOUNTER — TELEPHONE (OUTPATIENT)
Dept: BARIATRICS | Facility: CLINIC | Age: 30
End: 2020-10-06

## 2020-10-26 ENCOUNTER — TELEMEDICINE (OUTPATIENT)
Dept: SLEEP CENTER | Facility: CLINIC | Age: 30
End: 2020-10-26
Payer: COMMERCIAL

## 2020-10-26 DIAGNOSIS — G47.33 OBSTRUCTIVE SLEEP APNEA SYNDROME: Primary | ICD-10-CM

## 2020-10-26 PROCEDURE — 99214 OFFICE O/P EST MOD 30 MIN: CPT | Performed by: PSYCHIATRY & NEUROLOGY

## 2020-10-27 ENCOUNTER — TELEPHONE (OUTPATIENT)
Dept: SLEEP CENTER | Facility: CLINIC | Age: 30
End: 2020-10-27

## 2020-11-16 ENCOUNTER — TELEMEDICINE (OUTPATIENT)
Dept: FAMILY MEDICINE CLINIC | Facility: CLINIC | Age: 30
End: 2020-11-16
Payer: COMMERCIAL

## 2020-11-16 DIAGNOSIS — G47.33 OBSTRUCTIVE SLEEP APNEA: ICD-10-CM

## 2020-11-16 DIAGNOSIS — F41.9 ANXIETY AND DEPRESSION: Primary | ICD-10-CM

## 2020-11-16 DIAGNOSIS — F32.A ANXIETY AND DEPRESSION: Primary | ICD-10-CM

## 2020-11-16 DIAGNOSIS — E03.9 HYPOTHYROIDISM, UNSPECIFIED TYPE: ICD-10-CM

## 2020-11-16 PROCEDURE — 99213 OFFICE O/P EST LOW 20 MIN: CPT | Performed by: FAMILY MEDICINE

## 2020-11-16 PROCEDURE — 1036F TOBACCO NON-USER: CPT | Performed by: FAMILY MEDICINE

## 2021-01-09 ENCOUNTER — NURSE TRIAGE (OUTPATIENT)
Dept: OTHER | Facility: OTHER | Age: 31
End: 2021-01-09

## 2021-01-09 NOTE — TELEPHONE ENCOUNTER
Reason for Disposition   [1] MILD-MODERATE pain AND [2] constant AND [3] present > 2 hours    Answer Assessment - Initial Assessment Questions  1  LOCATION: "Where does it hurt?"       Left upper abdomen, right under his rib area     2  RADIATION: "Does the pain shoot anywhere else?" (e g , chest, back)      Denies     3  ONSET: "When did the pain begin?" (e g , minutes, hours or days ago)       1 week ago     4  SUDDEN: "Gradual or sudden onset?"       Sudden     5  PATTERN "Does the pain come and go, or is it constant?"     - If constant: "Is it getting better, staying the same, or worsening?"       (Note: Constant means the pain never goes away completely; most serious pain is constant and it progresses)      - If intermittent: "How long does it last?" "Do you have pain now?"      (Note: Intermittent means the pain goes away completely between bouts)       Constant    6  SEVERITY: "How bad is the pain?"  (e g , Scale 1-10; mild, moderate, or severe)     - MILD (1-3): doesn't interfere with normal activities, abdomen soft and not tender to touch      - MODERATE (4-7): interferes with normal activities or awakens from sleep, tender to touch      - SEVERE (8-10): excruciating pain, doubled over, unable to do any normal activities        5/10    7  RECURRENT SYMPTOM: "Have you ever had this type of abdominal pain before?" If so, ask: "When was the last time?" and "What happened that time?"       Denies     8  AGGRAVATING FACTORS: "Does anything seem to cause this pain?" (e g , foods, stress, alcohol)      Movement and touch makes pain worse    9  CARDIAC SYMPTOMS: "Do you have any of the following symptoms: chest pain, difficulty breathing, sweating, nausea?"      Nausea     10  OTHER SYMPTOMS: "Do you have any other symptoms?" (e g , fever, vomiting, diarrhea)        Denies     Took ibuprofen, with minimal relief      Protocols used: ABDOMINAL PAIN - UPPER-ADULT-

## 2021-01-09 NOTE — TELEPHONE ENCOUNTER
Regarding: pain on L side, feels nauseous when pushing on it  ----- Message from [employer] sent at 1/9/2021 10:01 AM EST -----  "My  was hurt at work and when he presses his side he feels nauseous, there is a bulge there   The workers comp doc wants a CT scan but it's getting worse "    Tried [facility] backline and no one answered     is at work

## 2021-01-11 NOTE — TELEPHONE ENCOUNTER
Spoke to pt  He is feeling okay  He has a CT scan scheduled for tomorrow   He will call us if he wants to schedule

## 2021-01-14 NOTE — TELEPHONE ENCOUNTER
Spoke to pt  He had the CT scan on Tuesday  It was a workers comp case  It was done at [facility] has the results and he has an appt on Monday with them

## 2021-01-18 ENCOUNTER — APPOINTMENT (OUTPATIENT)
Dept: URGENT CARE | Facility: MEDICAL CENTER | Age: 31
End: 2021-01-18
Payer: OTHER MISCELLANEOUS

## 2021-01-18 PROCEDURE — 99213 OFFICE O/P EST LOW 20 MIN: CPT | Performed by: FAMILY MEDICINE

## 2021-01-24 DIAGNOSIS — Z23 ENCOUNTER FOR IMMUNIZATION: ICD-10-CM

## 2021-02-04 DIAGNOSIS — R79.89 ELEVATED TSH: ICD-10-CM

## 2021-02-04 RX ORDER — LEVOTHYROXINE SODIUM 0.03 MG/1
TABLET ORAL
Qty: 90 TABLET | Refills: 1 | Status: SHIPPED | OUTPATIENT
Start: 2021-02-04 | End: 2021-08-11

## 2021-02-05 DIAGNOSIS — F32.A ANXIETY AND DEPRESSION: ICD-10-CM

## 2021-02-05 DIAGNOSIS — F41.9 ANXIETY AND DEPRESSION: ICD-10-CM

## 2021-02-05 RX ORDER — ESCITALOPRAM OXALATE 20 MG/1
TABLET ORAL
Qty: 90 TABLET | Refills: 3 | Status: SHIPPED | OUTPATIENT
Start: 2021-02-05 | End: 2021-11-12 | Stop reason: SDUPTHER

## 2021-02-12 ENCOUNTER — TELEPHONE (OUTPATIENT)
Dept: SLEEP CENTER | Facility: CLINIC | Age: 31
End: 2021-02-12

## 2021-03-01 ENCOUNTER — TELEPHONE (OUTPATIENT)
Dept: FAMILY MEDICINE CLINIC | Facility: CLINIC | Age: 31
End: 2021-03-01

## 2021-03-01 ENCOUNTER — OFFICE VISIT (OUTPATIENT)
Dept: FAMILY MEDICINE CLINIC | Facility: CLINIC | Age: 31
End: 2021-03-01
Payer: COMMERCIAL

## 2021-03-01 VITALS
SYSTOLIC BLOOD PRESSURE: 128 MMHG | TEMPERATURE: 98 F | DIASTOLIC BLOOD PRESSURE: 82 MMHG | HEIGHT: 73 IN | WEIGHT: 315 LBS | HEART RATE: 72 BPM | BODY MASS INDEX: 41.75 KG/M2 | OXYGEN SATURATION: 97 %

## 2021-03-01 DIAGNOSIS — E66.01 MORBID OBESITY WITH BMI OF 50.0-59.9, ADULT (HCC): ICD-10-CM

## 2021-03-01 DIAGNOSIS — N50.811 RIGHT TESTICULAR PAIN: ICD-10-CM

## 2021-03-01 DIAGNOSIS — M79.672 LEFT FOOT PAIN: Primary | ICD-10-CM

## 2021-03-01 DIAGNOSIS — R82.90 ABNORMAL URINE FINDING: ICD-10-CM

## 2021-03-01 DIAGNOSIS — F33.41 RECURRENT MAJOR DEPRESSIVE DISORDER, IN PARTIAL REMISSION (HCC): ICD-10-CM

## 2021-03-01 LAB
SL AMB  POCT GLUCOSE, UA: ABNORMAL
SL AMB LEUKOCYTE ESTERASE,UA: ABNORMAL
SL AMB POCT BILIRUBIN,UA: ABNORMAL
SL AMB POCT BLOOD,UA: ABNORMAL
SL AMB POCT CLARITY,UA: ABNORMAL
SL AMB POCT COLOR,UA: YELLOW
SL AMB POCT KETONES,UA: ABNORMAL
SL AMB POCT NITRITE,UA: ABNORMAL
SL AMB POCT PH,UA: 5
SL AMB POCT SPECIFIC GRAVITY,UA: 1.03
SL AMB POCT URINE PROTEIN: ABNORMAL
SL AMB POCT UROBILINOGEN: 0.2

## 2021-03-01 PROCEDURE — 81003 URINALYSIS AUTO W/O SCOPE: CPT | Performed by: NURSE PRACTITIONER

## 2021-03-01 PROCEDURE — 3725F SCREEN DEPRESSION PERFORMED: CPT | Performed by: NURSE PRACTITIONER

## 2021-03-01 PROCEDURE — 99214 OFFICE O/P EST MOD 30 MIN: CPT | Performed by: NURSE PRACTITIONER

## 2021-03-01 PROCEDURE — 3008F BODY MASS INDEX DOCD: CPT | Performed by: NURSE PRACTITIONER

## 2021-03-01 RX ORDER — DOXYCYCLINE 100 MG/1
100 CAPSULE ORAL 2 TIMES DAILY
Qty: 14 CAPSULE | Refills: 0 | Status: SHIPPED | OUTPATIENT
Start: 2021-03-01 | End: 2021-03-08

## 2021-03-01 NOTE — PROGRESS NOTES
FAMILY PRACTICE OFFICE VISIT       NAME: Jerman Romero  AGE: 27 y o  SEX: male       : 1990        MRN: [id number]    Assessment and Plan     Problem List Items Addressed This Visit        Other    Morbid obesity with BMI of 50 0-59 9, adult ([facility] [address] )    Recurrent major depressive disorder, in partial remission ([facility] [address] )      Other Visit Diagnoses     Left foot pain    -  Primary    Relevant Orders    XR foot 3+ vw left (Completed)    Right testicular pain        Relevant Medications    doxycycline monohydrate (MONODOX) 100 mg capsule    Other Relevant Orders    US scrotum and testicles    Ambulatory referral to Urology    POCT urine dip auto non-scope (Completed)    Abnormal urine finding        Relevant Orders    UA (URINE) with reflex to Scope (Completed)    Urine culture (Completed)    Urine Microscopic (Completed)          1  Left foot pain  Unclear etiology of left lateral foot pain  Will check left foot x-ray, if no abnormality found, will refer to Podiatry  XR foot 3+ vw left   2  Right testicular pain  Unclear etiology, exam unremarkable today  He did have similar symptoms in the past upon review of records he was treated for epididymitis with doxycycline  Patient feels symptoms did improve after this  In office urine dip shows trace blood and trace protein  Will send urinalysis with microscopic as well as urine culture  Will repeat course of doxycycline 100 mg twice daily for 7 days  If symptoms are persistent he will proceed with ultrasound of the scrotum and testicles and follow-up with Urology  US scrotum and testicles    Ambulatory referral to Urology    doxycycline monohydrate (MONODOX) 100 mg capsule    POCT urine dip auto non-scope    CANCELED: Urine culture   3  Abnormal urine finding  UA (URINE) with reflex to Scope    Urine culture    Urine Microscopic   4   Morbid obesity with BMI of 50 0-59 9, adult [facility])  Was attending weight loss program, hoping to pursue bariatric surgery, but had to stop program due to depression  Aware of importance of weight loss  Is planning to try yoga and diet chagnes  May consider going back to bariatrics  5  Recurrent major depressive disorder, in partial remission (HCC)  Positive PHQ-9 permission score today  States he is not suicidal   Actually is feeling happier lately  Switched from night shift to day shift, and this is making a positive impact on mood  He is following with Psychiatry and counseling  Depression Screening Follow-up Plan: Patient's depression screening was positive with a PHQ-2 score of 4  Their PHQ-9 score was 16  Continue regular follow-up with their psychologist/therapist/psychiatrist who is managing their mental health condition(s)  Chief Complaint     Chief Complaint   Patient presents with    Left foot pain     X's 2-3 weeks,    Rt side testicle pain     X's1-2M- Denies dysuria or changes when voiding  Hurts when pt lies on his back       History of Present Illness     Suri Mitchell is a 27year old male presenting today for left foot pain and right testicular pain  Left foot pain started few weeks ago  Not getting better  Whacked his foot on bed, just prior to this  Dorsal foot and lateral foot pain  Palpation makes it worse  Walking makes it worse  Working 10-12 hour days  Nothing makes it better  Even rest does not make it feel better  No swelling  No redness  No new shoes  Using lightweight cloth sneakers for work  Testicular pain:  Right testicle pain only when lying supine  One month or more now  Can't determine specific location, generalized right testicle  Aching pain  Starts when lying down couch or bed, starts after a few minutes of supine position  Resolves when side lying after about 1 hour  No dysuria, no frequency, no urgency  No penile discharge  No ejaculation problems or pain  , monogamous partner  No swelling of testicles  No scrotal discoloration    No fevers  Does not feel ill  No history of testicular surgery  No history of testicular trauma  Ultrasound in 2019 for similar symptoms, did improve  Review of records indicates he was treated for Epididymitis with doxycycline  Positive PHQ-9 permission score today  States he is not suicidal   Actually is feeling happier lately  Switched from night shift to day shift, and this is making a positive impact on mood  He is following with Psychiatry and counseling  Weight is up--stress eating  Plans to start yoga  Was attending weight loss program, hoping to pursue bariatric surgery, but had to stop program due to depression  Aware of importance of weight loss  Is planning to try yoga and diet chagnes  May consider going back to bariatrics  Review of Systems   Review of Systems   Constitutional: Negative  Gastrointestinal: Negative for abdominal pain, diarrhea, nausea and vomiting  Genitourinary: Positive for testicular pain ( right testicular pain as noted in HPI)  Negative for decreased urine volume, difficulty urinating, discharge, dysuria, frequency, genital sores, hematuria, penile pain, penile swelling, scrotal swelling and urgency  Musculoskeletal: Positive for arthralgias (  Left foot as noted in HPI)  Psychiatric/Behavioral: Positive for dysphoric mood ( as noted in HPI)  Negative for suicidal ideas         Active Problem List     Patient Active Problem List   Diagnosis    Morbid obesity with BMI of 50 0-59 9, adult (HCC)    Generalized anxiety disorder    Elevated TSH    Carpal tunnel syndrome    Preop examination    Preoperative clearance    Pineal gland cyst    Vitamin D deficiency    Recurrent major depressive disorder, in partial remission ([facility] [address] )    Pain in both knees    Right knee pain    Skin pruritus    Skin rash    Peeling skin    Obstructive sleep apnea syndrome    Pain in right testicle    Obstructive sleep apnea    Excessive daytime sleepiness    Anxiety and depression    Eczema    Diarrhea    Routine health maintenance    Elevated total protein       Past Medical History:  Past Medical History:   Diagnosis Date    Abdominal pain     Ankle pain     Anxiety     Arthralgia     Asthma     Blood typing encounter     Bloody stools     LA    6/8/16   R   11/21/17     Carpal tunnel syndrome, bilateral     [place]  Marily [place] Marily Aspen 9/12/17   R   9/12/17     Chest pain     Constipation     Depression     Dermatitis     Fatigue     Frequent bowel movements     Gastroenteritis     GERD (gastroesophageal reflux disease)     GERD without esophagitis     Insomnia     LA   11/7/16   R   11/21/17     Irregular heart beats     Muscle spasm     Nausea     Numbness and tingling in both hands     Odynophagia     Otitis     Pharyngitis     Pilonidal cyst with abscess     LA   10/25/13   R   6/10/14     Proteinuria     Rectal bleeding     R   11/21/17     Rhinitis     Seasonal allergies     Skin lesion     LA    2/3/15   R  [place]arily [place] Marily Aspen 3/17/17  /   LA    3/17/17   R   12/22/17     Snoring     Strep throat     Tonsillitis     URI (upper respiratory infection)     Vitamin D deficiency        Past Surgical History:  Past Surgical History:   Procedure Laterality Date    FOOT SURGERY      PILONIDAL CYST DRAINAGE      Incision and drainage of pilonidal cyst     IN WRIST Maria L Reek LIG Right 7/12/2018    Procedure: RELEASE CARPAL TUNNEL ENDOSCOPIC;  Surgeon: Renny Finn MD;  [facility];  Service: Orthopedics    IN WRIST Maria L Reek LIG Left 7/26/2018    Procedure: RELEASE CARPAL TUNNEL ENDOSCOPIC;  Surgeon: Renny Finn MD;  [facility];  Service: Orthopedics       Family History:  Family History   Problem Relation Age of Onset    Depression Mother     Obesity Mother     Stroke Mother         Syndrome     Diabetes Mother     Alcohol abuse Father     Liver disease Father hepatic failure     Hypertension Father     Depression Brother     Thyroid disease Brother     Alcohol abuse Brother     Substance Abuse Brother     Depression Maternal Uncle     Substance Abuse Brother     Heart disease Neg Hx     Cancer Neg Hx     Thyroid cancer Neg Hx        Social History:  Social History     Socioeconomic History    Marital status: /Civil Union     Spouse name: Not on file    Number of children: Not on file    Years of education: some college    Highest education level: Not on file   Occupational History     Comment: employed    Social Needs    Financial resource strain: Not on file    Food insecurity     Worry: Not on file     Inability: Not on file   [employer] needs     Medical: Not on file     Non-medical: Not on file   Tobacco Use    Smoking status: Never Smoker    Smokeless tobacco: Never Used   Substance and Sexual Activity    Alcohol use: Yes     Comment: Rarely    Drug use: No    Sexual activity: Yes     Partners: Female     Birth control/protection: Implant   Lifestyle    Physical activity     Days per week: Not on file     Minutes per session: Not on file    Stress: Not on file   Relationships    Social connections     Talks on phone: Not on file     Gets together: Not on file     Attends Latter-day service: Not on file     Active member of club or organization: Not on file     Attends meetings of clubs or organizations: Not on file     Relationship status: Not on file    Intimate partner violence     Fear of current or ex partner: Not on file     Emotionally abused: Not on file     Physically abused: Not on file     Forced sexual activity: Not on file   Other Topics Concern    Not on file   Social History Narrative    Daily caffeine consumption        I have reviewed the patient's medical history in detail; there are no changes to the history as noted in the electronic medical record      Objective     Vitals:    03/01/21 1517   BP: 128/82   BP Location: Left arm   Patient Position: Sitting   Cuff Size: Extra-Large   Pulse: 72   Temp: 98 °F (36 7 °C)   TempSrc: Temporal   SpO2: 97%   Weight: (!) 187 kg (412 lb 6 4 oz)   Height: 6' 1" (1 854 m)     Wt Readings from Last 3 Encounters:   21 (!) 187 kg (412 lb 6 4 oz)   20 (!) 181 kg (398 lb)   20 (!) 181 kg (398 lb)     PHQ-9 Depression Screening    PHQ-9:   Frequency of the following problems over the past two weeks:      Little interest or pleasure in doing things: 2 - more than half the days  Feeling down, depressed, or hopeless: 2 - more than half the days  Trouble falling or staying asleep, or sleeping too much: 2 - more than half the days  Feeling tired or having little energy: 2 - more than half the days  Poor appetite or overeatin - more than half the days  Feeling bad about yourself - or that you are a failure or have let yourself or your family down: 2 - more than half the days  Trouble concentrating on things, such as reading the newspaper or watching television: 1 - several days  Moving or speaking so slowly that other people could have noticed  Or the opposite - being so fidgety or restless that you have been moving around a lot more than usual: 1 - several days  Thoughts that you would be better off dead, or of hurting yourself in some way: 2 - more than half the days  PHQ-2 Score: 4  PHQ-9 Score: 16         Physical Exam  Vitals signs and nursing note reviewed  Exam conducted with a chaperone present  Constitutional:       General: He is not in acute distress  Appearance: Normal appearance  He is obese  He is not ill-appearing, toxic-appearing or diaphoretic  HENT:      Head: Normocephalic and atraumatic  Cardiovascular:      Rate and Rhythm: Normal rate and regular rhythm  Heart sounds: No murmur  Pulmonary:      Effort: Pulmonary effort is normal  No respiratory distress  Breath sounds: Normal breath sounds  No wheezing or rales     Genitourinary: Penis: Normal        Scrotum/Testes: Normal       Comments: Penis and testes carefully examined  No tenderness, pain, swelling, masses of either testicle  Musculoskeletal:         General: No deformity  Right lower leg: No edema  Left lower leg: No edema  Comments: Lateral mid  to palpation when rotating foot medially  No tenderness, when foot is in neutral position  No swelling  No redness  No skin breakdown  Sensation intact  Skin:     General: Skin is warm and dry  Findings: No rash  Neurological:      Mental Status: He is alert and oriented to person, place, and time  Gait: Gait normal    Psychiatric:         Mood and Affect: Mood normal          Behavior: Behavior normal        BMI Counseling: Body mass index is 54 41 kg/m²  The BMI is above normal  Nutrition recommendations include encouraging healthy choices of fruits and vegetables, moderation in carbohydrate intake and increasing intake of lean protein  Exercise recommendations include moderate physical activity 150 minutes/week and strength training exercises  Is considering returning to bariatrics  Depression Screening Follow-up Plan: Patient's depression screening was positive with a PHQ-2 score of 4  Their PHQ-9 score was 16  Continue regular follow-up with their psychologist/therapist/psychiatrist who is managing their mental health condition(s)      ALLERGIES:  No Known Allergies    Current Medications     Current Outpatient Medications   Medication Sig Dispense Refill    buPROPion (WELLBUTRIN XL) 300 mg 24 hr tablet Take 1 tablet (300 mg total) by mouth daily 30 tablet 2    Cholecalciferol (VITAMIN D-3) 1000 units CAPS Take 2 capsules by mouth daily      escitalopram (LEXAPRO) 20 mg tablet TAKE 1 TABLET BY MOUTH EVERY DAY 90 tablet 3    levothyroxine 25 mcg tablet TAKE 1 TABLET BY MOUTH DAILY IN THE EARLY MORNING 90 tablet 1    betamethasone dipropionate (DIPROSONE) 0 05 % cream Apply topically 2 (two) times a day (Patient not taking: Reported on 3/1/2021) 30 g 3    clotrimazole-betamethasone (LOTRISONE) 1-0 05 % cream Apply topically 2 (two) times a day (Patient not taking: Reported on 3/1/2021) 45 g 2    diclofenac sodium (VOLTAREN) 1 % Apply 2 g topically 4 (four) times a day (Patient not taking: Reported on 3/1/2021) 100 g 1    doxycycline monohydrate (MONODOX) 100 mg capsule Take 1 capsule (100 mg total) by mouth 2 (two) times a day for 7 days 14 capsule 0    traZODone (DESYREL) 50 mg tablet Take 1 tablet (50 mg total) by mouth daily at bedtime as needed for sleep (Patient not taking: Reported on 3/1/2021) 30 tablet 2     No current facility-administered medications for this visit            Health Maintenance     Health Maintenance   Topic Date Due    HIV Screening  07/10/2005    Influenza Vaccine (1) 09/01/2020    Annual Physical  03/16/2021    BMI: Followup Plan  03/01/2022    BMI: Adult  03/01/2022    Depression Remission PHQ  03/01/2022    DTaP,Tdap,and Td Vaccines (2 - Td) 02/11/2023    Pneumococcal Vaccine: Pediatrics (0 to 5 Years) and At-Risk Patients (6 to 59 Years)  Aged Out    HIB Vaccine  Aged Out    Hepatitis B Vaccine  Aged Out    IPV Vaccine  Aged Out    Hepatitis A Vaccine  Aged Out    Meningococcal ACWY Vaccine  Aged Out    HPV Vaccine  Aged [employer] History   Administered Date(s) Administered    Influenza, recombinant, quadrivalent,injectable, preservative free 11/04/2019    Influenza, seasonal, injectable 01/04/2017    Influenza, seasonal, injectable, preservative free 10/01/2018    Tdap 02/11/2013       AMRITA Garcia

## 2021-03-02 ENCOUNTER — HOSPITAL ENCOUNTER (OUTPATIENT)
Dept: RADIOLOGY | Facility: HOSPITAL | Age: 31
Discharge: HOME | End: 2021-03-02
Payer: COMMERCIAL

## 2021-03-02 DIAGNOSIS — M79.672 LEFT FOOT PAIN: ICD-10-CM

## 2021-03-02 PROCEDURE — 73630 X-RAY EXAM OF FOOT: CPT

## 2021-03-02 PROCEDURE — 81001 URINALYSIS AUTO W/SCOPE: CPT | Performed by: NURSE PRACTITIONER

## 2021-03-02 PROCEDURE — 87086 URINE CULTURE/COLONY COUNT: CPT | Performed by: NURSE PRACTITIONER

## 2021-03-03 LAB
BACTERIA UR CULT: NORMAL
BACTERIA UR QL AUTO: ABNORMAL /HPF
BILIRUB UR QL STRIP: NEGATIVE
CLARITY UR: ABNORMAL
COLOR UR: ABNORMAL
GLUCOSE UR STRIP-MCNC: NEGATIVE MG/DL
HGB UR QL STRIP.AUTO: ABNORMAL
HYALINE CASTS #/AREA URNS LPF: ABNORMAL /LPF
KETONES UR STRIP-MCNC: NEGATIVE MG/DL
LEUKOCYTE ESTERASE UR QL STRIP: NEGATIVE
NITRITE UR QL STRIP: NEGATIVE
NON-SQ EPI CELLS URNS QL MICRO: ABNORMAL /HPF
PH UR STRIP.AUTO: 5.5 [PH]
PROT UR STRIP-MCNC: ABNORMAL MG/DL
RBC #/AREA URNS AUTO: ABNORMAL /HPF
SP GR UR STRIP.AUTO: 1.03 (ref 1–1.03)
UROBILINOGEN UR QL STRIP.AUTO: 0.2 E.U./DL
WBC #/AREA URNS AUTO: ABNORMAL /HPF

## 2021-03-04 DIAGNOSIS — R31.29 MICROSCOPIC HEMATURIA: ICD-10-CM

## 2021-03-04 DIAGNOSIS — N50.811 RIGHT TESTICULAR PAIN: Primary | ICD-10-CM

## 2021-03-07 DIAGNOSIS — M79.672 LEFT FOOT PAIN: Primary | ICD-10-CM

## 2021-03-10 ENCOUNTER — TELEMEDICINE (OUTPATIENT)
Dept: FAMILY MEDICINE CLINIC | Facility: CLINIC | Age: 31
End: 2021-03-10
Payer: COMMERCIAL

## 2021-03-10 DIAGNOSIS — B34.9 VIRAL INFECTION, UNSPECIFIED: ICD-10-CM

## 2021-03-10 DIAGNOSIS — Z03.818 ENCOUNTER FOR OBSERVATION FOR SUSPECTED EXPOSURE TO OTHER BIOLOGICAL AGENTS RULED OUT: ICD-10-CM

## 2021-03-10 LAB — SARS-COV-2 RNA RESP QL NAA+PROBE: NEGATIVE

## 2021-03-10 PROCEDURE — 1036F TOBACCO NON-USER: CPT | Performed by: NURSE PRACTITIONER

## 2021-03-10 PROCEDURE — U0003 INFECTIOUS AGENT DETECTION BY NUCLEIC ACID (DNA OR RNA); SEVERE ACUTE RESPIRATORY SYNDROME CORONAVIRUS 2 (SARS-COV-2) (CORONAVIRUS DISEASE [COVID-19]), AMPLIFIED PROBE TECHNIQUE, MAKING USE OF HIGH THROUGHPUT TECHNOLOGIES AS DESCRIBED BY CMS-2020-01-R: HCPCS | Performed by: NURSE PRACTITIONER

## 2021-03-10 PROCEDURE — 99213 OFFICE O/P EST LOW 20 MIN: CPT | Performed by: NURSE PRACTITIONER

## 2021-03-10 PROCEDURE — U0005 INFEC AGEN DETEC AMPLI PROBE: HCPCS | Performed by: NURSE PRACTITIONER

## 2021-03-10 NOTE — PROGRESS NOTES
COVID-19 Virtual Visit     Assessment/Plan:    Problem List Items Addressed This Visit     None      Visit Diagnoses     Encounter for observation for suspected exposure to other biological agents ruled out        Relevant Orders    Novel Coronavirus (Covid-19),PCR [facility] - Collected at [facility] or [facility]    Viral infection, unspecified        Relevant Orders    Novel Coronavirus (Covid-19),PCR [facility] - Collected at [facility] or [facility]         Disposition:     I referred patient to one of our centralized sites for a COVID-19 swab  This 66-year-old male presents today for symptoms of nausea, vomiting, diarrhea, fatigue, weakness generalized over the past 4 days  He did start taking doxycycline for possible epididymitis 2 days ago  It is unclear if this antibiotic is exacerbating symptoms  Will send for COVID-19 swab today  He will continue to rest and push fluids  We will develop further plan of care pending results of COVID-19 swab  If symptoms should worsen in the interim, he will call  I have spent 8 minutes directly with the patient  Encounter provider Amirah Arana    Provider located at [address] [place] [address]    Recent Visits  No visits were found meeting these conditions  Showing recent visits within past 7 days and meeting all other requirements     Today's Visits  Date Type Provider Dept   03/10/21 Telemedicine Amirah Truong, [address] [place] [address] today's visits and meeting all other requirements     Future Appointments  No visits were found meeting these conditions  Showing future appointments within next 150 days and meeting all other requirements      This virtual check-in was done via [employer] and patient was informed that this is not a secure, HIPAA-compliant platform  He agrees to proceed      Patient agrees to participate in a virtual check in via telephone or video visit instead of presenting to the office to address urgent/immediate medical needs  Patient is aware this is a billable service  After connecting through [facility], the patient was identified by name and date of birth  Sidra Hackett was informed that this was a telemedicine visit and that the exam was being conducted confidentially over secure lines  My office door was closed  No one else was in the room  Sidra Hackett acknowledged consent and understanding of privacy and security of the telemedicine visit  I informed the patient that I have reviewed his record in Epic and presented the opportunity for him to ask any questions regarding the visit today  The patient agreed to participate  Subjective:   Sidra Hackett is a 27 y o  male who is concerned about COVID-19  Patient's symptoms include fatigue, nausea, vomiting and diarrhea  Patient denies fever (has not checked temperature ), chills, malaise, congestion, rhinorrhea, sore throat, anosmia, loss of taste, cough, shortness of breath, chest tightness, abdominal pain, myalgias and headaches  Date of symptom onset: 3/6/2021    Exposure:   Contact with a person who is under investigation (PUI) for or who is positive for COVID-19 within the last 14 days?: No    Hospitalized recently for fever and/or lower respiratory symptoms?: No      Currently a healthcare worker that is involved in direct patient care?: No      Works in a special setting where the risk of COVID-19 transmission may be high? (this may include long-term care, correctional and alf facilities; homeless shelters; assisted-living facilities and group homes ): No      Resident in a special setting where the risk of COVID-19 transmission may be high? (this may include long-term care, correctional and alf facilities; homeless shelters; assisted-living facilities and group homes ): No      Nausea started 4 days ago  Vomited yesterday  Has diarrhea to 2-3 times per day  Feels tired and weak  Eating does not make symptoms worse  Has been able to keep fluids down well  He did start taking doxycycline 2 days ago for possible epididymitis  No known sick contacts  His wife is not ill  No close contacts at work that have been ill, but notes there have been a few cases of COVID-19 at work  Lab Results   Component Value Date    SARSCOV2 Not Detected 07/08/2020     Past Medical History:   Diagnosis Date    Abdominal pain     Ankle pain     Anxiety     Arthralgia     Asthma     Blood typing encounter     Bloody stools     LA    6/8/16   R   11/21/17     Carpal tunnel syndrome, bilateral     [place]  Sim Bou[place] Sim Bouquet 9/12/17   R   9/12/17     Chest pain     Constipation     Depression     Dermatitis     Fatigue     Frequent bowel movements     Gastroenteritis     GERD (gastroesophageal reflux disease)     GERD without esophagitis     Insomnia     LA   11/7/16   R   11/21/17     Irregular heart beats     Muscle spasm     Nausea     Numbness and tingling in both hands     Odynophagia     Otitis     Pharyngitis     Pilonidal cyst with abscess     LA   10/25/13   R   6/10/14     Proteinuria     Rectal bleeding     R   11/21/17     Rhinitis     Seasonal allergies     Skin lesion     LA    2/3/15   R  i[facility] [place] Sim Bouquet 3/17/17  /   [place]    3/17/17   R   12/22/17     Snoring     Strep throat     Tonsillitis     URI (upper respiratory infection)     Vitamin D deficiency      Past Surgical History:   Procedure Laterality Date    FOOT SURGERY      PILONIDAL CYST DRAINAGE      Incision and drainage of pilonidal cyst     MI WRIST Rodrigo Hernandez LIG Right 7/12/2018    Procedure: RELEASE CARPAL TUNNEL ENDOSCOPIC;  Surgeon: Criselda Walters MD;  [facility];  Service: Orthopedics    MI WRIST Rodrigo Hernandez LIG Left 7/26/2018    Procedure: RELEASE CARPAL TUNNEL ENDOSCOPIC;  Surgeon: Criselda Walters MD;  [facility];  Service: Orthopedics     Current Outpatient Medications Medication Sig Dispense Refill    buPROPion (WELLBUTRIN XL) 300 mg 24 hr tablet Take 1 tablet (300 mg total) by mouth daily 30 tablet 2    Cholecalciferol (VITAMIN D-3) 1000 units CAPS Take 2 capsules by mouth daily      escitalopram (LEXAPRO) 20 mg tablet TAKE 1 TABLET BY MOUTH EVERY DAY 90 tablet 3    levothyroxine 25 mcg tablet TAKE 1 TABLET BY MOUTH DAILY IN THE EARLY MORNING 90 tablet 1    betamethasone dipropionate (DIPROSONE) 0 05 % cream Apply topically 2 (two) times a day (Patient not taking: Reported on 3/1/2021) 30 g 3    clotrimazole-betamethasone (LOTRISONE) 1-0 05 % cream Apply topically 2 (two) times a day (Patient not taking: Reported on 3/1/2021) 45 g 2    diclofenac sodium (VOLTAREN) 1 % Apply 2 g topically 4 (four) times a day (Patient not taking: Reported on 3/1/2021) 100 g 1    traZODone (DESYREL) 50 mg tablet Take 1 tablet (50 mg total) by mouth daily at bedtime as needed for sleep (Patient not taking: Reported on 3/1/2021) 30 tablet 2     No current facility-administered medications for this visit  No Known Allergies    Review of Systems   Constitutional: Positive for diaphoresis (sweats last night) and fatigue  Negative for chills and fever (has not checked temperature  )  HENT: Negative for congestion, rhinorrhea and sore throat  Respiratory: Negative for cough, chest tightness and shortness of breath  Gastrointestinal: Positive for diarrhea, nausea and vomiting  Negative for abdominal pain  Musculoskeletal: Negative for myalgias  Neurological: Negative for headaches  Objective: There were no vitals filed for this visit  Physical Exam  Constitutional:       General: He is not in acute distress  Appearance: He is obese  He is not toxic-appearing or diaphoretic  Comments: Appears tired, but notes he is just waking up   HENT:      Head: Normocephalic and atraumatic  Pulmonary:      Effort: Pulmonary effort is normal  No respiratory distress  Neurological:      Mental Status: He is alert  Psychiatric:         Mood and Affect: Mood normal        VIRTUAL VISIT DISCLAIMER    Ronan Hurtado acknowledges that he has consented to an online visit or consultation  He understands that the online visit is based solely on information provided by him, and that, in the absence of a face-to-face physical evaluation by the physician, the diagnosis he receives is both limited and provisional in terms of accuracy and completeness  This is not intended to replace a full medical face-to-face evaluation by the physician  Ronan Hurtado understands and accepts these terms

## 2021-03-11 ENCOUNTER — TELEPHONE (OUTPATIENT)
Dept: FAMILY MEDICINE CLINIC | Facility: CLINIC | Age: 31
End: 2021-03-11

## 2021-03-11 NOTE — TELEPHONE ENCOUNTER
Called patient, no answer  Left detailed VM with providers results note       Advised patient to contact our office for questions or concerns       Advised patient to respond via telephone or my chart

## 2021-03-11 NOTE — TELEPHONE ENCOUNTER
----- Message from [address] Meludiamitch sent at 3/11/2021  9:45 AM EST -----  Please let patient know COVID-19 swab is negative  Please inquire if nausea, vomiting, diarrhea has improved?

## 2021-04-14 ENCOUNTER — TELEPHONE (OUTPATIENT)
Dept: FAMILY MEDICINE CLINIC | Facility: CLINIC | Age: 31
End: 2021-04-14

## 2021-04-14 ENCOUNTER — TELEMEDICINE (OUTPATIENT)
Dept: FAMILY MEDICINE CLINIC | Facility: CLINIC | Age: 31
End: 2021-04-14
Payer: COMMERCIAL

## 2021-04-14 VITALS — HEIGHT: 73 IN | WEIGHT: 315 LBS | BODY MASS INDEX: 41.75 KG/M2

## 2021-04-14 DIAGNOSIS — F33.41 RECURRENT MAJOR DEPRESSIVE DISORDER, IN PARTIAL REMISSION (HCC): Primary | ICD-10-CM

## 2021-04-14 DIAGNOSIS — R53.83 FATIGUE, UNSPECIFIED TYPE: ICD-10-CM

## 2021-04-14 DIAGNOSIS — F32.A ANXIETY AND DEPRESSION: ICD-10-CM

## 2021-04-14 DIAGNOSIS — E55.9 VITAMIN D DEFICIENCY: ICD-10-CM

## 2021-04-14 DIAGNOSIS — G47.33 OBSTRUCTIVE SLEEP APNEA: ICD-10-CM

## 2021-04-14 DIAGNOSIS — F41.9 ANXIETY AND DEPRESSION: ICD-10-CM

## 2021-04-14 DIAGNOSIS — E03.9 HYPOTHYROIDISM, UNSPECIFIED TYPE: ICD-10-CM

## 2021-04-14 PROCEDURE — 99214 OFFICE O/P EST MOD 30 MIN: CPT | Performed by: FAMILY MEDICINE

## 2021-04-14 PROCEDURE — 3725F SCREEN DEPRESSION PERFORMED: CPT | Performed by: FAMILY MEDICINE

## 2021-04-14 PROCEDURE — 3008F BODY MASS INDEX DOCD: CPT | Performed by: FAMILY MEDICINE

## 2021-04-14 PROCEDURE — 1036F TOBACCO NON-USER: CPT | Performed by: FAMILY MEDICINE

## 2021-04-14 NOTE — PROGRESS NOTES
Virtual Regular Visit      Assessment/Plan:    Problem List Items Addressed This Visit        Endocrine    Hypothyroidism    Relevant Orders    TSH, 3rd generation with Free T4 reflex       Respiratory    Obstructive sleep apnea    Relevant Orders    CBC and differential       Other    BMI 50 0-59 9, adult (HCC)    Relevant Orders    TSH, 3rd generation with Free T4 reflex    Lipid Panel with Direct LDL reflex    Comprehensive metabolic panel    Vitamin D deficiency    Relevant Orders    Vitamin D 25 hydroxy    Recurrent major depressive disorder, in partial remission (HCC) - Primary    Relevant Orders    Vitamin B12    TSH, 3rd generation with Free T4 reflex    Anxiety and depression    Relevant Orders    Vitamin B12    TSH, 3rd generation with Free T4 reflex    Fatigue    Relevant Orders    Vitamin D 25 hydroxy    Vitamin B12    TSH, 3rd generation with Free T4 reflex    Comprehensive metabolic panel    CBC and differential         57-year-old male with a history of anxiety and depression, hypothyroidism, sleep apnea, vitamin-D deficiency presents today via virtual video telemedicine visit for evaluation of worsening depression over the past 2 weeks  Denies any triggers  He does continue take his Lexapro and Wellbutrin  He has been under the care of a psychiatrist previously however has not seen Mariely Trinidad recently  He also sees a therapist every other week  Patient did have thoughts of hurting himself however denies any suicidal ideation or plans at present  I have offered innovations however patient does not wish to participate at present  He will call psychiatrist as soon as possible  He is requesting time off from work to schedule appointments, follow-up with his therapist   Patient has a BMI of 52 and feels his weight plays the large role in his depression  He is scheduled to see bariatrics on 05/05    He also complains of fatigue today and I feel this may be multifactorial secondary to untreated sleep apnea, depression, history of vitamin-D deficiency  He also has hypothyroidism and will go ahead and check TSH level to ensure it is within normal range  He will proceed with blood work  If he should have any thoughts of hurting himself or anything else that is concerning, I have instructed him to go to the nearest emergency room  He is agreeable with this plan  I would like to follow-up with me in the next 1-2 weeks or sooner if needed  Reason for visit is to discuss depression   Chief Complaint   Patient presents with    Depression     longtime     Virtual Regular Visit        Encounter provider Rohini Rolle MD    Provider located at [address] [place] [id number]      Recent Visits  Date Type Provider Dept   04/14/21 Telephone [address] [facility]   04/14/21 [facility], [address] [place] [address],[address] recent visits within past 7 days and meeting all other requirements     Future Appointments  No visits were found meeting these conditions  Showing future appointments within next 150 days and meeting all other requirements        The patient was identified by name and date of birth  Tapan Miller was informed that this is a telemedicine visit and that the visit is being conducted through [address] [place] [address] and patient was informed that this is not a secure, HIPAA-compliant platform  He agrees to proceed     My office door was closed  No one else was in the room  He acknowledged consent and understanding of privacy and security of the video platform  The patient has agreed to participate and understands they can discontinue the visit at any time  Patient is aware this is a billable service  Darlene Hernandes is a 27 y o  male presents today via virtual video telemedicine visit to discuss depression that started to act up for the past 2 weeks    Patient states while he was at work on Monday, had thoughts of hurting himself however denies any plan  Denies any suicidal ideation or thoughts at present  He does admit to being stressed at work  She is feeling overwhelmed, feels he does not have enough time to do everything  He does see his therapist Sagrario Nicole every other week  He has seen psychiatrist, Nnamdi Lloyd in the past however no recent appointment has been scheduled  Patient feels his depression is exacerbated by his weight  He feels that his weight plays a big role in his depression  He would like to get bariatric surgery  Has an appointment with bariatric on 05/05  He also has fatigue  He has a history of sleep apnea however was told to return his CPAP as he was not using it as much  Patient states the 2 was not working as well  He will address this  He will schedule appointment with Dr Sangeetha RICE     Past Medical History:   Diagnosis Date    Abdominal pain     Ankle pain     Anxiety     Arthralgia     Asthma     Blood typing encounter     Bloody stools     [place]    6/8/16   R   11/21/17     Carpal tunnel syndrome, bilateral     [place]  Lajean Cassette Lajean Cassette 9/12/17   R   9/12/17     Chest pain     Constipation     Depression     Dermatitis     Fatigue     Frequent bowel movements     Gastroenteritis     GERD (gastroesophageal reflux disease)     GERD without esophagitis     Insomnia     [place]   11/7/16   R   11/21/17     Irregular heart beats     Muscle spasm     Nausea     Numbness and tingling in both hands     Odynophagia     Otitis     Pharyngitis     Pilonidal cyst with abscess     [place]   10/25/13   R   6/10/14     Proteinuria     Rectal bleeding     R   11/21/17     Rhinitis     Seasonal allergies     Skin lesion     [place]    2/3/15   R  Lajean Cassette Lajean Cassette 3/17/17  /   [place]    3/17/17   R   12/22/17     Snoring     Strep throat     Tonsillitis     URI (upper respiratory infection)     Vitamin D deficiency        Past Surgical History:   Procedure Laterality Date    FOOT SURGERY      PILONIDAL CYST DRAINAGE      Incision and drainage of pilonidal cyst     [place] WRIST Gopal Soles LIG Right 7/12/2018    Procedure: RELEASE CARPAL TUNNEL ENDOSCOPIC;  Surgeon: Mya Membreno MD;  [facility];  Service: Orthopedics    NC WRIST Gopal Soles LIG Left 7/26/2018    Procedure: RELEASE CARPAL TUNNEL ENDOSCOPIC;  Surgeon: Mya Membreno MD;  [facility];  Service: Orthopedics       Current Outpatient Medications   Medication Sig Dispense Refill    buPROPion (WELLBUTRIN XL) 300 mg 24 hr tablet Take 1 tablet (300 mg total) by mouth daily 30 tablet 2    Cholecalciferol (VITAMIN D-3) 1000 units CAPS Take 2 capsules by mouth daily      escitalopram (LEXAPRO) 20 mg tablet TAKE 1 TABLET BY MOUTH EVERY DAY 90 tablet 3    levothyroxine 25 mcg tablet TAKE 1 TABLET BY MOUTH DAILY IN THE EARLY MORNING 90 tablet 1    betamethasone dipropionate (DIPROSONE) 0 05 % cream Apply topically 2 (two) times a day (Patient not taking: Reported on 3/1/2021) 30 g 3    clotrimazole-betamethasone (LOTRISONE) 1-0 05 % cream Apply topically 2 (two) times a day (Patient not taking: Reported on 3/1/2021) 45 g 2    diclofenac sodium (VOLTAREN) 1 % Apply 2 g topically 4 (four) times a day (Patient not taking: Reported on 3/1/2021) 100 g 1    traZODone (DESYREL) 50 mg tablet Take 1 tablet (50 mg total) by mouth daily at bedtime as needed for sleep (Patient not taking: Reported on 3/1/2021) 30 tablet 2     No current facility-administered medications for this visit  No Known Allergies    Review of Systems   Constitutional: Negative for appetite change  Respiratory: Negative for shortness of breath  Cardiovascular: Negative  Psychiatric/Behavioral: Positive for dysphoric mood  Negative for suicidal ideas  The patient is nervous/anxious        I have reviewed the patient's medical history in detail; there are no changes to the history as noted in the electronic medical record  Video Exam    Vitals:    21 0936   Weight: (!) 181 kg (400 lb)   Height: 6' 1" (1 854 m)     PHQ-9 Depression Screening    PHQ-9:   Frequency of the following problems over the past two weeks:      Little interest or pleasure in doing things: 0 - not at all  Feeling down, depressed, or hopeless: 1 - several days  Trouble falling or staying asleep, or sleeping too much: 1 - several days  Feeling tired or having little energy: 3 - nearly every day  Poor appetite or overeatin - more than half the days  Feeling bad about yourself - or that you are a failure or have let yourself or your family down: 2 - more than half the days  Trouble concentrating on things, such as reading the newspaper or watching television: 0 - not at all  Moving or speaking so slowly that other people could have noticed  Or the opposite - being so fidgety or restless that you have been moving around a lot more than usual: 1 - several days  Thoughts that you would be better off dead, or of hurting yourself in some way: 1 - several days  PHQ-2 Score: 1  PHQ-9 Score: 11           Physical Exam  Vitals signs and nursing note reviewed  Constitutional:       General: He is not in acute distress  Appearance: Normal appearance  HENT:      Head: Normocephalic  Pulmonary:      Effort: No respiratory distress  Neurological:      Mental Status: He is alert and oriented to person, place, and time  Psychiatric:         Mood and Affect: Mood normal           I spent 27 minutes directly with the patient during this visit      VIRTUAL VISIT DISCLAIMER    Mikal Raman acknowledges that he has consented to an online visit or consultation  He understands that the online visit is based solely on information provided by him, and that, in the absence of a face-to-face physical evaluation by the physician, the diagnosis he receives is both limited and provisional in terms of accuracy and completeness   This is not intended to replace a full medical face-to-face evaluation by the physician  Melissa Campos understands and accepts these terms

## 2021-04-14 NOTE — TELEPHONE ENCOUNTER
Spoke to pt and he said he will take one week off and is asking for a doctor's note  Patient asked for the note to excuse him from work from 4/14/21 through 4/23/21, and to call when ready because he will come to the office to  the note   Patient said he will also schedule his appointments

## 2021-04-14 NOTE — TELEPHONE ENCOUNTER
I am okay with him taking a week off if that will help him    I would like him to schedule his appointments, especially with Psychiatry and sleep specialist

## 2021-04-20 PROBLEM — R53.83 FATIGUE: Status: ACTIVE | Noted: 2021-04-20

## 2021-04-20 PROBLEM — E03.9 HYPOTHYROIDISM: Status: ACTIVE | Noted: 2021-04-20

## 2021-04-21 ENCOUNTER — TELEPHONE (OUTPATIENT)
Dept: FAMILY MEDICINE CLINIC | Facility: CLINIC | Age: 31
End: 2021-04-21

## 2021-04-21 ENCOUNTER — LAB (OUTPATIENT)
Dept: LAB | Facility: CLINIC | Age: 31
End: 2021-04-21
Payer: COMMERCIAL

## 2021-04-21 DIAGNOSIS — E55.9 VITAMIN D DEFICIENCY: ICD-10-CM

## 2021-04-21 DIAGNOSIS — F32.A ANXIETY AND DEPRESSION: ICD-10-CM

## 2021-04-21 DIAGNOSIS — R53.83 FATIGUE, UNSPECIFIED TYPE: ICD-10-CM

## 2021-04-21 DIAGNOSIS — G47.33 OBSTRUCTIVE SLEEP APNEA: ICD-10-CM

## 2021-04-21 DIAGNOSIS — F33.41 RECURRENT MAJOR DEPRESSIVE DISORDER, IN PARTIAL REMISSION (HCC): ICD-10-CM

## 2021-04-21 DIAGNOSIS — E03.9 HYPOTHYROIDISM, UNSPECIFIED TYPE: ICD-10-CM

## 2021-04-21 DIAGNOSIS — F41.9 ANXIETY AND DEPRESSION: ICD-10-CM

## 2021-04-21 LAB
25(OH)D3 SERPL-MCNC: 41.5 NG/ML (ref 30–100)
ALBUMIN SERPL BCP-MCNC: 3.3 G/DL (ref 3.5–5)
ALP SERPL-CCNC: 98 U/L (ref 46–116)
ALT SERPL W P-5'-P-CCNC: 45 U/L (ref 12–78)
ANION GAP SERPL CALCULATED.3IONS-SCNC: 4 MMOL/L (ref 4–13)
AST SERPL W P-5'-P-CCNC: 16 U/L (ref 5–45)
BASOPHILS # BLD AUTO: 0.03 THOUSANDS/ΜL (ref 0–0.1)
BASOPHILS NFR BLD AUTO: 0 % (ref 0–1)
BILIRUB SERPL-MCNC: 0.43 MG/DL (ref 0.2–1)
BUN SERPL-MCNC: 17 MG/DL (ref 5–25)
CALCIUM ALBUM COR SERPL-MCNC: 9.3 MG/DL (ref 8.3–10.1)
CALCIUM SERPL-MCNC: 8.7 MG/DL (ref 8.3–10.1)
CHLORIDE SERPL-SCNC: 109 MMOL/L (ref 100–108)
CHOLEST SERPL-MCNC: 186 MG/DL (ref 50–200)
CO2 SERPL-SCNC: 27 MMOL/L (ref 21–32)
CREAT SERPL-MCNC: 1.09 MG/DL (ref 0.6–1.3)
EOSINOPHIL # BLD AUTO: 0.09 THOUSAND/ΜL (ref 0–0.61)
EOSINOPHIL NFR BLD AUTO: 1 % (ref 0–6)
ERYTHROCYTE [DISTWIDTH] IN BLOOD BY AUTOMATED COUNT: 12.7 % (ref 11.6–15.1)
GFR SERPL CREATININE-BSD FRML MDRD: 91 ML/MIN/1.73SQ M
GLUCOSE P FAST SERPL-MCNC: 98 MG/DL (ref 65–99)
HCT VFR BLD AUTO: 46.8 % (ref 36.5–49.3)
HDLC SERPL-MCNC: 42 MG/DL
HGB BLD-MCNC: 15.2 G/DL (ref 12–17)
IMM GRANULOCYTES # BLD AUTO: 0.02 THOUSAND/UL (ref 0–0.2)
IMM GRANULOCYTES NFR BLD AUTO: 0 % (ref 0–2)
LDLC SERPL CALC-MCNC: 121 MG/DL (ref 0–100)
LYMPHOCYTES # BLD AUTO: 2.15 THOUSANDS/ΜL (ref 0.6–4.47)
LYMPHOCYTES NFR BLD AUTO: 28 % (ref 14–44)
MCH RBC QN AUTO: 28.5 PG (ref 26.8–34.3)
MCHC RBC AUTO-ENTMCNC: 32.5 G/DL (ref 31.4–37.4)
MCV RBC AUTO: 88 FL (ref 82–98)
MONOCYTES # BLD AUTO: 0.52 THOUSAND/ΜL (ref 0.17–1.22)
MONOCYTES NFR BLD AUTO: 7 % (ref 4–12)
NEUTROPHILS # BLD AUTO: 4.79 THOUSANDS/ΜL (ref 1.85–7.62)
NEUTS SEG NFR BLD AUTO: 64 % (ref 43–75)
NRBC BLD AUTO-RTO: 0 /100 WBCS
PLATELET # BLD AUTO: 265 THOUSANDS/UL (ref 149–390)
PMV BLD AUTO: 9.2 FL (ref 8.9–12.7)
POTASSIUM SERPL-SCNC: 3.8 MMOL/L (ref 3.5–5.3)
PROT SERPL-MCNC: 8.1 G/DL (ref 6.4–8.2)
RBC # BLD AUTO: 5.34 MILLION/UL (ref 3.88–5.62)
SODIUM SERPL-SCNC: 140 MMOL/L (ref 136–145)
TRIGL SERPL-MCNC: 116 MG/DL
TSH SERPL DL<=0.05 MIU/L-ACNC: 2.41 UIU/ML (ref 0.36–3.74)
VIT B12 SERPL-MCNC: 472 PG/ML (ref 100–900)
WBC # BLD AUTO: 7.6 THOUSAND/UL (ref 4.31–10.16)

## 2021-04-21 PROCEDURE — 80053 COMPREHEN METABOLIC PANEL: CPT

## 2021-04-21 PROCEDURE — 80061 LIPID PANEL: CPT

## 2021-04-21 PROCEDURE — 85025 COMPLETE CBC W/AUTO DIFF WBC: CPT

## 2021-04-21 PROCEDURE — 84443 ASSAY THYROID STIM HORMONE: CPT

## 2021-04-21 PROCEDURE — 82306 VITAMIN D 25 HYDROXY: CPT

## 2021-04-21 PROCEDURE — 82607 VITAMIN B-12: CPT

## 2021-04-21 PROCEDURE — 36415 COLL VENOUS BLD VENIPUNCTURE: CPT

## 2021-04-21 NOTE — TELEPHONE ENCOUNTER
----- Message from Alveta Hammans, MD sent at 4/21/2021  2:36 PM EDT -----  Please let patient know that his vitamin-D level was within normal range    Thank you

## 2021-04-21 NOTE — RESULT ENCOUNTER NOTE
Please let patient know that office blood work is overall stable  His bad cholesterol did increase mildly and I would like him to work on increasing heart healthy fats the diet by incorporating the Mediterranean diet and working on regular exercise and weight loss  In addition, his protein intake is mildly decreased  I would like to have him increase this slowly  I am awaiting 1 more blood work test, vitamin-D and will let him know once this is back   Thank you

## 2021-04-21 NOTE — TELEPHONE ENCOUNTER
----- Message from Raquel Neves MD sent at 4/21/2021  1:21 PM EDT -----  Please let patient know that office blood work is overall stable  His bad cholesterol did increase mildly and I would like him to work on increasing heart healthy fats the diet by incorporating the Mediterranean diet and working on regular exercise and weight loss  In addition, his protein intake is mildly decreased  I would like to have him increase this slowly  I am awaiting 1 more blood work test, vitamin-D and will let him know once this is back   Thank you

## 2021-04-22 ENCOUNTER — TELEPHONE (OUTPATIENT)
Dept: PSYCHIATRY | Facility: CLINIC | Age: 31
End: 2021-04-22

## 2021-04-22 NOTE — TELEPHONE ENCOUNTER
Dr Rivera Certain, pt called to set up an appt with you    just want to make sure I can set up the appt  Pt no show last prior appts    was seen last by you in August 2020

## 2021-04-24 DIAGNOSIS — F33.41 RECURRENT MAJOR DEPRESSIVE DISORDER, IN PARTIAL REMISSION (HCC): ICD-10-CM

## 2021-04-26 RX ORDER — BUPROPION HYDROCHLORIDE 300 MG/1
TABLET ORAL
Qty: 90 TABLET | OUTPATIENT
Start: 2021-04-26

## 2021-04-30 DIAGNOSIS — F33.41 RECURRENT MAJOR DEPRESSIVE DISORDER, IN PARTIAL REMISSION (HCC): ICD-10-CM

## 2021-04-30 RX ORDER — BUPROPION HYDROCHLORIDE 300 MG/1
300 TABLET ORAL DAILY
Qty: 14 TABLET | Refills: 0 | Status: SHIPPED | OUTPATIENT
Start: 2021-04-30 | End: 2021-05-14 | Stop reason: SDUPTHER

## 2021-04-30 NOTE — TELEPHONE ENCOUNTER
Patient calling to extend work note to return on 5/4   States he is out of medication and feels like he is going through withdrawal

## 2021-04-30 NOTE — TELEPHONE ENCOUNTER
Patient called and scheduled an appointment on 5/14       He is requesting a refill on his Wellbutrin

## 2021-05-05 ENCOUNTER — TELEPHONE (OUTPATIENT)
Dept: PSYCHIATRY | Facility: CLINIC | Age: 31
End: 2021-05-05

## 2021-05-05 ENCOUNTER — IMMUNIZATIONS (OUTPATIENT)
Dept: FAMILY MEDICINE CLINIC | Facility: HOSPITAL | Age: 31
End: 2021-05-05

## 2021-05-05 ENCOUNTER — OFFICE VISIT (OUTPATIENT)
Dept: BARIATRICS | Facility: CLINIC | Age: 31
End: 2021-05-05

## 2021-05-05 VITALS
RESPIRATION RATE: 17 BRPM | SYSTOLIC BLOOD PRESSURE: 128 MMHG | HEIGHT: 72 IN | BODY MASS INDEX: 42.66 KG/M2 | WEIGHT: 315 LBS | TEMPERATURE: 97.8 F | HEART RATE: 78 BPM | DIASTOLIC BLOOD PRESSURE: 84 MMHG

## 2021-05-05 DIAGNOSIS — G47.33 OBSTRUCTIVE SLEEP APNEA SYNDROME: ICD-10-CM

## 2021-05-05 DIAGNOSIS — Z01.810 PREOP CARDIOVASCULAR EXAM: ICD-10-CM

## 2021-05-05 DIAGNOSIS — Z23 ENCOUNTER FOR IMMUNIZATION: Primary | ICD-10-CM

## 2021-05-05 DIAGNOSIS — E66.9 OBESITY, UNSPECIFIED: Primary | ICD-10-CM

## 2021-05-05 PROCEDURE — RECHECK: Performed by: SOCIAL WORKER

## 2021-05-05 PROCEDURE — 91300 SARS-COV-2 / COVID-19 MRNA VACCINE (PFIZER-BIONTECH) 30 MCG: CPT

## 2021-05-05 PROCEDURE — 0001A SARS-COV-2 / COVID-19 MRNA VACCINE (PFIZER-BIONTECH) 30 MCG: CPT

## 2021-05-05 RX ORDER — DIPHENOXYLATE HYDROCHLORIDE AND ATROPINE SULFATE 2.5; .025 MG/1; MG/1
1 TABLET ORAL DAILY
COMMUNITY

## 2021-05-05 NOTE — PROGRESS NOTES
Bariatric Nutrition Assessment Note    Type of surgery    Preop  Surgery Date: TBD  Surgeon: Dr Davida Ramírez  27 y o   male     [place] with BMI of 25: 184 lbs  Pre-Op Excess Wt: 228 lbs  Blood pressure 128/84, pulse 78, temperature 97 8 °F (36 6 °C), temperature source Tympanic, resp  rate 17, height 6' 0 25" (1 835 m), weight (!) 187 kg (412 lb 1 6 oz)  Body mass index is 55 5 kg/m²  [facility] [place]-2869 BMR                                        2788 cals to lose 2 lb/week    Weight History   Onset of Obesity: Childhood  Family history of obesity: Yes  Wt Loss Attempts: Exercise  High Protein/Low CHO diets (Atkins, Union, etc )  Yoga with a diet plan  Maximum Wt Lost: 60 lbs    Review of History and Medications   Past Medical History:   Diagnosis Date    Abdominal pain     Ankle pain     Anxiety     Arthralgia     Asthma     Blood typing encounter     Bloody stools     [place]    6/8/16   R   11/21/17     Carpal tunnel syndrome, bilateral     [place]  Don Malone 9/12/17   R   9/12/17     Chest pain     Constipation     Depression     Dermatitis     Fatigue     Frequent bowel movements     Gastroenteritis     GERD (gastroesophageal reflux disease)     GERD without esophagitis     Insomnia     [place]   11/7/16   R   11/21/17     Irregular heart beats     Muscle spasm     Nausea     Numbness and tingling in both hands     Obesity     Odynophagia     Otitis     Pharyngitis     Pilonidal cyst with abscess     LA   10/25/13   R   6/10/14     Proteinuria     Rectal bleeding     R   11/21/17     Rhinitis     Seasonal allergies     Skin lesion     [place]    2/3/15   R  Don Malone 3/17/17  /   [place]    3/17/17   R   12/22/17     Snoring     Strep throat     Tonsillitis     URI (upper respiratory infection)     Vitamin D deficiency      Past Surgical History:   Procedure Laterality Date    FOOT SURGERY      PILONIDAL CYST DRAINAGE      Incision and drainage of pilonidal cyst     CO WRIST [place] LIG Right 7/12/2018    Procedure: RELEASE CARPAL TUNNEL ENDOSCOPIC;  Surgeon: Chris Mendoza MD;  [facility];  Service: Orthopedics    CO WRIST Alverto Salomon LIG Left 7/26/2018    Procedure: RELEASE CARPAL TUNNEL ENDOSCOPIC;  Surgeon: Chris Mendoza MD;  [facility];  Service: Orthopedics     Social History     Socioeconomic History    Marital status: /Civil Union     Spouse name: None    Number of children: None    Years of education: some college    Highest education level: None   Occupational History     Comment: employed    Social Needs    Financial resource strain: None    Food insecurity     Worry: None     Inability: None    Transportation needs     Medical: None     Non-medical: None   Tobacco Use    Smoking status: Never Smoker    Smokeless tobacco: Never Used   Substance and Sexual Activity    Alcohol use: Yes     Frequency: Never     Comment: Rarely    Drug use: No    Sexual activity: Yes     Partners: Female   Lifestyle    Physical activity     Days per week: None     Minutes per session: None    Stress: None   Relationships    Social connections     Talks on phone: None     Gets together: None     Attends Holiness service: None     Active member of club or organization: None     Attends meetings of clubs or organizations: None     Relationship status: None    Intimate partner violence     Fear of current or ex partner: None     Emotionally abused: None     Physically abused: None     Forced sexual activity: None   Other Topics Concern    None   Social History Narrative    Daily caffeine consumption        Current Outpatient Medications:     buPROPion (WELLBUTRIN XL) 300 mg 24 hr tablet, Take 1 tablet (300 mg total) by mouth daily, Disp: 14 tablet, Rfl: 0    Cholecalciferol (VITAMIN D-3) 1000 units CAPS, Take 2 capsules by mouth daily, Disp: , Rfl:     escitalopram (LEXAPRO) 20 mg tablet, TAKE 1 TABLET BY MOUTH EVERY DAY, Disp: 90 tablet, Rfl: 3    levothyroxine 25 mcg tablet, TAKE 1 TABLET BY MOUTH DAILY IN THE EARLY MORNING, Disp: 90 tablet, Rfl: 1    multivitamin (THERAGRAN) TABS, Take 1 tablet by mouth daily, Disp: , Rfl:     traZODone (DESYREL) 50 mg tablet, Take 1 tablet (50 mg total) by mouth daily at bedtime as needed for sleep, Disp: 30 tablet, Rfl: 2    betamethasone dipropionate (DIPROSONE) 0 05 % cream, Apply topically 2 (two) times a day (Patient not taking: Reported on 3/1/2021), Disp: 30 g, Rfl: 3    clotrimazole-betamethasone (LOTRISONE) 1-0 05 % cream, Apply topically 2 (two) times a day (Patient not taking: Reported on 3/1/2021), Disp: 45 g, Rfl: 2    diclofenac sodium (VOLTAREN) 1 %, Apply 2 g topically 4 (four) times a day (Patient not taking: Reported on 3/1/2021), Disp: 100 g, Rfl: 1  Food Intake and Lifestyle Assessment   Food Intake Assessment completed via usual diet recall  Breakfast: 5 am breakfast sandwich  Snack: 8 am fruit or yogurt  Lunch: pizza, chips, hoagies  Snack: 0   Dinner: wife may pack chicken and vegetables  Snack: 0  Beverage intake: water, sugar free beverages and V8 energy drink  Protein supplement: 0  Estimated protein intake per day: 80 gm  Estimated fluid intake per day: 80 oz  Meals eaten away from home: 2 times/week  Typical meal pattern: 3 meals per day and 2-3 snacks per day  Eating Behaviors: Consumption of high calorie/ high fat foods, Large portion sizes, Frequent snacking/ grazing and Emotional eating  Food allergies or intolerances: No Known Allergies  Cultural or Spiritism considerations: N/A    Physical Assessment  Physical Activity  Types of exercise: None  Current physical limitations: weight    Psychosocial Assessment   Support systems: spouse, mother  Socioeconomic factors: works at [employer], lives with wife and mother    Nutrition Diagnosis  Diagnosis: Overweight / Obesity (NC-3 3)  Related to:  Not ready for diet / lifestyle change, Physical inactivity and Excessive energy intake  As Evidenced by: BMI >50     Nutrition Prescription: Recommend the following diet  Low fat, Low sugar, High protein and Regular    Interventions and Teaching   Discussed pre-op and post-op nutrition guidelines  Patient educated and handouts provided  Surgical changes to stomach / GI  Capacity of post-surgery stomach  Diet progression  Adequate hydration  Sugar and fat restriction to decrease "dumping syndrome"  Fat restriction to decrease steatorrhea  Expected weight loss  Weight loss plateaus/ possibility of weight regain  Exercise  Suggestions for pre-op diet  Nutrition considerations after surgery  Protein supplements  Meal planning and preparation  Appropriate carbohydrate, protein, and fat intake, and food/fluid choices to maximize safe weight loss, nutrient intake, and tolerance   Dietary and lifestyle changes  Possible problems with poor eating habits  Intuitive eating  Techniques for self monitoring and keeping daily food journal  Potential for food intolerance after surgery, and ways to deal with them including: lactose intolerance, nausea, reflux, vomiting, diarrhea, food intolerance, appetite changes, gas  Vitamin / Mineral supplementation of Multivitamin with minerals and Vitamin D    Education provided to: patient    Barriers to learning: No barriers identified  Readiness to change: preparation    Prior research on procedure: internet, discussed with provider and friends or family    Comprehension: verbalizes understanding     Expected Compliance: good  Recommendations  Pt is an appropriate candidate for surgery   Yes    Evaluation / Monitoring  Dietitian to Monitor: Eating pattern as discussed Body weight Lab values Physical activity    Goals  Food journal, Exercise 30 minutes 5 times per week, Complete lession plans 1-6, Eat 3 meals per day and Eliminate mindless snacking    Time Spent:   1 Hour

## 2021-05-05 NOTE — TELEPHONE ENCOUNTER
Patient dropped off paperwork to be completed for [facility]   Placed in Dr Levy Economy
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
## 2021-05-13 ENCOUNTER — TELEPHONE (OUTPATIENT)
Dept: FAMILY MEDICINE CLINIC | Facility: CLINIC | Age: 31
End: 2021-05-13

## 2021-05-14 ENCOUNTER — TELEPHONE (OUTPATIENT)
Dept: PSYCHIATRY | Facility: CLINIC | Age: 31
End: 2021-05-14

## 2021-05-14 ENCOUNTER — TELEMEDICINE (OUTPATIENT)
Dept: PSYCHIATRY | Facility: CLINIC | Age: 31
End: 2021-05-14
Payer: COMMERCIAL

## 2021-05-14 DIAGNOSIS — F33.41 RECURRENT MAJOR DEPRESSIVE DISORDER, IN PARTIAL REMISSION (HCC): ICD-10-CM

## 2021-05-14 DIAGNOSIS — F41.1 GENERALIZED ANXIETY DISORDER: Primary | ICD-10-CM

## 2021-05-14 PROCEDURE — 99214 OFFICE O/P EST MOD 30 MIN: CPT | Performed by: PSYCHIATRY & NEUROLOGY

## 2021-05-14 RX ORDER — TRAZODONE HYDROCHLORIDE 50 MG/1
50 TABLET ORAL
Qty: 90 TABLET | Refills: 0 | Status: SHIPPED | OUTPATIENT
Start: 2021-05-14 | End: 2021-08-20 | Stop reason: SDUPTHER

## 2021-05-14 RX ORDER — BUPROPION HYDROCHLORIDE 300 MG/1
300 TABLET ORAL DAILY
Qty: 90 TABLET | Refills: 0 | Status: SHIPPED | OUTPATIENT
Start: 2021-05-14 | End: 2021-08-02 | Stop reason: SDUPTHER

## 2021-05-14 NOTE — BH TREATMENT PLAN
TREATMENT PLAN (Medication Management Only)        [facility]    Name and Date of Birth:  Audra Marin 27 y o  1990  Date of Treatment Plan: May 14, 2021  Diagnosis/Diagnoses:    1  Generalized anxiety disorder    2  Recurrent major depressive disorder, in partial remission [facility])      Strengths/Personal Resources for Self-Care: supportive family, ability to communicate needs, ability to understand psychiatric illness  Area/Areas of need (in own words): anxiety, depression  1  Long Term Goal: continue improvement in depression  Target Date:6 months - 11/14/2021  Person/Persons responsible for completion of goal: Opal Robertson  2  Short Term Objective (s) - How will we reach this goal?:   A  Provider new recommended medication/dosage changes and/or continue medication(s): continue current medications as prescribed Lexapro, Wellbutrin XL, Trazodone  B  exercise, music, meditaiton  C  therapy, pet therapy, socialization     Target Date:6 months - 11/14/2021  Person/Persons Responsible for Completion of Goal: Opal Robertson  Progress Towards Goals: continuing treatment  Treatment Modality: medication management every 6 weeks  Review due 180 days from date of this plan: 6 months - 11/14/2021  Expected length of service: maintenance  My Physician/PA/NP and I have developed this plan together and I agree to work on the goals and objectives  I understand the treatment goals that were developed for my treatment  Patient has given consent for COVID 19 precautions and use of telephonic communication or video communications, when applicable, and has given verbal consent for treatment plan, but is unable to sign treatment plan due to [address] concerns and video visit
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
## 2021-05-14 NOTE — TELEPHONE ENCOUNTER
Belinda Brittle called into the office to change his apt with Dr Kymberly Robbins to virtual today  Magdiel Lucero

## 2021-05-27 ENCOUNTER — OFFICE VISIT (OUTPATIENT)
Dept: BARIATRICS | Facility: CLINIC | Age: 31
End: 2021-05-27

## 2021-05-27 ENCOUNTER — IMMUNIZATIONS (OUTPATIENT)
Dept: FAMILY MEDICINE CLINIC | Facility: HOSPITAL | Age: 31
End: 2021-05-27

## 2021-05-27 DIAGNOSIS — E66.01 OBESITY, CLASS III, BMI 40-49.9 (MORBID OBESITY) (HCC): Primary | ICD-10-CM

## 2021-05-27 DIAGNOSIS — Z23 ENCOUNTER FOR IMMUNIZATION: Primary | ICD-10-CM

## 2021-05-27 PROCEDURE — 91300 SARS-COV-2 / COVID-19 MRNA VACCINE (PFIZER-BIONTECH) 30 MCG: CPT | Performed by: PHYSICIAN ASSISTANT

## 2021-05-27 PROCEDURE — RECHECK: Performed by: SOCIAL WORKER

## 2021-05-27 PROCEDURE — 0002A SARS-COV-2 / COVID-19 MRNA VACCINE (PFIZER-BIONTECH) 30 MCG: CPT | Performed by: PHYSICIAN ASSISTANT

## 2021-05-27 NOTE — PROGRESS NOTES
Melissa Campos  is a 27 y o    male    :  1990  [address]  of [address]  Current patient status: has lost weight    Discussion around taking care of emoatinal issues without using food  He uses music and writing  He feels like his depression has resolved and is currently mild  Increased stressors:Wife taken for scope in [facility] cancer runs in the family  We reviewed the workflow: He still needs the pod cast and the referral from her PCP  Goals of pausing when emotional needs trigger him for eating    Next appointment is scheduled for 6/10 with EV Romero, DONALDOW  _____________________________

## 2021-06-04 ENCOUNTER — CONSULT (OUTPATIENT)
Dept: CARDIOLOGY CLINIC | Facility: CLINIC | Age: 31
End: 2021-06-04
Payer: COMMERCIAL

## 2021-06-04 ENCOUNTER — TELEPHONE (OUTPATIENT)
Dept: FAMILY MEDICINE CLINIC | Facility: CLINIC | Age: 31
End: 2021-06-04

## 2021-06-04 VITALS
WEIGHT: 315 LBS | SYSTOLIC BLOOD PRESSURE: 140 MMHG | BODY MASS INDEX: 42.66 KG/M2 | DIASTOLIC BLOOD PRESSURE: 80 MMHG | HEART RATE: 77 BPM | HEIGHT: 72 IN

## 2021-06-04 DIAGNOSIS — Z01.818 PREOP EXAMINATION: Primary | ICD-10-CM

## 2021-06-04 DIAGNOSIS — G47.33 OBSTRUCTIVE SLEEP APNEA SYNDROME: ICD-10-CM

## 2021-06-04 DIAGNOSIS — Z01.818 PREOPERATIVE CLEARANCE: ICD-10-CM

## 2021-06-04 DIAGNOSIS — Z01.810 PREOP CARDIOVASCULAR EXAM: ICD-10-CM

## 2021-06-04 PROCEDURE — 1036F TOBACCO NON-USER: CPT | Performed by: INTERNAL MEDICINE

## 2021-06-04 PROCEDURE — 93000 ELECTROCARDIOGRAM COMPLETE: CPT | Performed by: INTERNAL MEDICINE

## 2021-06-04 PROCEDURE — 99243 OFF/OP CNSLTJ NEW/EST LOW 30: CPT | Performed by: INTERNAL MEDICINE

## 2021-06-04 PROCEDURE — 3008F BODY MASS INDEX DOCD: CPT | Performed by: INTERNAL MEDICINE

## 2021-06-04 NOTE — TELEPHONE ENCOUNTER
Patient called and stated that he has been seeing bariatrics  He needs a letter from you stating that you support him going through with the weight loss surgery in order to go through with the program and go through with possibility of surgery    Please call to advise

## 2021-06-04 NOTE — PROGRESS NOTES
Cardiology Consultation      Brenda Records  1990  [id number]  [facility] [place]  [address]  [facility]  [phone]    1  Obstructive sleep apnea syndrome  Ambulatory referral to Cardiology   2  Preop cardiovascular exam  Ambulatory referral to Cardiology   3  BMI 50 0-59 9, adult [facility])  Ambulatory referral to Cardiology          Discussion/Summary    1  Pre op bariatric surgery      Plan:  Patient has no cardiac contraindications to planned bariatric surgery  Would proceed as planned  ECG is normal   No med changes are preop testing required my standpoint      History:     28 yo here for bariatric surgery  Cardiac evaluation     Patient  With longstanding history of obesity     No known cardiac issues  Specifically, no history rheumatic heart disease, congenital heart disease, childhood murmur or syncope  No family history of  Premature coronary artery disease per his report     Patient does yoga, able to walk several miles on a flat surface without difficulty  Patient Active Problem List   Diagnosis    BMI 50 0-59 9, adult (HCC)    Generalized anxiety disorder    Elevated TSH    Carpal tunnel syndrome    Preop examination    Preoperative clearance    Pineal gland cyst    Vitamin D deficiency    Recurrent major depressive disorder, in partial remission ([facility] [address] )    Pain in both knees    Right knee pain    Skin pruritus    Skin rash    Peeling skin    Obstructive sleep apnea syndrome    Pain in right testicle    Obstructive sleep apnea    Excessive daytime sleepiness    Eczema    Diarrhea    Routine health maintenance    Elevated total protein    Hypothyroidism    Fatigue     Past Medical History:   Diagnosis Date    Abdominal pain     Ankle pain     Anxiety     Arthralgia     Asthma     Blood typing encounter     Bloody stools     LA    6/8/16   R   11/21/17     Carpal tunnel syndrome, bilateral LA 9/12/17   R   9/12/17     Chest pain     Constipation     Depression     Dermatitis     Fatigue     Frequent bowel movements     Gastroenteritis     GERD (gastroesophageal reflux disease)     GERD without esophagitis     Insomnia     LA   11/7/16   R   11/21/17     Irregular heart beats     Muscle spasm     Nausea     Numbness and tingling in both hands     Obesity     Odynophagia     Otitis     Pharyngitis     Pilonidal cyst with abscess     LA   10/25/13   R   6/10/14     Proteinuria     Rectal bleeding     R   11/21/17     Rhinitis     Seasonal allergies     Skin lesion     LA    2/3/15   R  Bipin Manifold Bipin Manifold 3/17/17  /   LA    3/17/17   R   12/22/17     Snoring     Strep throat     Tonsillitis     URI (upper respiratory infection)     Vitamin D deficiency      Social History     Socioeconomic History    Marital status: /Civil Union     Spouse name: Not on file    Number of children: Not on file    Years of education: some college    Highest education level: Not on file   Occupational History     Comment: employed    Social Needs    Financial resource strain: Not on file    Food insecurity     Worry: Not on file     Inability: Not on file   GILUPI needs     Medical: Not on file     Non-medical: Not on file   Tobacco Use    Smoking status: Never Smoker    Smokeless tobacco: Never Used   Substance and Sexual Activity    Alcohol use: Yes     Frequency: Never     Comment: Rarely    Drug use: No    Sexual activity: Yes     Partners: Female   Lifestyle    Physical activity     Days per week: Not on file     Minutes per session: Not on file    Stress: Not on file   Relationships    Social connections     Talks on phone: Not on file     Gets together: Not on file     Attends Denominational service: Not on file     Active member of club or organization: Not on file     Attends meetings of clubs or organizations: Not on file     Relationship status: Not on file    Intimate partner violence     Fear of current or ex partner: Not on file     Emotionally abused: Not on file     Physically abused: Not on file     Forced sexual activity: Not on file   Other Topics Concern    Not on file   Social History Narrative    Daily caffeine consumption       Family History   Problem Relation Age of Onset    Depression Mother     Obesity Mother     Stroke Mother         Syndrome     Diabetes Mother     Alcohol abuse Father     Liver disease Father         hepatic failure     Hypertension Father     Depression Brother     Thyroid disease Brother     Alcohol abuse Brother     Substance Abuse Brother     Depression Maternal Uncle     Substance Abuse Brother     Heart disease Neg Hx     Cancer Neg Hx     Thyroid cancer Neg Hx      Past Surgical History:   Procedure Laterality Date    FOOT SURGERY      PILONIDAL CYST DRAINAGE      Incision and drainage of pilonidal cyst     CO WRIST Vladimir Pao LIG Right 7/12/2018    Procedure: RELEASE CARPAL TUNNEL ENDOSCOPIC;  Surgeon: Faisal Zuniga MD;  [facility];  Service: Orthopedics    CO WRIST Vladimir Pao LIG Left 7/26/2018    Procedure: RELEASE CARPAL TUNNEL ENDOSCOPIC;  Surgeon: Faisal Zuniga MD;  [facility];  Service: Orthopedics       Current Outpatient Medications:     buPROPion (WELLBUTRIN XL) 300 mg 24 hr tablet, Take 1 tablet (300 mg total) by mouth daily, Disp: 90 tablet, Rfl: 0    Cholecalciferol (VITAMIN D-3) 1000 units CAPS, Take 2 capsules by mouth daily, Disp: , Rfl:     escitalopram (LEXAPRO) 20 mg tablet, TAKE 1 TABLET BY MOUTH EVERY DAY, Disp: 90 tablet, Rfl: 3    levothyroxine 25 mcg tablet, TAKE 1 TABLET BY MOUTH DAILY IN THE EARLY MORNING, Disp: 90 tablet, Rfl: 1    multivitamin (THERAGRAN) TABS, Take 1 tablet by mouth daily, Disp: , Rfl:     traZODone (DESYREL) 50 mg tablet, Take 1 tablet (50 mg total) by mouth daily at bedtime as needed for sleep, Disp: 90 tablet, Rfl: 0    betamethasone dipropionate (DIPROSONE) 0 05 % cream, Apply topically 2 (two) times a day (Patient not taking: Reported on 3/1/2021), Disp: 30 g, Rfl: 3    clotrimazole-betamethasone (LOTRISONE) 1-0 05 % cream, Apply topically 2 (two) times a day (Patient not taking: Reported on 3/1/2021), Disp: 45 g, Rfl: 2    diclofenac sodium (VOLTAREN) 1 %, Apply 2 g topically 4 (four) times a day (Patient not taking: Reported on 3/1/2021), Disp: 100 g, Rfl: 1  No Known Allergies    Social, Family and medication history as listed, reviewed and updated as necessary    Labs:   Lab Results   Component Value Date     11/21/2017    K 3 8 04/21/2021     (H) 04/21/2021    CO2 27 04/21/2021    BUN 17 04/21/2021    CREATININE 1 09 04/21/2021    CREATININE 1 22 08/25/2020    CALCIUM 8 7 04/21/2021       Lab Results   Component Value Date    WBC 7 60 04/21/2021    HGB 15 2 04/21/2021    HGB 14 9 08/25/2020    HCT 46 8 04/21/2021    HCT 45 3 08/25/2020     04/21/2021     08/25/2020       Lab Results   Component Value Date    CHOL 150 06/17/2016     Lab Results   Component Value Date    HDL 42 04/21/2021    HDL 44 08/18/2020     Lab Results   Component Value Date    LDLCALC 121 (H) 04/21/2021    LDLCALC 103 (H) 08/18/2020     Lab Results   Component Value Date    TRIG 116 04/21/2021    TRIG 91 08/18/2020     No results found for: LDLDIRECT    Lab Results   Component Value Date    ALT 45 04/21/2021    ALT 41 01/21/2020    AST 16 04/21/2021    AST 18 01/21/2020             No results found for: NTBNP    Lab Results   Component Value Date    HGBA1C 5 1 07/07/2018       Imaging: Reviewed in epic      Review of Systems:  14 systems reviewed and negative with exception of the above       PHYSICAL EXAM:        Vitals:    06/04/21 0756   BP: 140/80   Pulse: 77     Body mass index is 54 07 kg/m²    Weight (last 2 days)     Date/Time   Weight    06/04/21 0755   (!) 182 (402)                 Gen: No acute distress  HEENT: anicteric, mucous membranes moist  Neck: supple, no jugular venous distention, or carotid bruit  Heart: regular, normal s1 and s2, no murmur/rub or gallop  Lungs :clear to auscultation bilaterally, no rales/rhonchi or wheeze  Abdomen: soft nontender, normoactive bowel sounds, no organomegaly  Ext: warm and perfused, normal femoral pulses, no edema, or clubbing  Skin: warm, no rashes  Neuro: AAO x 3, no focal findings  Psychiatric: normal affect  Musculoskeletal: no obvious joint deformities

## 2021-06-30 ENCOUNTER — OFFICE VISIT (OUTPATIENT)
Dept: FAMILY MEDICINE CLINIC | Facility: CLINIC | Age: 31
End: 2021-06-30
Payer: COMMERCIAL

## 2021-06-30 VITALS
BODY MASS INDEX: 42.66 KG/M2 | SYSTOLIC BLOOD PRESSURE: 140 MMHG | HEIGHT: 72 IN | HEART RATE: 94 BPM | OXYGEN SATURATION: 97 % | WEIGHT: 315 LBS | TEMPERATURE: 98.2 F | DIASTOLIC BLOOD PRESSURE: 80 MMHG | RESPIRATION RATE: 22 BRPM

## 2021-06-30 DIAGNOSIS — F33.2 SEVERE EPISODE OF RECURRENT MAJOR DEPRESSIVE DISORDER, WITHOUT PSYCHOTIC FEATURES (HCC): Primary | ICD-10-CM

## 2021-06-30 DIAGNOSIS — F41.1 GENERALIZED ANXIETY DISORDER: ICD-10-CM

## 2021-06-30 PROCEDURE — 1036F TOBACCO NON-USER: CPT | Performed by: NURSE PRACTITIONER

## 2021-06-30 PROCEDURE — 99213 OFFICE O/P EST LOW 20 MIN: CPT | Performed by: NURSE PRACTITIONER

## 2021-06-30 RX ORDER — FLUTICASONE PROPIONATE 50 MCG
1 SPRAY, SUSPENSION (ML) NASAL AS NEEDED
COMMUNITY

## 2021-06-30 NOTE — LETTER
June 30, 2021     Patient: Machelle Cardenas   YOB: 1990   Date of Visit: 6/30/2021       To Whom it May Concern:    Eliseedwin Steiner is under my professional care  He was seen in my office on 6/30/2021  Starting today, he may not return to work until cleared by our office  Will have re-evaluation in one month  If you have any questions or concerns, please don't hesitate to call           Sincerely,          AMRITA Ramírez        CC: No Recipients

## 2021-06-30 NOTE — PROGRESS NOTES
FAMILY PRACTICE OFFICE VISIT       NAME: Frandy Garza  AGE: 27 y o  SEX: male       : 1990        MRN: [id number]    Assessment and Plan     Problem List Items Addressed This Visit        Other    Generalized anxiety disorder    Relevant Orders    Ambulatory referral to innovations or partial psych program    Recurrent major depressive disorder, in partial remission ([facility] [address] ) - Primary          1  Severe episode of recurrent major depressive disorder, without psychotic features [facility])  Ambulatory referral to innovations or partial psych program   2  Generalized anxiety disorder  Ambulatory referral to innovations or partial psych program     This 44-year-old male presents today for worsening depression anxiety over the past few weeks  He is currently under the care of Psychiatry, Dr Tarsah Renner  Currently taking Wellbutrin and Lexapro on a daily basis  Trazodone as needed for sleep  He is experiencing suicidal thoughts, with no plans  He attends counseling on a biweekly basis through [address]  He has reached out to his counselor, Pa Patel, and has an appointment this week  He has not reached out to his psychiatrist  He would like to be placed on short term disability to get himself in a better place before returning to work  He is struggling to focus and keep up with work, to the point co-workers are complaining about him  We discussed innovations program, which he is familiar with, and agreeable to attend  Referral placed  If he does not receive a call to schedule by Friday, he will contact me  He has a good support system in his wife, brother, mom, & best friend, who are aware of how he is feeling and are checking on him often  He is aware if his mood should worsen, suicidal ideations increase, or if he begins to plan for suicide, go immediately to the emergency room  Note provided for work, will place him on short term disability for now, with re-eval in office in 1 month       Chief Complaint     Chief Complaint   Patient presents with    Depression     Exacerbation X1 week  Suicidal thoughts no plan verbalized  History of Present Illness     Vernida Hashimoto is a 27year old male presenting today for depression and anxiety  Past few weeks depression and anxiety worse  Racing thoughts  Hard to sleep, can't fall asleep  Suicidal thoughts, but no plan  Does not want to put his family through a suicide loss  Struggling at work--can't focus on work  Co-worker's complaining about his work  Feeling self hatred  Anger at self  No motivation  Fatigue  Dogs keep him active  Good support system, wife, brother, mom, best friend  Counselor--sees biweekly, has an appointment this week, would like to return to weekly  [facility]un Robin [facility]  Psychiatry--July 14th Dr Nonah Schlatter    Would like to pursue short term disability  Has done innovations program in the past, would like to try this again  trying to focus on breathing and using coping mechanisms    Started bariatrics program 1 month ago--lost 16 pounds, goal to lose 40 pounds  Pursuing bariatric surgery, strongly desires to be successful in weight loss  Review of Systems   Review of Systems   Constitutional: Positive for fatigue  Negative for activity change, appetite change, chills, diaphoresis, fever and unexpected weight change  HENT: Negative  Respiratory: Negative  Cardiovascular: Negative  Gastrointestinal: Negative  Psychiatric/Behavioral: Positive for dysphoric mood, sleep disturbance and suicidal ideas (no plan)  Negative for self-injury  The patient is nervous/anxious          Active Problem List     Patient Active Problem List   Diagnosis    BMI 50 0-59 9, adult (HCC)    Generalized anxiety disorder    Elevated TSH    Carpal tunnel syndrome    Preop examination    Preoperative clearance    Pineal gland cyst    Vitamin D deficiency    Recurrent major depressive disorder, in partial remission (HCC)    Pain in both knees    Right knee pain    Skin pruritus    Skin rash    Peeling skin    Obstructive sleep apnea syndrome    Pain in right testicle    Obstructive sleep apnea    Excessive daytime sleepiness    Eczema    Diarrhea    Routine health maintenance    Elevated total protein    Hypothyroidism    Fatigue       Past Medical History:  Past Medical History:   Diagnosis Date    Abdominal pain     Ankle pain     Anxiety     Arthralgia     Asthma     Blood typing encounter     Bloody stools     LA    6/8/16   R   11/21/17     Carpal tunnel syndrome, bilateral     LA  Sandra Nissen Sandra Nissen 9/12/17   R   9/12/17     Chest pain     Constipation     Depression     Dermatitis     Fatigue     Frequent bowel movements     Gastroenteritis     GERD (gastroesophageal reflux disease)     GERD without esophagitis     Insomnia     LA   11/7/16   R   11/21/17     Irregular heart beats     Muscle spasm     Nausea     Numbness and tingling in both hands     Obesity     Odynophagia     Otitis     Pharyngitis     Pilonidal cyst with abscess     LA   10/25/13   R   6/10/14     Proteinuria     Rectal bleeding     R   11/21/17     Rhinitis     Seasonal allergies     Skin lesion     LA    2/3/15   R  Sandra Nissen Sandra Nissen 3/17/17  /   LA    3/17/17   R   12/22/17     Snoring     Strep throat     Tonsillitis     URI (upper respiratory infection)     Vitamin D deficiency        Past Surgical History:  Past Surgical History:   Procedure Laterality Date    FOOT SURGERY      PILONIDAL CYST DRAINAGE      Incision and drainage of pilonidal cyst     ID WRIST Lalita Lai LIG Right 7/12/2018    Procedure: RELEASE CARPAL TUNNEL ENDOSCOPIC;  Surgeon: Anil Grant MD;  [facility];  Service: Orthopedics    ID WRIST Lalita Lai LIG Left 7/26/2018    Procedure: RELEASE CARPAL TUNNEL ENDOSCOPIC;  Surgeon: Anil Grant MD;  [facility];  Service: Orthopedics       Family History:  Family History   Problem Relation Age of Onset   Robby Perez Depression Mother     Obesity Mother     Stroke Mother         Syndrome     Diabetes Mother     Alcohol abuse Father     Liver disease Father         hepatic failure     Hypertension Father     Depression Brother     Thyroid disease Brother     Alcohol abuse Brother     Substance Abuse Brother     Depression Maternal Uncle     Substance Abuse Brother     Heart disease Neg Hx     Cancer Neg Hx     Thyroid cancer Neg Hx        Social History:  Social History     Socioeconomic History    Marital status: /Civil Union     Spouse name: Not on file    Number of children: Not on file    Years of education: some college    Highest education level: Not on file   Occupational History     Comment: employed    Tobacco Use    Smoking status: Never Smoker    Smokeless tobacco: Never Used   Vaping Use    Vaping Use: Never used   Substance and Sexual Activity    Alcohol use: Yes     Comment: Rarely    Drug use: No    Sexual activity: Yes     Partners: Female   Other Topics Concern    Not on file   Social History Narrative    Daily caffeine consumption      Social Determinants of Health     Financial Resource Strain:     Difficulty of Paying Living Expenses:    Food Insecurity:     Worried About Running Out of Food in the Last Year:     Ran Out of Food in the Last Year:    Transportation Needs:     Lack of Transportation (Medical):      Lack of Transportation (Non-Medical):    Physical Activity:     Days of Exercise per Week:     Minutes of Exercise per Session:    Stress:     Feeling of Stress :    Social Connections:     Frequency of Communication with Friends and Family:     Frequency of Social Gatherings with Friends and Family:     Attends Cheondoism Services:     Active Member of Clubs or Organizations:     Attends Club or Organization Meetings:     Marital Status:    Intimate Partner Violence:     Fear of Current or Ex-Partner:     Emotionally Abused:     Physically Abused:     Sexually Abused:        I have reviewed the patient's medical history in detail; there are no changes to the history as noted in the electronic medical record  Objective     Vitals:    06/30/21 1112   BP: 140/80   BP Location: Left arm   Patient Position: Sitting   Cuff Size: Extra-Large   Pulse: 94   Resp: 22   Temp: 98 2 °F (36 8 °C)   TempSrc: Temporal   SpO2: 97%   Weight: (!) 180 kg (397 lb)   Height: 6' 0 03" (1 83 m)     Wt Readings from Last 3 Encounters:   06/30/21 (!) 180 kg (397 lb)   06/04/21 (!) 182 kg (402 lb)   05/05/21 (!) 187 kg (412 lb 1 6 oz)     Physical Exam  Vitals and nursing note reviewed  Constitutional:       Appearance: Normal appearance  He is obese  He is not ill-appearing or toxic-appearing  Cardiovascular:      Rate and Rhythm: Normal rate and regular rhythm  Heart sounds: No murmur heard  Pulmonary:      Effort: Pulmonary effort is normal  No respiratory distress  Breath sounds: Normal breath sounds  Neurological:      Mental Status: He is alert     Psychiatric:         Mood and Affect: Mood normal             ALLERGIES:  No Known Allergies    Current Medications     Current Outpatient Medications   Medication Sig Dispense Refill    buPROPion (WELLBUTRIN XL) 300 mg 24 hr tablet Take 1 tablet (300 mg total) by mouth daily 90 tablet 0    Cholecalciferol (VITAMIN D-3) 1000 units CAPS Take 2 capsules by mouth daily      escitalopram (LEXAPRO) 20 mg tablet TAKE 1 TABLET BY MOUTH EVERY DAY 90 tablet 3    fluticasone (FLONASE) 50 mcg/act nasal spray 1 spray into each nostril as needed for rhinitis      levothyroxine 25 mcg tablet TAKE 1 TABLET BY MOUTH DAILY IN THE EARLY MORNING 90 tablet 1    multivitamin (THERAGRAN) TABS Take 1 tablet by mouth daily      traZODone (DESYREL) 50 mg tablet Take 1 tablet (50 mg total) by mouth daily at bedtime as needed for sleep 90 tablet 0     No current facility-administered medications for this visit           Health Maintenance     Health Maintenance   Topic Date Due    Hepatitis C Screening  Never done    HIV Screening  Never done    Annual Physical  03/16/2021    Influenza Vaccine (Season Ended) 09/01/2021    Depression Remission PHQ  04/14/2022    BMI: Followup Plan  05/27/2022    BMI: Adult  06/30/2022    DTaP,Tdap,and Td Vaccines (2 - Td or Tdap) 02/11/2023    COVID-19 Vaccine  Completed    Pneumococcal Vaccine: Pediatrics (0 to 5 Years) and At-Risk Patients (6 to 59 Years)  Aged Out    HIB Vaccine  Aged Out    Hepatitis B Vaccine  Aged Out    IPV Vaccine  Aged Out    Hepatitis A Vaccine  Aged Out    Meningococcal ACWY Vaccine  Aged Out    HPV Vaccine  Aged [employer] History   Administered Date(s) Administered    Influenza, recombinant, quadrivalent,injectable, preservative free 11/04/2019    Influenza, seasonal, injectable 01/04/2017    Influenza, seasonal, injectable, preservative free 10/01/2018    SARS-CoV-2 / COVID-19 mRNA IM (Pfizer-BioNTech) 05/05/2021, 05/27/2021    Tdap 02/11/2013       AMRITA Angulo

## 2021-07-08 ENCOUNTER — OFFICE VISIT (OUTPATIENT)
Dept: PSYCHIATRY | Facility: CLINIC | Age: 31
End: 2021-07-08
Payer: COMMERCIAL

## 2021-07-08 ENCOUNTER — OFFICE VISIT (OUTPATIENT)
Dept: PSYCHOLOGY | Facility: CLINIC | Age: 31
End: 2021-07-08
Payer: COMMERCIAL

## 2021-07-08 VITALS
TEMPERATURE: 97.8 F | DIASTOLIC BLOOD PRESSURE: 85 MMHG | RESPIRATION RATE: 18 BRPM | HEART RATE: 81 BPM | HEIGHT: 73 IN | WEIGHT: 315 LBS | BODY MASS INDEX: 41.75 KG/M2 | SYSTOLIC BLOOD PRESSURE: 125 MMHG

## 2021-07-08 DIAGNOSIS — F41.1 GENERALIZED ANXIETY DISORDER: ICD-10-CM

## 2021-07-08 DIAGNOSIS — F33.1 MODERATE EPISODE OF RECURRENT MAJOR DEPRESSIVE DISORDER (HCC): Primary | ICD-10-CM

## 2021-07-08 PROCEDURE — 3008F BODY MASS INDEX DOCD: CPT | Performed by: NURSE PRACTITIONER

## 2021-07-08 PROCEDURE — 90792 PSYCH DIAG EVAL W/MED SRVCS: CPT | Performed by: PSYCHIATRY & NEUROLOGY

## 2021-07-08 NOTE — PSYCH
Assessment/Plan:      Diagnoses and all orders for this visit:    Moderate episode of recurrent major depressive disorder ([address] )    Generalized anxiety disorder          Subjective:     Patient ID: Millie Lara is a 27 y o  male  [facility] Treatment Plan   AREAS OF NEED: Major Depressive Disorder and Generalized Anxiety Disorder as evidenced by suicidal thoughts, ruminating and racing thoughts, tiredness, hopelessness and helplessness due to finances, work, wanting to have kids and move  Date Initiated: 07/08/21    Strengths: "care too much, thinking of others, sense of humor"     LONG TERM GOAL:   Date Initiated: 07/08/21  1 0 I will identify 3 ways that my over all wellbeing and mood has stabilized  Target Date: 08/05/21  Completion Date:       SHORT TERM OBJECTIVES:     Date Initiated: 07/08/21  1 1 I will learn 3 positive coping skills to decrease my impulses to act on the suicidal ideation  Revision Date:   Target Date: 07/20/21  Completion Date:     Date Initiated: 07/08/21  1 2 I will practice 3 CBT techniques to help "rewire my brain"  Revision Date:   Target Date: 07/20/21  Completion Date:    Date Initiated: 07/08/21  1 3 I will take medications as prescribed and share questions and concerns if arise  Revision Date:  Target Date: 07/20/21  Completion Date:     Date Initiated: 07/08/21  1 4 I will identify 3 ways my supports can assist in my recovery and agree to staff/support contact as indicated      Revision Date:  Target Date: 07/20/21  Completion Date:          7 DAY REVISION:    Date Initiated:  Revision Date:   Target Date:   Completion Date:      PSYCHIATRY:  Date Initiated:  07/08/21  Medication Management and Education       Revision Date:       The person(s) responsible for carrying out the plan is Papito Thibodeaux MD    NURSING/SYMPTOM EDUCATION:  Date Initiated: 07/08/21       1 1, 1 2  1 3, 1 4 Provide wellness/symptoms and skill education groups three to five days weekly to educate Nemo Johansen on signs and symptoms of diagnoses, skills to manage stressors, and medication questions that will be addressed by the treatment team         Revision date: The person(s) responsible for carrying out the plan is EV Chauhan, AKILA and Aline Greenwood [place]    PSYCHOLOGY:   Date Initiated: 07/08/21       1 1, 1 2, 1 4 Provide psychotherapy group 5 times per week to allow opportunity for Nemo Eleno  to explore stressors and ways of coping  Revision Date:   The person(s) responsible for carrying out the plan is Amalia Galan [place], [place]    ALLIED THERAPY:   Date Initiated: 07/08/21  1 1,1 2 Engage Nemo Eleno in AT group 5 times daily to encourage development and use of wellness tools to decrease symptoms and promote recovery through meaningful activity  Revision Date:       The person(s) responsible for carrying out the plan is GERALDINE Little and GERALDINE Webster    CASE MANAGEMENT:   Date Initiated: 07/08/21      1 0 This  will meet with Nemo Johansen  3-4 times weekly to assess treatment progress, discharge planning, connection to community supports and UR as indicated  Revision Date:   The person(s) responsible for carrying out the plan is Amalia Galan [place], [place]    TREATMENT REVIEW/COMMENTS:     DISCHARGE CRITERIA: Identify 3 signs of progress and complete relapse prevention plan  DISCHARGE PLAN: Connect with identified outpatient providers  Estimated Length of Stay: 10 treatment days       Diagnosis and Treatment Plan explained to Promise Layne relates understanding diagnosis and is agreeable to Treatment Plan           CLIENT COMMENTS / Please share your thoughts, feelings, need and/or experiences regarding your treatment plan: _____________________________________________________________________________________________________________________________________________________________________________________________________________________________________________________________________________________________________________________ Date/Time: ______________     Patient Signature: _________________________________     Date/Time: ______________      Signature: _________________________________    Date/Time: ______________

## 2021-07-08 NOTE — PSYCH
Subjective:     Patient ID: Geronimo Beyer is a 27 y o  male  [facility] Clinical Progress Notes      Specialized Services Documentation  Therapist must complete separate progress note for each specific clinical activity in which the individual participated during the day  Other ([phone] [id number] with Amanuel Solorzano [place] [facility] with a contact number [phone], using Tax ID [id number]  Location address remains [address]  Geronimo Beyer requested treatment starting 07/09/2021to with a pending authorization code of [id number]  Transferred and left voicemail for [facility]  for clinical review  ([id number]) Voicemail left from [facility]  Authorizing 15 days starting 07/09/21 to 08/09/21 with authorization code of [id number]  Case Management Note    AuthYoel Wayne, [facility] [place]    Current suicide risk : Low    ([phone]) Met with Geronimo Beyer  Reviewed program and given on call number  he completed releases and OP providers/ PCP notified of admission and health care coordination form completed  Completed initial psycho-social evaluation and initial treatment goals discussed  Medications changes/added/denied? No - See Dr Khadijah Farmer Note    Treatment session number: Assessment only    Individual Case Management Visit provided today?  No    [facility] follow up physician's orders: Admit to PHP - See Dr Khadijah Farmer Note

## 2021-07-08 NOTE — PSYCH
Assessment/Plan:      Diagnoses and all orders for this visit:    Moderate episode of recurrent major depressive disorder ([facility] [address] )    Generalized anxiety disorder          Subjective:     Patient ID: Yahir Bahena is a 27 y o  male  HPI:     Pre-morbid level of function and History of Present Illness:   As per Dr Jessica Jon: Yahir Bahena is a 27 y o  male with  Anxiety and depression, hypothyroidism, vitamin-D deficiency referred by  Primary physician because  He has increased depression, increased anxiety, difficulty function at  Work, decreased focus and concentration, suicidal ideation without plan or intent  Onset of symptoms was  a few weeks ago with gradually worsening course since that time  Psychosocial Stressors: occupational and financial   He states that he was doing well but few weeks ago he started to get more depressed, did not have any energy,  Has difficulty with concentration, starting to isolate himself, did  Not have any desire to do anything  He started to lose interest in doing things, was feeling hopeless, has difficulty sleeping he started to have suicidal ideation without plan or intent  He states that he takes medication as prescribed  He states that  he had been working in the same place for the last 3 years  And this became more stressful  He also has some financial issues  Rudi Padron feels depressed,  fleeting suicidal thoughts without plan or intent,  Denies any psychotic symptoms, denies any history manic episode  His PHQ-9 is 19        As per this writer: Yahir Bahena is a 27 y o  male using he/him pronouns referred to [facility] via Ruy Garcia nurse practitioner at PCP office due to worsening depression with suicidal ideation  Presenting symptoms are suicidal thoughts, racing thoughts, ruminating thoughts, worry, emotional and physical tiredness, hopelessness and helplessness   Current stressors are finances, work, feeling like a shitty  and wanting to have kids and move  He reports a healthy appetite  States he has difficulty falling asleep, staying asleep and feeling rested  He reports sleep apnea and possible insomnia  He has to reschedule with a sleep doctor  He reports previous treatment at [facility]  He reports positive supports in his wife and mother  He is currently taking a leave of absence from work at [employer] due to his worsening symptoms  He denies cigarette, alcohol or substance use  Reports increased caffeine intake  He reports daily suicidal ideation without plan or intent  No history of attempts  Denies HI, SIB, psychosis  As per Pieter Fox: "My depression was getting bad  I thought I should take care of it before it gets worse  I know I wouldn't want to commit suicide  I wouldn't do that to my wife or mother  I am constantly thinking about work or making up scenarios in my head "     Strengths identified by patient: "care too much, thinking about others, sense of humor"    Reason for evaluation and partial hospitalization as an alternative to inpatient hospitalization PHP is medically necessary to prevent hospitalization as outpatient care has been unable to stabilize Pieter Fox and a greater intensity of treatment is indicated  Milieu therapy to monitor for medication needs, provide wellness tools education and offer opportunity to share and connect to others  Group therapy, case management, psychiatric medication management, family contact and UR as indicated  ELOS 10 treatment days      Previous Psychiatric/psychological treatment/year: 2016 - [facility]    Current Psychiatrist/Therapist:   Medication Management:   Dr Bear Gupta     Outpatient Therapy:   Sharda Harrell   [address] [facility]  (Y) [phone]    Primary Care Physician:   Camilo Barba MD   [address]   F) [phone] (g) [phone]     Other Community Resources Used (CTT, ICM, VNA): None      Problem Assessment:     SOCIAL/VOCATION:  Family Constellation (include parents, relationship with each and pertinent Psych/Medical History):     Family History   Problem Relation Age of Onset    Depression Mother     Obesity Mother     Stroke Mother         Syndrome     Diabetes Mother     Alcohol abuse Father     Liver disease Father         hepatic failure     Hypertension Father     Depression Brother     Thyroid disease Brother     Alcohol abuse Brother     Substance Abuse Brother     Depression Maternal Uncle     Substance Abuse Brother     Heart disease Neg Hx     Cancer Neg Hx     Thyroid cancer Neg Hx      Family Constellation/Social Supports:     Juvenal Mast lives at home with his wife, Lizzette Porras  They have been  for 9 years and have been together for 13 years  They knew each other from childhood and "rekindled" at his father's    He reports that their relationship recently became open and both identified as bi-sexual  He father passed in  due to liver failure  He feels remorse after his father's death because he went to visit his ex-girlfriend prior his father's death  He stated that he went to the hospital and "watched him die"  He reports a positive relationship with his mother  He states he has two older brothers  Domestic Violence: No past history of domestic violence    Trauma history: See above in relation to father's death  Additional Comments related to family/relationships/peer support: none  Work History (strengths/limitations/needs): Works for [employer] that [employer]  Highest grade level achieved was unknown     history includes unknown    Financial status includes stable - but stressful    LEISURE ASSESSMENT (Include past and present hobbies/interests and level of involvement (Ex: Group/Club Affiliations): video games, puzzles, music, reading    Primary/Preferred language is [place]  Ethnic considerations are none  Religions affiliations and level of involvement  [place] - would go to classes with father  FUNCTIONAL STATUS: There has been a recent change in the patient's ability to do the following: does not need can service    Level of Assistance Needed/By Whom?: N/A    Yaneli Bah learns best by  reading, listening, demonstration, and picture    SUBSTANCE ABUSE ASSESSMENT: no substance abuse    Do you currently smoke? Yes     Offered smoking cessation? Yes     Substance/Route/Age/Amount/Frequency/Last Use:   Cigarette - denies  Alcohol - denies  Marijuana - denies-   Other substance use - denies  Caffeine - 3-5 12oz cups of coffee, [employer] - energy drinks     DETOX HISTORY: None    Previous detox/rehab treatment: None    HEALTH ASSESSMENT: no referral to PCP needed    LEGAL: No Mental Health Advance Directive or Power of  on file and No pending legal issues  Risk Assessment:   The following ratings are based on my interview(s) with Nico Nicol during initial assessment  ]    Risk of Harm to Self:   Demographic risk factors include  and male  Historical Risk Factors include None reported  Recent Specific Risk Factors include passive death wishes and diagnosis of depression     Risk of Harm to Others:   Demographic Risk Factors include male  Historical Risk Factors include None reported  Recent Specific Risk Factors include multiple stressors    Access to Weapons:   Yaneli Bah has access to the following weapons: Reports having knives, firearms  The following steps have been taken to ensure weapons are properly secured: no thoughts of using to harm self, does not have ammo, most of the firearms were his father's, has good protective factors, mindful if thoughts arise to lock away       Based on the above information, the client presents the following risk of harm to self or others:  low    The following interventions are recommended:   no intervention changes    Notes regarding this Risk Assessment: Provided crisis phone numbers for appropriate county and on-call number as well as warm lines and peer support hotlines  Review Of Systems:     Mood Anxiety and Depression   Behavior Normal    Thought Content Normal   General Decreased Functioning   Personality Normal   Other Psych Symptoms Normal   Constitutional Negative   ENT Negative   Cardiovascular Negative   Respiratory Negative   Gastrointestinal Negative   Genitourinary Negative   Musculoskeletal Negative   Integumentary Negative   Neurological Negative   Endocrine Normal    Other Symptoms Normal           Mental status:  Appearance calm and cooperative , adequate hygiene and grooming and good eye contact    Mood depressed and anxious   Affect affect appropriate    Speech a normal rate   Thought Processes coherent/organized and normal thought processes   Hallucinations no hallucinations present    Thought Content no delusions   Abnormal Thoughts passive/fleeting thoughts of suicide and no homicidal thoughts    Orientation  oriented to person and place and time   Remote Memory short term memory intact and long term memory intact   Attention Span concentration intact   Intellect Appears to be Above Average Intelligence   Fund of Knowledge displays adequate knowledge of current events, adequate fund of knowledge regarding past history and adequate fund of knowledge regarding vocabulary    Insight Insight intact   Judgement judgment was intact   Muscle Strength Muscle strength and tone were normal and Normal gait    Language no difficulty naming common objects, no difficulty repeating a phrase  and no difficulty writing a sentence    Pain none   Pain Scale 0       DSM:   1  Moderate episode of recurrent major depressive disorder ([address] )     2  Generalized anxiety disorder         Plan: Admit to PHP  Group therapy, case management, medication management, UR and family contact as indicated    ELOS 10 treatment days  Refer to OP psychiatry and therapy
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
## 2021-07-09 ENCOUNTER — OFFICE VISIT (OUTPATIENT)
Dept: PSYCHOLOGY | Facility: CLINIC | Age: 31
End: 2021-07-09
Payer: COMMERCIAL

## 2021-07-09 DIAGNOSIS — F33.1 MODERATE EPISODE OF RECURRENT MAJOR DEPRESSIVE DISORDER (HCC): Primary | ICD-10-CM

## 2021-07-09 DIAGNOSIS — F41.1 GENERALIZED ANXIETY DISORDER: ICD-10-CM

## 2021-07-09 PROCEDURE — G0410 GRP PSYCH PARTIAL HOSP 45-50: HCPCS

## 2021-07-09 PROCEDURE — S0201 PARTIAL HOSPITALIZATION SERV: HCPCS

## 2021-07-09 PROCEDURE — G0177 OPPS/PHP; TRAIN & EDUC SERV: HCPCS

## 2021-07-09 PROCEDURE — G0176 OPPS/PHP;ACTIVITY THERAPY: HCPCS

## 2021-07-09 NOTE — PSYCH
Subjective:     Patient ID: Travis Clancy is a 27 y o  male  [facility] Clinical Progress Notes      Specialized Services Documentation  Therapist must complete separate progress note for each specific clinical activity in which the individual participated during the day  Group Psychotherapy Life Skills Group (10:45-11:45) Travis Clancy actively engaged in group focused on problems   During group, clients were asked to describe a problem they would like us to consider and if the problem was a tv show what the name of the show was  They had to describe the tv show  Marcie Tiwari stated the name for their tv show was    "One,Two, is this thing on?    Group members shared details about their tv show and received feedback from group members  Group members created a new tv show that was a spin off of the old series examining what life would be like if their problem was solved  We discussed how things would be the same and different   Marcie Tiwari continues to make progress towards goals through verbal participation in group; to accomplish long term goals continue to utilize skills learned in programming  Continue with psychotherapy to educate and encourage use of wellness tools    Tx Plan Objective: 1 1,1 2 Therapist: Nicholas Cummings

## 2021-07-09 NOTE — PSYCH
Subjective:     Patient ID: Mireya Cook is a 27 y o  male  [facility] Clinical Progress Notes      Specialized Services Documentation  Therapist must complete separate progress note for each specific clinical activity in which the individual participated during the day  GROUP PSYCHOTHERAPY (0930-1030) Group was facilitated virtually in a private office using HIPAA Compliant and Approved Microsoft Teams  Mireya Cook consented to the use of tele-video modality of treatment and was virtually present for group psychotherapy today  The group engaged in the wellness assessment that evaluates progress on several different areas of wellness: physical, emotional, cognitive, vocational, social and spiritual  Clients rated their progress and discussed areas that need work  Janae John continues to make progress towards goals through verbal participation in group  Continue with psychotherapy  [facility] Objectives: 1 1, 1 2, 1 4    Therapist:  ALEYDA Sheehan      Education Therapy   [id number] Mireya Cook actively shared in morning assessment and goal review  Presented as Receptive related to readiness to learn  Mireya Cook did complete goal from last treatment day identifying gaining hope  did not present with any barriers to learning  [id number] Mireya Cook engaged throughout the treatment day  Was engaged in learning related to Illness, Medication, Aftercare and Wellness Tools  Staff utilized Verbal, Written, A/V and Demonstration teaching methods  Mireya Cook shared area of learning and set a goal for outside of program to practice Mindfulness when listening to music        Tx Plan Objective: 1 1,1 2 Therapist:  ALEYDA Sheehan

## 2021-07-09 NOTE — PSYCH
Subjective:     Patient ID: Nguyen Lange is a 27 y o  male  [facility] Clinical Progress Notes      Specialized Services Documentation  Therapist must complete separate progress note for each specific clinical activity in which the individual participated during the day  Allied Therapy   [id number] Nguyen Lange actively shared in [facility] group focused on the use of dialectic statements and thoughts to decrease emotional intensity and black and white thinking as well as acknowledging that two opinions or feelings can co-exist and both can be true  Nguyen Lange engaged in group by actively listening to music, writing her own lyrics, and taking notes  Group explored practice of writing their own song lyrics to a well known tune to explore their own dialectic thoughts  Nguyen Lange shared that they found dialectical thinking helpful that we can be unhealthy and healthy at the same time (I e  I am depressed but I can recover)    Some effort noted toward treatment goal   Continue AT to encourage the development and practice of dialectic statements to  alleviate symptoms and support wellness    Tx Plan Objective: 1 1, Therapist:  GERALDINE Lawrence

## 2021-07-09 NOTE — PSYCH
Subjective:     Patient ID: Pieter Fox is a 27 y o  male  [facility] Clinical Progress Notes      Specialized Services Documentation  Therapist must complete separate progress note for each specific clinical activity in which the individual participated during the day  Case Management Note    Yoel Posadas [place]    Current suicide risk : Low     ([id number]) Dillan Velásquez shared that he had a positive first day  He reports trying to be more open and talk about the things he is thinking about and struggling with  Reviewed treatment plan and received copy  Dillan Velásquez will be excused from program on Thursday due to a doctor's appointment for his wife  Medications changes/added/denied? No    Treatment session number: 1    Individual Case Management Visit provided today? Yes     [facility] follow up physician's orders: None at this time

## 2021-07-12 ENCOUNTER — OFFICE VISIT (OUTPATIENT)
Dept: PSYCHOLOGY | Facility: CLINIC | Age: 31
End: 2021-07-12
Payer: COMMERCIAL

## 2021-07-12 DIAGNOSIS — F33.1 MODERATE EPISODE OF RECURRENT MAJOR DEPRESSIVE DISORDER (HCC): Primary | ICD-10-CM

## 2021-07-12 DIAGNOSIS — F41.1 GENERALIZED ANXIETY DISORDER: ICD-10-CM

## 2021-07-12 PROCEDURE — G0177 OPPS/PHP; TRAIN & EDUC SERV: HCPCS

## 2021-07-12 PROCEDURE — S0201 PARTIAL HOSPITALIZATION SERV: HCPCS

## 2021-07-12 PROCEDURE — G0176 OPPS/PHP;ACTIVITY THERAPY: HCPCS

## 2021-07-12 PROCEDURE — G0410 GRP PSYCH PARTIAL HOSP 45-50: HCPCS
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
## 2021-07-12 NOTE — PSYCH
Subjective:     Patient ID: Theron Meléndez is a 32 y o  male  [facility] Clinical Progress Notes      Specialized Services Documentation  Therapist must complete separate progress note for each specific clinical activity in which the individual participated during the day  Group Psychotherapy [phone] Zandra Bernal was involved in a group that started with a video on a speaker from a kathrine talk presentation geared around how phones can steal our attention and get us pulled in longer than we attended  Transitioning into an open discussion portion where Zandra Bernal participated sharing how phones/social media can affect our mood and overall well-being  Boundaries such tracking your time on certain apps was one way to monitor and assess ones own current issues regarding their phone use  Zandra Bernal will continue with life skills and psychotherapy groups  Some progress made towards treatment    Tx Plan Objective: 1 1,1 2 Therapist:  ALEYDA Dominguez

## 2021-07-12 NOTE — PSYCH
Subjective:     Patient ID: Meliza Do is a 32 y o  male  [facility] Clinical Progress Notes      Specialized Services Documentation  Therapist must complete separate progress note for each specific clinical activity in which the individual participated during the day  Allied Therapy   [id number] Meliza Do participated in [facility] group focused on identifying healthy vs unhealthy mental health symptoms and what steps we need to take to maintain wellness  Meliza Do engaged in group and shared the coreen line "blinded by emotion causes commotion" stuck out to him as he frequently feels like he does not understand why or when certain emotions occur which leads to chaos occurring unintentionally whether internally or externally  Overall, Betito Alonso was very quiet in group today  Group explored practice of coreen analysis, group discussion, and self-reflection  No effort noted toward treatment goal   Continue AT to encourage the development and practice of identifying signs of mental illness to alleviate symptoms and support wellness    Tx Plan Objective: 1 1, Therapist:  Genesis Farias MT-BC

## 2021-07-13 ENCOUNTER — APPOINTMENT (OUTPATIENT)
Dept: PSYCHOLOGY | Facility: CLINIC | Age: 31
End: 2021-07-13
Payer: COMMERCIAL

## 2021-07-14 ENCOUNTER — OFFICE VISIT (OUTPATIENT)
Dept: PSYCHIATRY | Facility: CLINIC | Age: 31
End: 2021-07-14
Payer: COMMERCIAL

## 2021-07-14 ENCOUNTER — OFFICE VISIT (OUTPATIENT)
Dept: PSYCHOLOGY | Facility: CLINIC | Age: 31
End: 2021-07-14
Payer: COMMERCIAL

## 2021-07-14 DIAGNOSIS — F33.1 MODERATE EPISODE OF RECURRENT MAJOR DEPRESSIVE DISORDER (HCC): Primary | ICD-10-CM

## 2021-07-14 DIAGNOSIS — F41.1 GENERALIZED ANXIETY DISORDER: ICD-10-CM

## 2021-07-14 PROCEDURE — S0201 PARTIAL HOSPITALIZATION SERV: HCPCS

## 2021-07-14 PROCEDURE — 99213 OFFICE O/P EST LOW 20 MIN: CPT | Performed by: NURSE PRACTITIONER

## 2021-07-14 PROCEDURE — G0177 OPPS/PHP; TRAIN & EDUC SERV: HCPCS

## 2021-07-14 PROCEDURE — G0410 GRP PSYCH PARTIAL HOSP 45-50: HCPCS

## 2021-07-14 PROCEDURE — G0176 OPPS/PHP;ACTIVITY THERAPY: HCPCS

## 2021-07-14 NOTE — PSYCH
Subjective:     Patient ID: Shruthi Hillman is a 32 y o  male  [facility] Clinical Progress Notes      Specialized Services Documentation  Therapist must complete separate progress note for each specific clinical activity in which the individual participated during the day  Group Psychotherapy Life Skills (12:30-1:30) Shruthi Hillman actively engaged in group focused on creative stress reduction techniques   The group discussed creative ways they have reduced stress  The group discussed "creativity killers" and negative messages that discourage us from being creative  The group did three creative stress reduction activities  The group turned their scribbles into an image and they explored the message behind their image  Another exercise that the group did was drawing their breath to help mindfully connect to their body  The group members disclosed feelings that came up for them  The group did a focused doodling and folded the paper into 6 blocks and doodled a patterned in each section  The group had to remind mindful and bring their focus back to the page when their mind started to wonder  Client stated that the exercise they enjoyed the most and might implement was turning a scribble into an image  Dorothy Sommers continues to make progress towards goals through verbal participation in group; to accomplish long term goals continue to utilize skills learned in programming  Continue with psychotherapy to educate and encourage use of wellness tools   Tx Plan Objective: 1 1,1 2 Therapist: Chris Campos

## 2021-07-14 NOTE — PSYCH
PHP MEDICATION MANAGEMENT NOTE        [facility]    Name and Date of Birth:  Ole Hay 32 y o  1990 MRN: [id number]    Date of Visit: July 14, 2021    No Known Allergies  SUBJECTIVE:    Angelika Mirza is seen today for a follow up for Major Depressive Disorder and Generalized Anxiety Disorder  He reports that he has improved slightly since beginning PHP  States he no longer feels suicidal and  Finds the group has been helpful  Has been using coping techniques at home listening to music playing video games  States he continues to struggle with sleep periods patient states it is at least in part attributed to sleep apnea and obesity  States he also experiences nasal congestion during the day and at nighttime  Patient has planned follow-up with sleep doctor to be fitted with new sleep apnea machine  States he has been taking his current psychiatric medications for the past year  Consider increasing trazodone next week if difficulty with initiation of sleep continues  He denies any side effects from medications  PLAN:    Continue all Wellbutrin  mg p o  daily   continue escitalopram 20 mg p o  daily   continue trazodone 50 mg p o  HS p r n    Aware of 24 hour and weekend coverage for urgent situations accessed by calling [facility] main practice number  Continue partial hospitalization program    Diagnoses and all orders for this visit:    Moderate episode of recurrent major depressive disorder ([facility] [address] )    Generalized anxiety disorder        Current Outpatient Medications on File Prior to Visit   Medication Sig Dispense Refill    buPROPion (WELLBUTRIN XL) 300 mg 24 hr tablet Take 1 tablet (300 mg total) by mouth daily 90 tablet 0    Cholecalciferol (VITAMIN D-3) 1000 units CAPS Take 2 capsules by mouth daily      escitalopram (LEXAPRO) 20 mg tablet TAKE 1 TABLET BY MOUTH EVERY DAY 90 tablet 3    fluticasone (FLONASE) 50 mcg/act nasal spray 1 spray into each nostril as needed for rhinitis      levothyroxine 25 mcg tablet TAKE 1 TABLET BY MOUTH DAILY IN THE EARLY MORNING 90 tablet 1    multivitamin (THERAGRAN) TABS Take 1 tablet by mouth daily      traZODone (DESYREL) 50 mg tablet Take 1 tablet (50 mg total) by mouth daily at bedtime as needed for sleep 90 tablet 0     No current facility-administered medications on file prior to visit  Psychotherapy Provided:     Individual psychotherapy provided: Yes  Counseling was provided during the session today for 16 minutes  Supportive counseling provided  HPI ROS Appetite Changes and Sleep:     He reports difficulty falling asleep, frequent awakenings, normal appetite, low energy   Patient denies suicidal or homicidal ideation    Review Of Systems:      General emotional problems, decreased functioning and sleep disturbances   Personality no change in personality   Constitutional negative   ENT negative   Cardiovascular negative   Respiratory negative   Gastrointestinal negative   Genitourinary negative   Musculoskeletal negative   Integumentary negative   Neurological negative   Endocrine negative   Other Symptoms none, all other systems are negative     Mental Status Evaluation:    Appearance Adequate hygiene and grooming   Behavior calm and cooperative   Mood depressed  Depression Scale - of 10 (0 = No depression)  Anxiety Scale -  of 10 (0 = No anxiety)   Speech Normal rate and volume   Affect appropriate and mood-congruent   Thought Processes Goal directed and coherent   Thought Content Does not verbalize delusional material   Associations Tightly connected   Perceptual Disturbances Denies hallucinations and does not appear to be responding to internal stimuli   Risk Potential Suicidal/Homicidal Ideation - No evidence of suicidal or homicidal ideation and patient does not verbalize suicidal or homicidal ideation  Risk of Violence - No evidence of risk for violence found on assessment  Risk of Self Mutilation - No evidence of risk for self mutilation found on assessment   Orientation oriented to person, place, time/date and situation   Memory recent and remote memory grossly intact   Consciousness alert and awake   Attention/Concentration attention span and concentration are age appropriate   Insight intact   Judgement intact   Muscle Strength and Gait normal muscle strength and normal muscle tone, normal gait/station and normal balance   Motor Activity no abnormal movements   Language no difficulty naming common objects, no difficulty repeating a phrase, no difficulty writing a sentence   Fund of Knowledge adequate knowledge of current events  adequate fund of knowledge regarding past history  adequate fund of knowledge regarding vocabulary      Past Psychiatric History Update:     Inpatient Psychiatric Admission Since Last Encounter:   no  Suicide Attempt Or Self Mutilation Since Last Encounter:   no  Incidence of Violent Behavior Since Last Encounter:   no    Traumatic History Update:     New Onset of Abuse Since Last Encounter:   no  Traumatic Events Since Last Encounter:   no    Past Medical History:    Past Medical History:   Diagnosis Date    Abdominal pain     Ankle pain     Anxiety     Arthralgia     Asthma     Blood typing encounter     Bloody stools     [place]    6/8/16   R   11/21/17     Carpal tunnel syndrome, bilateral     [place]  Jennifer Schuster Jennifer Schuster 9/12/17   R   9/12/17     Chest pain     Constipation     Depression     Dermatitis     Fatigue     Frequent bowel movements     Gastroenteritis     GERD (gastroesophageal reflux disease)     GERD without esophagitis     Insomnia     [place]   11/7/16   R   11/21/17     Irregular heart beats     Moderate episode of recurrent major depressive disorder ([facility] [address] ) 8/22/2018    Muscle spasm     Nausea     Numbness and tingling in both hands     Obesity     Odynophagia     Otitis     Pharyngitis     Pilonidal cyst with abscess [place]  Cobian Spruce  10/25/13   R   6/10/14     Proteinuria     Rectal bleeding     R   11/21/17     Rhinitis     Seasonal allergies     Skin lesion     [place]    2/3/15   R  Cobian Spruce Cobian Spruce 3/17/17  /   [place]    3/17/17   R   12/22/17     Snoring     Strep throat     Tonsillitis     URI (upper respiratory infection)     Vitamin D deficiency      Past Medical History Pertinent Negatives:   Diagnosis Date Noted    Head injury 07/08/2021    Seizures ([address] ) 07/08/2021     Past Surgical History:   Procedure Laterality Date    FOOT SURGERY      PILONIDAL CYST DRAINAGE      Incision and drainage of pilonidal cyst     [place] Crist[place] LIG Right 7/12/2018    Procedure: RELEASE CARPAL TUNNEL ENDOSCOPIC;  Surgeon: Cassandra Dorantes MD;  Location: [facility];  Service: Orthopedics    [place]ST Cristino [place] LIG Left 7/26/2018    Procedure: RELEASE CARPAL TUNNEL ENDOSCOPIC;  Surgeon: Cassandra Dorantes MD;  Location: [facility];  Service: Orthopedics     No Known Allergies  Substance Abuse History:    Social History     Substance and Sexual Activity   Alcohol Use Yes    Comment: Rarely     Social History     Substance and Sexual Activity   Drug Use No     Social History:    Social History     Socioeconomic History    Marital status: /Civil Union     Spouse name: Not on file    Number of children: Not on file    Years of education: some college    Highest education level: Some college, no degree   Occupational History     Comment: employed    Tobacco Use    Smoking status: Never Smoker    Smokeless tobacco: Never Used   Vaping Use    Vaping Use: Never used   Substance and Sexual Activity    Alcohol use: Yes     Comment: Rarely    Drug use: No    Sexual activity: Yes     Partners: Female   Other Topics Concern    Not on file   Social History Narrative    Daily caffeine consumption      Social Determinants of Health     Financial Resource Strain:     Difficulty of Paying Living Expenses:    Food Insecurity:     Worried About Running Out of Food in the Last Year:     [address] in the Last Year:    Transportation Needs:     Lack of Transportation (Medical):  Lack of Transportation (Non-Medical):    Physical Activity:     Days of Exercise per Week:     Minutes of Exercise per Session:    Stress:     Feeling of Stress :    Social Connections:     Frequency of Communication with Friends and Family:     Frequency of Social Gatherings with Friends and Family:     Attends Religion Services:     Active Member of Clubs or Organizations:     Attends Club or Organization Meetings:     Marital Status:    Intimate Partner Violence:     Fear of Current or Ex-Partner:     Emotionally Abused:     Physically Abused:     Sexually Abused:      Family Psychiatric History:     Family History   Problem Relation Age of Onset    Depression Mother     Obesity Mother     Stroke Mother         Syndrome     Diabetes Mother     Alcohol abuse Father     Liver disease Father         hepatic failure     Hypertension Father     Depression Brother     Thyroid disease Brother     Alcohol abuse Brother     Substance Abuse Brother     Depression Maternal Uncle     Substance Abuse Brother     Heart disease Neg Hx     Cancer Neg Hx     Thyroid cancer Neg Hx      History Review: The following portions of the patient's history were reviewed and updated as appropriate: allergies, current medications, past family history, past medical history, past social history, past surgical history and problem list     OBJECTIVE:     Vital signs in last 24 hours: There were no vitals filed for this visit  Laboratory Results: I have personally reviewed all pertinent laboratory/tests results      Medications Risks/Benefits:      Risks, Benefits And Possible Side Effects Of Medications:    Discussed risks and benefits of treatment with patient including risk of suicidality, serotonin syndrome and SIADH related to treatment with antidepressants; Risk of induction of manic symptoms in certain patient populations and Reduction in seizure threshold from Wellbutrin     Controlled Medication Discussion:     Not applicable    AMRITA Flores 07/14/21    This note was shared with patient

## 2021-07-14 NOTE — PSYCH
Subjective:     Patient ID: Camryn Kohler is a 32 y o  male    [facility] Clinical Progress Notes      Specialized Services Documentation  Therapist must complete separate progress note for each specific clinical activity in which the individual participated during the day  Allied Therapy Group ([id number]) Pt actively participated in Mind and Body Exercise Group  Purpose of group was to educate patients about the benefits of exercising the mind and body, improve mood and self-esteem, utilizing physical exercises as a means of learning healthy coping skills  Pt's engaged in gentle, full body exercises to challenge strength, balance, and flexibility  Discussed barriers to engagement in healthy lifestyle to include exercise  In addition, pt's participated in brain teaser activity exercises as well to emphasize the importance of cognitive stimulation for mental health  Emphasized the importance of incorporating small amounts of physical and mental exercises daily to facilitate healthy routine engagement  Pt completed all exercises while standing - pt with no complaints at end of session  Pt identified one healthy habit to incorporate daily to make a small change in their daily routines is to "exercise so that I can fit into chairs like these" indicating he needs to lose weight as well  Pt also made a few remarks throughout session using humor related to his weight  Pt also stated that he uses music as a way to clear his mind  Pt demonstrated full range affect  Pt actively engaged with peers and shared willingly during group discussion  Pt was able to attend to activity and discussion throughout duration of group session   Camryn Kohler appears to be making good progress  Continue to engage pt in Allied Therapy Group in order to continue to progress toward long-term goal achievement  Tx Plan Objective: 1 1 and 1 2   AMBAR Franks; AMBAR Montanez  Therapist: Miguel Gutiérrez MS, OTR/L

## 2021-07-14 NOTE — PSYCH
Subjective:     Patient ID: Fili Bearden is a 32 y o  male  [facility] Clinical Progress Notes      Specialized Services Documentation  Therapist must complete separate progress note for each specific clinical activity in which the individual participated during the day  Group Psychotherapy [id number] Juan Ramon Up was verbally engaged and interacted during todays psychoeducation on the DBT acronym D E A R  M A N  This DBT acronym D E A R  M A N  outlines seven different techniques for communicating more effectively  Each member participated in reviewing the seven different key strategies and then worked on creating some new ideas that they then shared with the group  Annabellezeina Annel demonstrated good insight regarding how they can practice these strategies outside the program   Rosemariejedzeina Chaidezhermann will continue with life skills and psychotherapy groups  Good progress made towards treatment  Tx Plan Objective: 1 1,1 2 Therapist:  ALEYDA Cox      Education Therapy   [id number] Fili Bearden actively shared in morning assessment and goal review  Presented as Receptive related to readiness to learn  Fili Bearden did not complete goal from last treatment day identifying hoping to gain responsibility and accomplishment  did not present with any barriers to learning  [id number] Fili Bearden engaged throughout the treatment day  Was engaged in learning related to Illness, Medication, Aftercare and Wellness Tools  Staff utilized Verbal, Written, A/V and Demonstration teaching methods  Fili Bearden shared area of learning and set a goal for outside of program to work on bike  Tx Plan Objective: 1 1,1 2 Therapist:  ALEYDA Cox      Case Management Note    ALEYDA Cox    Current suicide risk : Low     Met with Juan Ramon Up at 1:45PM   Annabellezeina Chaidezhermann stated that he has no concerns and informed  that he would be out of program Tomorrow do to brining his wife to a medical appointment        Medications changes/added/denied? No    Treatment session number: 3    Individual Case Management Visit provided today?  Yes

## 2021-07-15 ENCOUNTER — APPOINTMENT (OUTPATIENT)
Dept: PSYCHOLOGY | Facility: CLINIC | Age: 31
End: 2021-07-15
Payer: COMMERCIAL

## 2021-07-16 ENCOUNTER — OFFICE VISIT (OUTPATIENT)
Dept: PSYCHOLOGY | Facility: CLINIC | Age: 31
End: 2021-07-16
Payer: COMMERCIAL

## 2021-07-16 DIAGNOSIS — F33.1 MODERATE EPISODE OF RECURRENT MAJOR DEPRESSIVE DISORDER (HCC): Primary | ICD-10-CM

## 2021-07-16 DIAGNOSIS — F41.1 GENERALIZED ANXIETY DISORDER: ICD-10-CM

## 2021-07-16 PROCEDURE — G0176 OPPS/PHP;ACTIVITY THERAPY: HCPCS

## 2021-07-16 PROCEDURE — S0201 PARTIAL HOSPITALIZATION SERV: HCPCS

## 2021-07-16 PROCEDURE — G0177 OPPS/PHP; TRAIN & EDUC SERV: HCPCS

## 2021-07-16 PROCEDURE — G0410 GRP PSYCH PARTIAL HOSP 45-50: HCPCS

## 2021-07-16 NOTE — PSYCH
Subjective:     Patient ID: Elise Martines is a 32 y o  male  [facility] Clinical Progress Notes      Specialized Services Documentation  Therapist must complete separate progress note for each specific clinical activity in which the individual participated during the day  Group Psychotherapy Life Skills Group (12:30-1:30) Elise Martines actively engaged in group focused on learning about self-disclosure  During group we discussed the following questions:  1  What are your concerns about self-disclosure? 2  What is appropriate to disclose? 3  What is inappropriate to disclose? 4  Why is self-disclosure important? Clients identified the ease and difficulties of self-disclosure  During group we did an activity to facilitate a self-disclosure  Client disclosed that he is most proud of becoming a   Julio César Marcano continues to make progress towards goals through verbal participation in group; to accomplish long term goals continue to utilize skills learned in programming  Continue with psychotherapy to educate and encourage use of wellness tools   Tx Plan Objective: 1 1,1 2 Therapist: Kayleen Mendez
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
## 2021-07-16 NOTE — PSYCH
Subjective:     Patient ID: Millicent Capps is a 32 y o  male  [facility] Clinical Progress Notes      Specialized Services Documentation  Therapist must complete separate progress note for each specific clinical activity in which the individual participated during the day  GROUP PSYCHOTHERAPY ([id number]) The group engaged in the wellness assessment that evaluates progress on several different areas of wellness: physical, emotional, cognitive, vocational, social and spiritual  Clients rated their progress and discussed areas that need work  Dharadayamimadalyn continues to make progress towards goals through verbal participation in group  Continue with psychotherapy  [facility] Objectives: 1 1, 1 2, 1 4  Therapist:  ALEYDA Christopher      Education Therapy   [id number] Millicent Capps actively shared in morning assessment and goal review  Presented as Receptive related to readiness to learn  Millicent Capps did complete goal from last treatment day identifying gaining quality time with his wife  did not present with any barriers to learning  [id number] Millicent aCpps engaged throughout the treatment day  Was engaged in learning related to Illness, Medication, Aftercare and Wellness Tools  Staff utilized Verbal, Written, A/V and Demonstration teaching methods  Millicent Capps shared area of learning and set a goal for outside of program to go see fire works and clean house  Tx Plan Objective: 1 1,1 2 Therapist:  ALEYDA Christopher      Case Management Note    ALEYDA Christopher    Current suicide risk : Low     Met with Kevin at 12:15pm   Kevin stated that he had no concerns and rescheduled his psychiatrist appointment with Dr Chantale Galeas for 9am on 8/20/21    Next outpatient therapy appointment is scheduled for 7/30/21 @ 3pm   When asking Kevin about his progress in treatment, Kevin stated he was about a 10/10 with 10 being the most severely depressed or sad when starting the program   Kevin then reported that today he was about a 6/10 on that scale and continues to make progress with asking for help and support when needed  Medications changes/added/denied? No    Treatment session number: 3    Individual Case Management Visit provided today?  Yes

## 2021-07-17 NOTE — PSYCH
Subjective:     Patient ID: Claudia Irwin is a 32 y o  male  [facility] Clinical Progress Notes      Specialized Services Documentation  Therapist must complete separate progress note for each specific clinical activity in which the individual participated during the day  Allied Therapy  [id number]- Claudia Irwin actively shared in [facility] group focused on the 6 stages to change, identifying aspects to preparation for change, and analyzing songs to think about where the narrator of the song is within the cycle of change  Claudia Irwin stated that one of his biggest motivations for change is that he does not want to become his father whom struggled with alcoholism to mask his mental health issues  He stated he recognizes he is in the contemplation phase to change as he feels like some days he denies anything is wrong and the next day he realizes he needs to make a change but questions if he is ready or if he even has what it takes to make a change   Some effort noted toward treatment goal   Continue AT to encourage the development and practice of analyzing the change cycle to  alleviate symptoms and support wellness      Tx Plan Objective: 1 1, Therapist:  GERALDINE Lawrence

## 2021-07-19 ENCOUNTER — APPOINTMENT (OUTPATIENT)
Dept: PSYCHOLOGY | Facility: CLINIC | Age: 31
End: 2021-07-19
Payer: COMMERCIAL

## 2021-07-19 ENCOUNTER — DOCUMENTATION (OUTPATIENT)
Dept: PSYCHOLOGY | Facility: CLINIC | Age: 31
End: 2021-07-19

## 2021-07-19 NOTE — PROGRESS NOTES
Subjective:     Patient ID: Fahad Marcano is a 32 y o  male  [facility] Clinical Progress Notes      Specialized Services Documentation  Therapist must complete separate progress note for each specific clinical activity in which the individual participated during the day  Case Management Note    Johanna Jones MA, Isha    Current suicide risk : Unable to assess due to absence  Fahad Marcano called out from program today 07/19/21 stating he "has to take care of something"  Medications changes/added/denied? No    Treatment session number: N/A    Individual Case Management Visit provided today? No    [facility] follow up physician's orders: None at this time

## 2021-07-20 ENCOUNTER — OFFICE VISIT (OUTPATIENT)
Dept: PSYCHOLOGY | Facility: CLINIC | Age: 31
End: 2021-07-20
Payer: COMMERCIAL

## 2021-07-20 DIAGNOSIS — F33.1 MODERATE EPISODE OF RECURRENT MAJOR DEPRESSIVE DISORDER (HCC): Primary | ICD-10-CM

## 2021-07-20 DIAGNOSIS — F41.1 GENERALIZED ANXIETY DISORDER: ICD-10-CM

## 2021-07-20 PROCEDURE — S0201 PARTIAL HOSPITALIZATION SERV: HCPCS

## 2021-07-20 PROCEDURE — G0410 GRP PSYCH PARTIAL HOSP 45-50: HCPCS

## 2021-07-20 PROCEDURE — G0177 OPPS/PHP; TRAIN & EDUC SERV: HCPCS

## 2021-07-20 NOTE — PSYCH
Subjective:     Patient ID: Mahi Bruno is a 32 y o  male  [facility] Clinical Progress Notes      Specialized Services Documentation  Therapist must complete separate progress note for each specific clinical activity in which the individual participated during the day  Group Psychotherapy (10:45-11:145) Gloria Maher was present for this group  This group was on increasing social contacts  Participants learned about the benefits of social connectedness for mental health and general health outcomes  Participants were asked to connect in a small group activity with one another, finding areas of common ground by asking general questions to other members, "Do you have any pets? What do you enjoy doing for fun/favorite hobby? Participants were also asked to scan their phone contacts and to send out a text message to someone nanetteing hello that they have been meaning to reach out to  Participants then reflected on that experience and any negative thoughts they had surrounding reaching out and why they don't socially connect with others more  Participants learned some common cognitive distortions/negative thoughts that keep them isolated and from connecting with others  Participants listed together all the different ways people can socially connect big or small and made personal commitments for how they could increase or strengthen social connections during the week  Deidre Cheng was attentive and participated during this group  He appeared to enjoy connecting with others but displayed some negative beliefs about reaching out to people in his life  He did reach out to his cousin via text and reflected on them being close when was younger   Tx Goal Objective: 1 1,1 2  Therapist: AKILA Trevino, co led by Nahomy Coates

## 2021-07-20 NOTE — PSYCH
Assessment/Plan:       Diagnoses and all orders for this visit:     Moderate episode of recurrent major depressive disorder ([address] )     Generalized anxiety disorder         Subjective:      Patient ID: Carlos Wang is a 27 y o  male  [facility] Plan   AREAS OF NEED: Major Depressive Disorder and Generalized Anxiety Disorder as evidenced by suicidal thoughts, ruminating and racing thoughts, tiredness, hopelessness and helplessness due to finances, work, wanting to have kids and move  Date Initiated: 07/08/21     Strengths: "care too much, thinking of others, sense of humor"           LONG TERM GOAL:   Date Initiated: 07/08/21  1 0 I will identify 3 ways that my over all wellbeing and mood has stabilized  Target Date: 08/05/21  Completion Date:         SHORT TERM OBJECTIVES:      Date Initiated: 07/08/21  1 1 I will learn 3 positive coping skills to decrease my impulses to act on the suicidal ideation  Revision Date: 07/20/21   Target Date: 07/20/21  Completion Date:      Date Initiated: 07/08/21  1 2 I will practice 3 CBT techniques to help "rewire my brain"  Revision Date: 07/20/21   Target Date: 07/20/21  Completion Date:     Date Initiated: 07/08/21  1 3 I will take medications as prescribed and share questions and concerns if arise  Revision Date: 07/20/21   Target Date: 07/20/21  Completion Date:      Date Initiated: 07/08/21  1 4 I will identify 3 ways my supports can assist in my recovery and agree to staff/support contact as indicated  Revision Date: 07/20/21   Target Date: 07/20/21  Completion Date:            7 DAY REVISION:  Date Initiated: 07/20/21   1 5 I will not leave early or miss any more days of program to ensure my responsibility in my recovery journey or I will be discharged from program   Revision Date:   Target Date: 07/30/21   Completion Date:    Date Initiated: 07/20/21   1 6 I will develop a self-care plan to work on maintenance of my mental health needs     Revision Date:   Target Date: 07/30/21   Completion Date:        PSYCHIATRY:  Date Initiated:  07/08/21  Medication Management and Education       Revision Date: 07/20/21       1 3 Continue medication management       The person(s) responsible for carrying out the plan is Femi Alfonso MD     NURSING/SYMPTOM EDUCATION:  Date Initiated: 07/08/21       1 1, 1 2  1 3, 1 4 Provide wellness/symptoms and skill education groups three to five days weekly to educate Izabel Talley on signs and symptoms of diagnoses, skills to manage stressors, and medication questions that will be addressed by the treatment team         Revision date: 07/20/21        1 1,1 2,1 3,1 4,1 5 Continue to encourage Izabel Talley to participate in wellness groups daily to learn about symptoms, coping strategies and warning signs to promote relapse prevention  The person(s) responsible for carrying out the plan is EV Silveira, LSW and Julito Simmons [place]     PSYCHOLOGY:   Date Initiated: 07/08/21       1 1, 1 2, 1 4 Provide psychotherapy group 5 times per week to allow opportunity for Izabel Talley  to explore stressors and ways of coping  Revision Date: 07/20/21   1 1,1 2,1 4,1 5  Continue to provide psychotherapy group daily to Izabel Talley and encourage sharing of stressors, skills and positive change  The person(s) responsible for carrying out the plan is Yoel Bearden, [facility]     ALLIED THERAPY:   Date Initiated: 07/08/21  1 1,1 2 Engage Izabel Talley in AT group 5 times daily to encourage development and use of wellness tools to decrease symptoms and promote recovery through meaningful activity  Revision Date: 07/20/21   1 1,1 2,1 5 Continue to engage Izabel Talley to participate in AT group to practice wellness tools within program and transfer to home sharing successes and barriers through healthy task involvement         The person(s) responsible for carrying out the plan is GERALDINE Abdullahi and Evelyne Marie GERALDINE Brewer     CASE MANAGEMENT:   Date Initiated: 07/08/21      1 0 This  will meet with Travis Clancy  3-4 times weekly to assess treatment progress, discharge planning, connection to community supports and UR as indicated  Revision Date: 07/20/21   1 0 Continue to meet with Travis Clancy 3-4 times weekly to assess growth, work toward goals, continued treatment needs, dc planning and use of supports  The person(s) responsible for carrying out the plan is Christofer Galvan MA, [place]     TREATMENT REVIEW/COMMENTS:      DISCHARGE CRITERIA: Identify 3 signs of progress and complete relapse prevention plan  DISCHARGE PLAN: Connect with identified outpatient providers     Estimated Length of Stay: 10 treatment days       Diagnosis and Treatment Plan explained to Ginny Fuller relates understanding diagnosis and is agreeable to Treatment Plan             CLIENT COMMENTS / Please share your thoughts, feelings, need and/or experiences regarding your treatment plan: _____________________________________________________________________________________________________________________________________________________________________________________________________________________________________________________________________________________________________________________ Date/Time: ______________      Patient Signature: _________________________________      Date/Time: ______________       Signature: _________________________________     Date/Time: ______________

## 2021-07-20 NOTE — PSYCH
Subjective:     Patient ID: Igor Champion is a 32 y o  male  [facility] Clinical Progress Notes      Specialized Services Documentation  Therapist must complete separate progress note for each specific clinical activity in which the individual participated during the day  Case Management Note    Elise Garcia, [address] [place], [place]    Current suicide risk : Low     ([id number]) Ifeoma Hudson stated that he feels he has improved since starting program  He reports being at a 10 (worst) when he arrived but feels he is at a 6  He reviewed topics he has learned in program to update his treatment plan  He feels he will still get depressed at times because of arguments with his wife and feeling like she doesn't understand his struggles  Brainstormed effective ways to communicate with his wife about his needs  Brain will begin to create a self-care/coping skills list to assist when he is feeling more depressed  He will then communicate this to his wife  Medications changes/added/denied? No    Treatment session number: 4     Individual Case Management Visit provided today? Yes     [facility] follow up physician's orders: None at this time

## 2021-07-20 NOTE — PSYCH
Subjective:     Patient ID: Ole Hay is a 32 y o  male  [facility] Clinical Progress Notes      Specialized Services Documentation  Therapist must complete separate progress note for each specific clinical activity in which the individual participated during the day  Group Psychotherapy  9:30am-10:30am  Steph Dominguez participated in group psychotherapy focused on Self care  Group began with a brief check in and Juan Carlosjacek Mirza shared feeling tired today and when asked what have you done for yourself recently his response was taking time for himself  The group completed self care checklists and shared insight into how there are often barriers to practicing good self care  The different aspects of self care: Physical, emotional, psychological, personal professional, spiritual, were reviewed and the importance of taking care of oneself to then be able to be there for others  Angelika Mirza participated and shared in the open discussion  Angelika Mirza will continue to work on his goals and participate in psychotherapy  Tx Plan Objective: 1 1,1 2 Therapist: PAPITO Barrios, LSW  Group co-lead with Zeyad Beltre

## 2021-07-20 NOTE — PSYCH
Subjective:     Patient ID: Shruthi Hillman is a 32 y o  male  [facility] Clinical Progress Notes      Specialized Services Documentation  Therapist must complete separate progress note for each specific clinical activity in which the individual participated during the day  Group Psychotherapy [phone] Dorothy Sommers engaged in a guided group around perfectionism and how that can get in the way of achieving our daily goals  The group talked about how fear itself can get in the way  The group also discussed how perfectionism contributes to such areas of procrastination, stress, anxiety, and depression  The members then shared on challenges they face when having a partner or roommate who suffers from perfectionism  Dorothy Sommers will continue with life skills and psychotherapy groups  Good progress made towards treatment  Tx Plan Objective: 1 1,1 2 Therapist:  ALEYDA Fritz    Education Therapy   [phone] Shruthi Hillman actively shared in morning assessment and goal review  Presented as Receptive related to readiness to learn  Shruthi Hillman did complete goal from last treatment day identifying gaining a sense of relaxation and accomplishment  did not present with any barriers to learning  [phone] Shruthi Hillman engaged throughout the treatment day  Was engaged in learning related to Illness, Medication, Aftercare and Wellness Tools  Staff utilized Verbal, Written, A/V and Demonstration teaching methods  Shruthi Hillman shared area of learning and set a goal for outside of program to call sleep doctor        Tx Plan Objective: 1 1,1 2 Therapist:  ALEYDA Fritz

## 2021-07-21 ENCOUNTER — OFFICE VISIT (OUTPATIENT)
Dept: PSYCHOLOGY | Facility: CLINIC | Age: 31
End: 2021-07-21
Payer: COMMERCIAL

## 2021-07-21 ENCOUNTER — OFFICE VISIT (OUTPATIENT)
Dept: PSYCHIATRY | Facility: CLINIC | Age: 31
End: 2021-07-21
Payer: COMMERCIAL

## 2021-07-21 DIAGNOSIS — G47.33 OBSTRUCTIVE SLEEP APNEA: ICD-10-CM

## 2021-07-21 DIAGNOSIS — F33.1 MODERATE EPISODE OF RECURRENT MAJOR DEPRESSIVE DISORDER (HCC): Primary | ICD-10-CM

## 2021-07-21 DIAGNOSIS — F41.1 GENERALIZED ANXIETY DISORDER: ICD-10-CM

## 2021-07-21 PROCEDURE — G0176 OPPS/PHP;ACTIVITY THERAPY: HCPCS

## 2021-07-21 PROCEDURE — S0201 PARTIAL HOSPITALIZATION SERV: HCPCS

## 2021-07-21 PROCEDURE — 99213 OFFICE O/P EST LOW 20 MIN: CPT | Performed by: PHYSICIAN ASSISTANT

## 2021-07-21 PROCEDURE — G0177 OPPS/PHP; TRAIN & EDUC SERV: HCPCS

## 2021-07-21 PROCEDURE — G0410 GRP PSYCH PARTIAL HOSP 45-50: HCPCS

## 2021-07-21 RX ORDER — BUPROPION HYDROCHLORIDE 150 MG/1
150 TABLET ORAL DAILY
Qty: 90 TABLET | Refills: 0 | Status: SHIPPED | OUTPATIENT
Start: 2021-07-21 | End: 2021-08-20 | Stop reason: SDUPTHER

## 2021-07-21 NOTE — PSYCH
Subjective:     Patient ID: Rafy Gaviria is a 32 y o  male  [facility] Clinical Progress Notes      Specialized Services Documentation  Therapist must complete separate progress note for each specific clinical activity in which the individual participated during the day  Group Psychotherapy [id number] Well[place]babatunde Borges was engaged in an introduction group about [employer]  The group went over the handout describing emotional, reasonable, and wise mind state  Members were educated on the following mind states    Emotional mind - when our feelings are controlled by our thoughts and behaviors  This might be when we act impulsively with little regard to the consequences of our actions   Reasonable mind - when we approach a situation intellectually  This is when we plan and make decisions based on fact   Wise mind - refers to the balance between the reasonable and emotional mind  This is when we can recognize and respect feelings of others, while responding to them in a rational manner  Ophelia Vazquezher will continue with life skills and psychotherapy groups  Some progress made towards treatment  Tx Plan Objective: 1 1,1 2 Therapist:  ALEYDA Herndon      Education Therapy   [phone] Rafy Gaviria actively shared in morning assessment and goal review  Presented as Receptive related to readiness to learn  Rafy Gaviria did not complete goal from last treatment day identifying hoping to gain responsibility  did not present with any barriers to learning         Tx Plan Objective: 1 1,1 2 Therapist:  ALEYDA Herndon

## 2021-07-21 NOTE — PSYCH
Subjective:     Patient ID: Ann Chan is a 32 y o  male  [facility] Clinical Progress Notes      Specialized Services Documentation  Therapist must complete separate progress note for each specific clinical activity in which the individual participated during the day  GROUP PSYCHOTHERAPY ([id number]) Group members received a safe and non-judgmental space to discuss current stressors and received feedback from the group  Members are able to gain a different perspective and deeper understanding of past experiences and current events  Group discussions and themes involved personal disclosures, struggles with advocating when others don't believe, coping with mental health symptoms and challenges with utilizing coping skills and finding new ones  Brain shared his coping skills and insights to his brain functioning  Judy Parker continues to demonstrate insight and growth through verbal participation, offerings of support, encouragement and feedback to other group members during the open discussion time  Continue with psychotherapy  TX Plan Objectives: 1 1, 1 2, 1 4   Therapist: Rashel Corbin MA, [place]      Case Management Note    Rashel Corbin MA, [place]    Current suicide risk : Low     A case management session is not scheduled today with Ann Chan; additionally, they did not request a CM meeting  Next scheduled session is 07/22/21  Provided Judy Parker with copy of signed treatment plan  Medications changes/added/denied? No    Treatment session number: 5    Individual Case Management Visit provided today?  No    [facility] follow up physician's orders: None at this time

## 2021-07-21 NOTE — PSYCH
Subjective:     Patient ID: Elise Martines is a 32 y o  male  [facility] Clinical Progress Notes      Specialized Services Documentation  Therapist must complete separate progress note for each specific clinical activity in which the individual participated during the day  Allied Therapy   [id number] Elise Martines actively shared in [facility] group focused on social connection, rewiring unhealthy thoughts related to loneliness, and setting daily intentions related to how we are utilizing our coping skills  Elise Martines engaged in group by sharing personal reflections, music listening, related to coreen analysis and journaling during reflection time  Group explored practice of coreen analysis, utilizing rhythm reprocity thought processes for loneliness, and shared with the group during coreen analysis  Elise Martines  shared his intention to feel less lonely was to spend an hour after program completing something for just himself such as playing video games or listening to music  He shared that typically he never spends any time alone as when the wife comes home it becomes wife time  Continue AT to encourage the development and practice of monitering how we are utilizing our alone time and social connection time to  alleviate symptoms and support wellness    Tx Plan Objective: 1 1, Therapist:  GERALDINE Candelario

## 2021-07-21 NOTE — PSYCH
Subjective:     Patient ID: Izabel Talley is a 32 y o  male  [facility] Clinical Progress Notes      Specialized Services Documentation  Therapist must complete separate progress note for each specific clinical activity in which the individual participated during the day  Education Therapy     [id number] Izabel Talley engaged throughout the treatment day  Was engaged in learning related to Illness, Medication, Aftercare and Wellness Tools  Staff utilized Verbal, Written, A/V and Demonstration teaching methods  Izabel Talley shared area of learning and set a goal for outside of program to call the doctor about his sleep then take a nap        Tx Plan Objective: 1 1,1 2 Therapist:  EV Silveira

## 2021-07-22 ENCOUNTER — OFFICE VISIT (OUTPATIENT)
Dept: PSYCHOLOGY | Facility: CLINIC | Age: 31
End: 2021-07-22
Payer: COMMERCIAL

## 2021-07-22 DIAGNOSIS — F41.1 GENERALIZED ANXIETY DISORDER: ICD-10-CM

## 2021-07-22 DIAGNOSIS — F33.1 MODERATE EPISODE OF RECURRENT MAJOR DEPRESSIVE DISORDER (HCC): Primary | ICD-10-CM

## 2021-07-22 PROCEDURE — S0201 PARTIAL HOSPITALIZATION SERV: HCPCS

## 2021-07-22 PROCEDURE — G0177 OPPS/PHP; TRAIN & EDUC SERV: HCPCS

## 2021-07-22 PROCEDURE — G0176 OPPS/PHP;ACTIVITY THERAPY: HCPCS

## 2021-07-22 PROCEDURE — G0410 GRP PSYCH PARTIAL HOSP 45-50: HCPCS

## 2021-07-22 NOTE — PSYCH
Subjective:     Patient ID: Claudia Irwin is a 32 y o  male  [facility] Clinical Progress Notes      Specialized Services Documentation  Therapist must complete separate progress note for each specific clinical activity in which the individual participated during the day  Education Therapy   [id number] Claudia Irwin actively shared in morning assessment and goal review  Presented as Receptive related to readiness to learn  Claudia Irwin did complete goal from last treatment day identifying gaining responsibility  did not present with any barriers to learning  [phone] Claudia Irwin engaged throughout the treatment day  Was engaged in learning related to Illness, Medication, Aftercare and Wellness Tools  Staff utilized Verbal, Written, A/V and Demonstration teaching methods  Claudia Irwin shared area of learning and set a goal for outside of program to start writing book  Tx Plan Objective: 1 1, 1 2, 1 4 Therapist:  Rebecca Leigh MA, [place]       Case Management Note    Rebecca Leigh MA, [place]    Current suicide risk : Low     ([id number]) Hari Hobbs requested more information on discharge date  GERA and Hari Hobbs will meet tomorrow to discuss further plans  Medications changes/added/denied? No    Treatment session number: 6    Individual Case Management Visit provided today? No    [facility] follow up physician's orders: None at this time

## 2021-07-22 NOTE — PSYCH
Subjective:     Patient ID: Dotty Dupree is a 32 y o  male  [facility] Clinical Progress Notes      Specialized Services Documentation  Therapist must complete separate progress note for each specific clinical activity in which the individual participated during the day  Group Psychotherapy   [phone]  Dotty Dupree  actively shared in psychotherapy group focused on managing anger and emotional regulation skills  Luis Stock engaged in task exploring effective versus ineffective ways in addition to skills to refocus in the moment  Offered to participate and engaged   Group explored the benefits of positive choices with intense emotion and factors that increase emotional vulnerability  Some  work toward goal noted   Continue psychotherapy group to explore personal role in learning about and managing intense emotions       Tx Plan Objective: 1 1, Therapist:  Biju CHANDLER

## 2021-07-23 ENCOUNTER — OFFICE VISIT (OUTPATIENT)
Dept: PSYCHOLOGY | Facility: CLINIC | Age: 31
End: 2021-07-23
Payer: COMMERCIAL

## 2021-07-23 DIAGNOSIS — F33.1 MODERATE EPISODE OF RECURRENT MAJOR DEPRESSIVE DISORDER (HCC): Primary | ICD-10-CM

## 2021-07-23 DIAGNOSIS — F41.1 GENERALIZED ANXIETY DISORDER: ICD-10-CM

## 2021-07-23 PROCEDURE — G0176 OPPS/PHP;ACTIVITY THERAPY: HCPCS

## 2021-07-23 PROCEDURE — S0201 PARTIAL HOSPITALIZATION SERV: HCPCS

## 2021-07-23 PROCEDURE — G0410 GRP PSYCH PARTIAL HOSP 45-50: HCPCS

## 2021-07-23 PROCEDURE — G0177 OPPS/PHP; TRAIN & EDUC SERV: HCPCS

## 2021-07-23 NOTE — PSYCH
Subjective:     Patient ID: Fahad Marcano is a 32 y o  male  [facility] Clinical Progress Notes      Specialized Services Documentation  Therapist must complete separate progress note for each specific clinical activity in which the individual participated during the day  GROUP PSYCHOTHERAPY ([id number])  The group engaged in the wellness assessment that evaluates progress on several different areas of wellness: physical, emotional, cognitive, vocational, social and spiritual  Clients rated their progress and discussed areas that need work  Wilber Hope continues to make progress towards goals through verbal participation in group  Continue with psychotherapy  [facility] Objectives: 1 1, 1 2, 1 4  Therapist: Johanna Jones MA, [place]     Education Therapy   [phone] Fahad Marcano actively shared in morning assessment and goal review  Presented as Receptive related to readiness to learn  Fahad Marcano did not complete goal from last treatment day identifying hoping to gain self-care   did not present with any barriers to learning  [phone] Fahad Marcano engaged throughout the treatment day  Was engaged in learning related to Illness, Medication, Aftercare and Wellness Tools  Staff utilized Verbal, Written, A/V and Demonstration teaching methods  Fahad Marcano shared area of learning and set a goal for outside of program to clean house  Tx Plan Objective: 1 1, 1 2, 1 4 Therapist:  Johanna Jones MA, [place]       Case Management Note    Johanna Jones MA, [place]    Current suicide risk : Low     ([id number]) Wilber Hope was informed about authorization date, which is 08/04  He reports that he would like to complete the 15 days in order to gain as much as possible out program   He will inform is work  He will reach out to outpatient therapists to schedule for follow up appointments when discharged  Medications changes/added/denied? No    Treatment session number: 7    Individual Case Management Visit provided today? Yes     [facility] follow up physician's orders: None at this time

## 2021-07-23 NOTE — PSYCH
Subjective:     Patient ID: Jean Russell is a 32 y o  male  [facility] Clinical Progress Notes      Specialized Services Documentation  Therapist must complete separate progress note for each specific clinical activity in which the individual participated during the day  Allied Therapy   [phone]-  Jean Russell actively shared in [facility] group focused on analyzing what coping skill they have that contribute to making crisis situations worse and how to utilize the thought skill  Group explored practice of active music making, practicing each part of the STOP technique, analyzing their behaviors and reactions, and openly participating in discussion   Jean Russell  identified they could practice new skills by actively playing music, practicing each part of the the STOP technique, and actively engaging in discussion   Some effort noted toward treatment goal   Continue AT to encourage the development and practice of STOP technique to  alleviate symptoms and support wellness      Tx Plan Objective: 1 1, Therapist:  GERALDINE Lawrence

## 2021-07-23 NOTE — PSYCH
Subjective:     Patient ID: Ole Hay is a 32 y o  male  [facility] Clinical Progress Notes      Specialized Services Documentation  Therapist must complete separate progress note for each specific clinical activity in which the individual participated during the day  Group Psychotherapy Life Skills  (12:30-1:30)  Group members discussed the importance of mindfulness and talking about a time when they were mindful  Group members played the mindfulness game which consisted of true/false statements, visualization cards, and mindfulness practice cards  Group members engaged in mindfulness activities  Angelika Mirza continues to make progress towards goals through verbal participation in group; to accomplish long term goals continue to utilize skills learned in programming  Continue with psychotherapy to educate and encourage use of wellness tools   Tx Plan Objective: 1 1,1 2 Therapist: Cathleen Rausch

## 2021-07-23 NOTE — PSYCH
Subjective:     Patient ID: Dotty Dupree is a 32 y o  male  [facility] Clinical Progress Notes      Specialized Services Documentation  Therapist must complete separate progress note for each specific clinical activity in which the individual participated during the day  Group Psychotherapy Life Skills (12:30-1:30) Dotty Dupree actively engaged in group focused on coping skills  During group we created a list of coping skills from A-Z which patients can utilize when they are feeling stressed  Patients generated a list on the white board of coping strategies and were given a copy of the A-Z coping strategies list that they created  Luis Stock identified that a coping skill they would like to use is listening to music  Luis Stock continues to make progress towards goals through verbal participation in group; to accomplish long term goals continue to utilize skills learned in programming  Continue with psychotherapy to educate and encourage use of wellness tools   Tx Plan Objective: 1 1,1 2 Therapist: Mary Lou Rolle

## 2021-07-26 ENCOUNTER — OFFICE VISIT (OUTPATIENT)
Dept: PSYCHIATRY | Facility: CLINIC | Age: 31
End: 2021-07-26
Payer: COMMERCIAL

## 2021-07-26 ENCOUNTER — OFFICE VISIT (OUTPATIENT)
Dept: PSYCHOLOGY | Facility: CLINIC | Age: 31
End: 2021-07-26
Payer: COMMERCIAL

## 2021-07-26 DIAGNOSIS — F41.1 GENERALIZED ANXIETY DISORDER: Primary | ICD-10-CM

## 2021-07-26 DIAGNOSIS — F41.1 GENERALIZED ANXIETY DISORDER: ICD-10-CM

## 2021-07-26 DIAGNOSIS — F33.1 MODERATE EPISODE OF RECURRENT MAJOR DEPRESSIVE DISORDER (HCC): Primary | ICD-10-CM

## 2021-07-26 DIAGNOSIS — F33.1 MODERATE EPISODE OF RECURRENT MAJOR DEPRESSIVE DISORDER (HCC): ICD-10-CM

## 2021-07-26 PROCEDURE — G0410 GRP PSYCH PARTIAL HOSP 45-50: HCPCS

## 2021-07-26 PROCEDURE — G0177 OPPS/PHP; TRAIN & EDUC SERV: HCPCS

## 2021-07-26 PROCEDURE — G0176 OPPS/PHP;ACTIVITY THERAPY: HCPCS

## 2021-07-26 PROCEDURE — S0201 PARTIAL HOSPITALIZATION SERV: HCPCS

## 2021-07-26 PROCEDURE — 99213 OFFICE O/P EST LOW 20 MIN: CPT | Performed by: NURSE PRACTITIONER

## 2021-07-26 NOTE — PSYCH
PHP MEDICATION MANAGEMENT NOTE        [facility]    Name and Date of Birth:  Ann Chan 32 y o  1990 MRN: [id number]    Date of Visit: July 26, 2021    No Known Allergies  SUBJECTIVE:    Judy Parker is seen today for a follow up for Major Depressive Disorder and Generalized Anxiety Disorder  He reports that he has done fairly well since the last visit  States that he feels depressed at times  States that depression usually occurs when ruminating on the past   Style a rating increase in Wellbutrin well  Continues to struggle with energy during the day  He denies any side effects from medications      PLAN:    Continue current psychiatric medications as ordered  Aware of 24 hour and weekend coverage for urgent situations accessed by calling [facility] main practice number  Continue partial hospitalization program    Diagnoses and all orders for this visit:    Generalized anxiety disorder    Moderate episode of recurrent major depressive disorder ([facility] [address] )        Current Outpatient Medications on File Prior to Visit   Medication Sig Dispense Refill    buPROPion (WELLBUTRIN XL) 150 mg 24 hr tablet Take 1 tablet (150 mg total) by mouth daily Take with the 300mg tab for a total of 450mg daily 90 tablet 0    buPROPion (WELLBUTRIN XL) 300 mg 24 hr tablet Take 1 tablet (300 mg total) by mouth daily 90 tablet 0    Cholecalciferol (VITAMIN D-3) 1000 units CAPS Take 2 capsules by mouth daily      escitalopram (LEXAPRO) 20 mg tablet TAKE 1 TABLET BY MOUTH EVERY DAY 90 tablet 3    fluticasone (FLONASE) 50 mcg/act nasal spray 1 spray into each nostril as needed for rhinitis      levothyroxine 25 mcg tablet TAKE 1 TABLET BY MOUTH DAILY IN THE EARLY MORNING 90 tablet 1    multivitamin (THERAGRAN) TABS Take 1 tablet by mouth daily      traZODone (DESYREL) 50 mg tablet Take 1 tablet (50 mg total) by mouth daily at bedtime as needed for sleep 90 tablet 0     No current facility-administered medications on file prior to visit  Psychotherapy Provided:     Individual psychotherapy provided: Yes  Counseling was provided during the session today for 16 minutes  Supportive counseling provided  HPI ROS Appetite Changes and Sleep:     He reports frequent awakenings, adequate appetite, adequate energy level   Patient denies suicidal or homicidal ideation    Review Of Systems:      General emotional problems and decreased functioning   Personality no change in personality   Constitutional negative   ENT negative   Cardiovascular negative   Respiratory negative   Gastrointestinal negative   Genitourinary negative   Musculoskeletal negative   Integumentary negative   Neurological negative   Endocrine negative   Other Symptoms none, all other systems are negative     Mental Status Evaluation:    Appearance Adequate hygiene and grooming   Behavior calm and cooperative   Mood depressed  Depression Scale -  of 10 (0 = No depression)  Anxiety Scale -  of 10 (0 = No anxiety)   Speech Normal rate and volume   Affect appropriate and mood-congruent   Thought Processes Goal directed and coherent   Thought Content Does not verbalize delusional material   Associations Tightly connected   Perceptual Disturbances Denies hallucinations and does not appear to be responding to internal stimuli   Risk Potential Suicidal/Homicidal Ideation - No evidence of suicidal or homicidal ideation and patient does not verbalize suicidal or homicidal ideation  Risk of Violence - No evidence of risk for violence found on assessment  Risk of Self Mutilation - No evidence of risk for self mutilation found on assessment   Orientation oriented to person, place, time/date and situation   Memory recent and remote memory grossly intact   Consciousness alert and awake   Attention/Concentration attention span and concentration are age appropriate   Insight intact   Judgement intact   Muscle Strength and Gait normal muscle strength and normal muscle tone, normal gait/station and normal balance   Motor Activity no abnormal movements   Language no difficulty naming common objects, no difficulty repeating a phrase, no difficulty writing a sentence   Fund of Knowledge adequate knowledge of current events  adequate fund of knowledge regarding past history  adequate fund of knowledge regarding vocabulary      Past Psychiatric History Update:     Inpatient Psychiatric Admission Since Last Encounter:   no  Suicide Attempt Or Self Mutilation Since Last Encounter:   no  Incidence of Violent Behavior Since Last Encounter:   no    Traumatic History Update:     New Onset of Abuse Since Last Encounter:   no  Traumatic Events Since Last Encounter:   no    Past Medical History:    Past Medical History:   Diagnosis Date    Abdominal pain     Ankle pain     Anxiety     Arthralgia     Asthma     Blood typing encounter     Bloody stools     LA    6/8/16   R   11/21/17     Carpal tunnel syndrome, bilateral     LA  Dorathy Infield Dorathy Infield 9/12/17   R   9/12/17     Chest pain     Constipation     Depression     Dermatitis     Fatigue     Frequent bowel movements     Gastroenteritis     GERD (gastroesophageal reflux disease)     GERD without esophagitis     Insomnia     LA   11/7/16   R   11/21/17     Irregular heart beats     Moderate episode of recurrent major depressive disorder ([facility] [address] ) 8/22/2018    Muscle spasm     Nausea     Numbness and tingling in both hands     Obesity     Odynophagia     Otitis     Pharyngitis     Pilonidal cyst with abscess     LA   10/25/13   R   6/10/14     Proteinuria     Rectal bleeding     R   11/21/17     Rhinitis     Seasonal allergies     Skin lesion     LA    2/3/15   R  Dorathy Infield Dorathy Infield 3/17/17  /   [place]    3/17/17   R   12/22/17     Snoring     Strep throat     Tonsillitis     URI (upper respiratory infection)     Vitamin D deficiency      Past Medical History Pertinent Negatives:   Diagnosis Date Noted    Head injury 07/08/2021    Seizures ([address] ) 07/08/2021     Past Surgical History:   Procedure Laterality Date    FOOT SURGERY      PILONIDAL CYST DRAINAGE      Incision and drainage of pilonidal cyst     HI WRIST Tiny Blake LIG Right 7/12/2018    Procedure: RELEASE CARPAL TUNNEL ENDOSCOPIC;  Surgeon: Loi Barry MD;  Location: [facility];  Service: Orthopedics    HI WRIST Tiny Blake LIG Left 7/26/2018    Procedure: RELEASE CARPAL TUNNEL ENDOSCOPIC;  Surgeon: Loi Barry MD;  Location: [facility];  Service: Orthopedics     No Known Allergies  Substance Abuse History:    Social History     Substance and Sexual Activity   Alcohol Use Yes    Comment: Rarely     Social History     Substance and Sexual Activity   Drug Use No     Social History:    Social History     Socioeconomic History    Marital status: /Civil Union     Spouse name: Not on file    Number of children: 0    Years of education: some college    Highest education level: Some college, no degree   Occupational History    Occupation: [employer]     Comment: employed full time   Tobacco Use    Smoking status: Never Smoker    Smokeless tobacco: Never Used   Vaping Use    Vaping Use: Never used   Substance and Sexual Activity    Alcohol use: Yes     Comment: Rarely    Drug use: No    Sexual activity: Yes     Partners: Female   Other Topics Concern    Not on file   Social History Narrative    Daily caffeine consumption      Social Determinants of Health     Financial Resource Strain:     Difficulty of Paying Living Expenses:    Food Insecurity:     Worried About Running Out of Food in the Last Year:     Ran Out of Food in the Last Year:    Transportation Needs:     Lack of Transportation (Medical):      Lack of Transportation (Non-Medical):    Physical Activity:     Days of Exercise per Week:     Minutes of Exercise per Session:    Stress:     Feeling of Stress :    Social Connections:     Frequency of Communication with Friends and Family:     Frequency of Social Gatherings with Friends and Family:     Attends Yarsani Services:     Active Member of Clubs or Organizations:     Attends Club or Organization Meetings:     Marital Status:    Intimate Partner Violence:     Fear of Current or Ex-Partner:     Emotionally Abused:     Physically Abused:     Sexually Abused:      Family Psychiatric History:     Family History   Problem Relation Age of Onset    Depression Mother     Obesity Mother     Stroke Mother         Syndrome     Diabetes Mother     Alcohol abuse Father     Liver disease Father         hepatic failure     Hypertension Father     Depression Brother     Thyroid disease Brother     Alcohol abuse Brother     Substance Abuse Brother     Depression Maternal Uncle     Substance Abuse Brother     Heart disease Neg Hx     Cancer Neg Hx     Thyroid cancer Neg Hx      History Review: The following portions of the patient's history were reviewed and updated as appropriate: allergies, current medications, past family history, past medical history, past social history, past surgical history and problem list     OBJECTIVE:     Vital signs in last 24 hours: There were no vitals filed for this visit  Laboratory Results: I have personally reviewed all pertinent laboratory/tests results  Medications Risks/Benefits:      Risks, Benefits And Possible Side Effects Of Medications:    Discussed risks and benefits of treatment with patient including risk of suicidality, serotonin syndrome and SIADH related to treatment with antidepressants; Risk of induction of manic symptoms in certain patient populations     Controlled Medication Discussion:     Not applicable    AMRITA Christianson 07/26/21    This note was shared with patient

## 2021-07-26 NOTE — PSYCH
Subjective:     Patient ID: Ady Tesfaye is a 32 y o  male  [facility] Clinical Progress Notes      Specialized Services Documentation  Therapist must complete separate progress note for each specific clinical activity in which the individual participated during the day  Case Management Note    Janny Last MA, [facility]    Current suicide risk : Low     A case management session is not scheduled today with Ady Tesfaye; additionally, they did not request a CM meeting  Next scheduled session is 07/27/21  Medications changes/added/denied? No    Treatment session number: 8    Individual Case Management Visit provided today?  No    [facility] follow up physician's orders: None at this time

## 2021-07-26 NOTE — PSYCH
Subjective:     Patient ID: Claudia Irwin is a 32 y o  male  [facility] Clinical Progress Notes      Specialized Services Documentation  Therapist must complete separate progress note for each specific clinical activity in which the individual participated during the day  Group Psychotherapy [id number] Hari Hobbs actively shared in psychotherapy group focused on Cognitive Distortions with also participating in our Progressive Muscle Relaxation exercise to finish the group  Hari Hobbs was also involved in an open-discussion with how we can start to untwist our cognitive distortions and collect all the facts to a situation  Hari Hobbs will continue with life skills and psychotherapy groups  Good progress made towards treatment  Tx Plan Objective: 1 1,1 2 Therapist:  ALEYDA Lam    Education Therapy   [id number] Claudia Irwin actively shared in morning assessment and goal review  Presented as Receptive related to readiness to learn  Claudia Irwin did complete goal from last treatment day identifying gaining responsiblity  did not present with any barriers to learning  [id number] Claudia Irwin engaged throughout the treatment day  Was engaged in learning related to Illness, Medication, Aftercare and Wellness Tools  Staff utilized Verbal, Written, A/V and Demonstration teaching methods  Claudia Irwin shared area of learning and set a goal for outside of program to go to [facility]        Tx Plan Objective: 1 1,1 2 Therapist:  ALEYDA Lam

## 2021-07-26 NOTE — PSYCH
Subjective:     Patient ID: Camryn Kohler is a 32 y o  male  [facility] Clinical Progress Notes      Specialized Services Documentation  Therapist must complete separate progress note for each specific clinical activity in which the individual participated during the day  Group Psychotherapy Life Skills (10:45-11:45) Camryn Kohler actively engaged in group focused on values based behaviors During the activity clients thought about each of their top three values and created a goal and action steps to take towards achieving that value-based behavior  Kirsten Anglin stated that one of their top values is having a good relationship with his spouse and stated their goal and action plan towards achieving that value-based behavior  We discussed personal situations where client's wanted to act and react with their values and not their emotions  Clients discussed one of their values and their intentions to respond to a situation  Clients discussed the steps they would take to turn their intentions into action  Kirsten Anglin continues to make progress towards goals through verbal participation in group; to accomplish long term goals continue to utilize skills learned in programming  Continue with psychotherapy to educate and encourage use of wellness tools   Tx Plan Objective: 1 1,1 2 Therapist: Mikal Burch

## 2021-07-26 NOTE — PSYCH
Subjective:     Patient ID: Millie Lara is a 32 y o  male  [facility] Clinical Progress Notes      Specialized Services Documentation  Therapist must complete separate progress note for each specific clinical activity in which the individual participated during the day  Allied Therapy  [id number] Millie Lara  actively shared in [facility] group focused on the identification of the 5 phases of acceptance  Millie Lara  engaged in active music listening, group discussion, self-reflection, writing a letter, and utilizing a venn diagram for analyzing their blame  Group explored practice of writing a letter which was shredded at the end to represent letting go  They also listened to a song and analyzed lines they believed represented different lines of acceptance  Individually the group selected a scenario in their life and utilized a venn diagram that focused on thinking about what factors played into each circumstance and how much responsibility falls on them for taking acceptance  Julian Sauceda shared that he had difficulty during the writing letter portion as he stated he found himself feeling emotional because as much as he wants to let go he isn't sure if he is ready to let go yet  Some effort noted toward treatment goal   Continue AT to encourage the development and practice of acceptance to  alleviate symptoms and support wellness       Tx Plan Objective: 1 1, Therapist:  GERALDINE Levin

## 2021-07-27 ENCOUNTER — OFFICE VISIT (OUTPATIENT)
Dept: PSYCHOLOGY | Facility: CLINIC | Age: 31
End: 2021-07-27
Payer: COMMERCIAL

## 2021-07-27 DIAGNOSIS — F41.1 GENERALIZED ANXIETY DISORDER: ICD-10-CM

## 2021-07-27 DIAGNOSIS — F33.1 MODERATE EPISODE OF RECURRENT MAJOR DEPRESSIVE DISORDER (HCC): Primary | ICD-10-CM

## 2021-07-27 PROCEDURE — S0201 PARTIAL HOSPITALIZATION SERV: HCPCS

## 2021-07-27 PROCEDURE — G0176 OPPS/PHP;ACTIVITY THERAPY: HCPCS

## 2021-07-27 PROCEDURE — G0177 OPPS/PHP; TRAIN & EDUC SERV: HCPCS

## 2021-07-27 PROCEDURE — G0410 GRP PSYCH PARTIAL HOSP 45-50: HCPCS

## 2021-07-27 NOTE — PSYCH
Subjective:     Patient ID: Camryn Kohler is a 32 y o  male  [facility] Clinical Progress Notes      Specialized Services Documentation  Therapist must complete separate progress note for each specific clinical activity in which the individual participated during the day  Allied Therapy   [phone] Reynaldo Astudillo actively shared in [facility] group that focused on identifying negative coping skills and how to turn them into positive coping skills  Group explored practice of identifying what negative coping skills we use to numb ourselves versus what are positive coping skills we can use by engaging in coreen analysis, self-reflection, group discussion, and song writing  Group also discussed what they need to do to keep themselves on track with utilizing positive coping skills (I e  accountability, creating a schedule, etc ) Kirsten Anglin identified that today he finds himself ruminating on his father's death and that an appropriate activity to do to help limit the ruminating would be to 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

## 2021-07-27 NOTE — PSYCH
Subjective:     Patient ID: Pieter Fox is a 32 y o  male  [facility] Clinical Progress Notes      Specialized Services Documentation  Therapist must complete separate progress note for each specific clinical activity in which the individual participated during the day  Education Therapy   [phone] Pieter Fox actively shared in morning assessment and goal review  Presented as Receptive related to readiness to learn  Pieter Fox did not complete goal from last treatment day identifying wanting to gain responsibility  did not present with any barriers to learning  [id number] Pieter Fox engaged throughout the treatment day  Was engaged in learning related to [employer]  Staff utilized Verbal teaching methods  Pieter Fox shared area of learning and set a goal for outside of program to 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

## 2021-07-27 NOTE — PSYCH
Subjective:     Patient ID: Parish De La Fuente is a 32 y o  male  [facility] Clinical Progress Notes      Specialized Services Documentation  Therapist must complete separate progress note for each specific clinical activity in which the individual participated during the day  Group Psychotherapy Life Skills (10:45-11:45) Parish De La Fuente actively engaged in group focused on their personal narrative using the tree of life tool  The group watched a BETH talk The importance of sharing your story with speaker Abner Chacon  The group created their own tree of life which represents who they are  They had to write words that answered the questions about each part of the tree which represents them  Clients identified what they learned by doing this activity  Juana Bull recognized that he needs mental stability in order to grow  Juana Bull created a goal to work towards accomplishing the one area that he wants to grow  Group members discussed why it is important to share their story with at least one person  Juana Bull continues to make progress towards goals through verbal participation in group; to accomplish long term goals continue to utilize skills learned in programming  Continue with psychotherapy to educate and encourage use of wellness tools   Tx Plan Objective: 1 1,1 2 Therapist: Cassi Washington

## 2021-07-27 NOTE — PSYCH
Subjective:     Patient ID: No Christopher is a 32 y o  male  [facility] Clinical Progress Notes      Specialized Services Documentation  Therapist must complete separate progress note for each specific clinical activity in which the individual participated during the day  Group Psychotherapy [id number] Pineda Shantelle was engaged in a group that started out with Progressive Muscle Relaxation  After our morning Mindfulness exercise, we then lead into a group that was guided by strengths discussion questions  Pineda Pepper was involved and participated throughout the discussion question portion of the group  Pineda Pepper will continue with life skills and psychotherapy groups  Good progress made towards treatment    Tx Plan Objective: 1 1,1 2 Therapist:  ALEYDA Mosquera

## 2021-07-28 ENCOUNTER — OFFICE VISIT (OUTPATIENT)
Dept: PSYCHOLOGY | Facility: CLINIC | Age: 31
End: 2021-07-28
Payer: COMMERCIAL

## 2021-07-28 DIAGNOSIS — F41.1 GENERALIZED ANXIETY DISORDER: ICD-10-CM

## 2021-07-28 DIAGNOSIS — F33.1 MODERATE EPISODE OF RECURRENT MAJOR DEPRESSIVE DISORDER (HCC): Primary | ICD-10-CM

## 2021-07-28 PROCEDURE — S0201 PARTIAL HOSPITALIZATION SERV: HCPCS

## 2021-07-28 PROCEDURE — G0410 GRP PSYCH PARTIAL HOSP 45-50: HCPCS

## 2021-07-28 PROCEDURE — G0176 OPPS/PHP;ACTIVITY THERAPY: HCPCS

## 2021-07-28 PROCEDURE — G0177 OPPS/PHP; TRAIN & EDUC SERV: HCPCS

## 2021-07-28 NOTE — PSYCH
Subjective:     Patient ID: Aniceto Garcia is a 32 y o  male  [facility] Clinical Progress Notes      Specialized Services Documentation  Therapist must complete separate progress note for each specific clinical activity in which the individual participated during the day  GROUP PSYCHOTHERAPY ([id number]) Group was facilitated virtually in a private office using HIPAA Compliant and Approved Microsoft Teams  Aniceto Garcia consented to the use of tele-video modality of treatment and was virtually present for group psychotherapy today  The group was educated on the grounding technique called Emotional Freedom Tapping (EFT)  Members practiced EFT by following along to a prompt titled "Quieting the Voices that Say You Are Not Enough"  Members rated the intensity of their thoughts before and after participating in the tapping exercise  Group discussed scenarios where they could use the technique, how to adapt the practice to their needs and understanding insights from past  Kevin reported his skepticism, but engaged in the practice  He stated that the practice made him more tired  Educated on being tired is a response to the body feeling safe  Valenteleonie continues to make progress towards goals through verbal participation and engagement in activity during group  Continue with Psychotherapy  TX Plan Objectives: 1 1, 1 2, 1 4   Therapist: Estiven Mcfarlane MA, [facility]       Case Management Note    Estiven Mcfarlane MA, [facility]    Current suicide risk : Low     ([phone]) Kevin shared that he attempted to contact his short term disability office  He stated it was difficult to understand the representative and he will be looking online for documentation that would be needed to extend to his last cover day  He will provide the  with appropriate documentation  Medications changes/added/denied? No    Treatment session number: 10    Individual Case Management Visit provided today?  Yes     [facility] follow up physician's orders: None at this time

## 2021-07-28 NOTE — PSYCH
Subjective:     Patient ID: Theron Meléndez is a 32 y o  male  [facility] Clinical Progress Notes      Specialized Services Documentation  Therapist must complete separate progress note for each specific clinical activity in which the individual participated during the day  Group Psychotherapy [id number] Zandra Bernal was involved in a group that started with a video on a speaker from a kathrine talk presentation geared around how phones can steal our attention and get us pulled in longer than we attended  Transitioning into an open discussion portion where Zandra Bernal participated sharing how phones/social media can affect our mood and overall well-being  Boundaries such tracking your time on certain apps was one way to monitor and assess ones own current issues regarding their phone use  Zandra Bernal will continue with life skills and psychotherapy groups  Good progress made towards treatment    Tx Plan Objective: 1 1,1 2 Therapist:  ALEYDA Dominguez

## 2021-07-28 NOTE — PSYCH
Subjective:     Patient ID: Millicent Capps is a 32 y o  male  [facility] Clinical Progress Notes      Specialized Services Documentation  Therapist must complete separate progress note for each specific clinical activity in which the individual participated during the day  Allied Therapy   [id number]- Millicent Capps actively shared in [facility] group focused on how to effectively and assertively set a boundary with others as well as examining what areas in our life we need to strengthen or put a new boundary  Millicetn Capps engaged in role play with a partner involving a drum and verbally sharing in discussion  Group explored practice of setting a boundary verbally and non-verbally (separate and together) and then utilizing the 4 step model for when they encounter these situations in real life  Millicent Capps stated he does not believe he has issue setting boundaries and had difficulty taking this group and making a parallel to the current boundary issues within his marriage  He appeared to have limited insight or awareness related to bigger issues as opposed to the simple task given within this group   Some effort noted toward treatment goal   Continue AT to encourage the development and practice of developing boundaries to  alleviate symptoms and support wellness      Tx Plan Objective: 1 1, 1 2, & 1 4 Therapist:  WERNER LawrenceBC    Education Therapy   [id number] Millicent Capps actively shared in morning assessment and goal review  Presented as Receptive related to readiness to learn  Millicent Capps did not complete goal from last treatment day, but was able to spend time with his wife and pets, identifying gaining relaxation  Millicent Capps did not present with any barriers to learning  [id number] Millicent Capps engaged throughout the treatment day  Was engaged in learning related to Illness, Medication, Aftercare and Wellness Tools   Staff utilized Verbal, Written, A/V and Demonstration teaching methods  No Christopher shared area of learning and set a goal for outside of program to go to a fire event that he previously committed to        Tx Plan Objective: 1 1,1 2,1 4, Therapist:  GERALDINE Burns and NIRAJ Valenzuela (student music therapy intern)

## 2021-07-29 ENCOUNTER — APPOINTMENT (OUTPATIENT)
Dept: PSYCHOLOGY | Facility: CLINIC | Age: 31
End: 2021-07-29
Payer: COMMERCIAL

## 2021-07-29 ENCOUNTER — OFFICE VISIT (OUTPATIENT)
Dept: PSYCHOLOGY | Facility: CLINIC | Age: 31
End: 2021-07-29
Payer: COMMERCIAL

## 2021-07-29 DIAGNOSIS — F33.1 MODERATE EPISODE OF RECURRENT MAJOR DEPRESSIVE DISORDER (HCC): Primary | ICD-10-CM

## 2021-07-29 DIAGNOSIS — F41.1 GENERALIZED ANXIETY DISORDER: ICD-10-CM

## 2021-07-29 PROCEDURE — S0201 PARTIAL HOSPITALIZATION SERV: HCPCS

## 2021-07-29 PROCEDURE — G0410 GRP PSYCH PARTIAL HOSP 45-50: HCPCS

## 2021-07-29 PROCEDURE — G0176 OPPS/PHP;ACTIVITY THERAPY: HCPCS

## 2021-07-29 PROCEDURE — G0177 OPPS/PHP; TRAIN & EDUC SERV: HCPCS

## 2021-07-29 NOTE — PSYCH
Subjective:     Patient ID: Igor Champion is a 32 y o  male  [facility] Clinical Progress Notes      Specialized Services Documentation  Therapist must complete separate progress note for each specific clinical activity in which the individual participated during the day  GROUP PSYCHOTHERAPY ([id number]) Members were educated on benefits of proper sleep hygiene and how improper sleep affects mood regulation  Group discussed personal experiences with positive and negative sleep routines/activities  Members watched an informational video to help brainstorm and incorporate healthy sleep habits into their schedule  Group created a list of 3 activities they could implement to encourage proper sleep hygiene  Members participated in a guided meditation to foster relaxation before going to sleep  Valenteleonie shared that he has difficulty sleeping because he has sleep apnea but he is seeking medical attention  Kevin engaged in the guided meditation  Kevin continues to demonstrate insight and progress through activity participation and verbal disclosures in group  Continue with psychotherapy  TX Plan Objectives: 1 1, 1 2, 1 4   Therapist: Elise Garcia MA, [facility]  Co-led by NASIMA Tavera student        Case Management Note    Elise Garcia MA, [facility]    Current suicide risk : Low     ([phone]) Kevin shared that his short term disability company sent over paperwork to extend FLMA/STD while attending program  Kevin was advised that [facility] will complete his paperwork for his time in program, any other extension will need to be submitted through his PCP  Kevin states his return to work date will be 08/05/21  Kevin reported that his therapy appointment with China Weinberg is scheduled for 08/13/21 and his next appointment with Dr Miguel Sahu is on 08/20/21  Kevin reports that his brother's family is coming to live with them  He stated he was excited that he gets to see his niece every day    Kevin shared his support for his wife in their open relationship  He states his depression symptoms are decreasing due to better communication and less fighting with his wife  Wilber Hope shared that he continues to picture the day his father   He feels that he hasn't fully processed the grief, that's why it continues to stand out in his mind  He is agreeable to addressing this with his outpatient therapist and continue to working on coping skills to decrease distress around the images  Medications changes/added/denied? No    Treatment session number: 12    Individual Case Management Visit provided today? Yes     [facility]ations follow up physician's orders: None at this time

## 2021-07-29 NOTE — PSYCH
Subjective:     Patient ID: Shruthi Hillman is a 32 y o  male  [facility] Clinical Progress Notes      Specialized Services Documentation  Therapist must complete separate progress note for each specific clinical activity in which the individual participated during the day  Education Therapy   [id number] Shruthi Hillman actively shared in morning assessment and goal review  Presented as Receptive related to readiness to learn  Shruthi Hillman did complete goal from last treatment day identifying gaining a sense of accomplishment  Shruthi Hillman did not present with any barriers to learning  [phone] Shruthi Hillman engaged throughout the treatment day  Was engaged in learning related to [employer]  Staff utilized Verbal teaching methods  Shruthi Hillman shared area of learning and set a goal for outside of program to read before bed        Tx Plan Objective: 1 1,1 2, Therapist:  Errol CHANDLER; DIRK Craig

## 2021-07-29 NOTE — PSYCH
Subjective:     Patient ID: Nguyen Lange is a 32 y o  male  [facility] Clinical Progress Notes      Specialized Services Documentation  Therapist must complete separate progress note for each specific clinical activity in which the individual participated during the day  Allied Therapy   [id number]- Nguyen Lange actively shared in [facility] group focused on recognizing our authentic self and merging it with our adaptive self   Reynaldo Jauregui engaged in group by actively listening, discussing, taking notes, and creating a verse and chorus to a song  Group explored practice of fill-in-the-blank  songwriting to explore authentic self  Reynaldo Mcwilliams Harrisonma identified that the verse was very representative of how he feels about himself and the majority of it was negative  He stated that many people would tell him nice things about himself, but overall he finds it hard to see those qualities   Some effort noted toward treatment goal   Continue AT to encourage the development and practice of developing authentic self  to alleviate symptoms and support wellness    Tx Plan Objective: 1 1, Therapist:  GERALDINE Lawrence

## 2021-07-29 NOTE — PSYCH
Subjective:     Patient ID: Pelon Herring is a 32 y o  male  [facility] Clinical Progress Notes      Specialized Services Documentation  Therapist must complete separate progress note for each specific clinical activity in which the individual participated during the day  GROUP PSYCHOTHERAPY  (12:30-1:30) Group was facilitated virtually in a private office using HIPAA Compliant and Approved Microsoft Teams  Pelon Herring consented to the use of tele-video modality of treatment and was virtually present for group psychotherapy today  he attentively listened to [facility] share his life story as he co-led this session  Group encouraged power of learning about self, accepting illness and personal responsibility in recovery  Community resources reviewed in addition to personal resources like the affirmations  Progress toward goals noted  Continue psychotherapy to encourage self -awareness and healthy engagement of supports      TX Plan Objectives: 1 1, 1 2, 1 4   Therapist: EV Weiss

## 2021-07-30 ENCOUNTER — APPOINTMENT (OUTPATIENT)
Dept: PSYCHOLOGY | Facility: CLINIC | Age: 31
End: 2021-07-30
Payer: COMMERCIAL

## 2021-07-30 ENCOUNTER — OFFICE VISIT (OUTPATIENT)
Dept: PSYCHOLOGY | Facility: CLINIC | Age: 31
End: 2021-07-30
Payer: COMMERCIAL

## 2021-07-30 DIAGNOSIS — F41.1 GENERALIZED ANXIETY DISORDER: ICD-10-CM

## 2021-07-30 DIAGNOSIS — F33.1 MODERATE EPISODE OF RECURRENT MAJOR DEPRESSIVE DISORDER (HCC): Primary | ICD-10-CM

## 2021-07-30 PROCEDURE — S0201 PARTIAL HOSPITALIZATION SERV: HCPCS

## 2021-07-30 PROCEDURE — G0177 OPPS/PHP; TRAIN & EDUC SERV: HCPCS

## 2021-07-30 PROCEDURE — G0176 OPPS/PHP;ACTIVITY THERAPY: HCPCS

## 2021-07-30 PROCEDURE — G0410 GRP PSYCH PARTIAL HOSP 45-50: HCPCS

## 2021-07-30 NOTE — PSYCH
Subjective:     Patient ID: Fahad Marcano is a 32 y o  male  [facility] Clinical Progress Notes      Specialized Services Documentation  Therapist must complete separate progress note for each specific clinical activity in which the individual participated during the day  Other [phone] paperwork to [employer] stating return to work date would be 08/05/2021, as requested by Wilber Hope  Case Management Note    Johanna Jones MA, [place]    Current suicide risk : Low     (1118) Informed Wilber Hope that [facility]  Medications changes/added/denied? No    Treatment session number: 13    Individual Case Management Visit provided today? Yes     [facility]ations follow up physician's orders: None at this time

## 2021-07-30 NOTE — PSYCH
Assessment/Plan:       Diagnoses and all orders for this visit:     Moderate episode of recurrent major depressive disorder (HCC)     Generalized anxiety disorder          Subjective:      Patient ID: Reynaldo Santoyo is a 27 y  o  male  [facility] Treatment Plan   AREAS OF NEED: Major Depressive Disorder and Generalized Anxiety Disorder as evidenced by suicidal thoughts, ruminating and racing thoughts, tiredness, hopelessness and helplessness due to finances, work, wanting to have kids and move    Date Initiated: 07/08/21     Strengths: "care too much, thinking of others, sense of humor"           LONG TERM GOAL:   Date Initiated: 07/08/21  1 0 I will identify 3 ways that my over all wellbeing and mood has stabilized    Target Date: 08/05/21  Completion Date:         SHORT TERM OBJECTIVES:      Date Initiated: 07/08/21  1 1 I will learn 3 positive coping skills to decrease my impulses to act on the suicidal ideation  Revision Date: 07/20/21 07/30/21   Target Date: 07/20/21  Completion Date:      Date Initiated: 07/08/21  1 2 I will practice 3 CBT techniques to help "rewire my brain"    Revision Date: 07/20/21 07/30/21   Target Date: 07/20/21  Completion Date:     Date Initiated: 07/08/21  1 3 I will take medications as prescribed and share questions and concerns if arise     Revision Date: 07/20/21 07/30/21   Target Date: 07/20/21  Completion Date:      Date Initiated: 07/08/21  1 4 I will identify 3 ways my supports can assist in my recovery and agree to staff/support contact as indicated     Revision Date: 07/20/21 07/30/21   Target Date: 07/20/21  Completion Date:            7 DAY REVISION:  Date Initiated: 07/20/21   1 5 I will not leave early or miss any more days of program to ensure my responsibility in my recovery journey or I will be discharged from program   Revision Date: 07/30/21   Target Date: 07/30/21   Completion Date:     Date Initiated: 07/20/21   1 6 I will develop a self-care plan to work on maintenance of my mental health needs  Revision Date: 07/30/21   Target Date: 07/30/21   Completion Date:    Date Initiated: 07/30/21   1 7  I will continue to have open communication with my wife to discuss struggles rather than letting them build  Revision Date:    Target Date: 08/11/21  Completion Date:        PSYCHIATRY:  Date Initiated:  07/08/21  Medication Management and Education       Revision Date: 07/20/21 07/30/21       1 3 Continue medication management       The person(s) responsible for carrying out the plan is Loraine Colón MD     NURSING/SYMPTOM EDUCATION:  Date Initiated: 07/08/21       1 1, 1 2  1 3, 1 4 Provide wellness/symptoms and skill education groups three to five days weekly to educate Jason Rooney signs and symptoms of diagnoses, skills to manage stressors, and medication questions that will be addressed by the treatment team         Revision date: 07/20/21 07/30/21        1 1,1 2,1 3,1 4,1 5 Continue to encourage Travis Clancy to participate in wellness groups daily to learn about symptoms, coping strategies and warning signs to promote relapse prevention         The person(s) responsible for carrying out the plan is Darrel Tripp MSW, LSW and ALEYDA Tobar     PSYCHOLOGY:   Date Initiated: 07/08/21       1 1, 1 2, 1 4 Provide psychotherapy group 5 times per week to allow opportunity for Mohini Mari explore stressors and ways of coping  Revision Date: 07/20/21 07/30/21   1 1,1 2,1 4,1 5  Continue to provide psychotherapy group daily to Travis Clancy and encourage sharing of stressors, skills and positive change  The person(s) responsible for carrying out the plan is Christofer Galvan MA, [place]     ALLIED THERAPY:   Date Initiated: 07/08/21  1 1,1 2 Engage Reynaldo Santoyo in AT group 5 times daily to encourage development and use of wellness tools to decrease symptoms and promote recovery through meaningful activity    Revision Date: 07/20/21 07/30/21 1 1,1 2,1 5 Continue to engage Izabel Talley to participate in AT group to practice wellness tools within program and transfer to home sharing successes and barriers through healthy task involvement        The person(s) responsible for carrying out the plan is GERALDINE Abdullahi and GERALDINE Peters     CASE MANAGEMENT:   Date Initiated: 07/08/21      1 0 This  will meet with Chon Wellington times weekly to assess treatment progress, discharge planning, connection to community supports and UR as indicated  Revision Date: 07/20/21 07/30/21   1 0 Continue to meet with Izabel Talley 3-4 times weekly to assess growth, work toward goals, continued treatment needs, dc planning and use of supports  The person(s) responsible for carrying out the plan is Ladan Abraham MA, [facility]     TREATMENT REVIEW/COMMENTS:      DISCHARGE CRITERIA: Identify 3 signs of progress and complete relapse prevention plan     DISCHARGE PLAN: Connect with identified outpatient providers     Estimated Length of Stay: 10 treatment days       Diagnosis and Treatment Plan explained to Reynaldo Jacobs relates understanding diagnosis and is agreeable to Treatment Plan             CLIENT COMMENTS / Please share your thoughts, feelings, need and/or experiences regarding your treatment plan: _____________________________________________________________________________________________________________________________________________________________________________________________________________________________________________________________________________________________________________________ Date/Time: ______________      Patient Signature: _________________________________      Date/Time: ______________       Signature: _________________________________     Date/Time: ______________

## 2021-07-30 NOTE — PSYCH
Subjective:     Patient ID: Claudia Irwin is a 32 y o  male  [facility] Clinical Progress Notes      Specialized Services Documentation  Therapist must complete separate progress note for each specific clinical activity in which the individual participated during the day  Allied Therapy   [id number] The group engaged in the wellness assessment that evaluates progress on several different areas of wellness: physical, emotional, cognitive, vocational, social and spiritual  Clients rated their progress and discussed areas that need work  Hari Hobbs continues to make progress towards goals through verbal participation in group  Continue with psychotherapy  TX Plan Objectives: 1 1, 1 2, 1 4  Therapist: GERALDINE Reno     Education Therapy     [id number] Claudia Irwin engaged throughout the treatment day  Was engaged in learning related to Illness, Medication, Aftercare and Wellness Tools  Staff utilized Verbal, Written, A/V and Demonstration teaching methods  Claudia Irwin shared area of learning and set a goal for outside of program to clean his car        Tx Plan Objective: 1 1,1 2,1 4, Therapist:  GERALDINE Reno

## 2021-07-30 NOTE — PSYCH
Subjective:     Patient ID: Ole Hay is a 32 y o  male  [facility] Clinical Progress Notes      Specialized Services Documentation  Therapist must complete separate progress note for each specific clinical activity in which the individual participated during the day  Group Psychotherapy ([id number]) Angelika Mirza was engaged in an active group that participated in a [employer] [employer] game around guessing the emotion or feeling topic that they chose out of the basket  After the feeling or emotion was guessed then the person was asked to give an example where they felt this feeling and how they were able to cope or address that feeling/emotion  At the end, group members were asked how well they were able to stay mindful or present during group, and if their mind started to wonder were they able to bring their attention back to the group  Angelika Mirza will continue with life skills and psychotherapy groups  Good progress made towards treatment   Tx Plan Objective: 1 1,1 2 Therapist:  ALEYDA Cagle

## 2021-07-30 NOTE — PSYCH
Subjective:     Patient ID: Theron Meléndez is a 32 y o  male  [facility] Clinical Progress Notes      Specialized Services Documentation  Therapist must complete separate progress note for each specific clinical activity in which the individual participated during the day  Education Therapy   [id number] Theron Meléndez actively shared in morning assessment and goal review  Presented as Receptive related to readiness to learn  Theron Meléndez did complete goal from last treatment day identifying gaining satisfaction  Theron Meléndez did not present with any barriers to learning       Tx Plan Objective: 1 1, Therapist:  Dale CALDERABC Bianca Marie MT

## 2021-07-30 NOTE — PSYCH
Subjective:     Patient ID: Travis Clancy is a 32 y o  male  [facility] Clinical Progress Notes      Specialized Services Documentation  Therapist must complete separate progress note for each specific clinical activity in which the individual participated during the day  Group Psychotherapy Life Skills (9:30-10:30) Travis Clancy actively engaged in group focused on using the therapy tool Cards for Calm  Cards for calm encourages positive thinking through the use of visualization, mindfulness techniques and creative thinking  This tool helps clients learn to respond in more effective ways  Marcie Tiwari  selected a calming card about taking steps towards achieving goals  Marcie Tiwari continues to make progress towards goals through verbal participation in group; to accomplish long term goals continue to utilize skills learned in programming  Continue with psychotherapy to educate and encourage use of wellness tools   Tx Plan Objective: 1 1,1 2 Therapist: Nicholas Cummings

## 2021-08-02 ENCOUNTER — OFFICE VISIT (OUTPATIENT)
Dept: PSYCHOLOGY | Facility: CLINIC | Age: 31
End: 2021-08-02
Payer: COMMERCIAL

## 2021-08-02 ENCOUNTER — OFFICE VISIT (OUTPATIENT)
Dept: PSYCHIATRY | Facility: CLINIC | Age: 31
End: 2021-08-02
Payer: COMMERCIAL

## 2021-08-02 DIAGNOSIS — F33.1 MODERATE EPISODE OF RECURRENT MAJOR DEPRESSIVE DISORDER (HCC): ICD-10-CM

## 2021-08-02 DIAGNOSIS — F33.1 MODERATE EPISODE OF RECURRENT MAJOR DEPRESSIVE DISORDER (HCC): Primary | ICD-10-CM

## 2021-08-02 DIAGNOSIS — F33.41 RECURRENT MAJOR DEPRESSIVE DISORDER, IN PARTIAL REMISSION (HCC): ICD-10-CM

## 2021-08-02 DIAGNOSIS — F41.1 GENERALIZED ANXIETY DISORDER: ICD-10-CM

## 2021-08-02 DIAGNOSIS — F41.1 GENERALIZED ANXIETY DISORDER: Primary | ICD-10-CM

## 2021-08-02 PROCEDURE — 99214 OFFICE O/P EST MOD 30 MIN: CPT | Performed by: NURSE PRACTITIONER

## 2021-08-02 PROCEDURE — G0176 OPPS/PHP;ACTIVITY THERAPY: HCPCS

## 2021-08-02 PROCEDURE — S0201 PARTIAL HOSPITALIZATION SERV: HCPCS

## 2021-08-02 PROCEDURE — 1036F TOBACCO NON-USER: CPT | Performed by: NURSE PRACTITIONER

## 2021-08-02 PROCEDURE — G0177 OPPS/PHP; TRAIN & EDUC SERV: HCPCS

## 2021-08-02 PROCEDURE — G0410 GRP PSYCH PARTIAL HOSP 45-50: HCPCS

## 2021-08-02 RX ORDER — BUPROPION HYDROCHLORIDE 300 MG/1
300 TABLET ORAL DAILY
Qty: 90 TABLET | Refills: 0 | Status: SHIPPED | OUTPATIENT
Start: 2021-08-02 | End: 2021-08-20 | Stop reason: SDUPTHER

## 2021-08-02 NOTE — PSYCH
Subjective:     Patient ID: Shruthi Hillman is a 32 y o  male  [facility] Clinical Progress Notes      Specialized Services Documentation  Therapist must complete separate progress note for each specific clinical activity in which the individual participated during the day  GROUP PSYCHOTHERAPY ([id number])   Members are able to gain a different perspectives and deeper understanding of past experiences and current events  Group discussions and themes involved personal disclosures, identifying and utilizing supports, perceptions and barriers to communication, self-worth and changing negative core beliefs and celebrating small victories and strengths  Dorothy Sommers encouraged other members by explaining strengths and positive traits by attending PHP and working on self  Dorothy Sommers continues to demonstrate insight and growth through verbal participation, offerings of support, encouragement and feedback to other group members during the open discussion time  Continue with psychotherapy  [address] Objectives: 1 1, 1 2, 1 4   Therapist: Tye Woods MA, [place]        Case Management Note    Tye Woods MA, [place]    Current suicide risk : Low     ([phone]) Dorothy Sommers shared that he has been having stomach pain since the weekend  Advised to seek medical attention if the problem continues to occur  She reports that his and his wife's communication is continuing to go well  He states that she is currently depressed since a family friend passed away over the weekend and she has not been able to see her girlfriend  Dorothy Sommers reports that overall he has seen a decrease in his depressive episodes  Continue with discharge plan  Reviewed treatment plan  Medications changes/added/denied? No - See Danya Back Note    Treatment session number: 14    Individual Case Management Visit provided today? Yes     Innovations follow up physician's orders: None at this time

## 2021-08-02 NOTE — PSYCH
Subjective:     Patient ID: Mahi Bruno is a 32 y o  male  [facility] Clinical Progress Notes      Specialized Services Documentation  Therapist must complete separate progress note for each specific clinical activity in which the individual participated during the day  Group Psychotherapy (0817-1820) Deidre Cheng was engaged in a DBT House of Treatment activity  Where the house identifies different areas of treatment starting from the foundation where Deidre Cheng labeled their values that guide their life  Next, along the outside of their house each group member wrote down anyone who supports them, following with writing down on the roof things or people that protect them  Second, each group member wrote down on their door things you keep hidden from others  Third, on the chimney they were asked to write down ways in which you blow off steam   Fourth, they made billboard sign in their yard and wrote things that they are proud of and or wanted others to see  Inside each group members home, they were asked to draw four different levels:  Level 1: Things or behaviors you are trying to gain control over, or areas of your life you want to change  Level 2: Write or draw things that you want to experience more often or just in a healthier way  Level 3: Draw or write things you feel happy about or would like to even feel happier about  Level 4: On this layer you would write down or draw things to resemble what a, Life Woody Noe looks like for you  After the houses were finished the group opened and shared areas that were difficult when creating their house, areas they had strengths in, and areas that they want to work on  Deidre Cheng will continue with life skills and psychotherapy groups  Good progress made towards treatment   Tx Plan Objective: 1 1,1 2 Therapist:  ALEYDA Bryson

## 2021-08-02 NOTE — PSYCH
PHP MEDICATION MANAGEMENT NOTE        [facility]    Name and Date of Birth:  Lola Fraire 32 y o  1990 MRN: [id number]    Date of Visit: August 2, 2021    No Known Allergies  SUBJECTIVE:    Arley Riedel is seen today for a follow up for Major Depressive Disorder and Generalized Anxiety Disorder  He reports that he has done fairly well since beginning PHP  Continues to have some symptoms of depression and anxiety  Denies any suicidal ideation  States symptoms have improved since starting [facility]  Continues to struggle with sleep which patient states is likely related to sleep apnea for which he needs to seek treatment  He denies any side effects from medications      PLAN:    Continue Wellbutrin  mg p o  daily   continue Lexapro 20 mg p o  daily   continue trazodone 50 mg at bedtime as needed for sleep   recommend fiber supplement as needed for constipation   patient has follow-up scheduled with Psychiatry August 20th  Aware of 24 hour and weekend coverage for urgent situations accessed by calling [facility] main practice number  Continue partial hospitalization program    Diagnoses and all orders for this visit:    Recurrent major depressive disorder, in partial remission ([facility] )        Current Outpatient Medications on File Prior to Visit   Medication Sig Dispense Refill    buPROPion (WELLBUTRIN XL) 150 mg 24 hr tablet Take 1 tablet (150 mg total) by mouth daily Take with the 300mg tab for a total of 450mg daily 90 tablet 0    buPROPion (WELLBUTRIN XL) 300 mg 24 hr tablet Take 1 tablet (300 mg total) by mouth daily 90 tablet 0    Cholecalciferol (VITAMIN D-3) 1000 units CAPS Take 2 capsules by mouth daily      escitalopram (LEXAPRO) 20 mg tablet TAKE 1 TABLET BY MOUTH EVERY DAY 90 tablet 3    fluticasone (FLONASE) 50 mcg/act nasal spray 1 spray into each nostril as needed for rhinitis      levothyroxine 25 mcg tablet TAKE 1 TABLET BY MOUTH DAILY IN THE EARLY MORNING 90 tablet 1    multivitamin (THERAGRAN) TABS Take 1 tablet by mouth daily      traZODone (DESYREL) 50 mg tablet Take 1 tablet (50 mg total) by mouth daily at bedtime as needed for sleep 90 tablet 0     No current facility-administered medications on file prior to visit  Psychotherapy Provided:     Individual psychotherapy provided: Yes  Counseling was provided during the session today for 16 minutes  Supportive counseling provided  HPI ROS Appetite Changes and Sleep:     He reports frequent awakenings, adequate appetite, adequate energy level   Patient denies suicidal or homicidal ideation    Review Of Systems:      General emotional problems, decreased functioning, sleep disturbances and relationship problems   Personality no change in personality   Constitutional negative   ENT negative   Cardiovascular negative   Respiratory negative   Gastrointestinal negative   Genitourinary negative   Musculoskeletal negative   Integumentary negative   Neurological negative   Endocrine negative   Other Symptoms none, all other systems are negative     Mental Status Evaluation:    Appearance Adequate hygiene and grooming   Behavior calm and cooperative   Mood anxious and depressed  Depression Scale -  of 10 (0 = No depression)  Anxiety Scale -  of 10 (0 = No anxiety)   Speech Normal rate and volume   Affect appropriate and mood-congruent   Thought Processes Goal directed and coherent   Thought Content Does not verbalize delusional material   Associations Tightly connected   Perceptual Disturbances Denies hallucinations and does not appear to be responding to internal stimuli   Risk Potential Suicidal/Homicidal Ideation - No evidence of suicidal or homicidal ideation and patient does not verbalize suicidal or homicidal ideation  Risk of Violence - No evidence of risk for violence found on assessment  Risk of Self Mutilation - No evidence of risk for self mutilation found on assessment   Orientation oriented to person, place, time/date and situation   Memory recent and remote memory grossly intact   Consciousness alert and awake   Attention/Concentration attention span and concentration are age appropriate   Insight intact   Judgement intact   Muscle Strength and Gait normal muscle strength and normal muscle tone, normal gait/station and normal balance   Motor Activity no abnormal movements   Language no difficulty naming common objects, no difficulty repeating a phrase, no difficulty writing a sentence   Fund of Knowledge adequate knowledge of current events  adequate fund of knowledge regarding past history  adequate fund of knowledge regarding vocabulary      Past Psychiatric History Update:     Inpatient Psychiatric Admission Since Last Encounter:   no  Suicide Attempt Or Self Mutilation Since Last Encounter:   no  Incidence of Violent Behavior Since Last Encounter:   no    Traumatic History Update:     New Onset of Abuse Since Last Encounter:   no  Traumatic Events Since Last Encounter:   no    Past Medical History:    Past Medical History:   Diagnosis Date    Abdominal pain     Ankle pain     Anxiety     Arthralgia     Asthma     Blood typing encounter     Bloody stools     LA    6/8/16   R   11/21/17     Carpal tunnel syndrome, bilateral     LA  Harris Spinner Harris Spinner 9/12/17   R   9/12/17     Chest pain     Constipation     Depression     Dermatitis     Fatigue     Frequent bowel movements     Gastroenteritis     GERD (gastroesophageal reflux disease)     GERD without esophagitis     Insomnia     LA   11/7/16   R   11/21/17     Irregular heart beats     Moderate episode of recurrent major depressive disorder ([facility] ) 8/22/2018    Muscle spasm     Nausea     Numbness and tingling in both hands     Obesity     Odynophagia     Otitis     Pharyngitis     Pilonidal cyst with abscess     LA   10/25/13   R   6/10/14     Proteinuria     Rectal bleeding     R   11/21/17  Rhinitis     Seasonal allergies     Skin lesion     LA    2/3/15   R  Orbie Mattock Orbie Mattock 3/17/17  /   [place]    3/17/17   R   12/22/17     Snoring     Strep throat     Tonsillitis     URI (upper respiratory infection)     Vitamin D deficiency      Past Medical History Pertinent Negatives:   Diagnosis Date Noted    Head injury 07/08/2021    Seizures ([address] ) 07/08/2021     Past Surgical History:   Procedure Laterality Date    FOOT SURGERY      PILONIDAL CYST DRAINAGE      Incision and drainage of pilonidal cyst     NC WRIST Kallie Jerad LIG Right 7/12/2018    Procedure: RELEASE CARPAL TUNNEL ENDOSCOPIC;  Surgeon: Nahun Neri MD;  Location: [facility];  Service: Orthopedics    NC WRIST Kallie Jerad LIG Left 7/26/2018    Procedure: RELEASE CARPAL TUNNEL ENDOSCOPIC;  Surgeon: Nahun Neri MD;  Location: [facility];  Service: Orthopedics     No Known Allergies  Substance Abuse History:    Social History     Substance and Sexual Activity   Alcohol Use Yes    Comment: Rarely     Social History     Substance and Sexual Activity   Drug Use No     Social History:    Social History     Socioeconomic History    Marital status: /Civil Union     Spouse name: Not on file    Number of children: 0    Years of education: some college    Highest education level: Some college, no degree   Occupational History    Occupation: [employer]     Comment: employed full time   Tobacco Use    Smoking status: Never Smoker    Smokeless tobacco: Never Used   Vaping Use    Vaping Use: Never used   Substance and Sexual Activity    Alcohol use: Yes     Comment: Rarely    Drug use: No    Sexual activity: Yes     Partners: Female   Other Topics Concern    Not on file   Social History Narrative    Daily caffeine consumption      Social Determinants of Health     Financial Resource Strain:     Difficulty of Paying Living Expenses:    Food Insecurity:     Worried About Running Out of Food in the Last Year:     Ran Out of Food in the Last Year:    Transportation Needs:     Lack of Transportation (Medical):  Lack of Transportation (Non-Medical):    Physical Activity:     Days of Exercise per Week:     Minutes of Exercise per Session:    Stress:     Feeling of Stress :    Social Connections:     Frequency of Communication with Friends and Family:     Frequency of Social Gatherings with Friends and Family:     Attends Yarsanism Services:     Active Member of Clubs or Organizations:     Attends Club or Organization Meetings:     Marital Status:    Intimate Partner Violence:     Fear of Current or Ex-Partner:     Emotionally Abused:     Physically Abused:     Sexually Abused:      Family Psychiatric History:     Family History   Problem Relation Age of Onset    Depression Mother     Obesity Mother     Stroke Mother         Syndrome     Diabetes Mother     Alcohol abuse Father     Liver disease Father         hepatic failure     Hypertension Father     Depression Brother     Thyroid disease Brother     Alcohol abuse Brother     Substance Abuse Brother     Depression Maternal Uncle     Substance Abuse Brother     Heart disease Neg Hx     Cancer Neg Hx     Thyroid cancer Neg Hx      History Review: The following portions of the patient's history were reviewed and updated as appropriate: allergies, current medications, past family history, past medical history, past social history, past surgical history and problem list     OBJECTIVE:     Vital signs in last 24 hours: There were no vitals filed for this visit  Laboratory Results: I have personally reviewed all pertinent laboratory/tests results  Medications Risks/Benefits:      Risks, Benefits And Possible Side Effects Of Medications:    Discussed risks and benefits of treatment with patient including risk of suicidality, serotonin syndrome and SIADH related to treatment with antidepressants;  Risk of induction of manic symptoms in certain patient populations and Reduction in seizure threshold from Wellbutrin     Controlled Medication Discussion:     Not applicable    AMRITA Garcia 08/02/21    This note was shared with patient

## 2021-08-02 NOTE — PSYCH
Subjective:     Patient ID: Elise Martines is a 32 y o  male  [facility] Clinical Progress Notes      Specialized Services Documentation  Therapist must complete separate progress note for each specific clinical activity in which the individual participated during the day  Allied Therapy   [id number]- Elise Martines did not actively share in [facility] group focused on using ACT metaphors and visualizations to decrease the intensity of anxiety and negative thoughts  Elise Martines engaged in listening to music, processing, and practicing visualization techniques  Group explored practice of utilizing 3 visualization techniques as well as analyzing what they viewed as effective and how they would implement the practice in the outside world  Elise Martines left the room during one of the visualizations due to attending to a first aid personal need  In addition, Julio Césarkelsey Marcano did not verbally participate or share personal insights during this group  He was observed to take notes at times  Some effort noted toward treatment goal   Continue AT to encourage the development and practice of mindfulness strategies to alleviate symptoms and support wellness    Tx Plan Objective: 1 1, 1 2, & 1 4        Therapist: GERALDINE Candelario

## 2021-08-02 NOTE — PSYCH
Subjective:     Patient ID: Shruthi Hillman is a 32 y o  male  [facility] Clinical Progress Notes      Specialized Services Documentation  Therapist must complete separate progress note for each specific clinical activity in which the individual participated during the day  Education Therapy   [id number] Shruthi Hillman actively shared in morning assessment and goal review  Presented as Receptive related to readiness to learn  Shruthi Hillman did not complete goal from last treatment day but was hoping to gain responsibility  Dorothy Sommers did not present with any barriers to learning  [id number] Shruthi Hillman engaged throughout the treatment day  Was engaged in learning related to Illness, Medication, Aftercare and Wellness Tools  Staff utilized Verbal, Written, A/V and Demonstration teaching methods  Shruthi Hillman shared area of learning and 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

## 2021-08-03 ENCOUNTER — OFFICE VISIT (OUTPATIENT)
Dept: PSYCHOLOGY | Facility: CLINIC | Age: 31
End: 2021-08-03
Payer: COMMERCIAL

## 2021-08-03 DIAGNOSIS — F33.1 MODERATE EPISODE OF RECURRENT MAJOR DEPRESSIVE DISORDER (HCC): Primary | ICD-10-CM

## 2021-08-03 DIAGNOSIS — F41.1 GENERALIZED ANXIETY DISORDER: ICD-10-CM

## 2021-08-03 PROCEDURE — G0410 GRP PSYCH PARTIAL HOSP 45-50: HCPCS

## 2021-08-03 PROCEDURE — G0176 OPPS/PHP;ACTIVITY THERAPY: HCPCS

## 2021-08-03 PROCEDURE — G0177 OPPS/PHP; TRAIN & EDUC SERV: HCPCS

## 2021-08-03 PROCEDURE — S0201 PARTIAL HOSPITALIZATION SERV: HCPCS

## 2021-08-03 NOTE — PSYCH
Subjective:     Patient ID: Rafy Gaviria is a 32 y o  male  [facility] Clinical Progress Notes      Specialized Services Documentation  Therapist must complete separate progress note for each specific clinical activity in which the individual participated during the day  Allied Therapy   [id number]- Rafy Gaviria actively shared in [facility] group focused on analyzing personal self-destructive behaviors within certain situations and creating their unique crisis coping skill plan  Rafy Gaviria engaged in active discussion  Group explored practice of coreen analysis, active discussion, and utilizing DBT worksheets to analyze behaviors  Rafy Gaviria  identified that they never realized the importance of having a plan or preparing for certain interactions with others or stressful unpredictable situations  Ophelia Borges believed by having an emergency coping skills it will decrease their chance of entering a situation with intense emotional responses or reacting in a negative way  Some effort noted toward treatment goal   Continue AT to encourage the development and practice of utilizing their unique crisis coping skill plan to alleviate symptoms and support wellness    Tx Plan Objective: 1 1, Therapist:  GERALDINE Cooper

## 2021-08-03 NOTE — PSYCH
Subjective:     Patient ID: Shruthi Hillman is a 32 y o  male  [facility] Clinical Progress Notes      Specialized Services Documentation  Therapist must complete separate progress note for each specific clinical activity in which the individual participated during the day  Group Psychotherapy ([id number]) Dorothy Sommers was engaged in a group around building awareness around healthy coping strategies vs  unhealthy coping strategies  Group also discussed the cycle of depression and how changing out an unhealthy behavioral response to a healthy coping strategy can invoke a new pattern that even though small can make a major difference to our overall health  Dorothy Sommers shared an example of an unhealthy coping skill they would like to change along with the healthy coping skill replacement  Group then discussed different barriers that could also prevent us from choosing a healthy coping skill  Lastly, there was a Self-Care Tips sheet that was discussed at the end relating back to changing our old patterns of unhealthy coping skills and also giving ourselves the time that we deserve to improve our overall mental health  Dorothy Sommers will continue with life skills and psychotherapy groups  Good progress made towards treatment   Tx Plan Objective: 1 1,1 2 Therapist:  ALEYDA Fritz

## 2021-08-03 NOTE — PSYCH
Subjective:     Patient ID: Bryon Hsu is a 32 y o  male  [facility] Clinical Progress Notes      Specialized Services Documentation  Therapist must complete separate progress note for each specific clinical activity in which the individual participated during the day  Group Psychotherapy Life Skills (10:45-11:45) The group was educated on S M A R T E R  goal criteria, research about forming new habits and provided tips on implementing goals  Members practiced creating goals based on the criteria and their aspirations  The members shared their initial goal and the adjusted goal to reflect SMART goal setting  Beverly Tomlinson made a smart goal about losing weight   Beverly Tomlinson continues to make progress towards goals through verbal participation in group  Continue with psychotherapy     [facility] Objectives: 1 1, 1 2, 1 4   Therapist: EV Cole,  Co-led by Karely Weston

## 2021-08-03 NOTE — PSYCH
Assessment/Plan:       Diagnoses and all orders for this visit:     Moderate episode of recurrent major depressive disorder (HCC)     Generalized anxiety disorder          Subjective:      Patient ID: Reynaldo Santoyo is a 27 y  o  male  [facility] Treatment Plan   AREAS OF NEED: Major Depressive Disorder and Generalized Anxiety Disorder as evidenced by suicidal thoughts, ruminating and racing thoughts, tiredness, hopelessness and helplessness due to finances, work, wanting to have kids and move    Date Initiated: 07/08/21     Strengths: "care too much, thinking of others, sense of humor"           LONG TERM GOAL:   Date Initiated: 07/08/21  1 0 I will identify 3 ways that my over all wellbeing and mood has stabilized    Target Date: 08/05/21  Completion Date: 08/04/21 - DISCHARGE        SHORT TERM OBJECTIVES:      Date Initiated: 07/08/21  1 1 I will learn 3 positive coping skills to decrease my impulses to act on the suicidal ideation  Revision Date: 07/20/21 07/30/21   Target Date: 07/20/21  Completion Date: 08/04/21 - DISCHARGE     Date Initiated: 07/08/21  1 2 I will practice 3 CBT techniques to help "rewire my brain"    Revision Date: 07/20/21 07/30/21   Target Date: 07/20/21  Completion Date:08/04/21 - DISCHARGE     Date Initiated: 07/08/21  1 3 I will take medications as prescribed and share questions and concerns if arise     Revision Date: 07/20/21 07/30/21   Target Date: 07/20/21  Completion Date: 08/04/21 - DISCHARGE     Date Initiated: 07/08/21  1 4 I will identify 3 ways my supports can assist in my recovery and agree to staff/support contact as indicated     Revision Date: 07/20/21 07/30/21   Target Date: 07/20/21  Completion Date: 08/04/21 - DISCHARGE            7 DAY REVISION:  Date Initiated: 07/20/21   1 5 I will not leave early or miss any more days of program to ensure my responsibility in my recovery journey or I will be discharged from program   Revision Date: 07/30/21   Target Date: 07/30/21   Completion Date: 08/04/21 - DISCHARGE     Date Initiated: 07/20/21   1 6 I will develop a self-care plan to work on maintenance of my mental health needs    Revision Date: 07/30/21   Target Date: 07/30/21   Completion Date: 08/04/21 - DISCHARGE     Date Initiated: 07/30/21   1 7  I will continue to have open communication with my wife to discuss struggles rather than letting them build  Revision Date:    Target Date: 08/11/21  Completion Date: 08/04/21 - DISCHARGE        PSYCHIATRY:  Date Initiated:  07/08/21  Medication Management and Education       Revision Date: 07/20/21 07/30/21 08/04/21 - DISCHARGE      1 3 Continue medication management       The person(s) responsible for carrying out the plan is Loraine Colón MD     NURSING/SYMPTOM EDUCATION:  Date Initiated: 07/08/21       1 1, 1 2  1 3, 1 4 Provide wellness/symptoms and skill education groups three to five days weekly to educate Robert Kc signs and symptoms of diagnoses, skills to manage stressors, and medication questions that will be addressed by the treatment team    Ada Aguirre date: 07/20/21 07/30/21 08/04/21 - DISCHARGE       1 1,1 2,1 3,1 4,1 5 Continue to encourage Lee Friend participate in wellness groups daily to learn about symptoms, coping strategies and warning signs to promote relapse prevention         The person(s) responsible for carrying out the plan is Janie Johnson MSW, LSW and ALEYDA Chen     PSYCHOLOGY:   Date Initiated: 07/08/21       1 1, 1 2, 1 4 Provide psychotherapy group 5 times per week to allow opportunity for Lee Friend explore stressors and ways of coping  Revision Date: 07/20/21 07/30/21 08/04/21 - DISCHARGE  1 1,1 2,1 4,1 5  Continue to provide psychotherapy group daily to [place] encourage sharing of stressors, skills and positive change     The person(s) responsible for carrying out the plan is Aline Bernard MA, [place]     ALLIED THERAPY:   Date Initiated: 07/08/21  1 1,1 2 Engage Reynaldo Santoyo in AT group 5 times daily to encourage development and use of wellness tools to decrease symptoms and promote recovery through meaningful activity  Revision Date: 07/20/21 07/30/21 08/04/21 - DISCHARGE  1 1,1 2,1 5 Continue to engage Reynaldo Santoyo to participate in AT group to practice wellness tools within program and transfer to home sharing successes and barriers through healthy task involvement        The person(s) responsible for carrying out the plan is WERNER LoyolaBC and WERNER DayBC     CASE MANAGEMENT:   Date Initiated: 07/08/21      1 0 This  will meet with Ann Mendoza times weekly to assess treatment progress, discharge planning, connection to community supports and UR as indicated  Revision Date: 07/20/21 07/30/21 08/04/21 - DISCHARGE  1 0 Continue to meet with Reynaldo Santoyo 3-4 times weekly to assess growth, work toward goals, continued treatment needs, dc planning and use of supports  The person(s) responsible for carrying out the plan is Guillermo Galloway MA, [place]     TREATMENT REVIEW/COMMENTS:      DISCHARGE CRITERIA: Identify 3 signs of progress and complete relapse prevention plan     DISCHARGE PLAN: Connect with identified outpatient providers     Estimated Length of Stay: 10 treatment days       Diagnosis and Treatment Plan explained to Reynaldo Jacobs relates understanding diagnosis and is agreeable to Treatment Plan             CLIENT COMMENTS / Please share your thoughts, feelings, need and/or experiences regarding your treatment plan: _____________________________________________________________________________________________________________________________________________________________________________________________________________________________________________________________________________________________________________________ Date/Time: ______________      Patient Signature: _________________________________      Date/Time: ______________       Signature: _________________________________     Date/Time: ______________

## 2021-08-03 NOTE — PSYCH
[facility] Discharge Instructions:   Disposition: home  Address: [place] [address]      Diagnosis:  1  Moderate episode of recurrent major depressive disorder ([address] )     2  Generalized anxiety disorder           Allergies (Drug/Food): No Known Allergies  Activity: you may not return to work until 08/05/21  Diet:no recommendations  Smoking Cessation:not a smoker   Diagnostic/Laboratory Orders: None ordered while attending [facility]    Follow-up appointments/Referrals:   Medication Management: Appointment Date: 08/20/21  Dr Bear Gupta   [address] [place] [address]  [phone]     Outpatient Therapy: Appointment Date: 08/13/21  Anamaria Nam  [facility]   [address] [facility],   [address]  [phone]     Primary Care Physician:   Camilo Barba MD   [address]   (g) [phone] (c) [phone]        [address] [place] [address] [phone]  Intake/Referral/Evaluation (Non-Emergency) *NON INSURED FOR FUNDING: [facility]: [phone], Critical access hospital: [phone], [facility]: [phone] and [place]: [phone]  Crisis Intervention (Emergency) [place] Service: [facility]: [phone], [place]: [phone], [address] [place] [address] Ave: [phone], [facility]): [phone], [facility]: [phone] and [facility]: [phone]      _________________________________  National Crisis Intervention Hotline: 0-712.930.4650  National Suicide Crisis Hotline: 6-229.935.4132  I, the undersigned, have received and understand the above instructions          Patient/Rep Signature: __________________________________       Date/Time: ______________         Physician Signature: ____________________________________      Date/Time: ______________               Signature: ________________________________       Date/Time: ______________

## 2021-08-03 NOTE — PSYCH
Subjective:     Patient ID: Dotty Dupree is a 32 y o  male  [facility] Clinical Progress Notes      Specialized Services Documentation  Therapist must complete separate progress note for each specific clinical activity in which the individual participated during the day  Education Therapy   [id number] Dotty Dupree actively shared in morning assessment and goal review  Presented as Receptive related to readiness to learn  Dotty Dupree did not complete goal from last treatment day but identified he hoped to gain responsibility  Kevin did not present with any barriers to learning  [id number] Dotty Dupree engaged throughout the treatment day  Was engaged in learning related to Illness, Medication, Aftercare and Wellness Tools  Staff utilized Verbal, Written, A/V and Demonstration teaching methods  Dotty Dupree shared area of learning and set a goal for outside of program to relax with his wife  Tx Plan Objective: 1 1,1 2,1 4, Therapist:  Jam Landrum MA, [place]; DIRK Childers      Case Management Note    Jam Landrum MA, [place]    Current suicide risk : Low     ([id number]) Provided Kevin with Relapse Prevention Plan and PHQ-9  Medications changes/added/denied? No    Treatment session number: 15    Individual Case Management Visit provided today? Yes     [facility] follow up physician's orders: None at this time

## 2021-08-04 ENCOUNTER — OFFICE VISIT (OUTPATIENT)
Dept: PSYCHOLOGY | Facility: CLINIC | Age: 31
End: 2021-08-04
Payer: COMMERCIAL

## 2021-08-04 DIAGNOSIS — F41.1 GENERALIZED ANXIETY DISORDER: ICD-10-CM

## 2021-08-04 DIAGNOSIS — F33.1 MODERATE EPISODE OF RECURRENT MAJOR DEPRESSIVE DISORDER (HCC): Primary | ICD-10-CM

## 2021-08-04 PROCEDURE — G0410 GRP PSYCH PARTIAL HOSP 45-50: HCPCS

## 2021-08-04 PROCEDURE — S0201 PARTIAL HOSPITALIZATION SERV: HCPCS

## 2021-08-04 PROCEDURE — G0177 OPPS/PHP; TRAIN & EDUC SERV: HCPCS

## 2021-08-04 PROCEDURE — G0176 OPPS/PHP;ACTIVITY THERAPY: HCPCS

## 2021-08-04 NOTE — PSYCH
Subjective:     Patient ID: Lola Fraire is a 32 y o  male  [facility] Discharge Summary:   Admission Date: 07/08/21  Patient was referred by Ginette Miles   Discharge Date: 08/04/21   Was this a routine discharge? Yes     Diagnosis: Axis I:   1  Moderate episode of recurrent major depressive disorder ([address] )     2  Generalized anxiety disorder        Treating Physician: Dr John Colón    Treatment Complications: Early in treatment, Arley Riedel missed several days and had to make a goal on treatment plan that he wouldn't miss any more days  After adding goal, Arley Riedel attended treatment daily with no other treatment complications  Presenting Need:   As per Dr Khadijah Nelson: Mitali Sorto a 27 y  o  male with  Anxiety and depression, hypothyroidism, vitamin-D deficiency referred by  Primary physician because  He has increased depression, increased anxiety, difficulty function at [facility], decreased focus and concentration, suicidal ideation without plan or intent   Onset of symptoms was  a few weeks ago with gradually worsening course since that time  Psychosocial Stressors: occupational and financial   He states that he was doing well but few weeks ago he started to get more depressed, did not have any energy,  Has difficulty with concentration, starting to isolate himself, did  Not have any desire to do anything  Aguedalester Coon started to lose interest in doing things, was feeling hopeless, has difficulty sleeping he started to have suicidal ideation without plan or intent  Aguedalester Coon states that he takes medication as prescribed  Aguedalester Coon states that  he had been working in the same place for the last 3 years  And this became more stressful  Agueda Coon also has some financial issues  Ziggy Salomon depressed,  fleeting suicidal thoughts without plan or intent,  Denies any psychotic symptoms, denies any history manic episode   His PHQ-9 is 23      As per this writer: Lola Fraire is a 27 y o  male using he/him pronouns referred to [facility] via Ching Payne nurse practitioner at PCP office due to worsening depression with suicidal ideation  Presenting symptoms are suicidal thoughts, racing thoughts, ruminating thoughts, worry, emotional and physical tiredness, hopelessness and helplessness  Current stressors are finances, work, feeling like a shitty  and wanting to have kids and move  He reports a healthy appetite  States he has difficulty falling asleep, staying asleep and feeling rested  He reports sleep apnea and possible insomnia  He has to reschedule with a sleep doctor  He reports previous treatment at [facility]  He reports positive supports in his wife and mother  He is currently taking a leave of absence from work at [employer] due to his worsening symptoms  He denies cigarette, alcohol or substance use  Reports increased caffeine intake  He reports daily suicidal ideation without plan or intent  No history of attempts  Denies HI, SIB, psychosis        As per Mireya Cook: "My depression was getting bad  I thought I should take care of it before it gets worse  I know I wouldn't want to commit suicide  I wouldn't do that to my wife or mother  I am constantly thinking about work or making up scenarios in my head "     Course of treatment includes:    group counseling, medication management, individual case management, allied therapy, psychoeducation and psychiatric evaluation    Treatment Progress: Mireya Joey participated in 15 treatment days; in addition to the in person initial evaluation with the doctor and   Mireya Cook engaged in group psychotherapy 9 x per week and Allied Therapy Group 6 x per week, along with case management sessions 3x per week  Janae John addressed the following treatment objectives with the  with wife, addressing emotions coming up around his father's death and preparing to return to work   Response to treatment was positive evident by engaging in group discussions, asking relevant questions and giving feedback to other group members  Theron Meléndez presented encouragement of others and humor strengths and superficial sharing as challenges  Medication changes include increasing Wellbutrin XL to 450 mg p o  daily, continue Lexapro 20 mg p o  daily, continue trazodone 50 mg at bedtime as needed for sleep  Evidence of progress includes less intense and frequent depressive episodes  Take aways from program include coping skills, taking steps to make changes, breathing, using relaxation music  De Paz Minor has identified a plan to talk with doctor about his sleep apnea  No lab work was completed while attending Trudy Meléndez denies any current SI, HI, SIB or psychosis        Aftercare recommendations include:     Medication Management: Appointment Date: 08/20/21  Dr Suzi Ledesma  [address] [phone]     Outpatient Therapy: Appointment Date: 08/13/21  Jennybetzy Hilton  [facility]  [address] [place] [address] [address] [facility]   Juana Ravi [address]  (O) [phone]     Primary Care Physician:   Misha Sears MD   [address] [place] [id number]   (P) [phone] (Y) [phone]     Discharge Medications include:  Current Outpatient Medications:     buPROPion (WELLBUTRIN XL) 150 mg 24 hr tablet, Take 1 tablet (150 mg total) by mouth daily Take with the 300mg tab for a total of 450mg daily, Disp: 90 tablet, Rfl: 0    buPROPion (WELLBUTRIN XL) 300 mg 24 hr tablet, Take 1 tablet (300 mg total) by mouth daily, Disp: 90 tablet, Rfl: 0    Cholecalciferol (VITAMIN D-3) 1000 units CAPS, Take 2 capsules by mouth daily, Disp: , Rfl:     escitalopram (LEXAPRO) 20 mg tablet, TAKE 1 TABLET BY MOUTH EVERY DAY, Disp: 90 tablet, Rfl: 3    fluticasone (FLONASE) 50 mcg/act nasal spray, 1 spray into each nostril as needed for rhinitis, Disp: , Rfl:     levothyroxine 25 mcg tablet, TAKE 1 TABLET BY MOUTH DAILY IN THE EARLY MORNING, Disp: 90 tablet, Rfl: 1    multivitamin (THERAGRAN) TABS, Take 1 tablet by mouth daily, Disp: , Rfl:     traZODone (DESYREL) 50 mg tablet, Take 1 tablet (50 mg total) by mouth daily at bedtime as needed for sleep, Disp: 90 tablet, Rfl: 0

## 2021-08-04 NOTE — PSYCH
Subjective:     Patient ID: Dotty Dupree is a 32 y o  male  [facility] Clinical Progress Notes      Specialized Services Documentation  Therapist must complete separate progress note for each specific clinical activity in which the individual participated during the day  Group Psychotherapy Life Skills (10:45-11:45) Group members discussed the difference between healthy self-esteem and being overly confident  The group focused on acknowledging and accepting positive qualities in front of peers  During the activity group members stated 3 positive affirmations of themselves  Group members processed the activity by discussing their experience when doing this exercise  We talked about the reasons why it is difficult to acknowledge our positive qualities  We discussed how they could use positive affirmations in their personal life  Luis Stock stated that a positive affirmation that they would use is, " I am happy and love my family"   Luis Stock continues to make progress towards goals through verbal participation in group;will discharge at the end of treatment day   Tx Plan Objective: 1 1,1 2 Therapist: Mary Lou Rolle

## 2021-08-04 NOTE — PSYCH
[facility] Clinical Progress Notes      Specialized Services Documentation  Therapist must complete separate progress note for each specific clinical activity in which the individual participated during the day         [facility] follow up physician's orders:   DATE 8/4/2021  TIME 10:06   Amelie Bennett MD

## 2021-08-04 NOTE — PSYCH
Subjective:     Patient ID: Travis Clancy is a 32 y o  male  [facility] Clinical Progress Notes      Specialized Services Documentation  Therapist must complete separate progress note for each specific clinical activity in which the individual participated during the day  GROUP PSYCHOTHERAPY (930 - ) Members were asked the question, "What pushes your buttons or leads to big feelings in you?" Encouraged to create an emotion web relating to the situations that came to mind when asked the initial questions  Provided prompts to ease difficulty by breaking the situations down to body sensations, thoughts and behaviors  Members shared insights about their personal experiences and relationships with emotions  The group was educated on the [facility] in CBT therapy, that discusses emotionally heightened situations and slowing down the processing of emotions rather than acting immediately on them  Members offered feedback and encouragement associated with each personal disclosure  Marcie Tiwari presented as quiet but attentive, evident no personal disclosures shared but worked on emotion web activity  Marcie Tiwari continues to demonstrate insight through verbal participation in group  Continue with psychotherapy  [facility] Objectives: 1 1, 1 2, 1 4   Therapist: Christofer Galvan MA, [place]          Education Therapy   [id number] Travis Clancy actively shared in morning assessment and goal review  Presented as Receptive related to readiness to learn  Travis Clancy did complete goal from last treatment day identifying gaining self care and relaxation  did not present with any barriers to learning  [id number] Travis Clancy engaged throughout the treatment day  Was engaged in learning related to Illness, Medication, Aftercare and Wellness Tools  Staff utilized Verbal, Written, A/V and Demonstration teaching methods    Travis Clancy shared area of learning and set a goal for outside of program to discharge and go to   Tx Plan Objective: 1 1, 1 2, 1 4 Therapist:  Christofer Galvan MA, [place]        Case Management Note    Christofer Galvan MA, [place]    Current suicide risk : Low     ([phone]) CM reviewed Relapse Prevention Plan, discharge instructions, medication list and crisis information with Travis Clancy  See discharge summary for treatment details  Aftercare providers to receive discharge summary  Medications changes/added/denied? No    Treatment session number: 16    Individual Case Management Visit provided today?  Yes     [facility] follow up physician's orders: Discharge - See Dr Pj Geiger Note

## 2021-08-04 NOTE — PSYCH
Subjective:     Patient ID: Tito Howard is a 32 y o  male  [facility] Clinical Progress Notes      Specialized Services Documentation  Therapist must complete separate progress note for each specific clinical activity in which the individual participated during the day  Allied Therapy   [id number]- Tito Howard actively shared in [facility] group focused on how to make balanced choices of familiar versus unfamiliar when confronting unmanageable situations on various degrees  Tito Howard  engaged in the group by actively playing instruments, actively listening to other group members, and contributing insight into discussion  Group explored practice of active music making/improvisation to explore the differences between remaining on a steady familiar path vs creating our own rhythms parallel to the outside world in how are we making balanced choices  Encourage Mike Beyer during his discharge meeting today, to continue utilizing  AT strategies to alleviate symptoms and promote wellness     Tx Plan Objective: 1 1, Therapist:  Ana Burnham MT-BC

## 2021-08-05 ENCOUNTER — APPOINTMENT (OUTPATIENT)
Dept: PSYCHOLOGY | Facility: CLINIC | Age: 31
End: 2021-08-05
Payer: COMMERCIAL

## 2021-08-06 ENCOUNTER — APPOINTMENT (OUTPATIENT)
Dept: PSYCHOLOGY | Facility: CLINIC | Age: 31
End: 2021-08-06
Payer: COMMERCIAL

## 2021-08-11 DIAGNOSIS — R79.89 ELEVATED TSH: ICD-10-CM

## 2021-08-11 RX ORDER — LEVOTHYROXINE SODIUM 0.03 MG/1
TABLET ORAL
Qty: 90 TABLET | Refills: 1 | Status: SHIPPED | OUTPATIENT
Start: 2021-08-11 | End: 2022-02-07

## 2021-08-13 ENCOUNTER — OFFICE VISIT (OUTPATIENT)
Dept: FAMILY MEDICINE CLINIC | Facility: CLINIC | Age: 31
End: 2021-08-13
Payer: COMMERCIAL

## 2021-08-13 VITALS
SYSTOLIC BLOOD PRESSURE: 120 MMHG | HEIGHT: 72 IN | HEART RATE: 86 BPM | TEMPERATURE: 97.5 F | BODY MASS INDEX: 42.66 KG/M2 | RESPIRATION RATE: 18 BRPM | WEIGHT: 315 LBS | DIASTOLIC BLOOD PRESSURE: 80 MMHG | OXYGEN SATURATION: 97 %

## 2021-08-13 DIAGNOSIS — F33.1 MODERATE EPISODE OF RECURRENT MAJOR DEPRESSIVE DISORDER (HCC): Primary | ICD-10-CM

## 2021-08-13 PROCEDURE — 99213 OFFICE O/P EST LOW 20 MIN: CPT | Performed by: NURSE PRACTITIONER

## 2021-08-13 NOTE — PROGRESS NOTES
FAMILY PRACTICE OFFICE VISIT       NAME: Ann Chan  AGE: 32 y o  SEX: male       : 1990        MRN: [id number]    Assessment and Plan     Problem List Items Addressed This Visit        Other    BMI 50 0-59 9, adult ([address] )    Moderate episode of recurrent major depressive disorder ([facility] ) - Primary          1  Moderate episode of recurrent major depressive disorder ([facility] )     2  BMI 50 0-59 9, adult ([facility])       Moderate episode of recurrent major depressive disorder:  Significantly improved with medication change and completing innovations program   Will continue Wellbutrin  mg daily, Lexapro 20 mg daily, trazodone 50 mg at bedtime  Will continue follow-up care with Psychiatry  Obesity:  Unfortunately is not able to continue with bariatrics program, due to recent depression  He will continue to work on weight loss on his own at this time  Recently has been successful in losing 16 lbs  Will release back to work as of , 08/15/2021  Chief Complaint     Chief Complaint   Patient presents with    Well Check     evaluation return to work        History of Present Illness     Ann Chan is a 28year old male presenting today for follow up on depression  Completed innovations program   Feeling much better  Wellbutrin was increased from 300 mg daily to 450 mg daily  Continues with Lexapro, and trazodone at bedtime for insomnia  Group work was good to talk about stressors and feelings  Feels ready to return to work  Has learned new coping mechanisms  Has recognized work is a contributor to stressors, and mood  He and his wife are considering relocating, but they have not decided yet  Currently brother and sister-in-law are living with them, which is helping financially  Unfortunately unable to continue with bariatrics program, due to recent episode of depression  He is planning to continue to work on weight loss on his own    He has been successful in losing 16 lb recently  He has a yoga program, that includes diet changes  Is also considering trial of Slim-Fast diet  Currently working 6:00 a m  to 6:00 p m  4 days per week   Would like to return to work on Sunday, 08/15/2021  Review of Systems   Review of Systems   Constitutional: Negative  Psychiatric/Behavioral: Negative for dysphoric mood, self-injury, sleep disturbance and suicidal ideas  The patient is not nervous/anxious  Active Problem List     Patient Active Problem List   Diagnosis    BMI 50 0-59 9, adult ([facility] )    Generalized anxiety disorder    Pineal gland cyst    Vitamin D deficiency    Moderate episode of recurrent major depressive disorder ([facility] )    Obstructive sleep apnea    Eczema    Hypothyroidism       Past Medical History:  Past Medical History:   Diagnosis Date    Anxiety     Asthma     Carpal tunnel syndrome, bilateral     [place]  Kevin Cnaer Kevin Rower 9/12/17   R   9/12/17     GERD without esophagitis     Moderate episode of recurrent major depressive disorder ([facility] ) 8/22/2018    Pilonidal cyst with abscess     [place]   10/25/13   R   6/10/14     Vitamin D deficiency        Past Surgical History:  Past Surgical History:   Procedure Laterality Date    FOOT SURGERY      PILONIDAL CYST DRAINAGE      Incision and drainage of pilonidal cyst     CA WRIST Julane Money LIG Right 7/12/2018    Procedure: RELEASE CARPAL TUNNEL ENDOSCOPIC;  Surgeon: Steve Allison MD;  Location: [facility];  Service: Orthopedics    [place] WRIST Julane Money LIG Left 7/26/2018    Procedure: RELEASE CARPAL TUNNEL ENDOSCOPIC;  Surgeon: Steve Allison MD;  [facility]: [facility];  Service: Orthopedics       Family History:  Family History   Problem Relation Age of Onset    Depression Mother     Obesity Mother     Stroke Mother         Syndrome     Diabetes Mother     Alcohol abuse Father     Liver disease Father         hepatic failure     Hypertension Father     Depression Brother  Thyroid disease Brother     Alcohol abuse Brother     Substance Abuse Brother     Depression Maternal Uncle     Substance Abuse Brother     Heart disease Neg Hx     Cancer Neg Hx     Thyroid cancer Neg Hx        Social History:  Social History     Socioeconomic History    Marital status: /Civil Union     Spouse name: Not on file    Number of children: 0    Years of education: some college    Highest education level: Some college, no degree   Occupational History    Occupation: [employer]     Comment: employed full time   Tobacco Use    Smoking status: Never Smoker    Smokeless tobacco: Never Used   Vaping Use    Vaping Use: Never used   Substance and Sexual Activity    Alcohol use: Yes     Comment: Rarely    Drug use: No    Sexual activity: Yes     Partners: Female   Other Topics Concern    Not on file   Social History Narrative    Daily caffeine consumption      Social Determinants of Health     Financial Resource Strain:     Difficulty of Paying Living Expenses:    Food Insecurity:     Worried About Running Out of Food in the Last Year:     Ran Out of Food in the Last Year:    Transportation Needs:     Lack of Transportation (Medical):  Lack of Transportation (Non-Medical):    Physical Activity:     Days of Exercise per Week:     Minutes of Exercise per Session:    Stress:     Feeling of Stress :    Social Connections:     Frequency of Communication with Friends and Family:     Frequency of Social Gatherings with Friends and Family:     Attends [employer] Services:     Active Member of Clubs or Organizations:     Attends Club or Organization Meetings:     Marital Status:    Intimate Partner Violence:     Fear of Current or Ex-Partner:     Emotionally Abused:     Physically Abused:     Sexually Abused:        I have reviewed the patient's medical history in detail; there are no changes to the history as noted in the electronic medical record      Objective traZODone (DESYREL) 50 mg tablet Take 1 tablet (50 mg total) by mouth daily at bedtime as needed for sleep 90 tablet 0     No current facility-administered medications for this visit           Health Maintenance     Health Maintenance   Topic Date Due    Hepatitis C Screening  Never done    HIV Screening  Never done    Annual Physical  03/16/2021    Influenza Vaccine (1) 09/01/2021    BMI: Followup Plan  05/27/2022    Depression Remission PHQ  08/04/2022    BMI: Adult  08/13/2022    DTaP,Tdap,and Td Vaccines (2 - Td or Tdap) 02/11/2023    COVID-19 Vaccine  Completed    Pneumococcal Vaccine: Pediatrics (0 to 5 Years) and At-Risk Patients (6 to 59 Years)  Aged Out    HIB Vaccine  Aged Out    Hepatitis B Vaccine  Aged Out    IPV Vaccine  Aged Out    Hepatitis A Vaccine  Aged Out    Meningococcal ACWY Vaccine  Aged Out    HPV Vaccine  Aged [employer] History   Administered Date(s) Administered    Influenza, recombinant, quadrivalent,injectable, preservative free 11/04/2019    Influenza, seasonal, injectable 01/04/2017    Influenza, seasonal, injectable, preservative free 10/01/2018    SARS-CoV-2 / COVID-19 mRNA IM (Pfizer-BioNTech) 05/05/2021, 05/27/2021    Tdap 02/11/2013       AMRITA Chaudhry

## 2021-08-13 NOTE — LETTER
August 13, 2021     Patient: Gabriel Lang   YOB: 1990   Date of Visit: 8/13/2021       To Whom it May Concern:    Claudell Scrape is under my professional care  He was seen in my office on 8/13/2021  He may return to work on 08/15/2021  If you have any questions or concerns, please don't hesitate to call           Sincerely,          AMRITA Duval        CC: No Recipients

## 2021-08-20 ENCOUNTER — TELEMEDICINE (OUTPATIENT)
Dept: PSYCHIATRY | Facility: CLINIC | Age: 31
End: 2021-08-20
Payer: COMMERCIAL

## 2021-08-20 DIAGNOSIS — F33.1 MODERATE EPISODE OF RECURRENT MAJOR DEPRESSIVE DISORDER (HCC): ICD-10-CM

## 2021-08-20 DIAGNOSIS — F33.41 RECURRENT MAJOR DEPRESSIVE DISORDER, IN PARTIAL REMISSION (HCC): ICD-10-CM

## 2021-08-20 DIAGNOSIS — F41.1 GENERALIZED ANXIETY DISORDER: Primary | ICD-10-CM

## 2021-08-20 PROCEDURE — 99214 OFFICE O/P EST MOD 30 MIN: CPT | Performed by: PSYCHIATRY & NEUROLOGY

## 2021-08-20 RX ORDER — BUPROPION HYDROCHLORIDE 150 MG/1
150 TABLET ORAL DAILY
Qty: 90 TABLET | Refills: 0 | Status: SHIPPED | OUTPATIENT
Start: 2021-08-20 | End: 2021-11-12 | Stop reason: SDUPTHER

## 2021-08-20 RX ORDER — TRAZODONE HYDROCHLORIDE 50 MG/1
50 TABLET ORAL
Qty: 90 TABLET | Refills: 0 | Status: SHIPPED | OUTPATIENT
Start: 2021-08-20 | End: 2021-11-12 | Stop reason: SDUPTHER

## 2021-08-20 RX ORDER — BUPROPION HYDROCHLORIDE 300 MG/1
300 TABLET ORAL DAILY
Qty: 90 TABLET | Refills: 0 | Status: SHIPPED | OUTPATIENT
Start: 2021-08-20 | End: 2021-11-12 | Stop reason: SDUPTHER

## 2021-08-20 NOTE — PSYCH
Patient ID: Jerman Nguyen is a 32 y o  male  Subjective:  Patient seen for follow up, last seen by me 3 months ago, although he has been in [facility] for several week until last week  His current meds include Wellbutrin XL now 450 mg daily, Lexapro 20 mg daily, Trazodone 50 mg hs prn  He feels that he has been doing better after being in the [facility]  He indicates that he has been using his coping skills  He had been having bad depression and suicidal thoughts, which started around the end of June  Stressors were related to work and relationship stressors w his wife  He is still having job stressors, which include coworker and management problems  His depression his much less, only a few days per week  He is looking for another job now  He has been sleeping somewhat poor due to sleep apnea, sometimes he uses trazodone  He has no CPAP machine presently  His anxiety has been fair  He denies hopelessness or thoghts of death  Review Of Systems:     Mood Euthymic   Thought Content Normal   General As HPI   Physical symptoms No physical complaints       Laboratory Results: No results found for this or any previous visit  Objective:       Mental status:  Appearance calm and cooperative , adequate hygiene and grooming and good eye contact    Mood euthymic   Affect affect appropriate    Speech Normal rate and Normal volume   Thought Processes coherent/organized   Hallucinations no hallucinations present    Thought Content no delusional thoughts verbalized   Abnormal Thoughts no suicidal thoughts  and no homicidal thoughts    Orientation A+O x 3       Assessment/Plan:       Diagnoses and all orders for this visit:    Generalized anxiety disorder  -     traZODone (DESYREL) 50 mg tablet; Take 1 tablet (50 mg total) by mouth daily at bedtime as needed for sleep    Recurrent major depressive disorder, in partial remission (HCC)  -     buPROPion (WELLBUTRIN XL) 300 mg 24 hr tablet;  Take 1 tablet (300 mg total) by mouth daily    Moderate episode of recurrent major depressive disorder (HCC)  -     buPROPion (WELLBUTRIN XL) 150 mg 24 hr tablet; Take 1 tablet (150 mg total) by mouth daily Take with the 300mg tab for a total of 450mg daily            Treatment Recommendations- Risks Benefits    Continue w current meds, has 6 months remaining from PCP ordered Lexapro 20 mg daily, contine w that, plus Wellbutrin  mg daily, trazodone 50 mg hs  Risks, Benefits And Possible Side Effects Of Medications:  Risks, benefits, and possible side effects of medications explained to patient and patient verbalizes understanding                  Virtual Regular Visit    Verification of patient location:    Patient is located in the following state in which I hold an active license [place]      Assessment/Plan:    Problem List Items Addressed This Visit        Other    Generalized anxiety disorder - Primary    Relevant Medications    buPROPion (WELLBUTRIN XL) 300 mg 24 hr tablet    buPROPion (WELLBUTRIN XL) 150 mg 24 hr tablet    traZODone (DESYREL) 50 mg tablet    Moderate episode of recurrent major depressive disorder (HCC)    Relevant Medications    buPROPion (WELLBUTRIN XL) 300 mg 24 hr tablet    buPROPion (WELLBUTRIN XL) 150 mg 24 hr tablet    traZODone (DESYREL) 50 mg tablet      Other Visit Diagnoses     Recurrent major depressive disorder, in partial remission (HCC)        Relevant Medications    buPROPion (WELLBUTRIN XL) 300 mg 24 hr tablet    buPROPion (WELLBUTRIN XL) 150 mg 24 hr tablet    traZODone (DESYREL) 50 mg tablet          Goals addressed in session: Goal 1          Reason for visit is No chief complaint on file  Encounter provider Luis Montgaue MD    Provider located at [facility] [address]  [facility] [address]  [facility] [address] [facility] [address] [phone]      Recent Visits  No visits were found meeting these conditions    Showing recent visits within past 7 days and meeting all other requirements  Future Appointments  No visits were found meeting these conditions  Showing future appointments within next 150 days and meeting all other requirements       The patient was identified by name and date of birth  Gerson aCpps was informed that this is a telemedicine visit and that the visit is being conducted through[employer] [place] and patient was informed that this is a secure, HIPAA-compliant platform  He agrees to proceed     My office door was closed  No one else was in the room  He acknowledged consent and understanding of privacy and security of the video platform  The patient has agreed to participate and understands they can discontinue the visit at any time  Patient is aware this is a billable service  Subjective  Gerson Capps is a 32 y o  male see above   HPI     Past Medical History:   Diagnosis Date    Anxiety     Asthma     Carpal tunnel syndrome, bilateral     [place]  [place] 9/12/17   R   9/12/17     GERD without esophagitis     Moderate episode of recurrent major depressive disorder ([address] ) 8/22/2018    Pilonidal cyst with abscess     [place]   10/25/13   R   6/10/14     Vitamin D deficiency        Past Surgical History:   Procedure Laterality Date    FOOT SURGERY      PILONIDAL CYST DRAINAGE      Incision and drainage of pilonidal cyst     NH WRIST Tavon Ink LIG Right 7/12/2018    Procedure: RELEASE CARPAL TUNNEL ENDOSCOPIC;  Surgeon: Arsh Roberts MD;  [facility];  Service: Orthopedics    NH WRIST Tavon Ink LIG Left 7/26/2018    Procedure: RELEASE CARPAL TUNNEL ENDOSCOPIC;  Surgeon: rAsh Roberts MD;  [facility];  Service: Orthopedics       Current Outpatient Medications   Medication Sig Dispense Refill    buPROPion (WELLBUTRIN XL) 150 mg 24 hr tablet Take 1 tablet (150 mg total) by mouth daily Take with the 300mg tab for a total of 450mg daily 90 tablet 0    buPROPion (WELLBUTRIN XL) 300 mg 24 hr tablet Take 1 tablet (300 mg total) by mouth daily 90 tablet 0    Cholecalciferol (VITAMIN D-3) 1000 units CAPS Take 2 capsules by mouth daily      escitalopram (LEXAPRO) 20 mg tablet TAKE 1 TABLET BY MOUTH EVERY DAY 90 tablet 3    fluticasone (FLONASE) 50 mcg/act nasal spray 1 spray into each nostril as needed for rhinitis      levothyroxine 25 mcg tablet TAKE 1 TABLET BY MOUTH DAILY IN THE EARLY MORNING 90 tablet 1    multivitamin (THERAGRAN) TABS Take 1 tablet by mouth daily      traZODone (DESYREL) 50 mg tablet Take 1 tablet (50 mg total) by mouth daily at bedtime as needed for sleep 90 tablet 0     No current facility-administered medications for this visit  No Known Allergies    Review of Systems    Video Exam    There were no vitals filed for this visit  Physical Exam     I spent 30 minutes directly with the patient during this visit    [address] [place] [address] verbally agrees to participate in [employer]  Pt is aware that [employer] could be limited without vital signs or the ability to perform a full hands-on physical Jose Fine understands he or the provider may request at any time to terminate the video visit and request the patient to seek care or treatment in person

## 2021-08-29 ENCOUNTER — APPOINTMENT (EMERGENCY)
Dept: ULTRASOUND IMAGING | Facility: HOSPITAL | Age: 31
End: 2021-08-29
Payer: COMMERCIAL

## 2021-08-29 ENCOUNTER — HOSPITAL ENCOUNTER (EMERGENCY)
Facility: HOSPITAL | Age: 31
Discharge: HOME | End: 2021-08-29
Attending: EMERGENCY MEDICINE | Admitting: EMERGENCY MEDICINE
Payer: COMMERCIAL

## 2021-08-29 VITALS
DIASTOLIC BLOOD PRESSURE: 82 MMHG | TEMPERATURE: 97.6 F | HEART RATE: 78 BPM | OXYGEN SATURATION: 95 % | SYSTOLIC BLOOD PRESSURE: 156 MMHG | RESPIRATION RATE: 16 BRPM

## 2021-08-29 DIAGNOSIS — N50.812 PAIN IN BOTH TESTICLES: ICD-10-CM

## 2021-08-29 DIAGNOSIS — N50.811 PAIN IN BOTH TESTICLES: ICD-10-CM

## 2021-08-29 DIAGNOSIS — S39.012A STRAIN OF LUMBAR REGION, INITIAL ENCOUNTER: Primary | ICD-10-CM

## 2021-08-29 LAB
BACTERIA UR QL AUTO: NORMAL /HPF
BILIRUB UR QL STRIP: NEGATIVE
CLARITY UR: CLEAR
COLOR UR: ABNORMAL
GLUCOSE UR STRIP-MCNC: NEGATIVE MG/DL
HGB UR QL STRIP.AUTO: ABNORMAL
KETONES UR STRIP-MCNC: NEGATIVE MG/DL
LEUKOCYTE ESTERASE UR QL STRIP: NEGATIVE
NITRITE UR QL STRIP: NEGATIVE
NON-SQ EPI CELLS URNS QL MICRO: NORMAL /HPF
PH UR STRIP.AUTO: 6 [PH] (ref 4.5–8)
PROT UR STRIP-MCNC: NEGATIVE MG/DL
RBC #/AREA URNS AUTO: NORMAL /HPF
SP GR UR STRIP.AUTO: >=1.03 (ref 1–1.03)
UROBILINOGEN UR QL STRIP.AUTO: 0.2 E.U./DL
WBC #/AREA URNS AUTO: NORMAL /HPF

## 2021-08-29 PROCEDURE — 99284 EMERGENCY DEPT VISIT MOD MDM: CPT | Performed by: EMERGENCY MEDICINE

## 2021-08-29 PROCEDURE — 81001 URINALYSIS AUTO W/SCOPE: CPT

## 2021-08-29 PROCEDURE — 96372 THER/PROPH/DIAG INJ SC/IM: CPT

## 2021-08-29 PROCEDURE — 76870 US EXAM SCROTUM: CPT

## 2021-08-29 PROCEDURE — 99284 EMERGENCY DEPT VISIT MOD MDM: CPT

## 2021-08-29 RX ORDER — KETOROLAC TROMETHAMINE 30 MG/ML
30 INJECTION, SOLUTION INTRAMUSCULAR; INTRAVENOUS ONCE
Status: COMPLETED | OUTPATIENT
Start: 2021-08-29 | End: 2021-08-29

## 2021-08-29 RX ORDER — NAPROXEN 500 MG/1
500 TABLET ORAL 2 TIMES DAILY WITH MEALS
Qty: 20 TABLET | Refills: 0 | Status: SHIPPED | OUTPATIENT
Start: 2021-08-29 | End: 2021-11-12

## 2021-08-29 RX ORDER — HYDROCODONE BITARTRATE AND ACETAMINOPHEN 5; 325 MG/1; MG/1
2 TABLET ORAL ONCE
Status: COMPLETED | OUTPATIENT
Start: 2021-08-29 | End: 2021-08-29

## 2021-08-29 RX ORDER — METHOCARBAMOL 500 MG/1
1000 TABLET, FILM COATED ORAL ONCE
Status: COMPLETED | OUTPATIENT
Start: 2021-08-29 | End: 2021-08-29

## 2021-08-29 RX ORDER — HYDROCODONE BITARTRATE AND ACETAMINOPHEN 5; 325 MG/1; MG/1
1-2 TABLET ORAL EVERY 6 HOURS PRN
Qty: 12 TABLET | Refills: 0 | Status: SHIPPED | OUTPATIENT
Start: 2021-08-29 | End: 2021-11-12

## 2021-08-29 RX ADMIN — METHOCARBAMOL 1000 MG: 500 TABLET ORAL at 20:57

## 2021-08-29 RX ADMIN — HYDROCODONE BITARTRATE AND ACETAMINOPHEN 2 TABLET: 5; 325 TABLET ORAL at 22:00

## 2021-08-29 RX ADMIN — KETOROLAC TROMETHAMINE 30 MG: 30 INJECTION, SOLUTION INTRAMUSCULAR; INTRAVENOUS at 20:55

## 2021-08-29 NOTE — Clinical Note
Adele Hinds was seen and treated in our emergency department on 8/29/2021  May work as tolerated    Diagnosis: Low back strain    Arn Generous  may return to work on return date  He may return on this date: 09/01/2021         If you have any questions or concerns, please don't hesitate to call        Pipe Salazar MD    ______________________________           _______________          _______________  Hospital Representative                              Date                                Time

## 2021-08-30 NOTE — ED PROVIDER NOTES
History  Chief Complaint   Patient presents with    Back Pain     pt reports low back pain after trying to move a cough, also c/o testicle pain     Testicle Pain       History provided by:  Patient   used: No    19-year-old male presented for evaluation of acute low back pain occurring while lifting a couch this afternoon  States it felt like a pop with midline pain  No radiation into the legs  No bowel or bladder dysfunction  No paresthesias or sensory loss  No difficulty with ambulation  He does have increased pain with movement in all directions  No history of significant back injury in the past   Pain is moderate, constant, localized  He also reports about an hour later he developed testicular aching  Denies any specific injury, any lumps, rashes  Differential diagnosis includes muscular strain of the low back, scrotal hernia, testicular torsion, unlikely disc herniation, L-spine fracture  Plan pain control, testicular ultrasound, UA and will re-evaluate  Prior to Admission Medications   Prescriptions Last Dose Informant Patient Reported? Taking?    Cholecalciferol (VITAMIN D-3) 1000 units CAPS  Self Yes No   Sig: Take 2 capsules by mouth daily   buPROPion (WELLBUTRIN XL) 150 mg 24 hr tablet   No No   Sig: Take 1 tablet (150 mg total) by mouth daily Take with the 300mg tab for a total of 450mg daily   buPROPion (WELLBUTRIN XL) 300 mg 24 hr tablet   No No   Sig: Take 1 tablet (300 mg total) by mouth daily   escitalopram (LEXAPRO) 20 mg tablet   No No   Sig: TAKE 1 TABLET BY MOUTH EVERY DAY   fluticasone (FLONASE) 50 mcg/act nasal spray  Self Yes No   Si spray into each nostril as needed for rhinitis   levothyroxine 25 mcg tablet   No No   Sig: TAKE 1 TABLET BY MOUTH DAILY IN THE EARLY MORNING   multivitamin (THERAGRAN) TABS  Self Yes No   Sig: Take 1 tablet by mouth daily   traZODone (DESYREL) 50 mg tablet   No No   Sig: Take 1 tablet (50 mg total) by mouth daily at bedtime as needed for sleep      Facility-Administered Medications: None       Past Medical History:   Diagnosis Date    Anxiety     Asthma     Carpal tunnel syndrome, bilateral     [place]  [facility] [place] [facility] Workman 9/12/17   R   9/12/17     GERD without esophagitis     Moderate episode of recurrent major depressive disorder ([facility] [address] ) 8/22/2018    Pilonidal cyst with abscess     [place]   10/25/13   R   6/10/14     Vitamin D deficiency        Past Surgical History:   Procedure Laterality Date    FOOT SURGERY      PILONIDAL CYST DRAINAGE      Incision and drainage of pilonidal cyst     ID WRIST Myrene Hill LIG Right 7/12/2018    Procedure: RELEASE CARPAL TUNNEL ENDOSCOPIC;  Surgeon: Aaron Smith MD;  [facility];  Service: Orthopedics    ID WRIST Myrene Hill LIG Left 7/26/2018    Procedure: RELEASE CARPAL TUNNEL ENDOSCOPIC;  Surgeon: Aaron Smith MD;  [facility];  Service: Orthopedics       Family History   Problem Relation Age of Onset    Depression Mother     Obesity Mother     Stroke Mother         Syndrome     Diabetes Mother     Alcohol abuse Father     Liver disease Father         hepatic failure     Hypertension Father     Depression Brother     Thyroid disease Brother     Alcohol abuse Brother     Substance Abuse Brother     Depression Maternal Uncle     Substance Abuse Brother     Heart disease Neg Hx     Cancer Neg Hx     Thyroid cancer Neg Hx      I have reviewed and agree with the history as documented      E-Cigarette/Vaping    E-Cigarette Use Never User      E-Cigarette/Vaping Substances    Nicotine No     THC No     CBD No     Flavoring No     Other No     Unknown No      Social History     Tobacco Use    Smoking status: Never Smoker    Smokeless tobacco: Never Used   Vaping Use    Vaping Use: Never used   Substance Use Topics    Alcohol use: Yes     Comment: Rarely    Drug use: No       Review of Systems   Constitutional: Negative for activity change, appetite change, fatigue and fever  Respiratory: Negative for chest tightness and shortness of breath  Cardiovascular: Negative for leg swelling  Gastrointestinal: Negative for abdominal pain, nausea and vomiting  Genitourinary: Positive for testicular pain  Negative for difficulty urinating, dysuria, flank pain, genital sores, hematuria, penile pain and scrotal swelling  Musculoskeletal: Positive for back pain  Negative for neck pain  Skin: Negative for color change and rash  Neurological: Negative for dizziness, weakness, numbness and headaches  All other systems reviewed and are negative  Physical Exam  Physical Exam  Vitals and nursing note reviewed  Constitutional:       Appearance: Normal appearance  HENT:      Head: Normocephalic and atraumatic  Cardiovascular:      Rate and Rhythm: Normal rate  Pulmonary:      Effort: Pulmonary effort is normal  No respiratory distress  Abdominal:      General: There is no distension  Palpations: Abdomen is soft  Tenderness: There is no abdominal tenderness  Genitourinary:     Penis: Normal        Comments: Minimal bilateral testicular tenderness  No scrotal edema, erythema  No palpable scrotal hernia  No inguinal hernia  Musculoskeletal:         General: Normal range of motion  Cervical back: Normal range of motion and neck supple  Comments: Low back pain with movement of the trunk  Some tenderness of the L-spine and paraspinal tissues  Skin:     General: Skin is warm and dry  Findings: No erythema or rash  Neurological:      General: No focal deficit present  Mental Status: He is alert and oriented to person, place, and time  Sensory: No sensory deficit  Motor: No weakness     Psychiatric:         Mood and Affect: Mood normal          Behavior: Behavior normal          Vital Signs  ED Triage Vitals   Temperature Pulse Respirations Blood Pressure SpO2   08/29/21 1733 08/29/21 1733 08/29/21 1733 08/29/21 1733 08/29/21 1733   97 6 °F (36 4 °C) 89 18 150/93 97 %      Temp Source Heart Rate Source Patient Position - Orthostatic VS BP Location FiO2 (%)   08/29/21 1733 08/29/21 1733 08/29/21 2115 08/29/21 2115 --   Oral Monitor Sitting Right arm       Pain Score       08/29/21 2200       8           Vitals:    08/29/21 1733 08/29/21 2115 08/29/21 2200   BP: 150/93 141/84 156/82   Pulse: 89 78 78   Patient Position - Orthostatic VS:  Sitting          Visual Acuity      ED Medications  Medications   methocarbamol (ROBAXIN) tablet 1,000 mg (1,000 mg Oral Given 8/29/21 2057)   ketorolac (TORADOL) injection 30 mg (30 mg Intramuscular Given 8/29/21 2055)   HYDROcodone-acetaminophen (NORCO) 5-325 mg per tablet 2 tablet (2 tablets Oral Given 8/29/21 2200)       Diagnostic Studies  Results Reviewed     Procedure Component Value Units Date/Time    Urine Microscopic [[id number]]  (Normal) Collected: 08/29/21 2112    Lab Status: Final result Specimen: Urine Updated: 08/29/21 2137     RBC, UA 0-5 /hpf      WBC, UA 0-5 /hpf      Epithelial Cells Occasional /hpf      Bacteria, UA Occasional /hpf     Urine Macroscopic, POC [[id number]]  (Abnormal) Collected: 08/29/21 2112    Lab Status: Final result Specimen: Urine Updated: 08/29/21 2114     Color, UA Vane     Clarity, UA Clear     pH, UA 6 0     Leukocytes, UA Negative     Nitrite, UA Negative     Protein, UA Negative mg/dl      Glucose, UA Negative mg/dl      Ketones, UA Negative mg/dl      Urobilinogen, UA 0 2 E U /dl      Bilirubin, UA Negative     Blood, UA Trace     Specific Gravity, UA >=1 030    Narrative:      CLINITEK RESULT                 US scrotum and testicles   Final Result by Mayito Samuel MD (08/29 2144)       Unremarkable testes bilaterally with normal Doppler flow         Workstation performed: [id number]                    Procedures  Procedures         ED Course                                           MDM  Number of Diagnoses or Management Options  Pain in both testicles: new and requires workup  Strain of lumbar region, initial encounter: new and requires workup  Diagnosis management comments: 29-year-old male presented for evaluation acute low back pain after lifting a couch earlier today  Stated about an hour after that he developed bilateral testicular pain  Minimal testicular tenderness on exam   Ultrasound unremarkable  UA unremarkable  Some tenderness of the lumbar spine and paraspinal musculature  No neurologic deficits  Has pain with movement but able to sit, ambulate  Some improvement with pain meds here  Given note for work and advised continued analgesics  Referred to [facility]  He continues to have testicular pain advised follow-up with Urology  Return to ED if symptoms worsen  Amount and/or Complexity of Data Reviewed  Clinical lab tests: ordered and reviewed  Tests in the radiology section of CPT®: ordered and reviewed    Patient Progress  Patient progress: improved      Disposition  Final diagnoses:   Strain of lumbar region, initial encounter   Pain in both testicles     Time reflects when diagnosis was documented in both MDM as applicable and the Disposition within this note     Time User Action Codes Description Comment    8/29/2021 10:26 PM Allison Pandya [S39 012A] Strain of lumbar region, initial encounter     8/29/2021 10:26 PM Pedro Mcdowell [[id number],  N50 812] Pain in both testicles       ED Disposition     ED Disposition Condition Date/Time Comment    Discharge Stable Sun Aug 29, 2021 10:29 PM Mireya Cook discharge to home/self care              Follow-up Information     Follow up With Specialties Details Why Contact Info Additional Information    Dillan Macdonald MD [facility]   [address] [place] [address] 56       [address] [facility]   [phone]    [address] [facility]  As needed [address] [facility], [address], [facility], [address] , [address]          Discharge Medication List as of 8/29/2021 10:29 PM      START taking these medications    Details   HYDROcodone-acetaminophen (NORCO) 5-325 mg per tablet Take 1-2 tablets by mouth every 6 (six) hours as needed for painMax Daily Amount: 8 tablets, Starting Sun 8/29/2021, Normal      naproxen (NAPROSYN) 500 mg tablet Take 1 tablet (500 mg total) by mouth 2 (two) times a day with meals, Starting Sun 8/29/2021, Normal         CONTINUE these medications which have NOT CHANGED    Details   !! buPROPion (WELLBUTRIN XL) 150 mg 24 hr tablet Take 1 tablet (150 mg total) by mouth daily Take with the 300mg tab for a total of 450mg daily, Starting Fri 8/20/2021, Normal      !! buPROPion (WELLBUTRIN XL) 300 mg 24 hr tablet Take 1 tablet (300 mg total) by mouth daily, Starting Fri 8/20/2021, Normal      Cholecalciferol (VITAMIN D-3) 1000 units CAPS Take 2 capsules by mouth daily, Historical Med      escitalopram (LEXAPRO) 20 mg tablet TAKE 1 TABLET BY MOUTH EVERY DAY, Normal      fluticasone (FLONASE) 50 mcg/act nasal spray 1 spray into each nostril as needed for rhinitis, Historical Med      levothyroxine 25 mcg tablet TAKE 1 TABLET BY MOUTH DAILY IN THE EARLY MORNING, Normal      multivitamin (THERAGRAN) TABS Take 1 tablet by mouth daily, Historical Med      traZODone (DESYREL) 50 mg tablet Take 1 tablet (50 mg total) by mouth daily at bedtime as needed for sleep, Starting Fri 8/20/2021, Normal       !! - Potential duplicate medications found  Please discuss with provider  No discharge procedures on file      PDMP Review       Value Time User    PDMP Reviewed  Yes 8/29/2021  9:40 PM Thuy Montana MD          ED Provider  Electronically Signed by           Thuy Montana MD  08/29/21 7732

## 2021-08-31 ENCOUNTER — OFFICE VISIT (OUTPATIENT)
Dept: FAMILY MEDICINE CLINIC | Facility: CLINIC | Age: 31
End: 2021-08-31
Payer: COMMERCIAL

## 2021-08-31 VITALS
HEIGHT: 72 IN | TEMPERATURE: 98.4 F | SYSTOLIC BLOOD PRESSURE: 108 MMHG | DIASTOLIC BLOOD PRESSURE: 78 MMHG | WEIGHT: 315 LBS | RESPIRATION RATE: 20 BRPM | HEART RATE: 89 BPM | BODY MASS INDEX: 42.66 KG/M2

## 2021-08-31 DIAGNOSIS — M54.50 ACUTE BILATERAL LOW BACK PAIN WITHOUT SCIATICA: Primary | ICD-10-CM

## 2021-08-31 PROCEDURE — 1036F TOBACCO NON-USER: CPT | Performed by: NURSE PRACTITIONER

## 2021-08-31 PROCEDURE — 99213 OFFICE O/P EST LOW 20 MIN: CPT | Performed by: NURSE PRACTITIONER

## 2021-08-31 PROCEDURE — 3008F BODY MASS INDEX DOCD: CPT | Performed by: NURSE PRACTITIONER

## 2021-08-31 RX ORDER — CYCLOBENZAPRINE HCL 10 MG
10 TABLET ORAL 2 TIMES DAILY PRN
Qty: 20 TABLET | Refills: 0 | Status: SHIPPED | OUTPATIENT
Start: 2021-08-31 | End: 2021-11-12

## 2021-08-31 NOTE — ASSESSMENT & PLAN NOTE
Low back pain following lifting of couch on Saturday with associated testicular pain  Reviewed ER evaluation, no abnormal findings on scrotal US  Would like to have pt try muscle relaxer as needed in addition to use of naproxen as needed; continue to avoid overuse of prescribed norco   Pt also referred for PT evaluation  Pt's job is very physical, recommended a week off to recover and can return thereafter to his next scheduled shift    Call if sx worsen or persist

## 2021-08-31 NOTE — LETTER
August 31, 2021     Patient: Shruthi Hillman   YOB: 1990   Date of Visit: 8/31/2021       To Whom it May Concern:    Atiya Saucedo is under my professional care  He was seen in my office on 8/31/2021  He is advised to take a week of rest from work to recover and may return to work on 9/12/21  If you have any questions or concerns, please don't hesitate to call           Sincerely,          AMRITA Padilla        CC: No Recipients

## 2021-08-31 NOTE — PROGRESS NOTES
Assessment/Plan:    Acute bilateral low back pain without sciatica  Low back pain following lifting of couch on Saturday with associated testicular pain  Reviewed ER evaluation, no abnormal findings on scrotal US  Would like to have pt try muscle relaxer as needed in addition to use of naproxen as needed; continue to avoid overuse of prescribed norco   Pt also referred for PT evaluation  Pt's job is very physical, recommended a week off to recover and can return thereafter to his next scheduled shift  Call if sx worsen or persist          Diagnoses and all orders for this visit:    Acute bilateral low back pain without sciatica  -     cyclobenzaprine (FLEXERIL) 10 mg tablet; Take 1 tablet (10 mg total) by mouth 2 (two) times a day as needed for muscle spasms  -     Ambulatory referral to Physical Therapy; Future          Subjective:      Patient ID: Camryn Kohler is a 32 y o  male  Pt is a 32 y o  y/o male who is seen today for evaluation of back pain  PMH of hypothyroidism, pineal gland cyst, JIMI, morbid obesity, JON, MDD  Pt states he pulled his back out on Sunday lifting a couch  He started to have testicular pain and went to the ER for evaluation  The did a scrotal US which was unremarkable  He was given rx for norco and naproxen  He did get some relief with 2 tabs last night, has not taken any yet today  He still complains of midline low back pain and testicular pain  He describes back pain as sharp and like muscle tightness  Testicular pain is more of a squeezing pain  Rates pain 7-8/10; slightly improved today from yesterday  Pain does not radiate to the extremities, denies numbness or tingling  Pain is increased with standing, twisting, and bending over  He denies trouble urinating, dysuria  He works in packaging, 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 tomorrow, he does not feel he is ready to go back          The following portions of the patient's history were reviewed and updated as appropriate: allergies, current medications, past family history, past medical history, past social history, past surgical history and problem list     Review of Systems   Constitutional: Negative for appetite change, chills, fatigue and fever  Respiratory: Negative for shortness of breath  Cardiovascular: Negative for chest pain and palpitations  Genitourinary: Positive for testicular pain  Musculoskeletal: Positive for back pain and gait problem  Negative for arthralgias, joint swelling, myalgias and neck pain  Neurological: Negative for dizziness, weakness and numbness  Objective:      /78 (BP Location: Left arm, Patient Position: Sitting, Cuff Size: Thigh)   Pulse 89   Temp 98 4 °F (36 9 °C) (Temporal)   Resp 20   Ht 6' 0 03" (1 83 m)   Wt (!) 183 kg (403 lb 3 2 oz)   BMI 54 64 kg/m²          Physical Exam  Vitals reviewed  Constitutional:       General: He is awake  He is not in acute distress  Appearance: Normal appearance  He is well-developed and well-groomed  He is morbidly obese  He is diaphoretic  Cardiovascular:      Rate and Rhythm: Normal rate and regular rhythm  Pulses: Normal pulses  Heart sounds: Normal heart sounds  No murmur heard  Pulmonary:      Effort: Pulmonary effort is normal    Musculoskeletal:      Cervical back: Normal range of motion  Lumbar back: Spasms and tenderness present  Decreased range of motion (rom limited by pain)  Skin:     General: Skin is warm  Findings: No erythema or rash  Neurological:      Mental Status: He is alert and oriented to person, place, and time  Sensory: Sensation is intact  Motor: Motor function is intact  No abnormal muscle tone  Deep Tendon Reflexes: Reflexes are normal and symmetric     Psychiatric:         Attention and Perception: Attention normal          Mood and Affect: Mood normal          Speech: Speech normal          Behavior: Behavior normal  Behavior is cooperative  Thought Content:  Thought content normal          Cognition and Memory: Cognition normal          Judgment: Judgment normal

## 2021-09-07 ENCOUNTER — EVALUATION (OUTPATIENT)
Dept: PHYSICAL THERAPY | Age: 31
End: 2021-09-07
Payer: COMMERCIAL

## 2021-09-07 DIAGNOSIS — M54.50 ACUTE BILATERAL LOW BACK PAIN WITHOUT SCIATICA: Primary | ICD-10-CM

## 2021-09-07 PROCEDURE — 97110 THERAPEUTIC EXERCISES: CPT | Performed by: PHYSICAL THERAPIST

## 2021-09-07 PROCEDURE — 97161 PT EVAL LOW COMPLEX 20 MIN: CPT | Performed by: PHYSICAL THERAPIST

## 2021-09-07 NOTE — PROGRESS NOTES
PT Evaluation     Today's date: 2021  Patient name: Bryon Hsu  : 1990  MRN: [id number]  Referring provider: AMRITA Gant  Dx:   Encounter Diagnosis     ICD-10-CM    1  Acute bilateral low back pain without sciatica  M54 5                   Assessment  Assessment details: Pt reports to PT with cc of lumbar and R groin pain that began 1 week ago while moving couch  Pt would benefit from core strengthening/extension based program, with groin pain likely due to hip flexor strain  Impairments: abnormal gait, abnormal muscle firing, abnormal muscle tone, abnormal or restricted ROM, abnormal movement, activity intolerance, impaired physical strength, lacks appropriate home exercise program, poor posture  and poor body mechanics    Goals  In 4 weeks pt will:  -Be independent with phase I of HEP  -Increase LE strength by 1/2 grade  -Increase Lumber ROM by 25%    By discharge pt will:  -Be independent with Phase II of HEP  -Demonstrate full LE strength  -Demonstrate full Lumbar ROM  -Report minimal pain with ADLs    Plan  Patient would benefit from: skilled physical therapy  Planned therapy interventions: joint mobilization, manual therapy, abdominal trunk stabilization, neuromuscular re-education, patient education, therapeutic activities, therapeutic exercise, strengthening, stretching, functional ROM exercises and home exercise program  Frequency: 2x week  Duration in weeks: 6  Plan of Care beginning date: 2021  Plan of Care expiration date: 10/19/2021  Treatment plan discussed with: patient and PTA        Subjective Evaluation    History of Present Illness  Mechanism of injury: Pt reports to PT with cc of lumbar and R groin pain that began 1 week ago while moving couch  Pt has not worked since incident as he does frequent lifting up to 50lbs at work  Pt reports prolonged sitting increased pain           Objective     Active Range of Motion     Additional Active Range of Motion Details  Lumbar ROM as % of normal ROM    Flex:50  Ext:75  R ROT:50  L ROT:50      Strength/Myotome Testing     Left Hip   Planes of Motion   Flexion: 4  Abduction: 3+    Right Hip   Planes of Motion   Flexion: 3+  Abduction: 3+    Left Knee   Flexion: 4  Extension: 4    Right Knee   Flexion: 4-  Extension: 4-    Left Ankle/Foot   Dorsiflexion: 4  Plantar flexion: 4    Right Ankle/Foot   Dorsiflexion: 4  Plantar flexion: 4             Precautions: anxiety      Manuals                                                                 Neuro Re-Ed                                                                                                        Ther Ex                          pallof press 6# 2x10 ea            selena press down 10# x20            HEP                                                                 Ther Activity                                       Gait Training                                       Modalities

## 2021-09-10 ENCOUNTER — TELEPHONE (OUTPATIENT)
Dept: FAMILY MEDICINE CLINIC | Facility: CLINIC | Age: 31
End: 2021-09-10

## 2021-09-10 NOTE — TELEPHONE ENCOUNTER
Patient calling to request an extension on his leave from work  States he is still experiencing back pain   Due to return 9/12

## 2021-09-13 ENCOUNTER — OFFICE VISIT (OUTPATIENT)
Dept: SLEEP CENTER | Facility: CLINIC | Age: 31
End: 2021-09-13
Payer: COMMERCIAL

## 2021-09-13 VITALS
HEIGHT: 73 IN | WEIGHT: 315 LBS | DIASTOLIC BLOOD PRESSURE: 82 MMHG | BODY MASS INDEX: 41.75 KG/M2 | HEART RATE: 113 BPM | SYSTOLIC BLOOD PRESSURE: 130 MMHG

## 2021-09-13 DIAGNOSIS — G47.19 EXCESSIVE DAYTIME SLEEPINESS: ICD-10-CM

## 2021-09-13 DIAGNOSIS — R09.81 NASAL CONGESTION: ICD-10-CM

## 2021-09-13 DIAGNOSIS — E66.01 MORBID OBESITY (HCC): ICD-10-CM

## 2021-09-13 DIAGNOSIS — F41.9 ANXIETY AND DEPRESSION: ICD-10-CM

## 2021-09-13 DIAGNOSIS — J35.1 TONSILLAR HYPERTROPHY: ICD-10-CM

## 2021-09-13 DIAGNOSIS — F32.A ANXIETY AND DEPRESSION: ICD-10-CM

## 2021-09-13 DIAGNOSIS — G47.09 OTHER INSOMNIA: ICD-10-CM

## 2021-09-13 DIAGNOSIS — G47.33 OBSTRUCTIVE SLEEP APNEA SYNDROME: Primary | ICD-10-CM

## 2021-09-13 PROCEDURE — 99215 OFFICE O/P EST HI 40 MIN: CPT | Performed by: INTERNAL MEDICINE

## 2021-09-13 PROCEDURE — 3008F BODY MASS INDEX DOCD: CPT | Performed by: INTERNAL MEDICINE

## 2021-09-13 PROCEDURE — 1036F TOBACCO NON-USER: CPT | Performed by: INTERNAL MEDICINE

## 2021-09-13 NOTE — PROGRESS NOTES
Follow-Up Note - Sleep Center   Ady Tesfaye  32 y o  male  GCO:7/24/0269  UJE:[id number]  DOS:9/13/2021    CC: I saw this patient for follow-up in clinic today for obstructive sleep apnea, Coexisting Sleep and Medical Problems  He was seen by Dr Cristina Murrell  in the past, diagnosed with obstructive sleep apnea and prescribed CPAP  He lost the machine because of noncompliance and is here today seeking a replacement CPAP machine  Home sleep testing in 2019 demonstrated a respiratory event index of 11 per hour with minimum oxygen saturation of 82%  PFSH, Problem List, Medications & Allergies were reviewed in EMR  Interval changes: none reported  He  has a past medical history of Anxiety, Asthma, Carpal tunnel syndrome, bilateral, GERD without esophagitis, Moderate episode of recurrent major depressive disorder ([facility] [address] ) (8/22/2018), Pilonidal cyst with abscess, and Vitamin D deficiency  He has a current medication list which includes the following prescription(s): bupropion, bupropion, vitamin d-3, cyclobenzaprine, escitalopram, fluticasone, hydrocodone-acetaminophen, levothyroxine, multivitamin, naproxen, and trazodone  HPI:  He attributes noncompliance due to depression  He was also experiencing some difficulties tolerating CPAP  He has ongoing symptoms of sleep disordered breathing as outlined in previous reports:  Notably snoring, frequent interruptions of sleep and nocturia  Sleep is un refreshing  He reported no features of movement disorder of parasomnia  Sleep Routine: Chaim Fleischer reports getting 4-6 hrs sleep on work days and a little more on days off ; he has difficulty initiating and maintaining sleep   He was prescribed trazodone as a sleep aid by psychiatrist and feels it does help  He arises with aid of an alarm but never feels rested  Chaim Fleischer reports Excessive Daytime Sleepiness, feels like napping & does when has the opportunity   He rated himself at Total score: 17 /24 on the [place] Sleepiness Scale  Habits: reports that he has never smoked  He has never used smokeless tobacco ,  reports current alcohol use ,  reports no history of drug use , Caffeine use: limited  , Exercise routine: none   ROS: reviewed & as attached  Significant for few lb weight gain since his last visit  He reports constant nasal congestion  He reported no respiratory or cardiac symptoms  He uses Tums as needed for acid reflux  Mood he is now stable on current medications  EXAM: /82   Pulse (!) 113   Ht 6' 1" (1 854 m)   Wt (!) 182 kg (401 lb 12 8 oz)   BMI 53 01 kg/m²     Patient is well groomed; well appearing  H&N: EOMI; NC/AT:no facial pressure marks, no rashes  Nasal airway:  Reduced airflow  Oral airway:  Crowded; base of tongue is at Mallampati IV; there is 2+ tonsillar hypertrophy   Psych: cooperativeand in no distress  Mental state appears anxious and has a constricted affect  CNS: Alert, orientated, clear & coherent speech  Skin/Extrem: col & hydration normal; no edema  Resp:  Respiratory effort is normal; and teres good bilaterally  There are no wheezes or rales  Abd:  Truncal obesity  MSK:  Normal muscle strength  Normal gait and balance    IMPRESSION: Diagnoses, Problem List Items & Comorbidities Addressed this Visit   1  Obstructive sleep apnea syndrome  Cpap DME   2  Other insomnia     3  Anxiety and depression     4  Excessive daytime sleepiness     5  Nasal congestion     6  Tonsillar hypertrophy     7  Morbid obesity ([address] )         PLAN:  1  I reviewed results of the Sleep studies with the patient and previous clinic records  2  With respect to above conditions, I counseled on pathophysiology, diagnosis, treatment options, risks and benefits; inter-relationship and effects on symptoms and comorbidities; risks of no treatment; costs and insurance aspects     3  Because of his nasal symptoms, he is not a good candidate for mandibular advancement device and tonsillectomy main not adequately remediated sleep disordered breathing because of his weight  Patient elected to re-initiate positive airway pressure therapy in spite of previous difficulties he had tolerating it  Care coordinated with the DME provider for set-up  Pressure settin-12 cm H2O  He is aware that a repeat study may be necessary if his insurance mandates  4  Need for compliance with therapy and weight reduction were emphasized  5  Nasal symptoms may improve with regular nasal saline rinse followed by topical nasal steroid if necessary  6  Multi component Cognitive behavioral therapy for Insomnia undertaken - Sleep Restriction, Stimulus control, Relaxation techniques and Sleep hygiene were discussed  Specifically, avoiding daytime naps, starting an exercise routine and on relaxation techniques  He may continue trazodone  7  Follow-up to be scheduled in 2 months to monitor compliance, progress and to adjust therapy  Thank you for allowing me to participate in the care of this patient  I will keep you apprised of developments  Sincerely,    Authenticated electronically by Kelechi Hernandez MD on    Board Certified Specialist     Portions of the record may have been created with voice recognition software  Occasional wrong word or "sound a like" substitutions may have occurred due to the inherent limitations of voice recognition software  There may also be notations and random deletions of words or characters from malfunctioning software  Read the chart carefully and recognize, using context, where substitutions/deletions have occurred

## 2021-09-13 NOTE — PATIENT INSTRUCTIONS

## 2021-09-13 NOTE — PROGRESS NOTES
Review of Systems      Genitourinary need to urinate more than twice a night   Cardiology none   Gastrointestinal frequent heartburn/acid reflux   Neurology forgetfulness, poor concentration or confusion,  and difficulty with memory   Constitutional fatigue   Integumentary none   Psychiatry anxiety and depression   Musculoskeletal back pain   Pulmonary none   ENT none   Endocrine frequent urination   Hematological none

## 2021-09-14 ENCOUNTER — TELEPHONE (OUTPATIENT)
Dept: SLEEP CENTER | Facility: CLINIC | Age: 31
End: 2021-09-14

## 2021-09-14 ENCOUNTER — APPOINTMENT (OUTPATIENT)
Dept: PHYSICAL THERAPY | Age: 31
End: 2021-09-14
Payer: COMMERCIAL

## 2021-09-15 ENCOUNTER — TELEPHONE (OUTPATIENT)
Dept: PSYCHIATRY | Facility: CLINIC | Age: 31
End: 2021-09-15

## 2021-09-15 ENCOUNTER — OFFICE VISIT (OUTPATIENT)
Dept: PHYSICAL THERAPY | Age: 31
End: 2021-09-15
Payer: COMMERCIAL

## 2021-09-15 DIAGNOSIS — M54.50 ACUTE BILATERAL LOW BACK PAIN WITHOUT SCIATICA: Primary | ICD-10-CM

## 2021-09-15 PROCEDURE — 97110 THERAPEUTIC EXERCISES: CPT | Performed by: SPECIALIST/TECHNOLOGIST

## 2021-09-15 PROCEDURE — 97112 NEUROMUSCULAR REEDUCATION: CPT | Performed by: SPECIALIST/TECHNOLOGIST

## 2021-09-15 NOTE — TELEPHONE ENCOUNTER
Faxed Record Request to [phone]  [employer] is requesting records for his disability claim

## 2021-09-15 NOTE — PROGRESS NOTES
Daily Note     Today's date: 9/15/2021  Patient name: Mireya Cook  : 1990  MRN: [id number]  Referring provider: AMRITA Cheema  Dx:   Encounter Diagnosis     ICD-10-CM    1  Acute bilateral low back pain without sciatica  M54 5                   Subjective: Pt reports improvement in LBP since IE  Objective: See treatment diary below    Assessment: Pt challenged with therex assigned this visit, good TA activation and core stabilization noted with therex  Tolerated treatment well  Patient demonstrated fatigue post treatment, exhibited good technique with therapeutic exercises and would benefit from continued PT    Plan: Continue per plan of care  Progress treatment as tolerated         Precautions: anxiety      Manuals  9/15                                                               Neuro Re-Ed             Prone Alt UE/LE  30x           P [facility] L/R/C  10x5s ea           Bird Dogs  30x                                                                Ther Ex             TM  10'           pallof press 6# 2x10 ea 10# 2x15           selena press down 10# x20 16# 20x           Prone Press Ups  2x10                         Sled Push/Pull  25# 5x 50ft                        Cybex Hip Abd  35# 30x           Cybex Leg Press  70# 30x                                                                                                      Ther Activity                                       Gait Training                                       Modalities

## 2021-09-16 ENCOUNTER — OFFICE VISIT (OUTPATIENT)
Dept: PHYSICAL THERAPY | Age: 31
End: 2021-09-16
Payer: COMMERCIAL

## 2021-09-16 DIAGNOSIS — M54.50 ACUTE BILATERAL LOW BACK PAIN WITHOUT SCIATICA: Primary | ICD-10-CM

## 2021-09-16 PROCEDURE — 97110 THERAPEUTIC EXERCISES: CPT | Performed by: SPECIALIST/TECHNOLOGIST

## 2021-09-16 PROCEDURE — 97112 NEUROMUSCULAR REEDUCATION: CPT | Performed by: SPECIALIST/TECHNOLOGIST

## 2021-09-16 NOTE — PROGRESS NOTES
Daily Note     Today's date: 2021  Patient name: Geronimo Beyer  : 1990  MRN: [id number]  Referring provider: [employer], CRNP  Dx:   Encounter Diagnosis     ICD-10-CM    1  Acute bilateral low back pain without sciatica  M54 5                   Subjective: Pt reports muscular soreness following initial tx session  Objective: See treatment diary below    Assessment: No progressions to POC this visit given soreness and short duration between tx sessions  Tolerated treatment well  Patient demonstrated fatigue post treatment, exhibited good technique with therapeutic exercises and would benefit from continued PT    Plan: Continue per plan of care  Progress treatment as tolerated             Precautions: anxiety    Manuals  9/15 9/16                                                              Neuro Re-Ed             Prone Alt UE/LE  30x 30x          P [facility] L/R/C  10x5s ea 15x5s          Bird Dogs  30x  30x                                                              Ther Ex             TM  10' 10'          pallof press 6# 2x10 ea 10# 2x15 10# 2x15          selena press down 10# x20 16# 20x 16# 30x          Prone Press Ups  2x10  3x10                       Sled Push/Pull  25# 5x 50ft 25# 5x50ft                       Cybex Hip Abd  35# 30x 35# 30x          Cybex Leg Press  70# 30x 70# 30x                                                                                                     Ther Activity                                       Gait Training                                       Modalities

## 2021-09-22 ENCOUNTER — APPOINTMENT (OUTPATIENT)
Dept: PHYSICAL THERAPY | Age: 31
End: 2021-09-22
Payer: COMMERCIAL

## 2021-09-23 ENCOUNTER — TELEPHONE (OUTPATIENT)
Dept: SLEEP CENTER | Facility: CLINIC | Age: 31
End: 2021-09-23

## 2021-09-23 ENCOUNTER — APPOINTMENT (OUTPATIENT)
Dept: PHYSICAL THERAPY | Age: 31
End: 2021-09-23
Payer: COMMERCIAL

## 2021-09-23 NOTE — TELEPHONE ENCOUNTER
Patient requires a new sleep study to move forward with this order  His last study was in 2019 and he's returned his machine 2x due to compliance issues  So a new study is required before he can get setup  Thank you

## 2021-09-27 NOTE — TELEPHONE ENCOUNTER
Left message for the patient that appointment for CPAP set up on 9/29/2021 is canceled  Left call back message to discuss  Patient needs to schedule sleep study

## 2021-09-29 ENCOUNTER — OFFICE VISIT (OUTPATIENT)
Dept: PHYSICAL THERAPY | Age: 31
End: 2021-09-29
Payer: COMMERCIAL

## 2021-09-29 DIAGNOSIS — M54.50 ACUTE BILATERAL LOW BACK PAIN WITHOUT SCIATICA: Primary | ICD-10-CM

## 2021-09-29 PROCEDURE — 97110 THERAPEUTIC EXERCISES: CPT | Performed by: SPECIALIST/TECHNOLOGIST

## 2021-09-29 PROCEDURE — 97112 NEUROMUSCULAR REEDUCATION: CPT | Performed by: SPECIALIST/TECHNOLOGIST

## 2021-09-29 NOTE — PROGRESS NOTES
Daily Note     Today's date: 2021  Patient name: Ole Hay  : 1990  MRN: [id number]  Referring provider: AMRITA Carter  Dx:   Encounter Diagnosis     ICD-10-CM    1  Acute bilateral low back pain without sciatica  M54 5                   Subjective: Pt reports increased LBP from a lot of lifting recently  Objective: See treatment diary below    Assessment: Lumbar ext based therex remains appropriate  Pt challenged with current reps and resistance  Tolerated treatment well  Patient demonstrated fatigue post treatment, exhibited good technique with therapeutic exercises and would benefit from continued PT  Plan: Continue per plan of care  Progress treatment as tolerated            Precautions: anxiety    Manuals  9/15 9/16 9/29                                                             Neuro Re-Ed             Prone Alt UE/LE  30x 30x 30x          P [facility] L/R/C  10x5s ea 15x5s 20x3s         Bird Dogs  30x  30x 30x                                                             Ther Ex             TM  10' 10' 10'         pallof press 6# 2x10 ea 10# 2x15 10# 2x15 12# 2x15         selena press down 10# x20 16# 20x 16# 30x 18# 30x         Prone Press Ups  2x10  3x10 3x10 from bolster         Prone Lying on Bolster    5 min         Sled Push/Pull  25# 5x 50ft 25# 5x50ft np                      Cybex Hip Abd  35# 30x 35# 30x 40# 3x15         Cybex Leg Press  70# 30x 70# 30x np                                                                                                    Ther Activity                                       Gait Training                                       Modalities

## 2021-09-29 NOTE — TELEPHONE ENCOUNTER
Left message 2nd message for the patient to call back to schedule sleep study  Phone number for central scheduling also provided

## 2021-09-30 ENCOUNTER — OFFICE VISIT (OUTPATIENT)
Dept: PHYSICAL THERAPY | Age: 31
End: 2021-09-30
Payer: COMMERCIAL

## 2021-09-30 DIAGNOSIS — M54.50 ACUTE BILATERAL LOW BACK PAIN WITHOUT SCIATICA: Primary | ICD-10-CM

## 2021-09-30 PROCEDURE — 97110 THERAPEUTIC EXERCISES: CPT | Performed by: SPECIALIST/TECHNOLOGIST

## 2021-09-30 PROCEDURE — 97112 NEUROMUSCULAR REEDUCATION: CPT | Performed by: SPECIALIST/TECHNOLOGIST

## 2021-09-30 NOTE — PROGRESS NOTES
Daily Note     Today's date: 2021  Patient name: Travis Clancy  : 1990  MRN: [id number]  Referring provider: AMRITA Washington  Dx:   Encounter Diagnosis     ICD-10-CM    1  Acute bilateral low back pain without sciatica  M54 5                   Subjective: Pt reports he went for a walk today and had mild LBP sx  Objective: See treatment diary below    Assessment: No reproduction of LBP with therex assigned this visit  Tolerated treatment well  Patient demonstrated fatigue post treatment, exhibited good technique with therapeutic exercises and would benefit from continued PT  Plan: Continue per plan of care  Progress treatment as tolerated  Plan to incorporate box lifting activities to mimic work related tasks next visit as tolerated          Precautions: anxiety    Manuals  9/15 9/16 9/29 9/30                                                            Neuro Re-Ed             Prone Alt UE/LE  30x 30x 30x  30x        P [facility] L/R/C  10x5s ea 15x5s 20x3s 30x C Supine        Bird Dogs  30x  30x 30x 30x        TA w LE ext     2x20                                               Ther Ex             TM  10' 10' 10' 10'        pallof press 6# 2x10 ea 10# 2x15 10# 2x15 12# 2x15 12# 30x ea        selena press down 10# x20 16# 20x 16# 30x 18# 30x 18# 30x        Prone Press Ups  2x10  3x10 3x10 from bolster 3x10 from bolster         Prone Lying on Bolster    5 min 5 min        Sled Push/Pull  25# 5x 50ft 25# 5x50ft np 50# 3x50ft                     Cybex Hip Abd  35# 30x 35# 30x 40# 3x15 40# 3x15        Cybex Leg Press  70# 30x 70# 30x np 70# 3x15                                                                                                   Ther Activity                                       Gait Training                                       Modalities

## 2021-10-28 ENCOUNTER — TELEPHONE (OUTPATIENT)
Dept: PSYCHIATRY | Facility: CLINIC | Age: 31
End: 2021-10-28

## 2021-11-12 ENCOUNTER — OFFICE VISIT (OUTPATIENT)
Dept: PSYCHIATRY | Facility: CLINIC | Age: 31
End: 2021-11-12
Payer: COMMERCIAL

## 2021-11-12 DIAGNOSIS — F33.41 RECURRENT MAJOR DEPRESSIVE DISORDER, IN PARTIAL REMISSION (HCC): ICD-10-CM

## 2021-11-12 DIAGNOSIS — F41.1 GENERALIZED ANXIETY DISORDER: Primary | ICD-10-CM

## 2021-11-12 DIAGNOSIS — F33.1 MODERATE EPISODE OF RECURRENT MAJOR DEPRESSIVE DISORDER (HCC): ICD-10-CM

## 2021-11-12 DIAGNOSIS — F41.9 ANXIETY AND DEPRESSION: ICD-10-CM

## 2021-11-12 DIAGNOSIS — F32.A ANXIETY AND DEPRESSION: ICD-10-CM

## 2021-11-12 PROBLEM — M51.9 INTERVERTEBRAL THORACIC DISC DISORDER: Status: ACTIVE | Noted: 2018-01-17

## 2021-11-12 PROBLEM — R79.89 ELEVATED TSH: Status: ACTIVE | Noted: 2017-11-22

## 2021-11-12 PROBLEM — M62.830 LUMBAR PARASPINAL MUSCLE SPASM: Status: ACTIVE | Noted: 2018-01-17

## 2021-11-12 PROBLEM — G56.03 BILATERAL CARPAL TUNNEL SYNDROME: Status: ACTIVE | Noted: 2017-09-12

## 2021-11-12 PROCEDURE — 99214 OFFICE O/P EST MOD 30 MIN: CPT | Performed by: STUDENT IN AN ORGANIZED HEALTH CARE EDUCATION/TRAINING PROGRAM

## 2021-11-12 RX ORDER — ESCITALOPRAM OXALATE 20 MG/1
20 TABLET ORAL DAILY
Qty: 90 TABLET | Refills: 0 | Status: SHIPPED | OUTPATIENT
Start: 2021-11-12 | End: 2022-01-17 | Stop reason: SDUPTHER

## 2021-11-12 RX ORDER — BUPROPION HYDROCHLORIDE 150 MG/1
150 TABLET ORAL DAILY
Qty: 90 TABLET | Refills: 0 | Status: SHIPPED | OUTPATIENT
Start: 2021-11-12 | End: 2022-01-27 | Stop reason: DRUGHIGH

## 2021-11-12 RX ORDER — BUPROPION HYDROCHLORIDE 300 MG/1
300 TABLET ORAL DAILY
Qty: 90 TABLET | Refills: 0 | Status: SHIPPED | OUTPATIENT
Start: 2021-11-12 | End: 2022-01-27 | Stop reason: DRUGHIGH

## 2021-11-12 RX ORDER — TRAZODONE HYDROCHLORIDE 50 MG/1
50 TABLET ORAL
Qty: 90 TABLET | Refills: 0 | Status: SHIPPED | OUTPATIENT
Start: 2021-11-12 | End: 2022-01-27 | Stop reason: SDUPTHER

## 2021-12-10 NOTE — PSYCH
Subjective:     Patient ID: Geronimo Beyer is a 32 y o  male  [facility] Clinical Progress Notes      Specialized Services Documentation  Therapist must complete separate progress note for each specific clinical activity in which the individual participated during the day  Allied Therapy   [id number]- Geronimo Beyer  actively shared in [facility] group focused on how to live out of our balanced mind and identifying signs that our brain is being unbalanced (utilizing more of the reasoning or emotional side)  Geronimo Beyer engaged in group by listening to music from an emotional mind perspective vs  Reasoning perspective and learning how to analyze a situation from a balance mind perspective  He also processed how his mind is unbalanced and what steps they can take to become more balanced  Group explored practice of active song listening, group processing and discussion, and journaling  Donna Johns was relatively quiet during group, however, he shared an emotional mind statement about how he felt annoyed when listening to a song and then provided an immediate balanced mind statement by synthesizing the logic component (I e  I felt annoyed because I heard this underlying repeating bass sound being played over and over again)  Given his statement, the group then processed what a balance mind reaction looked like given that information in any situation we face in real life  (I e  I am feeling annoyed I could turn off the stereo, ask someone to turn down the volume, put cancelling headphones on vs  unbalanced reaction-yelling at someone, throwing the stereo off of a table, etc )  Some effort noted toward treatment goal   Continue AT to encourage the development and practice of mindfulness strategies to alleviate symptoms and support wellness    Tx Plan Objective: 1 1, Therapist:  GERALDINE Lawrence    normal for race

## 2021-12-11 ENCOUNTER — OFFICE VISIT (OUTPATIENT)
Dept: URGENT CARE | Age: 31
End: 2021-12-11
Payer: COMMERCIAL

## 2021-12-11 VITALS — TEMPERATURE: 97.3 F | OXYGEN SATURATION: 98 % | HEART RATE: 80 BPM

## 2021-12-11 DIAGNOSIS — J02.0 STREP PHARYNGITIS: Primary | ICD-10-CM

## 2021-12-11 LAB — S PYO AG THROAT QL: POSITIVE

## 2021-12-11 PROCEDURE — 87880 STREP A ASSAY W/OPTIC: CPT | Performed by: NURSE PRACTITIONER

## 2021-12-11 PROCEDURE — 99213 OFFICE O/P EST LOW 20 MIN: CPT | Performed by: NURSE PRACTITIONER

## 2021-12-11 RX ORDER — AMOXICILLIN 875 MG/1
875 TABLET, COATED ORAL 2 TIMES DAILY
Qty: 14 TABLET | Refills: 0 | Status: SHIPPED | OUTPATIENT
Start: 2021-12-11 | End: 2021-12-14

## 2021-12-14 ENCOUNTER — OFFICE VISIT (OUTPATIENT)
Dept: FAMILY MEDICINE CLINIC | Facility: CLINIC | Age: 31
End: 2021-12-14
Payer: COMMERCIAL

## 2021-12-14 VITALS
HEIGHT: 73 IN | TEMPERATURE: 98.1 F | HEART RATE: 79 BPM | RESPIRATION RATE: 20 BRPM | SYSTOLIC BLOOD PRESSURE: 136 MMHG | OXYGEN SATURATION: 98 % | DIASTOLIC BLOOD PRESSURE: 92 MMHG | WEIGHT: 315 LBS | BODY MASS INDEX: 41.75 KG/M2

## 2021-12-14 DIAGNOSIS — J02.0 STREP PHARYNGITIS: Primary | ICD-10-CM

## 2021-12-14 PROBLEM — M51.9 INTERVERTEBRAL THORACIC DISC DISORDER: Status: RESOLVED | Noted: 2018-01-17 | Resolved: 2021-12-14

## 2021-12-14 PROBLEM — G89.29 CHRONIC BILATERAL LOW BACK PAIN WITHOUT SCIATICA: Status: ACTIVE | Noted: 2021-08-31

## 2021-12-14 PROBLEM — E34.8 PINEAL GLAND CYST: Status: RESOLVED | Noted: 2018-07-19 | Resolved: 2021-12-14

## 2021-12-14 PROBLEM — M62.830 LUMBAR PARASPINAL MUSCLE SPASM: Status: RESOLVED | Noted: 2018-01-17 | Resolved: 2021-12-14

## 2021-12-14 PROBLEM — R79.89 ELEVATED TSH: Status: RESOLVED | Noted: 2017-11-22 | Resolved: 2021-12-14

## 2021-12-14 PROBLEM — G56.03 BILATERAL CARPAL TUNNEL SYNDROME: Status: RESOLVED | Noted: 2017-09-12 | Resolved: 2021-12-14

## 2021-12-14 PROCEDURE — 3725F SCREEN DEPRESSION PERFORMED: CPT | Performed by: FAMILY MEDICINE

## 2021-12-14 PROCEDURE — 99213 OFFICE O/P EST LOW 20 MIN: CPT | Performed by: FAMILY MEDICINE

## 2021-12-14 RX ORDER — AMOXICILLIN 500 MG/1
1000 CAPSULE ORAL EVERY 8 HOURS SCHEDULED
Qty: 14 CAPSULE | Refills: 0 | Status: SHIPPED | OUTPATIENT
Start: 2021-12-17 | End: 2021-12-20

## 2021-12-14 RX ORDER — AMOXICILLIN 500 MG/1
1000 CAPSULE ORAL EVERY 8 HOURS SCHEDULED
Qty: 6 CAPSULE | Refills: 0 | Status: SHIPPED | OUTPATIENT
Start: 2021-12-17 | End: 2021-12-14

## 2021-12-21 ENCOUNTER — OFFICE VISIT (OUTPATIENT)
Dept: FAMILY MEDICINE CLINIC | Facility: CLINIC | Age: 31
End: 2021-12-21
Payer: COMMERCIAL

## 2021-12-21 VITALS
DIASTOLIC BLOOD PRESSURE: 90 MMHG | WEIGHT: 315 LBS | SYSTOLIC BLOOD PRESSURE: 130 MMHG | RESPIRATION RATE: 20 BRPM | TEMPERATURE: 97.5 F | OXYGEN SATURATION: 97 % | BODY MASS INDEX: 41.75 KG/M2 | HEART RATE: 102 BPM | HEIGHT: 73 IN

## 2021-12-21 DIAGNOSIS — B34.9 VIRAL INFECTION, UNSPECIFIED: Primary | ICD-10-CM

## 2021-12-21 PROCEDURE — 87636 SARSCOV2 & INF A&B AMP PRB: CPT | Performed by: NURSE PRACTITIONER

## 2021-12-21 PROCEDURE — 3008F BODY MASS INDEX DOCD: CPT | Performed by: NURSE PRACTITIONER

## 2021-12-21 PROCEDURE — 1036F TOBACCO NON-USER: CPT | Performed by: NURSE PRACTITIONER

## 2021-12-21 PROCEDURE — 99213 OFFICE O/P EST LOW 20 MIN: CPT | Performed by: NURSE PRACTITIONER

## 2021-12-23 ENCOUNTER — TELEPHONE (OUTPATIENT)
Dept: FAMILY MEDICINE CLINIC | Facility: CLINIC | Age: 31
End: 2021-12-23

## 2021-12-23 LAB
FLUAV RNA RESP QL NAA+PROBE: NEGATIVE
FLUBV RNA RESP QL NAA+PROBE: NEGATIVE
SARS-COV-2 RNA RESP QL NAA+PROBE: NEGATIVE

## 2021-12-24 DIAGNOSIS — J02.0 STREP PHARYNGITIS: Primary | ICD-10-CM

## 2021-12-24 RX ORDER — CEFUROXIME AXETIL 250 MG/1
250 TABLET ORAL EVERY 12 HOURS SCHEDULED
Qty: 20 TABLET | Refills: 0 | Status: SHIPPED | OUTPATIENT
Start: 2021-12-24 | End: 2022-01-03

## 2022-01-12 ENCOUNTER — TELEPHONE (OUTPATIENT)
Dept: FAMILY MEDICINE CLINIC | Facility: CLINIC | Age: 32
End: 2022-01-12

## 2022-01-12 NOTE — TELEPHONE ENCOUNTER
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

## 2022-01-13 NOTE — TELEPHONE ENCOUNTER
Patient called and stated that he needs the TB test for tomorrow and to have it read first things Monday morning    Please call to advise

## 2022-01-14 ENCOUNTER — CLINICAL SUPPORT (OUTPATIENT)
Dept: FAMILY MEDICINE CLINIC | Facility: CLINIC | Age: 32
End: 2022-01-14
Payer: COMMERCIAL

## 2022-01-14 DIAGNOSIS — Z11.1 TUBERCULOSIS SCREENING: Primary | ICD-10-CM

## 2022-01-14 PROCEDURE — 86580 TB INTRADERMAL TEST: CPT

## 2022-01-17 DIAGNOSIS — F41.9 ANXIETY AND DEPRESSION: ICD-10-CM

## 2022-01-17 DIAGNOSIS — F32.A ANXIETY AND DEPRESSION: ICD-10-CM

## 2022-01-17 RX ORDER — ESCITALOPRAM OXALATE 20 MG/1
20 TABLET ORAL DAILY
Qty: 90 TABLET | Refills: 0 | Status: SHIPPED | OUTPATIENT
Start: 2022-01-17 | End: 2022-01-27 | Stop reason: SDUPTHER

## 2022-01-17 NOTE — TELEPHONE ENCOUNTER
Joey Gave called to get his lexapro refilled  Script was written on 11/12 for a 90 day supply  Patient is out of medication and was told by [facility] that they do not have a script for him  [facility] verified that this scrip was "inactivated" and they were unable to fill it       Please send in a new script for Keyon Gonzalez does have a scheduled appointment on 1/27

## 2022-01-27 ENCOUNTER — TELEMEDICINE (OUTPATIENT)
Dept: PSYCHIATRY | Facility: CLINIC | Age: 32
End: 2022-01-27
Payer: COMMERCIAL

## 2022-01-27 DIAGNOSIS — F41.1 GENERALIZED ANXIETY DISORDER: ICD-10-CM

## 2022-01-27 DIAGNOSIS — G47.33 OBSTRUCTIVE SLEEP APNEA: ICD-10-CM

## 2022-01-27 DIAGNOSIS — F33.1 MODERATE EPISODE OF RECURRENT MAJOR DEPRESSIVE DISORDER (HCC): Primary | ICD-10-CM

## 2022-01-27 DIAGNOSIS — F41.9 ANXIETY AND DEPRESSION: ICD-10-CM

## 2022-01-27 DIAGNOSIS — F32.A ANXIETY AND DEPRESSION: ICD-10-CM

## 2022-01-27 PROCEDURE — 99214 OFFICE O/P EST MOD 30 MIN: CPT | Performed by: STUDENT IN AN ORGANIZED HEALTH CARE EDUCATION/TRAINING PROGRAM

## 2022-01-27 PROCEDURE — 90833 PSYTX W PT W E/M 30 MIN: CPT | Performed by: STUDENT IN AN ORGANIZED HEALTH CARE EDUCATION/TRAINING PROGRAM

## 2022-01-27 RX ORDER — ESCITALOPRAM OXALATE 20 MG/1
20 TABLET ORAL DAILY
Qty: 90 TABLET | Refills: 0 | Status: SHIPPED | OUTPATIENT
Start: 2022-01-27 | End: 2022-04-29 | Stop reason: SDUPTHER

## 2022-01-27 RX ORDER — TRAZODONE HYDROCHLORIDE 50 MG/1
50 TABLET ORAL
Qty: 90 TABLET | Refills: 0 | Status: SHIPPED | OUTPATIENT
Start: 2022-01-27 | End: 2022-04-29 | Stop reason: SDUPTHER

## 2022-01-27 RX ORDER — BUPROPION HYDROCHLORIDE 450 MG/1
450 TABLET, FILM COATED, EXTENDED RELEASE ORAL DAILY
Qty: 90 TABLET | Refills: 0 | Status: SHIPPED | OUTPATIENT
Start: 2022-01-27 | End: 2022-03-23

## 2022-01-27 NOTE — PSYCH
Virtual Regular Visit    Verification of patient location:    Patient is located in the following state in which I hold an active license [place]      Assessment/Plan:    Problem List Items Addressed This Visit        Respiratory    Obstructive sleep apnea       Other    Generalized anxiety disorder    Moderate episode of recurrent major depressive disorder ([facility] [address] ) - Primary      Other Visit Diagnoses     Anxiety and depression                Reason for visit is   Chief Complaint   Patient presents with    Virtual Regular Visit        Encounter provider Sonny Connell MD    Provider located at [facility] [address]  [facility] [address]  [facility] [address] [phone]      Recent Visits  No visits were found meeting these conditions  Showing recent visits within past 7 days and meeting all other requirements  Today's Visits  Date Type Provider Dept   01/27/22 Telemedicine Sonny Connell MD [facility]   Showing today's visits and meeting all other requirements  Future Appointments  No visits were found meeting these conditions  Showing future appointments within next 150 days and meeting all other requirements       The patient was identified by name and date of birth  Machelle Nan was informed that this is a telemedicine visit and that the visit is being conducted throughic Embedded and patient was informed this is a secure, HIPAA-complaint platform  He agrees to proceed     My office door was closed  No one else was in the room  He acknowledged consent and understanding of privacy and security of the video platform  The patient has agreed to participate and understands they can discontinue the visit at any time  Patient is aware this is a billable service  Subjective  See below      HPI     Past Medical History:   Diagnosis Date    Anxiety     Carpal tunnel syndrome, bilateral     [place]  Daniel Gentile 9/12/17   [place]   9/12/17     GERD without esophagitis  Moderate episode of recurrent major depressive disorder ([facility] [address] ) 8/22/2018    Pilonidal cyst with abscess     [place]   10/25/13   R   6/10/14     Vitamin D deficiency        Past Surgical History:   Procedure Laterality Date    FOOT SURGERY      PILONIDAL CYST DRAINAGE      Incision and drainage of pilonidal cyst     MT WRIST Criselda Frankel LIG Right 7/12/2018    Procedure: RELEASE CARPAL TUNNEL ENDOSCOPIC;  Surgeon: Erika Mclain MD;  Location:  MAIN OR;  Service: Orthopedics    MT WRIST Criselda Frankel LIG Left 7/26/2018    Procedure: RELEASE CARPAL TUNNEL ENDOSCOPIC;  Surgeon: Erika Mclain MD;  Location:  MAIN OR;  Service: Orthopedics       Current Outpatient Medications   Medication Sig Dispense Refill    Cholecalciferol (VITAMIN D-3) 1000 units CAPS Take 2 capsules by mouth daily      escitalopram (LEXAPRO) 20 mg tablet Take 1 tablet (20 mg total) by mouth daily 90 tablet 0    fluticasone (FLONASE) 50 mcg/act nasal spray 1 spray into each nostril as needed for rhinitis        levothyroxine 25 mcg tablet TAKE 1 TABLET BY MOUTH DAILY IN THE EARLY MORNING 90 tablet 1    multivitamin (THERAGRAN) TABS Take 1 tablet by mouth daily      traZODone (DESYREL) 50 mg tablet Take 1 tablet (50 mg total) by mouth daily at bedtime as needed for sleep 90 tablet 0     No current facility-administered medications for this visit  No Known Allergies    Review of Systems    Video Exam    There were no vitals filed for this visit  Physical Exam     I spent 30 minutes directly with the patient during this visit    [address] [place] [address] verbally agrees to participate in [facility]   Pt is aware that [facility] could be limited without vital signs or the ability to perform a full hands-on physical Yazan Espinoza understands he or the provider may request at any time to terminate the video visit and request the patient to seek care or treatment in person  MEDICATION MANAGEMENT NOTE        [facility]      Name and Date of Birth:  Machelle Cardenas 32 y o  1990 MRN: [id number]    Date of Visit: January 27, 2022    Reason for Visit: Follow-up visit regarding medication management     Chief Complaint:  "Have been feeling a little bit more anxious"  SUBJECTIVE:    Machelle Cardenas is a 32 y o , male, possessing a past psychiatric history significant for MDD, JON, medically complicated by JIMI, obesity, HTN, who was personally seen and evaluated at the [address] [place] [address] E outpatient clinic for follow-up regarding medication management  Kevin states that since their previous outpatient psychiatric appointment, he has been dealing with increased stress  He reports he is feeling more anxious due to various psychosocial stressors  Particularly stress from work and in process of trying to find a new job  He reports that his wife is no longer working and he felt some excess financial strain  He found himself ruminating about his finances at times  Describes feeling more tense at times  Some decreased energy  Sleep remains problematic, mostly feeling as though it is not entirely restful sleep  He does report that he has sleep medicine evaluation in the upcoming months  He is curious if they will evaluate to recommend tonsillectomy or correct his overbite  He did report some passive and fleeting suicidal thoughts when finances were particularly difficult a few weeks back, adamantly denied any plan and contract to safety if he did develop active suicidal thoughts  Otherwise he describes his appetite is stable, trying to eat more healthy here with the intent of losing weight  He is also trying to develop a routine/schedule and asked advice to help with this  He tolerated the increased to 450 mg of Wellbutrin without any major side effects    States that it did improve his mood although it has been difficult to fully evaluate this given the current stressors  Denies any guilt or hopelessness  No suicidal or homicidal ideations today  No auditory or visual hallucinations  No overt delusions are present  No obsessions  Presently, patient denies suicidal/homicidal ideation in addition to thoughts of self-injury; contracts for safety, see below for risk assessment  At conclusion of evaluation, patient is amenable to the recommendations of this writer including: therapy, medications  Also, patient is amenable to calling/contacting the outpatient office including this writer if any acute adverse effects of their medication regimen arise in addition to any comments or concerns pertaining to their psychiatric management  Patient is amenable to calling/contacting crisis and/or attending to the nearest emergency department if their clinical condition deteriorates to assure their safety and stability, stating that they are able to appropriately confide in their spouse regarding their psychiatric state  Current Rating Scores:     None completed today  Psychiatric Review Of Systems:    Unchanged information from this writer's previous assessment pertaining to pertinent history is copied and italicized; information that is changed is bolded      Appetite: no  Adverse eating: no  Weight changes: no  Insomnia/sleeplessness: decreased  Fatigue/anergy: increased  Anhedonia/lack of interest: no  Attention/concentration: no  Psychomotor agitation/retardation: no  Somatic symptoms: no  Anxiety/panic attack: yes  Gillian/hypomania: no  Hopelessness/helplessness/worthlessness: no  Self-injurious behavior/high-risk behavior: no  Suicidal ideation: no  Homicidal ideation: no  Auditory hallucinations: no  Visual hallucinations: no  Other perceptual disturbances: no  Delusional thinking: no  Obsessive/compulsive symptoms: no    Review Of Systems:      Constitutional negative   ENT negative Cardiovascular negative   Respiratory negative   Gastrointestinal negative   Genitourinary negative   Musculoskeletal negative   Integumentary negative   Neurological negative   Endocrine negative   Other Symptoms none, all other systems are negative     History Review: The following portions of the patient's history were reviewed and updated as appropriate: allergies, current medications, past family history, past medical history, past social history, past surgical history and problem list     Unchanged information from this writer's previous assessment pertaining to pertinent history is copied and italicized; information that is changed is bolded  OBJECTIVE:     Vital signs in last 24 hours: There were no vitals filed for this visit      Mental Status Evaluation:    Appearance age appropriate, casually dressed , overweight   Behavior cooperative, calm   Speech normal rate, normal volume, normal pitch   Mood euthymic   Affect normal range and intensity, appropriate   Thought Processes organized, goal directed   Associations intact associations   Thought Content no overt delusions   Perceptual Disturbances: no auditory hallucinations, no visual hallucinations   Abnormal Thoughts  Risk Potential Suicidal ideation - None  Homicidal ideation - None  Potential for aggression - No   Orientation oriented to person, place, time/date and situation   Memory recent and remote memory grossly intact   Consciousness alert and awake   Attention Span Concentration Span attention span and concentration are age appropriate   Intellect appears to be of average intelligence   Insight intact   Judgement intact   Muscle Strength and  Gait normal muscle strength and normal muscle tone, normal gait and normal balance   Motor activity no abnormal movements   Language no difficulty naming common objects, no difficulty repeating a phrase, no difficulty writing a sentence   Fund of Knowledge adequate knowledge of current events  adequate fund of knowledge regarding past history  adequate fund of knowledge regarding vocabulary    Pain none   Pain Scale 0       Laboratory Results: I have personally reviewed all pertinent laboratory/tests results    Recent Labs (last 2 months):   Office Visit on 12/21/2021   Component Date Value    SARS-CoV-2 12/21/2021 Negative     INFLUENZA A PCR 12/21/2021 Negative     INFLUENZA B PCR 12/21/2021 Negative    Office Visit on 12/11/2021   Component Date Value     RAPID STREP A 12/11/2021 Positive*       Suicide/Homicide Risk Assessment:    The following interventions are recommended: no intervention changes needed  Although patient's acute lethality risk is low, long-term/chronic lethality risk is mildly elevated in the presence of MDD, JON  At the current moment, Eber Li is future-oriented, forward-thinking, and demonstrates ability to act in a self-preserving manner as evidenced by volitionally presenting to the clinic today, seeking treatment  Additionally, Eber Li sits throughout the assessment wearing personal protective gear (i e  medical mask) in the context of the ongoing COVID-19 pandemic, suggesting a will and desire to live  At this juncture, inpatient hospitalization is not currently warranted  To mitigate future risk, patient should adhere to the recommendations of this writer, avoid alcohol/illicit substance use, utilize community-based resources and familiar support and prioritize mental health treatment  Assessment/Plan:     Eber Li was personally seen and evaluated today at the [address] [place] [address] [address] outpatient clinic for follow-up regarding medication management  He notes that he has experienced an increase in depressive symptoms, mainly some negative self image and ruminating negative thoughts  He has experienced decreased motivation decreased energy level although this appears to coincide with poor sleep    His sleep is most likely significantly impacted by his obstructive sleep apnea; he is in process to obtain a CPAP machine  He also does correlate an increase in depressive symptoms around the time he ran out of the 150 mg Wellbutrin XL tablets; he wishes to restart this  The plan will be to resume the medications, follow in 2 months, consider adjustments if needed  Jose Ingram reports that there is mild improvement in depressive symptoms  It has been difficult for him to fully evaluate the increase in the Wellbutrin due to current stressors, namely job-related and having to work more hours  Sleep remains problematic, though much of this seems related to obstructive sleep apnea  Strongly encouraged him to continue to follow-up with sleep medicine  Will continue the current medication regimen  Follow in 3 months  DSM-5 Diagnoses:      Major depressive disorder, recurrent, moderate; generalized anxiety disorder; obstructive sleep apnea    Treatment Recommendations/Precautions:     Continue Lexapro 20 mg daily for depression and anxiety   Wellbutrin adjusted to 450 mg for depression   Trazodone 50mg HS PRN for insomnia   Medication management every 3 months   Continue psychotherapy with own therapist   Aware of need to follow up with family physician for medical issues   Aware of 24 hour and weekend coverage for urgent situations accessed by calling [facility] main practice number    Medications Risks/Benefits      Risks, Benefits And Possible Side Effects Of Medications:    Risks, benefits, and possible side effects of medications explained to Jose Ingram including risk of suicidality and serotonin syndrome related to treatment with antidepressants  He verbalizes understanding and agreement for treatment  Controlled Medication Discussion:     Not applicable    Psychotherapy Provided:     Individual psychotherapy provided: Yes  Counseling was provided during the session today for 16 minutes    Medications, treatment progress and treatment plan reviewed with Christo Parrish  Medication changes discussed with Christo Parrish  Goals discussed during in session: improve anxiety and improve depression  Reassurance and supportive therapy provided  Discussed financial and work stresses  Motivational interviewing about following routine and schedule  Counseled on sleep pattern regulation and circadian rhythms      Treatment Plan:    Completed and signed during the session: Not applicable - Treatment Plan not due at this session     This note was not shared with the patient due to reasonable likelihood of causing patient harm    Fanny Cobian MD 01/27/22

## 2022-01-28 ENCOUNTER — TELEPHONE (OUTPATIENT)
Dept: PSYCHIATRY | Facility: CLINIC | Age: 32
End: 2022-01-28

## 2022-02-07 DIAGNOSIS — R79.89 ELEVATED TSH: ICD-10-CM

## 2022-02-07 RX ORDER — LEVOTHYROXINE SODIUM 0.03 MG/1
TABLET ORAL
Qty: 90 TABLET | Refills: 1 | Status: SHIPPED | OUTPATIENT
Start: 2022-02-07 | End: 2022-07-15 | Stop reason: SDUPTHER

## 2022-02-15 ENCOUNTER — OFFICE VISIT (OUTPATIENT)
Dept: FAMILY MEDICINE CLINIC | Facility: CLINIC | Age: 32
End: 2022-02-15
Payer: COMMERCIAL

## 2022-02-15 VITALS
WEIGHT: 315 LBS | RESPIRATION RATE: 18 BRPM | OXYGEN SATURATION: 97 % | TEMPERATURE: 97.5 F | HEART RATE: 100 BPM | SYSTOLIC BLOOD PRESSURE: 140 MMHG | BODY MASS INDEX: 41.75 KG/M2 | HEIGHT: 73 IN | DIASTOLIC BLOOD PRESSURE: 90 MMHG

## 2022-02-15 DIAGNOSIS — J35.1 ENLARGED TONSILS: ICD-10-CM

## 2022-02-15 DIAGNOSIS — N50.811 TESTICULAR PAIN, RIGHT: ICD-10-CM

## 2022-02-15 DIAGNOSIS — K52.9 GASTROENTERITIS: Primary | ICD-10-CM

## 2022-02-15 DIAGNOSIS — F33.2 SEVERE EPISODE OF RECURRENT MAJOR DEPRESSIVE DISORDER, WITHOUT PSYCHOTIC FEATURES (HCC): ICD-10-CM

## 2022-02-15 PROCEDURE — 99214 OFFICE O/P EST MOD 30 MIN: CPT | Performed by: NURSE PRACTITIONER

## 2022-02-15 NOTE — PROGRESS NOTES
FAMILY PRACTICE OFFICE VISIT       NAME: Mikal Raman  AGE: 32 y o  SEX: male       : 1990        MRN: [id number]    Assessment and Plan   1  Gastroenteritis  Comments:  Viral gastroenteritis  Work note provided  Will call if not continuing to improve over the next few days  2  Testicular pain, right  Comments:  Right testicular pain when supine, US of testes and scrotum unremarkable  Will refer to urology  Orders:  -     Ambulatory Referral to Urology; Future    3  Severe episode of recurrent major depressive disorder, without psychotic features ([facility] )  Comments: Following with psychiatry, struggles with depression at times  Recent increase in Bupropion  4  Enlarged tonsils  Comments:  Requests ENT referral for enlarged tonsils  Contact information provided  Orders:  -     Ambulatory Referral to Otolaryngology; Future         Chief Complaint     Chief Complaint   Patient presents with    Nausea     Pt is here for nausea, diarrhe and fatigue 2 + days       History of Present Illness     Mikal Raman is a 32year old male presenting today for nausea and diarrhea   started with nausea and diarrhea  Nausea little better, less intense  Diarrhea comign and going  Fatigue  Napping  Sweats on , nothing else  No fevers  No bloody stool  No vomiting  Keeping fluids and foods down  Small portions  No pain  No cramping  Popcorn at movie theatre, burgers at home for dinner Saturday  Kemi Tripathi, his wife, is not sick, was eating same foods  Symptoms are beginning to improve  Mood decent on current meds, still some depression  Increased bupropion recently, following with psychiatry       Lost 12 pounds trying to eat less portions  Review of Systems   Review of Systems   Constitutional: Positive for fatigue  Negative for chills, diaphoresis and fever  Gastrointestinal: Positive for diarrhea and nausea   Negative for abdominal pain, constipation and vomiting  Neurological: Negative for dizziness and headaches  I have reviewed the patient's medical history in detail; there are no changes to the history as noted in the electronic medical record  Objective     Vitals:    02/15/22 1425 02/15/22 1454   BP: 140/90    Pulse: (!) 108 100   Resp: 18    Temp: 97 5 °F (36 4 °C)    TempSrc: Temporal    SpO2: 97%    Weight: (!) 182 kg (401 lb)    Height: 6' 1" (1 854 m)      Wt Readings from Last 3 Encounters:   02/15/22 (!) 182 kg (401 lb)   12/21/21 (!) 187 kg (413 lb)   12/14/21 (!) 188 kg (414 lb 6 4 oz)     Physical Exam  Vitals and nursing note reviewed  Constitutional:       General: He is not in acute distress  Appearance: Normal appearance  He is not ill-appearing  HENT:      Head: Atraumatic  Cardiovascular:      Rate and Rhythm: Regular rhythm  Tachycardia present  Heart sounds: No murmur heard  Comments:   Pulmonary:      Effort: Pulmonary effort is normal  No respiratory distress  Breath sounds: Normal breath sounds  No wheezing or rales  Abdominal:      General: Bowel sounds are normal       Palpations: Abdomen is soft  Tenderness: There is no abdominal tenderness  Skin:     General: Skin is warm and dry  Findings: No rash  Neurological:      Mental Status: He is alert  Psychiatric:         Mood and Affect: Mood normal        BMI Counseling: Body mass index is 52 91 kg/m²  The BMI is above normal  Nutrition recommendations include decreasing portion sizes  Rationale for BMI follow-up plan is due to patient being overweight or obese   Congratulated on efforts on losing 12 lb since last office        ALLERGIES:  No Known Allergies    Current Medications     Current Outpatient Medications   Medication Sig Dispense Refill    buPROPion (Forfivo XL) 450 MG 24 hr tablet Take 1 tablet (450 mg total) by mouth daily 90 tablet 0    Cholecalciferol (VITAMIN D-3) 1000 units CAPS Take 2 capsules by mouth daily      escitalopram (LEXAPRO) 20 mg tablet Take 1 tablet (20 mg total) by mouth daily 90 tablet 0    fluticasone (FLONASE) 50 mcg/act nasal spray 1 spray into each nostril as needed for rhinitis        levothyroxine 25 mcg tablet TAKE 1 TABLET BY MOUTH DAILY IN THE EARLY MORNING 90 tablet 1    multivitamin (THERAGRAN) TABS Take 1 tablet by mouth daily      traZODone (DESYREL) 50 mg tablet Take 1 tablet (50 mg total) by mouth daily at bedtime as needed for sleep 90 tablet 0     No current facility-administered medications for this visit           Health Maintenance     Health Maintenance   Topic Date Due    Hepatitis C Screening  Never done    HIV Screening  Never done    Annual Physical  03/16/2021    Influenza Vaccine (1) 09/01/2021    PT PLAN OF CARE  10/07/2021    COVID-19 Vaccine (3 - Booster for Pfizer series) 10/27/2021    BMI: Followup Plan  05/27/2022    Depression Remission PHQ  12/14/2022    DTaP,Tdap,and Td Vaccines (2 - Td or Tdap) 02/11/2023    BMI: Adult  02/15/2023    Pneumococcal Vaccine: Pediatrics (0 to 5 Years) and At-Risk Patients (6 to 59 Years)  Aged Out    HIB Vaccine  Aged Out    Hepatitis B Vaccine  Aged Out    IPV Vaccine  Aged Out    Hepatitis A Vaccine  Aged Out    Meningococcal ACWY Vaccine  Aged Out    HPV Vaccine  Aged [employer] History   Administered Date(s) Administered    COVID-19 PFIZER VACCINE 0 3 ML IM 05/05/2021, 05/27/2021    Influenza, recombinant, quadrivalent,injectable, preservative free 11/04/2019    Influenza, seasonal, injectable 01/04/2017    Influenza, seasonal, injectable, preservative free 10/01/2018    Tdap 02/11/2013    Tuberculin Skin Test-PPD Intradermal 01/14/2022       AMRITA Flanagan

## 2022-02-15 NOTE — LETTER
February 15, 2022     Patient: Noreen Nageotte   YOB: 1990   Date of Visit: 2/15/2022       To Whom it May Concern:    Rajni Denins is under my professional care  He was seen in my office on 2/15/2022  He may return to work on 02/17/2022  Please excuse from 2/13/22 through 2/16/22  If you have any questions or concerns, please don't hesitate to call           Sincerely,          AMRITA Cisneros        CC: No Recipients

## 2022-02-19 NOTE — ASSESSMENT & PLAN NOTE
49-year-old male here for preop exam and clearance for bilateral carpal tunnel repair  Right CTR scheduled for 7/12 and left CTR scheduled for 7/26   No prior adverse reactions to anesthesia  Is able to walk an equivalent of 4 city blocks and climb flight of stairs without experiencing shortness of breath  Denies easy bleeding or bruising  Patient is low to moderate risk for intermediate risk procedure  Patient is at acceptable risk  May proceed  Reviewed ekg, bw and urine  Name: Erin Shipman            Admitted: 2/12/2022     Significant event:  Rapid response    Called to bedside because bright red blood per rectum, hypotension. On exam he had large lower GI bleed via the rectum. Patient is awake and able to talk. BP (!) 77/45   Pulse 95   Temp 98.9 °F (37.2 °C) (Oral)   Resp 16   Ht 6' (1.829 m)   Wt 220 lb 7.4 oz (100 kg)   SpO2 100%   BMI 29.90 kg/m²   · General appearance: acute distress due to lethargy   · Lungs: clear to auscultation bilaterally  · Heart: regular rate and rhythm, S1, S2 normal, no murmur, click, rub or gallop  · Abdomen: soft, non-tender; bowel sounds normal; no masses,  no organomegaly  · Neurologic: Mental status: Alert, oriented, thought content appropriate    A/P   1. Acute GI bleed   2. Acute blood loss anemia   3. Circulatory shock due to large bleed     Orders: Transfer to the ICU. Transfuse 2 units PRBC stat. 1 liter normal saline bolus given. 2 more units PRBC and platelet ordered by general surgery. Patient hemodynamically unstable (hypotensive and tachycardic) requiring pressor support. Both general surgery and GI paged multiple times and aware of patient's critical status. Code:Full Code  Disposition:     Total critical care time was 40 minutes, excluding separately reportable procedures. Services included in critical care time were chart data review, documentation time, obtaining information from patient, review of nursing notes, and vital sign assessment. There was a high probability of clinically significant life-threatening deterioration in the patient's condition, which required my urgent intervention.     Carolyne Yancey MD   2/19/2022  12:04 AM

## 2022-02-22 ENCOUNTER — OFFICE VISIT (OUTPATIENT)
Dept: FAMILY MEDICINE CLINIC | Facility: CLINIC | Age: 32
End: 2022-02-22
Payer: COMMERCIAL

## 2022-02-22 ENCOUNTER — APPOINTMENT (OUTPATIENT)
Dept: LAB | Facility: CLINIC | Age: 32
End: 2022-02-22
Payer: COMMERCIAL

## 2022-02-22 VITALS
HEIGHT: 73 IN | DIASTOLIC BLOOD PRESSURE: 90 MMHG | WEIGHT: 315 LBS | HEART RATE: 82 BPM | OXYGEN SATURATION: 97 % | BODY MASS INDEX: 41.75 KG/M2 | TEMPERATURE: 98.1 F | SYSTOLIC BLOOD PRESSURE: 120 MMHG | RESPIRATION RATE: 16 BRPM

## 2022-02-22 DIAGNOSIS — E55.9 VITAMIN D DEFICIENCY: ICD-10-CM

## 2022-02-22 DIAGNOSIS — R31.29 MICROSCOPIC HEMATURIA: ICD-10-CM

## 2022-02-22 DIAGNOSIS — F33.1 MODERATE EPISODE OF RECURRENT MAJOR DEPRESSIVE DISORDER (HCC): ICD-10-CM

## 2022-02-22 DIAGNOSIS — E03.9 ACQUIRED HYPOTHYROIDISM: ICD-10-CM

## 2022-02-22 DIAGNOSIS — F33.1 MODERATE EPISODE OF RECURRENT MAJOR DEPRESSIVE DISORDER (HCC): Primary | ICD-10-CM

## 2022-02-22 LAB
25(OH)D3 SERPL-MCNC: 31.9 NG/ML (ref 30–100)
ALBUMIN SERPL BCP-MCNC: 3.5 G/DL (ref 3.5–5)
ALP SERPL-CCNC: 91 U/L (ref 46–116)
ALT SERPL W P-5'-P-CCNC: 49 U/L (ref 12–78)
ANION GAP SERPL CALCULATED.3IONS-SCNC: 4 MMOL/L (ref 4–13)
AST SERPL W P-5'-P-CCNC: 23 U/L (ref 5–45)
BACTERIA UR QL AUTO: ABNORMAL /HPF
BILIRUB SERPL-MCNC: 0.59 MG/DL (ref 0.2–1)
BILIRUB UR QL STRIP: NEGATIVE
BUN SERPL-MCNC: 16 MG/DL (ref 5–25)
CALCIUM SERPL-MCNC: 9.5 MG/DL (ref 8.3–10.1)
CHLORIDE SERPL-SCNC: 108 MMOL/L (ref 100–108)
CHOLEST SERPL-MCNC: 197 MG/DL
CLARITY UR: CLEAR
CO2 SERPL-SCNC: 28 MMOL/L (ref 21–32)
COLOR UR: YELLOW
CREAT SERPL-MCNC: 0.99 MG/DL (ref 0.6–1.3)
ERYTHROCYTE [DISTWIDTH] IN BLOOD BY AUTOMATED COUNT: 12.8 % (ref 11.6–15.1)
GFR SERPL CREATININE-BSD FRML MDRD: 101 ML/MIN/1.73SQ M
GLUCOSE P FAST SERPL-MCNC: 95 MG/DL (ref 65–99)
GLUCOSE UR STRIP-MCNC: NEGATIVE MG/DL
HCT VFR BLD AUTO: 48.6 % (ref 36.5–49.3)
HDLC SERPL-MCNC: 38 MG/DL
HGB BLD-MCNC: 15.8 G/DL (ref 12–17)
HGB UR QL STRIP.AUTO: ABNORMAL
KETONES UR STRIP-MCNC: NEGATIVE MG/DL
LDLC SERPL CALC-MCNC: 133 MG/DL (ref 0–100)
LEUKOCYTE ESTERASE UR QL STRIP: ABNORMAL
MCH RBC QN AUTO: 28.3 PG (ref 26.8–34.3)
MCHC RBC AUTO-ENTMCNC: 32.5 G/DL (ref 31.4–37.4)
MCV RBC AUTO: 87 FL (ref 82–98)
NITRITE UR QL STRIP: NEGATIVE
NON-SQ EPI CELLS URNS QL MICRO: ABNORMAL /HPF
NONHDLC SERPL-MCNC: 159 MG/DL
PH UR STRIP.AUTO: 6 [PH]
PLATELET # BLD AUTO: 280 THOUSANDS/UL (ref 149–390)
PMV BLD AUTO: 9.3 FL (ref 8.9–12.7)
POTASSIUM SERPL-SCNC: 4.2 MMOL/L (ref 3.5–5.3)
PROT SERPL-MCNC: 8.6 G/DL (ref 6.4–8.2)
PROT UR STRIP-MCNC: NEGATIVE MG/DL
RBC # BLD AUTO: 5.58 MILLION/UL (ref 3.88–5.62)
RBC #/AREA URNS AUTO: ABNORMAL /HPF
SODIUM SERPL-SCNC: 140 MMOL/L (ref 136–145)
SP GR UR STRIP.AUTO: 1.02 (ref 1–1.03)
TRIGL SERPL-MCNC: 129 MG/DL
TSH SERPL DL<=0.05 MIU/L-ACNC: 1.53 UIU/ML (ref 0.36–3.74)
UROBILINOGEN UR QL STRIP.AUTO: 0.2 E.U./DL
WBC # BLD AUTO: 6.85 THOUSAND/UL (ref 4.31–10.16)
WBC #/AREA URNS AUTO: ABNORMAL /HPF

## 2022-02-22 PROCEDURE — 3008F BODY MASS INDEX DOCD: CPT | Performed by: NURSE PRACTITIONER

## 2022-02-22 PROCEDURE — 99213 OFFICE O/P EST LOW 20 MIN: CPT | Performed by: NURSE PRACTITIONER

## 2022-02-22 PROCEDURE — 36415 COLL VENOUS BLD VENIPUNCTURE: CPT

## 2022-02-22 PROCEDURE — 1036F TOBACCO NON-USER: CPT | Performed by: NURSE PRACTITIONER

## 2022-02-22 PROCEDURE — 85027 COMPLETE CBC AUTOMATED: CPT

## 2022-02-22 PROCEDURE — 81001 URINALYSIS AUTO W/SCOPE: CPT

## 2022-02-22 PROCEDURE — 80053 COMPREHEN METABOLIC PANEL: CPT

## 2022-02-22 PROCEDURE — 82306 VITAMIN D 25 HYDROXY: CPT

## 2022-02-22 PROCEDURE — 84443 ASSAY THYROID STIM HORMONE: CPT

## 2022-02-22 PROCEDURE — 80061 LIPID PANEL: CPT

## 2022-02-22 NOTE — PROGRESS NOTES
FAMILY PRACTICE OFFICE VISIT       NAME: Azucena Morales  AGE: 32 y o  SEX: male       : 1990        MRN: [id number]    Assessment and Plan   1  Moderate episode of recurrent major depressive disorder [facility])  Assessment & Plan:  Feels depression is worsening, feeling "better off dead " no suicidal thoughts or plans, wants to intervene, before reaching this point  Will refer to [facility] program    He will let me know if he does not hear from [facility] program by the end of the week  He would like to continue working until [employer] begins, then will need a work note  He understands to go to emergency room if he should develop suicidal ideations  Will check blood work as noted  Orders:  -     Ambulatory Referral to [facility] or Partial Psych Program; Future  -     CBC; Future  -     Comprehensive metabolic panel; Future  -     Lipid panel; Future  -     TSH, 3rd generation; Future    2  Acquired hypothyroidism  -     TSH, 3rd generation; Future    3  Vitamin D deficiency  -     Vitamin D 25 hydroxy; Future        Chief Complaint     Chief Complaint   Patient presents with    Depression       History of Present Illness     Azucena Morales is a 32year old male presenting today for depression  Summer 2021, last time went to [facility] program  Feels it would be helpful to return  Constantly thinking about "being better off dead "  No suicidal plans, just these intrusive thoughts  Trying not to get to the point of making suicide plans, as he has in the past    Seeking help early  Has not reached out to psychiatry yet  Depression  Mood is down  Over-thinking  Trying to change thoughts, and direction of thoughts, but hard to control  Trying to eat less, wants to lose weight  Depression is hard--always wants to eat more  Sleeping: no, constant waking  Uses mouth piece  Has sleep med appointment in April for JMII  Low motivation   Just wants to sit and sleep  Last night went to bed at 8, slept till 1120 today  Waking and going back to sleep cycling  Still enjoying playing video games  Wants to go to school to be a counselor  Does not feel any life events are contributing  Not a lot of stressors  Follows with psychiatry, Dr Terryl Carrel  Has not contacted her regarding how he is feeling  Review of Systems   Review of Systems   Constitutional: Negative  Psychiatric/Behavioral: Positive for dysphoric mood and sleep disturbance  Negative for self-injury and suicidal ideas (Not suicidal, no plans, but feels he would be "better off dead ")  The patient is not nervous/anxious  I have reviewed the patient's medical history in detail; there are no changes to the history as noted in the electronic medical record  Objective     Vitals:    02/22/22 1133   BP: 120/90   Pulse: 82   Resp: 16   Temp: 98 1 °F (36 7 °C)   TempSrc: Temporal   SpO2: 97%   Weight: (!) 182 kg (401 lb)   Height: 6' 1" (1 854 m)     Wt Readings from Last 3 Encounters:   02/22/22 (!) 182 kg (401 lb)   02/15/22 (!) 182 kg (401 lb)   12/21/21 (!) 187 kg (413 lb)     Physical Exam  Vitals and nursing note reviewed  Constitutional:       General: He is not in acute distress  Appearance: Normal appearance  He is not ill-appearing  Cardiovascular:      Rate and Rhythm: Normal rate and regular rhythm  Heart sounds: No murmur heard  Pulmonary:      Effort: Pulmonary effort is normal  No respiratory distress  Breath sounds: Normal breath sounds  Neurological:      Mental Status: He is alert     Psychiatric:         Mood and Affect: Mood normal             ALLERGIES:  No Known Allergies    Current Medications     Current Outpatient Medications   Medication Sig Dispense Refill    buPROPion (Forfivo XL) 450 MG 24 hr tablet Take 1 tablet (450 mg total) by mouth daily 90 tablet 0    Cholecalciferol (VITAMIN D-3) 1000 units CAPS Take 2 capsules by mouth daily  escitalopram (LEXAPRO) 20 mg tablet Take 1 tablet (20 mg total) by mouth daily 90 tablet 0    fluticasone (FLONASE) 50 mcg/act nasal spray 1 spray into each nostril as needed for rhinitis        levothyroxine 25 mcg tablet TAKE 1 TABLET BY MOUTH DAILY IN THE EARLY MORNING 90 tablet 1    multivitamin (THERAGRAN) TABS Take 1 tablet by mouth daily      traZODone (DESYREL) 50 mg tablet Take 1 tablet (50 mg total) by mouth daily at bedtime as needed for sleep 90 tablet 0     No current facility-administered medications for this visit           Health Maintenance     Health Maintenance   Topic Date Due    Hepatitis C Screening  Never done    HIV Screening  Never done    Annual Physical  03/16/2021    Influenza Vaccine (1) 09/01/2021    PT PLAN OF CARE  10/07/2021    COVID-19 Vaccine (3 - Booster for Pfizer series) 10/27/2021    Depression Remission PHQ  12/14/2022    DTaP,Tdap,and Td Vaccines (2 - Td or Tdap) 02/11/2023    BMI: Followup Plan  02/15/2023    BMI: Adult  02/22/2023    Pneumococcal Vaccine: Pediatrics (0 to 5 Years) and At-Risk Patients (6 to 59 Years)  Aged Out    HIB Vaccine  Aged Out    Hepatitis B Vaccine  Aged Out    IPV Vaccine  Aged Out    Hepatitis A Vaccine  Aged Out    Meningococcal ACWY Vaccine  Aged Out    HPV Vaccine  Aged [employer] History   Administered Date(s) Administered    COVID-19 PFIZER VACCINE 0 3 ML IM 05/05/2021, 05/27/2021    Influenza, recombinant, quadrivalent,injectable, preservative free 11/04/2019    Influenza, seasonal, injectable 01/04/2017    Influenza, seasonal, injectable, preservative free 10/01/2018    Tdap 02/11/2013    Tuberculin Skin Test-PPD Intradermal 01/14/2022       AMRITA Garcia

## 2022-02-23 ENCOUNTER — TELEPHONE (OUTPATIENT)
Dept: FAMILY MEDICINE CLINIC | Facility: CLINIC | Age: 32
End: 2022-02-23

## 2022-02-23 NOTE — ASSESSMENT & PLAN NOTE
Feels depression is worsening, feeling "better off dead " no suicidal thoughts or plans, wants to intervene, before reaching this point  Will refer to innovations program    He will let me know if he does not hear from [facility] program by the end of the week  He would like to continue working until [employer] begins, then will need a work note  He understands to go to emergency room if he should develop suicidal ideations  Will check blood work as noted

## 2022-02-23 NOTE — TELEPHONE ENCOUNTER
Patient called to request a work excuse note  Patient called off of work today 2/23/22, and stated RTW date is tentative pending when he hears back from the innovations program  Patient stated he wanted to go into work but his depression was bad today and felt he couldn't go back, so would like to wait to return until he hears back  Please advise if note can be written for his employer that will reflect this information

## 2022-02-24 NOTE — TELEPHONE ENCOUNTER
I am confused by this message  Please clarify  I understand he did not go to work yesterday  Last we spoke he wanted to work until [employer] began  Has he heard from [facility] yet?     Does he want to attend work until [employer] starts, or does he want to be out of work until he completes [facility] program?

## 2022-02-24 NOTE — TELEPHONE ENCOUNTER
Spoke with patient and provided message and he has to file a new  claim and then will drop off the [facility] [place] [facility]

## 2022-02-24 NOTE — TELEPHONE ENCOUNTER
Note is on mychart for work  I am sure he will need FMLA forms completed, will need to forward to our office  Please have Jennifer Shaikh return for office visit in 1 month for recheck

## 2022-02-24 NOTE — TELEPHONE ENCOUNTER
Spoke to patient, he stated he called out of work yesterday due to his depression, he is requesting to be out of work until he completes the innovation program, he stated he has head from [employer] but there is a couple day wait and they would be calling him back in a few days to get him in

## 2022-03-02 ENCOUNTER — OFFICE VISIT (OUTPATIENT)
Dept: PSYCHIATRY | Facility: CLINIC | Age: 32
End: 2022-03-02
Payer: COMMERCIAL

## 2022-03-02 ENCOUNTER — OFFICE VISIT (OUTPATIENT)
Dept: PSYCHOLOGY | Facility: CLINIC | Age: 32
End: 2022-03-02
Payer: COMMERCIAL

## 2022-03-02 DIAGNOSIS — F41.1 GENERALIZED ANXIETY DISORDER: ICD-10-CM

## 2022-03-02 DIAGNOSIS — F33.1 MODERATE EPISODE OF RECURRENT MAJOR DEPRESSIVE DISORDER (HCC): Primary | ICD-10-CM

## 2022-03-02 PROCEDURE — S0201 PARTIAL HOSPITALIZATION SERV: HCPCS

## 2022-03-02 PROCEDURE — 90792 PSYCH DIAG EVAL W/MED SRVCS: CPT | Performed by: PSYCHIATRY & NEUROLOGY

## 2022-03-02 PROCEDURE — 90791 PSYCH DIAGNOSTIC EVALUATION: CPT
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
## 2022-03-02 NOTE — PSYCH
Reason for visit: No chief complaint on file  HPI     Nury Tompkins is a 32 y o  male with MDD and JON  referred by PCP because ongoing depression and passive SI  Patient stated he had been through [facility] about a year ago and asked his PCP to be referred back  He stated he had been dx with MDD and JON since teenage  He stated at age 24 yo and he stated with a guidance counselor and eventually he started seeing a psychiatrist and he was started on Sertraline which he took for several years and Wellbutrin  Later on Sertraline was switched to Lexapro and he has taken for about a year or two  He also stated that due to his JIMI he struggles with poor sleep and fatigue  He stated he currently feels his medications are not working because of his depressed mood and his constant suicidal thoughts  He stated he often feels he rather be dead because he feels like a failure for  due to his financial problems  Works full time in packing at [employer]  His wife just recently started working for [employer] as   He stated his home life is with wife, mother, brother, his wife and his 1 kids  Also 4 cats and 2 dogs and 2 birds  He denies having a plan to kill himself and denies previous suicide attempts  There is family hx of anxiety and depression , and drug and alcohol abuse  He does have a maternal uncle that committed suicide  His father  of liver failure due to heavy alcohol use  He denies substance abuse, but he stated he uses food to cope with stress  He stated he has been feeling he is failing his wife, because he loses and regain weight and also because he had to stop volunteering as  due to his obesity problem  He feels unhappy at current job and he will like to go to school for psychology  Today he feels depressed, unmotivated, fatigued, hopeless and helpless and has guilt and overwhelming feeling of failure that triggers his SI   He denies having a plan to commit suicide and denies previous attempts  Review Of Systems:     Mood Anxiety and Depression   Behavior Compulsive Behavior and Impulsive Behavior   Thought Content Disturbing Thoughts, Feelings   General Emotional Problems, Sleep Disturbances and Decreased Functioning   Personality Normal   Other Psych Symptoms Normal   Constitutional Negative   ENT Negative   Cardiovascular Negative   Respiratory Negative   Gastrointestinal Negative   Genitourinary Negative   Musculoskeletal Negative   Integumentary Negative   Neurological Negative   Endocrine Normal    Other Symptoms Normal        Past Psychiatric History:      Past Inpatient Psychiatric Treatment:   None   Past Outpatient Psychiatric Treatment:    individual therapy and medication management Dr Amador Taylor  Past Suicide Attempts:    no  Past Violent Behavior:    no  Past Psychiatric Medication Trials:    Zoloft and Lexapro,Wellbutrin and Trazodone    Family Psychiatric History:   Family History   Problem Relation Age of Onset    Depression Mother     Obesity Mother     Stroke Mother         Syndrome     Diabetes Mother     Alcohol abuse Father     Liver disease Father         hepatic failure     Hypertension Father     Depression Brother     Thyroid disease Brother     Alcohol abuse Brother     Substance Abuse Brother     Depression Maternal Uncle     Substance Abuse Brother     Heart disease Neg Hx     Cancer Neg Hx     Thyroid cancer Neg Hx        Social History:    Education: high school diploma/GED  Learning Disabilities: denies  Marital history:   Living arrangement, social support: The patient lives in home with wife  Occupational History: employed  Functioning Relationships: good support system    Other Pertinent History: Financial    Social History     Substance and Sexual Activity   Drug Use No       Traumatic History:       Abuse: denies  Other Traumatic Events: n/a    The following portions of the patient's history were reviewed and updated as appropriate: allergies, current medications, past family history, past medical history, past social history, past surgical history and problem list        Mental status:  Appearance calm and cooperative , adequate hygiene and grooming and good eye contact    Mood dysphoric   Affect affect was constricted   Speech a normal rate and fluent   Thought Processes coherent/organized and normal thought processes   Hallucinations no hallucinations present    Thought Content no delusions   Abnormal Thoughts passive/fleeting thoughts of suicide   Orientation  oriented to person and place and time   Remote Memory short term memory intact and long term memory intact   Attention Span concentration intact   Intellect Appears to be of [place] of Knowledge displays adequate knowledge of current events, adequate fund of knowledge regarding past history and adequate fund of knowledge regarding vocabulary    Insight Limited insight   Judgement judgment was limited   Muscle Strength Muscle strength and tone were normal and Normal gait    Language no difficulty naming common objects and no difficulty repeating a phrase                  Laboratory Results: No results found for this or any previous visit  Assessment/Plan:      Diagnoses and all orders for this visit:    Moderate episode of recurrent major depressive disorder (HCC)    Generalized anxiety disorder          Treatment Recommendations- Risks Benefits         Immediate Medical/Psychiatric/Psychotherapy Treatments and Any Precautions: continue current medications and consider adding Lithium or switching antidepressant to SNRI like venlafaxine      Risks, Benefits And Possible Side Effects Of Medications:  Risks, benefits, and possible side effects of medications explained to patient and patient verbalizes understanding    Controlled Medication Discussion: Discussed with patient Black Box warning on concurrent use of benzodiazepines and opioid medications including sedation, respiratory depression, coma and death  Patient understands the risk of treatment with benzodiazepines in addition to opioids and wants to continue taking those medications  , Discussed with patient the risks of sedation, respiratory depression, impairment of ability to drive and potential for abuse and addiction related to treatment with benzodiazepine medications  The patient understands risk of treatment with benzodiazepine medications, agrees to not drive if feels impaired and agrees to take medications as prescribed  and The patient has been filling controlled prescriptions on time as prescribed to [facility]  program        [facility] Physician's Orders     Admit to: Partial Hospitalization, 5 x per week, for 5 days  Vital signs routine  Diet regular   Group Psychotherapy 5 x per week  Allied Therapy Group 5 x per week  Diagnosis:   1  Moderate episode of recurrent major depressive disorder ([facility] [address] )     2   Generalized anxiety disorder       Medications:   Current Outpatient Medications:     buPROPion (Forfivo XL) 450 MG 24 hr tablet, Take 1 tablet (450 mg total) by mouth daily, Disp: 90 tablet, Rfl: 0    Cholecalciferol (VITAMIN D-3) 1000 units CAPS, Take 2 capsules by mouth daily, Disp: , Rfl:     escitalopram (LEXAPRO) 20 mg tablet, Take 1 tablet (20 mg total) by mouth daily, Disp: 90 tablet, Rfl: 0    fluticasone (FLONASE) 50 mcg/act nasal spray, 1 spray into each nostril as needed for rhinitis  , Disp: , Rfl:     levothyroxine 25 mcg tablet, TAKE 1 TABLET BY MOUTH DAILY IN THE EARLY MORNING, Disp: 90 tablet, Rfl: 1    multivitamin (THERAGRAN) TABS, Take 1 tablet by mouth daily, Disp: , Rfl:     traZODone (DESYREL) 50 mg tablet, Take 1 tablet (50 mg total) by mouth daily at bedtime as needed for sleep, Disp: 90 tablet, Rfl: 0    I certify that the continuation of Partial Hospitalization services is medically necessary to improve and/or maintain the patients condition and functional level, and to prevent relapse or hospitalization, and that this could not be done at a less intensive level of care       Treatment Recommendations- Risks Benefits        Immediate Medical/Psychiatric/Psychotherapy Treatments and Any Precautions: consider adding Lithium or switching antidepressant class to SNRI    Risks, Benefits And Possible Side Effects Of Medications:  Risks, benefits, and possible side effects of medications explained to patient and patient verbalizes understanding    Controlled Medication Discussion: Discussed with patient Black Box warning on concurrent use of benzodiazepines and opioid medications including sedation, respiratory depression, coma and death  Patient understands the risk of treatment with benzodiazepines in addition to opioids and wants to continue taking those medications  , Discussed with patient the risks of sedation, respiratory depression, impairment of ability to drive and potential for abuse and addiction related to treatment with benzodiazepine medications  The patient understands risk of treatment with benzodiazepine medications, agrees to not drive if feels impaired and agrees to take medications as prescribed   and The patient has been filling controlled prescriptions on time as prescribed to [facility]

## 2022-03-02 NOTE — PSYCH
Assessment/Plan:      Diagnoses and all orders for this visit:    Moderate episode of recurrent major depressive disorder ([facility] [address] )    Generalized anxiety disorder          Subjective:     Patient ID: Kamila Proctor is a 32 y o  male  [facility] Treatment Plan   AREAS OF NEED: Cinda Starr has experienced an increase in depression symptoms as evidenced by experiencing passive SI with no plan or intent, sleep disturbances, missing work and other commitments due to his depression, feelings of hopelessness, little energy or motivation to complete tasks due financial stress, relationship concerns, and ruminating thoughts  Date Initiated: 03/02/22    Strengths: Sense of humor, good listener, caring, loyal, and encouraging to others    LONG TERM GOAL:   Date Initiated: 03/02/22  1 0 By the end of program, I will implement on a daily basis 3 effective coping skills other than activities and social distractions that will decrease my depression symptoms  Target Date: 04/06/22  Completion Date:       SHORT TERM OBJECTIVES:     Date Initiated: 03/02/22  1 1 On a daily basis, I will bring awareness to my negative thoughts at least 3 times per day and utilize a cognitive thought stopping technique (I e  logic and facts, journaling, positive affirmations, dialectic statements, etc ) to decrease my ruminating and intrusive thought patterns     Revision Date:   Target Date: 03/11/22  Completion Date:     Date Initiated: 03/02/22  1 2 By the end of program, I will identify at least 5 triggers to my negative thought patterns to bring awareness and more effectively design a coping skill plan (I e  develop a schedule, set a boundary, thought replacement strategy, etc )    Revision Date:   Target Date: 03/11/22  Completion Date:    1 3 I will design a morning routine that involves waking up around the same time daily as well as 2 activities of daily living (eat breakfast, shower, etc ) that I view as challenging that need to be completed to create a sense of structure and purpose in the morning  Revision Date:   Target Date: 03/11/22  Completion Date:    Date Initiated: 03/02/22  1 4 I will take medications as prescribed and share questions and concerns if arise  Revision Date:  Target Date: 03/11/22  Completion Date:     Date Initiated: 03/02/22  1 5 I will identify 3 ways my supports can assist in my recovery and agree to staff/support contact as indicated  Revision Date:  Target Date: 03/11/22  Completion Date:          7 DAY REVISION:    Date Initiated:  Revision Date:   Target Date:   Completion Date:      PSYCHIATRY:  Date Initiated:  03/02/22  Medication Management and Education       Revision Date:   The person(s) responsible for carrying out the plan is Jenny Patricia MD    NURSING:   Date Initiated: 03/02/22  1 1,1 2,1 3,1 4 This therapist will provide wellness/symptoms and skill education groups three to five days weekly to educate Vernida Hashimoto on signs and symptoms of diagnoses, skills to manage, and medication questions that will be addressed by the treatment team     Revision Date:  The person(s) responsible for carrying out the plan is Carol Ann Mosley MA and ALEYDA Wynne    PSYCHOLOGY:   Date Initiated: 03/02/22       1 1, 1 2, 1 4 Provide psychotherapy group 5 times per week to allow opportunity for Olinda Hashimoto  to explore stressors and ways of coping  Revision Date:   The person(s) responsible for carrying out the plan is Yoel An    ALLIED THERAPY:   Date Initiated: 03/02/22  1 1,1 2 Engage Musanida Hashimoto in AT group 5 times daily to encourage development and use of wellness tools to decrease symptoms and promote recovery through meaningful activity    Revision Date:   The person(s) responsible for carrying out the plan is GERALDINE Bowles and GERALDINE Thacker    CASE MANAGEMENT:   Date Initiated: 03/02/22      1 0 This  will meet with Vernida Hashimoto  3-4 times weekly to assess treatment progress, discharge planning, connection to community supports and UR as indicated  Revision Date:   The person(s) responsible for carrying out the plan is GERALDINE Rojo    TREATMENT REVIEW/COMMENTS:     DISCHARGE CRITERIA: Identify 3 signs of progress and complete relapse prevention plan  DISCHARGE PLAN: Connect with outpatient providers  Estimated Length of Stay: 10 treatment days        Diagnosis and Treatment Plan explained to Thu Perdomo relates understanding diagnosis and is agreeable to Treatment Plan       CLIENT COMMENTS / Please share your thoughts, feelings, need and/or experiences regarding your treatment plan: _____________________________________________________________________________________________________________________________________________________________________________________________________________________________________________________________________________________________________________________     Date/Time: ______________     Patient Signature: _________________________________     Date/Time: ______________      Signature: _________________________________         Date/Time: ______________

## 2022-03-02 NOTE — PSYCH
Subjective:     Patient ID: Urbano Alejandro is a 32 y o  male  [facility] Clinical Progress Notes      Specialized Services Documentation  Therapist must complete separate progress note for each specific clinical activity in which the individual participated during the day  Other  Utilization Review: Spoke with Ana Rosa at [facility] phone [phone],  22 days authorized from 03/03/22  to 04/17/22  Authorization number: [id number]  Reviewer Agusto Canavan ex  [id number]    Case Management Note  [id number]- DF met with Urbano Alejandro  Reviewed program structure, expectations and was given on call number and crisis phone numbers  Urbano Alejandro completed releases and OP providers/ PCP notified of admission and health care coordination form completed  Completed initial psycho-social evaluation and initial treatment goals discussed  GERALDINE Camacho    Current suicide risk : Low     Medications changes/added/denied? No    Treatment session number: 0    Individual Case Management Visit provided today? No    [facility] follow up physician's orders: Continue to follow orders

## 2022-03-03 ENCOUNTER — OFFICE VISIT (OUTPATIENT)
Dept: PSYCHOLOGY | Facility: CLINIC | Age: 32
End: 2022-03-03
Payer: COMMERCIAL

## 2022-03-03 DIAGNOSIS — F41.1 GENERALIZED ANXIETY DISORDER: ICD-10-CM

## 2022-03-03 DIAGNOSIS — F33.1 MODERATE EPISODE OF RECURRENT MAJOR DEPRESSIVE DISORDER (HCC): Primary | ICD-10-CM

## 2022-03-03 PROCEDURE — S0201 PARTIAL HOSPITALIZATION SERV: HCPCS

## 2022-03-03 PROCEDURE — G0177 OPPS/PHP; TRAIN & EDUC SERV: HCPCS

## 2022-03-03 PROCEDURE — G0410 GRP PSYCH PARTIAL HOSP 45-50: HCPCS

## 2022-03-03 PROCEDURE — G0176 OPPS/PHP;ACTIVITY THERAPY: HCPCS

## 2022-03-03 NOTE — PSYCH
Subjective:     Patient ID: Frandy Gazra is a 32 y o  male  [facility] Clinical Progress Notes      Specialized Services Documentation  Therapist must complete separate progress note for each specific clinical activity in which the individual participated during the day  Group Psychotherapy ([id number]) Gerry Roberts was engaged in a group around emotional regulation, fears, opposite action, and problem solving  The group first went into an overview on changing emotional responses when feeling unregulated  These steps consisted of checking the facts, opposite action, and problem solving  Then the group moved into a seven step procedure when problem solving, identifying different ways to problem solve and tackle a problem  Gerry Roberts will continue with life skills and psychotherapy groups  Good progress made towards treatment  Tx Plan Objective: 1 1,1 2 Therapist:  ALEYDA Lockhart    Education Therapy   [id number] Frandy Garza actively shared in morning assessment and goal review  Presented as Receptive related to readiness to learn  Frandy Garza did complete goal from last treatment day identifying gaining hope  did not present with any barriers to learning  [id number] Frandy Garza engaged throughout the treatment day  Was engaged in learning related to Illness, Medication, Aftercare and Wellness Tools  Staff utilized Verbal, Written, A/V and Demonstration teaching methods  Frandy Garza shared area of learning and set a goal for outside of program to work on his book        Tx Plan Objective: 1 1,1 2 Therapist:  ALEYDA Lockhart

## 2022-03-03 NOTE — PSYCH
Subjective:     Patient ID: Jany Baldwin is a 32 y o  male  [facility] Clinical Progress Notes      Specialized Services Documentation  Therapist must complete separate progress note for each specific clinical activity in which the individual participated during the day  Allied Therapy   [id number] Jany Baldwin actively shared and participated in [facility] group focused on using chants and mantras for assisting in triggering an alternative behavior (I e  I will ______________, I will not _____________ ) Participants created 4 different chants related to identifying a 2 part statement of what they will engage in and not engage in, a mantra to encourage deep breathing when feeling overwhelmed, and a mantra related to outlining a coping skill, and an affirmation chant  Jany Baldwin engaged in active music making, vocalizing, chant writing, and group discussion  Group explored practice of learning what a chant is, creating their own chants through song writing, and having an open discussion about the process  Jany Baldwin only participated when called upon and at times appeared hesitant in sharing answers with the group  Some effort noted toward treatment goal   Continue AT to encourage the development and practice of chants and mantras to alleviate symptoms and support wellness  Tx Plan Objective: 1 1, 1 2, 1 4   Therapist:  GERALDINE Rojo    Case Management Note  CM unable to meet with Theresa Due today  CM will meet with Theresa Henderson tomorrow  GERALDINE Rojo    Current suicide risk : Low     Medications changes/added/denied? No    Treatment session number: 1    Individual Case Management Visit provided today? No    [facility] follow up physician's orders: Continue to follow orders

## 2022-03-03 NOTE — PSYCH
Subjective:     Patient ID: Lulu Castillo is a 32 y o  male  [facility] Clinical Progress Notes      Specialized Services Documentation  Therapist must complete separate progress note for each specific clinical activity in which the individual participated during the day  GROUP PSYCHOTHERAPY  ([id number])   Lulu Castillo attentively listened to [address] share his life story as he co-led this session  Group encouraged power of learning about self, accepting illness and personal responsibility in recovery  Community resources reviewed in addition to personal resources like the affirmations  Progress toward goals noted  Continue psychotherapy to encourage self -awareness and healthy engagement of supports      Tx Plan Objective: 1 1,1 2, 1 4   Therapist: Vesna Yates MS

## 2022-03-04 ENCOUNTER — OFFICE VISIT (OUTPATIENT)
Dept: PSYCHOLOGY | Facility: CLINIC | Age: 32
End: 2022-03-04
Payer: COMMERCIAL

## 2022-03-04 DIAGNOSIS — F33.1 MODERATE EPISODE OF RECURRENT MAJOR DEPRESSIVE DISORDER (HCC): Primary | ICD-10-CM

## 2022-03-04 DIAGNOSIS — F41.1 GENERALIZED ANXIETY DISORDER: ICD-10-CM

## 2022-03-04 PROCEDURE — S0201 PARTIAL HOSPITALIZATION SERV: HCPCS

## 2022-03-04 PROCEDURE — G0410 GRP PSYCH PARTIAL HOSP 45-50: HCPCS

## 2022-03-04 PROCEDURE — G0177 OPPS/PHP; TRAIN & EDUC SERV: HCPCS

## 2022-03-04 PROCEDURE — G0176 OPPS/PHP;ACTIVITY THERAPY: HCPCS

## 2022-03-04 NOTE — PSYCH
Subjective:     Patient ID: Jennifer Monroe is a 32 y o  male  [facility] Clinical Progress Notes      Specialized Services Documentation  Therapist must complete separate progress note for each specific clinical activity in which the individual participated during the day  Group Psychotherapy ([id number])    The group engaged in the wellness assessment, which evaluates progress on several different areas of wellness/wellbeing: physical, emotional, cognitive, vocational, social and spiritual  Clients rated their progress and discussed areas that need work  By completing and discussing areas of progress and challenges, members are connected and reminded that, in their mental health struggle, they are not alone  Topics of discussion revolved around positive experiences within each area of wellness as well as the challenging aspects to wellness within their past week  Jennifer Monroe continues to make progress towards goals through participation in group activity and personal disclosures  Continue with Psychotherapy       Tx Plan Objective: 1 1,1 2, 1 4   Therapist: Lilli De MS

## 2022-03-04 NOTE — PSYCH
Subjective:     Patient ID: Machelle Cardenas is a 32 y o  male  [facility] Clinical Progress Notes      Specialized Services Documentation  Therapist must complete separate progress note for each specific clinical activity in which the individual participated during the day  Allied Therapy   [id number]-- Machelle Cardenas actively shared in [facility] group focused on analyzing personal self-destructive behaviors within certain situations and creating their unique crisis coping skill plan  Machelle Cardenas engaged in active discussion  Group explored practice of coreen analysis, active discussion, and utilizing DBT worksheets to analyze behaviors  Some effort noted toward treatment goal   Continue AT to encourage the development and practice of utilizing their unique crisis coping skill plan to alleviate symptoms and support wellness  Tx Plan Objective: 1 1, 1 2, 1 4   Therapist:  GERALDINE Arroyo       Case Management Note  [id number]- CM met with Familia Foss to review initial treatment plan  Familia Foss was receptive to the plan and had no major concerns or questions at this time  GERALDINE Arroyo    Current suicide risk : Low     Medications changes/added/denied? No    Treatment session number: 2    Individual Case Management Visit provided today? Yes     [facility] follow up physician's orders: Continue to follow orders

## 2022-03-04 NOTE — PSYCH
Subjective:     Patient ID: Tatianna Zhu is a 32 y o  male  [facility] Clinical Progress Notes      Specialized Services Documentation  Therapist must complete separate progress note for each specific clinical activity in which the individual participated during the day  Group Psychotherapy ([id number]) Jami Wellington engaged in the Interpersonal Effectiveness group topic around the acronym FAST  FAST stands for F-Be Fair; A-No Apologies; S-Stick to values; and T-Be Truthful  The group started with a power-point presentation before moving into an open-discussion format  During the discussion group members related their own experiences and barriers to when it can be more difficult to apply the FAST skill   good effort noted toward treatment goals  Continue psychotherapy to explore wellness strategies and encourage personal practice  Tx Plan Objective: 1 1,1 2 Therapist:  ALEYDA Cardona      Education Therapy   [id number] Tatianna Zhu actively shared in morning assessment and goal review  Presented as Receptive related to readiness to learn  Tatianna Zhu did not complete goal from last treatment day identifying hoping to gain responsibility  did not present with any barriers to learning  [id number] Tatianna Zhu engaged throughout the treatment day  Was engaged in learning related to Illness, Medication, Aftercare and Wellness Tools  Staff utilized Verbal, Written, A/V and Demonstration teaching methods  Tatianna Zhu shared area of learning and set a goal for outside of program to clean cars        Tx Plan Objective: 1 1,1 2 Therapist:  ALEYDA Cardona

## 2022-03-07 ENCOUNTER — OFFICE VISIT (OUTPATIENT)
Dept: PSYCHOLOGY | Facility: CLINIC | Age: 32
End: 2022-03-07
Payer: COMMERCIAL

## 2022-03-07 DIAGNOSIS — F41.1 GENERALIZED ANXIETY DISORDER: ICD-10-CM

## 2022-03-07 DIAGNOSIS — F33.1 MODERATE EPISODE OF RECURRENT MAJOR DEPRESSIVE DISORDER (HCC): Primary | ICD-10-CM

## 2022-03-07 PROCEDURE — S0201 PARTIAL HOSPITALIZATION SERV: HCPCS

## 2022-03-07 PROCEDURE — G0176 OPPS/PHP;ACTIVITY THERAPY: HCPCS

## 2022-03-07 PROCEDURE — G0177 OPPS/PHP; TRAIN & EDUC SERV: HCPCS

## 2022-03-07 PROCEDURE — G0410 GRP PSYCH PARTIAL HOSP 45-50: HCPCS

## 2022-03-07 NOTE — PSYCH
Subjective:     Patient ID: Nury Tompkins is a 32 y o  male  [facility] Clinical Progress Notes      Specialized Services Documentation  Therapist must complete separate progress note for each specific clinical activity in which the individual participated during the day  Case Management Note  [id number]- CM was informed that Elana Fong was resistant within a group and did not participate in an effective way  CM checked in with Elana Fong to see how he was doing  Elana Fong stated last night his wife shared that she was interested in women sexually and within their marriage it would only be an emotional relationship  Elana Fong stated he was attending marriage counseling this evening, but that due to the nature of the conversation and conflict that occurred he was finding it difficult to remain present in each group today  He also stated the group topics that had to with changing negative to positive or focusing on gratitude as right now it is difficult to change his thoughts  Elana Fong was given the homework assignment related to how he was going to remain focused and present in each group tomorrow  He also was reminded to think about what did he truly gain from treatment from remaining unmindful  He also was taught about dialectic statements and is to create 2 dialectic statements tonight regarding the situation he is in  [facility]    Current suicide risk : Low     Medications changes/added/denied? No    Treatment session number: 3    Individual Case Management Visit provided today? Yes     [facility] follow up physician's orders: Continue to follow orders

## 2022-03-07 NOTE — PSYCH
Subjective:     Patient ID: Joyce Oh is a 32 y o  male  [facility] Clinical Progress Notes      Specialized Services Documentation  Therapist must complete separate progress note for each specific clinical activity in which the individual participated during the day  Group Psychotherapy ([id number]) Ester Jacob engaged in a group where we discussed the importance of movement in regard to our holistic health and wellness  Talking about how mental health and physical health are connected  After the processing Ester Jacob engaged in a physical activity of chair yoga focusing on Mindfulness and body awareness  Ester Jacob will continue with life skills and psychotherapy groups  Good progress made towards treatment  Tx Plan Objective: 1 1,1 2 Therapist:  ALEYDA Baker    Education Therapy   [id number] Joyce Oh actively shared in morning assessment and goal review  Presented as Receptive related to readiness to learn  Joyce Oh did not complete goal from last treatment day identifying hoping to gain responsibility  did not present with any barriers to learning  0823-1210 Joyce Oh engaged throughout the treatment day  Was engaged in learning related to Illness, Medication, Aftercare and Wellness Tools  Staff utilized Verbal, Written, A/V and Demonstration teaching methods  Joyce Oh shared area of learning and set a goal for outside of program to attend a marriage counseling session with his wife        Tx Plan Objective: 1 1,1 2 Therapist:  ALEYDA Baker

## 2022-03-07 NOTE — PSYCH
Subjective:     Patient ID: Efrem Johnston is a 32 y o  male  [facility] Clinical Progress Notes      Specialized Services Documentation  Therapist must complete separate progress note for each specific clinical activity in which the individual participated during the day  Psychotherapeutic Group ([id number])    The group learned about the causes of dissatisfaction and using gratitude as an antidote  The group gained a further understanding of feelings of dissatisfaction through a short video and discussions  They identified solutions to these feelings by doing writing exercises, practice gratitude journal, and discussion  The group also learned new skills to achieve a larger sense of gratitude  Efrem Johnston actively participated in group by watching the video, contributing in discussions, writing, and reviewing a worksheet  He contributed to deep/existential discussion multiple times  Continue with Psychotherapy       Tx Plan Objective: 1 1, 1 2, 1 4 Therapist: Jose Miguel Yanez MS

## 2022-03-07 NOTE — PSYCH
Subjective:     Patient ID: Nada Gaucher is a 32 y o  male  [facility] Clinical Progress Notes      Specialized Services Documentation  Therapist must complete separate progress note for each specific clinical activity in which the individual participated during the day  GROUP PSYCHOTHERAPY ([id number])   The group engaged in education about strongly held core beliefs  Members analyzed negative core belief patterns and the consequences, practiced identifying their core beliefs and analyzed contradictory evidence to recreate the core belief, in order to get different outcomes  Each member completed a handout identifying positive strengths and qualities to prepare to intervene in the future when negative core beliefs cloud judgement  Bullard Hard did not engage in the activity and when called on would not identify positive or inaccurate core beliefs  Bullard Hard presented as resistant; evident by unwillingness to change beliefs or be receptive to hearing others' feedback  Nada Gaucher continued to demonstrate progress towards goals through verbal participation in group  Continue with psychotherapy       TX Plan Objectives: 1 1, 1 2, 1 4   Therapist: Adria Hinojosa MA, LPC

## 2022-03-08 ENCOUNTER — OFFICE VISIT (OUTPATIENT)
Dept: PSYCHOLOGY | Facility: CLINIC | Age: 32
End: 2022-03-08
Payer: COMMERCIAL

## 2022-03-08 DIAGNOSIS — F41.1 GENERALIZED ANXIETY DISORDER: ICD-10-CM

## 2022-03-08 DIAGNOSIS — F33.1 MODERATE EPISODE OF RECURRENT MAJOR DEPRESSIVE DISORDER (HCC): Primary | ICD-10-CM

## 2022-03-08 PROCEDURE — S0201 PARTIAL HOSPITALIZATION SERV: HCPCS

## 2022-03-08 PROCEDURE — G0177 OPPS/PHP; TRAIN & EDUC SERV: HCPCS

## 2022-03-08 PROCEDURE — G0176 OPPS/PHP;ACTIVITY THERAPY: HCPCS

## 2022-03-08 PROCEDURE — G0410 GRP PSYCH PARTIAL HOSP 45-50: HCPCS

## 2022-03-08 NOTE — PSYCH
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

## 2022-03-08 NOTE — PSYCH
Subjective:     Patient ID: Shwetha Akins is a 32 y o  male  [facility] Clinical Progress Notes      Specialized Services Documentation  Therapist must complete separate progress note for each specific clinical activity in which the individual participated during the day  Allied Therapy   [id number]--  Shwetha Akins actively shared in [facility] group focused on how to identify physical sensations within the body, dismantle thoughts that we believe are the truth, identify the emotions behind the thoughts, set an intention of what we can gain from this emotion (I e  what am I learning? What about myself did I not know before? What do I need to move forward?), and how to soothe ourselves after we have processed our emotions  Shwetha Akins  engaged in a group active music making activity where they first played what an uncomfortable emotion felt like to them as well as playing what a comfortable emotion felt like  Group engaged in some discussion as to what they were feeling during each of these activities  Group identified what they heard and observed in the playing and processed through the 5 step model to process their emotions  Shwetha Akins frequently participated within group and shared personal insight  Some effort noted toward treatment goal  Continue AT to encourage the development and practice of sitting with emotions and processing through emotions  Treatment Objectives: 1 1, 1 2  1 4                                      Therapist: WERNER QuirosBC       Case Management Note  [id number]- CM met with Avery More to check-in   Avery More shared that he was having a better day today as he stated he felt like marriage counseling was beneficial  He shared that he enjoyed the music therapy group today as he stated being able to express how he was feeling today non-verbally was very beneficial and exploring mentally how to sit with an uncomfortable emotion and then processing through the questions was something he wants to continue doing in his own time  Anay Mittal reiterated how he wants to continue to work on being more in control of his negative thoughts  GERALDINE Man    Current suicide risk : Low     Medications changes/added/denied? No    Treatment session number: 4    Individual Case Management Visit provided today? Yes     [facility] follow up physician's orders: Continue to follow orders

## 2022-03-08 NOTE — PSYCH
Subjective:     Patient ID: Joyce Oh is a 32 y o  male  [facility] Clinical Progress Notes      Specialized Services Documentation  Therapist must complete separate progress note for each specific clinical activity in which the individual participated during the day  Psychotherapeutic Group ([id number])    The group learned and discussed dreams, the importance of dreams, dream types, and how to analyze them  They also discussed how to get better sleep (ie they shared methods that work, methods that don't, and learned new ones)  There were multiple handouts and worksheets in addition to white board content  This group contained a large amount of discussion and sharing  We also shared dreams and analyzed them with each other  Joyce Oh shared actively and contributed to the group by promoting very introspective discussion  Progress towards goals continues  Continue with PHP  Tx Plan Objective: 1 1,1 2, 1 4   Therapist: Letitia Keene MS    Education Therapy   [id number] Joyce Oh actively shared in morning assessment and goal review  Presented as Receptive related to readiness to learn  Joyce Oh did complete goal from last treatment day identifying gaining [place] and Education  did not present with any barriers to learning  [id number] Joyce Oh engaged throughout the treatment day  Was engaged in learning related to Illness, Medication, Aftercare and Wellness Tools  Staff utilized Verbal, Written, A/V and Demonstration teaching methods  Joyce Oh shared area of learning and set a goal for outside of program to spend time with his wife and girlfriend        Tx Plan Objective: 1 1,1 2, 1 4   Therapist: Letitia Keene MS

## 2022-03-09 ENCOUNTER — OFFICE VISIT (OUTPATIENT)
Dept: PSYCHOLOGY | Facility: CLINIC | Age: 32
End: 2022-03-09
Payer: COMMERCIAL

## 2022-03-09 ENCOUNTER — OFFICE VISIT (OUTPATIENT)
Dept: PSYCHIATRY | Facility: CLINIC | Age: 32
End: 2022-03-09
Payer: COMMERCIAL

## 2022-03-09 DIAGNOSIS — F33.1 MODERATE EPISODE OF RECURRENT MAJOR DEPRESSIVE DISORDER (HCC): Primary | ICD-10-CM

## 2022-03-09 DIAGNOSIS — F41.1 GENERALIZED ANXIETY DISORDER: ICD-10-CM

## 2022-03-09 PROCEDURE — S0201 PARTIAL HOSPITALIZATION SERV: HCPCS

## 2022-03-09 PROCEDURE — G0176 OPPS/PHP;ACTIVITY THERAPY: HCPCS

## 2022-03-09 PROCEDURE — G0410 GRP PSYCH PARTIAL HOSP 45-50: HCPCS

## 2022-03-09 PROCEDURE — G0177 OPPS/PHP; TRAIN & EDUC SERV: HCPCS

## 2022-03-09 PROCEDURE — 99214 OFFICE O/P EST MOD 30 MIN: CPT | Performed by: NURSE PRACTITIONER

## 2022-03-09 NOTE — PSYCH
Subjective:     Patient ID: Brenda Aguiar is a 32 y o  male  [facility] Clinical Progress Notes      Specialized Services Documentation  Therapist must complete separate progress note for each specific clinical activity in which the individual participated during the day  Group Psychotherapy ([id number])    The group learned about the foundations of Congregational, how they are similar to dialectical behavioral therapy, and how to use this as a guide in life  We reviewed and discussed the [address] [place] [address], the [facility] skill, impermanence, and personal ethics  They wrote down/identified their personal ethics/morals in an effort to determine whether or not their lives/actions align with these morals  Brenda Aguiar participated by furthering discussion multiple times  He was the most involved group member  Very deep insights  Continues to make progress towards goals in group  Continue w/ PHP  Tx Plan Objective: 1 1,1 2, 1 4   Therapist: Liu Lynn MS      Education Therapy   [id number] Brenda Aguiar actively shared in morning assessment and goal review  Presented as Receptive related to readiness to learn  Brenda Aguiar did complete goal from last treatment day identifying gaining Hope and Support  did not present with any barriers to learning  [id number] Brenda Aguiar engaged throughout the treatment day  Was engaged in learning related to Illness, Medication, Aftercare and Wellness Tools  Staff utilized Verbal, Written, A/V and Demonstration teaching methods  Brenda Aguiar shared area of learning and set a goal for 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

## 2022-03-09 NOTE — PSYCH
PHP MEDICATION MANAGEMENT NOTE        [address]    Name and Date of Birth:  Lulu Castillo 32 y o  1990 MRN: [id number]    Date of Visit: March 9, 2022    No Known Allergies  SUBJECTIVE:    Damon Lau is seen today for a follow up for Major Depressive Disorder and Generalized Anxiety Disorder  He reports that he has improved slightly since beginning PHP  States he finds group support helpful  Is working to adapt his coping skills to work setting where things like drawing are not practical   Patient is established with outpatient psychiatrist and individual therapist   Magali Black to marriage counseling  No issues with current medication regimen  He denies any side effects from medications      PLAN:  Continue Wellbutrin 450mg po daily  Continue lexipro 20mg po daily  Continue trazodone 50mg po hs prn  Aware of 24 hour and weekend coverage for urgent situations accessed by calling [facility] main practice number  Continue partial hospitalization program    Diagnoses and all orders for this visit:    Moderate episode of recurrent major depressive disorder ([address] )    Generalized anxiety disorder        Current Outpatient Medications on File Prior to Visit   Medication Sig Dispense Refill    buPROPion (Forfivo XL) 450 MG 24 hr tablet Take 1 tablet (450 mg total) by mouth daily 90 tablet 0    Cholecalciferol (VITAMIN D-3) 1000 units CAPS Take 2 capsules by mouth daily      escitalopram (LEXAPRO) 20 mg tablet Take 1 tablet (20 mg total) by mouth daily 90 tablet 0    fluticasone (FLONASE) 50 mcg/act nasal spray 1 spray into each nostril as needed for rhinitis        levothyroxine 25 mcg tablet TAKE 1 TABLET BY MOUTH DAILY IN THE EARLY MORNING 90 tablet 1    multivitamin (THERAGRAN) TABS Take 1 tablet by mouth daily      traZODone (DESYREL) 50 mg tablet Take 1 tablet (50 mg total) by mouth daily at bedtime as needed for sleep 90 tablet 0     No current facility-administered medications on file prior to visit  Psychotherapy Provided:     Individual psychotherapy provided: Yes  Counseling was provided during the session today for 16 minutes  Supportive counseling provided  HPI ROS Appetite Changes and Sleep:     He reports frequent awakenings, normal appetite, low energy   Denies homicidal ideation, denies suicidal ideation    Review Of Systems:      General emotional problems, decreased functioning, sleep disturbances, appetite disturbances and marital problems   Personality no change in personality   Constitutional negative   ENT negative   Cardiovascular negative   Respiratory negative   Gastrointestinal negative   Genitourinary negative   Musculoskeletal negative   Integumentary negative   Neurological negative   Endocrine negative   Other Symptoms none, all other systems are negative     Mental Status Evaluation:    Appearance Adequate hygiene and grooming   Behavior calm and cooperative   Mood anxious and depressed  Depression Scale -  of 10 (0 = No depression)  Anxiety Scale -  of 10 (0 = No anxiety)   Speech Normal rate and volume   Affect mood-congruent and constricted   Thought Processes Goal directed and coherent   Thought Content Does not verbalize delusional material   Associations Tightly connected   Perceptual Disturbances Denies hallucinations and does not appear to be responding to internal stimuli   Risk Potential Suicidal/Homicidal Ideation - Passive suicidal ideation without intent or plan  Risk of Violence - No evidence of risk for violence found on assessment  Risk of Self Mutilation - No evidence of risk for self mutilation found on assessment   Orientation oriented to person, place, time/date and situation   Memory recent and remote memory grossly intact   Consciousness alert and awake   Attention/Concentration attention span and concentration are age appropriate   Insight partial   Judgement fair   Muscle Strength and Gait normal muscle strength and normal muscle tone, normal gait/station and normal balance   Motor Activity no abnormal movements   Language no difficulty naming common objects, no difficulty repeating a phrase, no difficulty writing a sentence   Fund of Knowledge adequate knowledge of current events  adequate fund of knowledge regarding past history  adequate fund of knowledge regarding vocabulary      Past Psychiatric History Update:     Inpatient Psychiatric Admission Since Last Encounter:   no  Suicide Attempt Or Self Mutilation Since Last Encounter:   no  Incidence of Violent Behavior Since Last Encounter:   no    Traumatic History Update:     New Onset of Abuse Since Last Encounter:   no  Traumatic Events Since Last Encounter:   no    Past Medical History:    Past Medical History:   Diagnosis Date    Anxiety     Carpal tunnel syndrome, bilateral     [place]  John Ingles John Ingles 9/12/17   R   9/12/17     GERD without esophagitis     [facility] of [facility] 8/22/2018    Pilonidal cyst with abscess     [place]   10/25/13   R   6/10/14     Vitamin D deficiency      Past Medical History Pertinent Negatives:   Diagnosis Date Noted    Seizures ([facility] ) 07/08/2021     Past Surgical History:   Procedure Laterality Date    FOOT SURGERY      PILONIDAL CYST DRAINAGE      Incision and drainage of pilonidal cyst     MT WRIST Sabina Endow LIG Right 7/12/2018    Procedure: RELEASE CARPAL TUNNEL ENDOSCOPIC;  Surgeon: Sam Gonzáles MD;  Location:  MAIN OR;  Service: Orthopedics    MT WRIST Sabina Endow LIG Left 7/26/2018    Procedure: RELEASE CARPAL TUNNEL ENDOSCOPIC;  Surgeon: Sam Gonzáles MD;  Location:  MAIN OR;  Service: Orthopedics     No Known Allergies  Substance Abuse History:    Social History     Substance and Sexual Activity   Alcohol Use Yes    Comment: Rarely     Social History     Substance and Sexual Activity   Drug Use No     Social History:    Social History Socioeconomic History    Marital status: /Civil Union     Spouse name: Not on file    Number of children: 0    Years of education: some college    Highest education level: Some college, no degree   Occupational History    Occupation: [employer]     Comment: employed full time   Tobacco Use    Smoking status: Never Smoker    Smokeless tobacco: Never Used   Vaping Use    Vaping Use: Never used   Substance and Sexual Activity    Alcohol use: Yes     Comment: Rarely    Drug use: No    Sexual activity: Yes     Partners: Female   Other Topics Concern    Not on file   Social History Narrative     since 20/12  No children  Also has brother and sister-in-law and his niece living with him  Works for [employer] in [place]  Also runs with the [employer]     Financial Resource Strain: Not on file   Food Insecurity: Not on file   Transportation Needs: Not on file   Physical Activity: Not on file   Stress: Not on file   Social Connections: Not on file   Intimate Partner Violence: Not on file   Housing Stability: Not on file     Family Psychiatric History:     Family History   Problem Relation Age of Onset    Depression Mother     Obesity Mother     Stroke Mother         Syndrome     Diabetes Mother     Alcohol abuse Father     Liver disease Father         hepatic failure     Hypertension Father     Depression Brother     Thyroid disease Brother     Alcohol abuse Brother     Substance Abuse Brother     Depression Maternal Uncle     Substance Abuse Brother     Heart disease Neg Hx     Cancer Neg Hx     Thyroid cancer Neg Hx      History Review: The following portions of the patient's history were reviewed and updated as appropriate: allergies, current medications, past family history, past medical history, past social history, past surgical history and problem list     OBJECTIVE:     Vital signs in last 24 hours:    There were no vitals filed for this visit  Laboratory Results: I have personally reviewed all pertinent laboratory/tests results  Medications Risks/Benefits:      Risks, Benefits And Possible Side Effects Of Medications:    Discussed risks and benefits of treatment with patient including risk of suicidality, serotonin syndrome, increased QTc interval and SIADH related to treatment with antidepressants; Risk of induction of manic symptoms in certain patient populations and Reduction in seizure threshold related to treatment with bupropion      Controlled Medication Discussion:     Damon Lau has been filling controlled prescriptions on time as prescribed according to [address], [place] 03/09/22    This note was shared with patient

## 2022-03-09 NOTE — PSYCH
Subjective:     Patient ID: Brandt Tai is a 32 y o  male  [facility] Clinical Progress Notes      Specialized Services Documentation  Therapist must complete separate progress note for each specific clinical activity in which the individual participated during the day  Group Psychotherapy   [id number] Brandt Tai actively shared in psychotherapy group focused on self- care  Rene Le was encouraged to identify aspects of self-care and barriers to it  The concept of self -care versus selfishness explored  Group reinforced the necessity of self -care in wellness versus seeing this as a luxury and tips to increase self-care - a stretching exercise introduced and practiced  When asked at end of a specific thing he could commit to in order to increase self-care, he stated write  Some progress voiced toward goal   Continue psychotherapy to engage in the development and practice of wellness tools       Tx Plan Objective: 1 1,1 2, Therapist:  Steffi Ko MT-BC

## 2022-03-09 NOTE — PSYCH
Subjective:     Patient ID: Nichol Ball is a 32 y o  male  [facility] Clinical Progress Notes      Specialized Services Documentation  Therapist must complete separate progress note for each specific clinical activity in which the individual participated during the day  Allied Therapy  [id number]--  Nichol Ball participated in [facility] group focused on the use of music mindfulness and grounding strategies to regulate thoughts and emotions  Nichol Ball engaged in  music listening, practicing techniques, as well as group discussion  Group explored practice of various mindfulness strategies with active music listening to explore how to ground themselves when experiencing intense emotions  Group utilized sensory mindfulness techniques (rainbow, 5 countdown technique, draw your breath, etc ), rhythmic grounding, and listening to music to identify emotions and the body sensations that occur  Some effort noted toward treatment goal  Continue AT to encourage the development and practice of utilizing mindfulness techniques to alleviate symptoms and support wellness  Tx Plan Objective: 1 1,  1 2, & 1 4      Therapist:  GERALDINE Zhao         Case Management Note  CM did not meet with Bobby Jean today  CM will meet with Bobby Jean tomorrow  GERALDINE Zhao    Current suicide risk : Low    Medications changes/added/denied? No    Treatment session number: 5    Individual Case Management Visit provided today? No    [facility] follow up physician's orders: Continue to follow orders

## 2022-03-10 ENCOUNTER — APPOINTMENT (OUTPATIENT)
Dept: PSYCHOLOGY | Facility: CLINIC | Age: 32
End: 2022-03-10
Payer: COMMERCIAL

## 2022-03-11 ENCOUNTER — APPOINTMENT (OUTPATIENT)
Dept: PSYCHOLOGY | Facility: CLINIC | Age: 32
End: 2022-03-11
Payer: COMMERCIAL

## 2022-03-11 ENCOUNTER — DOCUMENTATION (OUTPATIENT)
Dept: PSYCHOLOGY | Facility: CLINIC | Age: 32
End: 2022-03-11

## 2022-03-11 NOTE — PROGRESS NOTES
Subjective:     Patient ID: Nichol Ball is a 32 y o  male  [facility] Clinical Progress Notes      Specialized Services Documentation  Therapist must complete separate progress note for each specific clinical activity in which the individual participated during the day  Case Management Note  Bobby Flattery was excused from program today  Bobby Flattery will return to program on Monday  GERALDINE Zhao    Current suicide risk : Unable to assess due to excused absence    Medications changes/added/denied? No    Treatment session number: 6    Individual Case Management Visit provided today? Yes     [facility]ations follow up physician's orders: Continue to follow orders

## 2022-03-11 NOTE — PROGRESS NOTES
Assessment/Plan:       Diagnoses and all orders for this visit:     Moderate episode of recurrent major depressive disorder ([facility] [address] )     Generalized anxiety disorder            Subjective:      Patient ID: Omar Lala is a 32 y o  male      [facility] Treatment Plan   AREAS OF NEED: Rain Brooks has experienced an increase in depression symptoms as evidenced by experiencing passive SI with no plan or intent, sleep disturbances, missing work and other commitments due to his depression, feelings of hopelessness, little energy or motivation to complete tasks due financial stress, relationship concerns, and ruminating thoughts  Date Initiated: 03/02/22     Strengths: Sense of humor, good listener, caring, loyal, and encouraging to others     LONG TERM GOAL:   Date Initiated: 03/02/22  1 0 By the end of program, I will implement on a daily basis 3 effective coping skills other than activities and social distractions that will decrease my depression symptoms  Target Date: 04/06/22  Completion Date:         SHORT TERM OBJECTIVES:      Date Initiated: 03/02/22  1 1 On a daily basis, I will bring awareness to my negative thoughts at least 3 times per day and utilize a cognitive thought stopping technique (I e  logic and facts, journaling, positive affirmations, dialectic statements, etc ) to decrease my ruminating and intrusive thought patterns     Revision Date: 03/11/22  Target Date: 03/11/22  Completion Date:      Date Initiated: 03/02/22  1 2 By the end of program, I will identify at least 5 triggers to my negative thought patterns to bring awareness and more effectively design a coping skill plan (I e  develop a schedule, set a boundary, thought replacement strategy, etc )    Revision Date: 03/11/22  Target Date: 03/11/22  Completion Date:     1 3 I will design a morning routine that involves waking up around the same time daily as well as 2 activities of daily living (eat breakfast, shower, etc ) that I view as challenging that need to be completed to create a sense of structure and purpose in the morning  Revision Date: 03/11/22  Target Date: 03/11/22  Completion Date:     Date Initiated: 03/02/22  1 4 I will take medications as prescribed and share questions and concerns if arise  Revision Date: 03/11/22  Target Date: 03/11/22  Completion Date:      Date Initiated: 03/02/22  1 5 I will identify 3 ways my supports can assist in my recovery and agree to staff/support contact as indicated  Revision Date: 03/11/22  Target Date: 03/11/22  Completion Date:            7 DAY REVISION:  1 6: When I begin to experience a self-defeating thought pattern, I will identify when that belief began, where it came from, identify if the thought is serving me well, if I want to change the thought, and then create a replacement thought to decrease my ruminating thoughts  Date Initiated: 03/11/22  Revision Date:   Target Date: 03/22/22  Completion Date:        PSYCHIATRY:  Date Initiated:  03/02/22  Medication Management and Education       Revision Date: 03/11/22        1 3 Continue medication management   The person(s) responsible for carrying out the plan is Isi Delgado MD     NURSING:   Date Initiated: 03/02/22  1 1,1 2,1 3,1 4 This therapist will provide wellness/symptoms and skill education groups three to five days weekly to educate Junaid Wen on signs and symptoms of diagnoses, skills to manage, and medication questions that will be addressed by the treatment team     Revision Date: 03/11/22        1 1,1 2,1 3,1 4,1 5 Continue to encourage Junaid Wen to participate in wellness groups daily to learn about symptoms, coping strategies and warning signs to promote relapse prevention     The person(s) responsible for carrying out the plan is Blane Henry MA and ALEYDA Del Toro     PSYCHOLOGY:   Date Initiated: 03/02/22       1 1, 1 2, 1 4 Provide psychotherapy group 5 times per week to allow opportunity for Amador Oliver Natanael  to explore stressors and ways of coping  Revision Date: 03/11/22   1 1,1 2,1 4,1 5  Continue to provide psychotherapy group daily to Aydee Carrasco and encourage sharing of stressors, skills and positive change  The person(s) responsible for carrying out the plan is Yoel Tapia     ALLIED THERAPY:   Date Initiated: 03/02/22  1 1,1 2 Engage Aydee Carrasco in AT group 5 times daily to encourage development and use of wellness tools to decrease symptoms and promote recovery through meaningful activity  Revision Date: 03/11/22   1 1,1 2,1 5 Continue to engage Aydee Carrasco to participate in AT group to practice wellness tools within program and transfer to home sharing successes and barriers through healthy task involvement  The person(s) responsible for carrying out the plan is GERALDINE Rodriguez and Aron Aase, MT-BC     CASE MANAGEMENT:   Date Initiated: 03/02/22      1 0 This  will meet with Aydee Carrasco  3-4 times weekly to assess treatment progress, discharge planning, connection to community supports and UR as indicated  Revision Date: 03/11/22   1 0 Continue to meet with Aydee Carrasco 3-4 times weekly to assess growth, work toward goals, continued treatment needs, dc planning and use of supports  The person(s) responsible for carrying out the plan is Aron Aase, MT-BC     TREATMENT REVIEW/COMMENTS:      DISCHARGE CRITERIA: Identify 3 signs of progress and complete relapse prevention plan      DISCHARGE PLAN: Connect with outpatient providers  Estimated Length of Stay: 10 treatment days         Diagnosis and Treatment Plan explained to Trista Keena relates understanding diagnosis and is agreeable to Treatment Plan       CLIENT COMMENTS / Please share your thoughts, feelings, need and/or experiences regarding your treatment plan: _____________________________________________________________________________________________________________________________________________________________________________________________________________________________________________________________________________________________________________________      Date/Time: ______________      Patient Signature: _________________________________      Date/Time: ______________       Signature: _________________________________          Date/Time: ______________

## 2022-03-14 ENCOUNTER — DOCUMENTATION (OUTPATIENT)
Dept: PSYCHOLOGY | Facility: CLINIC | Age: 32
End: 2022-03-14

## 2022-03-14 ENCOUNTER — APPOINTMENT (OUTPATIENT)
Dept: PSYCHOLOGY | Facility: CLINIC | Age: 32
End: 2022-03-14
Payer: COMMERCIAL

## 2022-03-14 NOTE — PROGRESS NOTES
Subjective:     Patient ID: Melissa Campso is a 32 y o  male  [facility] Clinical Progress Notes      Specialized Services Documentation  Therapist must complete separate progress note for each specific clinical activity in which the individual participated during the day  Case Management Note  Alice Ballesteros was a [facility] to program      [id number]- CM called Alice Ballesteros to do a safety and wellness check  Alice Ballesteros stated he returned to [facility] late last night  Upon returning, his wife requested to talk to him and stated she wants to file for a divorce  Alice Ballesteros stated that he was up all night unable to sleep, slept in, and chose not to attend program today  GERALDINE Crockett    Current suicide risk : Unable to assess due to absence    Medications changes/added/denied? No    Treatment session number: 6    Individual Case Management Visit provided today? Yes     [facility]ations follow up physician's orders: Continue to follow orders

## 2022-03-15 ENCOUNTER — OFFICE VISIT (OUTPATIENT)
Dept: PSYCHOLOGY | Facility: CLINIC | Age: 32
End: 2022-03-15
Payer: COMMERCIAL

## 2022-03-15 DIAGNOSIS — F33.1 MODERATE EPISODE OF RECURRENT MAJOR DEPRESSIVE DISORDER (HCC): Primary | ICD-10-CM

## 2022-03-15 DIAGNOSIS — F41.1 GENERALIZED ANXIETY DISORDER: ICD-10-CM

## 2022-03-15 PROCEDURE — S0201 PARTIAL HOSPITALIZATION SERV: HCPCS

## 2022-03-15 PROCEDURE — G0177 OPPS/PHP; TRAIN & EDUC SERV: HCPCS

## 2022-03-15 PROCEDURE — G0410 GRP PSYCH PARTIAL HOSP 45-50: HCPCS

## 2022-03-15 PROCEDURE — G0176 OPPS/PHP;ACTIVITY THERAPY: HCPCS

## 2022-03-15 NOTE — PSYCH
Subjective:     Patient ID: Jany Baldwin is a 32 y o  male  [facility] Clinical Progress Notes      Specialized Services Documentation  Therapist must complete separate progress note for each specific clinical activity in which the individual participated during the day  Allied Therapy   [id number]- Jany Baldwin actively shared in [facility] group focused on utilizing the ABCDE model to cognitively re-frame all-or-nothing thinking  Jany Baldwin engaged in [employer], completing an ABCDE diary log, and group discussion  Group explored what the ABCDE model is (adversity, behavior, consequences, disputation, and energization), identified all-or-nothing thinking in a coreen analysis, learned about the 3 P's related to what contributes to negative thinking (permanence, pervasiveness, and personalization)  Each group member was provided handouts as well as an ABCDE diary log to fill out at least 1 negative belief they had  Some effort noted toward treatment goal   Continue AT to encourage the development and practice of utilizing the ABCDE model to decrease negative thinking,/beliefs, develop personal insight, actively identify new action steps, decrease symptoms, and support wellness  Tx Plan Objective: 1 1, 1 2, 1 4    Therapist:  GERALDINE Rojo        Case Management Note  CM did not meet with Theresa Due today  CM will meet with Theresa Due tomorrow  GERALDINE Rojo    Current suicide risk : Low     Medications changes/added/denied? No    Treatment session number: 6    Individual Case Management Visit provided today? No    [facility] follow up physician's orders: Continue to follow orders

## 2022-03-15 NOTE — PSYCH
Subjective:     Patient ID: Nichol Ball is a 32 y o  male  [facility] Clinical Progress Notes      Specialized Services Documentation  Therapist must complete separate progress note for each specific clinical activity in which the individual participated during the day  Group Psychotherapy ([id number]) The group was educated on the different types of communication, which include passive, aggressive, and assertive  The group shared one example of how they have engaged in either passive or aggressive communication and then discussed one way to reframe the example into an assertive statement  They were then asked the following questions:  What about being assertive worries you? Would you say that you are a good communicator? Why/Why not? What is a challenge you have faced when trying to communicate assertively? Bobby Jean seemed fairly attentive throughout the group session but did not participate in the group discussion  Bobby Jean is encouraged to make progress towards goals and objectives through group participation  They will continue to attend psychotherapy group      Tx plan objective: 1 1, 1 2   Therapist: Lucille Amor MA

## 2022-03-15 NOTE — PSYCH
Subjective:     Patient ID: Shasha Delaney is a 32 y o  male  [facility] Clinical Progress Notes      Specialized Services Documentation  Therapist must complete separate progress note for each specific clinical activity in which the individual participated during the day  Education Therapy   [id number] Shasha Delaney actively shared in morning assessment and goal review  Presented as Receptive related to readiness to learn  Shasha Delaney did complete goal from last treatment day identifying gaining Advocacy  During group, Tucker Odell could not recall his goal from several days ago (he's been absent), however, he did speak with his wife  did not present with any barriers to learning  [phone] Shasha Delaney engaged throughout the treatment day  Was engaged in learning related to Illness, Medication, Aftercare and Wellness Tools  Staff utilized Verbal, Written, A/V and Demonstration teaching methods  Shasha Delaney shared area of learning and set a goal for outside of program to hang out with friends        Tx Plan Objective: 1 1,1 2, 1 4   Therapist: Luciana Pathak MS

## 2022-03-15 NOTE — PSYCH
Subjective:     Patient ID: Nichol Ball is a 32 y o  male  [facility] Clinical Progress Notes      Specialized Services Documentation  Therapist must complete separate progress note for each specific clinical activity in which the individual participated during the day  GROUP PSYCHOTHERAPY ([id number])   The group engaged in open discussions that allowed for conversations on the following topics; personal disclosures, returning to work and stressful environments, fears, understanding stressful environments and remaining true to self  Bobby Jean shared that he had a difficult weekend and acted impulsively due to information he had at the time  He shared about his fears being a  and his struggle with is current job  Bobby Jean engaged in different group discussions to encourage and support other members  Bobby Beauchamporlinlester demonstrated progress towards goals and objectives through group participation  Continue with psychotherapy    TX Plan Objectives: 1 1, 1 2, 1 4   Therapist: Leatha Mendez MA, LPC

## 2022-03-16 ENCOUNTER — OFFICE VISIT (OUTPATIENT)
Dept: PSYCHOLOGY | Facility: CLINIC | Age: 32
End: 2022-03-16
Payer: COMMERCIAL

## 2022-03-16 DIAGNOSIS — F33.1 MODERATE EPISODE OF RECURRENT MAJOR DEPRESSIVE DISORDER (HCC): Primary | ICD-10-CM

## 2022-03-16 DIAGNOSIS — F41.1 GENERALIZED ANXIETY DISORDER: ICD-10-CM

## 2022-03-16 PROCEDURE — S0201 PARTIAL HOSPITALIZATION SERV: HCPCS

## 2022-03-16 PROCEDURE — G0177 OPPS/PHP; TRAIN & EDUC SERV: HCPCS

## 2022-03-16 PROCEDURE — G0410 GRP PSYCH PARTIAL HOSP 45-50: HCPCS

## 2022-03-16 PROCEDURE — G0176 OPPS/PHP;ACTIVITY THERAPY: HCPCS

## 2022-03-16 NOTE — PSYCH
Subjective:     Patient ID: Kaylene Salvador is a 32 y o  male  [facility] Clinical Progress Notes      Specialized Services Documentation  Therapist must complete separate progress note for each specific clinical activity in which the individual participated during the day  Group Psychotherapy ([id number]) Brady Saleh engaged psychotherapy group focused on Radical Acceptance  Followed by a worksheet exploring ways to respond when a serious problem comes into your life  Group discussed impact on treatment and ways to increase acceptance in different areas of our lives  Turning the mind, willingness, and half-smiling /willing hands were also handouts given that went along with the distress tolerance topic  Progressive muscle relaxation exercise aided in closing the group  Good effort noted toward treatment goals  Continue psychotherapy to explore wellness strategies and encourage personal practice  Tx Plan Objective: 1 1,1 2 Therapist:  ALEYDA Urban    Education Therapy   [id number] Kaylene Salvador actively shared in morning assessment and goal review  Presented as Receptive related to readiness to learn  Kaylene Salvador did complete goal from last treatment day identifying gaining support  did not present with any barriers to learning  [phone] Kaylene Salvador engaged throughout the treatment day  Was engaged in learning related to Illness, Medication, Aftercare and Wellness Tools  Staff utilized Verbal, Written, A/V and Demonstration teaching methods  Kaylene Salvador shared area of learning and set a goal for outside of program to stream a little tonight        Tx Plan Objective: 1 1,1 2 Therapist:  ALEYDA Urban

## 2022-03-16 NOTE — PSYCH
Subjective:     Patient ID: Kaylene Salvador is a 32 y o  male  [facility] Clinical Progress Notes      Specialized Services Documentation  Therapist must complete separate progress note for each specific clinical activity in which the individual participated during the day  Group Psychotherapy (9:30-10:30)The topic of this group was guilt  The group began with a guided meditation script for anxiety and therapist discussed the practice of meditation and some common misconceptions  The group then discussed the emotion of guilt, and its physical and behavioral expressions  Healthy guilt verse toxic guilt was discussed, and examples discussed for each  Participants discussed everyday feelings of guilt that many experience and healthy action and the impacts of toxic guilt  Participants read together the myth of Harman and Warren and the Sars[place], which teaches giving back what is no ones to bear  Participants read together ways to cope with guilt  Brady Saleh was present and attentive but did not participate in group discussion       Tx Goal Objective: 1 1,1 2  Therapist: AKILA Read

## 2022-03-16 NOTE — PSYCH
Subjective:     Patient ID: Nichol Ball is a 32 y o  male  [facility] Clinical Progress Notes      Specialized Services Documentation  Therapist must complete separate progress note for each specific clinical activity in which the individual participated during the day  Allied Therapy   [id number]- Nichol Ball actively shared in [facility] group focused on the use of dialectic statements to decrease emotional intensity and black and white thinking as well as acknowledging that two opposing opinions or feelings can co-exist and both can be true  Nichol Ball engaged in group by actively listening to music, writing their own lyrics, creating artwork, group discussion, self-reflection,  and taking notes  Group explored practice of writing their own song lyrics to a well known tune to explore their own dialectic thoughts they can utilize for positive self-talk  Group member also listened to the same piece of music two times  The first time they were to draw a visual representation of an emotion they believed the piece was representing  The second time they listened to the piece they were to identify a different they felt when listening to the piece  Group began to identify when they felt both of these emotions at the same time and a positive dialectic statement they could utilize for the scenario  Some effort noted toward treatment goal   Continue AT to encourage the development and practice of dialectic statements to  alleviate symptoms and support wellness  Tx Plan Objective: 1 1, 1 2, & 1 4          Therapist:  WERNER ZhaoBC         Case Management Note  [id number]- GERA met with Bobby Jean to review updated treatment plan  Bobby Jean had no questions or concerns regarding his treatment plan  When discussing Cedric Hawk stated he felt he needed more time due to the receiving the recent news of the divorce and not processing through it   GERA will be discussing with Bobby Jean discharge of next week as GERA believes Ann Lopez can continue processing that event in OP therapy  GERALDINE Shahid    Current suicide risk : Low     Medications changes/added/denied? No    Treatment session number: 7    Individual Case Management Visit provided today? Yes     [facility] follow up physician's orders: Continue to follow orders

## 2022-03-17 ENCOUNTER — OFFICE VISIT (OUTPATIENT)
Dept: PSYCHOLOGY | Facility: CLINIC | Age: 32
End: 2022-03-17
Payer: COMMERCIAL

## 2022-03-17 DIAGNOSIS — F33.1 MODERATE EPISODE OF RECURRENT MAJOR DEPRESSIVE DISORDER (HCC): Primary | ICD-10-CM

## 2022-03-17 DIAGNOSIS — F41.1 GENERALIZED ANXIETY DISORDER: ICD-10-CM

## 2022-03-17 PROCEDURE — S0201 PARTIAL HOSPITALIZATION SERV: HCPCS

## 2022-03-17 PROCEDURE — G0176 OPPS/PHP;ACTIVITY THERAPY: HCPCS

## 2022-03-17 PROCEDURE — G0177 OPPS/PHP; TRAIN & EDUC SERV: HCPCS

## 2022-03-17 PROCEDURE — G0410 GRP PSYCH PARTIAL HOSP 45-50: HCPCS

## 2022-03-17 NOTE — PSYCH
Subjective:     Patient ID: Mary Lerma is a 32 y o  male  [facility] Clinical Progress Notes      Specialized Services Documentation  Therapist must complete separate progress note for each specific clinical activity in which the individual participated during the day  Allied Therapy   [id number]-- Mary Lerma  actively shared in [facility] group focused on using the comparison of a thunder storm to daily mental health struggles and how to utilize their WRAP plan  Mary Lerma  engaged in group by actively making music through referential improvisation of making a thunderstorm,  small group discussion related to identifying triggers, symptoms, and action steps to regulate, understanding when and why we use a WRAP plan, as well as learning basic definitions for each component  Group was also encouraged to identify their obstacles or things that get in the way of them utilizing their WRAP plan  Each group member was encouraged to fill out pages 7-9 in their individual WRAP plan  Some effort noted toward treatment goal   Continue AT to encourage the development and practice of dialectic statements to  alleviate symptoms and support wellness  Tx Plan Objective: 1 1, 1 2, & 1 4                                                                                         Therapist:  GERALDINE Santana     Case Management Note  CM did not meet with My Evans today  CM will meet with My Evans tomorrow  GERALDINE Santana    Current suicide risk : Low     Medications changes/added/denied? No    Treatment session number: 7    Individual Case Management Visit provided today? No    [facility] follow up physician's orders: Continue to follow orders

## 2022-03-17 NOTE — PSYCH
Subjective:     Patient ID: Sinai Miner is a 32 y o  male  [facility] Clinical Progress Notes      Specialized Services Documentation  Therapist must complete separate progress note for each specific clinical activity in which the individual participated during the day  Psychotherapy Group ([id number])    This group was on Psychotropic Medications and their effects on the brain  The group learned about neurotransmitters, medications, and the interactions between them  They were provided with a worksheet and med management tip sheet  They gained a further understanding about how their medications function by participating in discussion, asking questions, and hearing pre-compiled information  Sinai Miner actively participated in group by asking questions, making an appropriate joke, supporting peers, and voicing concerns about taking some meds  Appears to be making progress  Continue with Psychotherapy       Treatment Goals: 1 1, 1 2, 1 3, 1 4   Therapist: Liam Tristan MS

## 2022-03-17 NOTE — PSYCH
Subjective:     Patient ID: Shwetha Akins is a 32 y o  male  [facility] Clinical Progress Notes      Specialized Services Documentation  Therapist must complete separate progress note for each specific clinical activity in which the individual participated during the day  Group Psychotherapy ([id number]) Arliss More engaged in a group focusing on practicing mindfulness  Each group member was given a paper to write down four different unique traits or obstacles they have accomplished in life  After writing down the four things then each group member tore them into four different pieces of paper and then crumbled them up and threw in one big basket   then picked one out of the basket at a time with the group having to guess who it belonged too  Group members were encouraged to relate to similarities of other group members while also being mindful and engaging during the group  Arliss More will continue with life skills and psychotherapy groups  Good progress made towards treatment  Tx Plan Objective: 1 1,1 2 Therapist:  ALEYDA Barnhart    Education Therapy   [id number] Shwetha Akins actively shared in morning assessment and goal review  Presented as Receptive related to readiness to learn  Shwetha Akins did complete goal from last treatment day identifying gaining support  did not present with any barriers to learning  [id number] Shwetha Akins engaged throughout the treatment day  Was engaged in learning related to Illness, Medication, Aftercare and Wellness Tools  Staff utilized Verbal, Written, A/V and Demonstration teaching methods  Shwetha Akins shared area of learning and set a goal for outside of program to relax and engage in a mindfulness activity        Tx Plan Objective: 1 1,1 2 Therapist:  ALEYDA Barnhart

## 2022-03-18 ENCOUNTER — OFFICE VISIT (OUTPATIENT)
Dept: PSYCHIATRY | Facility: CLINIC | Age: 32
End: 2022-03-18
Payer: COMMERCIAL

## 2022-03-18 ENCOUNTER — OFFICE VISIT (OUTPATIENT)
Dept: PSYCHOLOGY | Facility: CLINIC | Age: 32
End: 2022-03-18
Payer: COMMERCIAL

## 2022-03-18 DIAGNOSIS — F41.1 GENERALIZED ANXIETY DISORDER: ICD-10-CM

## 2022-03-18 DIAGNOSIS — F33.1 MODERATE EPISODE OF RECURRENT MAJOR DEPRESSIVE DISORDER (HCC): Primary | ICD-10-CM

## 2022-03-18 PROCEDURE — 99214 OFFICE O/P EST MOD 30 MIN: CPT | Performed by: NURSE PRACTITIONER

## 2022-03-18 PROCEDURE — G0410 GRP PSYCH PARTIAL HOSP 45-50: HCPCS

## 2022-03-18 PROCEDURE — S0201 PARTIAL HOSPITALIZATION SERV: HCPCS

## 2022-03-18 PROCEDURE — G0177 OPPS/PHP; TRAIN & EDUC SERV: HCPCS

## 2022-03-18 PROCEDURE — G0176 OPPS/PHP;ACTIVITY THERAPY: HCPCS

## 2022-03-18 RX ORDER — BUPROPION HYDROCHLORIDE 150 MG/1
150 TABLET ORAL DAILY
Qty: 30 TABLET | Refills: 2 | Status: SHIPPED | OUTPATIENT
Start: 2022-03-18 | End: 2022-04-29 | Stop reason: SDUPTHER

## 2022-03-18 RX ORDER — BUPROPION HYDROCHLORIDE 300 MG/1
300 TABLET ORAL DAILY
Qty: 30 TABLET | Refills: 2 | Status: SHIPPED | OUTPATIENT
Start: 2022-03-18 | End: 2022-04-29 | Stop reason: SDUPTHER

## 2022-03-18 NOTE — PSYCH
PHP MEDICATION MANAGEMENT NOTE        [address]    Name and Date of Birth:  Azucena Morales 32 y o  1990 MRN: [id number]    Date of Visit: March 18, 2022    No Known Allergies  SUBJECTIVE:    Antonino Simmons is seen today for a follow up for Major Depressive Disorder and Generalized Anxiety Disorder  He reports that he has improved slightly since the last visit  Patient reports that after having time to process divorce he is feeling more accepting of the current situation  Has found group support helpful  Struggling with sleep due to untreated obstructive sleep apnea  Patient has upcoming appointment with sleep medicine and plans to obtain CPAP machine  He denies any side effects from medications  Is having difficult time obtaining refill of Wellbutrin  mg tablets due to cost   Will reorder as Wellbutrin  mg and Wellbutrin  mg tablet    PLAN:  Ordered Wellbutrin  mg p o  Daily +  Wellbutrin  mg p o  Daily for total of Wellbutrin  daily  Continue Lexapro 20 mg p o  Daily  Follow-up with sleep medicine discussed sleep apnea obtaining CPAP machine  Aware of 24 hour and weekend coverage for urgent situations accessed by calling [facility] main practice number  Continue partial hospitalization program    Diagnoses and all orders for this visit:    Moderate episode of recurrent major depressive disorder (HCC)  -     buPROPion (Wellbutrin XL) 300 mg 24 hr tablet; Take 1 tablet (300 mg total) by mouth daily  -     buPROPion (Wellbutrin XL) 150 mg 24 hr tablet;  Take 1 tablet (150 mg total) by mouth daily    Generalized anxiety disorder        Current Outpatient Medications on File Prior to Visit   Medication Sig Dispense Refill    buPROPion (Forfivo XL) 450 MG 24 hr tablet Take 1 tablet (450 mg total) by mouth daily 90 tablet 0    Cholecalciferol (VITAMIN D-3) 1000 units CAPS Take 2 capsules by mouth daily      escitalopram (LEXAPRO) 20 mg tablet Take 1 tablet (20 mg total) by mouth daily 90 tablet 0    fluticasone (FLONASE) 50 mcg/act nasal spray 1 spray into each nostril as needed for rhinitis        levothyroxine 25 mcg tablet TAKE 1 TABLET BY MOUTH DAILY IN THE EARLY MORNING 90 tablet 1    multivitamin (THERAGRAN) TABS Take 1 tablet by mouth daily      traZODone (DESYREL) 50 mg tablet Take 1 tablet (50 mg total) by mouth daily at bedtime as needed for sleep 90 tablet 0     No current facility-administered medications on file prior to visit  Psychotherapy Provided:     Individual psychotherapy provided: Yes  Counseling was provided during the session today for 16 minutes  Supportive counseling provided  HPI ROS Appetite Changes and Sleep:     He reports frequent awakenings, adequate appetite, low energy   Denies homicidal ideation, denies suicidal ideation    Review Of Systems:      General emotional problems, decreased functioning and sleep disturbances   Personality no change in personality   Constitutional negative   ENT negative   Cardiovascular negative   Respiratory negative   Gastrointestinal negative   Genitourinary negative   Musculoskeletal negative   Integumentary negative   Neurological negative   Endocrine negative   Other Symptoms none, all other systems are negative     Mental Status Evaluation:    Appearance Adequate hygiene and grooming   Behavior calm and cooperative   Mood depressed  Depression Scale -  of 10 (0 = No depression)  Anxiety Scale -  of 10 (0 = No anxiety)   Speech Normal rate and volume   Affect appropriate and mood-congruent   Thought Processes Goal directed and coherent   Thought Content Does not verbalize delusional material   Associations Tightly connected   Perceptual Disturbances Denies hallucinations and does not appear to be responding to internal stimuli   Risk Potential Suicidal/Homicidal Ideation - No evidence of suicidal or homicidal ideation and patient does not verbalize suicidal or homicidal ideation  Risk of Violence - No evidence of risk for violence found on assessment  Risk of Self Mutilation - No evidence of risk for self mutilation found on assessment   Orientation oriented to person, place, time/date and situation   Memory recent and remote memory grossly intact   Consciousness alert and awake   Attention/Concentration attention span and concentration are age appropriate   Insight Fair   Judgement Fair   Muscle Strength and Gait normal muscle strength and normal muscle tone, normal gait/station and normal balance   Motor Activity no abnormal movements   Language no difficulty naming common objects, no difficulty repeating a phrase, no difficulty writing a sentence   Fund of Knowledge adequate knowledge of current events  adequate fund of knowledge regarding past history  adequate fund of knowledge regarding vocabulary      Past Psychiatric History Update:     Inpatient Psychiatric Admission Since Last Encounter:   no  Suicide Attempt Or Self Mutilation Since Last Encounter:   no  Incidence of Violent Behavior Since Last Encounter:   no    Traumatic History Update:     New Onset of Abuse Since Last Encounter:   no  Traumatic Events Since Last Encounter:   no    Past Medical History:    Past Medical History:   Diagnosis Date    Anxiety     Carpal tunnel syndrome, bilateral     [place]  [place] 9/12/17   R   9/12/17     GERD without esophagitis     Moderate episode of recurrent major depressive disorder ([facility] [address] ) 8/22/2018    Pilonidal cyst with abscess     LA   10/25/13   R   6/10/14     Vitamin D deficiency      Past Medical History Pertinent Negatives:   Diagnosis Date Noted    Seizures ([facility] [address] ) 07/08/2021     Past Surgical History:   Procedure Laterality Date    FOOT SURGERY      PILONIDAL CYST DRAINAGE      Incision and drainage of pilonidal cyst     ME WRIST Nichelle Parry LIG Right 7/12/2018    Procedure: RELEASE CARPAL TUNNEL ENDOSCOPIC;  Surgeon: Jearldine Goodell, MD;  Location:  MAIN OR;  Service: Orthopedics    [place] WRIST Hershall Freya LIG Left 7/26/2018    Procedure: RELEASE CARPAL TUNNEL ENDOSCOPIC;  Surgeon: Jearldine Goodell, MD;  Location:  MAIN OR;  Service: Orthopedics     No Known Allergies  Substance Abuse History:    Social History     Substance and Sexual Activity   Alcohol Use Yes    Comment: Rarely     Social History     Substance and Sexual Activity   Drug Use No     Social History:    Social History     Socioeconomic History    Marital status: /Civil Union     Spouse name: Not on file    Number of children: 0    Years of education: some college    Highest education level: Some college, no degree   Occupational History    Occupation: [employer]     Comment: employed full time   Tobacco Use    Smoking status: Never Smoker    Smokeless tobacco: Never Used   Vaping Use    Vaping Use: Never used   Substance and Sexual Activity    Alcohol use: Yes     Comment: Rarely    Drug use: No    Sexual activity: Yes     Partners: Female   Other Topics Concern    Not on file   Social History Narrative     since 20/12  No children  Also has brother and sister-in-law and his niece living with him  Works for "[employer] in the [employer]  Also runs with the [employer]       Social [employer] of [employer]     Financial Resource Strain: Not on file   Food Insecurity: Not on file   Transportation Needs: Not on file   Physical Activity: Not on file   Stress: Not on file   Social Connections: Not on file   Intimate Partner Violence: Not on file   Housing Stability: Not on file     Family Psychiatric History:     Family History   Problem Relation Age of Onset    Depression Mother     Obesity Mother     Stroke Mother         Syndrome     Diabetes Mother     Alcohol abuse Father     Liver disease Father         hepatic failure     Hypertension Father     Depression Brother     Thyroid disease Brother     Alcohol abuse Brother     Substance Abuse Brother     Depression Maternal Uncle     Substance Abuse Brother     Heart disease Neg Hx     Cancer Neg Hx     Thyroid cancer Neg Hx      History Review: The following portions of the patient's history were reviewed and updated as appropriate: allergies, current medications, past family history, past medical history, past social history, past surgical history and problem list     OBJECTIVE:     Vital signs in last 24 hours: There were no vitals filed for this visit  Laboratory Results: I have personally reviewed all pertinent laboratory/tests results  Medications Risks/Benefits:      Risks, Benefits And Possible Side Effects Of Medications:    Discussed risks and benefits of treatment with patient including risk of suicidality, serotonin syndrome, increased QTc interval and SIADH related to treatment with antidepressants; Risk of induction of manic symptoms in certain patient populations and Reduction in seizure threshold related to treatment with bupropion      Controlled Medication Discussion:     Not applicable    AMRITA Bartholomew 03/18/22    This note was shared with patient

## 2022-03-18 NOTE — PSYCH
Subjective:     Patient ID: Nury Tompkins is a 32 y o  male  [facility] Clinical Progress Notes      Specialized Services Documentation  Therapist must complete separate progress note for each specific clinical activity in which the individual participated during the day  Group Psychotherapy  ([id number]) Elana Fong engaged in an open-discussion process group  The group opened up with the prompt question of Where would you rate yourself regarding your wellness journey  Rating yourself anywhere from a 0-10  Then the group talked about what has been working and what hasnt been working in regards to applying skills learned [facility] will continue with life skills and psychotherapy groups  Good progress made towards treatment   Tx Plan Objective: 1 1,1 2 Therapist:  ALEYDA Nolasco

## 2022-03-18 NOTE — PSYCH
Subjective:     Patient ID: Aimee Diehl is a 32 y o  male  [facility] Clinical Progress Notes      Specialized Services Documentation  Therapist must complete separate progress note for each specific clinical activity in which the individual participated during the day  GROUP PSYCHOTHERAPY ([id number])    The group engaged in the wellness assessment, which evaluates progress on several different areas of wellness/wellbeing: physical, emotional, cognitive, vocational, social and spiritual  Clients rated their progress and discussed areas that need work  By completing and discussing areas of progress and challenges, members are connected and reminded that, in their mental health struggle, they are not alone  Topics of discussion revolved around becoming aware and making changes and discussions on broadening views of vocational and spiritual wellness  Aimee Diehl shared his successes, goals, and areas to work on  Eber Li continues to demonstrate progress towards goals through participation in group activity and personal disclosures  Continue with psychotherapy  [facility] Objectives: 1 1, 1 2, 1 4 Therapist: Chavez Plasencia MS    Education Therapy   [id number] Aimee Diehl actively shared in morning assessment and goal review  Presented as Receptive related to readiness to learn  Aimee Diehl did complete goal from last treatment day identifying gaining [facility] and Self Care  did not present with any barriers to learning  [id number] Aimee Diehl engaged throughout the treatment day  Was engaged in learning related to Illness, Medication, Aftercare and Wellness Tools  Staff utilized Verbal, Written, A/V and Demonstration teaching methods  Aimee Diehl shared area of learning and set a goal for outside of program to stay postitive        Tx Plan Objective: 1 1,1 2, 1 4   Therapist: Chavez Plasencia MS

## 2022-03-18 NOTE — PSYCH
Subjective:     Patient ID: Shasha Delaney is a 32 y o  male  [facility] Clinical Progress Notes      Specialized Services Documentation  Therapist must complete separate progress note for each specific clinical activity in which the individual participated during the day  Case Management Note  [id number]-  met with Tucker Odell stated that he believes he is doing well and is experiencing less depression symptoms, however, is worried about the weekend as his wife will be packing to move out of the home and what that may trigger  Tucker Odell was given the processing question to think about ways he will be okay this weekend and what his coping skill plan will be  Tucker Odell successfully identified many strategies he can try  Tucker Odell shared that he has been biting his tongue while sleeping and  and Tucker Odell discussed mindfulness strategies to utilize before bedtime to help regulate his nervous system as the biting could be a stress response  Ruth Malave, MT-BC    Current suicide risk : Low     Medications changes/added/denied? No    Treatment session number: 9    Individual Case Management Visit provided today? Yes     [facility] follow up physician's orders: Continue to follow orders

## 2022-03-18 NOTE — PSYCH
Subjective:     Patient ID: Jany Baldwin is a 32 y o  male  [facility] Clinical Progress Notes      Specialized Services Documentation  Therapist must complete separate progress note for each specific clinical activity in which the individual participated during the day  Group Psychotherapy (9:30-10:30) Jigar Nix [place] was present for this group on mindful self compassion  The group began by reading the poem "[employer]" by [facility], which speaks to emotionally acceptance  The group processed what stood out to them in the poem and their reflections  The group was led in a brief self compassion meditation and then was led into discussion of comparing compassion shared with a friend or family member who is struggling verse the way one speaks to oneself in the midst of struggle  Group members then participated in an activity in which they completed prompts on showing oneself self compassion through comforting, soothing, and validating versus protection, providing and motivating oneself  Theresa Henderson was attentive during this group and participated briefly in group discussion       Tx Goal Objective: 1 1,1 2  Therapist: AKILA Ramirez

## 2022-03-21 ENCOUNTER — OFFICE VISIT (OUTPATIENT)
Dept: PSYCHOLOGY | Facility: CLINIC | Age: 32
End: 2022-03-21
Payer: COMMERCIAL

## 2022-03-21 DIAGNOSIS — F41.1 GENERALIZED ANXIETY DISORDER: ICD-10-CM

## 2022-03-21 DIAGNOSIS — F33.1 MODERATE EPISODE OF RECURRENT MAJOR DEPRESSIVE DISORDER (HCC): Primary | ICD-10-CM

## 2022-03-21 PROCEDURE — S0201 PARTIAL HOSPITALIZATION SERV: HCPCS

## 2022-03-21 PROCEDURE — G0176 OPPS/PHP;ACTIVITY THERAPY: HCPCS

## 2022-03-21 PROCEDURE — G0410 GRP PSYCH PARTIAL HOSP 45-50: HCPCS

## 2022-03-21 PROCEDURE — G0177 OPPS/PHP; TRAIN & EDUC SERV: HCPCS

## 2022-03-21 NOTE — PSYCH
Subjective:     Patient ID: Juan Mcmullen is a 32 y o  male  [facility] Clinical Progress Notes      Specialized Services Documentation  Therapist must complete separate progress note for each specific clinical activity in which the individual participated during the day  GROUP PSYCHOTHERAPY ([id number])   The group engaged in psycho-education on negative self-talk patterns and steps to manage them in daily situations  Members selected a negative self-talk statement; one from personal experience or one from a supplied list   Members were educated on and reviewed the 4 types of negative self-talk (personalizing, filtering, catastrophizing and polarizing), the 5 C's (catch, control, challenge, change and cherish) and examples and solutions to each negative pattern  Each member randomly chose a negative statement provided by another member, identified which category the statement fell into and practiced challenging and changing the negative statement utilizing the 5 C's  Reynaldo's initial statement was I make people uncomfortable, which means I can't socialize  he identified the statement fell into the personalization and filtering category  The statement changed by utilizing strengths and the other person's perspective to contradict the negative statements  Juan Mcmullen continues to demonstrate progress towards goals through verbal and activity participation in group  Continue with psychotherapy     TX Plan Objectives: 1 1, 1 2, 1 4   Therapist: Giovanni Cope MA, LPC

## 2022-03-21 NOTE — PSYCH
Subjective:     Patient ID: Brenda Records is a 32 y o  male  [facility] Clinical Progress Notes      Specialized Services Documentation  Therapist must complete separate progress note for each specific clinical activity in which the individual participated during the day  GROUP PSYCHOTHERAPY ([id number]) The group engaged in education about the stages of change  Each member shared a personal example of a habit they struggle with and then stated which stage of change they are currently in  Members identified themselves in one of the following categories: pre-contemplation, contemplation, preparation, action, and maintenance  We then discussed the challenges and benefits of changing old habits and behaviors  Members were asked the following questions:  1  What is one habit/behavior youve struggled with and what stage would you put yourself in at this time? 2  What is one obstacle/challenge youve faced when trying to change this behavior/habit in the past?    Jennifer Shaikh seemed fairly engaged throughout most of the session  Jennifer Shaikh stated that the habit they are currently struggling with is healthy eating  They identified with the preparation stage  Jennifer Shaikh is encouraged to make progress towards goals and objectives through group participation and will continue to attend psychotherapy group      Tx plan objective: 1 1, 1 2   Therapist: Joe Gtz MA

## 2022-03-21 NOTE — PSYCH
Subjective:     Patient ID: Almeta Lesch is a 32 y o  male  [facility] Clinical Progress Notes      Specialized Services Documentation  Therapist must complete separate progress note for each specific clinical activity in which the individual participated during the day  Education Therapy   [id number] Almeta Lesch actively shared in morning assessment and goal review  Presented as Receptive related to readiness to learn  Almeta Lesch did complete goal from last treatment day identifying gaining support  did not present with any barriers to learning  [id number] Almeta Lesch engaged throughout the treatment day  Was engaged in learning related to Illness, Medication, Aftercare and Wellness Tools  Staff utilized Verbal, Written, A/V and Demonstration teaching methods  Almeta Lesch shared area of learning and set a goal for outside of program to call doctor        Tx Plan Objective: 1 1,1 2 Therapist:  ALEYDA Doll

## 2022-03-21 NOTE — PSYCH
Subjective:     Patient ID: Cash Walters is a 32 y o  male  [facility] Clinical Progress Notes      Specialized Services Documentation  Therapist must complete separate progress note for each specific clinical activity in which the individual participated during the day  Allied Therapy   [id number]--  Cash Walters actively shared in [facility] group focused on identifying the 5 phases of acceptance  Cash Walters engaged in group by actively listening to music, coreen analysis, completed an education component to learning about each phase and characteristics seen each one as well as how each phase serves us, participating in group discussion, and writing a letter to a situation relevant to their life where they are having difficulty accepting an outcome  Group explored practice of writing a letter to begin taking the first step towards practicing letting go  Each group member also identified their road blocks and obstacles to acceptance  Some effort noted toward treatment goal   Continue AT to encourage the development and practice of writing or having a conversation with one's self to promote letting go,  alleviate symptoms, and support wellness  Tx Plan Objective: 1 1, 1 2, & 1 4     Therapist:  GERALDINE De Paz       Case Management Note  CM did not meet with Kael Mitchell and Kael Mitchell did not request to meet with CM and it was not an assigned CM day  CM will meet with Kael Mitchell tomorrow  GERALDINE De Paz    Current suicide risk : Low     Medications changes/added/denied? No    Treatment session number: 10    Individual Case Management Visit provided today? No    [facility] follow up physician's orders: Continue to follow orders

## 2022-03-22 ENCOUNTER — OFFICE VISIT (OUTPATIENT)
Dept: PSYCHOLOGY | Facility: CLINIC | Age: 32
End: 2022-03-22
Payer: COMMERCIAL

## 2022-03-22 DIAGNOSIS — F41.1 GENERALIZED ANXIETY DISORDER: ICD-10-CM

## 2022-03-22 DIAGNOSIS — F33.1 MODERATE EPISODE OF RECURRENT MAJOR DEPRESSIVE DISORDER (HCC): Primary | ICD-10-CM

## 2022-03-22 PROCEDURE — S0201 PARTIAL HOSPITALIZATION SERV: HCPCS

## 2022-03-22 PROCEDURE — G0176 OPPS/PHP;ACTIVITY THERAPY: HCPCS

## 2022-03-22 PROCEDURE — G0177 OPPS/PHP; TRAIN & EDUC SERV: HCPCS

## 2022-03-22 PROCEDURE — G0410 GRP PSYCH PARTIAL HOSP 45-50: HCPCS

## 2022-03-22 NOTE — PSYCH
Subjective:     Patient ID: Brenda Records is a 32 y o  male  [facility] Clinical Progress Notes      Specialized Services Documentation  Therapist must complete separate progress note for each specific clinical activity in which the individual participated during the day  Group Psychotherapy ([id number]) Jennifer Shaikh engaged in a refresher of TORITO E A R  M A N  interpersonal skill before moving into the G I V E  skill  Group went through and discussed the acronym G I V E  which stands for: G: Gentle; I: Interested; V: Validate; and E: Easy Manner and discussed how it intertwines with the TORITO E A R  M A N  skill for positive communication  Group discussed the importance of these skills and how they can improve in their own communication skills  Jennifer Shaikh will continue with life skills and psychotherapy groups  Good progress made towards treatment   Tx Plan Objective: 1 1,1 2 Therapist:  ALEYDA Chappell

## 2022-03-22 NOTE — PSYCH
Subjective:     Patient ID: Hayden Tse is a 32 y o  male  [facility] Clinical Progress Notes      Specialized Services Documentation  Therapist must complete separate progress note for each specific clinical activity in which the individual participated during the day  Allied Therapy   [id number]--  Hayden Tse actively shared in [facility] group that focused on what mindfulness is, what we need to do to remain mindful (observe, describe, and participate), and how can engage in those 3 skills (non-judgmental, one mindfulness, and effectiveness)  Group learned the WHAT Skills and HOW skills through learning each definition, active music making that required the practice of each skill, and open discussion processing their experience utilizing each skill  Group was given a homework assignment to begin implementing one mindfulness experiences once a day for the upcoming week  Hayden Tse identified a one mindfully activity he can complete this evening is a 5 minute meditation  Some effort made toward treatment goal   Continue AT to encourage the development of WHAT and HOW skills to alleviate symptoms and support wellness  Treatment Objectives: 1 1, 1 2, & 1 4     Therapist:  WERNER De SouzaBC           Education Therapy   [id number] Hayden Tse actively shared in morning assessment and goal review  Presented as Receptive related to readiness to learn  Hayden Tse did complete goal from last treatment day identifying gaining responsibility and advocacy  did not present with any barriers to learning  [id number] Hayden Tse engaged throughout the treatment day  Was engaged in learning related to Illness, Medication, Aftercare and Wellness Tools  Staff utilized Verbal, Written, A/V and Demonstration teaching methods  Hayden Tse shared area of learning and set a goal for outside of program to complete a 5 minute meditation and spend time with his niece        Tx Plan Objective: 1 1, 1 2, 1 4    Therapist:  GERALDINE Holland        Case Management Note  [id number]- CM met with Belinda Brittle Belinda Brittle stated that last night was a difficult night as his wife crossed an unspoken boundary that needs to be addressed moving forward  Isiinda Brittle stated he is motivated and has begun to outline a plan for himself  Belinda Brittle will be discharged on Thursday 03/24/22  GERALDINE Holland    Current suicide risk : Low     Medications changes/added/denied? No    Treatment session number: 11    Individual Case Management Visit provided today? Yes     [facility] follow up physician's orders: Continue to follow orders

## 2022-03-22 NOTE — PSYCH
Subjective:     Patient ID: Cirilo Cabrera is a 32 y o  male  [facility] Clinical Progress Notes      Specialized Services Documentation  Therapist must complete separate progress note for each specific clinical activity in which the individual participated during the day  GROUP PSYCHOTHERAPY ([id number])  The group engaged in education about self-sabotaging behavior  We specifically focused on self-sabotaging habits related to how they approach change, along with emotion/time draining habits they tend to engage in    Each member was asked to answer the following questions:     You expect yourself to succeed in making life changes without designating any time or mental space to accomplish them   You see your capacity to change as being dependent on other peoples behavior  For example, youd exercise more or make better spending choices if your spouse was more supportive and on board  Jasmin Salazar a perfectionist who is dismissive of incremental improvements, and youre only satisfied when 100 percent of a problem is fixed   You waste a lot of time and emotional energy reinventing the wheel, such as writing a new packing list each time you take a trip or continually resetting passwords you forget rather than taking the time to set up a password manager   You need better routines that work for you  For example, you go grocery shopping every second day, because youre always running out of basic items   Other people in your life defer all decision-making to you rather than taking up some of that burden  You allow this pattern rather than empowering them to make decisions   In situations in which you can choose to be happy or choose to be miserable, you choose to be miserable  - Which of the statements resonated with you the most? Why?  - How can you work on this? Zach Perla seemed fairly engaged throughout the group session and provided feedback to peers   Zach Perla stated that the seventh statement resonated with them the most  Brissa Vogel is encouraged to make progress towards goals and objectives through group participation and will continue to attend psychotherapy group       Tx plan objective: 1 1, 1 2   Therapist: Eden Shea MA

## 2022-03-23 ENCOUNTER — OFFICE VISIT (OUTPATIENT)
Dept: PSYCHIATRY | Facility: CLINIC | Age: 32
End: 2022-03-23
Payer: COMMERCIAL

## 2022-03-23 ENCOUNTER — OFFICE VISIT (OUTPATIENT)
Dept: PSYCHOLOGY | Facility: CLINIC | Age: 32
End: 2022-03-23
Payer: COMMERCIAL

## 2022-03-23 DIAGNOSIS — F33.1 MODERATE EPISODE OF RECURRENT MAJOR DEPRESSIVE DISORDER (HCC): Primary | ICD-10-CM

## 2022-03-23 DIAGNOSIS — F41.1 GENERALIZED ANXIETY DISORDER: ICD-10-CM

## 2022-03-23 DIAGNOSIS — F33.1 MODERATE EPISODE OF RECURRENT MAJOR DEPRESSIVE DISORDER (HCC): ICD-10-CM

## 2022-03-23 DIAGNOSIS — F41.1 GENERALIZED ANXIETY DISORDER: Primary | ICD-10-CM

## 2022-03-23 PROCEDURE — 1036F TOBACCO NON-USER: CPT | Performed by: NURSE PRACTITIONER

## 2022-03-23 PROCEDURE — 99213 OFFICE O/P EST LOW 20 MIN: CPT | Performed by: NURSE PRACTITIONER

## 2022-03-23 NOTE — PSYCH
Subjective:     Patient ID: Aydee Carrasco is a 32 y o  male  [facility] Clinical Progress Notes      Specialized Services Documentation  Therapist must complete separate progress note for each specific clinical activity in which the individual participated during the day  Allied Therapy    [id number]-- Aydee Carrasco  actively shared in [facility] group focused on how to effectively and assertively set a boundary with others as well as examining what areas in our life we need to strengthen or put a new boundary  Aydee Carrasco engaged in role play with a partner involving a drum and verbally sharing in discussion  Group explored practice of setting a boundary verbally and non-verbally (separate and together) and then utilizing the 4 step model for when they encounter these situations in real life  Aydee Carrasco identified within group personal insight regarding how he attempts to fix others or take their reactions and emotions as his responsibility  Some effort noted toward treatment goal   Continue AT to encourage the development and practice of developing boundaries to  alleviate symptoms and support wellness  Tx Plan Objective: 1 1, 1 2, & 1 4   Therapist:  Aron Aase, [place]-BC     Case Management Note  CM did not meet with Robson Jeffery today  CM will meet with Robson Jeffery tomorrow to complete discharge meeting  Aron Aase, [place]-BC    Current suicide risk : Low     Medications changes/added/denied? No    Treatment session number: 12    Individual Case Management Visit provided today? Yes     [facility] follow up physician's orders: Continue to follow orders

## 2022-03-23 NOTE — PSYCH
Subjective:     Patient ID: Almeta Lesch is a 32 y o  male  [facility] Clinical Progress Notes      Specialized Services Documentation  Therapist must complete separate progress note for each specific clinical activity in which the individual participated during the day  Education Therapy   [id number] Almeta Lesch actively shared in morning assessment and goal review  Presented as Receptive related to readiness to learn  Almeta Lesch did complete goal from last treatment day identifying gaining Hope  did not present with any barriers to learning  [id number] Almeta Lesch engaged throughout the treatment day  Was engaged in learning related to Illness, Medication, Aftercare and Wellness Tools  Staff utilized Verbal, Written, A/V and Demonstration teaching methods  Almeta Lesch shared area of learning and set a goal for outside of program to babysit his friend's children  Tx Plan Objective: 1 1,1 2, 1 4   Therapist: Hortensia Layne, MS    Other We discussed his career/educational plans after wrap up  Anderson Kelley has aspirations to pursue a BA in Psychology and an MA in some field of therapy/counseling  He asked questions regarding the educational process and presented a concept for a private practice

## 2022-03-23 NOTE — PSYCH
Subjective:     Patient ID: Urbano Alejandro is a 32 y o  male  [facility] Clinical Progress Notes      Specialized Services Documentation  Therapist must complete separate progress note for each specific clinical activity in which the individual participated during the day  GROUP PSYCHOTHERAPY ([id number])   The group received psycho-education about developing and identifying hope, 4 types of hope and benefits of having hope  Members engaged in discussions about common questions about hope and recognizing barriers to hope  The group participated in an therapeutic "tree of hope" activity that encouraged identification of supports, interests, and sources of comfort and guidance; along with passions, wants and dreams for the future  River Pepper talked about how he often feels realistic hope but has difficulty with chosen hope through his divorce  Jeetmannie Pepper engaged in different group discussions to encourage and support other members  River Shantelle demonstrated progress towards goals and objectives through group participation  Continue with psychotherapy    TX Plan Objectives: 1 1, 1 2, 1 4   Therapist: Sally Montenegro MA, LPC

## 2022-03-23 NOTE — PSYCH
Subjective:     Patient ID: Edward Castrejon is a 32 y o  male  [facility] Clinical Progress Notes      Specialized Services Documentation  Therapist must complete separate progress note for each specific clinical activity in which the individual participated during the day  Group Psychotherapy ([id number]) Brissa Vogel participated in a group that started with a mindfulness technique of progressive muscle relaxation  After finishing our mindfulness exercise Brissa Vogel participated in an open discussion card game called self-care empowerment  Each card would express or impose a question or way to empower an area of their life  Brissa Vogel will continue with life skills and psychotherapy groups  Good progress made towards treatment   Tx Plan Objective: 1 1,1 2 Therapist:  ALEYDA Guzman

## 2022-03-23 NOTE — PSYCH
PHP MEDICATION MANAGEMENT NOTE        [facility]    Name and Date of Birth:  Joyce Oh 32 y o  1990 MRN: [id number]    Date of Visit: March 23, 2022    No Known Allergies  SUBJECTIVE:    Ester Jacob is seen today for a follow up for Major Depressive Disorder and Generalized Anxiety Disorder  He reports that he has improved since beginning PHP  Patient states that since starting program he is feeling significantly better  Reports he continues to experience some passive suicidal thoughts at times but denies any intent or plan to harm self  Planned discharge tomorrow  Will call today to schedule follow-up with outpatient psychiatry  Denies any questions or concerns    He denies any side effects from medications  PLAN:  Continue Wellbutrin  mg p o  Daily  Continue Lexapro 20 mg p o  Daily  Continue trazodone 50 mg p o  HS p r n    Aware of 24 hour and weekend coverage for urgent situations accessed by calling [facility] main practice number  Continue partial hospitalization program    Diagnoses and all orders for this visit:    Generalized anxiety disorder    Moderate episode of recurrent major depressive disorder ([address] )        Current Outpatient Medications on File Prior to Visit   Medication Sig Dispense Refill    buPROPion (Wellbutrin XL) 150 mg 24 hr tablet Take 1 tablet (150 mg total) by mouth daily 30 tablet 2    buPROPion (Wellbutrin XL) 300 mg 24 hr tablet Take 1 tablet (300 mg total) by mouth daily 30 tablet 2    Cholecalciferol (VITAMIN D-3) 1000 units CAPS Take 2 capsules by mouth daily      escitalopram (LEXAPRO) 20 mg tablet Take 1 tablet (20 mg total) by mouth daily 90 tablet 0    fluticasone (FLONASE) 50 mcg/act nasal spray 1 spray into each nostril as needed for rhinitis        levothyroxine 25 mcg tablet TAKE 1 TABLET BY MOUTH DAILY IN THE EARLY MORNING 90 tablet 1    multivitamin (THERAGRAN) TABS Take 1 tablet by mouth daily      traZODone (DESYREL) 50 mg tablet Take 1 tablet (50 mg total) by mouth daily at bedtime as needed for sleep 90 tablet 0    [DISCONTINUED] buPROPion (Forfivo XL) 450 MG 24 hr tablet Take 1 tablet (450 mg total) by mouth daily 90 tablet 0     No current facility-administered medications on file prior to visit  Psychotherapy Provided:     Individual psychotherapy provided: Yes  Counseling was provided during the session today for 16 minutes  Supportive counseling provided  HPI ROS Appetite Changes and Sleep:     He reports frequent awakenings, normal appetite, adequate energy level   Denies homicidal ideation, denies suicidal ideation    Review Of Systems:      General emotional problems and decreased functioning   Personality no change in personality   Constitutional negative   ENT negative   Cardiovascular negative   Respiratory negative   Gastrointestinal negative   Genitourinary negative   Musculoskeletal negative   Integumentary negative   Neurological negative   Endocrine negative   Other Symptoms none, all other systems are negative     Mental Status Evaluation:    Appearance Adequate hygiene and grooming   Behavior calm and cooperative   Mood anxious and depressed  Depression Scale -  of 10 (0 = No depression)  Anxiety Scale -  of 10 (0 = No anxiety)   Speech Normal rate and volume   Affect appropriate and mood-congruent   Thought Processes Goal directed and coherent   Thought Content Does not verbalize delusional material   Associations Tightly connected   Perceptual Disturbances Denies hallucinations and does not appear to be responding to internal stimuli   Risk Potential Suicidal/Homicidal Ideation - Passive suicidal ideation without intent or plan  Risk of Violence - No evidence of risk for violence found on assessment  Risk of Self Mutilation - No evidence of risk for self mutilation found on assessment   Orientation oriented to person, place, time/date and situation Memory recent and remote memory grossly intact   Consciousness alert and awake   Attention/Concentration attention span and concentration are age appropriate   Insight Fair   Judgement Fair   Muscle Strength and Gait normal muscle strength and normal muscle tone, normal gait/station and normal balance   Motor Activity no abnormal movements   Language no difficulty naming common objects, no difficulty repeating a phrase, no difficulty writing a sentence   Fund of Knowledge adequate knowledge of current events  adequate fund of knowledge regarding past history  adequate fund of knowledge regarding vocabulary      Past Psychiatric History Update:     Inpatient Psychiatric Admission Since Last Encounter:   no  Suicide Attempt Or Self Mutilation Since Last Encounter:   no  Incidence of Violent Behavior Since Last Encounter:   no    Traumatic History Update:     New Onset of Abuse Since Last Encounter:   no  Traumatic Events Since Last Encounter:   no    Past Medical History:    Past Medical History:   Diagnosis Date    Anxiety     Carpal tunnel syndrome, bilateral     [place]  Riky Fletcheran 9/12/17   R   9/12/17     GERD without esophagitis     Moderate episode of recurrent major depressive disorder [facility] ) 8/22/2018    Pilonidal cyst with abscess     [place]   10/25/13   R   6/10/14     Vitamin D deficiency      Past Medical History Pertinent Negatives:   Diagnosis Date Noted    Seizures ([facility] ) 07/08/2021     Past Surgical History:   Procedure Laterality Date    FOOT SURGERY      PILONIDAL CYST DRAINAGE      Incision and drainage of pilonidal cyst     NE WRIST Alistair Traci LIG Right 7/12/2018    Procedure: RELEASE CARPAL TUNNEL ENDOSCOPIC;  Surgeon: Osbaldo Duffy MD;  Location:  MAIN OR;  Service: Orthopedics    NE WRIST Alistair Traci LIG Left 7/26/2018    Procedure: RELEASE CARPAL TUNNEL ENDOSCOPIC;  Surgeon: Osbaldo Duffy MD;  Location:  MAIN OR;  Service: Orthopedics     No Known Allergies  Substance Abuse History:    Social History     Substance and Sexual Activity   Alcohol Use Yes    Comment: Rarely     Social History     Substance and Sexual Activity   Drug Use No     Social History:    Social History     Socioeconomic History    Marital status: /Civil Union     Spouse name: Not on file    Number of children: 0    Years of education: some college    Highest education level: Some college, no degree   Occupational History    Occupation: [employer]     Comment: employed full time   Tobacco Use    Smoking status: Never Smoker    Smokeless tobacco: Never Used   Vaping Use    Vaping Use: Never used   Substance and Sexual Activity    Alcohol use: Yes     Comment: Rarely    Drug use: No    Sexual activity: Yes     Partners: Female   Other Topics Concern    Not on file   Social History Narrative     since 20/12  No children  Also has brother and sister-in-law and his niece living with him  Works for [employer] in the [employer]  Also runs with the [employer]     Financial Resource Strain: Not on file   Food Insecurity: Not on file   Transportation Needs: Not on file   Physical Activity: Not on file   Stress: Not on file   Social Connections: Not on file   Intimate Partner Violence: Not on file   Housing Stability: Not on file     Family Psychiatric History:     Family History   Problem Relation Age of Onset    Depression Mother     Obesity Mother     Stroke Mother         Syndrome     Diabetes Mother     Alcohol abuse Father     Liver disease Father         hepatic failure     Hypertension Father     Depression Brother     Thyroid disease Brother     Alcohol abuse Brother     Substance Abuse Brother     Depression Maternal Uncle     Substance Abuse Brother     Heart disease Neg Hx     Cancer Neg Hx     Thyroid cancer Neg Hx      History Review: The following portions of the patient's history were reviewed and updated as appropriate: allergies, current medications, past family history, past medical history, past social history, past surgical history and problem list     OBJECTIVE:     Vital signs in last 24 hours: There were no vitals filed for this visit  Laboratory Results: I have personally reviewed all pertinent laboratory/tests results  Medications Risks/Benefits:      Risks, Benefits And Possible Side Effects Of Medications:    Discussed risks and benefits of treatment with patient including risk of suicidality, serotonin syndrome, increased QTc interval and SIADH related to treatment with antidepressants; Risk of induction of manic symptoms in certain patient populations and Reduction in seizure threshold related to treatment with bupropion      Controlled Medication Discussion:     Not applicable    AMRITA Hancock 03/23/22    This note was shared with patient

## 2022-03-23 NOTE — PSYCH
[facility] Discharge Instructions:   Disposition: home  Address: [address] [place] [id number]   Diagnosis:   1  Moderate episode of recurrent major depressive disorder ([address] )     2  Generalized anxiety disorder        Allergies (Drug/Food): No Known Allergies  Activity: No activity restriction  Diet:no recommendations  Smoking Cessation:not a smoker   Diagnostic/Laboratory Orders: no labs ordered at this time  Vaccines: If you received a vaccine, please notify your family physician on your next visit  For more information, please call [phone]  Follow-up appointments/Referrals:   Medication Management:   Dr Emmett Granger  [address] [facility], [address] [place] [address]  [phone]      Outpatient Therapy:   Ifrah Oneal   [address] [facility]   [address] [facility],   Ju[address]   (P) [phone]     Primary Care Physician:   AMRITA Shields   [address] [place] [id number] (U) [phone] (W) [phone]     ICM/CTT: N/A    [facility]: [facility] [phone] and [facility] [phone]     Intake/Referral/Evaluation (Non-Emergency) *NON INSURED FOR FUNDING: [facility]: [phone], [facility]: [phone], [facility]: [phone] and [facility]: [phone]  Crisis Intervention (Emergency) [place] Service: [facility]: [phone], [facility]: [phone] , [facility]: [phone], [facility]): [phone], Devonte Mcgee: [phone] and [facility]: [phone]  [facility]/Warm Line: [phone]  _________________________________  National Crisis Intervention Hotline: 9-333.433.2289  National Suicide Crisis Hotline: 4-322.247.5675  I, the undersigned, have received and understand the above instructions          Patient/Rep Signature: __________________________________       Date/Time: ______________         Relationship: __________________________________________       Date/Time: ______________         Physician Signature: ____________________________________      Date/Time: ______________               Signature: ________________________________       Date/Time: ______________

## 2022-03-24 ENCOUNTER — OFFICE VISIT (OUTPATIENT)
Dept: PSYCHOLOGY | Facility: CLINIC | Age: 32
End: 2022-03-24
Payer: COMMERCIAL

## 2022-03-24 DIAGNOSIS — F41.1 GENERALIZED ANXIETY DISORDER: ICD-10-CM

## 2022-03-24 DIAGNOSIS — F33.1 MODERATE EPISODE OF RECURRENT MAJOR DEPRESSIVE DISORDER (HCC): Primary | ICD-10-CM

## 2022-03-24 PROCEDURE — G0176 OPPS/PHP;ACTIVITY THERAPY: HCPCS

## 2022-03-24 PROCEDURE — S0201 PARTIAL HOSPITALIZATION SERV: HCPCS

## 2022-03-24 PROCEDURE — G0177 OPPS/PHP; TRAIN & EDUC SERV: HCPCS

## 2022-03-24 PROCEDURE — G0410 GRP PSYCH PARTIAL HOSP 45-50: HCPCS

## 2022-03-24 NOTE — PSYCH
Subjective:     Patient ID: Kamila Proctor is a 32 y o  male  [facility] Discharge Summary:   Admission Date: 22  Patient was referred by [address] (PCP)  Discharge Date: 22  Was this a routine discharge? yes   Diagnosis: Axis I:   1  Moderate episode of recurrent major depressive disorder ([address] )     2  Generalized anxiety disorder        Treating Physician: Dr Danyell Colón   Treatment Complications: There were no treatment complications  Presenting Need:   As per Dr Kayla Merritt: Elana Ends a 32 y  o  male with MDD and JON  referred by PCP because ongoing depression and passive SI    Patient stated he had been through [facility] about a year ago and asked his PCP to be referred back  He stated he had been dx with MDD and JON since teenage  He stated at age 24 yo and he stated with a guidance counselor and eventually he started seeing a psychiatrist and he was started on Sertraline which he took for several years and Wellbutrin  Later on Sertraline was switched to Lexapro and he has taken for about a year or two  He also stated that due to his JIMI he struggles with poor sleep and fatigue  He stated he currently feels his medications are not working because of his depressed mood and his constant suicidal thoughts  He stated he often feels he rather be dead because he feels like a failure for  due to his financial problems  Works full time in packing at [employer]  His wife just recently started working for [employer]   He stated his home life is with wife, mother, brother, his wife and his 1 kids  Danica Osorio 4 cats and 2 dogs and 2 birds  He denies having a plan to kill himself and denies previous suicide attempts  There is family hx of anxiety and depression , and drug and alcohol abuse  He does have a maternal uncle that committed suicide  His father  of liver failure due to heavy alcohol use   He denies substance abuse, but he stated he uses food to cope with stress  He stated he has been feeling he is failing his wife, because he loses and regain weight and also because he had to stop volunteering as  due to his obesity problem  He feels unhappy at current job and he will like to go to school for psychology  Today he feels depressed, unmotivated, fatigued, hopeless and helpless and has guilt and overwhelming feeling of failure that triggers his SI  He denies having a plan to commit suicide and denies previous attempts        As per this writer: Odalys Angeles is a 32 y o  male using he/him pronouns referred to [facility] via due to worsening depression symptoms involving suicidal ideation and impacting ability to get to work  Within the risk assessment, Isi Brittnirosie denies smoking cigarettes, rarely uses alcohol, no past or current substance abuse, and moderate caffeine intake  Currently, Belinda Brittle stated he is experiencing constant ruminating of thoughts regarding "I am a failure and I am inadequate" which leads to passive suicidal ideation  When asked what led to the thoughts of I am a failure and being inadequate, Belinda Brittle stated that he feels like a failure of a  and provider  Belinda Brittle shared that within the past couple of months, his wife lost her job  Belinda Brittle began working overtime to  the extra bills, however, Reynaldo's depression worsened and he began calling off from work and missing scheduled work shifts  Belinda Brittle stated they are behind on bills and rent payments  He also stated his wife tells him continually that he doesn't treat her right and while he is working on it, he doesn't feel good enough  In terms of mental health symptoms, Belinda Brittle struggles with a regular sleep schedule  Belinda Brittle stated some of the sleep difficulties may be related to sleep apnea and he will be receiving studies in April  Belinda Brittle is currently experiencing passive SI with no plan or intent   Belinda Brittle has been missing work and other commitments due to his depression, feelings of hopelessness, little energy or motivation to complete tasks         As per Kaylene Salvador: "I feel like I am failing as a  and provider "     Course of treatment includes:    group counseling, medication management, individual case management, allied therapy, psychoeducation and psychiatric evaluation  Treatment Progress:   Kaylene Salvador participated in 13 treatment days; in addition to the in person initial evaluation with the doctor and   Kaylene Salvador engaged in group psychotherapy 9 x per week and Allied Therapy Group 6 x per week, along with case management sessions 3x per week  Kaylene Salvador addressed the following treatment objectives in case management: finding appropriate cognitive re-framing strategies, acknowledging and accepting uncomfortable emotions, regulating negative self-talk, and processing responses and behaviors related to relationships  Response to treatment was positive evident by  engaging in group discussions and activities/experiences, asking relevant questions, and learning new coping and distress tolerance skills  Kaylene Salvador presented with willingness to change  Kaylene Salvador presented with challenges regarding having motivation to challenge his thought processes, however, by the end of program he began setting goals for himself and working on treatment outcomes  Medication changes did not occur while attending program  Kaylene Salvador attended all medication reviews  Evidence of progress includes feeling less hopeless/depressed, more motivation to maintain my mental health, and setting goals for his mental well being  Takeaways from program include: using daily meditations, daily positive affirmations, DEAR MAN and STOP technique, and quick reset tools to decrease anxiety symptoms   Kaylene Salvador  Recognized that he would like to continue to work on in outpatient therapy: understanding his negative thought patterns and guidance in how to cognitively reframe them, learn more quick reset strategies to assist in decreasing racing thoughts, and placing himself as a priority by learning how to set boundaries  No lab work was completed while attending Trudy Johnston denies any current SI, HI or SIB         Aftercare recommendations include:   Medication Management:   Dr Rain Verdugo  [address], [address] [facility]  [phone]      Outpatient Therapy:   Zeke Gu   [address]   [address] [facility], [place] [address]   (H) [phone]     Primary Care Physician:   AMRITA Larry   [address] [place] [id number]   (M) [phone] (L) [phone]   Appointment Time: March 30, 2022 @ 11:30 AM    Discharge Medications include:  Current Outpatient Medications:     buPROPion (Wellbutrin XL) 150 mg 24 hr tablet, Take 1 tablet (150 mg total) by mouth daily, Disp: 30 tablet, Rfl: 2    buPROPion (Wellbutrin XL) 300 mg 24 hr tablet, Take 1 tablet (300 mg total) by mouth daily, Disp: 30 tablet, Rfl: 2    Cholecalciferol (VITAMIN D-3) 1000 units CAPS, Take 2 capsules by mouth daily, Disp: , Rfl:     escitalopram (LEXAPRO) 20 mg tablet, Take 1 tablet (20 mg total) by mouth daily, Disp: 90 tablet, Rfl: 0    fluticasone (FLONASE) 50 mcg/act nasal spray, 1 spray into each nostril as needed for rhinitis  , Disp: , Rfl:     levothyroxine 25 mcg tablet, TAKE 1 TABLET BY MOUTH DAILY IN THE EARLY MORNING, Disp: 90 tablet, Rfl: 1    multivitamin (THERAGRAN) TABS, Take 1 tablet by mouth daily, Disp: , Rfl:     traZODone (DESYREL) 50 mg tablet, Take 1 tablet (50 mg total) by mouth daily at bedtime as needed for sleep, Disp: 90 tablet, Rfl: 0

## 2022-03-24 NOTE — PSYCH
Assessment/Plan:       Diagnoses and all orders for this visit:     Moderate episode of recurrent major depressive disorder (HCC)     Generalized anxiety disorder            Subjective:      Patient ID: Reynaldo Santoyo is a 32 y  o  male      [facility] Treatment Plan   AREAS OF [employer] Brothers has experienced an increase in depression symptoms as evidenced by experiencing passive SI with no plan or intent, sleep disturbances, missing work and other commitments due to his depression, feelings of hopelessness, little energy or motivation to complete tasks due financial stress, relationship concerns, and ruminating thoughts    Date Initiated: 03/02/22     Strengths: Sense of humor, good listener, caring, loyal, and encouraging to others     LONG TERM GOAL:   Date Initiated: 03/02/22  1 0 By the end of program, I will implement on a daily basis 3 effective coping skills other than activities and social distractions that will decrease my depression symptoms    Target Date: 04/06/22  Completion Date:         SHORT TERM OBJECTIVES:      Date Initiated: 03/02/22  1 1 On a daily basis, I will bring awareness to my negative thoughts at least 3 times per day and utilize a cognitive thought stopping technique (I e  logic and facts, journaling, positive affirmations, dialectic statements, etc ) to decrease my ruminating and intrusive thought patterns    Revision Date: 03/11/22  Target Date: 03/11/22  Completion Date:      Date Initiated: 03/02/22  1 2 By the end of program, I will identify at least 5 triggers to my negative thought patterns to bring awareness and more effectively design a coping skill plan (I e  develop a schedule, set a boundary, thought replacement strategy, etc )    Revision Date: 03/11/22  Target Date: 03/11/22  Completion Date: 03/24/22     1 3 I will design a morning routine that involves waking up around the same time daily as well as 2 activities of daily living (eat breakfast, shower, etc ) that I view as challenging that need to be completed to create a sense of structure and purpose in the morning    Revision Date: 03/11/22  Target Date: 03/11/22  Completion Date: 03/24/22     Date Initiated: 03/02/22  1 4 I will take medications as prescribed and share questions and concerns if arise     Revision Date: 03/11/22  Target Date: 03/11/22  Completion Date: 03/24/22     Date Initiated: 03/02/22  1 5 I will identify 3 ways my supports can assist in my recovery and agree to staff/support contact as indicated     Revision Date: 03/11/22  Target Date: 03/11/22  Completion Date: 03/24/22            7 DAY REVISION:  1 6: When I begin to experience a self-defeating thought pattern, I will identify when that belief began, where it came from, identify if the thought is serving me well, if I want to change the thought, and then create a replacement thought to decrease my ruminating thoughts  Date Initiated: 03/11/22  Revision Date: 03/22/22  Target Date: 03/22/22  Completion Date: 03/24/22        PSYCHIATRY:  Date Initiated:  03/02/22  Medication Management and Education       Revision Date: 03/11/22        1 3 Continue medication management   The person(s) responsible for carrying out the plan is Loraine Colón MD     NURSING:   Date Initiated: 03/02/22  1 1,1 2,1 3,1 4 This therapist will provide wellness/symptoms and skill education groups three to five days weekly to educate Reynaldo Padron signs and symptoms of diagnoses, skills to manage, and medication questions that will be addressed by the treatment team     Revision Date: 03/11/22        1 1,1 2,1 3,1 4,1 5 Continue to encourage Shwetha Akins to participate in wellness groups daily to learn about symptoms, coping strategies and warning signs to promote relapse prevention     The person(s) responsible for carrying out the plan is Chloé Mancia MA and ALEYDA Barnhart     PSYCHOLOGY:   Date Initiated: 03/02/22       1 1, 1 2, 1 4 Provide psychotherapy group 5 times per week to allow opportunity for Nolberto Learn explore stressors and ways of coping  Revision Date: 03/11/22   1 1,1 2,1 4,1 5  Continue to provide psychotherapy group daily to Almeta Lesch and encourage sharing of stressors, skills and positive change  The person(s) responsible for carrying out the plan is Timothy Pitts MA     ALLIED THERAPY:   Date Initiated: 03/02/22  1 1,1 2 Engage Reynaldo Santoyo in AT group 5 times daily to encourage development and use of wellness tools to decrease symptoms and promote recovery through meaningful activity  Revision Date: 03/11/22   1 1,1 2,1 5 Continue to engage Almeta Lesch to participate in AT group to practice wellness tools within program and transfer to home sharing successes and barriers through healthy task involvement  The person(s) responsible for carrying out the plan is GERALDINE Elizabeth and GERALDINE Sweeney     CASE MANAGEMENT:   Date Initiated: 03/02/22      1 0 This  will meet with Papa Denny times weekly to assess treatment progress, discharge planning, connection to community supports and UR as indicated  Revision Date: 03/11/22   1 0 Continue to meet with Almeta Lesch 3-4 times weekly to assess growth, work toward goals, continued treatment needs, dc planning and use of supports     The person(s) responsible for carrying out the plan is GERALIDNE Sweeney     TREATMENT REVIEW/COMMENTS:      DISCHARGE CRITERIA: Identify 3 signs of progress and complete relapse prevention plan     DISCHARGE PLAN: Connect with outpatient providers  Estimated Length of Stay: 10 treatment days         Diagnosis and Treatment Plan explained to Reynaldo Jacobs relates understanding diagnosis and is agreeable to Treatment Plan       CLIENT COMMENTS / Please share your thoughts, feelings, need and/or experiences regarding your treatment plan: _____________________________________________________________________________________________________________________________________________________________________________________________________________________________________________________________________________________________________________________      Date/Time: ______________      Patient Signature: _________________________________      Date/Time: ______________       Signature: _________________________________          Date/Time: ______________

## 2022-03-24 NOTE — PSYCH
Subjective:     Patient ID: Mary Lerma is a 32 y o  male  [facility] Clinical Progress Notes      Specialized Services Documentation  Therapist must complete separate progress note for each specific clinical activity in which the individual participated during the day  Group Psychotherapy ([id number]) My Evans engaged in a group where we discussed the importance of movement in regard to our holistic health and wellness  Talking about how mental health and physical health are connected  After the processing My Evans engaged in a physical activity of chair yoga focusing on Mindfulness and body awareness  My Evans will continue with life skills upon discharge  Good progress made towards treatment   Tx Plan Objective: 1 1,1 2 Therapist:  ALEYDA Valles

## 2022-03-24 NOTE — PSYCH
[facility] Clinical Progress Notes      Specialized Services Documentation  Therapist must complete separate progress note for each specific clinical activity in which the individual participated during the day         [facility] follow up physician's orders:   DATE 3/24/2022  TIME 9:56 AM  DISCHARGE TODAY  Laury Mireles MD

## 2022-03-24 NOTE — PSYCH
Subjective:     Patient ID: Tapan Miller is a 32 y o  male  [facility] Clinical Progress Notes      Specialized Services Documentation  Therapist must complete separate progress note for each specific clinical activity in which the individual participated during the day  Allied Therapy   [id number]--Reynaldo Blood actively shared in [facility] group focused on social connection, rewiring unhealthy thoughts related to loneliness, and setting daily intentions related to how we are utilizing our coping skills  Tapan Miller  engaged in group by sharing personal reflections, music listening, related to coreen analysis and journaling during reflection time  Group explored practice of coreen analysis, utilizing self-reflection questions to guide thought processes related to loneliness, and shared with the group during coreen analysis  Tapan Miller shared  Some effort noted toward treatment goal   Continue AT to encourage the development and practice of monitering how we are utilizing our alone time and social connection time to  alleviate symptoms and support wellness  Treatment Plan Objectives Addressed: 1 1, 1 2, 1 4   Therapist:  GERALDINE Simmons       Case Management Note  [id number]- Met with Tapan Miller  Reviewed relapse prevention plan, aftercare plan, and medication list (copies provided)  Tapan Miller shared improvement through  Denied SI, HI, and psychosis  Aftercare providers to receive summary  GERALDINE Simmons    Current suicide risk : Low     Medications changes/added/denied? No    Treatment session number: 13    Individual Case Management Visit provided today? Yes     [facility] follow up physician's orders: Continue to follow orders

## 2022-03-24 NOTE — PSYCH
Subjective:     Patient ID: Jennifer Monroe is a 32 y o  male  [facility] Clinical Progress Notes      Specialized Services Documentation  Therapist must complete separate progress note for each specific clinical activity in which the individual participated during the day  Group Psychotherapy ([id number])    The group played the Dialectical Behavioral Therapy game called "The Game of Real Life" by Negrita Reardon  The game presents the group with very difficult situations called "Conflict Cards"  Each group member has 6 "Skill Cards" to choose from in order to best react to the given conflict situation  These "Skill Cards" contain DBT skills (along with a detailed explanation of said skill) from the 4 facets, Interpersonal Effectiveness, Mindfulness, Distress Tolerance, and Emotional Regulation  Each player submits their card anonymously for the "[address]" (the player who decides the winner, this alternates clockwise) to read aloud  Each skill being announced prompts discussion regarding how that skill can be appropriately used  The skill that best fits the conflict is chosen and the player who submitted it is awarded a point  This game promoted significant discussion regarding DBT skills, multiple questions/answers about DBT acronyms, and a lot of group comradery  Jennifer Monroe actively participated in the game, took part in each discussion, and learned about multiple new DBT skills  Jennifer Monroe is making progress towards goals  Continue w/ PHP  Tx Plan Objective: 1 1,1 2, 1 4   Therapist: Lilli De MS    Education Therapy   [id number] Jennifer Monroe actively shared in morning assessment and goal review  Presented as Receptive related to readiness to learn  Jennifer Monroe did complete goal from last treatment day identifying gaining hope  did not present with any barriers to learning  [id number] Jennifer Monroe engaged throughout the treatment day   Was engaged in learning related to Illness, Medication, Aftercare and Wellness Tools  Staff utilized Verbal, Written, A/V and Demonstration teaching methods  Urbano Alejandro shared area of learning and set a goal for outside of program to work on long term goals        Tx Plan Objective: 1 1,1 2, 1 4   Therapist: Lizzy Antunez MS

## 2022-03-25 ENCOUNTER — APPOINTMENT (OUTPATIENT)
Dept: PSYCHOLOGY | Facility: CLINIC | Age: 32
End: 2022-03-25
Payer: COMMERCIAL

## 2022-03-30 ENCOUNTER — OFFICE VISIT (OUTPATIENT)
Dept: FAMILY MEDICINE CLINIC | Facility: CLINIC | Age: 32
End: 2022-03-30
Payer: COMMERCIAL

## 2022-03-30 VITALS
DIASTOLIC BLOOD PRESSURE: 80 MMHG | WEIGHT: 315 LBS | TEMPERATURE: 98 F | BODY MASS INDEX: 41.75 KG/M2 | HEART RATE: 97 BPM | HEIGHT: 73 IN | RESPIRATION RATE: 20 BRPM | SYSTOLIC BLOOD PRESSURE: 130 MMHG | OXYGEN SATURATION: 97 %

## 2022-03-30 DIAGNOSIS — F33.1 MODERATE EPISODE OF RECURRENT MAJOR DEPRESSIVE DISORDER (HCC): Primary | ICD-10-CM

## 2022-03-30 DIAGNOSIS — B34.9 VIRAL INFECTION, UNSPECIFIED: ICD-10-CM

## 2022-03-30 PROCEDURE — U0005 INFEC AGEN DETEC AMPLI PROBE: HCPCS | Performed by: NURSE PRACTITIONER

## 2022-03-30 PROCEDURE — 3008F BODY MASS INDEX DOCD: CPT | Performed by: NURSE PRACTITIONER

## 2022-03-30 PROCEDURE — 99214 OFFICE O/P EST MOD 30 MIN: CPT | Performed by: NURSE PRACTITIONER

## 2022-03-30 PROCEDURE — U0003 INFECTIOUS AGENT DETECTION BY NUCLEIC ACID (DNA OR RNA); SEVERE ACUTE RESPIRATORY SYNDROME CORONAVIRUS 2 (SARS-COV-2) (CORONAVIRUS DISEASE [COVID-19]), AMPLIFIED PROBE TECHNIQUE, MAKING USE OF HIGH THROUGHPUT TECHNOLOGIES AS DESCRIBED BY CMS-2020-01-R: HCPCS | Performed by: NURSE PRACTITIONER

## 2022-03-30 NOTE — PROGRESS NOTES
FAMILY PRACTICE OFFICE VISIT       NAME: Shanice Bateman  AGE: 32 y o  SEX: male       : 1990        MRN: [id number]    Assessment and Plan   1  Moderate episode of recurrent major depressive disorder [facility])  Assessment & Plan:  Significantly improved after attending [facility] partial program   Continue current medications  Continue follow-up with counselor and Psychiatry  May return to work  Forms completed and faxed today  2  Viral infection, unspecified  Comments:  Swabbed for COVID-19  Will await results  Continue supportive care  Call if symptoms are persisting greater than 7-10 days  Orders:  -     COVID Only - Office Collect     Chief Complaint     Chief Complaint   Patient presents with    Follow-up     Pt is here for post program f/u       History of Present Illness     Shanice Bateman is a 32year old male presenting today for follow up on depression  Completed [facility] program   No med changes  Was off Wellbutrin for about 1 week due to cost of medication  Wellbutrin 450 mg tablets was $125 for 30 days  Too costly  New prescriptions for 300 mg and 150 mg tablets are $40 for 90 days, more affordable  Currently going through a divorce, which is difficult  Needs to schedule psych and counseling appointments, with established psychiatrist and counselor  Depression is much better  Wants to return to work next week  URI symptoms started yesterday  Nasal congestion, runny nose, sore throat  No fevers, chills, body aches  No known sick contacts              Review of Systems   Review of Systems   Constitutional: Negative  HENT: Positive for congestion, postnasal drip, rhinorrhea and sore throat  Respiratory: Negative  Negative for cough, chest tightness, shortness of breath and wheezing  Cardiovascular: Negative  Gastrointestinal: Negative  Genitourinary: Negative  Musculoskeletal: Negative  Skin: Negative  Neurological: Negative  Hematological: Negative  Psychiatric/Behavioral: Negative  I have reviewed the patient's medical history in detail; there are no changes to the history as noted in the electronic medical record  Objective     Vitals:    03/30/22 1129   BP: 130/80   Pulse: 97   Resp: 20   Temp: 98 °F (36 7 °C)   TempSrc: Temporal   SpO2: 97%   Weight: (!) 185 kg (408 lb)   Height: 6' 1" (1 854 m)     Wt Readings from Last 3 Encounters:   03/30/22 (!) 185 kg (408 lb)   02/22/22 (!) 182 kg (401 lb)   02/15/22 (!) 182 kg (401 lb)     Physical Exam  Vitals and nursing note reviewed  Constitutional:       General: He is not in acute distress  Appearance: Normal appearance  He is not ill-appearing  HENT:      Head: Normocephalic and atraumatic  Right Ear: Tympanic membrane normal       Left Ear: Tympanic membrane normal       Mouth/Throat:      Mouth: Mucous membranes are moist       Pharynx: Posterior oropharyngeal erythema present  No oropharyngeal exudate  Tonsils: 2+ on the right  2+ on the left  Eyes:      Conjunctiva/sclera: Conjunctivae normal    Cardiovascular:      Rate and Rhythm: Normal rate and regular rhythm  Heart sounds: No murmur heard  Pulmonary:      Effort: Pulmonary effort is normal       Breath sounds: Normal breath sounds  Musculoskeletal:      Cervical back: Normal range of motion and neck supple  Lymphadenopathy:      Cervical: No cervical adenopathy  Neurological:      Mental Status: He is alert     Psychiatric:         Mood and Affect: Mood normal             ALLERGIES:  No Known Allergies    Current Medications     Current Outpatient Medications   Medication Sig Dispense Refill    buPROPion (Wellbutrin XL) 150 mg 24 hr tablet Take 1 tablet (150 mg total) by mouth daily 30 tablet 2    buPROPion (Wellbutrin XL) 300 mg 24 hr tablet Take 1 tablet (300 mg total) by mouth daily 30 tablet 2    Cholecalciferol (VITAMIN D-3) 1000 units CAPS Take 2 capsules by mouth daily      escitalopram (LEXAPRO) 20 mg tablet Take 1 tablet (20 mg total) by mouth daily 90 tablet 0    fluticasone (FLONASE) 50 mcg/act nasal spray 1 spray into each nostril as needed for rhinitis        levothyroxine 25 mcg tablet TAKE 1 TABLET BY MOUTH DAILY IN THE EARLY MORNING 90 tablet 1    multivitamin (THERAGRAN) TABS Take 1 tablet by mouth daily      traZODone (DESYREL) 50 mg tablet Take 1 tablet (50 mg total) by mouth daily at bedtime as needed for sleep 90 tablet 0     No current facility-administered medications for this visit           Health Maintenance     Health Maintenance   Topic Date Due    Hepatitis C Screening  Never done    HIV Screening  Never done    Annual Physical  03/16/2021    Influenza Vaccine (1) 09/01/2021    PT PLAN OF CARE  10/07/2021    COVID-19 Vaccine (3 - Booster for Pfizer series) 10/27/2021    DTaP,Tdap,and Td Vaccines (2 - Td or Tdap) 02/11/2023    BMI: Followup Plan  02/15/2023    Depression Remission PHQ  03/24/2023    BMI: Adult  03/30/2023    Pneumococcal Vaccine: Pediatrics (0 to 5 Years) and At-Risk Patients (6 to 59 Years)  Aged Out    HIB Vaccine  Aged Out    Hepatitis B Vaccine  Aged Out    IPV Vaccine  Aged Out    Hepatitis A Vaccine  Aged Out    Meningococcal ACWY Vaccine  Aged Out    HPV Vaccine  Aged [employer] History   Administered Date(s) Administered    COVID-19 PFIZER VACCINE 0 3 ML IM 05/05/2021, 05/27/2021    Influenza, recombinant, quadrivalent,injectable, preservative free 11/04/2019    Influenza, seasonal, injectable 01/04/2017    Influenza, seasonal, injectable, preservative free 10/01/2018    Tdap 02/11/2013    Tuberculin Skin Test-PPD Intradermal 01/14/2022       Amy Caryle Deems, CRNP

## 2022-03-30 NOTE — LETTER
March 30, 2022     Patient: Jess Sandoval   YOB: 1990   Date of Visit: 3/30/2022       To Whom it May Concern:    Maria Esther Mendoza is under my professional care  He was seen in my office on 3/30/2022  He may return to work on 04/04/2022  If you have any questions or concerns, please don't hesitate to call           Sincerely,          Amy Caryle Deems, CRNP        CC: No Recipients

## 2022-03-31 ENCOUNTER — TELEPHONE (OUTPATIENT)
Dept: FAMILY MEDICINE CLINIC | Facility: CLINIC | Age: 32
End: 2022-03-31

## 2022-03-31 LAB — SARS-COV-2 RNA RESP QL NAA+PROBE: NEGATIVE

## 2022-03-31 NOTE — TELEPHONE ENCOUNTER
----- Message from [facility] sent at 3/31/2022  1:24 PM EDT -----  COVID-19 swab is negative

## 2022-04-02 NOTE — ASSESSMENT & PLAN NOTE
Significantly improved after attending [facility] partial program   Continue current medications  Continue follow-up with counselor and Psychiatry  May return to work  Forms completed and faxed today

## 2022-04-18 ENCOUNTER — OFFICE VISIT (OUTPATIENT)
Dept: SLEEP CENTER | Facility: CLINIC | Age: 32
End: 2022-04-18
Payer: COMMERCIAL

## 2022-04-18 VITALS
OXYGEN SATURATION: 98 % | SYSTOLIC BLOOD PRESSURE: 150 MMHG | DIASTOLIC BLOOD PRESSURE: 92 MMHG | HEIGHT: 73 IN | BODY MASS INDEX: 41.75 KG/M2 | HEART RATE: 85 BPM | WEIGHT: 315 LBS

## 2022-04-18 DIAGNOSIS — R09.81 NASAL CONGESTION: ICD-10-CM

## 2022-04-18 DIAGNOSIS — F32.A ANXIETY AND DEPRESSION: ICD-10-CM

## 2022-04-18 DIAGNOSIS — E66.01 MORBID OBESITY (HCC): ICD-10-CM

## 2022-04-18 DIAGNOSIS — J35.1 TONSILLAR HYPERTROPHY: ICD-10-CM

## 2022-04-18 DIAGNOSIS — G47.33 OSA (OBSTRUCTIVE SLEEP APNEA): Primary | ICD-10-CM

## 2022-04-18 DIAGNOSIS — G47.19 EXCESSIVE DAYTIME SLEEPINESS: ICD-10-CM

## 2022-04-18 DIAGNOSIS — F41.9 ANXIETY AND DEPRESSION: ICD-10-CM

## 2022-04-18 DIAGNOSIS — G47.09 OTHER INSOMNIA: ICD-10-CM

## 2022-04-18 PROCEDURE — 3008F BODY MASS INDEX DOCD: CPT | Performed by: INTERNAL MEDICINE

## 2022-04-18 PROCEDURE — 99214 OFFICE O/P EST MOD 30 MIN: CPT | Performed by: INTERNAL MEDICINE

## 2022-04-18 PROCEDURE — 1036F TOBACCO NON-USER: CPT | Performed by: INTERNAL MEDICINE

## 2022-04-18 NOTE — PROGRESS NOTES
Follow-up Note - Sleep Center   Vivi Patel  32 y o  male  VMI:4/52/8850  SQF:[id number]  DOS:4/18/2022    I saw Ninoska Martin in sleep clinic today for his sleep complaints & comorbidities  He was diagnosed with obstructive sleep apnea in the past and prescribed CPAP  He lost his machine because of noncompliance  He is now here because of escalation of his symptoms  Home sleep testing in 2019 demonstrated a respiratory event index of 11 per hour with minimum oxygen saturation of 82%  PFSH, Problem List, Medications & Allergies were reviewed in EMR  Interval changes: none reported  He  has a past medical history of Anxiety, Carpal tunnel syndrome, bilateral, GERD without esophagitis, Moderate episode of recurrent major depressive disorder ([facility] [address] ) (8/22/2018), Pilonidal cyst with abscess, and Vitamin D deficiency  He has a current medication list which includes the following prescription(s): bupropion, bupropion, vitamin d-3, escitalopram, fluticasone, levothyroxine, multivitamin, and trazodone  HPI:  He sleeps alone but notes increased snoring  He awakens himself with snoring or choking/gasping  He is not aware of modifying factors  Sleep Routine:  Ninoska Martin reports getting 6 hrs sleep out of 6-7 hours in bed; he has no difficulty initiating, but reports diffculty maintaining sleep   He has frequent interruptions of sleep and at times struggles to fall back asleep because of mind racing in spite of medications for anxiety and depression  He arises with aid of an alarm and never feels rested  Ninoska Martin reports Excessive Daytime Sleepiness, feels like napping & does when has the opportunity  He rated himself at Total score: 11 /24 on the [place] Sleepiness Scale  Habits:  reports that he has never smoked  He has never used smokeless tobacco  He reports current alcohol use  He reports that he does not use drugs  ROS: reviewed & as attached  Significant for weight fluctuates in the range of 6-10 lb    He has nasal symptoms for which he uses Flonase p r n  He reported no respiratory or cardiac symptoms  He has acid reflux  He reports difficulty with memory, has old going feelings of anxiety and depression  EXAM: /92   Pulse 85   Ht 6' 1" (1 854 m)   Wt (!) 182 kg (401 lb 3 2 oz)   SpO2 98%   BMI 52 93 kg/m²     Patient is well groomed; well appearing  CNS: Alert, orientated, clear & coherent speech  Psych: cooperativeand in no distress  Mental state:appears normal   H&N: EOMI; NC/AT:no facial pressure marks, no rashes  Nasal airway:  Is patent  Oral airway:  Crowded; macroglossia and base of tongue is at Mallampati IV; 3 to 4+ tonsillar hypertrophy   Skin/Extrem: col & hydration normal; no edema  Resp: Respiratory effort is normal  Physical findings otherwise essentially unchanged from previous  IMPRESSION: Diagnoses, Problem List Items & Comorbidities Addressed this Visit   1  JIMI (obstructive sleep apnea)  Home Study   2  Other insomnia     3  Anxiety and depression     4  Excessive daytime sleepiness     5  Tonsillar hypertrophy     6  Nasal congestion     7  Morbid obesity ([address] )         PLAN:    1  I reviewed results of previous Home sleep testing with the patient  2  With respect to above conditions, I counseled on pathophysiology, diagnosis, treatment options, risks and benefits; inter-relationship and effects on symptoms and comorbidities; risks of no treatment; costs and insurance aspects  3  Patient declined upper airway surgery and elected to re-initiate positive airway pressure therapy  He  will need a repeat diagnostic study to justify replacement of his equipment  4  Need for compliance with therapy and weight reduction were emphasized  5  Multi component Cognitive behavioral therapy for Insomnia undertaken - Sleep Restriction, Stimulus control, Relaxation techniques and Sleep hygiene were discussed         6  Follow-up to be scheduled in 2 months to monitor compliance, progress and to adjust therapy  Thank you for allowing me to participate in the care of this patient  I will keep you apprised of developments  Sincerely,    Authenticated electronically by Tammy Jon MD on 76/41/28   Board Certified Specialist     Portions of the record may have been created with voice recognition software  Occasional wrong word or "sound a like" substitutions may have occurred due to the inherent limitations of voice recognition software  There may also be notations and random deletions of words or characters from malfunctioning software  Read the chart carefully and recognize, using context, where substitutions/deletions have occurred

## 2022-04-18 NOTE — PATIENT INSTRUCTIONS

## 2022-04-18 NOTE — PROGRESS NOTES
Review of Systems      Genitourinary need to urinate more than twice a night   Cardiology none   Gastrointestinal frequent heartburn/acid reflux   Neurology forgetfulness, poor concentration or confusion,  and difficulty with memory   Constitutional fatigue   Integumentary none   Psychiatry anxiety and depression   Musculoskeletal joint pain and back pain   Pulmonary snoring   ENT none   Endocrine none   Hematological none

## 2022-04-19 ENCOUNTER — HOSPITAL ENCOUNTER (OUTPATIENT)
Dept: SLEEP CENTER | Facility: CLINIC | Age: 32
Discharge: HOME | End: 2022-04-19
Payer: COMMERCIAL

## 2022-04-19 DIAGNOSIS — G47.33 OSA (OBSTRUCTIVE SLEEP APNEA): ICD-10-CM

## 2022-04-19 PROCEDURE — G0399 HOME SLEEP TEST/TYPE 3 PORTA: HCPCS

## 2022-04-20 NOTE — PROGRESS NOTES
Home Sleep Study Documentation    Pre-Sleep Home Study:    Set-up and instructions performed by: Gabi OKEEFE    Technician performed demonstration for Patient: yes    Return demonstration performed by Patient: yes    Written instructions provided to Patient: yes    Patient signed consent form: yes        Post-Sleep Home Study:    Additional comments by Patient:       Home Sleep Study Failed:no:    Failure reason: N/A    Reported or Detected: N/A    Scored by: GALEN Castanon RPSGT

## 2022-04-28 ENCOUNTER — TELEPHONE (OUTPATIENT)
Dept: SLEEP CENTER | Facility: CLINIC | Age: 32
End: 2022-04-28

## 2022-04-28 NOTE — TELEPHONE ENCOUNTER
Left message for patient to call office to review sleep study results  Study shows severe JIMI  CPAP study ordered and needs to be scheduled  DME set up scheduled 5/4/22 has been canceled  Made patient aware in voice message

## 2022-04-29 ENCOUNTER — TELEMEDICINE (OUTPATIENT)
Dept: PSYCHIATRY | Facility: CLINIC | Age: 32
End: 2022-04-29
Payer: COMMERCIAL

## 2022-04-29 DIAGNOSIS — F33.1 MODERATE EPISODE OF RECURRENT MAJOR DEPRESSIVE DISORDER (HCC): ICD-10-CM

## 2022-04-29 DIAGNOSIS — F41.9 ANXIETY AND DEPRESSION: ICD-10-CM

## 2022-04-29 DIAGNOSIS — F41.1 GENERALIZED ANXIETY DISORDER: ICD-10-CM

## 2022-04-29 DIAGNOSIS — F32.A ANXIETY AND DEPRESSION: ICD-10-CM

## 2022-04-29 PROCEDURE — 90833 PSYTX W PT W E/M 30 MIN: CPT | Performed by: STUDENT IN AN ORGANIZED HEALTH CARE EDUCATION/TRAINING PROGRAM

## 2022-04-29 PROCEDURE — 99214 OFFICE O/P EST MOD 30 MIN: CPT | Performed by: STUDENT IN AN ORGANIZED HEALTH CARE EDUCATION/TRAINING PROGRAM

## 2022-04-29 RX ORDER — BUPROPION HYDROCHLORIDE 150 MG/1
150 TABLET ORAL DAILY
Qty: 30 TABLET | Refills: 2 | Status: SHIPPED | OUTPATIENT
Start: 2022-04-29

## 2022-04-29 RX ORDER — ESCITALOPRAM OXALATE 20 MG/1
20 TABLET ORAL DAILY
Qty: 90 TABLET | Refills: 0 | Status: SHIPPED | OUTPATIENT
Start: 2022-04-29 | End: 2022-07-18 | Stop reason: SDUPTHER

## 2022-04-29 RX ORDER — BUPROPION HYDROCHLORIDE 300 MG/1
300 TABLET ORAL DAILY
Qty: 30 TABLET | Refills: 2 | Status: SHIPPED | OUTPATIENT
Start: 2022-04-29

## 2022-04-29 RX ORDER — TRAZODONE HYDROCHLORIDE 50 MG/1
50 TABLET ORAL
Qty: 90 TABLET | Refills: 0 | Status: SHIPPED | OUTPATIENT
Start: 2022-04-29

## 2022-04-29 NOTE — PSYCH
Virtual Regular Visit    Verification of patient location:    Patient is located in the following state in which I hold an active license [place]      Assessment/Plan:    Problem List Items Addressed This Visit        Other    Generalized anxiety disorder    Moderate episode of recurrent major depressive disorder ([facility] [address] )      Other Visit Diagnoses     Anxiety and depression              Reason for visit is   Chief Complaint   Patient presents with    Virtual Regular Visit        Encounter provider Liv Enriquez MD    Provider located at [address]  [facility]   [address] [facility] [address]  [facility] [id number] [phone]      Recent Visits  No visits were found meeting these conditions  Showing recent visits within past 7 days and meeting all other requirements  Today's Visits  Date Type Provider Dept   04/29/22 Telemedicine Liv Enriquez MD [facility]   Showing today's visits and meeting all other requirements  Future Appointments  No visits were found meeting these conditions  Showing future appointments within next 150 days and meeting all other requirements       The patient was identified by name and date of birth  Delma Osgood was informed that this is a telemedicine visit and that the visit is being conducted throughEpic Embedded and patient was informed this is a secure, HIPAA-complaint platform  He agrees to proceed     My office door was closed  No one else was in the room  He acknowledged consent and understanding of privacy and security of the video platform  The patient has agreed to participate and understands they can discontinue the visit at any time  Patient is aware this is a billable service  Sean Love is a 32 y o  male      HPI     Past Medical History:   Diagnosis Date    Anxiety     Carpal tunnel syndrome, bilateral     [place]  Shanti Kirby 9/12/17   R   9/12/17     GERD without esophagitis     Moderate episode of recurrent major depressive disorder ([facility] [address] ) 8/22/2018    Pilonidal cyst with abscess     [place]   10/25/13   R   6/10/14     Vitamin D deficiency        Past Surgical History:   Procedure Laterality Date    FOOT SURGERY      PILONIDAL CYST DRAINAGE      Incision and drainage of pilonidal cyst     [place] WRIST Yanira Keanu LIG Right 7/12/2018    Procedure: RELEASE CARPAL TUNNEL ENDOSCOPIC;  Surgeon: Boo Matthews MD;  Location: [facility];  Service: Orthopedics    [place] WRIST Yanira Keanu LIG Left 7/26/2018    Procedure: RELEASE CARPAL TUNNEL ENDOSCOPIC;  Surgeon: Boo Matthews MD;  Location:  MAIN OR;  Service: Orthopedics       Current Outpatient Medications   Medication Sig Dispense Refill    buPROPion (Wellbutrin XL) 150 mg 24 hr tablet Take 1 tablet (150 mg total) by mouth daily 30 tablet 2    buPROPion (Wellbutrin XL) 300 mg 24 hr tablet Take 1 tablet (300 mg total) by mouth daily 30 tablet 2    Cholecalciferol (VITAMIN D-3) 1000 units CAPS Take 2 capsules by mouth daily      escitalopram (LEXAPRO) 20 mg tablet Take 1 tablet (20 mg total) by mouth daily 90 tablet 0    fluticasone (FLONASE) 50 mcg/act nasal spray 1 spray into each nostril as needed for rhinitis        levothyroxine 25 mcg tablet TAKE 1 TABLET BY MOUTH DAILY IN THE EARLY MORNING 90 tablet 1    multivitamin (THERAGRAN) TABS Take 1 tablet by mouth daily      traZODone (DESYREL) 50 mg tablet Take 1 tablet (50 mg total) by mouth daily at bedtime as needed for sleep 90 tablet 0     No current facility-administered medications for this visit  No Known Allergies    Review of Systems    Video Exam    There were no vitals filed for this visit  Physical Exam     I spent 30 minutes directly with the patient during this visit    [address] [place] [address] verbally agrees to participate in [employer]   Pt is aware that Virtual Care Services could be limited without vital signs or the ability to perform a full hands-on physical Pleasant Aramis Perez understands he or the provider may request at any time to terminate the video visit and request the patient to seek care or treatment in person  MEDICATION MANAGEMENT NOTE        [facility]      Name and Date of Birth:  Marline Urbina 32 y o  1990 MRN: [id number]    Date of Visit: April 29, 2022    Reason for Visit: Follow-up visit regarding medication management     Chief Complaint: "I was feeling more depressed but now I am starting to feel better"    SUBJECTIVE:    Marline Urbina is a 32 y o , male, possessing a past psychiatric history significant for  MDD, JON, medically complicated by JIMI, obesity, HTN, who was personally seen and evaluated at the [address] [place] [address] [address] outpatient clinic for follow-up regarding medication management  Mark Lees states that since their previous outpatient psychiatric appointment, he has been doing well  He attended the partial program a few months back due to worsening depressive symptoms  He describes that he experienced bad depression at that time  Reports he was going through a lot of psychosocial stressors at that point in his life  These include work stress, stress at the house, his wife expressing that she wanted a divorce  He started to have worsening suicidal thoughts although did not have any plans or attempts  Reached out to the partial hospitalization program and completed this  He feels that his mood is much improved at this point  He feels happier, no longer as depressed  Still struggles with low energy levels and sleep difficulty  Sleep has been an ongoing struggle of his, most notably difficulty maintaining sleep throughout the night  Waking up frequently and snoring more often  Does have a history of obstructive sleep apnea and has reestablished with sleep medicine    At this time denies any suicidal or homicidal ideations  Denies any auditory or visual hallucinations  Current Rating Scores:     None completed today  Psychiatric Review Of Systems:    Unchanged information from this writer's previous assessment pertaining to pertinent history is copied and italicized; information that is changed is bolded  Appetite: no  Adverse eating: no  Weight changes: no  Insomnia/sleeplessness: decreased  Fatigue/anergy: decreased  Anhedonia/lack of interest: no  Attention/concentration: no  Psychomotor agitation/retardation: no  Somatic symptoms: no  Anxiety/panic attack: no  Gillian/hypomania: no  Hopelessness/helplessness/worthlessness: no  Self-injurious behavior/high-risk behavior: no  Suicidal ideation: no  Homicidal ideation: no  Auditory hallucinations: no  Visual hallucinations: no  Other perceptual disturbances: no  Delusional thinking: no  Obsessive/compulsive symptoms: no    Review Of Systems:      Constitutional negative   ENT negative   Cardiovascular negative   Respiratory negative   Gastrointestinal negative   Genitourinary negative   Musculoskeletal negative   Integumentary negative   Neurological negative   Endocrine negative   Other Symptoms none, all other systems are negative     History Review: The following portions of the patient's history were reviewed and updated as appropriate: allergies, current medications, past family history, past medical history, past social history, past surgical history and problem list     Unchanged information from this writer's previous assessment pertaining to pertinent history is copied and italicized; information that is changed is bolded  OBJECTIVE:     Vital signs in last 24 hours: There were no vitals filed for this visit      Mental Status Evaluation:    Appearance age appropriate, casually dressed   Behavior cooperative, calm   Speech normal rate, normal volume, normal pitch   Mood euthymic   Affect normal range and intensity, appropriate   Thought Processes organized, goal directed   Associations intact associations   Thought Content no overt delusions   Perceptual Disturbances: no auditory hallucinations, no visual hallucinations   Abnormal Thoughts  Risk Potential Suicidal ideation - None  Homicidal ideation - None  Potential for aggression - No   Orientation oriented to person, place, time/date and situation   Memory recent and remote memory grossly intact   Consciousness alert and awake   Attention Span Concentration Span attention span and concentration are age appropriate   Intellect appears to be of average intelligence   Insight intact   Judgement intact   Muscle Strength and  Gait normal muscle strength and normal muscle tone, normal gait and normal balance   Motor activity no abnormal movements   Fund of Knowledge adequate knowledge of current events  adequate fund of knowledge regarding past history  adequate fund of knowledge regarding vocabulary    Pain none   Pain Scale 0       Laboratory Results: I have personally reviewed all pertinent laboratory/tests results    Recent Labs (last 2 months):   Office Visit on 03/30/2022   Component Date Value    SARS-CoV-2 03/30/2022 Negative        Suicide/Homicide Risk Assessment:    The following interventions are recommended: no intervention changes needed  Although patient's acute lethality risk is low, long-term/chronic lethality risk is mildly elevated in the presence of MDD  At the current moment, Arsh Simmons is future-oriented, forward-thinking, and demonstrates ability to act in a self-preserving manner as evidenced by volitionally presenting to the clinic today, seeking treatment  At this juncture, inpatient hospitalization is not currently warranted  To mitigate future risk, patient should adhere to the recommendations of this writer, avoid alcohol/illicit substance use, utilize community-based resources and familiar support and prioritize mental health treatment       Presently, patient denies active suicidal/homicidal ideation in addition to thoughts of self-injury; contracts for safety  At conclusion of evaluation, patient is amenable to the recommendations of this writer including: therapy and medication management  Also, patient is amenable to calling/contacting the outpatient office including this writer if any acute adverse effects of their medication regimen arise in addition to any comments or concerns pertaining to their psychiatric management  Patient is amenable to calling/contacting crisis and/or attending to the nearest emergency department if their clinical condition deteriorates to assure their safety and stability, stating that they are able to appropriately confide in their family regarding their psychiatric state  Assessment/Plan:   Shaheed Darling was personally seen and evaluated today at the [address] [place] [address] [address] outpatient clinic for follow-up regarding medication management  He notes that he has experienced an increase in depressive symptoms, mainly some negative self image and ruminating negative thoughts  He has experienced decreased motivation decreased energy level although this appears to coincide with poor sleep  His sleep is most likely significantly impacted by his obstructive sleep apnea; he is in process to obtain a CPAP machine  He also does correlate an increase in depressive symptoms around the time he ran out of the 150 mg Wellbutrin XL tablets; he wishes to restart this  The plan will be to resume the medications, follow in 2 months, consider adjustments if needed  1/27/22 Mild improvement in depressive symptoms  Sleep remains problematic, though much of this seems related to obstructive sleep apnea  Since previous appointment he did experience worsening depressive symptoms, suicidal ideations  This was due mostly to wife  him and increased stress at work and home  He attended PHP and feels much better now    More happier, less depressed, more motivated  No longer experiencing SI  Feels the current medication regimen is helpful and would like to continue this  He continues to work with sleep medicine for JIMI  I encourage him to continue this  DSM-5 Diagnoses:      Major depressive disorder, recurrent, moderate; generalized anxiety disorder; obstructive sleep apnea    Treatment Recommendations/Precautions:     Continue Lexapro 20 mg daily for depression and anxiety   Wellbutrin 450 mg for depression   Trazodone 50mg HS PRN for insomnia   Medication management every 3 months   Continue psychotherapy with own therapist   Aware of need to follow up with family physician for medical issues   Aware of 24 hour and weekend coverage for urgent situations accessed by calling [facility] main practice number    Medications Risks/Benefits      Risks, Benefits And Possible Side Effects Of Medications:    Risks, benefits, and possible side effects of medications explained to Damaso Rothman including risk of suicidality and serotonin syndrome related to treatment with antidepressants and risks of cardiovascular side effects including elevated blood pressure, risk of misuse, abuse or dependence and risk of increased anxiety related to treatment with stimulant medications  He verbalizes understanding and agreement for treatment  Controlled Medication Discussion:     Not applicable    Psychotherapy Provided:     Individual psychotherapy provided: Yes  Counseling was provided during the session today for 16 minutes  Recent stressors discussed with Damaso Rothman including family problems, divorce, job stress, problems at work and everyday stressors  Importance of medication and treatment compliance reviewed with Damaso Rothman  Reassurance and supportive therapy provided        Treatment Plan:    Completed and signed during the session: Not applicable - Treatment Plan not due at this session    This note was not shared with the patient due to reasonable likelihood of causing patient harm     Mario Hutton MD 04/29/22

## 2022-05-02 ENCOUNTER — TELEPHONE (OUTPATIENT)
Dept: PSYCHIATRY | Facility: CLINIC | Age: 32
End: 2022-05-02

## 2022-05-17 ENCOUNTER — HOSPITAL ENCOUNTER (OUTPATIENT)
Dept: SLEEP CENTER | Facility: CLINIC | Age: 32
Discharge: HOME | End: 2022-05-17
Payer: COMMERCIAL

## 2022-05-17 DIAGNOSIS — G47.33 OSA (OBSTRUCTIVE SLEEP APNEA): ICD-10-CM

## 2022-05-17 PROCEDURE — 95811 POLYSOM 6/>YRS CPAP 4/> PARM: CPT

## 2022-05-18 DIAGNOSIS — G47.33 OSA (OBSTRUCTIVE SLEEP APNEA): Primary | ICD-10-CM

## 2022-05-18 NOTE — PROGRESS NOTES
Home Sleep Study Documentation    Pre-Sleep Home Study:    Set-up and instructions performed by: Jorge A Roblero [facility]    Technician performed demonstration for Patient: yes    Return demonstration performed by Patient: yes    Written instructions provided to Patient: yes    Patient signed consent form: yes        Post-Sleep Home Study:    Additional comments by Patient: pending    Home Sleep Study Failed:pending    Failure reason: pending    Reported or Detected: pending    Scored by: pending

## 2022-05-18 NOTE — PROGRESS NOTES
Sleep Study Documentation    Pre-Sleep Study       Sleep testing procedure explained to patient:YES    Patient napped prior to study:NO    Caffeine:Dayshift worker after 12PM   Caffeine use:YES- coffee  6 ounces    Alcohol:Dayshift workers after 5PM: Alcohol use:NO    Typical day for patient:YES       Study Documentation    Sleep Study Indications: JIMI    Sleep Study: Treatment   Optimal PAP pressure: 19hgl72  Leak:Small  Snore:Eliminated  REM Obtained:yes  Supplemental O2: no    Minimum SaO2 84%  Baseline SaO2 97%  PAP mask tried (list all) Resmed Air Fit N20 Nasal Large  PAP mask choice (final)Resmed Air Fit N20 Nasal Large  PAP mask type:nasal  PAP pressure at which snoring was eliminated 5cmh20  Minimum SaO2 at final PAP pressure 6cmh20  Mode of Therapy:CPAP  ETCO2:No  CPAP changed to BiPAP:No    Mode of Therapy:CPAP    EKG abnormalities: no     EEG abnormalities: no    Sleep Study Recorded < 2 hours: N/A    Sleep Study Recorded > 2 hours but incomplete study: N/A    Sleep Study Recorded 6 hours but no sleep obtained: NO    Patient classification: employed       Post-Sleep Study    Medication used at bedtime or during sleep study:NO    Patient reports time it took to fall asleep:20 to 30 minutes    Patient reports waking up during study: 3 or more    Patient reports sleeping 4 to 6 hours without dreaming  Patient reports sleep during study:better than usual    Patient rated sleepiness: Not sleepy or tired    PAP treatment:yes: Post PAP treatment patient reports feeling better and  would wear PAP mask at home

## 2022-05-26 ENCOUNTER — APPOINTMENT (EMERGENCY)
Dept: RADIOLOGY | Facility: HOSPITAL | Age: 32
End: 2022-05-26
Payer: COMMERCIAL

## 2022-05-26 ENCOUNTER — HOSPITAL ENCOUNTER (EMERGENCY)
Facility: HOSPITAL | Age: 32
Discharge: HOME | End: 2022-05-26
Attending: EMERGENCY MEDICINE | Admitting: EMERGENCY MEDICINE
Payer: COMMERCIAL

## 2022-05-26 VITALS
SYSTOLIC BLOOD PRESSURE: 130 MMHG | TEMPERATURE: 98.6 F | HEART RATE: 92 BPM | OXYGEN SATURATION: 97 % | DIASTOLIC BLOOD PRESSURE: 87 MMHG | RESPIRATION RATE: 18 BRPM

## 2022-05-26 DIAGNOSIS — U07.1 COVID-19: Primary | ICD-10-CM

## 2022-05-26 LAB
ALBUMIN SERPL BCP-MCNC: 4 G/DL (ref 3.5–5)
ALP SERPL-CCNC: 85 U/L (ref 34–104)
ALT SERPL W P-5'-P-CCNC: 26 U/L (ref 7–52)
AMORPH URATE CRY URNS QL MICRO: ABNORMAL /HPF
ANION GAP SERPL CALCULATED.3IONS-SCNC: 9 MMOL/L (ref 4–13)
APTT PPP: 29 SECONDS (ref 23–37)
AST SERPL W P-5'-P-CCNC: 18 U/L (ref 13–39)
BACTERIA UR QL AUTO: ABNORMAL /HPF
BASOPHILS # BLD AUTO: 0.02 THOUSANDS/ΜL (ref 0–0.1)
BASOPHILS NFR BLD AUTO: 0 % (ref 0–1)
BILIRUB SERPL-MCNC: 0.31 MG/DL (ref 0.2–1)
BILIRUB UR QL STRIP: NEGATIVE
BUN SERPL-MCNC: 14 MG/DL (ref 5–25)
CALCIUM SERPL-MCNC: 9.1 MG/DL (ref 8.4–10.2)
CHLORIDE SERPL-SCNC: 102 MMOL/L (ref 96–108)
CLARITY UR: CLEAR
CO2 SERPL-SCNC: 25 MMOL/L (ref 21–32)
COLOR UR: YELLOW
CREAT SERPL-MCNC: 1.23 MG/DL (ref 0.6–1.3)
EOSINOPHIL # BLD AUTO: 0.06 THOUSAND/ΜL (ref 0–0.61)
EOSINOPHIL NFR BLD AUTO: 1 % (ref 0–6)
ERYTHROCYTE [DISTWIDTH] IN BLOOD BY AUTOMATED COUNT: 12.7 % (ref 11.6–15.1)
GFR SERPL CREATININE-BSD FRML MDRD: 77 ML/MIN/1.73SQ M
GLUCOSE SERPL-MCNC: 105 MG/DL (ref 65–140)
GLUCOSE UR STRIP-MCNC: NEGATIVE MG/DL
HCT VFR BLD AUTO: 45.2 % (ref 36.5–49.3)
HGB BLD-MCNC: 15 G/DL (ref 12–17)
HGB UR QL STRIP.AUTO: ABNORMAL
HYALINE CASTS #/AREA URNS LPF: ABNORMAL /LPF
IMM GRANULOCYTES # BLD AUTO: 0.03 THOUSAND/UL (ref 0–0.2)
IMM GRANULOCYTES NFR BLD AUTO: 1 % (ref 0–2)
INR PPP: 0.99 (ref 0.84–1.19)
KETONES UR STRIP-MCNC: NEGATIVE MG/DL
LACTATE SERPL-SCNC: 1.3 MMOL/L (ref 0.5–2)
LEUKOCYTE ESTERASE UR QL STRIP: NEGATIVE
LYMPHOCYTES # BLD AUTO: 0.23 THOUSANDS/ΜL (ref 0.6–4.47)
LYMPHOCYTES NFR BLD AUTO: 4 % (ref 14–44)
MCH RBC QN AUTO: 28.8 PG (ref 26.8–34.3)
MCHC RBC AUTO-ENTMCNC: 33.2 G/DL (ref 31.4–37.4)
MCV RBC AUTO: 87 FL (ref 82–98)
MONOCYTES # BLD AUTO: 0.49 THOUSAND/ΜL (ref 0.17–1.22)
MONOCYTES NFR BLD AUTO: 9 % (ref 4–12)
MUCOUS THREADS UR QL AUTO: ABNORMAL
NEUTROPHILS # BLD AUTO: 4.52 THOUSANDS/ΜL (ref 1.85–7.62)
NEUTS SEG NFR BLD AUTO: 85 % (ref 43–75)
NITRITE UR QL STRIP: NEGATIVE
NON-SQ EPI CELLS URNS QL MICRO: ABNORMAL /HPF
NRBC BLD AUTO-RTO: 0 /100 WBCS
OTHER STN SPEC: ABNORMAL
PH UR STRIP.AUTO: 6 [PH]
PLATELET # BLD AUTO: 234 THOUSANDS/UL (ref 149–390)
PMV BLD AUTO: 9 FL (ref 8.9–12.7)
POTASSIUM SERPL-SCNC: 3.7 MMOL/L (ref 3.5–5.3)
PROCALCITONIN SERPL-MCNC: 0.06 NG/ML
PROT SERPL-MCNC: 7.9 G/DL (ref 6.4–8.4)
PROT UR STRIP-MCNC: NEGATIVE MG/DL
PROTHROMBIN TIME: 13.1 SECONDS (ref 11.6–14.5)
RBC # BLD AUTO: 5.2 MILLION/UL (ref 3.88–5.62)
RBC #/AREA URNS AUTO: ABNORMAL /HPF
SODIUM SERPL-SCNC: 136 MMOL/L (ref 135–147)
SP GR UR STRIP.AUTO: 1.02 (ref 1–1.03)
UROBILINOGEN UR QL STRIP.AUTO: 0.2 E.U./DL
WBC # BLD AUTO: 5.35 THOUSAND/UL (ref 4.31–10.16)
WBC #/AREA URNS AUTO: ABNORMAL /HPF

## 2022-05-26 PROCEDURE — 81001 URINALYSIS AUTO W/SCOPE: CPT | Performed by: EMERGENCY MEDICINE

## 2022-05-26 PROCEDURE — 96374 THER/PROPH/DIAG INJ IV PUSH: CPT

## 2022-05-26 PROCEDURE — 99284 EMERGENCY DEPT VISIT MOD MDM: CPT

## 2022-05-26 PROCEDURE — 71045 X-RAY EXAM CHEST 1 VIEW: CPT

## 2022-05-26 PROCEDURE — 85610 PROTHROMBIN TIME: CPT | Performed by: EMERGENCY MEDICINE

## 2022-05-26 PROCEDURE — 93005 ELECTROCARDIOGRAM TRACING: CPT

## 2022-05-26 PROCEDURE — 85730 THROMBOPLASTIN TIME PARTIAL: CPT | Performed by: EMERGENCY MEDICINE

## 2022-05-26 PROCEDURE — 99285 EMERGENCY DEPT VISIT HI MDM: CPT | Performed by: PHYSICIAN ASSISTANT

## 2022-05-26 PROCEDURE — 83605 ASSAY OF LACTIC ACID: CPT | Performed by: EMERGENCY MEDICINE

## 2022-05-26 PROCEDURE — 96361 HYDRATE IV INFUSION ADD-ON: CPT

## 2022-05-26 PROCEDURE — 87040 BLOOD CULTURE FOR BACTERIA: CPT | Performed by: EMERGENCY MEDICINE

## 2022-05-26 PROCEDURE — 84145 PROCALCITONIN (PCT): CPT | Performed by: EMERGENCY MEDICINE

## 2022-05-26 PROCEDURE — 80053 COMPREHEN METABOLIC PANEL: CPT | Performed by: EMERGENCY MEDICINE

## 2022-05-26 PROCEDURE — 36415 COLL VENOUS BLD VENIPUNCTURE: CPT | Performed by: EMERGENCY MEDICINE

## 2022-05-26 PROCEDURE — 85025 COMPLETE CBC W/AUTO DIFF WBC: CPT | Performed by: EMERGENCY MEDICINE

## 2022-05-26 RX ORDER — KETOROLAC TROMETHAMINE 30 MG/ML
15 INJECTION, SOLUTION INTRAMUSCULAR; INTRAVENOUS ONCE
Status: COMPLETED | OUTPATIENT
Start: 2022-05-26 | End: 2022-05-26

## 2022-05-26 RX ORDER — SODIUM CHLORIDE 9 MG/ML
3 INJECTION INTRAVENOUS
Status: DISCONTINUED | OUTPATIENT
Start: 2022-05-26 | End: 2022-05-26 | Stop reason: HOSPADM

## 2022-05-26 RX ADMIN — KETOROLAC TROMETHAMINE 15 MG: 30 INJECTION, SOLUTION INTRAMUSCULAR at 20:08

## 2022-05-26 RX ADMIN — SODIUM CHLORIDE 1000 ML: 0.9 INJECTION, SOLUTION INTRAVENOUS at 18:37

## 2022-05-26 RX ADMIN — SODIUM CHLORIDE 1000 ML: 0.9 INJECTION, SOLUTION INTRAVENOUS at 20:08

## 2022-05-27 ENCOUNTER — TELEMEDICINE (OUTPATIENT)
Dept: FAMILY MEDICINE CLINIC | Facility: CLINIC | Age: 32
End: 2022-05-27
Payer: COMMERCIAL

## 2022-05-27 VITALS — BODY MASS INDEX: 41.75 KG/M2 | TEMPERATURE: 97.6 F | HEIGHT: 73 IN | WEIGHT: 315 LBS

## 2022-05-27 DIAGNOSIS — U07.1 COVID-19: Primary | ICD-10-CM

## 2022-05-27 LAB
ATRIAL RATE: 94 BPM
P AXIS: 5 DEGREES
PR INTERVAL: 134 MS
QRS AXIS: 40 DEGREES
QRSD INTERVAL: 88 MS
QT INTERVAL: 336 MS
QTC INTERVAL: 420 MS
T WAVE AXIS: 5 DEGREES
VENTRICULAR RATE: 94 BPM

## 2022-05-27 PROCEDURE — 3008F BODY MASS INDEX DOCD: CPT | Performed by: NURSE PRACTITIONER

## 2022-05-27 PROCEDURE — 1036F TOBACCO NON-USER: CPT | Performed by: NURSE PRACTITIONER

## 2022-05-27 PROCEDURE — 93010 ELECTROCARDIOGRAM REPORT: CPT | Performed by: INTERNAL MEDICINE

## 2022-05-27 PROCEDURE — 99213 OFFICE O/P EST LOW 20 MIN: CPT | Performed by: NURSE PRACTITIONER

## 2022-05-27 RX ORDER — ALBUTEROL SULFATE 90 UG/1
3 AEROSOL, METERED RESPIRATORY (INHALATION) ONCE AS NEEDED
Status: CANCELLED | OUTPATIENT
Start: 2022-05-28

## 2022-05-27 RX ORDER — ACETAMINOPHEN 325 MG/1
650 TABLET ORAL ONCE AS NEEDED
Status: CANCELLED | OUTPATIENT
Start: 2022-05-28

## 2022-05-27 RX ORDER — SODIUM CHLORIDE 9 MG/ML
20 INJECTION, SOLUTION INTRAVENOUS ONCE
Status: CANCELLED | OUTPATIENT
Start: 2022-05-28

## 2022-05-27 RX ORDER — BEBTELOVIMAB 87.5 MG/ML
175 INJECTION, SOLUTION INTRAVENOUS ONCE
Status: CANCELLED | OUTPATIENT
Start: 2022-05-28

## 2022-05-27 RX ORDER — ONDANSETRON 2 MG/ML
4 INJECTION INTRAMUSCULAR; INTRAVENOUS ONCE AS NEEDED
Status: CANCELLED | OUTPATIENT
Start: 2022-05-28

## 2022-05-27 NOTE — DISCHARGE INSTRUCTIONS
Please call your primary care doctor tomorrow to discuss outpatient treatment options  I did send a message to our COVID-19 follow-up team as well  You may be a monoclonal antibody therapy candidate

## 2022-05-27 NOTE — PATIENT INSTRUCTIONS
Instructions for Monoclonal antibody infusion at [address], [address] [place]  Your appointment for infusion is: Tomorrow, May 28th at 8:30 am    1  Once you arrive at [facility], please park in the small parking lot at the [address] entrance  Do not park in the main parking lot or in the parking garage  2  When you arrive, please call the infusion nurse at [phone] to be escorted into the clinic  3  It will be approximately a 2-hour visit  4  No visitors can accompany you into the clinic  If you need a , they will need to wait in the car until treatment is over or we can call them 30 minutes before you will be ready for   5  Please bring something to occupy your time for 2 hours, i e  book, i-Pad, phone, headphones, etc   6  Please make sure you bring your mask  You must wear a mask for the duration of your treatment  Sera Robles is an investigational medicine used to treat mild-to-moderate symptoms of COVID-19 in adults and children (15years of age and older weighing at least 80 pounds [40 kg]) with positive results of direct SARS-CoV-2 viral testing, and who are at high risk of progression to severe COVID-19, including hospitalization or death, and for whom other COVID-19 treatment options approved or authorized by FDA are not available or clinically appropriate  Bebtelovimab is investigational because it is still being studied  There is limited information about the safety and effectiveness of using bebtelovimab to treat people with mild-to-moderate COVID-19  The FDA has authorized the emergency use of bebtelovimab for the treatment of COVID-19 under an Emergency Use Authorization (EUA)       Sera Robles is not authorized for use in people who:  are likely to be infected with a SARS-CoV-2 variant that is not able to be treated by bebtelovimab based on the circulating variants in your area (ask your health care provider about FDA and CDCs latest information on circulating variants by geographic area), or  are hospitalized due to COVID-19, or  require oxygen therapy and/or respiratory support due to COVID-19, or  require an increase in baseline oxygen flow rate and/or respiratory support due to [address] and are on chronic oxygen therapy and/or respiratory support due to underlying nonCOVID-19 related comorbidity  How will I receive Bebtelovimab? Levell Michelle will be given as an injection through a vein (intravenously or IV) over at least 30 seconds  You will be observed by your healthcare provider for at least 1 hour after you receive bebtelovimab  Possible side effects of Bebtelovimab: Allergic reactions can happen during and after infusion with bebtelovimab  Possible reactions include: fever, chills, nausea, headache, shortness of breath, low or high blood pressure, rapid or slow heart rate, chest discomfort or pain, weakness, confusion, feeling tired, wheezing, swelling of your lips, face, or throat, rash including hives, itching, muscle aches, dizziness, and sweating  These reactions may be severe or life threatening  Worsening symptoms after treatment: You may experience new or worsening symptoms after infusion, including fever, difficulty breathing, rapid or slow heart rate, tiredness, weakness or confusion  If these occur, contact your healthcare provider or seek immediate medical attention as some of these events have required hospitalization  It is unknown if these events are related to treatment or are due to the progression of COVID19  The side effects of getting any medicine by vein may include brief pain, bleeding, bruising of the skin, soreness, swelling, and possible infection at the infusion site  These are not all the possible side effects of bebtelovimab  Not a lot of people have been given bebtelovimab  Serious and unexpected side effects may happen   Bebtelovimab are still being studied so it is possible that all of the risks are not known at this time  It is possible that bebtelovimab could interfere with your body's own ability to fight off a future infection of SARS-CoV-2  Similarly, bebtelovimab may reduce your bodys immune response to a vaccine for SARS-CoV-2  Specific studies have not been conducted to address these possible risks  Talk to your healthcare provider if you have any questions  Emergency Use Authorization:    The [facility] FDA has made bebtelovimab available under an emergency access mechanism called an EUA  The EUA is supported by a Sod of Health and Human Service ([facility]) declaration that circumstances exist to justify the emergency use of drugs and biological products during the COVID-19 pandemic  Bebtelovimab have not undergone the same type of review as an FDA-approved or cleared product  The FDA may issue an EUA when certain criteria are met, which includes that there are no adequate, approved, and available alternatives  In addition, the FDA decision is based on the totality of scientific evidence available showing that it is reasonable to believe that the product meets certain criteria for safety, performance, and labeling and may be effective in treatment of patients during the COVID-19 pandemic  All of these criteria must be met to allow for the product to be used in the treatment of patients during the COVID-19 pandemic  The EUA for bebtelovimab together is in effect for the duration of the COVID-19 declaration justifying emergency use of these products, unless terminated or revoked (after which the product may no longer be used)  What if I am pregnant or breastfeeding? There is no experience treating pregnant women or breastfeeding mothers with bebtelovimab  For a mother and unborn baby, the benefit of receiving bebtelovimab may be greater than the risk from the treatment   If you are pregnant or breastfeeding, discuss your options and specific situation with your healthcare provider  How do I report side effects with Bebtelovimab? Contact your healthcare provider if you have any side effects that bother you or do not go away  Report side effects to FDA MedWatch at www fda gov/medwatch, or call 0-734-CAP-2810 or to [address]  as shown below  Email: [email or web address]  com   Fax number: 6-367.616.8744   Telephone number: 1-497-ORWWYM48 ([phone])    Full fact sheet document for patients can be found at: [email or web address]    The patient consents to proceed with bebtelovimab infusion

## 2022-05-27 NOTE — ED PROVIDER NOTES
History  Chief Complaint   Patient presents with    Fever - 9 weeks to 74 years     PT positive covid and c/o fever, fatigue, headache and "my testicles hurt"     The patient is a 70-year-old male who is morbidly obese who presents to the emergency department for evaluation of symptoms related to COVID-19  The patient states he has been feeling unwell for the last couple of days  Multiple family members that he lives with recently tested positive for COVID-19  He tested positive for COVID-19 at home  He said he has been running a fever as well as has headache, cough and body aches  He distally reports that he is having some pain in his testicles, which he states he generally gets when he is ill with infections  He states it does not feel any different than it generally does when he gets this pain when he is sick  He states he is really here today because he was concerned about his fever because it went as high as 103  He has been taking Tylenol and ibuprofen at home  He states he took 1500 mg of Tylenol prior to coming into the emergency department  He feels like the medications are not effective  He states he is vaccinated with the 1st and 2nd doses of the vaccine, but he did not get a booster  He denies any associated chest pain or shortness of breath  He additionally denies abdominal pain, nausea, vomiting, hematuria or dysuria  History provided by:  Patient   used: No    Fever - 9 weeks to 74 years  Associated symptoms: headaches and myalgias    Associated symptoms: no chest pain, no chills, no cough, no diarrhea, no dysuria, no ear pain, no nausea, no rash, no sore throat and no vomiting        Prior to Admission Medications   Prescriptions Last Dose Informant Patient Reported? Taking?    Cholecalciferol (VITAMIN D-3) 1000 units CAPS  Self Yes No   Sig: Take 2 capsules by mouth daily   buPROPion (Wellbutrin XL) 150 mg 24 hr tablet   No No   Sig: Take 1 tablet (150 mg total) by mouth daily   buPROPion (Wellbutrin XL) 300 mg 24 hr tablet   No No   Sig: Take 1 tablet (300 mg total) by mouth daily   escitalopram (LEXAPRO) 20 mg tablet   No No   Sig: Take 1 tablet (20 mg total) by mouth daily   fluticasone (FLONASE) 50 mcg/act nasal spray  Self Yes No   Si spray into each nostril as needed for rhinitis     levothyroxine 25 mcg tablet   No No   Sig: TAKE 1 TABLET BY MOUTH DAILY IN THE EARLY MORNING   multivitamin (THERAGRAN) TABS  Self Yes No   Sig: Take 1 tablet by mouth daily   traZODone (DESYREL) 50 mg tablet   No No   Sig: Take 1 tablet (50 mg total) by mouth daily at bedtime as needed for sleep      Facility-Administered Medications: None       Past Medical History:   Diagnosis Date    Anxiety     Carpal tunnel syndrome, bilateral     [place]  Raj Guise Raj Guise [address]   R   17     GERD without esophagitis     Moderate episode of recurrent major depressive disorder ([facility] [address] ) 2018    Pilonidal cyst with abscess     [place]   10/25/13   R   6/10/14     Vitamin D deficiency        Past Surgical History:   Procedure Laterality Date    FOOT SURGERY      PILONIDAL CYST DRAINAGE      Incision and drainage of pilonidal cyst     AL WRIST Jillene Levi LIG Right 2018    Procedure: RELEASE CARPAL TUNNEL ENDOSCOPIC;  Surgeon: Cassie Newell MD;  Location:  MAIN OR;  Service: Orthopedics    AL WRIST Jillene Levi LIG Left 2018    Procedure: RELEASE CARPAL TUNNEL ENDOSCOPIC;  Surgeon: Cassie Newell MD;  Location:  MAIN OR;  Service: Orthopedics       Family History   Problem Relation Age of Onset    Depression Mother     Obesity Mother     Stroke Mother         Syndrome     Diabetes Mother     Alcohol abuse Father     Liver disease Father         hepatic failure     Hypertension Father     Depression Brother     Thyroid disease Brother     Alcohol abuse Brother     Substance Abuse Brother     Depression Maternal Uncle     Substance Abuse Brother  Heart disease Neg Hx     Cancer Neg Hx     Thyroid cancer Neg Hx      I have reviewed and agree with the history as documented  E-Cigarette/Vaping    E-Cigarette Use Never User      E-Cigarette/Vaping Substances    Nicotine No     THC No     CBD No     Flavoring No     Other No     Unknown No      Social History     Tobacco Use    Smoking status: Never Smoker    Smokeless tobacco: Never Used   Vaping Use    Vaping Use: Never used   Substance Use Topics    Alcohol use: Yes     Comment: Rarely    Drug use: No       Review of Systems   Constitutional: Positive for fever  Negative for chills  HENT: Negative for ear pain and sore throat  Eyes: Negative for redness and visual disturbance  Respiratory: Negative for cough, shortness of breath and wheezing  Cardiovascular: Negative for chest pain  Gastrointestinal: Negative for abdominal pain, diarrhea, nausea and vomiting  Genitourinary: Positive for testicular pain  Negative for dysuria and hematuria  Musculoskeletal: Positive for myalgias  Negative for back pain, neck pain and neck stiffness  Skin: Negative for color change and rash  Neurological: Positive for headaches  Negative for dizziness and light-headedness  All other systems reviewed and are negative  Physical Exam  Physical Exam  Vitals and nursing note reviewed  Constitutional:       General: He is not in acute distress  Appearance: He is well-developed  He is not ill-appearing or toxic-appearing  HENT:      Head: Normocephalic and atraumatic  Eyes:      Pupils: Pupils are equal, round, and reactive to light  Cardiovascular:      Rate and Rhythm: Regular rhythm  Tachycardia present  Heart sounds: Normal heart sounds  Pulmonary:      Effort: Pulmonary effort is normal       Breath sounds: Normal breath sounds  Abdominal:      General: There is no distension  Palpations: Abdomen is soft  Tenderness: There is no abdominal tenderness  There is no guarding or rebound  Musculoskeletal:      Cervical back: Normal range of motion and neck supple  Skin:     General: Skin is warm and dry  Neurological:      Mental Status: He is alert and oriented to person, place, and time           Vital Signs  ED Triage Vitals   Temperature Pulse Respirations Blood Pressure SpO2   05/26/22 1725 05/26/22 1725 05/26/22 1725 05/26/22 1725 05/26/22 1725   99 7 °F (37 6 °C) (!) 122 18 167/77 96 %      Temp Source Heart Rate Source Patient Position - Orthostatic VS BP Location FiO2 (%)   05/26/22 1725 05/26/22 1725 05/26/22 1725 05/26/22 1725 --   Oral Monitor Sitting Left arm       Pain Score       05/26/22 1841       5           Vitals:    05/26/22 1725 05/26/22 2025 05/26/22 2103   BP: 167/77 118/69 130/87   Pulse: (!) 122 100 92   Patient Position - Orthostatic VS: Sitting Lying Lying         Visual Acuity  Visual Acuity    Flowsheet Row Most Recent Value   L Pupil Size (mm) 3   R Pupil Size (mm) 3          ED Medications  Medications   sodium chloride (PF) 0 9 % injection 3 mL (has no administration in time range)   sodium chloride 0 9 % bolus 1,000 mL (0 mL Intravenous Stopped 5/26/22 2007)     Followed by   sodium chloride 0 9 % bolus 1,000 mL (0 mL Intravenous Stopped 5/26/22 2114)     Followed by   sodium chloride 0 9 % bolus 1,000 mL (has no administration in time range)   ketorolac (TORADOL) injection 15 mg (15 mg Intravenous Given 5/26/22 2008)       Diagnostic Studies  Results Reviewed     Procedure Component Value Units Date/Time    Urine Microscopic [[id number]]  (Abnormal) Collected: 05/26/22 2020    Lab Status: Final result Specimen: Urine, Clean Catch Updated: 05/26/22 2115     RBC, UA 0-1 /hpf      WBC, UA 2-4 /hpf      Epithelial Cells Occasional /hpf      Bacteria, UA None Seen /hpf      Hyaline Casts, UA 0-1 /lpf      AMORPH URATES Occasional /hpf      OTHER OBSERVATIONS Transitional Epithelial Cells  Yeast Cells Present     MUCUS THREADS Moderate UA w Reflex to Microscopic w Reflex to Culture [[id number]]  (Abnormal) Collected: 05/26/22 2020    Lab Status: Final result Specimen: Urine, Clean Catch Updated: 05/26/22 2037     Color, UA Yellow     Clarity, UA Clear     Specific Gravity, UA 1 025     pH, UA 6 0     Leukocytes, UA Negative     Nitrite, UA Negative     Protein, UA Negative mg/dl      Glucose, UA Negative mg/dl      Ketones, UA Negative mg/dl      Urobilinogen, UA 0 2 E U /dl      Bilirubin, UA Negative     Blood, UA Small    Procalcitonin [[id number]]  (Normal) Collected: 05/26/22 1835    Lab Status: Final result Specimen: Blood from Arm, Right Updated: 05/26/22 1926     Procalcitonin 0 06 ng/ml     Lactic Acid [[id number]]  (Normal) Collected: 05/26/22 1835    Lab Status: Final result Specimen: Blood from Arm, Right Updated: 05/26/22 1918     LACTIC ACID 1 3 mmol/L     Narrative:      Result may be elevated if tourniquet was used during collection      Comprehensive metabolic panel [[id number]] Collected: 05/26/22 1835    Lab Status: Final result Specimen: Blood from Arm, Right Updated: 05/26/22 1915     Sodium 136 mmol/L      Potassium 3 7 mmol/L      Chloride 102 mmol/L      CO2 25 mmol/L      ANION GAP 9 mmol/L      BUN 14 mg/dL      Creatinine 1 23 mg/dL      Glucose 105 mg/dL      Calcium 9 1 mg/dL      AST 18 U/L      ALT 26 U/L      Alkaline Phosphatase 85 U/L      Total Protein 7 9 g/dL      Albumin 4 0 g/dL      Total Bilirubin 0 31 mg/dL      eGFR 77 ml/min/1 73sq m     Narrative:      [place] guidelines for Chronic Kidney Disease (CKD):     Stage 1 with normal or high GFR (GFR > 90 mL/min/1 73 square meters)    Stage 2 Mild CKD (GFR = 60-89 mL/min/1 73 square meters)    Stage 3A Moderate CKD (GFR = 45-59 mL/min/1 73 square meters)    Stage 3B Moderate CKD (GFR = 30-44 mL/min/1 73 square meters)    Stage 4 Severe CKD (GFR = 15-29 mL/min/1 73 square meters)    Stage 5 End Stage CKD (GFR <15 mL/min/1 73 square meters)  Note: GFR calculation is accurate only with a steady state creatinine    Protime-INR [[id number]]  (Normal) Collected: 05/26/22 1835    Lab Status: Final result Specimen: Blood from Arm, Right Updated: 05/26/22 1908     Protime 13 1 seconds      INR 0 99    APTT [[id number]]  (Normal) Collected: 05/26/22 1835    Lab Status: Final result Specimen: Blood from Arm, Right Updated: 05/26/22 1908     PTT 29 seconds     CBC and differential [[id number]]  (Abnormal) Collected: 05/26/22 1835    Lab Status: Final result Specimen: Blood from Arm, Right Updated: 05/26/22 1855     WBC 5 35 Thousand/uL      RBC 5 20 Million/uL      Hemoglobin 15 0 g/dL      Hematocrit 45 2 %      MCV 87 fL      MCH 28 8 pg      MCHC 33 2 g/dL      RDW 12 7 %      MPV 9 0 fL      Platelets 067 Thousands/uL      nRBC 0 /100 WBCs      Neutrophils Relative 85 %      Immat GRANS % 1 %      Lymphocytes Relative 4 %      Monocytes Relative 9 %      Eosinophils Relative 1 %      Basophils Relative 0 %      Neutrophils Absolute 4 52 Thousands/µL      Immature Grans Absolute 0 03 Thousand/uL      Lymphocytes Absolute 0 23 Thousands/µL      Monocytes Absolute 0 49 Thousand/µL      Eosinophils Absolute 0 06 Thousand/µL      Basophils Absolute 0 02 Thousands/µL     Blood culture #1 [[id number]] Collected: 05/26/22 1835    Lab Status: In process Specimen: Blood from Line, Venous Updated: 05/26/22 1842    Blood culture #2 [[id number]] Collected: 05/26/22 1835    Lab Status: In process Specimen: Blood from Arm, Right Updated: 05/26/22 1841                 XR chest 1 view portable   Final Result by Jose Alejandro Mcneil DO (05/26 1939)      No focal consolidation, pleural effusion, or pneumothorax  Workstation performed: [id number]                    Procedures  ECG 12 Lead Documentation Only    Date/Time: 5/26/2022 9:21 PM  Performed by: Chandu Almanza PA-C  Authorized by:  Vane Young PA-C     Comments:      Normal sinus rhythm at 94  Normal axis  No acute ST-T changes  No change noted from previous EKG  ED Course  ED Course as of 05/26/22 2124   u May 26, 2022   2018 Ambulatory pulse ox 98%  SBIRT 20yo+    Flowsheet Row Most Recent Value   SBIRT (23 yo +)    In order to provide better care to our patients, we are screening all of our patients for alcohol and drug use  Would it be okay to ask you these screening questions? Yes Filed at: 05/26/2022 1840   Initial Alcohol Screen: US AUDIT-C     1  How often do you have a drink containing alcohol? 0 Filed at: 05/26/2022 1840   2  How many drinks containing alcohol do you have on a typical day you are drinking? 0 Filed at: 05/26/2022 1840   3a  Male UNDER 65: How often do you have five or more drinks on one occasion? 0 Filed at: 05/26/2022 1840   3b  FEMALE Any Age, or MALE 65+: How often do you have 4 or more drinks on one occassion? 0 Filed at: 05/26/2022 1840   Audit-C Score 0 Filed at: 05/26/2022 1840   DIOGO: How many times in the past year have you    Used an illegal drug or used a prescription medication for non-medical reasons? Never Filed at: 05/26/2022 [address]                    MDM  Number of Diagnoses or Management Options  COVID-19: new and requires workup  Diagnosis management comments: Patient presents for evaluation of symptoms related to COVID-19  Patient's vital signs reviewed  Patient is not hypoxic  He is tachycardic, however he does have a low-grade fever at this time  Will reassess  Regarding the testicular pain, the patient states that the feel any different than the testicular pain he generally gets when he is ill  Will check UA  Labs, imaging, fluids and meds were ordered  Labs reviewed with no significant findings  UA is not concerning for infection at this time  Chest x-ray does not show any pneumonia  Patient's vital signs significantly improved while in the emergency department    Tachycardia resolved  Patient's oxygen saturation remained well within normal limits  I did obtain an ambulatory oxygen saturation, and patient remained at 97-98% on room air with ambulation  Due to patient's morbid obesity, I did send a message to the COVID-19 follow-up team as he may be a monoclonal antibody therapy candidate  Additionally, I encouraged him to call his primary care provider 1st thing tomorrow to further discuss any potential outpatient treatment options that he may qualify for  Regarding the testicular pain, I strongly recommended that the patient follow-up if the pain does not resolve as the rest of his symptoms resolve  I advised that he return to the ED with any new or significantly worsening symptoms  Of note, we did discuss proper dosing of Tylenol and ibuprofen at home  I also recommended the patient stay well hydrated and take vitamins  Patient is stable for discharge  Amount and/or Complexity of Data Reviewed  Clinical lab tests: ordered and reviewed  Tests in the radiology section of CPT®: ordered and reviewed  Decide to obtain previous medical records or to obtain history from someone other than the patient: yes  Review and summarize past medical records: yes  Discuss the patient with other providers: yes (Dr Travis Kohler)    Risk of Complications, Morbidity, and/or Mortality  Presenting problems: low  Diagnostic procedures: low  Management options: low    Patient Progress  Patient progress: improved      Disposition  Final diagnoses:   [id number]     Time reflects when diagnosis was documented in both MDM as applicable and the Disposition within this note     Time User Action Codes Description Comment    5/26/2022  9:11 PM [address] [U07 1] COVID-19       ED Disposition     ED Disposition   Discharge    Condition   Stable    Date/Time   Thu May 26, 2022  9:11 PM    Comment   Bishop Goldstein discharge to home/self care                 Follow-up Information     Follow up With Specialties Details Why Contact Info Additional Information    Amirah Solismahernando Haley, [address] [place], Nurse Practitioner Call in 1 day  [address] [facility] [address]       [address] Emergency Department Emergency Medicine  If symptoms worsen [address] [facility] [address] [facility], [address], [place], [place], [address]          Discharge Medication List as of 5/26/2022  9:12 PM      CONTINUE these medications which have NOT CHANGED    Details   !! buPROPion (Wellbutrin XL) 150 mg 24 hr tablet Take 1 tablet (150 mg total) by mouth daily, Starting Fri 4/29/2022, Normal      !! buPROPion (Wellbutrin XL) 300 mg 24 hr tablet Take 1 tablet (300 mg total) by mouth daily, Starting Fri 4/29/2022, Normal      Cholecalciferol (VITAMIN D-3) 1000 units CAPS Take 2 capsules by mouth daily, Historical Med      escitalopram (LEXAPRO) 20 mg tablet Take 1 tablet (20 mg total) by mouth daily, Starting Fri 4/29/2022, Normal      fluticasone (FLONASE) 50 mcg/act nasal spray 1 spray into each nostril as needed for rhinitis  , Historical Med      levothyroxine 25 mcg tablet TAKE 1 TABLET BY MOUTH DAILY IN THE EARLY MORNING, Normal      multivitamin (THERAGRAN) TABS Take 1 tablet by mouth daily, Historical Med      traZODone (DESYREL) 50 mg tablet Take 1 tablet (50 mg total) by mouth daily at bedtime as needed for sleep, Starting Fri 4/29/2022, Normal       !! - Potential duplicate medications found  Please discuss with provider  No discharge procedures on file      PDMP Review       Value Time User    PDMP Reviewed  Yes 8/29/2021  9:40 PM Garcia Hogue MD          ED Provider  Electronically Signed by           Elizabeth Joshi PA-C  05/26/22 2121

## 2022-05-27 NOTE — PROGRESS NOTES
COVID-19 Outpatient Progress Note    Assessment/Plan:    Problem List Items Addressed This Visit        Other    COVID-19 - Primary         Disposition:     Patient has COVID-19 infection  Based off CDC guidelines, they were recommended to isolate for 5 days from the date of the positive test  If they remain asymptomatic, isolation may be ended followed by 5 days of wearing a mask when around othes to minimize risk of infecting others  If they have a fever, continue to stay home until fever resolves for at least 24 hours  Discussed symptom directed medication options with patient  Discussed vitamin D, vitamin C, and/or zinc supplementation with patient  Given obesity, he is high risk for severe illness secondary to COVID-19  Due to medication interaction between Paxlovid and Trazodone, he is not a candidate for Paxlovid  Recommend monoclonal antibody infusion, we discussed below information at length, including this is an EUA medication  He wishes to proceed  He is scheduled for monoclonal antibody infusion tomorrow morning at 8:30 a m    I have placed instructions for the infusion center, as well as information regarding monoclonal antibody on the after visit summary  He will access this through [address]  He will contact me with any questions  He will continue supportive care  He will call for any worsening or persisting of symptoms  Go to the emergency room for any shortness of breath, chest pain, to status changes  Patient meets criteria for Bebtelovimab infusion  They were counseled in regards to risks, benefits, and side effects of this infusion      Gladis Foster is an investigational medicine used to treat mild-to-moderate symptoms of COVID-19 in adults and children (15years of age and older weighing at least 80 pounds (40 kg)) with positive results of direct SARS-CoV-2 viral testing, and who are at high risk of progression to severe COVID-19, including hospitalization or death, and for whom other COVID-19 treatment options approved or authorized by FDA are not available or clinically appropriate  Bebtelovimab is investigational because it is still being studied  There is limited information about the safety and effectiveness of using bebtelovimab to treat people with mild-to-moderate COVID-19  The FDA has authorized the emergency use of bebtelovimab for the treatment of COVID-19 under an Emergency Use Authorization (EUA)  [place] is not authorized for use in people who:  - are likely to be infected with a SARS-CoV-2 variant that is not able to be treated by bebtelovimab based on the circulating variants in your area (ask your health care provider about FDA and CDCs latest information on circulating variants by geographic area), or  - are hospitalized due to COVID-19, or  - require oxygen therapy and/or respiratory support due to COVID-19, or  - require an increase in baseline oxygen flow rate and/or respiratory support due to [address] and are on chronic oxygen therapy and/or respiratory support due to underlying nonCOVID-19 related comorbidity  How will I receive Bebtelovimab? [place] will be given as an injection through a vein (intravenously or IV) over at least 30 seconds  You will be observed by your healthcare provider for at least 1 hour after you receive bebtelovimab  Possible side effects of Bebtelovimab: Allergic reactions can happen during and after infusion with bebtelovimab  Possible reactions include: fever, chills, nausea, headache, shortness of breath, low or high blood pressure, rapid or slow heart rate, chest discomfort or pain, weakness, confusion, feeling tired, wheezing, swelling of your lips, face, or throat, rash including hives, itching, muscle aches, dizziness, and sweating  These reactions may be severe or life threatening      Worsening symptoms after treatment: You may experience new or worsening symptoms after infusion, including fever, difficulty breathing, rapid or slow heart rate, tiredness, weakness or confusion  If these occur, contact your healthcare provider or seek immediate medical attention as some of these events have required hospitalization  It is unknown if these events are related to treatment or are due to the progression of COVID19  The side effects of getting any medicine by vein may include brief pain, bleeding, bruising of the skin, soreness, swelling, and possible infection at the infusion site  These are not all the possible side effects of bebtelovimab  Not a lot of people have been given bebtelovimab  Serious and unexpected side effects may happen  Bebtelovimab are still being studied so it is possible that all of the risks are not known at this time  It is possible that bebtelovimab could interfere with your body's own ability to fight off a future infection of SARS-CoV-2  Similarly, bebtelovimab may reduce your bodys immune response to a vaccine for SARS-CoV-2  Specific studies have not been conducted to address these possible risks  Talk to your healthcare provider if you have any questions  Emergency Use Authorization:    The [facility] FDA has made bebtelovimab available under an emergency access mechanism called an EUA  The EUA is supported by a  of Health and Human Service (HHS) declaration that circumstances exist to justify the emergency use of drugs and biological products during the COVID-19 pandemic  Bebtelovimab have not undergone the same type of review as an FDA-approved or cleared product  The FDA may issue an EUA when certain criteria are met, which includes that there are no adequate, approved, and available alternatives   In addition, the FDA decision is based on the totality of scientific evidence available showing that it is reasonable to believe that the product meets certain criteria for safety, performance, and labeling and may be effective in treatment of patients during the COVID-19 pandemic  All of these criteria must be met to allow for the product to be used in the treatment of patients during the COVID-19 pandemic  The EUA for bebtelovimab together is in effect for the duration of the COVID-19 declaration justifying emergency use of these products, unless terminated or revoked (after which the product may no longer be used)  What if I am pregnant or breastfeeding? There is no experience treating pregnant women or breastfeeding mothers with bebtelovimab  For a mother and unborn baby, the benefit of receiving bebtelovimab may be greater than the risk from the treatment  If you are pregnant or breastfeeding, discuss your options and specific situation with your healthcare provider  How do I report side effects with Bebtelovimab? Contact your healthcare provider if you have any side effects that bother you or do not go away  Report side effects to FDA MedWatch at www [employer] gov/medwatch, or call 7-000-[facility]-0267 or to [address] [place] [address]  as shown below  Email: [email or web address]   Fax number: [phone]   Telephone number: [phone]-[phone] ([phone])    Full fact sheet document for patients can be found at: Namrata [email or web address]    The patient consents to proceed with bebtelovimab infusion  I have spent 12 minutes directly with the patient  Encounter provider Amirah Kemp    Provider located at [address] [place] [address]    Recent Visits  No visits were found meeting these conditions    Showing recent visits within past 7 days and meeting all other requirements  Today's Visits  Date Type Provider Dept   05/27/22 Telemedicine Amy Herbie Peabody, [address] [facility]    05/27/22 Telephone Amy Herbie Peabody, [facility] today's visits and meeting all other requirements  Future Appointments  No visits were found meeting these conditions  Showing future appointments within next 150 days and meeting all other requirements     This virtual check-in was done via [address] and patient was informed that this is a secure, HIPAA-compliant platform  He agrees to proceed  Patient agrees to participate in a virtual check in via telephone or video visit instead of presenting to the office to address urgent/immediate medical needs  Patient is aware this is a billable service  After connecting through [facility], the patient was identified by name and date of birth  Eli Rivera was informed that this was a telemedicine visit and that the exam was being conducted confidentially over secure lines  My office door was closed  No one else was in the room  Eli Rivera acknowledged consent and understanding of privacy and security of the telemedicine visit  I informed the patient that I have reviewed his record in Epic and presented the opportunity for him to ask any questions regarding the visit today  The patient agreed to participate  Verification of patient location:  Patient is located in the following state in which I hold an active license: [place]    Subjective:   Eli Rivera is a 32 y o  male who has been screened for COVID-19  Symptom change since last report: unchanged  Patient's symptoms include fever, chills, fatigue, malaise, nasal congestion, rhinorrhea, cough, nausea (mild), myalgias and headache  Patient denies sore throat, anosmia, loss of taste, shortness of breath, chest tightness, abdominal pain, vomiting and diarrhea  - Date of symptom onset: 5/26/2022  - Date of positive COVID-19 test: 5/26/2022  Type of test: PCR  COVID-19 vaccination status: Fully vaccinated (primary series)    Armando Webster has been staying home and has isolated themselves in his home   He is taking care to not share personal items and is cleaning all surfaces that are touched often, like counters, tabletops, and doorknobs using household cleaning sprays or wipes  He is wearing a mask when he leaves his room  Brother, sister-in-law, niece, who are household contacts are positive for COVID-19  Lab Results   Component Value Date    SARSCOV2 Negative 03/30/2022    SARSCOV2 Not Detected 07/08/2020     Past Medical History:   Diagnosis Date    Anxiety     Carpal tunnel syndrome, bilateral     [place]  Justinea Jluis Bazzi 9/12/17   R   9/12/17     GERD without esophagitis     Moderate episode of recurrent major depressive disorder ([facility] [address] ) 8/22/2018    Pilonidal cyst with abscess     [place]   10/25/13   R   6/10/14     Vitamin D deficiency      Past Surgical History:   Procedure Laterality Date    FOOT SURGERY      PILONIDAL CYST DRAINAGE      Incision and drainage of pilonidal cyst     [place] WRIST Brennan Eze LIG Right 7/12/2018    Procedure: RELEASE CARPAL TUNNEL ENDOSCOPIC;  Surgeon: Bney Maloney MD;  Location:  MAIN OR;  Service: Orthopedics    [place] WRIST Brennan Eze LIG Left 7/26/2018    Procedure: RELEASE CARPAL TUNNEL ENDOSCOPIC;  Surgeon: Beny Maloney MD;  [facility];  Service: Orthopedics     Current Outpatient Medications   Medication Sig Dispense Refill    buPROPion (Wellbutrin XL) 150 mg 24 hr tablet Take 1 tablet (150 mg total) by mouth daily 30 tablet 2    buPROPion (Wellbutrin XL) 300 mg 24 hr tablet Take 1 tablet (300 mg total) by mouth daily 30 tablet 2    Cholecalciferol (VITAMIN D-3) 1000 units CAPS Take 2 capsules by mouth daily      escitalopram (LEXAPRO) 20 mg tablet Take 1 tablet (20 mg total) by mouth daily 90 tablet 0    fluticasone (FLONASE) 50 mcg/act nasal spray 1 spray into each nostril as needed for rhinitis        levothyroxine 25 mcg tablet TAKE 1 TABLET BY MOUTH DAILY IN THE EARLY MORNING 90 tablet 1    multivitamin (THERAGRAN) TABS Take 1 tablet by mouth daily      traZODone (DESYREL) 50 mg tablet Take 1 tablet (50 mg total) by mouth daily at bedtime as needed for sleep 90 tablet 0     No current facility-administered medications for this visit  No Known Allergies    Review of Systems   Constitutional: Positive for chills, fatigue and fever  HENT: Positive for congestion and rhinorrhea  Negative for sore throat  Respiratory: Positive for cough  Negative for chest tightness and shortness of breath  Gastrointestinal: Positive for nausea (mild)  Negative for abdominal pain, diarrhea and vomiting  Musculoskeletal: Positive for myalgias  Neurological: Positive for headaches  Objective:    Vitals:    05/27/22 1306   Temp: 97 6 °F (36 4 °C)   TempSrc: Tympanic   Weight: (!) 182 kg (401 lb)   Height: 6' 1" (1 854 m)       Physical Exam  Vitals and nursing note reviewed  Constitutional:       General: He is not in acute distress  Appearance: Normal appearance  He is not ill-appearing  Pulmonary:      Effort: Pulmonary effort is normal  No respiratory distress  Comments: No cough  No dyspnea with speaking  Neurological:      Mental Status: He is alert  Psychiatric:         Mood and Affect: Mood normal          VIRTUAL VISIT DISCLAIMER    King Dash verbally agrees to participate in [employer]  Pt is aware that [employer] could be limited without vital signs or the ability to perform a full hands-on physical Yovani Cooper understands he or the provider may request at any time to terminate the video visit and request the patient to seek care or treatment in person

## 2022-05-28 ENCOUNTER — HOSPITAL ENCOUNTER (OUTPATIENT)
Dept: INFUSION CENTER | Facility: HOSPITAL | Age: 32
Discharge: HOME | End: 2022-05-28
Payer: COMMERCIAL

## 2022-05-28 VITALS
SYSTOLIC BLOOD PRESSURE: 136 MMHG | TEMPERATURE: 99.8 F | DIASTOLIC BLOOD PRESSURE: 74 MMHG | HEART RATE: 88 BPM | RESPIRATION RATE: 20 BRPM | OXYGEN SATURATION: 98 %

## 2022-05-28 DIAGNOSIS — U07.1 COVID-19: Primary | ICD-10-CM

## 2022-05-28 PROCEDURE — M0222 HB BEBTELOVIMAB INJECTION: HCPCS | Performed by: FAMILY MEDICINE

## 2022-05-28 RX ORDER — ALBUTEROL SULFATE 90 UG/1
3 AEROSOL, METERED RESPIRATORY (INHALATION) ONCE AS NEEDED
Status: DISCONTINUED | OUTPATIENT
Start: 2022-05-28 | End: 2022-05-31 | Stop reason: HOSPADM

## 2022-05-28 RX ORDER — ONDANSETRON 2 MG/ML
4 INJECTION INTRAMUSCULAR; INTRAVENOUS ONCE AS NEEDED
Status: CANCELLED | OUTPATIENT
Start: 2022-05-28

## 2022-05-28 RX ORDER — BEBTELOVIMAB 87.5 MG/ML
175 INJECTION, SOLUTION INTRAVENOUS ONCE
Status: CANCELLED | OUTPATIENT
Start: 2022-05-28

## 2022-05-28 RX ORDER — ACETAMINOPHEN 325 MG/1
650 TABLET ORAL ONCE AS NEEDED
Status: CANCELLED | OUTPATIENT
Start: 2022-05-28

## 2022-05-28 RX ORDER — SODIUM CHLORIDE 9 MG/ML
20 INJECTION, SOLUTION INTRAVENOUS ONCE
Status: CANCELLED | OUTPATIENT
Start: 2022-05-28

## 2022-05-28 RX ORDER — ALBUTEROL SULFATE 90 UG/1
3 AEROSOL, METERED RESPIRATORY (INHALATION) ONCE AS NEEDED
Status: CANCELLED | OUTPATIENT
Start: 2022-05-28

## 2022-05-28 RX ORDER — ONDANSETRON 2 MG/ML
4 INJECTION INTRAMUSCULAR; INTRAVENOUS ONCE AS NEEDED
Status: DISCONTINUED | OUTPATIENT
Start: 2022-05-28 | End: 2022-05-31 | Stop reason: HOSPADM

## 2022-05-28 RX ORDER — ACETAMINOPHEN 325 MG/1
650 TABLET ORAL ONCE AS NEEDED
Status: DISCONTINUED | OUTPATIENT
Start: 2022-05-28 | End: 2022-05-31 | Stop reason: HOSPADM

## 2022-05-28 RX ORDER — SODIUM CHLORIDE 9 MG/ML
20 INJECTION, SOLUTION INTRAVENOUS ONCE
Status: DISCONTINUED | OUTPATIENT
Start: 2022-05-28 | End: 2022-05-31 | Stop reason: HOSPADM

## 2022-05-28 RX ORDER — BEBTELOVIMAB 87.5 MG/ML
175 INJECTION, SOLUTION INTRAVENOUS ONCE
Status: COMPLETED | OUTPATIENT
Start: 2022-05-28 | End: 2022-05-28

## 2022-05-28 RX ADMIN — BEBTELOVIMAB 175 MG: 87.5 INJECTION, SOLUTION INTRAVENOUS at 08:48

## 2022-06-01 LAB
BACTERIA BLD CULT: NORMAL
BACTERIA BLD CULT: NORMAL

## 2022-06-02 ENCOUNTER — TELEPHONE (OUTPATIENT)
Dept: SLEEP CENTER | Facility: CLINIC | Age: 32
End: 2022-06-02

## 2022-06-02 NOTE — TELEPHONE ENCOUNTER
Called patient and advised sleep study resulted and APAP ordered  Scheduled DME set up 6/16/22  Rx for CPAP and clinicals sent to [facility] via [place]  Scheduled compliance follow up 10/26/22    Added to wait list

## 2022-06-16 ENCOUNTER — OFFICE VISIT (OUTPATIENT)
Dept: FAMILY MEDICINE CLINIC | Facility: CLINIC | Age: 32
End: 2022-06-16
Payer: COMMERCIAL

## 2022-06-16 VITALS
SYSTOLIC BLOOD PRESSURE: 120 MMHG | BODY MASS INDEX: 41.75 KG/M2 | WEIGHT: 315 LBS | RESPIRATION RATE: 18 BRPM | HEART RATE: 93 BPM | OXYGEN SATURATION: 97 % | TEMPERATURE: 97.6 F | DIASTOLIC BLOOD PRESSURE: 80 MMHG | HEIGHT: 73 IN

## 2022-06-16 DIAGNOSIS — R06.09 POST-COVID CHRONIC DYSPNEA: Primary | ICD-10-CM

## 2022-06-16 DIAGNOSIS — U09.9 POST-COVID CHRONIC DYSPNEA: Primary | ICD-10-CM

## 2022-06-16 LAB

## 2022-06-16 PROCEDURE — 99214 OFFICE O/P EST MOD 30 MIN: CPT | Performed by: FAMILY MEDICINE

## 2022-06-16 NOTE — LETTER
June 16, 2022     Patient: Maciej Felix  YOB: 1990  Date of Visit: 6/16/2022      To Whom it May Concern:    Anatoliy Mendoza is under my professional care  She seen for shortness of breath which is being evaluated and may be of post COVID phenomena  Presently, unable to return to work  If you have any questions or concerns, please don't hesitate to call           Sincerely,          Juana Loza MD        CC: No Recipients

## 2022-06-16 NOTE — PATIENT INSTRUCTIONS
Suspect shortness of breath his post COVID  There is a possibility of a pulmonary embolus so will arrange for a CT scan of his chest and lungs  Will not yet begin in anti coagulant as his oxygen level did not drop with exertion  If no blood clot is present, his symptoms are related to his COVID infection and will gradually improve although the time course is variable  Not sure when he will be able to return to work

## 2022-06-16 NOTE — PROGRESS NOTES
Chief Complaint   Patient presents with    Shortness of Breath     SOB since covid + 2 wks        HPI   Here with troubles catching his breath  Had COVID 2 weeks ago  Had a positive rapid test to go along with his 103 fever  Had body aches, headaches, and congestion  Had a little bit of a cough which made his head really hurt  Seen in the emergency room on 05/26  Blood work was okay  He had a chest x-ray which was normal   Noted to have 2 doses of vaccine but not the booster  Presently, is not coughing  Just feels short of breath  Notes that he had to call off of work a couple days because of the shortness of breath  Shortness of breath began about 1 week ago which was 2 weeks after his initial diagnosis of COVID  Past Medical History:   Diagnosis Date    Anxiety     Carpal tunnel syndrome, bilateral     [place]  Eden Dixie Eden Dixie 9/12/17   R   9/12/17     GERD without esophagitis     Moderate episode of recurrent major depressive disorder ([facility] [address] ) 8/22/2018    Pilonidal cyst with abscess     [place]   10/25/13   R   6/10/14     Vitamin D deficiency         Past Surgical History:   Procedure Laterality Date    FOOT SURGERY      PILONIDAL CYST DRAINAGE      Incision and drainage of pilonidal cyst     [place] WRIST Mary Kay Eisenmenger LIG Right 7/12/2018    Procedure: RELEASE CARPAL TUNNEL ENDOSCOPIC;  Surgeon: Buck Contreras MD;  [facility];  Service: Orthopedics    [place] WRIST Mary Kay Eisenmenger LIG Left 7/26/2018    Procedure: RELEASE CARPAL TUNNEL ENDOSCOPIC;  Surgeon: Buck Contreras MD;  [facility];  Service: Orthopedics       Social History     Tobacco Use    Smoking status: Never Smoker    Smokeless tobacco: Never Used   Substance Use Topics    Alcohol use: Yes     Comment: Rarely       Social History     Social History Narrative     since 20/12   2/22  She moved out  No children  Also has brother and sister-in-law and his niece living with him      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 [employer]  The following portions of the patient's history were reviewed and updated as appropriate: allergies, current medications, past family history, past medical history, past social history, past surgical history and problem list       Review of Systems       /80   Pulse 93   Temp 97 6 °F (36 4 °C) (Temporal)   Resp 18   Ht 6' 1" (1 854 m)   Wt (!) 176 kg (388 lb)   SpO2 97%   BMI 51 19 kg/m²      Physical Exam   Appears comfortable at rest   No cough is noted  Throat reveals no erythema  Both eardrums are white  Lungs are clear with good breath sounds  No wheezing noted  Heart regular with a controlled rate  No murmur  Abdomen nontender  Pulse ox 97%  After a 5 minute walk, pulse ox was 96% with a pulse of 123  Current Outpatient Medications:     buPROPion (Wellbutrin XL) 150 mg 24 hr tablet, Take 1 tablet (150 mg total) by mouth daily, Disp: 30 tablet, Rfl: 2    buPROPion (Wellbutrin XL) 300 mg 24 hr tablet, Take 1 tablet (300 mg total) by mouth daily, Disp: 30 tablet, Rfl: 2    Cholecalciferol (VITAMIN D-3) 1000 units CAPS, Take 2 capsules by mouth daily, Disp: , Rfl:     escitalopram (LEXAPRO) 20 mg tablet, Take 1 tablet (20 mg total) by mouth daily, Disp: 90 tablet, Rfl: 0    fluticasone (FLONASE) 50 mcg/act nasal spray, 1 spray into each nostril as needed for rhinitis  , Disp: , Rfl:     levothyroxine 25 mcg tablet, TAKE 1 TABLET BY MOUTH DAILY IN THE EARLY MORNING, Disp: 90 tablet, Rfl: 1    multivitamin (THERAGRAN) TABS, Take 1 tablet by mouth daily, Disp: , Rfl:     traZODone (DESYREL) 50 mg tablet, Take 1 tablet (50 mg total) by mouth daily at bedtime as needed for sleep, Disp: 90 tablet, Rfl: 0     No problem-specific Assessment & Plan notes found for this encounter         Diagnoses and all orders for this visit:    Post-COVID chronic dyspnea  -     CT chest w contrast; Future        Patient Instructions   Suspect shortness of breath his post COVID  There is a possibility of a pulmonary embolus so will arrange for a CT scan of his chest and lungs  Will not yet begin in anti coagulant as his oxygen level did not drop with exertion  If no blood clot is present, his symptoms are related to his COVID infection and will gradually improve although the time course is variable  Not sure when he will be able to return to work

## 2022-06-17 ENCOUNTER — HOSPITAL ENCOUNTER (OUTPATIENT)
Dept: NUCLEAR MEDICINE | Facility: HOSPITAL | Age: 32
Discharge: HOME | End: 2022-06-17
Payer: COMMERCIAL

## 2022-06-17 ENCOUNTER — HOSPITAL ENCOUNTER (OUTPATIENT)
Dept: RADIOLOGY | Facility: HOSPITAL | Age: 32
Discharge: HOME | End: 2022-06-17
Payer: COMMERCIAL

## 2022-06-17 DIAGNOSIS — R06.09 POST-COVID CHRONIC DYSPNEA: ICD-10-CM

## 2022-06-17 DIAGNOSIS — U09.9 POST-COVID CHRONIC DYSPNEA: ICD-10-CM

## 2022-06-17 PROCEDURE — 71046 X-RAY EXAM CHEST 2 VIEWS: CPT

## 2022-06-17 PROCEDURE — A9540 TC99M MAA: HCPCS

## 2022-06-17 PROCEDURE — A9558 XE133 XENON 10MCI: HCPCS

## 2022-06-17 PROCEDURE — G1004 CDSM NDSC: HCPCS

## 2022-06-17 PROCEDURE — 78582 LUNG VENTILAT&PERFUS IMAGING: CPT

## 2022-06-23 ENCOUNTER — OFFICE VISIT (OUTPATIENT)
Dept: FAMILY MEDICINE CLINIC | Facility: CLINIC | Age: 32
End: 2022-06-23
Payer: COMMERCIAL

## 2022-06-23 VITALS
BODY MASS INDEX: 41.75 KG/M2 | TEMPERATURE: 97.6 F | OXYGEN SATURATION: 99 % | HEART RATE: 108 BPM | RESPIRATION RATE: 20 BRPM | HEIGHT: 73 IN | SYSTOLIC BLOOD PRESSURE: 130 MMHG | DIASTOLIC BLOOD PRESSURE: 94 MMHG | WEIGHT: 315 LBS

## 2022-06-23 DIAGNOSIS — U09.9 POST-COVID CHRONIC DYSPNEA: Primary | ICD-10-CM

## 2022-06-23 DIAGNOSIS — R06.09 POST-COVID CHRONIC DYSPNEA: Primary | ICD-10-CM

## 2022-06-23 PROCEDURE — 1036F TOBACCO NON-USER: CPT | Performed by: FAMILY MEDICINE

## 2022-06-23 PROCEDURE — 3008F BODY MASS INDEX DOCD: CPT | Performed by: FAMILY MEDICINE

## 2022-06-23 PROCEDURE — 99213 OFFICE O/P EST LOW 20 MIN: CPT | Performed by: FAMILY MEDICINE

## 2022-06-23 NOTE — PROGRESS NOTES
Chief Complaint   Patient presents with    Follow-up     1 Wk F/U: Covid chronic dyspnea        HPI   Here for follow-up of dyspnea secondary to a COVID infection  Chest x-ray and lung scan were negative  He continues with shortness of breath with exertion  Difficulty walking up steps  Has not tried moving anything  Does not think he would be able to work yet  About a week ago, obtained a CPAP machine  Helps breathing at night  Past Medical History:   Diagnosis Date    Anxiety     Carpal tunnel syndrome, bilateral     [place]  Nithya Console Nithya Console 9/12/17   R   9/12/17     GERD without esophagitis     Moderate episode of recurrent major depressive disorder ([facility] [address] ) 08/22/2018    Morbid obesity ([facility] [address] )     Obstructive sleep apnea     Pilonidal cyst with abscess     [place]   10/25/13   R   6/10/14     Vitamin D deficiency         Past Surgical History:   Procedure Laterality Date    FOOT SURGERY      PILONIDAL CYST DRAINAGE      Incision and drainage of pilonidal cyst     [place] WRIST [place] LIG Right 7/12/2018    Procedure: RELEASE CARPAL TUNNEL ENDOSCOPIC;  Surgeon: Frida Chua MD;  [facility];  Service: Orthopedics    [place] WRIST [place] LIG Left 7/26/2018    Procedure: RELEASE CARPAL TUNNEL ENDOSCOPIC;  Surgeon: Frida Chua MD;  [facility];  Service: Orthopedics       Social History     Tobacco Use    Smoking status: Never Smoker    Smokeless tobacco: Never Used   Substance Use Topics    Alcohol use: Yes     Comment: Rarely       Social History     Social History Narrative     since 20/12   2/22  She moved out  No children  Also has brother and sister-in-law and his niece living with him  Works for [employer] in the [employer]  Also runs with the [employer]          The following portions of the patient's history were reviewed and updated as appropriate: allergies, current medications, past family history, past medical history, past social history, past surgical history and problem list       Review of Systems       /94 (BP Location: Left arm, Patient Position: Sitting, Cuff Size: Large)   Pulse (!) 108   Temp 97 6 °F (36 4 °C) (Temporal)   Resp 20   Ht 6' 1" (1 854 m)   Wt (!) 176 kg (387 lb 6 4 oz)   SpO2 99%   BMI 51 11 kg/m²      Physical Exam   Comfortable at rest   Lungs are clear with no wheezing or rhonchi  Heart rate 102 at rest   Chest x-ray and lung scan were negative  Current Outpatient Medications:     buPROPion (Wellbutrin XL) 150 mg 24 hr tablet, Take 1 tablet (150 mg total) by mouth daily, Disp: 30 tablet, Rfl: 2    buPROPion (Wellbutrin XL) 300 mg 24 hr tablet, Take 1 tablet (300 mg total) by mouth daily, Disp: 30 tablet, Rfl: 2    Cholecalciferol (VITAMIN D-3) 1000 units CAPS, Take 2 capsules by mouth daily, Disp: , Rfl:     escitalopram (LEXAPRO) 20 mg tablet, Take 1 tablet (20 mg total) by mouth daily, Disp: 90 tablet, Rfl: 0    fluticasone (FLONASE) 50 mcg/act nasal spray, 1 spray into each nostril as needed for rhinitis  , Disp: , Rfl:     levothyroxine 25 mcg tablet, TAKE 1 TABLET BY MOUTH DAILY IN THE EARLY MORNING, Disp: 90 tablet, Rfl: 1    multivitamin (THERAGRAN) TABS, Take 1 tablet by mouth daily, Disp: , Rfl:     traZODone (DESYREL) 50 mg tablet, Take 1 tablet (50 mg total) by mouth daily at bedtime as needed for sleep, Disp: 90 tablet, Rfl: 0     No problem-specific Assessment & Plan notes found for this encounter  Diagnoses and all orders for this visit:    Post-COVID chronic dyspnea  -     Ambulatory referral to Physical Therapy;  Future        Patient Instructions

## 2022-06-29 ENCOUNTER — EVALUATION (OUTPATIENT)
Dept: PHYSICAL THERAPY | Facility: CLINIC | Age: 32
End: 2022-06-29
Payer: COMMERCIAL

## 2022-06-29 DIAGNOSIS — U09.9 POST-COVID CHRONIC DYSPNEA: Primary | ICD-10-CM

## 2022-06-29 DIAGNOSIS — R06.09 POST-COVID CHRONIC DYSPNEA: Primary | ICD-10-CM

## 2022-06-29 PROCEDURE — 97110 THERAPEUTIC EXERCISES: CPT

## 2022-06-29 PROCEDURE — 97161 PT EVAL LOW COMPLEX 20 MIN: CPT

## 2022-06-29 NOTE — PROGRESS NOTES
PT Evaluation     Today's date: 2022  Patient name: Wilber Rico  : 1990  MRN: [id number]  Referring provider: Pato Melchor MD  Dx:   Encounter Diagnosis     ICD-10-CM    1  Post-COVID chronic dyspnea  R06 09 Ambulatory referral to Physical Therapy    U09 9        Start Time: 1800  Stop Time: 1845  Total time in clinic (min): 45 minutes    Assessment  Assessment details: Mr Chelly Hilario is a 32year-old male reporting to [facility] OP PT for initial evaluation of ongoing difficulties with dyspnea with exertion s/p COVID sickness just over one month ago  Pt was referred for consultation by Dr Karen Plaza MD  PMHx significant for morbid obesity and depression  Upon examination testing, SPO2 levels did not decline with physical exertion in the presence of subjective exertion highlighting opportunity for progressive conditioning in the presence of dyspnea  Extensive education reviewed regarding COVID pathophysiology, recovery timeline, and HEP involving progressive aerobic and strengthening exercise  Will continue to follow up as necessary in accordance with his physician follow-up next week  Impairments: activity intolerance    Symptom irritability: moderateBarriers to therapy: Co-pay  Understanding of Dx/Px/POC: good   Prognosis: good    Goals  ST weeks  1  [place] with finalized HEP  2  Improve 6 MWT by 150'  LT weeks  1  [place] with final HEP  2  Return to work without limitations      Plan  Patient would benefit from: skilled physical therapy  Referral necessary: No  Planned modality interventions: biofeedback  Planned therapy interventions: home exercise program, gait training, therapeutic exercise, therapeutic activities, patient education, manual therapy and neuromuscular re-education  Frequency: 2x week (PRN)  Duration in weeks: 4  Plan of Care beginning date: 2022  Plan of Care expiration date: 2022  Treatment plan discussed with: patient        Subjective Evaluation    History of Present Illness  Mechanism of injury: Presents to OP PT at [address] referred for ongoing post-COVID deconditioning and SOB following initial positive rapid test results just over one month ago  Has had f/u x-ray and CT imaging of lungs, all unremarkable for further pathology  Works in the [address] [place] [address] Avenue and also serves with [employer] - has not returned to both in the last month secondary to ongoing sensations of SOB  Does feel that his symptoms have noticeably improved since visit with physician last week - has f/u visit next week  Recurrent probem    Quality of life: fair    Pain  No pain reported    Social Support  Lives in: multiple-level home  Lives with: alone    Employment status: working  Hand dominance: right      Diagnostic Tests  X-ray: normal  CT scan: normal  Treatments  Previous treatment: medication  Current treatment: physical therapy  Patient Goals  Patient goals for therapy: increased strength and return to sport/leisure activities  Patient goal: "To stop feeling out of breath "        Objective    6 MWT: SPO2 ranging 97-98% throughout  Reported noticeable dispnea at 500 feet  Total distance ambulated 1430 feet          Short Term Goal Expiration Date:(7/14/22)  Long Term Goal Expiration Date: (7/29/22)  POC Expiration Date: (7/29/22)       Precautions: Obesity       Manuals 6/29/22 IE                                       Neuro Re-Ed                                                                 Ther Ex        Pt Education Covid pathophysiology, post-covid conditioning calendar program, management of symptoms using symptom management scales x25 min                                                               Ther Activity                        Gait Training                        Modalities

## 2022-07-01 ENCOUNTER — OFFICE VISIT (OUTPATIENT)
Dept: PHYSICAL THERAPY | Facility: CLINIC | Age: 32
End: 2022-07-01
Payer: COMMERCIAL

## 2022-07-01 DIAGNOSIS — R06.09 POST-COVID CHRONIC DYSPNEA: Primary | ICD-10-CM

## 2022-07-01 DIAGNOSIS — U09.9 POST-COVID CHRONIC DYSPNEA: Primary | ICD-10-CM

## 2022-07-01 PROCEDURE — 97112 NEUROMUSCULAR REEDUCATION: CPT

## 2022-07-01 PROCEDURE — 97110 THERAPEUTIC EXERCISES: CPT

## 2022-07-01 NOTE — PROGRESS NOTES
Daily Note     Today's date: 2022  Patient name: Jany Baldwin  : 1990  MRN: [id number]  Referring provider: Tim Peabody, MD  Dx:   Encounter Diagnosis     ICD-10-CM    1  Post-COVID chronic dyspnea  R06 09     U09 9        Start Time: 0900  Stop Time: 1000  Total time in clinic (min): 60 minutes    Subjective: Doing well today; a little tired but feels his sleep has been better with his new CPAP  Working on his walking program  Sees his physician for follow up next Thursday  Objective: See treatment diary below      Assessment: Session focused on continued establishment of HEP for more effective neuromuscular recruitment of core stability for carryover to job-related responsibilities as well as overall conditioning to improve stamina and self efficacy  Noted some throat tightness with TM exercise however resolved upon exercise cessation; SP02 remained at 97% throughout with appropriate intensities met - maintained throughout session  Plan: Continue per plan of care  Progress treatment as tolerated  Continue walking program/Big 3 over next week; follow up next Friday       Short Term Goal Expiration Date:(22)  Long Term Goal Expiration Date: (22)  POC Expiration Date: (22)       Precautions: Obesity       Manuals 22 IE                                       Neuro Re-Ed         Core  [place] Big 3    Curl-ups    Side planks from knees    Bird Dogs    10x10" ea    HR: 150 BPM  SP02: 97%    HEP                                                      Ther Ex        Pt Education Covid pathophysiology, post-covid conditioning calendar program, management of symptoms using symptom management scales x25 min       TM  UL UE PRN  2 0->3 5 mph mRPE 5/10 HR avg 130-140 BPM, SP02 97% x10 min w/3 min CD      MB Slams  6#  x15      Bolster Carry  Over shoulder  40#  Hallway  x500'                                      Ther Activity                        Gait Training Modalities

## 2022-07-08 ENCOUNTER — APPOINTMENT (OUTPATIENT)
Dept: PHYSICAL THERAPY | Facility: CLINIC | Age: 32
End: 2022-07-08
Payer: COMMERCIAL

## 2022-07-12 ENCOUNTER — OFFICE VISIT (OUTPATIENT)
Dept: FAMILY MEDICINE CLINIC | Facility: CLINIC | Age: 32
End: 2022-07-12
Payer: COMMERCIAL

## 2022-07-12 ENCOUNTER — OFFICE VISIT (OUTPATIENT)
Dept: PHYSICAL THERAPY | Facility: CLINIC | Age: 32
End: 2022-07-12
Payer: COMMERCIAL

## 2022-07-12 VITALS
RESPIRATION RATE: 20 BRPM | WEIGHT: 315 LBS | HEART RATE: 115 BPM | TEMPERATURE: 97.5 F | OXYGEN SATURATION: 99 % | DIASTOLIC BLOOD PRESSURE: 88 MMHG | SYSTOLIC BLOOD PRESSURE: 144 MMHG | BODY MASS INDEX: 41.75 KG/M2 | HEIGHT: 73 IN

## 2022-07-12 DIAGNOSIS — R06.09 POST-COVID CHRONIC DYSPNEA: Primary | ICD-10-CM

## 2022-07-12 DIAGNOSIS — U09.9 POST-COVID CHRONIC DYSPNEA: Primary | ICD-10-CM

## 2022-07-12 PROCEDURE — 97530 THERAPEUTIC ACTIVITIES: CPT

## 2022-07-12 PROCEDURE — 97110 THERAPEUTIC EXERCISES: CPT

## 2022-07-12 PROCEDURE — 99213 OFFICE O/P EST LOW 20 MIN: CPT | Performed by: FAMILY MEDICINE

## 2022-07-12 NOTE — LETTER
July 12, 2022     Patient: Efrem Johnston  YOB: 1990  Date of Visit: 7/12/2022      To Whom it May Concern:    Claudio Cuevas is under my professional care  Ann Lopez was seen in my office on 7/12/2022  Ann Dye may return to work on 7/13  If you have any questions or concerns, please don't hesitate to call           Sincerely,          Jose L Peterson MD        CC: No Recipients

## 2022-07-12 NOTE — PROGRESS NOTES
Daily Note - 30 Day Hold    Today's date: 2022  Patient name: Juan Mcmullen  : 1990  MRN: [id number]  Referring provider: Yoli Lee MD  Dx:   Encounter Diagnosis     ICD-10-CM    1  Post-COVID chronic dyspnea  R06 09     U09 9        Start Time: 1000  Stop Time: 4061  Total time in clinic (min): 45 minutes    Subjective: Doing very well today being nearly 2 weeks since IE; sees his PCP this afternoon for clearance to return to work  Feels his breathing and activity tolerance have significantly improved and he will be able to return to work at full levels of responsibility  His goal is to return to work this weekend  Felt great after last session  Objective: See treatment diary below    6 MWT: SPO2 ranging 97-98% throughout  Reported noticeable dyspnea at 500 feet  Total distance ambulated:1430 feet  22: SPO2 ranging 97-98% throughout  Reported noticeable dyspnea at 800 feet  SSGS: 1 55 m/s  Total distance ambulated: 1700 feet  Assessment: Skilled progress update completed noting clinically significant improvements in endurance per 6 MWT with appropriate vitals response  FOTO completed and reviewed highlighting significant improvements in overall function s/p COVID  At this time Jose Ingram has met all of his personal goals and is agreeable to 30 day hold from PT with plans for subsequent DC  Goals  ST weeks  1  [place] with finalized HEP  - MET  2  Improve 6 MWT by 150'  - MET    LT weeks  1  [place] with final HEP  - MET  2  Return to work without limitations  - MET    Plan: 30-day hold with subsequent DC         Short Term Goal Expiration Date:(22)  Long Term Goal Expiration Date: (22)  POC Expiration Date: (22)       Precautions: Obesity       Manuals 22 IE                                       Neuro Re-Ed         Core  [place] Big 3    Curl-ups    Side planks from knees    Bird Dogs    10x10" ea    HR: 150 BPM  SP02: 97%    HEP Ther Ex        Pt Education Covid pathophysiology, post-covid conditioning calendar program, management of symptoms using symptom management scales x25 min       TM  UL UE PRN  2 0->3 5 mph mRPE 5/10 HR avg 130-140 BPM, SP02 97% x10 min w/3 min CD      MB Slams  6#  x15      Bolster Carry  Over shoulder  40#  Hallway  x500'                                      Ther Activity                        Gait Training                        Modalities

## 2022-07-12 NOTE — PROGRESS NOTES
Chief Complaint   Patient presents with    Follow-up     2 Wk F/U: post Covid chronic dyspnea        HPI   Here for follow-up of dyspnea secondary to post COVID  Has been sent to Pulmonary Rehab and is on a respiratory therapy program   He is feeling 95% better and feels that he can return to work  Past Medical History:   Diagnosis Date    Anxiety     Carpal tunnel syndrome, bilateral     [place]  Kayla Jurist Kayla Jurist 9/12/17   R   9/12/17     GERD without esophagitis     Moderate episode of recurrent major depressive disorder ([facility] [address] ) 08/22/2018    Morbid obesity ([facility] [address] )     Obstructive sleep apnea     Pilonidal cyst with abscess     [place]   10/25/13   R   6/10/14     Vitamin D deficiency         Past Surgical History:   Procedure Laterality Date    FOOT SURGERY      PILONIDAL CYST DRAINAGE      Incision and drainage of pilonidal cyst     ND WRIST [place] LIG Right 7/12/2018    Procedure: RELEASE CARPAL TUNNEL ENDOSCOPIC;  Surgeon: Alicia Gardiner MD;  Location: [facility];  Service: Orthopedics    ND WRIST [place] LIG Left 7/26/2018    Procedure: RELEASE CARPAL TUNNEL ENDOSCOPIC;  Surgeon: Alicia Gardiner MD;  Location: [facility];  Service: Orthopedics       Social History     Tobacco Use    Smoking status: Never Smoker    Smokeless tobacco: Never Used   Substance Use Topics    Alcohol use: Yes     Comment: Rarely       Social History     Social History Narrative     since 20/12   2/22  She moved out  No children  Also has brother and sister-in-law and his niece living with him  Works for [employer] in the [employer]  Also runs with the [employer]          The following portions of the patient's history were reviewed and updated as appropriate: allergies, current medications, past family history, past medical history, past social history, past surgical history and problem list       Review of Systems       /88 (BP Location: Left arm, Patient Position: Sitting, Cuff Size: Large)   Pulse (!) 115   Temp 97 5 °F (36 4 °C) (Temporal)   Resp 20   Ht 6' 1" (1 854 m)   Wt (!) 175 kg (385 lb)   SpO2 99%   BMI 50 79 kg/m²      Physical Exam   Appears well  Lungs are clear  Heart regular  PT note reviewed  Current Outpatient Medications:     buPROPion (Wellbutrin XL) 150 mg 24 hr tablet, Take 1 tablet (150 mg total) by mouth daily, Disp: 30 tablet, Rfl: 2    buPROPion (Wellbutrin XL) 300 mg 24 hr tablet, Take 1 tablet (300 mg total) by mouth daily, Disp: 30 tablet, Rfl: 2    Cholecalciferol (VITAMIN D-3) 1000 units CAPS, Take 2 capsules by mouth daily, Disp: , Rfl:     escitalopram (LEXAPRO) 20 mg tablet, Take 1 tablet (20 mg total) by mouth daily, Disp: 90 tablet, Rfl: 0    fluticasone (FLONASE) 50 mcg/act nasal spray, 1 spray into each nostril as needed for rhinitis  , Disp: , Rfl:     levothyroxine 25 mcg tablet, TAKE 1 TABLET BY MOUTH DAILY IN THE EARLY MORNING, Disp: 90 tablet, Rfl: 1    multivitamin (THERAGRAN) TABS, Take 1 tablet by mouth daily, Disp: , Rfl:     traZODone (DESYREL) 50 mg tablet, Take 1 tablet (50 mg total) by mouth daily at bedtime as needed for sleep, Disp: 90 tablet, Rfl: 0     No problem-specific Assessment & Plan notes found for this encounter  Diagnoses and all orders for this visit:    Post-COVID chronic dyspnea        Patient Instructions   Symptoms are significantly improved  Note given to return to work tomorrow  Suggest getting a COVID booster at any time now and then again in the fall when the new shot is available  Return as needed

## 2022-07-12 NOTE — PATIENT INSTRUCTIONS
Symptoms are significantly improved  Note given to return to work tomorrow  Suggest getting a COVID booster at any time now and then again in the fall when the new shot is available  Return as needed

## 2022-07-14 ENCOUNTER — TELEPHONE (OUTPATIENT)
Dept: FAMILY MEDICINE CLINIC | Facility: CLINIC | Age: 32
End: 2022-07-14

## 2022-07-15 DIAGNOSIS — R79.89 ELEVATED TSH: ICD-10-CM

## 2022-07-15 RX ORDER — LEVOTHYROXINE SODIUM 0.03 MG/1
25 TABLET ORAL
Qty: 90 TABLET | Refills: 0 | Status: SHIPPED | OUTPATIENT
Start: 2022-07-15 | End: 2022-10-14

## 2022-07-18 DIAGNOSIS — F41.9 ANXIETY AND DEPRESSION: ICD-10-CM

## 2022-07-18 DIAGNOSIS — F32.A ANXIETY AND DEPRESSION: ICD-10-CM

## 2022-07-18 NOTE — TELEPHONE ENCOUNTER
Mikal Raman is requesting a refill on their Lexapro   Patient's next appointment with Dr Mercado is on 8/17

## 2022-07-19 RX ORDER — ESCITALOPRAM OXALATE 20 MG/1
20 TABLET ORAL DAILY
Qty: 90 TABLET | Refills: 0 | Status: SHIPPED | OUTPATIENT
Start: 2022-07-19 | End: 2022-08-17

## 2022-07-29 ENCOUNTER — OFFICE VISIT (OUTPATIENT)
Dept: FAMILY MEDICINE CLINIC | Facility: CLINIC | Age: 32
End: 2022-07-29
Payer: COMMERCIAL

## 2022-07-29 VITALS
WEIGHT: 315 LBS | HEART RATE: 83 BPM | HEIGHT: 73 IN | DIASTOLIC BLOOD PRESSURE: 90 MMHG | BODY MASS INDEX: 41.75 KG/M2 | TEMPERATURE: 97.5 F | OXYGEN SATURATION: 97 % | SYSTOLIC BLOOD PRESSURE: 130 MMHG | RESPIRATION RATE: 18 BRPM

## 2022-07-29 DIAGNOSIS — Z20.2 POSSIBLE EXPOSURE TO STD: Primary | ICD-10-CM

## 2022-07-29 PROCEDURE — 99213 OFFICE O/P EST LOW 20 MIN: CPT | Performed by: NURSE PRACTITIONER

## 2022-07-29 NOTE — PROGRESS NOTES
FAMILY PRACTICE OFFICE VISIT       NAME: Rylan Anderson  AGE: 28 y o  SEX: male       : 1990        MRN: [id number]    Assessment and Plan   1  Possible exposure to STD  -     Chlamydia/GC amplified DNA by PCR; Future  -     HIV 1/2 Antigen/Antibody (4th Generation) w Reflex [facility]; Future  -     Hepatitis C antibody; Future  -     RPR; Future       Will complete STI testing  Given urine cup for GC/Chlamydia urine testing, advised to collect first morning void, and bring sample to the lab  Blood work as noted  Office will call with results  Always use condoms, every time  Call with questions or concerns  Chief Complaint     Chief Complaint   Patient presents with    std testing       History of Present Illness     Rylan Anderson is a 28year old male presenting today for STD testing  Casual encounters, female partners  Not always using a condom  Last encounter with out condom at least 2 months ago  No symptoms, just wants to check  Review of Systems   Review of Systems   Constitutional: Negative  Genitourinary: Negative  I have reviewed the patient's medical history in detail; there are no changes to the history as noted in the electronic medical record  Objective     Vitals:    22 0827   BP: 130/90   Pulse: 83   Resp: 18   Temp: 97 5 °F (36 4 °C)   TempSrc: Temporal   SpO2: 97%   Weight: (!) 176 kg (389 lb)   Height: 6' 1" (1 854 m)     Wt Readings from Last 3 Encounters:   22 (!) 176 kg (389 lb)   22 (!) 175 kg (385 lb)   22 (!) 176 kg (387 lb 6 4 oz)     Physical Exam  Vitals and nursing note reviewed  Constitutional:       Appearance: Normal appearance  Cardiovascular:      Rate and Rhythm: Normal rate and regular rhythm  Heart sounds: No murmur heard  Pulmonary:      Effort: Pulmonary effort is normal       Breath sounds: Normal breath sounds  Neurological:      Mental Status: He is alert     Psychiatric: Mood and Affect: Mood normal             ALLERGIES:  No Known Allergies    Current Medications     Current Outpatient Medications   Medication Sig Dispense Refill    buPROPion (Wellbutrin XL) 150 mg 24 hr tablet Take 1 tablet (150 mg total) by mouth daily 30 tablet 2    buPROPion (Wellbutrin XL) 300 mg 24 hr tablet Take 1 tablet (300 mg total) by mouth daily 30 tablet 2    Cholecalciferol (VITAMIN D-3) 1000 units CAPS Take 2 capsules by mouth daily      escitalopram (LEXAPRO) 20 mg tablet Take 1 tablet (20 mg total) by mouth daily 90 tablet 0    fluticasone (FLONASE) 50 mcg/act nasal spray 1 spray into each nostril as needed for rhinitis        levothyroxine 25 mcg tablet Take 1 tablet (25 mcg total) by mouth daily in the early morning 90 tablet 0    multivitamin (THERAGRAN) TABS Take 1 tablet by mouth daily      traZODone (DESYREL) 50 mg tablet Take 1 tablet (50 mg total) by mouth daily at bedtime as needed for sleep 90 tablet 0     No current facility-administered medications for this visit           Health Maintenance     Health Maintenance   Topic Date Due    Hepatitis C Screening  Never done    HIV Screening  Never done    Annual Physical  03/16/2021    COVID-19 Vaccine (3 - Booster for Pfizer series) 10/27/2021    Influenza Vaccine (1) 09/01/2022    DTaP,Tdap,and Td Vaccines (2 - Td or Tdap) 02/11/2023    BMI: Followup Plan  02/15/2023    Depression Remission PHQ  03/24/2023    BMI: Adult  07/29/2023    Pneumococcal Vaccine: Pediatrics (0 to 5 Years) and At-Risk Patients (6 to 59 Years)  Aged Out    HIB Vaccine  Aged Out    Hepatitis B Vaccine  Aged Out    IPV Vaccine  Aged Out    Hepatitis A Vaccine  Aged Out    Meningococcal ACWY Vaccine  Aged Out    HPV Vaccine  Aged [employer] History   Administered Date(s) Administered    COVID-19 PFIZER VACCINE 0 3 ML IM 05/05/2021, 05/27/2021    Influenza, recombinant, quadrivalent,injectable, preservative free 11/04/2019    Influenza, seasonal, injectable 01/04/2017    Influenza, seasonal, injectable, preservative free 10/01/2018    Tdap 02/11/2013    Tuberculin Skin Test-PPD Intradermal 01/14/2022       AMRITA Mondragon

## 2022-08-08 ENCOUNTER — TELEPHONE (OUTPATIENT)
Dept: PSYCHIATRY | Facility: CLINIC | Age: 32
End: 2022-08-08

## 2022-08-08 NOTE — TELEPHONE ENCOUNTER
Antonino Simmons left a message at [employer] wishing to discuss his [employer] STD claim due to worsening depression  Writer returned patient's call to let patient know that he will need to be seen before paperwork can be completed   Patient has an appointment on 8/17

## 2022-08-08 NOTE — TELEPHONE ENCOUNTER
Kasia Sousa received a fax from [employer]  They are requesting the [facility] Form to be filled out and faxed back to [phone]  Writer contacted Anay Mittal as we would need a MARIA ESTHER in order to complete the form  Anay Mittal can stop into the office to sign

## 2022-08-17 ENCOUNTER — TELEMEDICINE (OUTPATIENT)
Dept: PSYCHIATRY | Facility: CLINIC | Age: 32
End: 2022-08-17
Payer: COMMERCIAL

## 2022-08-17 ENCOUNTER — OFFICE VISIT (OUTPATIENT)
Dept: SLEEP CENTER | Facility: CLINIC | Age: 32
End: 2022-08-17
Payer: COMMERCIAL

## 2022-08-17 VITALS
WEIGHT: 315 LBS | BODY MASS INDEX: 41.75 KG/M2 | DIASTOLIC BLOOD PRESSURE: 82 MMHG | HEIGHT: 73 IN | SYSTOLIC BLOOD PRESSURE: 122 MMHG

## 2022-08-17 DIAGNOSIS — F41.9 ANXIETY AND DEPRESSION: ICD-10-CM

## 2022-08-17 DIAGNOSIS — G47.09 OTHER INSOMNIA: ICD-10-CM

## 2022-08-17 DIAGNOSIS — G47.33 OSA (OBSTRUCTIVE SLEEP APNEA): Primary | ICD-10-CM

## 2022-08-17 DIAGNOSIS — F41.1 GENERALIZED ANXIETY DISORDER: Primary | ICD-10-CM

## 2022-08-17 DIAGNOSIS — E66.01 MORBID OBESITY (HCC): ICD-10-CM

## 2022-08-17 DIAGNOSIS — F32.A ANXIETY AND DEPRESSION: ICD-10-CM

## 2022-08-17 DIAGNOSIS — R09.81 NASAL CONGESTION: ICD-10-CM

## 2022-08-17 DIAGNOSIS — J35.1 TONSILLAR HYPERTROPHY: ICD-10-CM

## 2022-08-17 DIAGNOSIS — G47.19 EXCESSIVE DAYTIME SLEEPINESS: ICD-10-CM

## 2022-08-17 DIAGNOSIS — F33.1 MODERATE EPISODE OF RECURRENT MAJOR DEPRESSIVE DISORDER (HCC): ICD-10-CM

## 2022-08-17 PROCEDURE — 99910: CPT | Performed by: STUDENT IN AN ORGANIZED HEALTH CARE EDUCATION/TRAINING PROGRAM

## 2022-08-17 PROCEDURE — 99214 OFFICE O/P EST MOD 30 MIN: CPT | Performed by: INTERNAL MEDICINE

## 2022-08-17 PROCEDURE — 90833 PSYTX W PT W E/M 30 MIN: CPT | Performed by: STUDENT IN AN ORGANIZED HEALTH CARE EDUCATION/TRAINING PROGRAM

## 2022-08-17 PROCEDURE — 99214 OFFICE O/P EST MOD 30 MIN: CPT | Performed by: STUDENT IN AN ORGANIZED HEALTH CARE EDUCATION/TRAINING PROGRAM

## 2022-08-17 RX ORDER — VENLAFAXINE HYDROCHLORIDE 37.5 MG/1
37.5 CAPSULE, EXTENDED RELEASE ORAL DAILY
Qty: 7 CAPSULE | Refills: 0 | Status: SHIPPED | OUTPATIENT
Start: 2022-08-17 | End: 2022-09-15

## 2022-08-17 RX ORDER — VENLAFAXINE HYDROCHLORIDE 75 MG/1
75 CAPSULE, EXTENDED RELEASE ORAL DAILY
Qty: 30 CAPSULE | Refills: 1 | Status: SHIPPED | OUTPATIENT
Start: 2022-08-24 | End: 2022-09-14

## 2022-08-17 RX ORDER — ESCITALOPRAM OXALATE 20 MG/1
10 TABLET ORAL DAILY
Qty: 4 TABLET | Refills: 0
Start: 2022-08-17 | End: 2022-09-15

## 2022-08-17 RX ORDER — BUPROPION HYDROCHLORIDE 150 MG/1
150 TABLET ORAL DAILY
Qty: 30 TABLET | Refills: 1 | Status: SHIPPED | OUTPATIENT
Start: 2022-08-17 | End: 2022-09-14

## 2022-08-17 RX ORDER — BUPROPION HYDROCHLORIDE 300 MG/1
300 TABLET ORAL DAILY
Qty: 30 TABLET | Refills: 1 | Status: SHIPPED | OUTPATIENT
Start: 2022-08-17 | End: 2022-09-14

## 2022-08-17 NOTE — PSYCH
MEDICATION MANAGEMENT NOTE        [facility]      Name and Date of Birth:  Azucena Morales 28 y o  1990 MRN: [id number]    Date of Visit: August 17, 2022    Reason for Visit: Follow-up visit regarding medication management     Chief Complaint: "My depression is getting worse"    SUBJECTIVE:    Azucena Morales is a 28 y o , male, possessing a past psychiatric history significant for  MDD, JON, medically complicated by JIMI, obesity, HTN, who was personally seen and evaluated at the [address] [place] [address] E outpatient clinic for follow-up regarding medication management  Antonino Troy states that since their previous outpatient psychiatric appointment, his depressive symptoms have been getting worse  He reports that he has been feeling more hopeless, increased feelings of guilt, ruminating stressors about separation and upcoming divorce  He states that being around his brother and brother's family makes him feel sad and that he will not find another partner  He also has negative thoughts about self, mostly poor self-esteem regarding his weight  He reports passive and fleeting suicidal thoughts  No intention or plan  He is also frustrated about his work, currently working at [employer] and having to call off numerous days due to his depressive symptoms  He reports that his appetite is stable, sleep is improving as well  Follows with sleep medicine and recently had adjustment to CPAP machine  Despite the stressors, he is able to report future orientation  and is 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  He aspires to be a therapist and wishes to go back to college for an applied psychology degree  Denies any active suicidal ideation or intention today  No auditory or visual hallucinations  Current Rating Scores:   None completed today      Psychiatric Review Of Systems:    Appetite: no  Adverse eating: no  Weight changes: increased  Insomnia/sleeplessness: no  Fatigue/anergy: decreased  Anhedonia/lack of interest: decreased  Attention/concentration: decreased  Psychomotor agitation/retardation: no  Somatic symptoms: no  Anxiety/panic attack: yes  Gillian/hypomania: no  Hopelessness/helplessness/worthlessness: yes  Self-injurious behavior/high-risk behavior: no  Suicidal ideation: yes, passive death wish  Homicidal ideation: no  Auditory hallucinations: no  Visual hallucinations: no  Other perceptual disturbances: no  Delusional thinking: no  Obsessive/compulsive symptoms: no    Review Of Systems:      Constitutional negative   ENT negative   Cardiovascular negative   Respiratory negative   Gastrointestinal negative   Genitourinary negative   Musculoskeletal negative   Integumentary negative   Neurological negative   Endocrine negative   Other Symptoms none, all other systems are negative     History Review: The following portions of the patient's history were reviewed and updated as appropriate: allergies, current medications, past family history, past medical history, past social history, past surgical history and problem list           OBJECTIVE:     Vital signs in last 24 hours: There were no vitals filed for this visit      Mental Status Evaluation:    Appearance age appropriate, casually dressed, overweight   Behavior cooperative, calm   Speech normal rate, normal volume, normal pitch   Mood depressed, dysphoric   Affect constricted   Thought Processes organized, goal directed   Associations intact associations   Thought Content no overt delusions   Perceptual Disturbances: no auditory hallucinations, no visual hallucinations   Abnormal Thoughts  Risk Potential Suicidal ideation - None  Homicidal ideation - None  Potential for aggression - No   Orientation oriented to person, place, time/date and situation   Memory recent and remote memory grossly intact   Consciousness alert and awake   Attention Span Concentration Span attention span and concentration are age appropriate   Intellect appears to be of average intelligence   Insight intact   Judgement intact   Muscle Strength and  Gait normal muscle strength and normal muscle tone, normal gait and normal balance   Motor activity no abnormal movements   Fund of Knowledge adequate knowledge of current events  adequate fund of knowledge regarding past history  adequate fund of knowledge regarding vocabulary    Pain none   Pain Scale 0       Laboratory Results: I have personally reviewed all pertinent laboratory/tests results    Recent Labs (last 2 months):   No visits with results within 2 Month(s) from this visit  Latest known visit with results is:   Telephone on 06/02/2022   Component Date Value    Supplier Name [facility]02/2022 [facility] [id number] [facility] Supplier Phone Number 06/02/2022 [phone]     Order Status 06/02/2022 Delivery Successful     Delivery Note 06/02/2022 DME set up 6/16/22 Stephan Menon Delivery Request Date 06/02/2022 06/02/2022     Date Delivered  06/02/2022 06/16/2022     Supplier Name 06/02/2022 06/16/2022     Item Description 06/02/2022 CPAP Machine, Resmed S10 Auto-CPAP     Item Description 06/02/2022 PAP Mask, Nasal, Fit Upon Setup, N/A, 1 per 3 months     Item Description 06/02/2022 PAP Mask Interface Cushion, Nasal, 2 per 1 month     Item Description 06/02/2022 PAP Headgear, 1 per 6 months     Item Description 06/02/2022 PAP Humidifier, Heated     Item Description 06/02/2022 Disposable PAP Filter, 2 per 1 month     Item Description 06/02/2022 Non-Disposable PAP Filter, 1 per 6 months     Item Description 06/02/2022 PAP Machine Tubing, Heated, 1 per 3 months     Item Description 06/02/2022 PAP Monitoring Modem        Suicide/Homicide Risk Assessment:    The following interventions are recommended: no intervention changes needed   Although patient's acute lethality risk is low, long-term/chronic lethality risk is mildly elevated in the presence of MDD, Keaton  At the current moment, Avery Motta is future-oriented, forward-thinking, and demonstrates ability to act in a self-preserving manner as evidenced by volitionally presenting to the clinic today, seeking treatment  To mitigate future risk, patient should adhere to the recommendations below, avoid alcohol/illicit substance use, utilize community-based resources and familiar support and prioritize mental health treatment  Presently, patient denies active suicidal/homicidal ideation in addition to thoughts of self-injury; contracts for safety  At conclusion of evaluation, patient is amenable to the recommendations below  Patient is amenable to calling/contacting the outpatient office including this writer if any acute adverse effects of their medication regimen arise in addition to any comments or concerns pertaining to their psychiatric management  Patient is amenable to calling/contacting crisis and/or attending to the nearest emergency department if their clinical condition deteriorates to assure their safety and stability, stating that they are able to appropriately confide in their family regarding their psychiatric state  Assessment/Plan:   Avery Motta was personally seen and evaluated today at the [address] [place] [address] [address] outpatient clinic for follow-up regarding medication management  He notes that he has experienced an increase in depressive symptoms, mainly some negative self image and ruminating negative thoughts  He has experienced decreased motivation decreased energy level although this appears to coincide with poor sleep  His sleep is most likely significantly impacted by his obstructive sleep apnea; he is in process to obtain a CPAP machine  He also does correlate an increase in depressive symptoms around the time he ran out of the 150 mg Wellbutrin XL tablets; he wishes to restart this    The plan will be to resume the medications, follow in 2 months, consider adjustments if needed  1/27/22 Mild improvement in depressive symptoms  Sleep remains problematic, though much of this seems related to obstructive sleep apnea  4/29/22 Since previous appointment he did experience worsening depressive symptoms, suicidal ideations  This was due mostly to wife  him and increased stress at work and home  He attended PHP and feels much better now  More happier, less depressed, more motivated  No longer experiencing SI  Feels the current medication regimen is helpful and would like to continue this  He continues to work with sleep medicine for JIMI  I encourage him to continue this  8/17/22 Patient feeling more depressed  Passive suicidal ideations  Increased feelings of hopelessness, guilt, ruminating stressors  Much of this has worsened due to impending divorce  Feels that he is calling out of work more frequently, feeling unsatisfied with his career, more feelings of low self-esteem  Discussed medication changes and he agrees to taper Lexapro to Effexor  He will also reach out to his psychotherapist, Jose Cruz Malone, and return to weekly sessions  DSM-5 Diagnoses:      Major depressive disorder, recurrent, moderate; generalized anxiety disorder; obstructive sleep apnea    Treatment Recommendations/Precautions:     Reduce Lexapro 10 mg daily for 1 week and then discontinue   Start Effexor 37 5mg for 1 week and then increase to 75mg   Wellbutrin 450 mg for depression   Trazodone 50mg HS PRN for insomnia     Medication management every 1 months   Continue psychotherapy with own therapist   Aware of need to follow up with family physician for medical issues   Aware of 24 hour and weekend coverage for urgent situations accessed by calling [facility] main practice number    Medications Risks/Benefits      Risks, Benefits And Possible Side Effects Of Medications:    Risks, benefits, and possible side effects of medications explained to Familia Foss including risk of suicidality and serotonin syndrome related to treatment with antidepressants  He verbalizes understanding and agreement for treatment  Controlled Medication Discussion:     Not applicable    Psychotherapy Provided:     Individual psychotherapy provided: Yes  Counseling was provided during the session today for 16 minutes  Medication changes discussed with Familia Foss  Recent stressors discussed with Familia Foss including pending divorce, relationship problems, job stress, problems at work, everyday stressors, occasional anxiety and difficulty with maintaining healthy weight  Supportive therapy provided        Treatment Plan:    Completed and signed during the session: Not applicable - Treatment Plan not due at this session    This note was not shared with the patient due to reasonable likelihood of causing patient harm     Sonny Connell MD 08/17/22

## 2022-08-17 NOTE — PATIENT INSTRUCTIONS

## 2022-08-17 NOTE — PROGRESS NOTES
Review of Systems      Genitourinary none   Cardiology none   Gastrointestinal none   Neurology none   Constitutional fatigue   Integumentary none   Psychiatry anxiety and depression   Musculoskeletal joint pain, muscle aches and back pain   Pulmonary none   ENT none   Endocrine none   Hematological none

## 2022-08-17 NOTE — PROGRESS NOTES
Follow-Up Note - Sleep Center   Efrem Johnston  28 y o  male  [place]  KZE:[id number]  DOS:2022    CC: I saw this patient for follow-up in clinic today for Sleep disordered breathing, Coexisting Sleep and Medical Problems  Results of prior studies:  Home sleep testing in 2021 demonstrated respiratory event index (DONAVON) of 69 2  The lowest SpO2 recorded is 82%  And 7 5% of the study was spent with saturations below 90%  The snore index was 54 6%  During the subsequent titration study, sleep disordered breathing that appears to be adequately remediated with positive airway pressure at 9-12 cm H2O  Home sleep testing in  demonstrated a respiratory event index of 11 per hour with minimum oxygen saturation of 82%     PFSH, Problem List, Medications & Allergies were reviewed in EMR  Interval changes: none reported  He  has a past medical history of Anxiety, Carpal tunnel syndrome, bilateral, GERD without esophagitis, Moderate episode of recurrent major depressive disorder ([facility] [address] ) (2018), Morbid obesity ([facility] ), Obstructive sleep apnea, Pilonidal cyst with abscess, and Vitamin D deficiency  He has a current medication list which includes the following prescription(s): bupropion, bupropion, vitamin d-3, escitalopram, fluticasone, levothyroxine, multivitamin, and trazodone  PHYSIOLOGICAL DATA REVIEW AND INTERPRETATION:    using PAP > 4 hours/night 90%  Estimated DONAVON 0 1/hour with pressure of 9cm H2O @90th/95th percentile; Patient has not been using ozone based devices to sanitize the machine  SUBJECTIVE: Regarding use of PAP, Ann Lopez reports:   · no adverse effects: ; has not noticed any fibres or foreign material in air line  · He is benefiting from use: sleeping better   Sleep Routine: Ann John reports getting 6 hrs sleep and continues to use trazodone as a sleep aid; he has no difficulty initiating or maintaining sleep    He arises needing an alarm and feels more refreshed since on Rx  Jean Griggs reports significantly improved  Excessive Daytime Sleepiness, and naps daily but attributes to depression He rated himself at Total score: 8 /24 on the [place] Sleepiness Scale  Habits: reports that he has never smoked  He has never used smokeless tobacco ,  reports current alcohol use ,  reports no history of drug use , Caffeine use:excessive ; Exercise routine: none   ROS: reviewed & as attached  Significant for few lb weight gain since his last visit  He has less nasal congestion  He has ongoing feelings of anxiety and depression  Mary Ellen Contreras EXAM: /82   Ht 6' 1" (1 854 m)   Wt (!) 179 kg (394 lb)   BMI 51 98 kg/m²     Wt Readings from Last 3 Encounters:   08/17/22 (!) 179 kg (394 lb)   07/29/22 (!) 176 kg (389 lb)   07/12/22 (!) 175 kg (385 lb)      Patient is well groomed; well appearing  CNS: Alert, orientated, clear & coherent speech  Psych: cooperativeand in no distress  Mental state:appears normal   H&N: EOMI; NC/AT:no facial pressure marks, no rashes  Skin/Extrem: col & hydration normal; no edema  Resp: Respiratory effort is normal  Physical findings otherwise essentially unchanged from previous  IMPRESSION: Problem List Items & Comorbidities Addressed this Visit    1  JIMI (obstructive sleep apnea)  PAP DME Resupply/Reorder   2  Other insomnia     3  Anxiety and depression     4  Excessive daytime sleepiness     5  Tonsillar hypertrophy     6  Nasal congestion     7  Morbid obesity ([address] )         PLAN:  1  I reviewed results of prior studies and physiologic data with the patient  2  I discussed treatment options with risks and benefits  3  Treatment with  PAP is medically necessary and Jean Raw is agreable to continue use  4  Care of equipment, methods to improve comfort using PAP and importance of compliance with therapy were discussed    5  Pressure setting: continue 9-14 cmH2O     6  Rx provided to replace  supplies and Care coordinated with DME provider  7  Discussed strategies for weight reduction  8  Multi component Cognitive behavioral therapy for Insomnia undertaken - Sleep Restriction, Stimulus control, Relaxation techniques and Sleep hygiene were discussed  He may be able to wean of trazodone  9  Nasal symptoms may improve with regular nasal saline rinse up to twice a day, followed by topical nasal steroid (e g  OTC Flonase, Nasacort) once a day if necessary  10  Follow-up is advised in 1 year or sooner if needed to monitor progress, compliance and to adjust therapy  Thank you for allowing me to participate in the care of this patient  Sincerely,     Authenticated electronically on 41/05/94   Board Certified Specialist     Portions of the record may have been created with voice recognition software  Occasional wrong word or "sound a like" substitutions may have occurred due to the inherent limitations of voice recognition software  There may also be notations and random deletions of words or characters from malfunctioning software  Read the chart carefully and recognize, using context, where substitutions/deletions have occurred

## 2022-08-18 ENCOUNTER — TELEPHONE (OUTPATIENT)
Dept: SLEEP CENTER | Facility: CLINIC | Age: 32
End: 2022-08-18

## 2022-08-18 ENCOUNTER — TELEPHONE (OUTPATIENT)
Dept: PSYCHIATRY | Facility: CLINIC | Age: 32
End: 2022-08-18

## 2022-09-14 DIAGNOSIS — F41.1 GENERALIZED ANXIETY DISORDER: ICD-10-CM

## 2022-09-14 DIAGNOSIS — F33.1 MODERATE EPISODE OF RECURRENT MAJOR DEPRESSIVE DISORDER (HCC): ICD-10-CM

## 2022-09-14 RX ORDER — BUPROPION HYDROCHLORIDE 300 MG/1
TABLET ORAL
Qty: 90 TABLET | Refills: 1 | Status: SHIPPED | OUTPATIENT
Start: 2022-09-14

## 2022-09-14 RX ORDER — VENLAFAXINE HYDROCHLORIDE 75 MG/1
75 CAPSULE, EXTENDED RELEASE ORAL DAILY
Qty: 90 CAPSULE | Refills: 1 | Status: SHIPPED | OUTPATIENT
Start: 2022-09-14 | End: 2022-09-15

## 2022-09-14 RX ORDER — BUPROPION HYDROCHLORIDE 150 MG/1
TABLET ORAL
Qty: 90 TABLET | Refills: 1 | Status: SHIPPED | OUTPATIENT
Start: 2022-09-14

## 2022-09-15 ENCOUNTER — TELEMEDICINE (OUTPATIENT)
Dept: PSYCHIATRY | Facility: CLINIC | Age: 32
End: 2022-09-15
Payer: COMMERCIAL

## 2022-09-15 DIAGNOSIS — F41.1 GENERALIZED ANXIETY DISORDER: ICD-10-CM

## 2022-09-15 DIAGNOSIS — F33.1 MODERATE EPISODE OF RECURRENT MAJOR DEPRESSIVE DISORDER (HCC): ICD-10-CM

## 2022-09-15 PROCEDURE — 90833 PSYTX W PT W E/M 30 MIN: CPT | Performed by: STUDENT IN AN ORGANIZED HEALTH CARE EDUCATION/TRAINING PROGRAM

## 2022-09-15 PROCEDURE — 99214 OFFICE O/P EST MOD 30 MIN: CPT | Performed by: STUDENT IN AN ORGANIZED HEALTH CARE EDUCATION/TRAINING PROGRAM

## 2022-09-15 RX ORDER — VENLAFAXINE HYDROCHLORIDE 150 MG/1
150 CAPSULE, EXTENDED RELEASE ORAL DAILY
Qty: 30 CAPSULE | Refills: 1 | Status: SHIPPED | OUTPATIENT
Start: 2022-09-15 | End: 2022-10-15

## 2022-09-15 RX ORDER — VENLAFAXINE HYDROCHLORIDE 150 MG/1
150 CAPSULE, EXTENDED RELEASE ORAL DAILY
Qty: 30 CAPSULE | Refills: 0 | Status: SHIPPED | OUTPATIENT
Start: 2022-09-15 | End: 2022-09-15

## 2022-09-15 NOTE — LETTER
September 15, 2022     Patient: Vernida Hashimoto  YOB: 1990  Date of Visit: 9/15/2022      To Whom it May Concern:    Lelia Jameson is under my professional care  Joey Montgomery was seen in my office on 9/15/2022  Joey Montgomery may return to work on 9/19/22 without limitations  If you have any questions or concerns, please don't hesitate to call           Sincerely,          Collins Isaac MD

## 2022-09-15 NOTE — PSYCH
Virtual Regular Visit    Verification of patient location:    Patient is located in the following state in which I hold an active license [place]      Assessment/Plan:    Problem List Items Addressed This Visit        Other    Generalized anxiety disorder    Relevant Medications    venlafaxine (EFFEXOR-XR) 150 mg 24 hr capsule    Moderate episode of recurrent major depressive disorder (HCC)    Relevant Medications    venlafaxine (EFFEXOR-XR) 150 mg 24 hr capsule             Reason for visit is   Chief Complaint   Patient presents with    Virtual Regular Visit        Encounter provider Gemma Morataya MD    Provider located at [facility]  [place] [address] [address]  [facility] [id number] [phone]      Recent Visits  No visits were found meeting these conditions  Showing recent visits within past 7 days and meeting all other requirements  Today's Visits  Date Type Provider Dept   09/15/22 Telemedicine Gemma Morataya MD [facility]   Showing today's visits and meeting all other requirements  Future Appointments  No visits were found meeting these conditions  Showing future appointments within next 150 days and meeting all other requirements       The patient was identified by name and date of birth  Kamila Proctor was informed that this is a telemedicine visit and that the visit is being conducted through[employer] Embedded and patient was informed this is a secure, HIPAA-complaint platform  He agrees to proceed     My office door was closed  No one else was in the room  He acknowledged consent and understanding of privacy and security of the video platform  The patient has agreed to participate and understands they can discontinue the visit at any time  Patient is aware this is a billable service  Kenneth Najera is a 28 y o  male         HPI     Past Medical History:   Diagnosis Date    Anxiety     Carpal tunnel syndrome, bilateral LA 9/12/17   R   9/12/17     GERD without esophagitis     Moderate episode of recurrent major depressive disorder ([facility] [address] ) 08/22/2018    Morbid obesity ([facility] [address] )     Obstructive sleep apnea     Pilonidal cyst with abscess     LA   10/25/13   R   6/10/14     Vitamin D deficiency        Past Surgical History:   Procedure Laterality Date    FOOT SURGERY      PILONIDAL CYST DRAINAGE      Incision and drainage of pilonidal cyst     MA WRIST Kristi Small LIG Right 7/12/2018    Procedure: RELEASE CARPAL TUNNEL ENDOSCOPIC;  Surgeon: Ashley Davies MD;  Location:  MAIN OR;  Service: Orthopedics    MA WRIST Kristi Small LIG Left 7/26/2018    Procedure: RELEASE CARPAL TUNNEL ENDOSCOPIC;  Surgeon: Ashley Davies MD;  Location:  MAIN OR;  Service: Orthopedics       Current Outpatient Medications   Medication Sig Dispense Refill    buPROPion (WELLBUTRIN XL) 150 mg 24 hr tablet TAKE 1 TABLET BY MOUTH EVERY DAY 90 tablet 1    buPROPion (WELLBUTRIN XL) 300 mg 24 hr tablet TAKE 1 TABLET BY MOUTH EVERY DAY 90 tablet 1    Cholecalciferol (VITAMIN D-3) 1000 units CAPS Take 2 capsules by mouth daily      fluticasone (FLONASE) 50 mcg/act nasal spray 1 spray into each nostril as needed for rhinitis        levothyroxine 25 mcg tablet Take 1 tablet (25 mcg total) by mouth daily in the early morning 90 tablet 0    multivitamin (THERAGRAN) TABS Take 1 tablet by mouth daily      traZODone (DESYREL) 50 mg tablet Take 1 tablet (50 mg total) by mouth daily at bedtime as needed for sleep 90 tablet 0    venlafaxine (EFFEXOR-XR) 150 mg 24 hr capsule Take 1 capsule (150 mg total) by mouth daily 30 capsule 1     No current facility-administered medications for this visit  No Known Allergies    Review of Systems    Video Exam    There were no vitals filed for this visit      Physical Exam     I spent 30 minutes directly with the patient during this visit    MEDICATION MANAGEMENT NOTE [address] [place] [address]      Name and Date of Birth:  Nury Tompkins 28 y o  1990 MRN: [id number]    Date of Visit: September 15, 2022    Reason for Visit: Follow-up visit regarding medication management     Chief Complaint: "I'm still feeling depressed sometimes"    SUBJECTIVE:    Nury Tompkins is a 28 y o , male, possessing a past psychiatric history significant for MDD, JON, medically complicated by JIMI, obesity, HTN, who was personally seen and evaluated at the [address] [place] [address] outpatient clinic for follow-up regarding medication management  Elana Fong states that since their previous outpatient psychiatric appointment, he has noted some minor increase in depressive symptoms  He reports he still is struggling with anergia and amotivation  Isolating at times at the house, sleeping for long periods of time and reports on Sunday slept for approximately 24 hours  States that he struggles to complete activities and do tasks  He was able to attend a job interview yesterday and was proud of himself for this  Reports he has been having more ruminative thoughts lately, mostly related to his divorce  He struggles as he feels he is not quite moved on" from the relationship and was upset to learn his wife is moving to a different state with another partner  He has poor self-esteem, questions if their relationship was close to begin with  He denies any SI, HI, AVH  Tolerated the change in medications to Effexor well  No major side effects reported  Current Rating Scores:   None completed      Psychiatric Review Of Systems:    Appetite: no  Adverse eating: no  Weight changes: increased  Insomnia/sleeplessness: decreased  Fatigue/anergy: decreased  Anhedonia/lack of interest: decreased  Attention/concentration: decreased  Psychomotor agitation/retardation: no  Somatic symptoms: no  Anxiety/panic attack: yes, worrying  Gillian/hypomania: no  Hopelessness/helplessness/worthlessness: no  Self-injurious behavior/high-risk behavior: no  Suicidal ideation: no  Homicidal ideation: no  Auditory hallucinations: no  Visual hallucinations: no  Other perceptual disturbances: no  Delusional thinking: no  Obsessive/compulsive symptoms: no    Review Of Systems:      Constitutional negative   ENT negative   Cardiovascular negative   Respiratory negative   Gastrointestinal negative   Genitourinary negative   Musculoskeletal negative   Integumentary negative   Neurological negative   Endocrine negative   Other Symptoms none, all other systems are negative     History Review: The following portions of the patient's history were reviewed and updated as appropriate: allergies, current medications, past family history, past medical history, past social history, past surgical history and problem list          OBJECTIVE:     Vital signs in last 24 hours: There were no vitals filed for this visit      Mental Status Evaluation:    Appearance age appropriate, casually dressed, overweight   Behavior cooperative, calm   Speech normal rate, normal volume, normal pitch   Mood dysphoric   Affect constricted   Thought Processes organized, goal directed   Associations intact associations   Thought Content no overt delusions   Perceptual Disturbances: no auditory hallucinations, no visual hallucinations   Abnormal Thoughts  Risk Potential Suicidal ideation - None  Homicidal ideation - None  Potential for aggression - No   Orientation oriented to person, place, time/date and situation   Memory recent and remote memory grossly intact   Consciousness alert and awake   Attention Span Concentration Span attention span and concentration are age appropriate   Intellect appears to be of average intelligence   Insight intact   Judgement intact   Muscle Strength and  Gait normal muscle strength and normal muscle tone, normal gait and normal balance   Motor activity no abnormal movements Fund of Knowledge adequate knowledge of current events  adequate fund of knowledge regarding past history  adequate fund of knowledge regarding vocabulary    Pain none   Pain Scale 0       Laboratory Results: I have personally reviewed all pertinent laboratory/tests results    Recent Labs (last 2 months):   No visits with results within 2 Month(s) from this visit  Latest known visit with results is:   Telephone on 06/02/2022   Component Date Value    Supplier Name 06/02/2022 [facility] [id number] [facility] Supplier Phone Number 06/02/2022 [phone]     Order Status 06/02/2022 Delivery Successful     Delivery Note 06/02/2022 DME set up 6/16/22 Catherine Ashley Delivery Request Date 06/02/2022 06/02/2022     Date Delivered  06/02/2022 06/16/2022     Supplier Name 06/02/2022 06/16/2022     Item Description 06/02/2022 CPAP Machine, Resmed S10 Auto-CPAP     Item Description 06/02/2022 PAP Mask, Nasal, Fit Upon Setup, N/A, 1 per 3 months     Item Description 06/02/2022 PAP Mask Interface Cushion, Nasal, 2 per 1 month     Item Description 06/02/2022 PAP Headgear, 1 per 6 months     Item Description 06/02/2022 PAP Humidifier, Heated     Item Description 06/02/2022 Disposable PAP Filter, 2 per 1 month     Item Description 06/02/2022 Non-Disposable PAP Filter, 1 per 6 months     Item Description 06/02/2022 PAP Machine Tubing, Heated, 1 per 3 months     Item Description 06/02/2022 PAP Monitoring Modem        Suicide/Homicide Risk Assessment:    The following interventions are recommended: no intervention changes needed  Although patient's acute lethality risk is low, long-term/chronic lethality risk is mildly elevated in the presence of MDD, JON  At the current moment, Jami Wellington is future-oriented, forward-thinking, and demonstrates ability to act in a self-preserving manner as evidenced by volitionally presenting to the clinic today, seeking treatment   To mitigate future risk, patient should adhere to the recommendations below, avoid alcohol/illicit substance use, utilize community-based resources and familiar support and prioritize mental health treatment  Presently, patient denies active suicidal/homicidal ideation in addition to thoughts of self-injury; contracts for safety  At conclusion of evaluation, patient is amenable to the recommendations below  Patient is amenable to calling/contacting the outpatient office including this writer if any acute adverse effects of their medication regimen arise in addition to any comments or concerns pertaining to their psychiatric management  Patient is amenable to calling/contacting crisis and/or attending to the nearest emergency department if their clinical condition deteriorates to assure their safety and stability, stating that they are able to appropriately confide in their family regarding their psychiatric state  Assessment/Plan:     Brissa Vogel was personally seen and evaluated today at the [address] [place] [address] [address] outpatient clinic for follow-up regarding medication management  He notes that he has experienced an increase in depressive symptoms, mainly some negative self image and ruminating negative thoughts  He has experienced decreased motivation decreased energy level although this appears to coincide with poor sleep  His sleep is most likely significantly impacted by his obstructive sleep apnea; he is in process to obtain a CPAP machine  He also does correlate an increase in depressive symptoms around the time he ran out of the 150 mg Wellbutrin XL tablets; he wishes to restart this  The plan will be to resume the medications, follow in 2 months, consider adjustments if needed  1/27/22 Mild improvement in depressive symptoms  Sleep remains problematic, though much of this seems related to obstructive sleep apnea  4/29/22 Since previous appointment he did experience worsening depressive symptoms, suicidal ideations    This was due mostly to wife  him and increased stress at work and home  He attended PHP and feels much better now  More happier, less depressed, more motivated  No longer experiencing SI  Feels the current medication regimen is helpful and would like to continue this  He continues to work with sleep medicine for JIMI  I encourage him to continue this  8/17/22 Patient feeling more depressed  Passive suicidal ideations  Increased feelings of hopelessness, guilt, ruminating stressors  Much of this has worsened due to impending divorce  Feels that he is calling out of work more frequently, feeling unsatisfied with his career, more feelings of low self-esteem  Discussed medication changes and he agrees to taper Lexapro to Effexor  He will also reach out to his psychotherapist, Beauty Karen, and return to weekly sessions  9/15/22 -Still experiencing depressive symptoms - mostly anergia, amotivation, sadness, isolation  Ruminating more frequently about the divorce and past relationship  No suicidal thoughts  Tolerated increase in Effexor well, no side effects  Re-established with psychotherapy; still agreeable to return back to work on 9/19/22  DSM-5 Diagnoses:      Major depressive disorder, recurrent, moderate; generalized anxiety disorder; obstructive sleep apnea    Treatment Recommendations/Precautions:     Increase  Effexor 150mg for depression and anxiety   Wellbutrin 450 mg for depression   Trazodone 50mg HS PRN for insomnia     Medication management every 1 months   Continue psychotherapy with own therapist   Aware of need to follow up with family physician for medical issues   Aware of 24 hour and weekend coverage for urgent situations accessed by calling [facility] main practice [facility]  Medications Risks/Benefits      Risks, Benefits And Possible Side Effects Of Medications:    Risks, benefits, and possible side effects of medications explained to Antonino Simmons including risk of suicidality and serotonin syndrome related to treatment with antidepressants and risks of cardiovascular side effects including elevated blood pressure, risk of misuse, abuse or dependence and risk of increased anxiety related to treatment with stimulant medications  He verbalizes understanding and agreement for treatment  Controlled Medication Discussion:     Not applicable    Psychotherapy Provided:     Individual psychotherapy provided: Yes  Counseling was provided during the session today for 16 minutes  Medication changes discussed with Anderson Kelley  Medication education provided to Anderson Kelley  Recent stressor including pending divorce, relationship problems, occupational problems, social difficulties and everyday stressors discussed with Anderson Kelley  Coping skills reviewed with Anderson Kelley  Supportive psychotherapy provided      Treatment Plan:    Completed and signed during the session: Not applicable - Treatment Plan not due at this session    This note was not shared with the patient due to reasonable likelihood of causing patient harm      Sujata Grey MD 09/15/22

## 2022-10-04 ENCOUNTER — TELEPHONE (OUTPATIENT)
Dept: PSYCHIATRY | Facility: CLINIC | Age: 32
End: 2022-10-04

## 2022-10-04 NOTE — TELEPHONE ENCOUNTER
Patient called stating he was to return to work on 09/19 which patient didn't because of "my bad depression " Patient also stated he is applying for short term disability and will have forms for you to sign  Patient requested a letter not to return to work  Patient asked for you to call him      [phone]    Patients next appointment is 10/26    Thank you

## 2022-10-05 ENCOUNTER — OCCMED (OUTPATIENT)
Dept: URGENT CARE | Facility: CLINIC | Age: 32
End: 2022-10-05

## 2022-10-05 ENCOUNTER — APPOINTMENT (OUTPATIENT)
Dept: LAB | Facility: CLINIC | Age: 32
End: 2022-10-05

## 2022-10-05 DIAGNOSIS — Z02.1 PRE-EMPLOYMENT EXAMINATION: ICD-10-CM

## 2022-10-05 DIAGNOSIS — Z02.1 PRE-EMPLOYMENT EXAMINATION: Primary | ICD-10-CM

## 2022-10-05 LAB — RUBV IGG SERPL IA-ACNC: 6.4 IU/ML

## 2022-10-05 PROCEDURE — 86480 TB TEST CELL IMMUN MEASURE: CPT

## 2022-10-05 PROCEDURE — 86735 MUMPS ANTIBODY: CPT

## 2022-10-05 PROCEDURE — 86765 RUBEOLA ANTIBODY: CPT

## 2022-10-05 PROCEDURE — 86787 VARICELLA-ZOSTER ANTIBODY: CPT

## 2022-10-05 PROCEDURE — 36415 COLL VENOUS BLD VENIPUNCTURE: CPT

## 2022-10-05 PROCEDURE — 86762 RUBELLA ANTIBODY: CPT

## 2022-10-06 ENCOUNTER — TELEPHONE (OUTPATIENT)
Dept: PSYCHIATRY | Facility: CLINIC | Age: 32
End: 2022-10-06

## 2022-10-06 LAB
MEV IGG SER QL IA: ABNORMAL
MUV IGG SER QL IA: ABNORMAL
VZV IGG SER QL IA: ABNORMAL

## 2022-10-06 NOTE — TELEPHONE ENCOUNTER
Office left message to inform patient that the requested forms were faxed to the provided number as well as scanned into patient's chart  Office number was left should patietn have any questions

## 2022-10-07 LAB
GAMMA INTERFERON BACKGROUND BLD IA-ACNC: 0.03 IU/ML
M TB IFN-G BLD-IMP: NEGATIVE
M TB IFN-G CD4+ BCKGRND COR BLD-ACNC: 0 IU/ML
M TB IFN-G CD4+ BCKGRND COR BLD-ACNC: 0 IU/ML
MITOGEN IGNF BCKGRD COR BLD-ACNC: >10 IU/ML

## 2022-10-14 DIAGNOSIS — R79.89 ELEVATED TSH: ICD-10-CM

## 2022-10-14 RX ORDER — LEVOTHYROXINE SODIUM 0.03 MG/1
25 TABLET ORAL
Qty: 90 TABLET | Refills: 0 | Status: SHIPPED | OUTPATIENT
Start: 2022-10-14

## 2022-11-11 ENCOUNTER — OFFICE VISIT (OUTPATIENT)
Dept: URGENT CARE | Facility: CLINIC | Age: 32
End: 2022-11-11

## 2022-11-11 VITALS
OXYGEN SATURATION: 97 % | SYSTOLIC BLOOD PRESSURE: 155 MMHG | HEART RATE: 87 BPM | DIASTOLIC BLOOD PRESSURE: 96 MMHG | TEMPERATURE: 98 F | RESPIRATION RATE: 16 BRPM

## 2022-11-11 DIAGNOSIS — R53.83 FATIGUE, UNSPECIFIED TYPE: ICD-10-CM

## 2022-11-11 DIAGNOSIS — R05.1 ACUTE COUGH: ICD-10-CM

## 2022-11-11 DIAGNOSIS — J06.9 UPPER RESPIRATORY TRACT INFECTION, UNSPECIFIED TYPE: Primary | ICD-10-CM

## 2022-11-11 DIAGNOSIS — J02.9 SORE THROAT: ICD-10-CM

## 2022-11-11 NOTE — PROGRESS NOTES
[address] [employer] [facility]        NAME: Tl Mccord is a 28 y o  male  : 1990    MRN: [id number]  DATE: 2022  TIME: 12:20 PM    Assessment and Plan   Upper respiratory tract infection, unspecified type [J06 9]  1  Upper respiratory tract infection, unspecified type  Cov/Flu-Collected at [facility]   2  Sore throat  POCT rapid strepA    Throat culture    Cov/Flu-Collected at [facility]   3  Fatigue, unspecified type  Cov/Flu-Collected at [facility]   4  Acute cough  Cov/Flu-Collected at [facility]         Patient Instructions   Covid/Flu sent due to symptoms and 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    Follow up with PCP in 3-5 days  Proceed to  ER if symptoms worsen  Chief Complaint     Chief Complaint   Patient presents with   • Sore Throat     Pt reports sudden onset sore throat yesterday  Wants to rule out strep  History of Present Illness       Pt works as an ER tech at [employer]  Pt had some NC, rhinorrhea and cough yesterday with throat irritation and this morning woke up with worsening sore throat pain  Pt has hx of strep infections and wanted to rule it out  Pt has fatigue and upper respiratory symptoms with productive cough and sore throat  Review of Systems   Review of Systems   Constitutional: Positive for fatigue  Negative for chills and fever  HENT: Positive for congestion, rhinorrhea and sore throat  Negative for ear pain  Respiratory: Positive for cough  Negative for chest tightness, shortness of breath and wheezing  Gastrointestinal: Negative for abdominal pain, diarrhea, nausea and vomiting  Skin: Negative for rash  Neurological: Negative for headaches           Current Medications       Current Outpatient Medications:   •  buPROPion (WELLBUTRIN XL) 150 mg 24 hr tablet, TAKE 1 TABLET BY MOUTH EVERY DAY, Disp: 90 tablet, Rfl: 1  •  buPROPion (WELLBUTRIN XL) 300 mg 24 hr tablet, TAKE 1 TABLET BY MOUTH EVERY DAY, Disp: 90 tablet, Rfl: 1  •  Cholecalciferol (VITAMIN D-3) 1000 units CAPS, Take 2 capsules by mouth daily, Disp: , Rfl:   •  fluticasone (FLONASE) 50 mcg/act nasal spray, 1 spray into each nostril as needed for rhinitis  , Disp: , Rfl:   •  levothyroxine 25 mcg tablet, TAKE 1 TABLET (25 MCG TOTAL) BY MOUTH DAILY IN THE EARLY MORNING, Disp: 90 tablet, Rfl: 0  •  multivitamin (THERAGRAN) TABS, Take 1 tablet by mouth daily, Disp: , Rfl:   •  traZODone (DESYREL) 50 mg tablet, Take 1 tablet (50 mg total) by mouth daily at bedtime as needed for sleep, Disp: 90 tablet, Rfl: 0  •  venlafaxine (EFFEXOR-XR) 150 mg 24 hr capsule, Take 1 capsule (150 mg total) by mouth daily, Disp: 30 capsule, Rfl: 1    Current Allergies     Allergies as of 11/11/2022   • (No Known Allergies)            The following portions of the patient's history were reviewed and updated as appropriate: allergies, current medications, past family history, past medical history, past social history, past surgical history and problem list      Past Medical History:   Diagnosis Date   • Anxiety    • Carpal tunnel syndrome, bilateral     LA  [place] 9/12/17   R   9/12/17    • GERD without esophagitis    • Moderate episode of recurrent major depressive disorder ([facility] [address] ) 08/22/2018   • Morbid obesity ([facility] [address] )    • Obstructive sleep apnea    • Pilonidal cyst with abscess     LA   10/25/13   R   6/10/14    • Vitamin D deficiency        Past Surgical History:   Procedure Laterality Date   • FOOT SURGERY     • PILONIDAL CYST DRAINAGE      Incision and drainage of pilonidal cyst    • RI WRIST ARTHROSCOP,RELEASE Franklin Hoit LIG Right 7/12/2018    Procedure: RELEASE CARPAL TUNNEL ENDOSCOPIC;  Surgeon: Alma Brooks MD;  Location: [facility];  Service: Orthopedics   • RI WRIST Paulene Jolanta LIG Left 7/26/2018    Procedure: RELEASE CARPAL TUNNEL ENDOSCOPIC;  Surgeon: Alma Brooks MD;  [facility]: [facility];  Service: Orthopedics Family History   Problem Relation Age of Onset   • Depression Mother    • Obesity Mother    • Stroke Mother         Syndrome    • Diabetes Mother    • Alcohol abuse Father    • Liver disease Father         hepatic failure    • Hypertension Father    • Depression Brother    • Thyroid disease Brother    • Alcohol abuse Brother    • Substance Abuse Brother    • Depression Maternal Uncle    • Substance Abuse Brother    • Heart disease Neg Hx    • Cancer Neg Hx    • Thyroid cancer Neg Hx          Medications have been verified  Objective   /96   Pulse 87   Temp 98 °F (36 7 °C)   Resp 16   SpO2 97%   No LMP for male patient  Physical Exam     Physical Exam  Vitals and nursing note reviewed  Constitutional:       General: He is not in acute distress  Appearance: He is well-developed  He is not diaphoretic  HENT:      Head: Normocephalic and atraumatic  Right Ear: Tympanic membrane normal       Left Ear: Tympanic membrane normal       Nose: Mucosal edema, congestion and rhinorrhea present  Mouth/Throat:      Pharynx: Uvula midline  Posterior oropharyngeal erythema present  No oropharyngeal exudate (no tonsil stones or exudated noted)  Eyes:      Conjunctiva/sclera: Conjunctivae normal    Cardiovascular:      Rate and Rhythm: Normal rate and regular rhythm  Heart sounds: Normal heart sounds  Pulmonary:      Effort: Pulmonary effort is normal       Breath sounds: Normal breath sounds  Musculoskeletal:         General: Normal range of motion  Skin:     General: Skin is warm and dry  Neurological:      Mental Status: He is alert and oriented to person, place, and time

## 2022-11-11 NOTE — PATIENT INSTRUCTIONS
Upper Respiratory Infection   AMBULATORY CARE:   An upper respiratory infection  is also called a cold  Your nose, throat, ears, and sinuses may be affected  You are more likely to get a cold in the winter  Your risk of getting a cold may be increased if you smoke cigarettes or have allergies, such as hay fever  What causes a cold? A cold is caused by a virus  Many viruses can cause a cold, and each is contagious  This means the virus can be easily spread to another person when the sick person coughs or sneezes  The virus can also be spread if you touch an object the virus is on and then touch your eyes, mouth, or nose  Cold symptoms  are usually worst for the first 3 to 5 days  You may have any of the following:  Runny or stuffy nose    Sneezing and coughing    Sore throat or hoarseness    Red, watery, and sore eyes    Fatigue (you feel more tired than usual)    Chills and fever    Headache, body aches, or sore muscles    Call your local emergency number (911 in the [address] [place] [address],[address]) if:   You have chest pain or trouble breathing  Seek care immediately if:   You have a fever over 102ºF (39ºC)  Call your doctor if:   You have a low fever  Your sore throat gets worse or you see white or yellow spots in your throat  Your symptoms get worse after 3 to 5 days or are not better in 14 days  You have a rash anywhere on your skin  You have large, tender lumps in your neck  You have thick, green, or yellow drainage from your nose  You cough up thick yellow, green, or bloody mucus  You have a bad earache  You have questions or concerns about your condition or care  Treatment:  Colds are caused by viruses and do not get better with antibiotics  Most people get better in 7 to 14 days  You may continue to cough for 2 to 3 weeks  The following may help decrease your symptoms:  Decongestants  help reduce nasal congestion and help you breathe more easily   If you take decongestant pills, they may make you feel restless or not able to sleep  Do not use decongestant sprays for more than a few days  Cough suppressants  help reduce coughing  Ask your healthcare provider which type of cough medicine is best for you  NSAIDs , such as ibuprofen, help decrease swelling, pain, and fever  NSAIDs can cause stomach bleeding or kidney problems in certain people  If you take blood thinner medicine, always ask your healthcare provider if NSAIDs are safe for you  Always read the medicine label and follow directions  Acetaminophen  decreases pain and fever  It is available without a doctor's order  Ask how much to take and how often to take it  Follow directions  Read the labels of all other medicines you are using to see if they also contain acetaminophen, or ask your doctor or pharmacist  Acetaminophen can cause liver damage if not taken correctly  Do not use more than 4 grams (4,000 milligrams) total of acetaminophen in one day  Manage a cold:   Rest as much as possible  Slowly start to do more each day  Drink more liquids as directed  Liquids will help thin and loosen mucus so you can cough it up  Liquids will also help prevent dehydration  Liquids that help prevent dehydration include water, fruit juice, and broth  Do not drink liquids that contain caffeine  Caffeine can increase your risk for dehydration  Ask your healthcare provider how much liquid to drink each day  Soothe a sore throat  Gargle with warm salt water  Make salt water by dissolving ¼ teaspoon salt in 1 cup warm water  You may also suck on hard candy or throat lozenges  You may use a sore throat spray  Use a humidifier or vaporizer  Use a cool mist humidifier or a vaporizer to increase air moisture in your home  This may make it easier for you to breathe and help decrease your cough  Use saline nasal drops as directed  These help relieve congestion  Apply petroleum-based jelly around the outside of your nostrils    This can decrease irritation from blowing your nose  Do not smoke  Nicotine and other chemicals in cigarettes and cigars can make your symptoms worse  They can also cause infections such as bronchitis or pneumonia  Ask your healthcare provider for information if you currently smoke and need help to quit  E-cigarettes or smokeless tobacco still contain nicotine  Talk to your healthcare provider before you use these products  Prevent a cold: Wash your hands often  Use soap and water every time you wash your hands  Rub your soapy hands together, lacing your fingers  Use the fingers of one hand to scrub under the nails of the other hand  Wash for at least 20 seconds  Rinse with warm, running water for several seconds  Then dry your hands  Use germ-killing gel if soap and water are not available  Do not touch your eyes or mouth without washing your hands first          Cover a sneeze or cough  Use a tissue that covers your mouth and nose  Put the used tissue in the trash right away  Use the bend of your arm if a tissue is not available  Wash your hands well with soap and water or use a hand   Do not stand close to anyone who is sneezing or coughing  Try to stay away from others while you are sick  This is especially important during the first 2 to 3 days when the virus is more easily spread  Wait until a fever, cough, or other symptoms are gone before you return to work or other regular activities  Do not share items while you are sick  This includes food, drinks, eating utensils, and dishes  Follow up with your doctor as directed:  Write down your questions so you remember to ask them during your visits  © Copyright [employer] 2022 Information is for End User's use only and may not be sold, redistributed or otherwise used for commercial purposes   All illustrations and images included in CareNotes® are the copyrighted property of [employer]  or Armando Zepeda   The above information is an  only  It is not intended as medical advice for individual conditions or treatments  Talk to your doctor, nurse or pharmacist before following any medical regimen to see if it is safe and effective for you

## 2022-11-12 LAB
FLUAV RNA RESP QL NAA+PROBE: NEGATIVE
FLUBV RNA RESP QL NAA+PROBE: NEGATIVE
S PYO AG THROAT QL: NEGATIVE
SARS-COV-2 RNA RESP QL NAA+PROBE: NEGATIVE

## 2022-11-13 LAB — BACTERIA THROAT CULT: ABNORMAL

## 2022-11-28 DIAGNOSIS — F41.1 GENERALIZED ANXIETY DISORDER: ICD-10-CM

## 2022-11-28 DIAGNOSIS — F33.1 MODERATE EPISODE OF RECURRENT MAJOR DEPRESSIVE DISORDER (HCC): ICD-10-CM

## 2022-11-29 RX ORDER — VENLAFAXINE HYDROCHLORIDE 150 MG/1
CAPSULE, EXTENDED RELEASE ORAL
Qty: 30 CAPSULE | Refills: 1 | Status: SHIPPED | OUTPATIENT
Start: 2022-11-29 | End: 2022-12-08

## 2022-12-07 DIAGNOSIS — F33.1 MODERATE EPISODE OF RECURRENT MAJOR DEPRESSIVE DISORDER (HCC): ICD-10-CM

## 2022-12-07 DIAGNOSIS — F41.1 GENERALIZED ANXIETY DISORDER: ICD-10-CM

## 2022-12-08 RX ORDER — VENLAFAXINE HYDROCHLORIDE 150 MG/1
CAPSULE, EXTENDED RELEASE ORAL
Qty: 90 CAPSULE | Refills: 1 | Status: SHIPPED | OUTPATIENT
Start: 2022-12-08

## 2023-01-16 ENCOUNTER — OFFICE VISIT (OUTPATIENT)
Dept: FAMILY MEDICINE CLINIC | Facility: CLINIC | Age: 33
End: 2023-01-16

## 2023-01-16 VITALS
WEIGHT: 315 LBS | RESPIRATION RATE: 16 BRPM | HEIGHT: 73 IN | OXYGEN SATURATION: 97 % | SYSTOLIC BLOOD PRESSURE: 130 MMHG | HEART RATE: 95 BPM | TEMPERATURE: 97.6 F | DIASTOLIC BLOOD PRESSURE: 100 MMHG | BODY MASS INDEX: 41.75 KG/M2

## 2023-01-16 DIAGNOSIS — F33.1 MODERATE EPISODE OF RECURRENT MAJOR DEPRESSIVE DISORDER (HCC): ICD-10-CM

## 2023-01-16 DIAGNOSIS — F41.1 GENERALIZED ANXIETY DISORDER: ICD-10-CM

## 2023-01-16 DIAGNOSIS — Z00.00 PHYSICAL EXAM, ANNUAL: Primary | ICD-10-CM

## 2023-01-16 DIAGNOSIS — E03.9 ACQUIRED HYPOTHYROIDISM: ICD-10-CM

## 2023-01-16 NOTE — PROGRESS NOTES
FAMILY PRACTICE OFFICE VISIT       NAME: Peyton Moura  AGE: 28 y o  SEX: male       : 1990        MRN: [id number]    Assessment and Plan   1  Physical exam, annual  Comments:  Vaccines up to date as required by employer  No problems with vision or hearing  HIV and Hep C screening ordered  Regular exercise recommended  2  Acquired hypothyroidism  Assessment & Plan:  Stopped levothyroxine, will repeat TSH  Orders:  -     TSH, 3rd generation; Future    3  BMI 50 0-59 9, adult [facility])  Assessment & Plan:  Lost a few pounds, trying to cut back on portions  Hoping to get more active  Continue working on weight loss  4  Moderate episode of recurrent major depressive disorder [facility])  Assessment & Plan:  Depression is much better with new job  Trying to make new career goals  Continue current medications, and psychiatry follow up  We talked about getting involved in social activities where he could meet other people and develop new social networks, which would also improve mental health  Orders:  -     CBC and differential; Future  -     Comprehensive metabolic panel; Future  -     Lipid panel; Future  -     TSH, 3rd generation; Future    5  Generalized anxiety disorder  Assessment & Plan:  Continue current medications, and psychiatry follow up  Orders:  -     CBC and differential; Future  -     Comprehensive metabolic panel; Future  -     Lipid panel; Future  -     TSH, 3rd generation; Future                   Chief Complaint     Chief Complaint   Patient presents with   • Well Check       History of Present Illness     Peyton Moura is a 28year old male presenting today for annual exam      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[employer], [employer]  [employer] new job  7pm-7am      Feeling good  Due for blood work  Ran out of thyroid medication  Had short time period of not taking levothyroxine, due to loss of health care insurance  Has health care insurance now  Weight: trying to cut down portions  Hopes to do Yoga and walk more  Working a lot of OT right now  Depression is much better with new job  Trying to decide on new career goals  Review of Systems   Review of Systems   Constitutional: Negative  HENT: Negative  Respiratory: Negative  Cardiovascular: Negative  Gastrointestinal: Negative  Genitourinary: Negative  Musculoskeletal: Negative  Skin: Negative  Neurological: Negative  Hematological: Negative  Psychiatric/Behavioral: Positive for dysphoric mood  Negative for self-injury, sleep disturbance and suicidal ideas  I have reviewed the patient's medical history in detail; there are no changes to the history as noted in the electronic medical record  Objective     Vitals:    01/16/23 1520   BP: 130/100   Pulse: 95   Resp: 16   Temp: 97 6 °F (36 4 °C)   TempSrc: Temporal   SpO2: 97%   Weight: (!) 175 kg (386 lb)   Height: 6' 1" (1 854 m)     Wt Readings from Last 3 Encounters:   01/16/23 (!) 175 kg (386 lb)   08/17/22 (!) 179 kg (394 lb)   07/29/22 (!) 176 kg (389 lb)     Physical Exam  Vitals and nursing note reviewed  Constitutional:       General: He is not in acute distress  Appearance: Normal appearance  He is well-developed  He is obese  He is not ill-appearing  HENT:      Head: Normocephalic and atraumatic  Right Ear: Tympanic membrane normal       Left Ear: Tympanic membrane normal       Mouth/Throat:      Mouth: Mucous membranes are moist       Pharynx: Oropharynx is clear  Eyes:      Conjunctiva/sclera: Conjunctivae normal       Pupils: Pupils are equal, round, and reactive to light  Neck:      Thyroid: No thyromegaly  Cardiovascular:      Rate and Rhythm: Normal rate and regular rhythm  Heart sounds: No murmur heard  Pulmonary:      Effort: Pulmonary effort is normal       Breath sounds: Normal breath sounds  Abdominal:      General: Bowel sounds are normal       Palpations: Abdomen is soft  Tenderness: There is no abdominal tenderness  Musculoskeletal:      Cervical back: Normal range of motion and neck supple  Right lower leg: No edema  Left lower leg: No edema  Lymphadenopathy:      Cervical: No cervical adenopathy  Skin:     Findings: No rash  Neurological:      Mental Status: He is alert and oriented to person, place, and time  Psychiatric:         Mood and Affect: Mood normal        BMI Counseling: Body mass index is 50 93 kg/m²  The BMI is above normal  Nutrition recommendations include decreasing portion sizes  Exercise recommendations include exercising 3-5 times per week  Rationale for BMI follow-up plan is due to patient being overweight or obese  ALLERGIES:  No Known Allergies    Current Medications     Current Outpatient Medications   Medication Sig Dispense Refill   • buPROPion (WELLBUTRIN XL) 150 mg 24 hr tablet TAKE 1 TABLET BY MOUTH EVERY DAY 90 tablet 1   • buPROPion (WELLBUTRIN XL) 300 mg 24 hr tablet TAKE 1 TABLET BY MOUTH EVERY DAY 90 tablet 1   • Cholecalciferol (VITAMIN D-3) 1000 units CAPS Take 2 capsules by mouth daily     • fluticasone (FLONASE) 50 mcg/act nasal spray 1 spray into each nostril as needed for rhinitis       • multivitamin (THERAGRAN) TABS Take 1 tablet by mouth daily     • traZODone (DESYREL) 50 mg tablet Take 1 tablet (50 mg total) by mouth daily at bedtime as needed for sleep 90 tablet 0   • venlafaxine (EFFEXOR-XR) 150 mg 24 hr capsule TAKE 1 CAPSULE BY MOUTH EVERY DAY 90 capsule 1   • levothyroxine 25 mcg tablet TAKE 1 TABLET (25 MCG TOTAL) BY MOUTH DAILY IN THE EARLY MORNING (Patient not taking: Reported on 1/16/2023) 90 tablet 0     No current facility-administered medications for this visit           Health Maintenance     Health Maintenance   Topic Date Due   • Hepatitis C Screening  Never done   • HIV Screening  Never done   • COVID-19 Vaccine (3 - Booster for Pfizer series) 07/22/2021   • DTaP,Tdap,and Td Vaccines (2 - Td or Tdap) 02/11/2023   • Depression Remission PHQ  05/11/2023   • BMI: Adult  01/16/2024   • Annual Physical  01/16/2024   • BMI: Followup Plan  01/17/2024   • Influenza Vaccine  Completed   • Pneumococcal Vaccine: Pediatrics (0 to 5 Years) and At-Risk Patients (6 to 59 Years)  Aged Out   • HIB Vaccine  Aged Out   • IPV Vaccine  Aged Out   • Hepatitis A Vaccine  Aged Out   • Meningococcal ACWY Vaccine  Aged Out   • HPV Vaccine  Aged [employer] History   Administered Date(s) Administered   • COVID-19 PFIZER VACCINE 0 3 ML IM 05/05/2021, 05/27/2021   • INFLUENZA 10/19/2022   • Influenza, recombinant, quadrivalent,injectable, preservative free 11/04/2019   • Influenza, seasonal, injectable 01/04/2017   • Influenza, seasonal, injectable, preservative free 10/01/2018   • MMR 11/09/2022, 12/22/2022   • Tdap 02/11/2013   • Tuberculin Skin Test-PPD Intradermal 01/14/2022   • Varicella 11/09/2022, 12/22/2022       AMRITA Llamas

## 2023-01-18 ENCOUNTER — APPOINTMENT (OUTPATIENT)
Dept: LAB | Facility: CLINIC | Age: 33
End: 2023-01-18

## 2023-01-18 DIAGNOSIS — F33.1 MODERATE EPISODE OF RECURRENT MAJOR DEPRESSIVE DISORDER (HCC): ICD-10-CM

## 2023-01-18 DIAGNOSIS — Z20.2 POSSIBLE EXPOSURE TO STD: ICD-10-CM

## 2023-01-18 DIAGNOSIS — F41.1 GENERALIZED ANXIETY DISORDER: ICD-10-CM

## 2023-01-18 DIAGNOSIS — R77.9 ELEVATED BLOOD PROTEIN: ICD-10-CM

## 2023-01-18 DIAGNOSIS — E03.9 ACQUIRED HYPOTHYROIDISM: ICD-10-CM

## 2023-01-18 LAB
ALBUMIN SERPL BCP-MCNC: 3.7 G/DL (ref 3.5–5)
ALP SERPL-CCNC: 98 U/L (ref 46–116)
ALT SERPL W P-5'-P-CCNC: 49 U/L (ref 12–78)
ANION GAP SERPL CALCULATED.3IONS-SCNC: 5 MMOL/L (ref 4–13)
AST SERPL W P-5'-P-CCNC: 19 U/L (ref 5–45)
BASOPHILS # BLD AUTO: 0.04 THOUSANDS/ÂΜL (ref 0–0.1)
BASOPHILS NFR BLD AUTO: 0 % (ref 0–1)
BILIRUB SERPL-MCNC: 0.44 MG/DL (ref 0.2–1)
BUN SERPL-MCNC: 23 MG/DL (ref 5–25)
CALCIUM SERPL-MCNC: 9.4 MG/DL (ref 8.3–10.1)
CHLORIDE SERPL-SCNC: 103 MMOL/L (ref 96–108)
CHOLEST SERPL-MCNC: 180 MG/DL
CO2 SERPL-SCNC: 30 MMOL/L (ref 21–32)
CREAT SERPL-MCNC: 1.25 MG/DL (ref 0.6–1.3)
EOSINOPHIL # BLD AUTO: 0.15 THOUSAND/ÂΜL (ref 0–0.61)
EOSINOPHIL NFR BLD AUTO: 2 % (ref 0–6)
ERYTHROCYTE [DISTWIDTH] IN BLOOD BY AUTOMATED COUNT: 12.8 % (ref 11.6–15.1)
GFR SERPL CREATININE-BSD FRML MDRD: 75 ML/MIN/1.73SQ M
GLUCOSE P FAST SERPL-MCNC: 88 MG/DL (ref 65–99)
HCT VFR BLD AUTO: 46.7 % (ref 36.5–49.3)
HCV AB SER QL: NORMAL
HDLC SERPL-MCNC: 49 MG/DL
HGB BLD-MCNC: 14.9 G/DL (ref 12–17)
HIV 1+2 AB+HIV1 P24 AG SERPL QL IA: NORMAL
HIV 2 AB SERPL QL IA: NORMAL
HIV1 AB SERPL QL IA: NORMAL
HIV1 P24 AG SERPL QL IA: NORMAL
IMM GRANULOCYTES # BLD AUTO: 0.05 THOUSAND/UL (ref 0–0.2)
IMM GRANULOCYTES NFR BLD AUTO: 1 % (ref 0–2)
LDLC SERPL CALC-MCNC: 111 MG/DL (ref 0–100)
LYMPHOCYTES # BLD AUTO: 3.22 THOUSANDS/ÂΜL (ref 0.6–4.47)
LYMPHOCYTES NFR BLD AUTO: 33 % (ref 14–44)
MCH RBC QN AUTO: 28.1 PG (ref 26.8–34.3)
MCHC RBC AUTO-ENTMCNC: 31.9 G/DL (ref 31.4–37.4)
MCV RBC AUTO: 88 FL (ref 82–98)
MONOCYTES # BLD AUTO: 0.7 THOUSAND/ÂΜL (ref 0.17–1.22)
MONOCYTES NFR BLD AUTO: 7 % (ref 4–12)
NEUTROPHILS # BLD AUTO: 5.57 THOUSANDS/ÂΜL (ref 1.85–7.62)
NEUTS SEG NFR BLD AUTO: 57 % (ref 43–75)
NONHDLC SERPL-MCNC: 131 MG/DL
NRBC BLD AUTO-RTO: 0 /100 WBCS
PLATELET # BLD AUTO: 329 THOUSANDS/UL (ref 149–390)
PMV BLD AUTO: 9.2 FL (ref 8.9–12.7)
POTASSIUM SERPL-SCNC: 4.2 MMOL/L (ref 3.5–5.3)
PROT SERPL-MCNC: 8.8 G/DL (ref 6.4–8.4)
RBC # BLD AUTO: 5.3 MILLION/UL (ref 3.88–5.62)
RPR SER QL: NORMAL
SODIUM SERPL-SCNC: 138 MMOL/L (ref 135–147)
TRIGL SERPL-MCNC: 99 MG/DL
TSH SERPL DL<=0.05 MIU/L-ACNC: 6.3 UIU/ML (ref 0.45–4.5)
WBC # BLD AUTO: 9.73 THOUSAND/UL (ref 4.31–10.16)

## 2023-01-18 NOTE — ASSESSMENT & PLAN NOTE
Depression is much better with new job  Trying to make new career goals  Continue current medications, and psychiatry follow up  We talked about getting involved in social activities where he could meet other people and develop new social networks, which would also improve mental health

## 2023-01-18 NOTE — ASSESSMENT & PLAN NOTE
Lost a few pounds, trying to cut back on portions  Hoping to get more active  Continue working on weight loss

## 2023-01-19 ENCOUNTER — TELEPHONE (OUTPATIENT)
Dept: FAMILY MEDICINE CLINIC | Facility: CLINIC | Age: 33
End: 2023-01-19

## 2023-01-19 DIAGNOSIS — E03.9 ACQUIRED HYPOTHYROIDISM: Primary | ICD-10-CM

## 2023-01-19 DIAGNOSIS — R79.89 ELEVATED TSH: ICD-10-CM

## 2023-01-19 LAB
C TRACH DNA SPEC QL NAA+PROBE: NEGATIVE
N GONORRHOEA DNA SPEC QL NAA+PROBE: NEGATIVE

## 2023-01-19 RX ORDER — LEVOTHYROXINE SODIUM 0.03 MG/1
25 TABLET ORAL
Qty: 90 TABLET | Refills: 3 | Status: SHIPPED | OUTPATIENT
Start: 2023-01-19

## 2023-01-19 NOTE — TELEPHONE ENCOUNTER
----- Message from [id number] Corrigo sent at 1/19/2023  3:14 PM EST -----  Please let Ken Silva know thyroid function is underactive, he really does need to restart his thyroid medication  I will send in a new prescription  Please repeat blood work to check thyroid in 8 weeks, no need to fast for this blood work  All other blood work was good

## 2023-01-25 DIAGNOSIS — F33.1 MODERATE EPISODE OF RECURRENT MAJOR DEPRESSIVE DISORDER (HCC): ICD-10-CM

## 2023-01-25 RX ORDER — BUPROPION HYDROCHLORIDE 150 MG/1
150 TABLET ORAL DAILY
Qty: 90 TABLET | Refills: 1 | Status: SHIPPED | OUTPATIENT
Start: 2023-01-25

## 2023-01-25 RX ORDER — BUPROPION HYDROCHLORIDE 300 MG/1
300 TABLET ORAL DAILY
Qty: 90 TABLET | Refills: 1 | Status: SHIPPED | OUTPATIENT
Start: 2023-01-25

## 2023-02-01 DIAGNOSIS — F33.1 MODERATE EPISODE OF RECURRENT MAJOR DEPRESSIVE DISORDER (HCC): ICD-10-CM

## 2023-02-01 DIAGNOSIS — F41.1 GENERALIZED ANXIETY DISORDER: ICD-10-CM

## 2023-02-01 RX ORDER — VENLAFAXINE HYDROCHLORIDE 150 MG/1
CAPSULE, EXTENDED RELEASE ORAL
Qty: 90 CAPSULE | Refills: 2 | Status: SHIPPED | OUTPATIENT
Start: 2023-02-01

## 2023-03-10 ENCOUNTER — TELEMEDICINE (OUTPATIENT)
Dept: PSYCHIATRY | Facility: CLINIC | Age: 33
End: 2023-03-10

## 2023-03-10 DIAGNOSIS — F41.1 GENERALIZED ANXIETY DISORDER: ICD-10-CM

## 2023-03-10 DIAGNOSIS — F33.1 MODERATE EPISODE OF RECURRENT MAJOR DEPRESSIVE DISORDER (HCC): Primary | ICD-10-CM

## 2023-03-10 RX ORDER — BUPROPION HYDROCHLORIDE 300 MG/1
300 TABLET ORAL DAILY
Qty: 90 TABLET | Refills: 0 | Status: SHIPPED | OUTPATIENT
Start: 2023-03-10

## 2023-03-10 RX ORDER — TRAZODONE HYDROCHLORIDE 50 MG/1
50 TABLET ORAL
Qty: 90 TABLET | Refills: 0 | Status: SHIPPED | OUTPATIENT
Start: 2023-03-10

## 2023-03-10 RX ORDER — BUPROPION HYDROCHLORIDE 150 MG/1
150 TABLET ORAL DAILY
Qty: 90 TABLET | Refills: 0 | Status: SHIPPED | OUTPATIENT
Start: 2023-03-10

## 2023-03-10 RX ORDER — VENLAFAXINE HYDROCHLORIDE 150 MG/1
150 CAPSULE, EXTENDED RELEASE ORAL DAILY
Qty: 90 CAPSULE | Refills: 0 | Status: SHIPPED | OUTPATIENT
Start: 2023-03-10

## 2023-03-10 NOTE — PSYCH
MEDICATION MANAGEMENT NOTE        [facility]      Name and Date of Birth:  Juan Long 28 y o  1990 MRN: [id number]    Date of Visit: March 10, 2023    Reason for Visit: Follow-up visit regarding medication management     Chief Complaint: "Things are going pretty good with work"    SUBJECTIVE:    Juan Long is a 28 y o , male, possessing a past psychiatric history significant for MDD, JON, medically complicated by JIMI, obesity, HTN, who presents for follow-up appointment for medication management  Bernard Vlila states that since their previous outpatient psychiatric appointment, he is doing fairly well  He reports that depressive symptoms are improving  Notes that he is overall feeling more motivated and energetic  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[place] [address]  He feels productive and satisfied with the type of care that he is providing  He is more engaged in work and seems to be more motivated by this  He reports that he is considering becoming a nurse or a crisis worker and appears more future oriented  He states that despite this, he still continues to experience intermittent periods of depression  States that a few months ago he was feeling more down, depressed, hopeless, mostly due to having ruminating thoughts about his ex-wife and their divorce  He reports that he was having poor sleep at that time, having nightmares about them being together and this would often awaken him at night  He reports that this has gotten better in the past few weeks though  He reports that appetite wise he is doing well, trying to eat healthier and trying to maintain more exercise for weight loss  There continues to be intermittent anxiety symptoms although he continues to utilize coping mechanisms and works with his therapist consistently on a weekly/biweekly basis  Denies any SI, HI, AVH at this time  No side effects reported to medications      Current Rating Scores:   Current PHQ-9   PHQ-2/9 Depression Screening           Psychiatric Review Of Systems:    Appetite: no  Adverse eating: no  Weight changes: no  Insomnia/sleeplessness: decreased  Fatigue/anergy: no  Anhedonia/lack of interest: no  Attention/concentration: no  Psychomotor agitation/retardation: no  Somatic symptoms: no  Anxiety/panic attack: no  Gillian/hypomania: no  Hopelessness/helplessness/worthlessness: no  Self-injurious behavior/high-risk behavior: no  Suicidal ideation: no  Homicidal ideation: no  Auditory hallucinations: no  Visual hallucinations: no  Other perceptual disturbances: no  Delusional thinking: no  Obsessive/compulsive symptoms: no    Review Of Systems:      Constitutional negative   ENT negative   Cardiovascular negative   Respiratory negative   Gastrointestinal negative   Genitourinary negative   Musculoskeletal negative   Integumentary negative   Neurological negative   Endocrine negative   Other Symptoms none, all other systems are negative     History Review: The following portions of the patient's history were reviewed and updated as appropriate: allergies, current medications, past family history, past medical history, past social history, past surgical history, and problem list  Previous progress notes/assessments from other providers reviewed as available  Past Medical History:    Past Medical History:   Diagnosis Date   • Anxiety    • Carpal tunnel syndrome, bilateral     LA  Wandalee Highman Wandalee Highman 9/12/17   R   9/12/17    • GERD without esophagitis    • Moderate episode of recurrent major depressive disorder ([facility] [address] ) 08/22/2018   • Morbid obesity ([facility] [address] )    • Obstructive sleep apnea    • Pilonidal cyst with abscess     LA   10/25/13   R   6/10/14    • Vitamin D deficiency         No Known Allergies    Substance Abuse History:    Social History     Substance and Sexual Activity   Alcohol Use Yes    Comment: Rarely     Social History     Substance and Sexual Activity   Drug Use No Social History:    Social History     Socioeconomic History   • Marital status: Legally      Spouse name: Not on file   • Number of children: 0   • Years of education: some college   • Highest education level: Some college, no degree   Occupational History   • Occupation: [employer]     Comment: employed full time   Tobacco Use   • Smoking status: Never   • Smokeless tobacco: Never   Vaping Use   • Vaping Use: Never used   Substance and Sexual Activity   • Alcohol use: Yes     Comment: Rarely   • Drug use: No   • Sexual activity: Yes     Partners: Female   Other Topics Concern   • Not on file   Social History Narrative     since 20/12   2/22  She moved out  No children  Also has brother and sister-in-law and his niece living with him  Works for [employer] in [place]  Also runs with the [employer]     Financial Resource Strain: Not on file   Food Insecurity: Not on file   Transportation Needs: Not on file   Physical Activity: Not on file   Stress: Not on file   Social Connections: Not on file   Intimate Partner Violence: Not on file   Housing Stability: Not on file       Family Psychiatric History:     Family History   Problem Relation Age of Onset   • Depression Mother    • Obesity Mother    • Stroke Mother         Syndrome    • Diabetes Mother    • Alcohol abuse Father    • Liver disease Father         hepatic failure    • Hypertension Father    • Depression Brother    • Thyroid disease Brother    • Alcohol abuse Brother    • Substance Abuse Brother    • Depression Maternal Uncle    • Substance Abuse Brother    • Heart disease Neg Hx    • Cancer Neg Hx    • Thyroid cancer Neg Hx             OBJECTIVE:     Vital signs in last 24 hours: There were no vitals filed for this visit      Mental Status Evaluation:    Appearance age appropriate, casually dressed   Behavior cooperative, calm   Speech normal rate, normal volume, normal pitch   Mood euthymic   Affect normal range and intensity, appropriate   Thought Processes organized, goal directed   Associations intact associations   Thought Content no overt delusions   Perceptual Disturbances: no auditory hallucinations, no visual hallucinations   Abnormal Thoughts  Risk Potential Suicidal ideation - None  Homicidal ideation - None  Potential for aggression - No   Orientation oriented to person, place, time/date and situation   Memory recent and remote memory grossly intact   Consciousness alert and awake   Attention Span Concentration Span attention span and concentration are age appropriate   Intellect appears to be of average intelligence   Insight intact   Judgement intact   Muscle Strength and  Gait normal muscle strength and normal muscle tone, normal gait and normal balance   Motor activity no abnormal movements   Fund of Knowledge adequate knowledge of current events  adequate fund of knowledge regarding past history  adequate fund of knowledge regarding vocabulary    Pain none   Pain Scale 0       Laboratory Results: I have personally reviewed all pertinent laboratory/tests results    Recent Labs (last 4 months):   Appointment on 01/18/2023   Component Date Value   • Sodium 01/18/2023 138    • Potassium 01/18/2023 4 2    • Chloride 01/18/2023 103    • CO2 01/18/2023 30    • ANION GAP 01/18/2023 5    • BUN 01/18/2023 23    • Creatinine 01/18/2023 1 25    • Glucose, Fasting 01/18/2023 88    • Calcium 01/18/2023 9 4    • AST 01/18/2023 19    • ALT 01/18/2023 49    • Alkaline Phosphatase 01/18/2023 98    • Total Protein 01/18/2023 8 8 (H)    • Albumin 01/18/2023 3 7    • Total Bilirubin 01/18/2023 0 44    • eGFR 01/18/2023 75    • N gonorrhoeae, DNA Probe 01/18/2023 Negative    • Chlamydia trachomatis, D* 01/18/2023 Negative    • Hepatitis C Ab 01/18/2023 Non-reactive    • RPR 01/18/2023 Non-Reactive    • WBC 01/18/2023 9 73    • RBC 01/18/2023 5 30    • Hemoglobin 01/18/2023 14 9    • Hematocrit 01/18/2023 46 7    • MCV 01/18/2023 88    • MCH 01/18/2023 28 1    • MCHC 01/18/2023 31 9    • RDW 01/18/2023 12 8    • MPV 01/18/2023 9 2    • Platelets 38/97/8881 329    • nRBC 01/18/2023 0    • Neutrophils Relative 01/18/2023 57    • Immat GRANS % 01/18/2023 1    • Lymphocytes Relative 01/18/2023 33    • Monocytes Relative 01/18/2023 7    • Eosinophils Relative 01/18/2023 2    • Basophils Relative 01/18/2023 0    • Neutrophils Absolute 01/18/2023 5 57    • Immature Grans Absolute 01/18/2023 0 05    • Lymphocytes Absolute 01/18/2023 3 22    • Monocytes Absolute 01/18/2023 0 70    • Eosinophils Absolute 01/18/2023 0 15    • Basophils Absolute 01/18/2023 0 04    • Cholesterol 01/18/2023 180    • Triglycerides 01/18/2023 99    • HDL, Direct 01/18/2023 49    • LDL Calculated 01/18/2023 111 (H)    • Non-HDL-Chol (CHOL-HDL) 01/18/2023 131    • TSH 3RD GENERATON 01/18/2023 6 300 (H)    • HIV-1 p24 Antigen 01/18/2023 Non-Reactive    • HIV-1 Antibody 01/18/2023 Non-Reactive    • HIV-2 Antibody 01/18/2023 Non-Reactive    • HIV Ag-Ab 5th Gen 01/18/2023 Non-Reactive    Office Visit on 11/11/2022   Component Date Value   •  RAPID STREP A 11/12/2022 Negative    • Throat Culture 11/11/2022 Few Colonies of Beta Hemolytic Streptococcus Group C (A)    • SARS-CoV-2 11/11/2022 Negative    • INFLUENZA A PCR 11/11/2022 Negative    • INFLUENZA B PCR 11/11/2022 Negative        Suicide/Homicide Risk Assessment:    The following interventions are recommended: no intervention changes needed  Although patient's acute lethality risk is low, long-term/chronic lethality risk is mildly elevated in the presence of current diagnoses  At the current moment, Enrique Doyle is future-oriented, forward-thinking, and demonstrates ability to act in a self-preserving manner as evidenced by volitionally presenting to the clinic today, seeking treatment   To mitigate future risk, patient should adhere to the recommendations below, avoid alcohol/illicit substance use, utilize community-based resources and familiar support and prioritize mental health treatment  Presently, patient denies active suicidal/homicidal ideation in addition to thoughts of self-injury; contracts for safety  At conclusion of evaluation, patient is amenable to the recommendations below  Patient is amenable to calling/contacting the outpatient office including this writer if any acute adverse effects of their medication regimen arise in addition to any comments or concerns pertaining to their psychiatric management  Patient is amenable to calling/contacting crisis and/or attending to the nearest emergency department if their clinical condition deteriorates to assure their safety and stability, stating that they are able to appropriately confide in their family regarding their psychiatric state  Assessment/Plan:     Neymar Randolph was personally seen and evaluated today at the [address] [place] [address] [address] outpatient clinic for follow-up regarding medication management  He notes that he has experienced an increase in depressive symptoms, mainly some negative self image and ruminating negative thoughts  He has experienced decreased motivation decreased energy level although this appears to coincide with poor sleep  His sleep is most likely significantly impacted by his obstructive sleep apnea; he is in process to obtain a CPAP machine  He also does correlate an increase in depressive symptoms around the time he ran out of the 150 mg Wellbutrin XL tablets; he wishes to restart this  The plan will be to resume the medications, follow in 2 months, consider adjustments if needed  1/27/22 Mild improvement in depressive symptoms  Sleep remains problematic, though much of this seems related to obstructive sleep apnea  4/29/22 Since previous appointment he did experience worsening depressive symptoms, suicidal ideations    This was due mostly to wife  him and increased stress at work and home  He attended PHP and feels much better now  More happier, less depressed, more motivated  No longer experiencing SI  Feels the current medication regimen is helpful and would like to continue this  He continues to work with sleep medicine for JIMI  I encourage him to continue this  8/17/22 Patient feeling more depressed  Passive suicidal ideations  Increased feelings of hopelessness, guilt, ruminating stressors  Much of this has worsened due to impending divorce  Feels that he is calling out of work more frequently, feeling unsatisfied with his career, more feelings of low self-esteem  Discussed medication changes and he agrees to taper Lexapro to Effexor  He will also reach out to his psychotherapist, Maryellen Pacheco, and return to weekly sessions  9/15/22 -Still experiencing depressive symptoms - mostly anergia, amotivation, sadness, isolation  Ruminating more frequently about the divorce and past relationship  No suicidal thoughts  Tolerated increase in Effexor well, no side effects  Re-established with psychotherapy; still agreeable to return back to work on 9/19/22     3/10/23 Carlos Kearney states that he is doing well  Depressive symptoms are relatively stable  Intermittently occurring at times, mostly related to rumination about his divorce  He reports that sleep has been more difficult at times due to nightmares about his wife  Overall he seems more positive and future oriented, 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 [employer]  He is hopeful that he may become a nurse or a crisis worker in the near future  Tolerating medications well and no side effects reported  DSM-5 Diagnoses:     • Major depressive disorder, recurrent, moderate; generalized anxiety disorder; obstructive sleep apnea    Treatment Recommendations/Precautions:    • Effexor 150mg for depression and anxiety  • Wellbutrin 450 mg for depression    • Trazodone 50mg HS PRN for insomnia  • Medication management every 1 months  • Continue psychotherapy with own therapist  • Aware of need to follow up with family physician for medical issues  • Aware of 24 hour and weekend coverage for urgent situations accessed by calling [facility] main practice number    Medications Risks/Benefits      Risks, Benefits And Possible Side Effects Of Medications:    Risks, benefits, and possible side effects of medications explained to Ja Espinoza including risk of suicidality and serotonin syndrome related to treatment with antidepressants and risks of cardiovascular side effects including elevated blood pressure, risk of misuse, abuse or dependence and risk of increased anxiety related to treatment with stimulant medications  He verbalizes understanding and agreement for treatment       Controlled Medication Discussion:     Not applicable    Psychotherapy Provided:     Individual psychotherapy provided: Yes  Counseling was provided during the session today for 16 minutes  Recent stressor including family issues, recent divorce, job stress, health issues and everyday stressors discussed with Ja Espinoza  Coping skills reviewed with Bristol Alexis  Reassurance and supportive therapy provided        Treatment Plan:    Completed and signed during the session: Yes - Treatment Plan done but not signed at time of office visit due to:  Plan reviewed in person and verbal consent given due to [address] distancing    This note was not shared with the patient due to reasonable likelihood of causing patient harm      Yao Alcantara MD 03/10/23    Virtual Regular Visit    Verification of patient location:    Patient is located in the following state in which I hold an active license [place]      Assessment/Plan:    Problem List Items Addressed This Visit        Other    Generalized anxiety disorder    Moderate episode of recurrent major depressive disorder ([facility] [address] ) - Primary     Reason for visit is   Chief Complaint   Patient presents with   • Virtual Regular Visit        Encounter provider Kalpana Ames MD    Provider located at [facility] [address]  [facility]    [address] [facility] [address]  [facility] [address] [phone]      Recent Visits  No visits were found meeting these conditions  Showing recent visits within past 7 days and meeting all other requirements  Today's Visits  Date Type Provider Dept   03/10/23 Telemedicine Kalpana Ames MD [facility]   Showing today's visits and meeting all other requirements  Future Appointments  No visits were found meeting these conditions  Showing future appointments within next 150 days and meeting all other requirements       The patient was identified by name and date of birth  Delmer Zambrano was informed that this is a telemedicine visit and [facility]  He agrees to proceed     My office door was closed  No one else was in the room  He acknowledged consent and understanding of privacy and security of the video platform  The patient has agreed to participate and understands they can discontinue the visit at any time  Patient is aware this is a billable service  Abdon Serna is a 28 y o  male    HPI     Past Medical History:   Diagnosis Date   • Anxiety    • Carpal tunnel syndrome, bilateral     LA  Ale Bundy 9/12/17   R   9/12/17    • GERD without esophagitis    • Moderate episode of recurrent major depressive disorder ([address] ) 08/22/2018   • Morbid obesity ([address] )    • Obstructive sleep apnea    • Pilonidal cyst with abscess     LA   10/25/13   R   6/10/14    • Vitamin D deficiency        Past Surgical History:   Procedure Laterality Date   • FOOT SURGERY     • PILONIDAL CYST DRAINAGE      Incision and drainage of pilonidal cyst    • [place] NDSC WRST SURG W/RLS TRANSVRS CARPL LIGM Right 7/12/2018    Procedure: RELEASE CARPAL TUNNEL ENDOSCOPIC;  Surgeon: Izzy Reyes MD;  Location:  MAIN OR;  Service: Orthopedics   • [place] [address] [facility] WRST SURG W/RLS TRANSVRS CARPL LIGM Left 7/26/2018    Procedure: RELEASE CARPAL TUNNEL ENDOSCOPIC;  Surgeon: Izzy Reyes MD;  Location:  MAIN OR;  Service: Orthopedics       Current Outpatient Medications   Medication Sig Dispense Refill   • buPROPion (WELLBUTRIN XL) 150 mg 24 hr tablet Take 1 tablet (150 mg total) by mouth daily 90 tablet 1   • buPROPion (WELLBUTRIN XL) 300 mg 24 hr tablet Take 1 tablet (300 mg total) by mouth daily 90 tablet 1   • Cholecalciferol (VITAMIN D-3) 1000 units CAPS Take 2 capsules by mouth daily     • fluticasone (FLONASE) 50 mcg/act nasal spray 1 spray into each nostril as needed for rhinitis       • levothyroxine 25 mcg tablet Take 1 tablet (25 mcg total) by mouth daily in the early morning 90 tablet 3   • multivitamin (THERAGRAN) TABS Take 1 tablet by mouth daily     • traZODone (DESYREL) 50 mg tablet Take 1 tablet (50 mg total) by mouth daily at bedtime as needed for sleep 90 tablet 0   • venlafaxine (EFFEXOR-XR) 150 mg 24 hr capsule TAKE 1 CAPSULE BY MOUTH EVERY DAY 90 capsule 2     No current facility-administered medications for this visit  No Known Allergies    Review of Systems    Video Exam    There were no vitals filed for this visit      Physical Exam     Visit Time    Visit Start Time: 4:02PM  Visit Stop Time: 4:28PM  Total Visit Duration: 26 minutes

## 2023-03-10 NOTE — BH TREATMENT PLAN
TREATMENT PLAN (Medication Management Only)        [facility]    Name and Date of Birth:  Darby Wills 28 y o  1990  Date of Treatment Plan: March 10, 2023  Diagnosis/Diagnoses:    1  Moderate episode of recurrent major depressive disorder ([address] )    2  Generalized anxiety disorder      Strengths/Personal Resources for Self-Care: supportive family  Area/Areas of need (in own words): anxiety, depression  1  Long Term Goal: continue improvement in depression  Target Date:6 months - 9/10/2023  Person/Persons responsible for completion of goal: Rod Del Rosarioing  2  Short Term Objective (s) - How will we reach this goal?:   A  Provider new recommended medication/dosage changes and/or continue medication(s): continue current medications as prescribed  B  Attend medication management appointments regularly  C  N/A  Target Date:6 months - 9/10/2023  Person/Persons Responsible for Completion of Goal: Rod Timmons  Progress Towards Goals: continuing treatment  Treatment Modality: medication management every 3 months  Review due 180 days from date of this plan: 6 months - 9/10/2023  Expected length of service: maintenance  My Physician/PA/NP and I have developed this plan together and I agree to work on the goals and objectives  I understand the treatment goals that were developed for my treatment

## 2023-04-19 PROBLEM — U07.1 COVID-19: Status: RESOLVED | Noted: 2022-05-27 | Resolved: 2023-04-19

## 2023-04-23 ENCOUNTER — OFFICE VISIT (OUTPATIENT)
Dept: URGENT CARE | Facility: MEDICAL CENTER | Age: 33
End: 2023-04-23

## 2023-04-23 VITALS
WEIGHT: 315 LBS | SYSTOLIC BLOOD PRESSURE: 142 MMHG | DIASTOLIC BLOOD PRESSURE: 100 MMHG | RESPIRATION RATE: 20 BRPM | OXYGEN SATURATION: 97 % | TEMPERATURE: 98.5 F | BODY MASS INDEX: 41.75 KG/M2 | HEART RATE: 103 BPM | HEIGHT: 73 IN

## 2023-04-23 DIAGNOSIS — H93.8X1 SWELLING OF RIGHT EAR: Primary | ICD-10-CM

## 2023-04-23 RX ORDER — AMOXICILLIN 500 MG/1
500 CAPSULE ORAL EVERY 8 HOURS SCHEDULED
Qty: 15 CAPSULE | Refills: 0 | Status: SHIPPED | OUTPATIENT
Start: 2023-04-23 | End: 2023-04-28

## 2023-04-23 NOTE — PROGRESS NOTES
[facility]        NAME: Fredy Naranjo is a 28 y o  male  : 1990    MRN: [id number]  DATE: 2023  TIME: 12:23 PM    Assessment and Plan   Swelling of right ear [H93 8X1]  1  Swelling of right ear              Patient Instructions       Follow up with PCP in 3-5 days  Proceed to  ER if symptoms worsen  Chief Complaint     Chief Complaint   Patient presents with   • Earache     Patient states he started with R ear pain 3-4 days ago, he states his ear is swollen and no drainage  Has decreased hearing         History of Present Illness       The right ear is swollen and tender to the touch  Denies swimming  Denies dunking his head underwater  Denies recent head cold  Denies fever  Denies any history of allergy  Denies any recent bug bite  Review of Systems   Review of Systems   HENT: Positive for ear pain and facial swelling            Current Medications       Current Outpatient Medications:   •  buPROPion (WELLBUTRIN XL) 150 mg 24 hr tablet, Take 1 tablet (150 mg total) by mouth daily, Disp: 90 tablet, Rfl: 0  •  buPROPion (WELLBUTRIN XL) 300 mg 24 hr tablet, Take 1 tablet (300 mg total) by mouth daily, Disp: 90 tablet, Rfl: 0  •  Cholecalciferol (VITAMIN D-3) 1000 units CAPS, Take 2 capsules by mouth daily, Disp: , Rfl:   •  fluticasone (FLONASE) 50 mcg/act nasal spray, 1 spray into each nostril as needed for rhinitis  , Disp: , Rfl:   •  levothyroxine 25 mcg tablet, Take 1 tablet (25 mcg total) by mouth daily in the early morning, Disp: 90 tablet, Rfl: 3  •  multivitamin (THERAGRAN) TABS, Take 1 tablet by mouth daily, Disp: , Rfl:   •  traZODone (DESYREL) 50 mg tablet, Take 1 tablet (50 mg total) by mouth daily at bedtime as needed for sleep, Disp: 90 tablet, Rfl: 0  •  venlafaxine (EFFEXOR-XR) 150 mg 24 hr capsule, Take 1 capsule (150 mg total) by mouth daily, Disp: 90 capsule, Rfl: 0    Current Allergies     Allergies as of 2023   • (No Known Allergies) "    The following portions of the patient's history were reviewed and updated as appropriate: allergies, current medications, past family history, past medical history, past social history, past surgical history and problem list      Past Medical History:   Diagnosis Date   • Anxiety    • Carpal tunnel syndrome, bilateral     [place]  Delmon Bar Delmon Bar 9/12/17   R   9/12/17    • COVID-19 5/27/2022   • GERD without esophagitis    • Moderate episode of recurrent major depressive disorder ([facility] [address] ) 08/22/2018   • Morbid obesity ([facility] [address] )    • Obstructive sleep apnea    • Pilonidal cyst with abscess     LA   10/25/13   R   6/10/14    • Vitamin D deficiency        Past Surgical History:   Procedure Laterality Date   • FOOT SURGERY     • PILONIDAL CYST DRAINAGE      Incision and drainage of pilonidal cyst    • IN [facility] WRST SURG W/RLS TRANSVRS CARPL LIGM Right 7/12/2018    Procedure: RELEASE CARPAL TUNNEL ENDOSCOPIC;  Surgeon: Ilsa Stubbs MD;  Location:  MAIN OR;  Service: Orthopedics   • IN [facility] [address] WRST SURG W/RLS TRANSVRS CARPL LIGM Left 7/26/2018    Procedure: RELEASE CARPAL TUNNEL ENDOSCOPIC;  Surgeon: Ilsa Stubbs MD;  Location:  MAIN OR;  Service: Orthopedics       Family History   Problem Relation Age of Onset   • Depression Mother    • Obesity Mother    • Stroke Mother         Syndrome    • Diabetes Mother    • Alcohol abuse Father    • Liver disease Father         hepatic failure    • Hypertension Father    • Depression Brother    • Thyroid disease Brother    • Alcohol abuse Brother    • Substance Abuse Brother    • Depression Maternal Uncle    • Substance Abuse Brother    • Heart disease Neg Hx    • Cancer Neg Hx    • Thyroid cancer Neg Hx          Medications have been verified  Objective   /100   Pulse 103   Temp 98 5 °F (36 9 °C)   Resp 20   Ht 6' 1\" (1 854 m)   Wt (!) 177 kg (390 lb)   SpO2 97%   BMI 51 45 kg/m²   No LMP for male patient         Physical Exam     Physical Exam  HENT:      Head:      " Comments: The right pinna appears to be swollen, mildly as well as the tissues around the pinna  There is tenderness when I palpate the tissues anterior to the ear  No tenderness over the right mastoid sinus     Ears:      Comments: The right external canal is swollen    The drum appears normal      Mouth/Throat:      Comments: No lesions in the mouth tapping the teeth produces no pain

## 2023-04-24 ENCOUNTER — TELEPHONE (OUTPATIENT)
Dept: FAMILY MEDICINE CLINIC | Facility: CLINIC | Age: 33
End: 2023-04-24

## 2023-04-24 NOTE — TELEPHONE ENCOUNTER
----- Message from [facility] sent at 4/23/2023  4:03 PM EDT -----  Throat culture is positive for strep--but not strep A, so no need to treat with antibiotics at this time  Please call if you are not feeling better

## 2023-05-01 DIAGNOSIS — R79.89 ELEVATED TSH: ICD-10-CM

## 2023-05-01 RX ORDER — LEVOTHYROXINE SODIUM 0.03 MG/1
25 TABLET ORAL
Qty: 90 TABLET | Refills: 0 | Status: SHIPPED | OUTPATIENT
Start: 2023-05-01

## 2023-05-23 DIAGNOSIS — F33.1 MODERATE EPISODE OF RECURRENT MAJOR DEPRESSIVE DISORDER (HCC): ICD-10-CM

## 2023-05-23 RX ORDER — BUPROPION HYDROCHLORIDE 150 MG/1
150 TABLET ORAL DAILY
Qty: 90 TABLET | Refills: 0 | Status: SHIPPED | OUTPATIENT
Start: 2023-05-23

## 2023-05-23 RX ORDER — BUPROPION HYDROCHLORIDE 300 MG/1
300 TABLET ORAL DAILY
Qty: 90 TABLET | Refills: 0 | Status: SHIPPED | OUTPATIENT
Start: 2023-05-23

## 2023-06-14 ENCOUNTER — OFFICE VISIT (OUTPATIENT)
Dept: FAMILY MEDICINE CLINIC | Facility: CLINIC | Age: 33
End: 2023-06-14
Payer: COMMERCIAL

## 2023-06-14 VITALS
OXYGEN SATURATION: 97 % | DIASTOLIC BLOOD PRESSURE: 100 MMHG | WEIGHT: 315 LBS | RESPIRATION RATE: 16 BRPM | TEMPERATURE: 98.2 F | BODY MASS INDEX: 41.75 KG/M2 | HEIGHT: 73 IN | HEART RATE: 83 BPM | SYSTOLIC BLOOD PRESSURE: 140 MMHG

## 2023-06-14 DIAGNOSIS — E03.9 ACQUIRED HYPOTHYROIDISM: ICD-10-CM

## 2023-06-14 DIAGNOSIS — Z13.1 DIABETES MELLITUS SCREENING: ICD-10-CM

## 2023-06-14 DIAGNOSIS — E66.01 CLASS 3 SEVERE OBESITY DUE TO EXCESS CALORIES WITH SERIOUS COMORBIDITY AND BODY MASS INDEX (BMI) OF 50.0 TO 59.9 IN ADULT (HCC): Primary | ICD-10-CM

## 2023-06-14 DIAGNOSIS — G47.33 OBSTRUCTIVE SLEEP APNEA: ICD-10-CM

## 2023-06-14 DIAGNOSIS — E55.9 VITAMIN D DEFICIENCY: ICD-10-CM

## 2023-06-14 DIAGNOSIS — Z13.220 LIPID SCREENING: ICD-10-CM

## 2023-06-14 PROCEDURE — 99214 OFFICE O/P EST MOD 30 MIN: CPT | Performed by: NURSE PRACTITIONER

## 2023-06-14 RX ORDER — PEN NEEDLE, DIABETIC 32 GX 1/4"
NEEDLE, DISPOSABLE MISCELLANEOUS DAILY
Qty: 90 EACH | Refills: 1 | Status: SHIPPED | OUTPATIENT
Start: 2023-06-14

## 2023-06-14 NOTE — PROGRESS NOTES
FAMILY PRACTICE OFFICE VISIT       NAME: Venkat Sapp  AGE: 28 y o  SEX: male       : 1990        MRN: [id number]    Assessment and Plan   1  Class 3 severe obesity due to excess calories with serious comorbidity and body mass index (BMI) of 50 0 to 59 9 in adult [facility])  Start [address] for weight loss  Advised on side effect profile of medication, dosing escalation  Will also have our clinical pharmacist provide education about medication and how to administer  He will follow up in 3-4 months for recheck  He will contact me with any problems with medication, or intolerance  -     CBC; Future  -     Comprehensive metabolic panel; Future  -     liraglutide (SAXENDA) injection; Start 0 6 mg daily injection under the skin for one week, then increase dose by 0 6 mg weekly until maximum dose 3 mg daily is reached  Then continue with 3 mg daily  -     Ambulatory referral to clinical pharmacy; Future  -     Insulin Pen Needle (Novofine Pen Needle) 32G X 6 MM MISC; Use daily    2  Acquired hypothyroidism  Due for repeat TSH  Taking levothyroxine 25 mcg daily    -     TSH, 3rd generation; Future    3  Vitamin D deficiency  -     Vitamin D 25 hydroxy; Future    4  Obstructive sleep apnea  Continue CPAP and sleep medicine follow up  5  Lipid screening  -     Lipid panel; Future    6  Diabetes mellitus screening  -     Hemoglobin A1C; Future         Chief Complaint     Chief Complaint   Patient presents with   • Weight Loss   • Fatigue       History of Present Illness     Venkat Sapp is a 28year old male presenting today for weight loss  He has tried for many years to lose weight unsuccessfully  Has tried low carb diet, Atkins diet, counting calories, exercise  He has used fitness apps in the past to count calories  Has been to weight management, has tried VLCD  No family history of thyroid cancer  No personal history of pancreatitis       He has JIMI and recent hypertension, is motivated "to lose weight  Using CPAP  Always feesl tired  Working night shift  Night time working--gets bored, snacks  Recent Knee crepitus and pain bilaterally sometimes is another motivation to lose weight  Eats out a lot  Know he needs to meal prep  Review of Systems   Review of Systems   Constitutional: Positive for fatigue  HENT: Negative  Respiratory: Negative  Cardiovascular: Negative  Gastrointestinal: Negative  Genitourinary: Negative  Musculoskeletal: Positive for arthralgias (knees)  Neurological: Negative  I have reviewed the patient's medical history in detail; there are no changes to the history as noted in the electronic medical record  Objective     Vitals:    06/14/23 1631   BP: 140/100   Pulse: 83   Resp: 16   Temp: 98 2 °F (36 8 °C)   TempSrc: Temporal   SpO2: 97%   Weight: (!) 178 kg (392 lb)   Height: 6' 1\" (1 854 m)     Wt Readings from Last 3 Encounters:   06/14/23 (!) 178 kg (392 lb)   04/23/23 (!) 177 kg (390 lb)   04/19/23 (!) 177 kg (390 lb)     Physical Exam  Vitals and nursing note reviewed  Constitutional:       General: He is not in acute distress  Appearance: Normal appearance  He is well-developed  He is not ill-appearing  HENT:      Head: Normocephalic and atraumatic  Right Ear: Tympanic membrane normal       Left Ear: Tympanic membrane normal       Mouth/Throat:      Mouth: Mucous membranes are moist       Pharynx: Oropharynx is clear  Eyes:      Conjunctiva/sclera: Conjunctivae normal       Pupils: Pupils are equal, round, and reactive to light  Neck:      Thyroid: No thyromegaly  Cardiovascular:      Rate and Rhythm: Normal rate and regular rhythm  Heart sounds: No murmur heard  Pulmonary:      Effort: Pulmonary effort is normal       Breath sounds: Normal breath sounds  Musculoskeletal:      Cervical back: Normal range of motion and neck supple  Right lower leg: No edema        Left " lower leg: No edema  Lymphadenopathy:      Cervical: No cervical adenopathy  Skin:     Findings: No rash  Neurological:      Mental Status: He is alert and oriented to person, place, and time  Psychiatric:         Mood and Affect: Mood normal             ALLERGIES:  No Known Allergies    Current Medications     Current Outpatient Medications   Medication Sig Dispense Refill   • buPROPion (WELLBUTRIN XL) 150 mg 24 hr tablet Take 1 tablet (150 mg total) by mouth daily 90 tablet 0   • buPROPion (WELLBUTRIN XL) 300 mg 24 hr tablet Take 1 tablet (300 mg total) by mouth daily 90 tablet 0   • Cholecalciferol (VITAMIN D-3) 1000 units CAPS Take 2 capsules by mouth daily     • fluticasone (FLONASE) 50 mcg/act nasal spray 1 spray into each nostril as needed for rhinitis       • Insulin Pen Needle (Novofine Pen Needle) 32G X 6 MM MISC Use daily 90 each 1   • levothyroxine 25 mcg tablet Take 1 tablet (25 mcg total) by mouth daily in the early morning 90 tablet 0   • liraglutide (SAXENDA) injection Start 0 6 mg daily injection under the skin for one week, then increase dose by 0 6 mg weekly until maximum dose 3 mg daily is reached  Then continue with 3 mg daily  15 mL 1   • multivitamin (THERAGRAN) TABS Take 1 tablet by mouth daily     • neomycin-polymyxin-hydrocortisone (CORTISPORIN) otic solution Administer 4 drops to the right ear every 6 (six) hours 10 mL 0   • traZODone (DESYREL) 50 mg tablet Take 1 tablet (50 mg total) by mouth daily at bedtime as needed for sleep 90 tablet 0   • venlafaxine (EFFEXOR-XR) 150 mg 24 hr capsule Take 1 capsule (150 mg total) by mouth daily 90 capsule 0     No current facility-administered medications for this visit           Health Maintenance     Health Maintenance   Topic Date Due   • COVID-19 Vaccine (3 - Pfizer series) 07/22/2021   • DTaP,Tdap,and Td Vaccines (2 - Td or Tdap) 02/11/2023   • Depression Remission PHQ  10/19/2023   • Annual Physical  01/16/2024   • BMI: Followup Plan 01/17/2024   • BMI: Adult  06/14/2024   • HIV Screening  Completed   • Hepatitis C Screening  Completed   • Influenza Vaccine  Completed   • Pneumococcal Vaccine: Pediatrics (0 to 5 Years) and At-Risk Patients (6 to 59 Years)  Aged Out   • HIB Vaccine  Aged Out   • IPV Vaccine  Aged Out   • Hepatitis A Vaccine  Aged Out   • Meningococcal ACWY Vaccine  Aged Out   • HPV Vaccine  Aged [employer] History   Administered Date(s) Administered   • COVID-19 PFIZER VACCINE 0 3 ML IM 05/05/2021, 05/27/2021   • INFLUENZA 10/19/2022   • Influenza, recombinant, quadrivalent,injectable, preservative free 11/04/2019   • Influenza, seasonal, injectable 01/04/2017   • Influenza, seasonal, injectable, preservative free 10/01/2018   • MMR 11/09/2022, 12/22/2022   • Tdap 02/11/2013   • Tuberculin Skin Test-PPD Intradermal 01/14/2022   • Varicella 11/09/2022, 12/22/2022       AMRITA Green

## 2023-06-19 ENCOUNTER — TELEPHONE (OUTPATIENT)
Dept: FAMILY MEDICINE CLINIC | Facility: CLINIC | Age: 33
End: 2023-06-19

## 2023-06-19 DIAGNOSIS — E66.01 CLASS 3 SEVERE OBESITY DUE TO EXCESS CALORIES WITH SERIOUS COMORBIDITY AND BODY MASS INDEX (BMI) OF 50.0 TO 59.9 IN ADULT (HCC): Primary | ICD-10-CM

## 2023-06-19 NOTE — TELEPHONE ENCOUNTER
[address]  Hedrick Post, PharmD, BCPS, BCACP     Reason for Outreach: Patient referred to clinical pharmacist by AMRITA Syed for disease management and medication education. Patient recently prescribed Saxenda by PCP. Saxenda requires PA on patient's prescription drug plan. PA submitted via telephone. Case ID: [id number]    Notified patient of pending PA.  Will follow-up

## 2023-06-19 NOTE — LETTER
RE: Rachel Perez -- MR#: [id number]   July 3, 2023    [address] [place], [place] [address]    Dear Mr. Jennifer Sanchez am a [address] [facility] Pharmacist and wanted to be certain you had information to contact me. I have made several attempts to call you by phone but have been unable to reach you. Amirah Guo requested a clinical pharmacist work with you regarding Amanda Alexandre. My role is to provide you with information about your medication. You can reach me at [phone]. Please call me to set up an appointment. Services are available at no cost and can be in-person, via telephone, or through video visit. Thank you for your time and I look forward to working with you.     Sincerely,          Riley Barber PharmD  Clinical Integration Pharmacist  [facility].  [phone]

## 2023-06-23 NOTE — TELEPHONE ENCOUNTER
[facility]  Yulisa Sanford, PharmD, BCPS, BCACP     Reason for Outreach: Patient referred to clinical pharmacist by AMRITA Chin for disease management and medication education. First outreach attempt to patient to schedule an appointment. Patient unavailable at time of call. Left voicemail with contact information for return call. Also sent [employer] message.  Will follow-up

## 2023-06-27 NOTE — TELEPHONE ENCOUNTER
[address] Evin Walsh, PharmD, BCPS, BCACP     Reason for Outreach: Patient referred to clinical pharmacist by AMRITA Ramirez for disease management and medication education. Second outreach attempt to patient to schedule an appointment. Patient unavailable at time of call. Left voicemail with contact information for return call.  Will follow-up

## 2023-06-30 NOTE — TELEPHONE ENCOUNTER
[address] Evin Johnson, PharmD, BCPS, BCACP     Reason for Outreach: Patient referred to clinical pharmacist by AMRITA Flores for disease management and medication education. Third outreach attempt to patient to schedule an appointment. Patient unavailable at time of call. Left voicemail with contact information for return call.  Will send unable to reach letter

## 2023-07-03 NOTE — TELEPHONE ENCOUNTER
[address] Luc RosenbaumD, BCPS, BCACP     Incoming call from patient. Notified that patient will not be picking up Saxenda due to cost.    [address] coupon card from  has been discontinued as of 6/30/2023. Patient plans to intensify diet and exercise efforts.

## 2023-07-05 DIAGNOSIS — F41.1 GENERALIZED ANXIETY DISORDER: ICD-10-CM

## 2023-07-05 DIAGNOSIS — F33.1 MODERATE EPISODE OF RECURRENT MAJOR DEPRESSIVE DISORDER (HCC): ICD-10-CM

## 2023-07-05 RX ORDER — VENLAFAXINE HYDROCHLORIDE 150 MG/1
150 CAPSULE, EXTENDED RELEASE ORAL DAILY
Qty: 90 CAPSULE | Refills: 0 | Status: SHIPPED | OUTPATIENT
Start: 2023-07-05

## 2023-07-05 NOTE — TELEPHONE ENCOUNTER
Medication Refill Request     Name of Medication Venlafaxine  Dose/Frequency 150 Mg 24 hr Capsule  Quantity 90  Verified pharmacy   [x]  Verified ordering Provider   [x]  Does patient have enough for the next 3 days? Yes [x] No []  Does patient have a follow-up appointment scheduled?  Yes [x] No []   If so when is appointment: 7/26/2023

## 2023-07-27 ENCOUNTER — TELEMEDICINE (OUTPATIENT)
Dept: PSYCHIATRY | Facility: CLINIC | Age: 33
End: 2023-07-27
Payer: COMMERCIAL

## 2023-07-27 DIAGNOSIS — F41.1 GENERALIZED ANXIETY DISORDER: ICD-10-CM

## 2023-07-27 DIAGNOSIS — F33.1 MODERATE EPISODE OF RECURRENT MAJOR DEPRESSIVE DISORDER (HCC): Primary | ICD-10-CM

## 2023-07-27 PROCEDURE — 90833 PSYTX W PT W E/M 30 MIN: CPT | Performed by: STUDENT IN AN ORGANIZED HEALTH CARE EDUCATION/TRAINING PROGRAM

## 2023-07-27 PROCEDURE — 99214 OFFICE O/P EST MOD 30 MIN: CPT | Performed by: STUDENT IN AN ORGANIZED HEALTH CARE EDUCATION/TRAINING PROGRAM

## 2023-07-27 RX ORDER — VENLAFAXINE HYDROCHLORIDE 150 MG/1
150 CAPSULE, EXTENDED RELEASE ORAL DAILY
Qty: 90 CAPSULE | Refills: 1 | Status: SHIPPED | OUTPATIENT
Start: 2023-07-27

## 2023-07-27 RX ORDER — TRAZODONE HYDROCHLORIDE 50 MG/1
50 TABLET ORAL
Qty: 90 TABLET | Refills: 1 | Status: SHIPPED | OUTPATIENT
Start: 2023-07-27

## 2023-07-27 RX ORDER — BUPROPION HYDROCHLORIDE 300 MG/1
300 TABLET ORAL DAILY
Qty: 90 TABLET | Refills: 1 | Status: SHIPPED | OUTPATIENT
Start: 2023-07-27

## 2023-07-27 RX ORDER — BUPROPION HYDROCHLORIDE 150 MG/1
150 TABLET ORAL DAILY
Qty: 90 TABLET | Refills: 1 | Status: SHIPPED | OUTPATIENT
Start: 2023-07-27

## 2023-07-27 NOTE — PSYCH
Virtual Regular Visit    Verification of patient location:    Patient is located at Home in the following state in which I hold an active license [place]      Assessment/Plan:    Problem List Items Addressed This Visit        Other    Generalized anxiety disorder    Relevant Medications    buPROPion (WELLBUTRIN XL) 300 mg 24 hr tablet    buPROPion (WELLBUTRIN XL) 150 mg 24 hr tablet    venlafaxine (EFFEXOR-XR) 150 mg 24 hr capsule    traZODone (DESYREL) 50 mg tablet    Moderate episode of recurrent major depressive disorder (HCC) - Primary    Relevant Medications    buPROPion (WELLBUTRIN XL) 300 mg 24 hr tablet    buPROPion (WELLBUTRIN XL) 150 mg 24 hr tablet    venlafaxine (EFFEXOR-XR) 150 mg 24 hr capsule    traZODone (DESYREL) 50 mg tablet            Reason for visit is   Chief Complaint   Patient presents with   • Virtual Regular Visit        Encounter provider Elly Chandra MD    Provider located at [address] [place] [id number] [phone]      Recent Visits  No visits were found meeting these conditions. Showing recent visits within past 7 days and meeting all other requirements  Today's Visits  Date Type Provider Dept   07/27/23 Telemedicine Elly Chandra MD [facility]   Showing today's visits and meeting all other requirements  Future Appointments  No visits were found meeting these conditions. Showing future appointments within next 150 days and meeting all other requirements       The patient was identified by name and date of birth. Julianna Painting was informed that this is a telemedicine visit and [facility] He agrees to proceed. .  My office door was closed. No one else was in the room. He acknowledged consent and understanding of privacy and security of the video platform.  The patient has agreed to participate and understands they can discontinue the visit at any time. Patient is aware this is a billable service. Alvaro Aceves is a 35 y.o. male . HPI     Past Medical History:   Diagnosis Date   • Anxiety    • Carpal tunnel syndrome, bilateral     LA. Niki Richmond Niki Richmond 9/12/17   R. ...9/12/17    • COVID-19 5/27/2022   • GERD without esophagitis    • Moderate episode of recurrent major depressive disorder ([address]) 08/22/2018   • Morbid obesity ([address])    • Obstructive sleep apnea    • Pilonidal cyst with abscess     LA. ...10/25/13   R. ...6/10/14    • Vitamin D deficiency        Past Surgical History:   Procedure Laterality Date   • FOOT SURGERY     • PILONIDAL CYST DRAINAGE      Incision and drainage of pilonidal cyst    • [place] [facility] WRST SURG W/RLS TRANSVRS CARPL LIGM Right 7/12/2018    Procedure: RELEASE CARPAL TUNNEL ENDOSCOPIC;  Surgeon: Luis Bailey MD;  Location:  MAIN OR;  Service: Orthopedics   • [place] [address] [address] WRST SURG W/RLS TRANSVRS CARPL LIGM Left 7/26/2018    Procedure: RELEASE CARPAL TUNNEL ENDOSCOPIC;  Surgeon: Luis Bailey MD;  Location:  MAIN OR;  Service: Orthopedics       Current Outpatient Medications   Medication Sig Dispense Refill   • buPROPion (WELLBUTRIN XL) 150 mg 24 hr tablet Take 1 tablet (150 mg total) by mouth daily 90 tablet 1   • buPROPion (WELLBUTRIN XL) 300 mg 24 hr tablet Take 1 tablet (300 mg total) by mouth daily 90 tablet 1   • Cholecalciferol (VITAMIN D-3) 1000 units CAPS Take 2 capsules by mouth daily     • fluticasone (FLONASE) 50 mcg/act nasal spray 1 spray into each nostril as needed for rhinitis       • levothyroxine 25 mcg tablet Take 1 tablet (25 mcg total) by mouth daily in the early morning 90 tablet 0   • multivitamin (THERAGRAN) TABS Take 1 tablet by mouth daily     • neomycin-polymyxin-hydrocortisone (CORTISPORIN) otic solution Administer 4 drops to the right ear every 6 (six) hours 10 mL 0   • traZODone (DESYREL) 50 mg tablet Take 1 tablet (50 mg total) by mouth daily at bedtime as needed for sleep 90 tablet 1   • venlafaxine (EFFEXOR-XR) 150 mg 24 hr capsule Take 1 capsule (150 mg total) by mouth daily 90 capsule 1   • Insulin Pen Needle (Novofine Pen Needle) 32G X 6 MM MISC Use daily 90 each 1   • liraglutide (SAXENDA) injection Start 0.6 mg daily injection under the skin for one week, then increase dose by 0.6 mg weekly until maximum dose 3 mg daily is reached. Then continue with 3 mg daily. 15 mL 1     No current facility-administered medications for this visit. No Known Allergies    Review of Systems    Video Exam    There were no vitals filed for this visit. Physical Exam     MEDICATION MANAGEMENT NOTE        [facility]      Name and Date of Birth:  Nora Coelho 35 y.o. 1990 MRN: [id number]    Date of Visit: July 27, 2023    Reason for Visit: Follow-up visit regarding medication management     Chief Complaint: "I am doing pretty good"    SUBJECTIVE:    Nora Coelho is a 35 y.o., male, possessing a past psychiatric history significant for MDD, JON, medically complicated by JIMI, obesity, HTN, who presents for follow-up appointment for medication management. Janeece Peabody states that since their previous outpatient psychiatric appointment, he is doing fairly well. Reports that his depression anxiety has been stable. Reports some intermittent stress and tension when dealing with his ex-wife of whom he is still processing divorce. States that otherwise he feels that his mood has been quite good and he has not had any major issues with sadness or hopelessness. Denies any suicidal ideations. 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 He is also attending classes and trying to obtain prerequisites needed for nursing school. He states that he is sleeping and eating well. He also recently established a new relationship and is happy about this. Denies any SI, HI, AVH.   No medication side effects. Current Rating Scores:   Current PHQ-9   PHQ-2/9 Depression Screening           Psychiatric Review Of Systems:    Appetite: no  Adverse eating: no  Weight changes: no  Insomnia/sleeplessness: no  Fatigue/anergy: no  Anhedonia/lack of interest: no  Attention/concentration: no  Psychomotor agitation/retardation: no  Somatic symptoms: no  Anxiety/panic attack: no  Gillian/hypomania: no  Hopelessness/helplessness/worthlessness: no  Self-injurious behavior/high-risk behavior: no  Suicidal ideation: no  Homicidal ideation: no  Auditory hallucinations: no  Visual hallucinations: no  Other perceptual disturbances: no  Delusional thinking: no  Obsessive/compulsive symptoms: no    Review Of Systems:      Constitutional negative   ENT negative   Cardiovascular negative   Respiratory negative   Gastrointestinal negative   Genitourinary negative   Musculoskeletal negative   Integumentary negative   Neurological negative   Endocrine negative   Other Symptoms none, all other systems are negative     History Review: The following portions of the patient's history were reviewed and updated as appropriate: allergies, current medications, past family history, past medical history, past social history, past surgical history, and problem list. Previous progress notes/assessments from other providers reviewed as available. Past Medical History:    Past Medical History:   Diagnosis Date   • Anxiety    • Carpal tunnel syndrome, bilateral     LA. Rodney Piety Rodney Piety 9/12/17   R. ...9/12/17    • COVID-19 5/27/2022   • GERD without esophagitis    • Moderate episode of recurrent major depressive disorder ([address]) 08/22/2018   • Morbid obesity ([address])    • Obstructive sleep apnea    • Pilonidal cyst with abscess     LA. ...10/25/13   R. ...6/10/14    • Vitamin D deficiency         No Known Allergies    Substance Abuse History:    Social History     Substance and Sexual Activity   Alcohol Use Yes    Comment: Rarely     Social History     Substance and Sexual Activity   Drug Use No       Social History:    Social History     Socioeconomic History   • Marital status: Legally      Spouse name: Not on file   • Number of children: 0   • Years of education: some college   • Highest education level: Some college, no degree   Occupational History   • Occupation: [employer]     Comment: employed full time   Tobacco Use   • Smoking status: Never   • Smokeless tobacco: Never   Vaping Use   • Vaping Use: Never used   Substance and Sexual Activity   • Alcohol use: Yes     Comment: Rarely   • Drug use: No   • Sexual activity: Yes     Partners: Female   Other Topics Concern   • Not on file   Social History Narrative     since 20/12.  2/22. She moved out. No children. Also has brother and sister-in-law and his niece living with him. Works for [employer] in the [employer]. Also runs with the [employer]. Social Determinants of Health     Financial Resource Strain: Not on file   Food Insecurity: Not on file   Transportation Needs: Not on file   Physical Activity: Not on file   Stress: Not on file   Social Connections: Not on file   Intimate Partner Violence: Not on file   Housing Stability: Not on file       Family Psychiatric History:     Family History   Problem Relation Age of Onset   • Depression Mother    • Obesity Mother    • Stroke Mother         Syndrome    • Diabetes Mother    • Alcohol abuse Father    • Liver disease Father         hepatic failure    • Hypertension Father    • Depression Brother    • Thyroid disease Brother    • Alcohol abuse Brother    • Substance Abuse Brother    • Depression Maternal Uncle    • Substance Abuse Brother    • Heart disease Neg Hx    • Cancer Neg Hx    • Thyroid cancer Neg Hx             OBJECTIVE:     Vital signs in last 24 hours: There were no vitals filed for this visit.     Mental Status Evaluation:    Appearance age appropriate, casually dressed   Behavior cooperative, calm   Speech normal rate, normal volume, normal pitch   Mood improved, euthymic   Affect normal range and intensity, appropriate   Thought Processes organized, goal directed   Associations intact associations   Thought Content no overt delusions   Perceptual Disturbances: no auditory hallucinations, no visual hallucinations   Abnormal Thoughts  Risk Potential Suicidal ideation - None  Homicidal ideation - None  Potential for aggression - No   Orientation oriented to person, place, time/date and situation   Memory recent and remote memory grossly intact   Consciousness alert and awake   Attention Span Concentration Span attention span and concentration are age appropriate   Intellect appears to be of average intelligence   Insight intact   Judgement intact   Muscle Strength and  Gait normal muscle strength and normal muscle tone, normal gait and normal balance   Motor activity no abnormal movements   Fund of Knowledge adequate knowledge of current events  adequate fund of knowledge regarding past history  adequate fund of knowledge regarding vocabulary    Pain none   Pain Scale 0       Laboratory Results: I have personally reviewed all pertinent laboratory/tests results    Recent Labs (last 2 months):   No visits with results within 2 Month(s) from this visit. Latest known visit with results is:   Office Visit on 04/19/2023   Component Date Value   •  RAPID STREP A 04/19/2023 Negative    • Throat Culture 04/19/2023 2+ Growth of Beta Hemolytic Streptococcus NOT Group A, C, or G        Suicide/Homicide Risk Assessment:    The following interventions are recommended: no intervention changes needed. Although patient's acute lethality risk is low, long-term/chronic lethality risk is mildly elevated in the presence of current diagnoses.    At the current moment, Kamlesh Pierre is future-oriented, forward-thinking, and demonstrates ability to act in a self-preserving manner as evidenced by volitionally presenting to the clinic today, seeking treatment. To mitigate future risk, patient should adhere to the recommendations below, avoid alcohol/illicit substance use, utilize community-based resources and familiar support and prioritize mental health treatment. Presently, patient denies active suicidal/homicidal ideation in addition to thoughts of self-injury; contracts for safety. At conclusion of evaluation, patient is amenable to the recommendations below. Patient is amenable to calling/contacting the outpatient office including this writer if any acute adverse effects of their medication regimen arise in addition to any comments or concerns pertaining to their psychiatric management. Patient is amenable to calling/contacting crisis and/or attending to the nearest emergency department if their clinical condition deteriorates to assure their safety and stability, stating that they are able to appropriately confide in their family regarding their psychiatric state. Assessment/Plan:     Hood Ann was personally seen and evaluated today at the [address] [place] [address] outpatient clinic for follow-up regarding medication management. He notes that he has experienced an increase in depressive symptoms, mainly some negative self image and ruminating negative thoughts. He has experienced decreased motivation decreased energy level although this appears to coincide with poor sleep. His sleep is most likely significantly impacted by his obstructive sleep apnea; he is in process to obtain a CPAP machine. He also does correlate an increase in depressive symptoms around the time he ran out of the 150 mg Wellbutrin XL tablets; he wishes to restart this. The plan will be to resume the medications, follow in 2 months, consider adjustments if needed. 1/27/22 Mild improvement in depressive symptoms. Sleep remains problematic, though much of this seems related to obstructive sleep apnea.      4/29/22 Since previous appointment he did experience worsening depressive symptoms, suicidal ideations. This was due mostly to wife  him and increased stress at work and home. He attended PHP and feels much better now. More happier, less depressed, more motivated. No longer experiencing SI. Feels the current medication regimen is helpful and would like to continue this. He continues to work with sleep medicine for JIMI. I encourage him to continue this. 8/17/22 Patient feeling more depressed. Passive suicidal ideations. Increased feelings of hopelessness, guilt, ruminating stressors. Much of this has worsened due to impending divorce. Feels that he is calling out of work more frequently, feeling unsatisfied with his career, more feelings of low self-esteem. Discussed medication changes and he agrees to taper Lexapro to Effexor. He will also reach out to his psychotherapist, Benjamin Nino, and return to weekly sessions. 9/15/22 -Still experiencing depressive symptoms - mostly anergia, amotivation, sadness, isolation. Ruminating more frequently about the divorce and past relationship. No suicidal thoughts. Tolerated increase in Effexor well, no side effects. Re-established with psychotherapy; still agreeable to return back to work on 9/19/22.    3/10/23 Nata Thomas states that he is doing well. Depressive symptoms are relatively stable. Intermittently occurring at times, mostly related to rumination about his divorce. He reports that sleep has been more difficult at times due to nightmares about his wife. 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 [employer]. He is hopeful that he may become a nurse or a crisis worker in the near future. Tolerating medications well and no side effects reported. 7/27/23 He is doing well. Depression and anxiety are stable.   Some stress still stemming from divorce proceedings although he is doing well and that relationship is improving with his ex-wife. He is now in a new relationship with another female and is pleased about this, going well. Putting in extra hours at work, continuing to do prerequisite classes for nursing school. Tolerating medications well. No side effects. DSM-5 Diagnoses:     • Major depressive disorder, recurrent, moderate; generalized anxiety disorder; obstructive sleep apnea    Treatment Recommendations/Precautions:    • Effexor 150mg for depression and anxiety. • Wellbutrin 450 mg for depression. • Trazodone 50mg HS PRN for insomnia. • Medication management every 1 months  • Continue psychotherapy with own therapist  • Aware of need to follow up with family physician for medical issues  • Aware of 24 hour and weekend coverage for urgent situations accessed by calling [facility] main practice number    Medications Risks/Benefits      Risks, Benefits And Possible Side Effects Of Medications:    Risks, benefits, and possible side effects of medications explained to Jayson Dhillon including risk of suicidality and serotonin syndrome related to treatment with antidepressants. He verbalizes understanding and agreement for treatment. .    Controlled Medication Discussion:     Not applicable    Psychotherapy Provided:     Individual psychotherapy provided: Yes  Counseling was provided during the session today for 16 minutes. Recent stressor including family problems, relationship problems, marital problems, job stress, social difficulties and everyday stressors discussed with Jayson Dhillon. Coping skills reviewed with Jayson Dhillon. Reassurance and supportive therapy provided.       Treatment Plan:    Completed and signed during the session: No not due at this time  This note was not shared with the patient due to reasonable likelihood of causing patient harm    Visit Time    Visit Start Time: 410PM  Visit Stop Time: 430PM  Total Visit Duration: 20 minutes        Jaskaran Ochoa MD 07/27/23

## 2023-08-22 ENCOUNTER — TELEPHONE (OUTPATIENT)
Dept: PSYCHIATRY | Facility: CLINIC | Age: 33
End: 2023-08-22

## 2023-08-23 DIAGNOSIS — R79.89 ELEVATED TSH: ICD-10-CM

## 2023-08-23 RX ORDER — LEVOTHYROXINE SODIUM 0.03 MG/1
TABLET ORAL
Qty: 90 TABLET | Refills: 0 | Status: SHIPPED | OUTPATIENT
Start: 2023-08-23

## 2023-10-04 ENCOUNTER — OFFICE VISIT (OUTPATIENT)
Dept: FAMILY MEDICINE CLINIC | Facility: CLINIC | Age: 33
End: 2023-10-04
Payer: COMMERCIAL

## 2023-10-04 VITALS
RESPIRATION RATE: 16 BRPM | OXYGEN SATURATION: 98 % | SYSTOLIC BLOOD PRESSURE: 138 MMHG | HEART RATE: 88 BPM | HEIGHT: 73 IN | BODY MASS INDEX: 41.75 KG/M2 | DIASTOLIC BLOOD PRESSURE: 98 MMHG | WEIGHT: 315 LBS | TEMPERATURE: 98 F

## 2023-10-04 DIAGNOSIS — H66.91 RIGHT OTITIS MEDIA, UNSPECIFIED OTITIS MEDIA TYPE: Primary | ICD-10-CM

## 2023-10-04 PROCEDURE — 99213 OFFICE O/P EST LOW 20 MIN: CPT | Performed by: NURSE PRACTITIONER

## 2023-10-04 RX ORDER — AMOXICILLIN 875 MG/1
875 TABLET, COATED ORAL 2 TIMES DAILY
Qty: 20 TABLET | Refills: 0 | Status: SHIPPED | OUTPATIENT
Start: 2023-10-04 | End: 2023-10-14

## 2023-10-04 RX ORDER — AMOXICILLIN 875 MG/1
875 TABLET, COATED ORAL 2 TIMES DAILY
Qty: 20 TABLET | Refills: 0 | Status: SHIPPED | OUTPATIENT
Start: 2023-10-04 | End: 2023-10-04 | Stop reason: ALTCHOICE

## 2023-10-04 NOTE — PROGRESS NOTES
FAMILY PRACTICE OFFICE VISIT       NAME: Shira Astorga  AGE: 35 y.o. SEX: male       : 1990        MRN: [id number]    Assessment and Plan   1. Right otitis media, unspecified otitis media type  -     amoxicillin (AMOXIL) 875 mg tablet; Take 1 tablet (875 mg total) by mouth 2 (two) times a day for 10 days     Start amoxicillin, take with food twice daily for 10 days. Call if symptoms are not improving over the next 2-3 days, or if ear is feeling worse, or for new symptom development. Chief Complaint     Chief Complaint   Patient presents with   • Earache     Right ear, noticed this morning, pain going into jaw       History of Present Illness     Shira Astorga is a 35year old male presenting today for ear pain. Right ear pain, radiating into jaw. Feels like water is in the ear. Ear has been popping sometimes. Cold symptoms recently, this got better, but ear started now. Took ibuprofen. No fevers, chills. No Sweats. Was not able to afford GLP-1 for weight loss. Will keep working on lifestyle changes. Trying to keep calories <2400. Eating sugar/sweets. Work is tough due to working night shift and sweets/snacks always available. Review of Systems   Review of Systems   Constitutional: Negative. HENT: Positive for ear pain. Negative for congestion, ear discharge, sinus pressure and sore throat. Respiratory: Negative for cough. Neurological: Negative for headaches. I have reviewed the patient's medical history in detail; there are no changes to the history as noted in the electronic medical record.     Objective     Vitals:    10/04/23 1144   BP: 138/98   BP Location: Right arm   Patient Position: Sitting   Cuff Size: Large   Pulse: 88   Resp: 16   Temp: 98 °F (36.7 °C)   TempSrc: Temporal   SpO2: 98%   Weight: (!) 183 kg (404 lb)   Height: 6' 1" (1.854 m)     Wt Readings from Last 3 Encounters:   10/04/23 (!) 183 kg (404 lb)   23 (!) 179 kg (395 lb) 06/14/23 (!) 178 kg (392 lb)     Physical Exam  Vitals and nursing note reviewed. Constitutional:       General: He is not in acute distress. Appearance: Normal appearance. He is not ill-appearing. HENT:      Head: Normocephalic. Right Ear: A middle ear effusion is present. Tympanic membrane is injected. Left Ear: Tympanic membrane normal.      Ears:      Comments: Right canal mildly erythematous, but no swelling or debris in canal.  Cardiovascular:      Rate and Rhythm: Normal rate and regular rhythm. Heart sounds: No murmur heard. Pulmonary:      Effort: Pulmonary effort is normal. No respiratory distress. Breath sounds: Normal breath sounds. Musculoskeletal:      Right lower leg: No edema. Left lower leg: No edema. Neurological:      Mental Status: He is alert.    Psychiatric:         Mood and Affect: Mood normal.            ALLERGIES:  No Known Allergies    Current Medications     Current Outpatient Medications   Medication Sig Dispense Refill   • amoxicillin (AMOXIL) 875 mg tablet Take 1 tablet (875 mg total) by mouth 2 (two) times a day for 10 days 20 tablet 0   • buPROPion (WELLBUTRIN XL) 150 mg 24 hr tablet Take 1 tablet (150 mg total) by mouth daily 90 tablet 1   • buPROPion (WELLBUTRIN XL) 300 mg 24 hr tablet Take 1 tablet (300 mg total) by mouth daily 90 tablet 1   • Cholecalciferol (VITAMIN D-3) 1000 units CAPS Take 2 capsules by mouth daily     • fluticasone (FLONASE) 50 mcg/act nasal spray 1 spray into each nostril 2 (two) times a day 16 g 2   • levothyroxine 25 mcg tablet TAKE ONE TABLET BY MOUTH EVERY DAY IN THE EARLY MORNINGS 90 tablet 0   • neomycin-polymyxin-hydrocortisone (CORTISPORIN) otic solution Administer 4 drops to the right ear every 6 (six) hours 10 mL 0   • traZODone (DESYREL) 50 mg tablet Take 1 tablet (50 mg total) by mouth daily at bedtime as needed for sleep 90 tablet 1   • venlafaxine (EFFEXOR-XR) 150 mg 24 hr capsule Take 1 capsule (150 mg total) by mouth daily 90 capsule 1   • multivitamin (THERAGRAN) TABS Take 1 tablet by mouth daily (Patient not taking: Reported on 10/4/2023)       No current facility-administered medications for this visit.          Health Maintenance     Health Maintenance   Topic Date Due   • COVID-19 Vaccine (3 - Pfizer series) 07/22/2021   • DTaP,Tdap,and Td Vaccines (2 - Td or Tdap) 02/11/2023   • Influenza Vaccine (1) 09/01/2023   • Depression Remission PHQ  10/19/2023   • BMI: Followup Plan  01/17/2024   • Annual Physical  01/16/2024   • BMI: Adult  09/26/2024   • HIV Screening  Completed   • Hepatitis C Screening  Completed   • Pneumococcal Vaccine: Pediatrics (0 to 5 Years) and At-Risk Patients (6 to 59 Years)  Aged Out   • HIB Vaccine  Aged Out   • IPV Vaccine  Aged Out   • Hepatitis A Vaccine  Aged Out   • Meningococcal ACWY Vaccine  Aged Out   • HPV Vaccine  Aged [employer] History   Administered Date(s) Administered   • COVID-19 PFIZER VACCINE 0.3 ML IM 05/05/2021, 05/27/2021   • INFLUENZA 10/19/2022   • Influenza, recombinant, quadrivalent,injectable, preservative free 11/04/2019   • Influenza, seasonal, injectable 01/04/2017   • Influenza, seasonal, injectable, preservative free 10/01/2018   • MMR 11/09/2022, 12/22/2022   • Tdap 02/11/2013   • Tuberculin Skin Test-PPD Intradermal 01/14/2022   • Varicella 11/09/2022, 12/22/2022       AMRITA Houser

## 2023-10-13 ENCOUNTER — OFFICE VISIT (OUTPATIENT)
Dept: FAMILY MEDICINE CLINIC | Facility: CLINIC | Age: 33
End: 2023-10-13
Payer: COMMERCIAL

## 2023-10-13 VITALS
DIASTOLIC BLOOD PRESSURE: 100 MMHG | TEMPERATURE: 98.2 F | OXYGEN SATURATION: 97 % | BODY MASS INDEX: 41.75 KG/M2 | HEIGHT: 73 IN | RESPIRATION RATE: 16 BRPM | HEART RATE: 80 BPM | WEIGHT: 315 LBS | SYSTOLIC BLOOD PRESSURE: 150 MMHG

## 2023-10-13 DIAGNOSIS — H60.501 ACUTE OTITIS EXTERNA OF RIGHT EAR, UNSPECIFIED TYPE: Primary | ICD-10-CM

## 2023-10-13 PROCEDURE — 99213 OFFICE O/P EST LOW 20 MIN: CPT | Performed by: NURSE PRACTITIONER

## 2023-10-13 RX ORDER — OFLOXACIN 3 MG/ML
10 SOLUTION AURICULAR (OTIC) DAILY
Qty: 10 ML | Refills: 0 | Status: SHIPPED | OUTPATIENT
Start: 2023-10-13

## 2023-10-13 RX ORDER — AMOXICILLIN AND CLAVULANATE POTASSIUM 875; 125 MG/1; MG/1
1 TABLET, FILM COATED ORAL EVERY 12 HOURS SCHEDULED
Qty: 14 TABLET | Refills: 0 | Status: SHIPPED | OUTPATIENT
Start: 2023-10-13 | End: 2023-10-20

## 2023-10-13 NOTE — PROGRESS NOTES
FAMILY PRACTICE OFFICE VISIT       NAME: Kyrie Pandya  AGE: 35 y.o. SEX: male       : 1990        MRN: [id number]    Assessment and Plan   1. Acute otitis externa of right ear, unspecified type  -     ofloxacin (FLOXIN) 0.3 % otic solution; Administer 10 drops to the right ear daily  -     amoxicillin-clavulanate (AUGMENTIN) 875-125 mg per tablet; Take 1 tablet by mouth every 12 (twelve) hours for 7 days         Stop Amoxicillin. Start Augmentin. Start Ofloxacin otic solution. Do not put anything in the ear. Tylenol or ibuprofen as needed for pain. Call or go to ED for worsening symptoms. Return in 1 week for recheck or sooner if needed. Chief Complaint     Chief Complaint   Patient presents with    Earache     Pt is here for rt ear pain 1 + wk       History of Present Illness     Kyrie Pandya is a 35year old male presenting today for right ear pain. He was seen in office last week for otitis media, started on Amoxicillin. Was initially getting better, then started having discharge from his ear. He was putting cotton balls in his ear, ear luis alberto (ED tech), cottons swabs. Ear continues to drain yellow drainage, and now pain has increased again. No fevers. No cold symptoms. No hearing loss. Review of Systems   Review of Systems   Constitutional: Negative. HENT:  Positive for ear discharge and ear pain. Negative for congestion, rhinorrhea, sinus pressure and sore throat. Respiratory:  Negative for cough. I have reviewed the patient's medical history in detail; there are no changes to the history as noted in the electronic medical record.     Objective     Vitals:    10/13/23 1453   BP: 150/100   Pulse: 80   Resp: 16   Temp: 98.2 °F (36.8 °C)   TempSrc: Temporal   SpO2: 97%   Weight: (!) 185 kg (408 lb)   Height: 6' 1" (1.854 m)     Wt Readings from Last 3 Encounters:   10/13/23 (!) 185 kg (408 lb)   10/04/23 (!) 183 kg (404 lb)   23 (!) 179 kg (395 lb)     Physical Exam  Vitals and nursing note reviewed. Constitutional:       Appearance: Normal appearance. He is not ill-appearing. HENT:      Head: Atraumatic. Left Ear: Tympanic membrane normal.      Ears:      Comments: Right ear canal swelling, redness, debris, I am not able to visualize TM. Neurological:      Mental Status: He is alert. ALLERGIES:  No Known Allergies    Current Medications     Current Outpatient Medications   Medication Sig Dispense Refill    amoxicillin-clavulanate (AUGMENTIN) 875-125 mg per tablet Take 1 tablet by mouth every 12 (twelve) hours for 7 days 14 tablet 0    buPROPion (WELLBUTRIN XL) 150 mg 24 hr tablet Take 1 tablet (150 mg total) by mouth daily 90 tablet 1    buPROPion (WELLBUTRIN XL) 300 mg 24 hr tablet Take 1 tablet (300 mg total) by mouth daily 90 tablet 1    Cholecalciferol (VITAMIN D-3) 1000 units CAPS Take 2 capsules by mouth daily      fluticasone (FLONASE) 50 mcg/act nasal spray 1 spray into each nostril 2 (two) times a day 16 g 2    levothyroxine 25 mcg tablet TAKE ONE TABLET BY MOUTH EVERY DAY IN THE EARLY MORNINGS 90 tablet 0    ofloxacin (FLOXIN) 0.3 % otic solution Administer 10 drops to the right ear daily 10 mL 0    traZODone (DESYREL) 50 mg tablet Take 1 tablet (50 mg total) by mouth daily at bedtime as needed for sleep 90 tablet 1    venlafaxine (EFFEXOR-XR) 150 mg 24 hr capsule Take 1 capsule (150 mg total) by mouth daily 90 capsule 1    multivitamin (THERAGRAN) TABS Take 1 tablet by mouth daily (Patient not taking: Reported on 10/4/2023)       No current facility-administered medications for this visit.          Health Maintenance     Health Maintenance   Topic Date Due    COVID-19 Vaccine (3 - Pfizer series) 07/22/2021    DTaP,Tdap,and Td Vaccines (2 - Td or Tdap) 02/11/2023    Influenza Vaccine (1) 09/01/2023    Depression Remission PHQ  10/19/2023    BMI: Followup Plan  01/17/2024    Annual Physical  01/16/2024    BMI: Adult 10/13/2024    HIV Screening  Completed    Hepatitis C Screening  Completed    Pneumococcal Vaccine: Pediatrics (0 to 5 Years) and At-Risk Patients (6 to 59 Years)  Aged Out    HIB Vaccine  Aged Out    IPV Vaccine  Aged Out    Hepatitis A Vaccine  Aged Out    Meningococcal ACWY Vaccine  Aged Out    HPV Vaccine  Aged [employer] History   Administered Date(s) Administered    COVID-19 PFIZER VACCINE 0.3 ML IM 05/05/2021, 05/27/2021    INFLUENZA 10/19/2022    Influenza, recombinant, quadrivalent,injectable, preservative free 11/04/2019    Influenza, seasonal, injectable 01/04/2017    Influenza, seasonal, injectable, preservative free 10/01/2018    MMR 11/09/2022, 12/22/2022    Tdap 02/11/2013    Tuberculin Skin Test-PPD Intradermal 01/14/2022    Varicella 11/09/2022, 12/22/2022       AMRITA Frank

## 2023-10-20 ENCOUNTER — OFFICE VISIT (OUTPATIENT)
Dept: FAMILY MEDICINE CLINIC | Facility: CLINIC | Age: 33
End: 2023-10-20

## 2023-10-20 VITALS
HEART RATE: 111 BPM | TEMPERATURE: 97.5 F | OXYGEN SATURATION: 97 % | RESPIRATION RATE: 18 BRPM | SYSTOLIC BLOOD PRESSURE: 132 MMHG | DIASTOLIC BLOOD PRESSURE: 86 MMHG

## 2023-10-20 DIAGNOSIS — H93.8X1 EAR PRESSURE, RIGHT: Primary | ICD-10-CM

## 2023-10-20 DIAGNOSIS — H93.11 RIGHT-SIDED TINNITUS: ICD-10-CM

## 2023-10-25 DIAGNOSIS — F41.1 GENERALIZED ANXIETY DISORDER: ICD-10-CM

## 2023-10-25 DIAGNOSIS — F33.1 MODERATE EPISODE OF RECURRENT MAJOR DEPRESSIVE DISORDER (HCC): ICD-10-CM

## 2023-10-25 RX ORDER — VENLAFAXINE HYDROCHLORIDE 150 MG/1
150 CAPSULE, EXTENDED RELEASE ORAL DAILY
Qty: 90 CAPSULE | Refills: 1 | Status: SHIPPED | OUTPATIENT
Start: 2023-10-25

## 2023-10-25 RX ORDER — BUPROPION HYDROCHLORIDE 150 MG/1
150 TABLET ORAL DAILY
Qty: 90 TABLET | Refills: 1 | Status: SHIPPED | OUTPATIENT
Start: 2023-10-25

## 2023-10-25 RX ORDER — BUPROPION HYDROCHLORIDE 300 MG/1
300 TABLET ORAL DAILY
Qty: 90 TABLET | Refills: 1 | Status: SHIPPED | OUTPATIENT
Start: 2023-10-25

## 2023-10-25 NOTE — TELEPHONE ENCOUNTER
Medication Refill Request     Name of Medication Wellbutrin XL  Dose/Frequency 150mg Take 1 tablet by mouth daily  Quantity 90 Tablets  Verified pharmacy   [x]  Verified ordering Provider   [x]  Does patient have enough for the next 3 days? Yes [] No [x]  Does patient have a follow-up appointment scheduled? Yes [x] No []   If so when is appointment: 11/8/23    Medication Refill Request     Name of Medication Wellbutrin XL  Dose/Frequency 300mg Take 1 tablet by mouth daily  Quantity 90 Tablets  Verified pharmacy   [x]  Verified ordering Provider   [x]  Does patient have enough for the next 3 days? Yes [] No [x]  Does patient have a follow-up appointment scheduled? Yes [x] No []   If so when is appointment: 11/8/23    Medication Refill Request     Name of Medication Effexor XR  Dose/Frequency 150mg Take 1 capsule by mouth daily  Quantity 90 Capsules  Verified pharmacy   [x]  Verified ordering Provider   [x]  Does patient have enough for the next 3 days? Yes [] No [x]  Does patient have a follow-up appointment scheduled?  Yes [x] No []   If so when is appointment: 11/8/23

## 2023-11-08 ENCOUNTER — TELEMEDICINE (OUTPATIENT)
Dept: PSYCHIATRY | Facility: CLINIC | Age: 33
End: 2023-11-08
Payer: COMMERCIAL

## 2023-11-08 DIAGNOSIS — F41.1 GENERALIZED ANXIETY DISORDER: ICD-10-CM

## 2023-11-08 DIAGNOSIS — R41.840 ATTENTION DEFICIT: ICD-10-CM

## 2023-11-08 DIAGNOSIS — F33.1 MODERATE EPISODE OF RECURRENT MAJOR DEPRESSIVE DISORDER (HCC): Primary | ICD-10-CM

## 2023-11-08 PROCEDURE — 90833 PSYTX W PT W E/M 30 MIN: CPT | Performed by: STUDENT IN AN ORGANIZED HEALTH CARE EDUCATION/TRAINING PROGRAM

## 2023-11-08 PROCEDURE — 99214 OFFICE O/P EST MOD 30 MIN: CPT | Performed by: STUDENT IN AN ORGANIZED HEALTH CARE EDUCATION/TRAINING PROGRAM

## 2023-11-08 NOTE — BH TREATMENT PLAN
TREATMENT PLAN (Medication Management Only)        [address] [facility]    Name and Date of Birth:  Casimiro Mcgee 35 y.o. 1990  Date of Treatment Plan: November 8, 2023  Diagnosis/Diagnoses:    1. Moderate episode of recurrent major depressive disorder ([address])    2. Generalized anxiety disorder      Strengths/Personal Resources for Self-Care: supportive family, supportive friends, taking medications as prescribed. Area/Areas of need (in own words): anxiety, depression  1. Long Term Goal: continue improvement in anxiety. Target Date:6 months - 5/8/2024  Person/Persons responsible for completion of goal: Early   2. Short Term Objective (s) - How will we reach this goal?:   A. Provider new recommended medication/dosage changes and/or continue medication(s): continue current medications as prescribed. B. Attend medication management appointments regularly. C. N/A. Target Date:6 months - 5/8/2024  Person/Persons Responsible for Completion of Goal: Early   Progress Towards Goals: continuing treatment  Treatment Modality: medication management every 3 months  Review due 180 days from date of this plan: 6 months - 5/8/2024  Expected length of service: maintenance  My Physician/PA/NP and I have developed this plan together and I agree to work on the goals and objectives. I understand the treatment goals that were developed for my treatment.

## 2023-11-08 NOTE — PSYCH
Virtual Regular Visit    Verification of patient location:    Patient is located at Home in the following state in which I hold an active license [place]      Assessment/Plan:    Problem List Items Addressed This Visit       Generalized anxiety disorder    Moderate episode of recurrent major depressive disorder ([address] ) - Primary     Other Visit Diagnoses       Attention deficit                       Reason for visit is   Chief Complaint   Patient presents with    Virtual Regular Visit          Encounter provider Dot Toledo MD    Provider located at [address] [place] [id number] [phone]      Recent Visits  No visits were found meeting these conditions. Showing recent visits within past 7 days and meeting all other requirements  Today's Visits  Date Type Provider Dept   11/08/23 Telemedicine Dot Toledo MD [facility]   Showing today's visits and meeting all other requirements  Future Appointments  No visits were found meeting these conditions. Showing future appointments within next 150 days and meeting all other requirements       The patient was identified by name and date of birth. Shira Astorga was informed that this is a telemedicine visit and [facility] He agrees to proceed. .  My office door was closed. No one else was in the room. He acknowledged consent and understanding of privacy and security of the video platform. The patient has agreed to participate and understands they can discontinue the visit at any time. Patient is aware this is a billable service. Daphne Perez is a 35 y.o. male . HPI     Past Medical History:   Diagnosis Date    Anxiety     Carpal tunnel syndrome, bilateral     LA. Danielle Richards 9/12/17   R. ...9/12/17     COVID-19 5/27/2022    GERD without esophagitis     Moderate episode of recurrent major depressive disorder ([address] [facility]) 08/22/2018    Morbid obesity ([address])     Obstructive sleep apnea     Pilonidal cyst with abscess     LA. ...10/25/13   R. ...6/10/14     Vitamin D deficiency        Past Surgical History:   Procedure Laterality Date    FOOT SURGERY      PILONIDAL CYST DRAINAGE      Incision and drainage of pilonidal cyst     OR NDSC WRST SURG W/RLS TRANSVRS CARPL LIGM Right 7/12/2018    Procedure: RELEASE CARPAL TUNNEL ENDOSCOPIC;  Surgeon: Lilian Evans MD;  Location:  MAIN OR;  Service: Orthopedics    OR [address] WRST SURG W/RLS TRANSVRS CARPL LIGM Left 7/26/2018    Procedure: RELEASE CARPAL TUNNEL ENDOSCOPIC;  Surgeon: Lilian Evans MD;  Location:  MAIN OR;  Service: Orthopedics       Current Outpatient Medications   Medication Sig Dispense Refill    buPROPion (WELLBUTRIN XL) 150 mg 24 hr tablet Take 1 tablet (150 mg total) by mouth daily 90 tablet 1    buPROPion (WELLBUTRIN XL) 300 mg 24 hr tablet Take 1 tablet (300 mg total) by mouth daily 90 tablet 1    Cholecalciferol (VITAMIN D-3) 1000 units CAPS Take 2 capsules by mouth daily      levothyroxine 25 mcg tablet TAKE ONE TABLET BY MOUTH EVERY DAY IN THE EARLY MORNINGS 90 tablet 0    multivitamin (THERAGRAN) TABS Take 1 tablet by mouth daily      venlafaxine (EFFEXOR-XR) 150 mg 24 hr capsule Take 1 capsule (150 mg total) by mouth daily 90 capsule 1     No current facility-administered medications for this visit. No Known Allergies    Review of Systems    Video Exam    There were no vitals filed for this visit.     Physical Exam     MEDICATION MANAGEMENT NOTE        [facility]      Name and Date of Birth:  Keeley Zazueta 35 y.o. 1990 MRN: [id number]    Date of Visit: November 8, 2023    Reason for Visit: Follow-up visit regarding medication management     Chief Complaint: "I think I might have ADHD"    SUBJECTIVE:    Keeley Zazueta is a 35 y.o., male, with a past psychiatric history significant for MDD, JON, medically complicated by JIMI, obesity, HTN, who presents for follow-up appointment for medication management. Annia Cisneros states that since their previous outpatient psychiatric appointment, he is doing relatively well. Reports that his depression has been relatively stable. Does have intermittent periods in which she becomes more down and depressed, mostly when ruminating about the past and his relationship with his ex-wife. He is still in the process of going through a divorce and this distracts him from time to time. Reports that he has not had any suicidal thoughts or intense feelings of hopelessness or worthlessness which is overall improved. Reports that there are some anxieties from time to time although this is fairly well managed with his current medication regimen. He is sleeping fairly well at night, no longer using the trazodone is able to fall asleep and maintain sleep without issue. Appetite levels are stable and adequate and he is trying to make some modifications to his diet to eat healthier. 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 He reports that he has some concerns that he may have ADHD. States that his sister was recently diagnosed with this. States that he has always had trouble with concentration and focus since grade school, resulting in poor grades and a D average. He reports that he struggles more so with inattention and focus, struggling to motivate himself to complete a task. This is becoming more problematic now that he is taking classes for college. He finds that he becomes easily distracted and unmotivated which creates difficulty with finishing his schoolwork. He is interested in ADHD testing denies any SI, HI, AVH.     Current Rating Scores:   Current PHQ-9   PHQ-2/9 Depression Screening             Psychiatric Review Of Systems:    Appetite: no  Adverse eating: no  Weight changes: no  Insomnia/sleeplessness: no  Fatigue/anergy: no  Anhedonia/lack of interest: no  Attention/concentration: decreased  Psychomotor agitation/retardation: no  Somatic symptoms: no  Anxiety/panic attack: no  Gillian/hypomania: no  Hopelessness/helplessness/worthlessness: no  Self-injurious behavior/high-risk behavior: no  Suicidal ideation: no  Homicidal ideation: no  Auditory hallucinations: no  Visual hallucinations: no  Other perceptual disturbances: no  Delusional thinking: no  Obsessive/compulsive symptoms: no    Review Of Systems:      Constitutional negative   ENT negative   Cardiovascular negative   Respiratory negative   Gastrointestinal negative   Genitourinary negative   Musculoskeletal negative   Integumentary negative   Neurological negative   Endocrine negative   Other Symptoms none, all other systems are negative     History Review: The following portions of the patient's history were reviewed and updated as appropriate: allergies, current medications, past family history, past medical history, past social history, past surgical history, and problem list. Previous progress notes/assessments from other providers reviewed as available. Past Medical History:    Past Medical History:   Diagnosis Date    Anxiety     Carpal tunnel syndrome, bilateral     LA. Louisa Pruett 9/12/17   R. ...9/12/17     COVID-19 5/27/2022    GERD without esophagitis     Moderate episode of recurrent major depressive disorder ([address]) 08/22/2018    Morbid obesity ([address])     Obstructive sleep apnea     Pilonidal cyst with abscess     LA. ...10/25/13   R. ...6/10/14     Vitamin D deficiency         No Known Allergies    Substance Abuse History:    Social History     Substance and Sexual Activity   Alcohol Use Yes    Comment: Rarely     Social History     Substance and Sexual Activity   Drug Use No       Social History:    Social History     Socioeconomic History    Marital status: Legally      Spouse name: Not on file Number of children: 0    Years of education: some college    Highest education level: Some college, no degree   Occupational History    Occupation: [employer]     Comment: employed full time   Tobacco Use    Smoking status: Never    Smokeless tobacco: Never   Vaping Use    Vaping Use: Never used   Substance and Sexual Activity    Alcohol use: Yes     Comment: Rarely    Drug use: No    Sexual activity: Yes     Partners: Female   Other Topics Concern    Not on file   Social History Narrative     since 20/12.  2/22. She moved out. No children. Also has brother and sister-in-law and his niece living with him. Works for [employer] in [place]. Also runs with the [employer]. Social Determinants of Health     Financial Resource Strain: Not on file   Food Insecurity: Not on file   Transportation Needs: Not on file   Physical Activity: Not on file   Stress: Not on file   Social Connections: Not on file   Intimate Partner Violence: Not on file   Housing Stability: Not on file       Family Psychiatric History:     Family History   Problem Relation Age of Onset    Depression Mother     Obesity Mother     Stroke Mother         Syndrome     Diabetes Mother     Alcohol abuse Father     Liver disease Father         hepatic failure     Hypertension Father     Depression Brother     Thyroid disease Brother     Alcohol abuse Brother     Substance Abuse Brother     Depression Maternal Uncle     Substance Abuse Brother     Heart disease Neg Hx     Cancer Neg Hx     Thyroid cancer Neg Hx             OBJECTIVE:     Vital signs in last 24 hours: There were no vitals filed for this visit.     Mental Status Evaluation:    Appearance age appropriate, casually dressed   Behavior cooperative, calm   Speech normal rate, normal volume, normal pitch   Mood euthymic   Affect normal range and intensity, appropriate   Thought Processes organized, goal directed   Associations intact associations   Thought Content no overt delusions   Perceptual Disturbances: no auditory hallucinations, no visual hallucinations   Abnormal Thoughts  Risk Potential Suicidal ideation - None  Homicidal ideation - None  Potential for aggression - No   Orientation oriented to person, place, time/date, and situation   Memory recent and remote memory grossly intact   Consciousness alert and awake   Attention Span Concentration Span attention span and concentration are age appropriate   Intellect appears to be of average intelligence   Insight intact   Judgement intact   Muscle Strength and  Gait normal muscle strength and normal muscle tone, normal gait and normal balance   Motor activity no abnormal movements   Fund of Knowledge adequate knowledge of current events  adequate fund of knowledge regarding past history  adequate fund of knowledge regarding vocabulary    Pain none   Pain Scale 0       Laboratory Results: I have personally reviewed all pertinent laboratory/tests results    Recent Labs (last 4 months):   No visits with results within 4 Month(s) from this visit. Latest known visit with results is:   Office Visit on 04/19/2023   Component Date Value     RAPID STREP A 04/19/2023 Negative     Throat Culture 04/19/2023 2+ Growth of Beta Hemolytic Streptococcus NOT Group A, C, or G        Suicide/Homicide Risk Assessment:    The following interventions are recommended: no intervention changes needed. Although patient's acute lethality risk is low, long-term/chronic lethality risk is mildly elevated in the presence of current diagnoses. At the current moment, Clementina Wallace is future-oriented, forward-thinking, and demonstrates ability to act in a self-preserving manner as evidenced by volitionally presenting to the clinic today, seeking treatment.  To mitigate future risk, patient should adhere to the recommendations below, avoid alcohol/illicit substance use, utilize community-based resources and familiar support and prioritize mental health treatment. Presently, patient denies active suicidal/homicidal ideation in addition to thoughts of self-injury; contracts for safety. Patient is amenable to calling/contacting the outpatient office including this writer if any acute adverse effects of their medication regimen arise in addition to any comments or concerns pertaining to their psychiatric management. Patient is amenable to calling/contacting crisis and/or attending to the nearest emergency department if their clinical condition deteriorates to assure their safety and stability, stating that they are able to appropriately confide in their family regarding their psychiatric state. Assessment/Plan:     Erasto Morataya was personally seen and evaluated today at the [address] [place] [address] outpatient clinic for follow-up regarding medication management. He notes that he has experienced an increase in depressive symptoms, mainly some negative self image and ruminating negative thoughts. He has experienced decreased motivation decreased energy level although this appears to coincide with poor sleep. His sleep is most likely significantly impacted by his obstructive sleep apnea; he is in process to obtain a CPAP machine. He also does correlate an increase in depressive symptoms around the time he ran out of the 150 mg Wellbutrin XL tablets; he wishes to restart this. The plan will be to resume the medications, follow in 2 months, consider adjustments if needed. 1/27/22 Mild improvement in depressive symptoms. Sleep remains problematic, though much of this seems related to obstructive sleep apnea. 4/29/22 Since previous appointment he did experience worsening depressive symptoms, suicidal ideations. This was due mostly to wife  him and increased stress at work and home. He attended PHP and feels much better now. More happier, less depressed, more motivated. No longer experiencing SI.   Feels the current medication regimen is helpful and would like to continue this. He continues to work with sleep medicine for JIMI. I encourage him to continue this. 8/17/22 Patient feeling more depressed. Passive suicidal ideations. Increased feelings of hopelessness, guilt, ruminating stressors. Much of this has worsened due to impending divorce. Feels that he is calling out of work more frequently, feeling unsatisfied with his career, more feelings of low self-esteem. Discussed medication changes and he agrees to taper Lexapro to Effexor. He will also reach out to his psychotherapist, Mal Hamlin, and return to weekly sessions. 9/15/22 -Still experiencing depressive symptoms - mostly anergia, amotivation, sadness, isolation. Ruminating more frequently about the divorce and past relationship. No suicidal thoughts. Tolerated increase in Effexor well, no side effects. Re-established with psychotherapy; still agreeable to return back to work on 9/19/22.    3/10/23 Usman Kendall states that he is doing well. Depressive symptoms are relatively stable. Intermittently occurring at times, mostly related to rumination about his divorce. He reports that sleep has been more difficult at times due to nightmares about his wife. 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. He is hopeful that he may become a nurse or a crisis worker in the near future. Tolerating medications well and no side effects reported. 7/27/23 He is doing well. Depression and anxiety are stable. Some stress still stemming from divorce proceedings although he is doing well and that relationship is improving with his ex-wife. He is now in a new relationship with another female and is pleased about this, going well. Putting in extra hours at work, continuing to do prerequisite classes for nursing school. Tolerating medications well. No side effects.     11/8/23 He is doing fairly well in terms of depression and anxiety symptoms. The struggle with some depressed mood at times when ruminating about the divorce with his wife. He considers planes of inattentive symptoms which she has noticed more recently now that he is taking prerequisites for nursing school. The symptoms have been present for a long period of time although he is interested in receiving an evaluation by neuropsychology. DSM-5 Diagnoses:     Major depressive disorder, recurrent, moderate; generalized anxiety disorder; obstructive sleep apnea    Treatment Recommendations/Precautions:    Effexor 150mg for depression and anxiety. Wellbutrin 450 mg for depression. Trazodone 50mg HS PRN for insomnia. Medication management every 1 months  Continue psychotherapy with own therapist  Aware of need to follow up with family physician for medical issues  Aware of 24 hour and weekend coverage for urgent situations accessed by calling [facility] main practice number    Medications Risks/Benefits      Risks, Benefits And Possible Side Effects Of Medications:    Risks, benefits, and possible side effects of medications explained to Susie including risk of suicidality and serotonin syndrome related to treatment with antidepressants. He verbalizes understanding and agreement for treatment. .    Controlled Medication Discussion:     Not applicable    Psychotherapy Provided:     Individual psychotherapy provided: Yes  Counseling was provided during the session today for 16 minutes. Recent stressor including family problems, pending divorce, relationship problems, marital problems, and job stress discussed with Susie. Coping skills reviewed with Susie. Reassurance and supportive therapy provided.       Treatment Plan:    Completed and signed during the session: Yes - with Susie    This note was not shared with the patient due to reasonable likelihood of causing patient harm    Visit Time    Visit Start Time: 300PM  Visit Stop Time: 325PM  Total Visit Duration:  25 minutes        Binta Payne MD 11/08/23

## 2023-12-28 DIAGNOSIS — R79.89 ELEVATED TSH: ICD-10-CM

## 2023-12-28 RX ORDER — LEVOTHYROXINE SODIUM 0.03 MG/1
25 TABLET ORAL
Qty: 90 TABLET | Refills: 0 | Status: SHIPPED | OUTPATIENT
Start: 2023-12-28

## 2024-02-21 ENCOUNTER — OFFICE VISIT (OUTPATIENT)
Dept: FAMILY MEDICINE CLINIC | Facility: CLINIC | Age: 34
End: 2024-02-21
Payer: COMMERCIAL

## 2024-02-21 VITALS
BODY MASS INDEX: 41.75 KG/M2 | WEIGHT: 315 LBS | HEART RATE: 97 BPM | SYSTOLIC BLOOD PRESSURE: 140 MMHG | OXYGEN SATURATION: 97 % | TEMPERATURE: 98.2 F | RESPIRATION RATE: 16 BRPM | HEIGHT: 73 IN | DIASTOLIC BLOOD PRESSURE: 86 MMHG

## 2024-02-21 DIAGNOSIS — J06.9 VIRAL UPPER RESPIRATORY TRACT INFECTION: Primary | ICD-10-CM

## 2024-02-21 DIAGNOSIS — J02.9 PHARYNGITIS, UNSPECIFIED ETIOLOGY: ICD-10-CM

## 2024-02-21 LAB — S PYO AG THROAT QL: NEGATIVE

## 2024-02-21 PROCEDURE — 99213 OFFICE O/P EST LOW 20 MIN: CPT | Performed by: FAMILY MEDICINE

## 2024-02-21 NOTE — PROGRESS NOTES
Chief Complaint   Patient presents with    Cold Like Symptoms     Congestion/ sore throat/ fever/ body aches/ chills x2 days  Negative covid test on 2/20/24  Took Ibuprofen this morning for fever @ 6:00am. Temp in office 98.2    Annual Exam        HPI   Here with 2 days of fever, chills, sweats, sore throat, and bodyaches.  COVID test negative yesterday.  Took ibuprofen this morning.  Did have a flu shot in October.  Minimal cough.    Past Medical History:   Diagnosis Date    Anxiety     Carpal tunnel syndrome, bilateral     LA...9/12/17   R....9/12/17     COVID-19 5/27/2022    GERD without esophagitis     Moderate episode of recurrent major depressive disorder (HCC) 08/22/2018    Morbid obesity (HCC)     Obstructive sleep apnea     Pilonidal cyst with abscess     LA....10/25/13   R....6/10/14     Vitamin D deficiency         Past Surgical History:   Procedure Laterality Date    FOOT SURGERY      PILONIDAL CYST DRAINAGE      Incision and drainage of pilonidal cyst     CT NDSC WRST SURG W/RLS TRANSVRS CARPL LIGM Right 7/12/2018    Procedure: RELEASE CARPAL TUNNEL ENDOSCOPIC;  Surgeon: Emerson Infante MD;  Location: [facility] OR;  Service: Orthopedics    CT NDSC WRST SURG W/RLS TRANSVRS CARPL LIGM Left 7/26/2018    Procedure: RELEASE CARPAL TUNNEL ENDOSCOPIC;  Surgeon: Emerson Infante MD;  Location: [facility];  Service: Orthopedics       Social History     Tobacco Use    Smoking status: Never    Smokeless tobacco: Never   Substance Use Topics    Alcohol use: Yes     Comment: Rarely       Social History     Social History Narrative     since 20/12.   2/22.  She moved out.    No children.    Also has brother and sister-in-law and his niece living with him.And mother.     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    Going to [employer] for prerequisites for nursing school.    Also involved with the [employer]        The following portions of the patient's history were reviewed  "and updated as appropriate: allergies, current medications, past family history, past medical history, past social history, past surgical history, and problem list.      Review of Systems       /86   Pulse 97   Temp 98.2 °F (36.8 °C) (Temporal)   Resp 16   Ht 6' 1\" (1.854 m)   Wt (!) 183 kg (404 lb)   SpO2 97%   BMI 53.30 kg/m²      Physical Exam   Appears comfortable.  Eardrums are white.  Throat reveals mild large and slightly exudative tonsils with peritonsillar erythema.  No palpable anterior cervical lymph nodes.  Lungs are clear.  Heart regular.              Current Outpatient Medications:     buPROPion (WELLBUTRIN XL) 150 mg 24 hr tablet, Take 1 tablet (150 mg total) by mouth daily, Disp: 90 tablet, Rfl: 1    buPROPion (WELLBUTRIN XL) 300 mg 24 hr tablet, Take 1 tablet (300 mg total) by mouth daily, Disp: 90 tablet, Rfl: 1    Cholecalciferol (VITAMIN D-3) 1000 units CAPS, Take 2 capsules by mouth daily, Disp: , Rfl:     levothyroxine 25 mcg tablet, Take 1 tablet (25 mcg total) by mouth daily in the early morning, Disp: 90 tablet, Rfl: 0    multivitamin (THERAGRAN) TABS, Take 1 tablet by mouth daily, Disp: , Rfl:     venlafaxine (EFFEXOR-XR) 150 mg 24 hr capsule, Take 1 capsule (150 mg total) by mouth daily, Disp: 90 capsule, Rfl: 1     No problem-specific Assessment & Plan notes found for this encounter.       Diagnoses and all orders for this visit:    Viral upper respiratory tract infection    Pharyngitis, unspecified etiology  -     POCT rapid strep A        Patient Instructions   Strep screen is.  Negative.  Would not be unreasonable to repeat a COVID test.  If positive, could be treated with Paxlovid.  Otherwise, continue ibuprofen up to 800 mg 3 times a day for fever, aches, and pains.  Return to work when free of fever.     "

## 2024-02-21 NOTE — PATIENT INSTRUCTIONS
Strep screen is.  Negative.  Would not be unreasonable to repeat a COVID test.  If positive, could be treated with Paxlovid.  Otherwise, continue ibuprofen up to 800 mg 3 times a day for fever, aches, and pains.  Return to work when free of fever.

## 2024-02-25 ENCOUNTER — OFFICE VISIT (OUTPATIENT)
Dept: URGENT CARE | Age: 34
End: 2024-02-25
Payer: COMMERCIAL

## 2024-02-25 VITALS
SYSTOLIC BLOOD PRESSURE: 159 MMHG | OXYGEN SATURATION: 97 % | HEART RATE: 100 BPM | TEMPERATURE: 96.3 F | DIASTOLIC BLOOD PRESSURE: 85 MMHG | RESPIRATION RATE: 20 BRPM

## 2024-02-25 DIAGNOSIS — J06.9 BACTERIAL UPPER RESPIRATORY INFECTION: Primary | ICD-10-CM

## 2024-02-25 DIAGNOSIS — B96.89 BACTERIAL UPPER RESPIRATORY INFECTION: Primary | ICD-10-CM

## 2024-02-25 PROCEDURE — 99213 OFFICE O/P EST LOW 20 MIN: CPT | Performed by: NURSE PRACTITIONER

## 2024-02-25 RX ORDER — AMOXICILLIN 875 MG/1
875 TABLET, COATED ORAL 2 TIMES DAILY
Qty: 14 TABLET | Refills: 0 | Status: SHIPPED | OUTPATIENT
Start: 2024-02-25 | End: 2024-03-03

## 2024-02-25 RX ORDER — PREDNISONE 20 MG/1
20 TABLET ORAL 2 TIMES DAILY WITH MEALS
Qty: 10 TABLET | Refills: 0 | Status: SHIPPED | OUTPATIENT
Start: 2024-02-25 | End: 2024-03-01

## 2024-02-26 NOTE — PROGRESS NOTES
"  [facility]        NAME: Reynaldo Santoyo is a 33 y.o. male  : 1990    MRN: [id number]  DATE: 2024  TIME: 7:53 PM    Assessment and Plan   Bacterial upper respiratory infection [J06.9, B96.89]  1. Bacterial upper respiratory infection  amoxicillin (AMOXIL) 875 mg tablet            Patient Instructions     Take med as prescribed   Follow up with PCP in 3-5 days.  Proceed to  ER if symptoms worsen.    Chief Complaint     Chief Complaint   Patient presents with    Earache     Sick for last week fever , chills body aches saw pcp on Thursday . Neg strep . Two home tests for covid. Thursday and Friday wake up from sleep throat \" felt like it was closing gasping for air. \" B/l ear pain     Sore Throat         History of Present Illness       HPI  Reports bilateral ear pain, left worse than right. Also chills, bodyaches, and chills. Duration of symptoms x 8 days. Strep at PCP at onset was negative.     Review of Systems   Review of Systems   Constitutional:  Positive for chills. Negative for fever.   HENT:  Positive for congestion, ear pain, rhinorrhea and sore throat.    Respiratory:  Positive for cough and shortness of breath (when laying down at night, intermittent). Negative for chest tightness and wheezing.    Cardiovascular:  Negative for chest pain.   Gastrointestinal:  Negative for diarrhea and vomiting.         Current Medications       Current Outpatient Medications:     amoxicillin (AMOXIL) 875 mg tablet, Take 1 tablet (875 mg total) by mouth 2 (two) times a day for 7 days, Disp: 14 tablet, Rfl: 0    buPROPion (WELLBUTRIN XL) 150 mg 24 hr tablet, Take 1 tablet (150 mg total) by mouth daily, Disp: 90 tablet, Rfl: 1    buPROPion (WELLBUTRIN XL) 300 mg 24 hr tablet, Take 1 tablet (300 mg total) by mouth daily, Disp: 90 tablet, Rfl: 1    Cholecalciferol (VITAMIN D-3) 1000 units CAPS, Take 2 capsules by mouth daily, Disp: , Rfl:     levothyroxine 25 mcg tablet, Take 1 tablet (25 mcg total) " by mouth daily in the early morning, Disp: 90 tablet, Rfl: 0    multivitamin (THERAGRAN) TABS, Take 1 tablet by mouth daily, Disp: , Rfl:     venlafaxine (EFFEXOR-XR) 150 mg 24 hr capsule, Take 1 capsule (150 mg total) by mouth daily, Disp: 90 capsule, Rfl: 1    Current Allergies     Allergies as of 02/25/2024    (No Known Allergies)            The following portions of the patient's history were reviewed and updated as appropriate: allergies, current medications, past family history, past medical history, past social history, past surgical history and problem list.     Past Medical History:   Diagnosis Date    Anxiety     Carpal tunnel syndrome, bilateral     LA...9/12/17   R....9/12/17     COVID-19 5/27/2022    GERD without esophagitis     Moderate episode of recurrent major depressive disorder (HCC) 08/22/2018    Morbid obesity (HCC)     Obstructive sleep apnea     Pilonidal cyst with abscess     LA....10/25/13   R....6/10/14     Vitamin D deficiency        Past Surgical History:   Procedure Laterality Date    FOOT SURGERY      PILONIDAL CYST DRAINAGE      Incision and drainage of pilonidal cyst     IA NDSC WRST SURG W/RLS TRANSVRS CARPL LIGM Right 7/12/2018    Procedure: RELEASE CARPAL TUNNEL ENDOSCOPIC;  Surgeon: Emerson Infante MD;  Location:  MAIN OR;  Service: Orthopedics    IA NDSC WRST SURG W/RLS TRANSVRS CARPL LIGM Left 7/26/2018    Procedure: RELEASE CARPAL TUNNEL ENDOSCOPIC;  Surgeon: Emerson Infante MD;  Location:  MAIN OR;  Service: Orthopedics       Family History   Problem Relation Age of Onset    Depression Mother     Obesity Mother     Stroke Mother         Syndrome     Diabetes Mother     Alcohol abuse Father     Liver disease Father         hepatic failure     Hypertension Father     Depression Brother     Thyroid disease Brother     Alcohol abuse Brother     Substance Abuse Brother     Depression Maternal Uncle     Substance Abuse Brother     Heart disease Neg Hx     Cancer Neg Hx      Thyroid cancer Neg Hx          Medications have been verified.        Objective   /85   Pulse 100   Temp (!) 96.3 °F (35.7 °C) (Temporal)   Resp 20   SpO2 97%   No LMP for male patient.       Physical Exam     Physical Exam

## 2024-03-13 ENCOUNTER — OFFICE VISIT (OUTPATIENT)
Dept: FAMILY MEDICINE CLINIC | Facility: CLINIC | Age: 34
End: 2024-03-13
Payer: COMMERCIAL

## 2024-03-13 VITALS
OXYGEN SATURATION: 97 % | DIASTOLIC BLOOD PRESSURE: 80 MMHG | WEIGHT: 315 LBS | SYSTOLIC BLOOD PRESSURE: 120 MMHG | HEART RATE: 82 BPM | HEIGHT: 73 IN | BODY MASS INDEX: 41.75 KG/M2 | TEMPERATURE: 98.4 F | RESPIRATION RATE: 18 BRPM

## 2024-03-13 DIAGNOSIS — H69.93 DYSFUNCTION OF BOTH EUSTACHIAN TUBES: Primary | ICD-10-CM

## 2024-03-13 PROCEDURE — 99213 OFFICE O/P EST LOW 20 MIN: CPT | Performed by: NURSE PRACTITIONER

## 2024-03-13 NOTE — PROGRESS NOTES
"   FAMILY PRACTICE OFFICE VISIT       NAME: Reynaldo Santoyo  AGE: 33 y.o. SEX: male       : 1990        MRN: [id number]    Assessment and Plan   1. Dysfunction of both eustachian tubes       Start OTC Claritin or Zyrtec 10 mg daily.   Start Flonase nasal spray 2 sprays each nostril daily.   Expect slow resolution of symptoms over next 2-3 weeks.   Call if symptoms persist, worsen, or new symptoms develop.     Chief Complaint     Chief Complaint   Patient presents with    Earache     Patient is here for b/l ear fullness       History of Present Illness     Reynaldo Santoyo is a 33 year old male presenting today for ear concern.    Was sick about 3 weeks ago.     Treated at urgent care when ears hurt on , Given amoxicillin and prednisone.     Since then left > right ear feels muffled hearing, and feels like there is fluid in his ears.     All other cold symptoms resolved.     Popping ears.    Symptoms better, but not resolved.               Review of Systems   Review of Systems   Constitutional: Negative.    HENT:  Negative for congestion, ear discharge, ear pain, postnasal drip, sinus pressure and sore throat.         Ears as noted in HPI.   Respiratory:  Negative for cough.    Neurological:  Negative for headaches.       I have reviewed the patient's medical history in detail; there are no changes to the history as noted in the electronic medical record.    Objective     Vitals:    24 1439   BP: 120/80   Pulse: 82   Resp: 18   Temp: 98.4 °F (36.9 °C)   TempSrc: Temporal   SpO2: 97%   Weight: (!) 184 kg (405 lb)   Height: 6' 1\" (1.854 m)     Wt Readings from Last 3 Encounters:   24 (!) 184 kg (405 lb)   24 (!) 183 kg (404 lb)   24 (!) 185 kg (407 lb)     Physical Exam  Vitals and nursing note reviewed.   Constitutional:       General: He is not in acute distress.     Appearance: Normal appearance. He is not ill-appearing.   HENT:      Head: Atraumatic.      Right Ear: Tympanic membrane " normal.      Left Ear: Tympanic membrane normal.      Nose:      Comments: Mildly enlarged turbinates.      Mouth/Throat:      Mouth: Mucous membranes are moist.      Pharynx: Oropharynx is clear. No pharyngeal swelling or posterior oropharyngeal erythema.      Tonsils: No tonsillar exudate. 1+ on the right. 1+ on the left.   Cardiovascular:      Rate and Rhythm: Normal rate and regular rhythm.      Heart sounds: No murmur heard.  Pulmonary:      Effort: Pulmonary effort is normal. No respiratory distress.      Breath sounds: Normal breath sounds.   Musculoskeletal:      Cervical back: Normal range of motion and neck supple.      Right lower leg: No edema.      Left lower leg: No edema.   Lymphadenopathy:      Cervical: No cervical adenopathy.   Neurological:      Mental Status: He is alert.   Psychiatric:         Mood and Affect: Mood normal.            ALLERGIES:  No Known Allergies    Current Medications     Current Outpatient Medications   Medication Sig Dispense Refill    buPROPion (WELLBUTRIN XL) 150 mg 24 hr tablet Take 1 tablet (150 mg total) by mouth daily 90 tablet 1    buPROPion (WELLBUTRIN XL) 300 mg 24 hr tablet Take 1 tablet (300 mg total) by mouth daily 90 tablet 1    Cholecalciferol (VITAMIN D-3) 1000 units CAPS Take 2 capsules by mouth daily      levothyroxine 25 mcg tablet Take 1 tablet (25 mcg total) by mouth daily in the early morning 90 tablet 0    multivitamin (THERAGRAN) TABS Take 1 tablet by mouth daily      venlafaxine (EFFEXOR-XR) 150 mg 24 hr capsule Take 1 capsule (150 mg total) by mouth daily 90 capsule 1     No current facility-administered medications for this visit.         Health Maintenance     Health Maintenance   Topic Date Due    Depression Follow-up Plan  Never done    DTaP,Tdap,and Td Vaccines (2 - Td or Tdap) 02/11/2023    COVID-19 Vaccine (3 - 2023-24 season) 09/01/2023    Annual Physical  01/16/2024    Depression Screening  02/21/2025    Zoster Vaccine (1 of 2) 07/10/2040     HIV Screening  Completed    Hepatitis C Screening  Completed    Influenza Vaccine  Completed    Pneumococcal Vaccine: Pediatrics (0 to 5 Years) and At-Risk Patients (6 to 64 Years)  Aged Out    HIB Vaccine  Aged Out    IPV Vaccine  Aged Out    Hepatitis A Vaccine  Aged Out    Meningococcal ACWY Vaccine  Aged Out    HPV Vaccine  Aged Out     Immunization History   Administered Date(s) Administered    COVID-19 PFIZER VACCINE 0.3 ML IM 05/05/2021, 05/27/2021    INFLUENZA 10/19/2022, 10/01/2023    Influenza, recombinant, quadrivalent,injectable, preservative free 11/04/2019    Influenza, seasonal, injectable 01/04/2017    Influenza, seasonal, injectable, preservative free 10/01/2018    MMR 11/09/2022, 12/22/2022    Tdap 02/11/2013    Tuberculin Skin Test-PPD Intradermal 01/14/2022    Varicella 11/09/2022, 12/22/2022       AMRITA Alexander

## 2024-03-18 ENCOUNTER — TELEMEDICINE (OUTPATIENT)
Dept: PSYCHIATRY | Facility: CLINIC | Age: 34
End: 2024-03-18
Payer: COMMERCIAL

## 2024-03-18 DIAGNOSIS — F33.1 MODERATE EPISODE OF RECURRENT MAJOR DEPRESSIVE DISORDER (HCC): Primary | ICD-10-CM

## 2024-03-18 DIAGNOSIS — F41.1 GENERALIZED ANXIETY DISORDER: ICD-10-CM

## 2024-03-18 DIAGNOSIS — R41.840 ATTENTION DEFICIT: ICD-10-CM

## 2024-03-18 PROCEDURE — 90833 PSYTX W PT W E/M 30 MIN: CPT | Performed by: STUDENT IN AN ORGANIZED HEALTH CARE EDUCATION/TRAINING PROGRAM

## 2024-03-18 PROCEDURE — 99214 OFFICE O/P EST MOD 30 MIN: CPT | Performed by: STUDENT IN AN ORGANIZED HEALTH CARE EDUCATION/TRAINING PROGRAM

## 2024-03-18 NOTE — BH TREATMENT PLAN
TREATMENT PLAN (Medication Management Only)        [facility]    Name and Date of Birth:  Reynaldo Santoyo 33 y.o. 1990  Date of Treatment Plan: March 18, 2024  Diagnosis/Diagnoses:    1. Moderate episode of recurrent major depressive disorder (HCC)    2. Generalized anxiety disorder    3. Attention deficit      Strengths/Personal Resources for Self-Care: supportive family, supportive friends, taking medications as prescribed.  Area/Areas of need (in own words): anxiety symptoms, depression, ADHD symptoms  1. Long Term Goal: continue improvement in attention.  Target Date:6 months - 9/18/2024  Person/Persons responsible for completion of goal: Reynaldo  2.  Short Term Objective (s) - How will we reach this goal?:   A. Provider new recommended medication/dosage changes and/or continue medication(s): continue current medications as prescribed.  B. Attend medication management appointments regularly.  C. N/A.  Target Date:6 months - 9/18/2024  Person/Persons Responsible for Completion of Goal: Reynaldo  Progress Towards Goals: continuing treatment  Treatment Modality: medication management every 3 months  Review due 180 days from date of this plan: 6 months - 9/18/2024  Expected length of service: maintenance  My Physician/PA/NP and I have developed this plan together and I agree to work on the goals and objectives. I understand the treatment goals that were developed for my treatment.

## 2024-03-18 NOTE — PSYCH
Virtual Regular Visit    Verification of patient location:    Patient is located at Home in the following state in which I hold an active license [place]      Assessment/Plan:    Problem List Items Addressed This Visit       Generalized anxiety disorder    Moderate episode of recurrent major depressive disorder (HCC) - Primary     Other Visit Diagnoses       Attention deficit                     Reason for visit is   Chief Complaint   Patient presents with    Virtual Regular Visit          Encounter provider Miguel A Mercado MD    Provider located at [facility] [address] [address] [phone]      Recent Visits  Date Type Provider Dept   03/13/24 Office Visit AMRITA Alexander [facility]   Showing recent visits within past 7 days and meeting all other requirements  Today's Visits  Date Type Provider Dept   03/18/24 Telemedicine Miguel A Mercado MD [facility]   Showing today's visits and meeting all other requirements  Future Appointments  No visits were found meeting these conditions.  Showing future appointments within next 150 days and meeting all other requirements       The patient was identified by name and date of birth. Reynaldo Santoyo was informed that this is a telemedicine visit and that the visit is being conducted throughthe Epic Embedded platform. He agrees to proceed..  My office door was closed. No one else was in the room.  He acknowledged consent and understanding of privacy and security of the video platform. The patient has agreed to participate and understands they can discontinue the visit at any time.    Patient is aware this is a billable service.     Subjective  Reynaldo Santoyo is a 33 y.o. male .      HPI     Past Medical History:   Diagnosis Date    Anxiety     Carpal tunnel syndrome, bilateral     LA...9/12/17   R....9/12/17     COVID-19 5/27/2022    GERD without esophagitis     Moderate  "episode of recurrent major depressive disorder (HCC) 08/22/2018    Morbid obesity (HCC)     Obstructive sleep apnea     Pilonidal cyst with abscess     LA....10/25/13   R....6/10/14     Vitamin D deficiency        Past Surgical History:   Procedure Laterality Date    FOOT SURGERY      PILONIDAL CYST DRAINAGE      Incision and drainage of pilonidal cyst     LA NDSC WRST SURG W/RLS TRANSVRS CARPL LIGM Right 7/12/2018    Procedure: RELEASE CARPAL TUNNEL ENDOSCOPIC;  Surgeon: Emerson Infante MD;  Location:  MAIN OR;  Service: Orthopedics    LA NDSC WRST SURG W/RLS TRANSVRS CARPL LIGM Left 7/26/2018    Procedure: RELEASE CARPAL TUNNEL ENDOSCOPIC;  Surgeon: Emerson Infante MD;  Location:  MAIN OR;  Service: Orthopedics       Current Outpatient Medications   Medication Sig Dispense Refill    buPROPion (WELLBUTRIN XL) 150 mg 24 hr tablet Take 1 tablet (150 mg total) by mouth daily 90 tablet 1    buPROPion (WELLBUTRIN XL) 300 mg 24 hr tablet Take 1 tablet (300 mg total) by mouth daily 90 tablet 1    Cholecalciferol (VITAMIN D-3) 1000 units CAPS Take 2 capsules by mouth daily      levothyroxine 25 mcg tablet Take 1 tablet (25 mcg total) by mouth daily in the early morning 90 tablet 0    multivitamin (THERAGRAN) TABS Take 1 tablet by mouth daily      venlafaxine (EFFEXOR-XR) 150 mg 24 hr capsule Take 1 capsule (150 mg total) by mouth daily 90 capsule 1     No current facility-administered medications for this visit.        No Known Allergies    Review of Systems    Video Exam    There were no vitals filed for this visit.    Physical Exam     MEDICATION MANAGEMENT NOTE        [facility]      Name and Date of Birth:  Reynaldo Santoyo 33 y.o. 1990 MRN: [id number]    Date of Visit: March 18, 2024    Reason for Visit: Follow-up visit regarding medication management     Chief Complaint: \"things are OK - I'm taking a semester off because I missed too many " "classes\"    SUBJECTIVE:    Reynaldo Santoyo is a 33 y.o., male, with a past psychiatric history significant for MDD, JON, medically complicated by JIMI, obesity, HTN, who presents for follow-up appointment for medication management. Reynaldo states that since their previous outpatient psychiatric appointment, he has been doing fairly well.  Reports that depression and anxiety has been relatively well-controlled.  No significant anxiety symptoms at this point.  Sometimes feeling intermittently anxious although not as significant as before.  He is struggling intermittently with some depressive symptoms.  Will notice a few times per month in which she will have some decreased energy and motivation.  Feeling less able to go out and interact with others and a bit more socially withdrawn and isolative at those times.  This will only last 1 to 2 days at max, sometimes requiring him to call out work.  He reports that he does have some passive suicidal thoughts during those times.  Often thoughts of \"would I be better off dead\".  Although he tries to reframe and restructure his thoughts, focusing on the positives in his life and states his family is protective factors and preventing him from harming himself.  He states that he still continues to struggle with inattentive symptoms.  Reports that he will often procrastinate, is forgetful, easily distracted.  Cites this as one of the difficulties he had with school this semester which required him to drop out a bit early.  He is still pending neuropsychological evaluation.  He also is following with ENT for correction of deviated nasal septum which he is hoping may relieve obstructive sleep apnea symptoms.  Today he denies any SI, HI, AVH.    Current Rating Scores:   Current PHQ-9   PHQ-2/9 Depression Screening             Psychiatric Review Of Systems:    Appetite: no  Adverse eating: no  Weight changes: no  Insomnia/sleeplessness: decreased  Fatigue/anergy: " decreased  Anhedonia/lack of interest: no  Attention/concentration: decreased  Psychomotor agitation/retardation: no  Somatic symptoms: no  Anxiety/panic attack: no  Gillian/hypomania: no  Hopelessness/helplessness/worthlessness: no  Self-injurious behavior/high-risk behavior: no  Suicidal ideation: no  Homicidal ideation: no  Auditory hallucinations: no  Visual hallucinations: no  Other perceptual disturbances: no  Delusional thinking: no  Obsessive/compulsive symptoms: no    Review Of Systems:      Constitutional negative   ENT negative   Cardiovascular negative   Respiratory negative   Gastrointestinal negative   Genitourinary negative   Musculoskeletal negative   Integumentary negative   Neurological negative   Endocrine negative   Other Symptoms none, all other systems are negative     History Review:   The following portions of the patient's history were reviewed and updated as appropriate: allergies, current medications, past family history, past medical history, past social history, past surgical history, and problem list. Previous progress notes/assessments from other providers reviewed as available.    Past Medical History:    Past Medical History:   Diagnosis Date    Anxiety     Carpal tunnel syndrome, bilateral     LA...9/12/17   R....9/12/17     COVID-19 5/27/2022    GERD without esophagitis     Moderate episode of recurrent major depressive disorder (HCC) 08/22/2018    Morbid obesity (HCC)     Obstructive sleep apnea     Pilonidal cyst with abscess     LA....10/25/13   R....6/10/14     Vitamin D deficiency         No Known Allergies    Substance Abuse History:    Social History     Substance and Sexual Activity   Alcohol Use Yes    Comment: Rarely     Social History     Substance and Sexual Activity   Drug Use No       Social History:    Social History     Socioeconomic History    Marital status: Legally      Spouse name: Not on file    Number of children: 0    Years of education: some college     Highest education level: Some college, no degree   Occupational History    Occupation: [employer]     Comment: employed full time   Tobacco Use    Smoking status: Never    Smokeless tobacco: Never   Vaping Use    Vaping status: Never Used   Substance and Sexual Activity    Alcohol use: Yes     Comment: Rarely    Drug use: No    Sexual activity: Yes     Partners: Female   Other Topics Concern    Not on file   Social History Narrative     since 20/12.   2/22.  She moved out.    No children.    Also has brother and sister-in-law and his niece living with him.And mother.     Works as an [employer] at [employer] in [place] since 10/22.    Going to [employer] for prerequisites for nursing school.    Also involved with the [employer].     Social Determinants of Health     Financial Resource Strain: Not on file   Food Insecurity: Not on file   Transportation Needs: Not on file   Physical Activity: Not on file   Stress: Not on file   Social Connections: Not on file   Intimate Partner Violence: Not on file   Housing Stability: Not on file       Family Psychiatric History:     Family History   Problem Relation Age of Onset    Depression Mother     Obesity Mother     Stroke Mother         Syndrome     Diabetes Mother     Alcohol abuse Father     Liver disease Father         hepatic failure     Hypertension Father     Depression Brother     Thyroid disease Brother     Alcohol abuse Brother     Substance Abuse Brother     Depression Maternal Uncle     Substance Abuse Brother     Heart disease Neg Hx     Cancer Neg Hx     Thyroid cancer Neg Hx             OBJECTIVE:     Vital signs in last 24 hours:    There were no vitals filed for this visit.    Mental Status Evaluation:    Appearance age appropriate, casually dressed   Behavior cooperative, calm   Speech normal rate, normal volume, normal pitch   Mood euthymic   Affect normal range and intensity, appropriate   Thought Processes organized, goal  directed   Associations intact associations   Thought Content no overt delusions   Perceptual Disturbances: no auditory hallucinations, no visual hallucinations   Abnormal Thoughts  Risk Potential Suicidal ideation - None  Homicidal ideation - None  Potential for aggression - No   Orientation oriented to person, place, time/date, and situation   Memory recent and remote memory grossly intact   Consciousness alert and awake   Attention Span Concentration Span attention span and concentration appear shorter than expected for age   Intellect appears to be of average intelligence   Insight intact   Judgement intact   Muscle Strength and  Gait normal muscle strength and normal muscle tone, normal gait and normal balance   Motor activity no abnormal movements   Fund of Knowledge adequate knowledge of current events  adequate fund of knowledge regarding past history  adequate fund of knowledge regarding vocabulary    Pain none   Pain Scale 0       Laboratory Results: I have personally reviewed all pertinent laboratory/tests results    Recent Labs (last 4 months):   Office Visit on 02/21/2024   Component Date Value    Rapid Strep A Screen 02/21/2024 Negative        Suicide/Homicide Risk Assessment:    The following interventions are recommended: no intervention changes needed.     Although patient's acute lethality risk is low, long-term/chronic lethality risk is mildly elevated in the presence of current diagnoses.   At the current moment, Reynaldo is future-oriented, forward-thinking, and demonstrates ability to act in a self-preserving manner as evidenced by volitionally presenting to the clinic today, seeking treatment. To mitigate future risk, patient should adhere to the recommendations below, avoid alcohol/illicit substance use, utilize community-based resources and familiar support and prioritize mental health treatment. Presently, patient denies active suicidal/homicidal ideation in addition to thoughts of  self-injury; contracts for safety. Patient is amenable to calling/contacting the outpatient office including this writer if any acute adverse effects of their medication regimen arise in addition to any comments or concerns pertaining to their psychiatric management.  Patient is amenable to calling/contacting crisis and/or attending to the nearest emergency department if their clinical condition deteriorates to assure their safety and stability, stating that they are able to appropriately confide in their family regarding their psychiatric state.    Assessment/Plan:     Reynaldo was personally seen and evaluated today at the [facility] outpatient clinic for follow-up regarding medication management.  He notes that he has experienced an increase in depressive symptoms, mainly some negative self image and ruminating negative thoughts.  He has experienced decreased motivation decreased energy level although this appears to coincide with poor sleep.  His sleep is most likely significantly impacted by his obstructive sleep apnea; he is in process to obtain a CPAP machine.  He also does correlate an increase in depressive symptoms around the time he ran out of the 150 mg Wellbutrin XL tablets; he wishes to restart this.  The plan will be to resume the medications, follow in 2 months, consider adjustments if needed.    1/27/22 Mild improvement in depressive symptoms. Sleep remains problematic, though much of this seems related to obstructive sleep apnea.     4/29/22 Since previous appointment he did experience worsening depressive symptoms, suicidal ideations.  This was due mostly to wife  him and increased stress at work and home.  He attended PHP and feels much better now.  More happier, less depressed, more motivated.  No longer experiencing SI.  Feels the current medication regimen is helpful and would like to continue this.  He continues to work with sleep medicine for JIMI. I encourage him  to continue this.    8/17/22 Patient feeling more depressed.  Passive suicidal ideations.  Increased feelings of hopelessness, guilt, ruminating stressors.  Much of this has worsened due to impending divorce.  Feels that he is calling out of work more frequently, feeling unsatisfied with his career, more feelings of low self-esteem.  Discussed medication changes and he agrees to taper Lexapro to Effexor.  He will also reach out to his psychotherapist, Ayse Bautista, and return to weekly sessions.    9/15/22 -Still experiencing depressive symptoms - mostly anergia, amotivation, sadness, isolation.  Ruminating more frequently about the divorce and past relationship. No suicidal thoughts. Tolerated increase in Effexor well, no side effects. Re-established with psychotherapy; still agreeable to return back to work on 9/19/22.    3/10/23 Reynaldo states that he is doing well.  Depressive symptoms are relatively stable.  Intermittently occurring at times, mostly related to rumination about his divorce.  He reports that sleep has been more difficult at times due to nightmares about his wife.  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  He is hopeful that he may become a nurse or a crisis worker in the near future.  Tolerating medications well and no side effects reported.    7/27/23 He is doing well.  Depression and anxiety are stable.  Some stress still stemming from divorce proceedings although he is doing well and that relationship is improving with his ex-wife.  He is now in a new relationship with another female and is pleased about this, going well.  Putting in extra hours at work, continuing to do prerequisite classes for nursing school.  Tolerating medications well.  No side effects.    11/8/23 He is doing fairly well in terms of depression and anxiety symptoms.  The struggle with some depressed mood at times when ruminating about the  divorce with his wife.  He considers planes of inattentive symptoms which she has noticed more recently now that he is taking prerequisites for nursing school.  The symptoms have been present for a long period of time although he is interested in receiving an evaluation by neuropsychology.    3/18/24 He is doing fairly well; anxiety is stable.  Depression does wax and wane at times, 1-2 times per month feeling anhedonic and anergic which results in him having to call out of work although this is happening less frequently.  We will fill out [facility] paperwork for him to accommodate.  He is still struggling with inattentive symptoms, feeling more forgetful and distracted.  He is pending neuropsychological evaluation which will better clarify if he has ADHD symptoms.  He is also working with ENT/sleep medicine to correct deviated nasal septum which may be playing a role in obstructive sleep apnea which could further impact his memory/focus issues.  He may be doing a clinical trial for a device to correct the nasal septum.    DSM-5 Diagnoses:     Major depressive disorder, recurrent, moderate; generalized anxiety disorder; obstructive sleep apnea    Treatment Recommendations/Precautions:    Effexor 150mg for depression and anxiety.  Wellbutrin 450 mg for depression.  Medication management every 1 months  Continue psychotherapy with own therapist  Aware of need to follow up with family physician for medical issues  Aware of 24 hour and weekend coverage for urgent situations accessed by calling [facility] main practice number    Medications Risks/Benefits      Risks, Benefits And Possible Side Effects Of Medications:    Risks, benefits, and possible side effects of medications explained to Reynaldo including risk of suicidality and serotonin syndrome related to treatment with antidepressants. He verbalizes understanding and agreement for treatment..    Controlled Medication Discussion:     Not  applicable    Psychotherapy Provided:     Individual psychotherapy provided: Yes  Counseling was provided during the session today for 16 minutes.  Recent stressor including family problems, relationship problems, job stress, school stress, health issues, medical problems, and everyday stressors discussed with Reynaldo.   Coping skills reviewed with Reynaldo.   Reassurance and supportive therapy provided.      Treatment Plan:    Completed and signed during the session: Yes - with Reynaldo    This note was not shared with the patient due to reasonable likelihood of causing patient harm    Visit Time    Visit Start Time: 332pm  Visit Stop Time: 403pm  Total Visit Duration:  29 minutes        Miguel A Mercado MD 03/18/24

## 2024-03-20 DIAGNOSIS — Z20.2 POSSIBLE EXPOSURE TO STD: Primary | ICD-10-CM

## 2024-05-13 ENCOUNTER — TELEPHONE (OUTPATIENT)
Dept: PSYCHIATRY | Facility: CLINIC | Age: 34
End: 2024-05-13

## 2024-05-13 NOTE — TELEPHONE ENCOUNTER
LM on Reynaldo's VM informing him that his Evaluation has not been received yet.  Also informed him that provider is out of the office this week and will need to review it upon his return.

## 2024-05-23 DIAGNOSIS — R79.89 ELEVATED TSH: ICD-10-CM

## 2024-05-24 ENCOUNTER — OFFICE VISIT (OUTPATIENT)
Dept: FAMILY MEDICINE CLINIC | Facility: CLINIC | Age: 34
End: 2024-05-24
Payer: COMMERCIAL

## 2024-05-24 VITALS
HEIGHT: 73 IN | WEIGHT: 315 LBS | OXYGEN SATURATION: 98 % | HEART RATE: 80 BPM | DIASTOLIC BLOOD PRESSURE: 82 MMHG | TEMPERATURE: 97.8 F | BODY MASS INDEX: 41.75 KG/M2 | SYSTOLIC BLOOD PRESSURE: 124 MMHG | RESPIRATION RATE: 16 BRPM

## 2024-05-24 DIAGNOSIS — K21.9 GASTROESOPHAGEAL REFLUX DISEASE, UNSPECIFIED WHETHER ESOPHAGITIS PRESENT: ICD-10-CM

## 2024-05-24 DIAGNOSIS — R10.30 LOWER ABDOMINAL PAIN: ICD-10-CM

## 2024-05-24 DIAGNOSIS — Z00.00 PHYSICAL EXAM, ANNUAL: Primary | ICD-10-CM

## 2024-05-24 DIAGNOSIS — R19.7 DIARRHEA, UNSPECIFIED TYPE: ICD-10-CM

## 2024-05-24 PROCEDURE — 99395 PREV VISIT EST AGE 18-39: CPT | Performed by: NURSE PRACTITIONER

## 2024-05-24 PROCEDURE — 99213 OFFICE O/P EST LOW 20 MIN: CPT | Performed by: NURSE PRACTITIONER

## 2024-05-24 RX ORDER — LEVOTHYROXINE SODIUM 0.03 MG/1
25 TABLET ORAL
Qty: 90 TABLET | Refills: 0 | Status: SHIPPED | OUTPATIENT
Start: 2024-05-24

## 2024-05-24 NOTE — PROGRESS NOTES
[facility] HEALTH MAINTENANCE OFFICE VISIT  [facility] - [facility]    NAME: Reynaldo Santoyo  AGE: 33 y.o. SEX: male  : 1990     DATE: 2024    Assessment and Plan     1. Physical exam, annual  2. Lower abdominal pain  -     H. pylori antigen, stool; Future  -     Giardia antigen; Future  -     Fecal leukocytes; Future  -     Clostridium difficile toxin by PCR with EIA; Future  -     Calprotectin,Fecal; Future  -     H. pylori antigen, stool; Future  -     Ova and parasite examination; Future  -     Pancreatic elastase, fecal; Future  -     Stool Enteric Bacterial Panel by PCR; Future  -     Ambulatory Referral to Gastroenterology; Future  3. Diarrhea, unspecified type  -     H. pylori antigen, stool; Future  -     Giardia antigen; Future  -     Fecal leukocytes; Future  -     Clostridium difficile toxin by PCR with EIA; Future  -     Calprotectin,Fecal; Future  -     H. pylori antigen, stool; Future  -     Ova and parasite examination; Future  -     Pancreatic elastase, fecal; Future  -     Stool Enteric Bacterial Panel by PCR; Future  -     Ambulatory Referral to Gastroenterology; Future  4. Gastroesophageal reflux disease, unspecified whether esophagitis present  -     Ambulatory Referral to Gastroenterology; Future    Complete previously ordered blood work.   Check stool samples.   Follow up with GI.     Patient Counseling:   Nutrition: Stressed importance of a well balanced diet, moderation of sodium/saturated fat, caloric balance and sufficient intake of fiber  Exercise: Stressed the importance of regular exercise with a goal of 150 minutes per week  Dental Health: Discussed daily flossing and brushing and regular dental visits     Immunizations reviewed: Up To Date Will check with employee health for vaccine records.   Discussed benefits of:  Screening labs.Due for blood work.  BMI Counseling: Body mass index is 53.17 kg/m². Discussed with patient's BMI with  him. The BMI is above normal. Nutrition recommendations include decreasing overall calorie intake and 3-5 servings of fruits/vegetables daily. Exercise recommendations include exercising 3-5 times per week.    Follow up in one year for annual exam or sooner if needed.         Chief Complaint     Chief Complaint   Patient presents with    Physical Exam    GI Problem     A few weeks        History of Present Illness     Reynaldo Santoyo is a 33 year old male presenting for annual exam and for GI concerns.     Was tested for ADD recently at [facility].   Waiting for results of testing.     Blood work due.     GI concerns:  Few weeks now.  Lower abdominal pain, left side most common location.  Cramping pain. Discomfort, does not keep him from doing activities.  Intermittent diarrhea.   Usually morning is solid stool, then has total of 2-4 stools per day. Stools can be loose to watery.   No nausea or vomiting.   Doesn't matter what he is eating.   Pain is not associated with having a bowel movement.   Pain occurs every day.   No fevers or chills.   Ibuprofen once in awhile, but not often.   No other OTC products used.   No unexpected weight changes.  No swallowing difficulty.  Has GERD, doesn't take omeprazole anymore, but still has symptoms.    Had EGD, Colonoscopy long time ago, not available in EMR.    Last took antibiotics in February--amoxicillin.   No recent travel.            Well Adult Physical   Patient here for a comprehensive physical exam.      Diet and Physical Activity  Diet: Trying to watch calories. Getting more motivated to improve diet.   Exercise:  Walking a lot at work. Plans to start going to the gym.       Depression Screen  PHQ-2/9 Depression Screening              General Health  Hearing: Normal:  bilateral  Vision: no vision problems  Dental: regular dental visits      The following portions of the patient's history were reviewed and updated as appropriate: allergies, current  medications, past family history, past medical history, past social history, past surgical history and problem list.    Review of Systems     Review of Systems   Constitutional: Negative.    HENT: Negative.     Respiratory: Negative.     Cardiovascular: Negative.    Gastrointestinal:  Positive for abdominal pain and diarrhea. Negative for abdominal distention, anal bleeding, blood in stool, constipation, nausea, rectal pain and vomiting.   Genitourinary: Negative.    Musculoskeletal: Negative.    Skin: Negative.    Neurological: Negative.    Hematological: Negative.    Psychiatric/Behavioral: Negative.         Past Medical History     Past Medical History:   Diagnosis Date    Anxiety     Carpal tunnel syndrome, bilateral     LA...9/12/17   R....9/12/17     COVID-19 5/27/2022    GERD without esophagitis     Moderate episode of recurrent major depressive disorder (HCC) 08/22/2018    Morbid obesity (HCC)     Obstructive sleep apnea     Pilonidal cyst with abscess     LA....10/25/13   R....6/10/14     Vitamin D deficiency        Past Surgical History     Past Surgical History:   Procedure Laterality Date    FOOT SURGERY      PILONIDAL CYST DRAINAGE      Incision and drainage of pilonidal cyst     IN NDSC WRST SURG W/RLS TRANSVRS CARPL LIGM Right 7/12/2018    Procedure: RELEASE CARPAL TUNNEL ENDOSCOPIC;  Surgeon: Emerson Infante MD;  Location: [facility];  Service: Orthopedics    IN NDSC WRST SURG W/RLS TRANSVRS CARPL LIGM Left 7/26/2018    Procedure: RELEASE CARPAL TUNNEL ENDOSCOPIC;  Surgeon: Emerson Infante MD;  Location: [facility];  Service: Orthopedics       Social History     Social History     Socioeconomic History    Marital status: Legally      Spouse name: None    Number of children: 0    Years of education: some college    Highest education level: Some college, no degree   Occupational History    Occupation: [employer]     Comment: employed full time   Tobacco Use    Smoking status:  Never    Smokeless tobacco: Never   Vaping Use    Vaping status: Never Used   Substance and Sexual Activity    Alcohol use: Yes     Comment: Rarely    Drug use: No    Sexual activity: Yes     Partners: Female   Other Topics Concern    None   Social History Narrative     since 20/12.   2/22.  She moved out.    No children.    Also has brother and sister-in-law and his niece living with him.And mother.     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    Going to [employer] for prerequisites for nursing school.    Also involved with the [employer].     Social Determinants of Health     Financial Resource Strain: Not on file   Food Insecurity: Not on file   Transportation Needs: Not on file   Physical Activity: Not on file   Stress: Not on file   Social Connections: Not on file   Intimate Partner Violence: Not on file   Housing Stability: Not on file       Family History     Family History   Problem Relation Age of Onset    Depression Mother     Obesity Mother     Stroke Mother         Syndrome     Diabetes Mother     Alcohol abuse Father     Liver disease Father         hepatic failure     Hypertension Father     Depression Brother     Thyroid disease Brother     Alcohol abuse Brother     Substance Abuse Brother     Depression Maternal Uncle     Substance Abuse Brother     Heart disease Neg Hx     Cancer Neg Hx     Thyroid cancer Neg Hx        Current Medications       Current Outpatient Medications:     buPROPion (WELLBUTRIN XL) 150 mg 24 hr tablet, Take 1 tablet (150 mg total) by mouth daily, Disp: 90 tablet, Rfl: 1    buPROPion (WELLBUTRIN XL) 300 mg 24 hr tablet, Take 1 tablet (300 mg total) by mouth daily, Disp: 90 tablet, Rfl: 1    Cholecalciferol (VITAMIN D-3) 1000 units CAPS, Take 2 capsules by mouth daily, Disp: , Rfl:     multivitamin (THERAGRAN) TABS, Take 1 tablet by mouth daily, Disp: , Rfl:     venlafaxine (EFFEXOR-XR) 150 mg 24 hr capsule, Take 1 capsule (150 mg total)  "by mouth daily, Disp: 90 capsule, Rfl: 1    levothyroxine 25 mcg tablet, Take 1 tablet (25 mcg total) by mouth daily in the early morning, Disp: 90 tablet, Rfl: 0     Allergies     No Known Allergies    Objective     /82 (BP Location: Left arm, Patient Position: Sitting, Cuff Size: Large)   Pulse 80   Temp 97.8 °F (36.6 °C) (Temporal)   Resp 16   Ht 6' 1\" (1.854 m)   Wt (!) 183 kg (403 lb)   SpO2 98%   BMI 53.17 kg/m²      Physical Exam  Vitals and nursing note reviewed.   Constitutional:       General: He is not in acute distress.     Appearance: Normal appearance. He is not ill-appearing.   HENT:      Head: Atraumatic.      Right Ear: Tympanic membrane normal.      Left Ear: Tympanic membrane normal.      Mouth/Throat:      Mouth: Mucous membranes are moist.      Pharynx: Oropharynx is clear.   Eyes:      Conjunctiva/sclera: Conjunctivae normal.      Pupils: Pupils are equal, round, and reactive to light.   Cardiovascular:      Rate and Rhythm: Normal rate and regular rhythm.      Heart sounds: No murmur heard.  Pulmonary:      Effort: Pulmonary effort is normal.      Breath sounds: Normal breath sounds.   Abdominal:      General: Bowel sounds are normal. There is no distension.      Palpations: Abdomen is soft.      Tenderness: There is abdominal tenderness (lower quadrants).      Comments: Abdomen obese, limits exam   Musculoskeletal:      Cervical back: Normal range of motion and neck supple.      Right lower leg: No edema.      Left lower leg: No edema.   Lymphadenopathy:      Cervical: No cervical adenopathy.   Skin:     General: Skin is warm and dry.      Findings: No rash.   Neurological:      Mental Status: He is alert.      Gait: Gait normal.   Psychiatric:         Mood and Affect: Mood normal.                 AMRITA Alexander  [facility]  "

## 2024-06-05 ENCOUNTER — APPOINTMENT (OUTPATIENT)
Dept: LAB | Facility: CLINIC | Age: 34
End: 2024-06-05
Payer: COMMERCIAL

## 2024-06-05 DIAGNOSIS — E55.9 VITAMIN D DEFICIENCY: ICD-10-CM

## 2024-06-05 DIAGNOSIS — R19.7 DIARRHEA, UNSPECIFIED TYPE: ICD-10-CM

## 2024-06-05 DIAGNOSIS — Z13.220 LIPID SCREENING: ICD-10-CM

## 2024-06-05 DIAGNOSIS — Z13.1 DIABETES MELLITUS SCREENING: ICD-10-CM

## 2024-06-05 DIAGNOSIS — R10.30 LOWER ABDOMINAL PAIN: ICD-10-CM

## 2024-06-05 DIAGNOSIS — Z20.2 POSSIBLE EXPOSURE TO STD: ICD-10-CM

## 2024-06-05 DIAGNOSIS — E66.01 CLASS 3 SEVERE OBESITY DUE TO EXCESS CALORIES WITH SERIOUS COMORBIDITY AND BODY MASS INDEX (BMI) OF 50.0 TO 59.9 IN ADULT (HCC): ICD-10-CM

## 2024-06-05 DIAGNOSIS — E03.9 ACQUIRED HYPOTHYROIDISM: ICD-10-CM

## 2024-06-05 LAB
25(OH)D3 SERPL-MCNC: 40.2 NG/ML (ref 30–100)
ALBUMIN SERPL BCP-MCNC: 4 G/DL (ref 3.5–5)
ALP SERPL-CCNC: 82 U/L (ref 34–104)
ALT SERPL W P-5'-P-CCNC: 27 U/L (ref 7–52)
ANION GAP SERPL CALCULATED.3IONS-SCNC: 5 MMOL/L (ref 4–13)
AST SERPL W P-5'-P-CCNC: 20 U/L (ref 13–39)
BILIRUB SERPL-MCNC: 0.45 MG/DL (ref 0.2–1)
BUN SERPL-MCNC: 15 MG/DL (ref 5–25)
CALCIUM SERPL-MCNC: 9.1 MG/DL (ref 8.4–10.2)
CHLORIDE SERPL-SCNC: 104 MMOL/L (ref 96–108)
CHOLEST SERPL-MCNC: 182 MG/DL
CO2 SERPL-SCNC: 31 MMOL/L (ref 21–32)
CREAT SERPL-MCNC: 1.09 MG/DL (ref 0.6–1.3)
ERYTHROCYTE [DISTWIDTH] IN BLOOD BY AUTOMATED COUNT: 12.8 % (ref 11.6–15.1)
EST. AVERAGE GLUCOSE BLD GHB EST-MCNC: 111 MG/DL
GFR SERPL CREATININE-BSD FRML MDRD: 88 ML/MIN/1.73SQ M
GLUCOSE P FAST SERPL-MCNC: 98 MG/DL (ref 65–99)
HBA1C MFR BLD: 5.5 %
HCT VFR BLD AUTO: 47.2 % (ref 36.5–49.3)
HCV AB SER QL: NORMAL
HDLC SERPL-MCNC: 43 MG/DL
HGB BLD-MCNC: 15.2 G/DL (ref 12–17)
LDLC SERPL CALC-MCNC: 117 MG/DL (ref 0–100)
MCH RBC QN AUTO: 27.8 PG (ref 26.8–34.3)
MCHC RBC AUTO-ENTMCNC: 32.2 G/DL (ref 31.4–37.4)
MCV RBC AUTO: 86 FL (ref 82–98)
NONHDLC SERPL-MCNC: 139 MG/DL
PLATELET # BLD AUTO: 306 THOUSANDS/UL (ref 149–390)
PMV BLD AUTO: 9.5 FL (ref 8.9–12.7)
POTASSIUM SERPL-SCNC: 4.1 MMOL/L (ref 3.5–5.3)
PROT SERPL-MCNC: 7.7 G/DL (ref 6.4–8.4)
RBC # BLD AUTO: 5.47 MILLION/UL (ref 3.88–5.62)
SODIUM SERPL-SCNC: 140 MMOL/L (ref 135–147)
TREPONEMA PALLIDUM IGG+IGM AB [PRESENCE] IN SERUM OR PLASMA BY IMMUNOASSAY: NORMAL
TRIGL SERPL-MCNC: 108 MG/DL
TSH SERPL DL<=0.05 MIU/L-ACNC: 4 UIU/ML (ref 0.45–4.5)
WBC # BLD AUTO: 7.7 THOUSAND/UL (ref 4.31–10.16)

## 2024-06-05 PROCEDURE — 36415 COLL VENOUS BLD VENIPUNCTURE: CPT

## 2024-06-05 PROCEDURE — 80061 LIPID PANEL: CPT

## 2024-06-05 PROCEDURE — 86780 TREPONEMA PALLIDUM: CPT

## 2024-06-05 PROCEDURE — 87491 CHLMYD TRACH DNA AMP PROBE: CPT

## 2024-06-05 PROCEDURE — 85027 COMPLETE CBC AUTOMATED: CPT

## 2024-06-05 PROCEDURE — 87591 N.GONORRHOEAE DNA AMP PROB: CPT

## 2024-06-05 PROCEDURE — 82306 VITAMIN D 25 HYDROXY: CPT

## 2024-06-05 PROCEDURE — 84443 ASSAY THYROID STIM HORMONE: CPT

## 2024-06-05 PROCEDURE — 87389 HIV-1 AG W/HIV-1&-2 AB AG IA: CPT

## 2024-06-05 PROCEDURE — 86803 HEPATITIS C AB TEST: CPT

## 2024-06-05 PROCEDURE — 83036 HEMOGLOBIN GLYCOSYLATED A1C: CPT

## 2024-06-05 PROCEDURE — 80053 COMPREHEN METABOLIC PANEL: CPT

## 2024-06-06 ENCOUNTER — TELEPHONE (OUTPATIENT)
Dept: FAMILY MEDICINE CLINIC | Facility: CLINIC | Age: 34
End: 2024-06-06

## 2024-06-06 LAB
C TRACH DNA SPEC QL NAA+PROBE: NEGATIVE
HIV 1+2 AB+HIV1 P24 AG SERPL QL IA: NORMAL
HIV 2 AB SERPL QL IA: NORMAL
HIV1 AB SERPL QL IA: NORMAL
HIV1 P24 AG SERPL QL IA: NORMAL
N GONORRHOEA DNA SPEC QL NAA+PROBE: NEGATIVE

## 2024-06-06 NOTE — TELEPHONE ENCOUNTER
----- Message from AMRITA Subramanian sent at 6/6/2024 12:19 PM EDT -----  Please call Reynaldo with blood work results--he does not desire to to be contacted via [employer].   Blood work is good.   Please continue same dose of levothyroxine.

## 2024-06-07 ENCOUNTER — PREP FOR PROCEDURE (OUTPATIENT)
Dept: GASTROENTEROLOGY | Facility: CLINIC | Age: 34
End: 2024-06-07

## 2024-06-07 ENCOUNTER — OFFICE VISIT (OUTPATIENT)
Dept: GASTROENTEROLOGY | Facility: CLINIC | Age: 34
End: 2024-06-07
Payer: COMMERCIAL

## 2024-06-07 VITALS
HEIGHT: 74 IN | WEIGHT: 315 LBS | BODY MASS INDEX: 40.43 KG/M2 | DIASTOLIC BLOOD PRESSURE: 78 MMHG | TEMPERATURE: 98.5 F | SYSTOLIC BLOOD PRESSURE: 134 MMHG

## 2024-06-07 DIAGNOSIS — K21.9 GERD WITHOUT ESOPHAGITIS: ICD-10-CM

## 2024-06-07 DIAGNOSIS — K21.9 GASTROESOPHAGEAL REFLUX DISEASE, UNSPECIFIED WHETHER ESOPHAGITIS PRESENT: ICD-10-CM

## 2024-06-07 DIAGNOSIS — R19.7 DIARRHEA, UNSPECIFIED TYPE: ICD-10-CM

## 2024-06-07 DIAGNOSIS — R10.30 LOWER ABDOMINAL PAIN: ICD-10-CM

## 2024-06-07 PROCEDURE — 99244 OFF/OP CNSLTJ NEW/EST MOD 40: CPT | Performed by: INTERNAL MEDICINE

## 2024-06-07 NOTE — PATIENT INSTRUCTIONS
Scheduled date of colonoscopy (as of today):06.25.24  Physician performing colonoscopy:DR COLLINS  Location of colonoscopy:  Bowel prep reviewed with patient:AD/EDGARDO  Instructions reviewed with patient by:BRISEIDA  Clearances: N/A

## 2024-06-07 NOTE — H&P (VIEW-ONLY)
"[facility] - Outpatient Consultation  Reynaldo Santoyo 33 y.o. male MRN: [id number]  Encounter: [id number]    ASSESSMENT AND PLAN:  33 year old male here for evaluation.  He has hypothyroidsim.  He has JIMI  and wears CPAP.  Vitamin D deficiency.  Also has anxiety and depression.      1. GERD without esophagitis  2. Lower abdominal pain  3. Diarrhea, unspecified type  4. Gastroesophageal reflux disease, unspecified whether esophagitis present  5. BMI 50.0-59.9, adult (HCC)    Gree with stool studies to assess for acute bacterial infection.  Agree with colonoscopy given severe cramping and lower quadrant pain.  This could just be irritable bowel syndrome Given his obesity he is at risk factor for polyps and early colon cancers.  Will do this in the hospital setting given his BMI of 52 and history of JIMI.  Will discuss weight loss and weight management referral at next visit.    Follow-up in a few months time.    ______________________________________________________________________    HPI:  33 year old male here for evaluation.  He reports being on PPI's in the past but stopped them as his PCP didn't want them to be on in long term.  Symptoms worse in the early morning, works night most of the time.    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      He reports for the past two months he has had sharp pain in the left lower quadrant that he feels is due to a \"poop cramp\" but also with sharp pains in b/l lower quadrants.  Pain getting worse lately.  Today, when he got done working, had pain that was 8/10.  Thinks he has a hemorrhoid and occasionally feels it.      Also with nausea and diarrhea.  PCP ordered stool studies.    Thinks he had a colonoscopy many years ago.          REVIEW OF SYSTEMS:    CONSTITUTIONAL: Denies any fever, chills, rigors, and weight loss.  HEENT: No earache or tinnitus. Denies hearing loss or visual disturbances.  CARDIOVASCULAR: No chest pain or palpitations. "   RESPIRATORY: Denies any cough, hemoptysis, shortness of breath or dyspnea on exertion.  GASTROINTESTINAL: As noted in the History of Present Illness.   GENITOURINARY: No problems with urination. Denies any hematuria or dysuria.  NEUROLOGIC: No dizziness or vertigo, denies headaches.   MUSCULOSKELETAL: Denies any muscle or joint pain.   SKIN: Denies skin rashes or itching.   ENDOCRINE: Denies excessive thirst. Denies intolerance to heat or cold.  PSYCHOSOCIAL: Denies depression or anxiety. Denies any recent memory loss.       Historical Information   Past Medical History:   Diagnosis Date   • Anxiety    • Carpal tunnel syndrome, bilateral     LA...9/12/17   R....9/12/17    • COVID-19 5/27/2022   • GERD without esophagitis    • Moderate episode of recurrent major depressive disorder (HCC) 08/22/2018   • Morbid obesity (HCC)    • Obstructive sleep apnea    • Pilonidal cyst with abscess     LA....10/25/13   R....6/10/14    • Vitamin D deficiency      Past Surgical History:   Procedure Laterality Date   • COLONOSCOPY     • FOOT SURGERY     • PILONIDAL CYST DRAINAGE      Incision and drainage of pilonidal cyst    • NE NDSC WRST SURG W/RLS TRANSVRS CARPL LIGM Right 07/12/2018    Procedure: RELEASE CARPAL TUNNEL ENDOSCOPIC;  Surgeon: Emerson Infante MD;  Location:  MAIN OR;  Service: Orthopedics   • NE NDSC WRST SURG W/RLS TRANSVRS CARPL LIGM Left 07/26/2018    Procedure: RELEASE CARPAL TUNNEL ENDOSCOPIC;  Surgeon: Emersno Infante MD;  Location:  MAIN OR;  Service: Orthopedics     Social History   Social History     Substance and Sexual Activity   Alcohol Use Yes    Comment: Rarely     Social History     Substance and Sexual Activity   Drug Use No     Social History     Tobacco Use   Smoking Status Never   Smokeless Tobacco Never     Family History   Problem Relation Age of Onset   • Depression Mother    • Obesity Mother    • Stroke Mother         Syndrome    • Diabetes Mother    • Alcohol abuse Father    •  "Liver disease Father         hepatic failure    • Hypertension Father    • Depression Brother    • Thyroid disease Brother    • Alcohol abuse Brother    • Substance Abuse Brother    • Depression Maternal Uncle    • Substance Abuse Brother    • Heart disease Neg Hx    • Cancer Neg Hx    • Thyroid cancer Neg Hx        Meds/Allergies       Current Outpatient Medications:   •  buPROPion (WELLBUTRIN XL) 150 mg 24 hr tablet  •  buPROPion (WELLBUTRIN XL) 300 mg 24 hr tablet  •  Cholecalciferol (VITAMIN D-3) 1000 units CAPS  •  levothyroxine 25 mcg tablet  •  multivitamin (THERAGRAN) TABS  •  venlafaxine (EFFEXOR-XR) 150 mg 24 hr capsule    No Known Allergies        Objective     Blood pressure 134/78, temperature 98.5 °F (36.9 °C), temperature source Tympanic, height 6' 2\" (1.88 m), weight (!) 182 kg (401 lb). Body mass index is 51.49 kg/m².        PHYSICAL EXAM:      General Appearance:   Alert, cooperative, no distress   HEENT:   Normocephalic, atraumatic, anicteric.     Neck:  Supple, symmetrical, trachea midline   Lungs:   Clear to auscultation bilaterally; no rales, rhonchi or wheezing; respirations unlabored    Heart::   Regular rate and rhythm; no murmur, rub, or gallop.   Abdomen:   Soft, non-tender, non-distended; normal bowel sounds; no masses, no organomegaly    Genitalia:   Deferred    Rectal:   Deferred    Extremities:  No cyanosis, clubbing or edema    Pulses:  2+ and symmetric    Skin:  No jaundice, rashes, or lesions    Lymph nodes:  No palpable cervical lymphadenopathy        Lab Results:   No visits with results within 1 Day(s) from this visit.   Latest known visit with results is:   Appointment on 06/05/2024   Component Date Value   • WBC 06/05/2024 7.70    • RBC 06/05/2024 5.47    • Hemoglobin 06/05/2024 15.2    • Hematocrit 06/05/2024 47.2    • MCV 06/05/2024 86    • MCH 06/05/2024 27.8    • MCHC 06/05/2024 32.2    • RDW 06/05/2024 12.8    • Platelets 06/05/2024 306    • MPV 06/05/2024 9.5    • Sodium " 06/05/2024 140    • Potassium 06/05/2024 4.1    • Chloride 06/05/2024 104    • CO2 06/05/2024 31    • ANION GAP 06/05/2024 5    • BUN 06/05/2024 15    • Creatinine 06/05/2024 1.09    • Glucose, Fasting 06/05/2024 98    • Calcium 06/05/2024 9.1    • AST 06/05/2024 20    • ALT 06/05/2024 27    • Alkaline Phosphatase 06/05/2024 82    • Total Protein 06/05/2024 7.7    • Albumin 06/05/2024 4.0    • Total Bilirubin 06/05/2024 0.45    • eGFR 06/05/2024 88    • Hemoglobin A1C 06/05/2024 5.5    • EAG 06/05/2024 111    • Cholesterol 06/05/2024 182    • Triglycerides 06/05/2024 108    • HDL, Direct 06/05/2024 43    • LDL Calculated 06/05/2024 117 (H)    • Non-HDL-Chol (CHOL-HDL) 06/05/2024 139    • TSH 3RD GENERATON 06/05/2024 3.998    • Vit D, 25-Hydroxy 06/05/2024 40.2    • Syphilis Total Antibody 06/05/2024 Non-reactive    • HIV-1 p24 Antigen 06/05/2024 Non-Reactive    • HIV-1 Antibody 06/05/2024 Non-Reactive    • HIV-2 Antibody 06/05/2024 Non-Reactive    • HIV Ag-Ab 5th Gen 06/05/2024 Non-Reactive    • Hepatitis C Ab 06/05/2024 Non-reactive    • N gonorrhoeae, DNA Probe 06/05/2024 Negative    • Chlamydia trachomatis, D* 06/05/2024 Negative          Radiology Results:   No results found.    Stable

## 2024-06-07 NOTE — PROGRESS NOTES
"[facility] - Outpatient Consultation  Reynaldo Santoyo 33 y.o. male MRN: [id number]  Encounter: [id number]    ASSESSMENT AND PLAN:  33 year old male here for evaluation.  He has hypothyroidsim.  He has JIMI  and wears CPAP.  Vitamin D deficiency.  Also has anxiety and depression.      1. GERD without esophagitis  2. Lower abdominal pain  3. Diarrhea, unspecified type  4. Gastroesophageal reflux disease, unspecified whether esophagitis present  5. BMI 50.0-59.9, adult (HCC)    [place]ree with stool studies to assess for acute bacterial infection.  Agree with colonoscopy given severe cramping and lower quadrant pain.  This could just be irritable bowel syndrome Given his obesity he is at risk factor for polyps and early colon cancers.  Will do this in the hospital setting given his BMI of 52 and history of JIMI.  Will discuss weight loss and weight management referral at next visit.    Follow-up in a few months time.    ______________________________________________________________________    HPI:  33 year old male here for evaluation.  He reports being on PPI's in the past but stopped them as his PCP didn't want them to be on in long term.  Symptoms worse in the early morning, works night most of the time.    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      He reports for the past two months he has had sharp pain in the left lower quadrant that he feels is due to a \"poop cramp\" but also with sharp pains in b/l lower quadrants.  Pain getting worse lately.  Today, when he got done working, had pain that was 8/10.  Thinks he has a hemorrhoid and occasionally feels it.      Also with nausea and diarrhea.  PCP ordered stool studies.    Thinks he had a colonoscopy many years ago.          REVIEW OF SYSTEMS:    CONSTITUTIONAL: Denies any fever, chills, rigors, and weight loss.  HEENT: No earache or tinnitus. Denies hearing loss or visual disturbances.  CARDIOVASCULAR: No chest pain or palpitations. "   RESPIRATORY: Denies any cough, hemoptysis, shortness of breath or dyspnea on exertion.  GASTROINTESTINAL: As noted in the History of Present Illness.   GENITOURINARY: No problems with urination. Denies any hematuria or dysuria.  NEUROLOGIC: No dizziness or vertigo, denies headaches.   MUSCULOSKELETAL: Denies any muscle or joint pain.   SKIN: Denies skin rashes or itching.   ENDOCRINE: Denies excessive thirst. Denies intolerance to heat or cold.  PSYCHOSOCIAL: Denies depression or anxiety. Denies any recent memory loss.       Historical Information   Past Medical History:   Diagnosis Date   • Anxiety    • Carpal tunnel syndrome, bilateral     LA...9/12/17   R....9/12/17    • COVID-19 5/27/2022   • GERD without esophagitis    • Moderate episode of recurrent major depressive disorder (HCC) 08/22/2018   • Morbid obesity (HCC)    • Obstructive sleep apnea    • Pilonidal cyst with abscess     LA....10/25/13   R....6/10/14    • Vitamin D deficiency      Past Surgical History:   Procedure Laterality Date   • COLONOSCOPY     • FOOT SURGERY     • PILONIDAL CYST DRAINAGE      Incision and drainage of pilonidal cyst    • [place] NDSC WRST SURG W/RLS TRANSVRS CARPL LIGM Right 07/12/2018    Procedure: RELEASE CARPAL TUNNEL ENDOSCOPIC;  Surgeon: Emerson Infante MD;  Location:  MAIN OR;  Service: Orthopedics   • [place] NDSC WRST SURG W/RLS TRANSVRS CARPL LIGM Left 07/26/2018    Procedure: RELEASE CARPAL TUNNEL ENDOSCOPIC;  Surgeon: Emerson Infante MD;  Location:  MAIN OR;  Service: Orthopedics     Social History   Social History     Substance and Sexual Activity   Alcohol Use Yes    Comment: Rarely     Social History     Substance and Sexual Activity   Drug Use No     Social History     Tobacco Use   Smoking Status Never   Smokeless Tobacco Never     Family History   Problem Relation Age of Onset   • Depression Mother    • Obesity Mother    • Stroke Mother         Syndrome    • Diabetes Mother    • Alcohol abuse Father    •  "Liver disease Father         hepatic failure    • Hypertension Father    • Depression Brother    • Thyroid disease Brother    • Alcohol abuse Brother    • Substance Abuse Brother    • Depression Maternal Uncle    • Substance Abuse Brother    • Heart disease Neg Hx    • Cancer Neg Hx    • Thyroid cancer Neg Hx        Meds/Allergies       Current Outpatient Medications:   •  buPROPion (WELLBUTRIN XL) 150 mg 24 hr tablet  •  buPROPion (WELLBUTRIN XL) 300 mg 24 hr tablet  •  Cholecalciferol (VITAMIN D-3) 1000 units CAPS  •  levothyroxine 25 mcg tablet  •  multivitamin (THERAGRAN) TABS  •  venlafaxine (EFFEXOR-XR) 150 mg 24 hr capsule    No Known Allergies        Objective     Blood pressure 134/78, temperature 98.5 °F (36.9 °C), temperature source Tympanic, height 6' 2\" (1.88 m), weight (!) 182 kg (401 lb). Body mass index is 51.49 kg/m².        PHYSICAL EXAM:      General Appearance:   Alert, cooperative, no distress   HEENT:   Normocephalic, atraumatic, anicteric.     Neck:  Supple, symmetrical, trachea midline   Lungs:   Clear to auscultation bilaterally; no rales, rhonchi or wheezing; respirations unlabored    Heart::   Regular rate and rhythm; no murmur, rub, or gallop.   Abdomen:   Soft, non-tender, non-distended; normal bowel sounds; no masses, no organomegaly    Genitalia:   Deferred    Rectal:   Deferred    Extremities:  No cyanosis, clubbing or edema    Pulses:  2+ and symmetric    Skin:  No jaundice, rashes, or lesions    Lymph nodes:  No palpable cervical lymphadenopathy        Lab Results:   No visits with results within 1 Day(s) from this visit.   Latest known visit with results is:   Appointment on 06/05/2024   Component Date Value   • WBC 06/05/2024 7.70    • RBC 06/05/2024 5.47    • Hemoglobin 06/05/2024 15.2    • Hematocrit 06/05/2024 47.2    • MCV 06/05/2024 86    • MCH 06/05/2024 27.8    • MCHC 06/05/2024 32.2    • RDW 06/05/2024 12.8    • Platelets 06/05/2024 306    • MPV 06/05/2024 9.5    • Sodium " 06/05/2024 140    • Potassium 06/05/2024 4.1    • Chloride 06/05/2024 104    • CO2 06/05/2024 31    • ANION GAP 06/05/2024 5    • BUN 06/05/2024 15    • Creatinine 06/05/2024 1.09    • Glucose, Fasting 06/05/2024 98    • Calcium 06/05/2024 9.1    • AST 06/05/2024 20    • ALT 06/05/2024 27    • Alkaline Phosphatase 06/05/2024 82    • Total Protein 06/05/2024 7.7    • Albumin 06/05/2024 4.0    • Total Bilirubin 06/05/2024 0.45    • eGFR 06/05/2024 88    • Hemoglobin A1C 06/05/2024 5.5    • EAG 06/05/2024 111    • Cholesterol 06/05/2024 182    • Triglycerides 06/05/2024 108    • HDL, Direct 06/05/2024 43    • LDL Calculated 06/05/2024 117 (H)    • Non-HDL-Chol (CHOL-HDL) 06/05/2024 139    • TSH 3RD GENERATON 06/05/2024 3.998    • Vit D, 25-Hydroxy 06/05/2024 40.2    • Syphilis Total Antibody 06/05/2024 Non-reactive    • HIV-1 p24 Antigen 06/05/2024 Non-Reactive    • HIV-1 Antibody 06/05/2024 Non-Reactive    • HIV-2 Antibody 06/05/2024 Non-Reactive    • HIV Ag-Ab 5th Gen 06/05/2024 Non-Reactive    • Hepatitis C Ab 06/05/2024 Non-reactive    • N gonorrhoeae, DNA Probe 06/05/2024 Negative    • Chlamydia trachomatis, D* 06/05/2024 Negative          Radiology Results:   No results found.

## 2024-06-25 ENCOUNTER — ANESTHESIA (OUTPATIENT)
Dept: GASTROENTEROLOGY | Facility: HOSPITAL | Age: 34
End: 2024-06-25

## 2024-06-25 ENCOUNTER — HOSPITAL ENCOUNTER (OUTPATIENT)
Dept: GASTROENTEROLOGY | Facility: HOSPITAL | Age: 34
Setting detail: OUTPATIENT SURGERY
Discharge: HOME | End: 2024-06-25
Attending: INTERNAL MEDICINE
Payer: COMMERCIAL

## 2024-06-25 ENCOUNTER — ANESTHESIA EVENT (OUTPATIENT)
Dept: GASTROENTEROLOGY | Facility: HOSPITAL | Age: 34
End: 2024-06-25

## 2024-06-25 VITALS
RESPIRATION RATE: 12 BRPM | HEART RATE: 84 BPM | TEMPERATURE: 97.4 F | DIASTOLIC BLOOD PRESSURE: 69 MMHG | SYSTOLIC BLOOD PRESSURE: 138 MMHG | OXYGEN SATURATION: 99 %

## 2024-06-25 DIAGNOSIS — R19.7 DIARRHEA, UNSPECIFIED TYPE: ICD-10-CM

## 2024-06-25 DIAGNOSIS — R10.30 LOWER ABDOMINAL PAIN: ICD-10-CM

## 2024-06-25 PROCEDURE — 45378 DIAGNOSTIC COLONOSCOPY: CPT | Performed by: INTERNAL MEDICINE

## 2024-06-25 RX ORDER — SODIUM CHLORIDE, SODIUM LACTATE, POTASSIUM CHLORIDE, CALCIUM CHLORIDE 600; 310; 30; 20 MG/100ML; MG/100ML; MG/100ML; MG/100ML
INJECTION, SOLUTION INTRAVENOUS CONTINUOUS PRN
Status: DISCONTINUED | OUTPATIENT
Start: 2024-06-25 | End: 2024-06-25

## 2024-06-25 RX ORDER — SODIUM CHLORIDE, SODIUM LACTATE, POTASSIUM CHLORIDE, CALCIUM CHLORIDE 600; 310; 30; 20 MG/100ML; MG/100ML; MG/100ML; MG/100ML
125 INJECTION, SOLUTION INTRAVENOUS CONTINUOUS
Status: CANCELLED | OUTPATIENT
Start: 2024-06-25

## 2024-06-25 RX ORDER — ONDANSETRON 2 MG/ML
4 INJECTION INTRAMUSCULAR; INTRAVENOUS ONCE
Status: COMPLETED | OUTPATIENT
Start: 2024-06-25 | End: 2024-06-25

## 2024-06-25 RX ORDER — PROPOFOL 10 MG/ML
INJECTION, EMULSION INTRAVENOUS AS NEEDED
Status: DISCONTINUED | OUTPATIENT
Start: 2024-06-25 | End: 2024-06-25

## 2024-06-25 RX ORDER — SODIUM CHLORIDE, SODIUM LACTATE, POTASSIUM CHLORIDE, CALCIUM CHLORIDE 600; 310; 30; 20 MG/100ML; MG/100ML; MG/100ML; MG/100ML
125 INJECTION, SOLUTION INTRAVENOUS CONTINUOUS
Status: DISCONTINUED | OUTPATIENT
Start: 2024-06-25 | End: 2024-06-29 | Stop reason: HOSPADM

## 2024-06-25 RX ADMIN — PROPOFOL 50 MG: 10 INJECTION, EMULSION INTRAVENOUS at 11:26

## 2024-06-25 RX ADMIN — ONDANSETRON 4 MG: 2 INJECTION INTRAMUSCULAR; INTRAVENOUS at 11:06

## 2024-06-25 RX ADMIN — PROPOFOL 50 MG: 10 INJECTION, EMULSION INTRAVENOUS at 11:29

## 2024-06-25 RX ADMIN — Medication 40 MG: at 11:24

## 2024-06-25 RX ADMIN — PROPOFOL 50 MG: 10 INJECTION, EMULSION INTRAVENOUS at 11:23

## 2024-06-25 RX ADMIN — PROPOFOL 100 MG: 10 INJECTION, EMULSION INTRAVENOUS at 11:21

## 2024-06-25 RX ADMIN — SODIUM CHLORIDE, SODIUM LACTATE, POTASSIUM CHLORIDE, AND CALCIUM CHLORIDE 125 ML/HR: .6; .31; .03; .02 INJECTION, SOLUTION INTRAVENOUS at 11:11

## 2024-06-25 RX ADMIN — PROPOFOL 100 MG: 10 INJECTION, EMULSION INTRAVENOUS at 11:20

## 2024-06-25 RX ADMIN — PROPOFOL 50 MG: 10 INJECTION, EMULSION INTRAVENOUS at 11:36

## 2024-06-25 RX ADMIN — SODIUM CHLORIDE, SODIUM LACTATE, POTASSIUM CHLORIDE, AND CALCIUM CHLORIDE: .6; .31; .03; .02 INJECTION, SOLUTION INTRAVENOUS at 09:38

## 2024-06-25 RX ADMIN — PROPOFOL 50 MG: 10 INJECTION, EMULSION INTRAVENOUS at 11:31

## 2024-06-25 RX ADMIN — PROPOFOL 50 MG: 10 INJECTION, EMULSION INTRAVENOUS at 11:34

## 2024-06-25 NOTE — INTERVAL H&P NOTE
H&P reviewed. After examining the patient I find no changes in the patients condition since the H&P had been written. Here for colonoscopy for severe lower abd cramping and pain.    Vitals:    06/25/24 1032   BP: 133/84   Pulse: 83   Resp: 16   Temp: (!) 96 °F (35.6 °C)   SpO2: 99%

## 2024-06-25 NOTE — ANESTHESIA PREPROCEDURE EVALUATION
Procedure:  COLONOSCOPY    Relevant Problems   ENDO   (+) Acquired hypothyroidism      GI/HEPATIC   (+) GERD without esophagitis      MUSCULOSKELETAL   (+) Chronic bilateral low back pain without sciatica      NEURO/PSYCH   (+) Chronic bilateral low back pain without sciatica   (+) Generalized anxiety disorder   (+) Moderate episode of recurrent major depressive disorder (HCC)      PULMONARY   (+) Obstructive sleep apnea        Physical Exam    Airway    Mallampati score: II  TM Distance: >3 FB  Neck ROM: full     Dental   No notable dental hx     Cardiovascular  Cardiovascular exam normal    Pulmonary  Pulmonary exam normal     Other Findings        Anesthesia Plan  ASA Score- 3     Anesthesia Type- IV sedation with anesthesia with ASA Monitors.         Additional Monitors:     Airway Plan:            Plan Factors-Exercise tolerance (METS): >4 METS.    Chart reviewed.   Existing labs reviewed.     Patient is not a current smoker. Patient not instructed to abstain from smoking on day of procedure. Patient did not smoke on day of surgery.            Induction- intravenous.    Postoperative Plan-         Informed Consent- Anesthetic plan and risks discussed with patient.  I personally reviewed this patient with the CRNA. Discussed and agreed on the Anesthesia Plan with the CRNA..

## 2024-06-25 NOTE — ANESTHESIA POSTPROCEDURE EVALUATION
Post-Op Assessment Note    CV Status:  Stable  Pain Score: 0    Pain management: adequate       Mental Status:  Alert   Hydration Status:  Stable   PONV Controlled:  Controlled   Airway Patency:  Patent     Post Op Vitals Reviewed: Yes    No anethesia notable event occurred.    Staff: CRNA               BP   129/61   Temp      Pulse  104   Resp   20   SpO2   98

## 2024-06-25 NOTE — NURSING NOTE
Pt had one episode of vomiting during IV placement. Pt stated he felt nauseous this am. Pt denies any other symptoms. Anesthesiologist is aware. Pt states he felt better after vomiting. Will continue to monitor.

## 2024-07-03 ENCOUNTER — OFFICE VISIT (OUTPATIENT)
Dept: URGENT CARE | Facility: CLINIC | Age: 34
End: 2024-07-03
Payer: COMMERCIAL

## 2024-07-03 VITALS
TEMPERATURE: 99.5 F | WEIGHT: 315 LBS | HEART RATE: 116 BPM | RESPIRATION RATE: 18 BRPM | BODY MASS INDEX: 40.43 KG/M2 | HEIGHT: 74 IN | OXYGEN SATURATION: 99 %

## 2024-07-03 DIAGNOSIS — H66.92 LEFT OTITIS MEDIA, UNSPECIFIED OTITIS MEDIA TYPE: Primary | ICD-10-CM

## 2024-07-03 DIAGNOSIS — H60.502 ACUTE OTITIS EXTERNA OF LEFT EAR, UNSPECIFIED TYPE: ICD-10-CM

## 2024-07-03 PROCEDURE — 99213 OFFICE O/P EST LOW 20 MIN: CPT | Performed by: PHYSICIAN ASSISTANT

## 2024-07-03 RX ORDER — METHYLPREDNISOLONE 4 MG/1
TABLET ORAL
Qty: 21 TABLET | Refills: 0 | Status: SHIPPED | OUTPATIENT
Start: 2024-07-03

## 2024-07-03 RX ORDER — OFLOXACIN 3 MG/ML
10 SOLUTION AURICULAR (OTIC) DAILY
Qty: 10 ML | Refills: 0 | Status: SHIPPED | OUTPATIENT
Start: 2024-07-03

## 2024-07-03 RX ORDER — AMOXICILLIN 500 MG/1
500 TABLET, FILM COATED ORAL 3 TIMES DAILY
Qty: 21 TABLET | Refills: 0 | Status: SHIPPED | OUTPATIENT
Start: 2024-07-03 | End: 2024-07-10

## 2024-07-03 NOTE — PROGRESS NOTES
[facility]      NAME: Reynaldo Santoyo is a 33 y.o. male  : 1990    MRN: [id number]  DATE: July 3, 2024  TIME: 8:38 AM    Assessment and Plan   Left otitis media, unspecified otitis media type [H66.92]  1. Left otitis media, unspecified otitis media type  amoxicillin (AMOXIL) 500 MG tablet      2. Acute otitis externa of left ear, unspecified type  methylPREDNISolone 4 MG tablet therapy pack    ofloxacin (FLOXIN) 0.3 % otic solution          Patient Instructions   To lay on the good ear and put the drops in the bad ear, allowing at least 5 minutes for the medication to fully penetrate the ear canal. Ibuprofen and/or tylenol as needed for pain or fever. Take amox as prescribed with probiotics. Medrol pack as prescribed. Patient agreeable to plan.    Risks and benefits discussed. Patient understands and agrees with the plan.      If tests have been performed at [facility], our office will contact you with results if changes need to be made to the care plan discussed with you at the visit.  You can review your full results on [facility]'s MyChart.     Follow up with PCP in 3-5 days.      If any of the following occur, please report to your nearest ED for evaluation or call 911.   Difficultly breathing or shortness of breath  Chest pain  Acutely worsening symptoms.   To present to the ER if symptoms worsen.  Chief Complaint     Chief Complaint   Patient presents with    Earache     Started yesterday, reports Left ear pain and swelling, denies fever, reports fluid in the ear, reports hearing change         History of Present Illness   Reynaldo Santoyo presents to the clinic c/o    Earache   There is pain in the left ear. This is a new problem. The current episode started yesterday. The problem occurs constantly. The problem has been gradually worsening. There has been no fever. The pain is moderate. Pertinent negatives include no abdominal pain, coughing, ear discharge, headaches or rash. He has tried NSAIDs,  acetaminophen, heat packs and cold packs for the symptoms. The treatment provided no relief. His past medical history is significant for hearing loss. There is no history of a tympanostomy tube.       Review of Systems   Review of Systems   Constitutional:  Negative for chills, diaphoresis, fatigue and fever.   HENT:  Positive for ear pain (left). Negative for congestion, ear discharge and facial swelling.    Eyes:  Negative for photophobia, pain, discharge, redness, itching and visual disturbance.   Respiratory:  Negative for apnea, cough, chest tightness, shortness of breath and wheezing.    Cardiovascular:  Negative for chest pain and palpitations.   Gastrointestinal:  Negative for abdominal pain.   Skin:  Negative for color change, rash and wound.   Neurological:  Negative for dizziness and headaches.   Hematological:  Negative for adenopathy.         Current Medications     Long-Term Medications   Medication Sig Dispense Refill    buPROPion (WELLBUTRIN XL) 150 mg 24 hr tablet Take 1 tablet (150 mg total) by mouth daily 90 tablet 1    buPROPion (WELLBUTRIN XL) 300 mg 24 hr tablet Take 1 tablet (300 mg total) by mouth daily 90 tablet 1    levothyroxine 25 mcg tablet Take 1 tablet (25 mcg total) by mouth daily in the early morning 90 tablet 0    venlafaxine (EFFEXOR-XR) 150 mg 24 hr capsule Take 1 capsule (150 mg total) by mouth daily 90 capsule 1       Current Allergies     Allergies as of 07/03/2024    (No Known Allergies)            The following portions of the patient's history were reviewed and updated as appropriate: allergies, current medications, past family history, past medical history, past social history, past surgical history and problem list.  Past Medical History:   Diagnosis Date    Anxiety     Carpal tunnel syndrome, bilateral     LA...9/12/17   R....9/12/17     COVID-19 5/27/2022    GERD without esophagitis     Moderate episode of recurrent major depressive disorder (HCC) 08/22/2018    Morbid  obesity (HCC)     Obstructive sleep apnea     Pilonidal cyst with abscess     LA....10/25/13   R....6/10/14     Vitamin D deficiency      Past Surgical History:   Procedure Laterality Date    COLONOSCOPY      FOOT SURGERY      PILONIDAL CYST DRAINAGE      Incision and drainage of pilonidal cyst     [place] NDSC WRST SURG W/RLS TRANSVRS CARPL LIGM Right 07/12/2018    Procedure: RELEASE CARPAL TUNNEL ENDOSCOPIC;  Surgeon: Emerson Infante MD;  Location: [facility];  Service: Orthopedics    [place] NDSC WRST SURG W/RLS TRANSVRS CARPL LIGM Left 07/26/2018    Procedure: RELEASE CARPAL TUNNEL ENDOSCOPIC;  Surgeon: Emerson Infante MD;  Location: [facility];  Service: Orthopedics     Social History     Socioeconomic History    Marital status: Legally      Spouse name: Not on file    Number of children: 0    Years of education: some college    Highest education level: Some college, no degree   Occupational History    Occupation: [employer]     Comment: employed full time   Tobacco Use    Smoking status: Never    Smokeless tobacco: Never   Vaping Use    Vaping status: Never Used   Substance and Sexual Activity    Alcohol use: Yes     Comment: Rarely    Drug use: No    Sexual activity: Yes     Partners: Female   Other Topics Concern    Not on file   Social History Narrative     since 20/12.   2/22.  She moved out.    No children.    Also has brother and sister-in-law and his niece living with him.And mother.     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    Going to [employer] for prerequisites for nursing school.    Also involved with the [employer].     Social Determinants of Health     Financial Resource Strain: Not on file   Food Insecurity: Not on file   Transportation Needs: Not on file   Physical Activity: Not on file   Stress: Not on file   Social Connections: Not on file   Intimate Partner Violence: Not on file   Housing Stability: Not on file       Objective   Pulse  "(!) 116   Temp 99.5 °F (37.5 °C) (Tympanic)   Resp 18   Ht 6' 2\" (1.88 m)   Wt (!) 182 kg (401 lb)   SpO2 99%   BMI 51.49 kg/m²      Physical Exam     Physical Exam  Vitals and nursing note reviewed.   Constitutional:       General: He is not in acute distress.     Appearance: He is well-developed. He is not diaphoretic.   HENT:      Head: Normocephalic and atraumatic.      Right Ear: Tympanic membrane and external ear normal.      Left Ear: External ear normal. Drainage, swelling and tenderness present. Tympanic membrane is erythematous and bulging.      Nose: Nose normal.      Mouth/Throat:      Mouth: Mucous membranes are moist.      Pharynx: No oropharyngeal exudate or posterior oropharyngeal erythema.   Eyes:      General: No scleral icterus.        Right eye: No discharge.         Left eye: No discharge.      Conjunctiva/sclera: Conjunctivae normal.   Cardiovascular:      Rate and Rhythm: Normal rate and regular rhythm.      Heart sounds: Normal heart sounds. No murmur heard.     No friction rub. No gallop.   Pulmonary:      Effort: Pulmonary effort is normal. No respiratory distress.      Breath sounds: Normal breath sounds. No decreased breath sounds, wheezing, rhonchi or rales.   Skin:     General: Skin is warm and dry.      Coloration: Skin is not pale.      Findings: No erythema or rash.   Neurological:      Mental Status: He is alert and oriented to person, place, and time.   Psychiatric:         Behavior: Behavior normal.         Thought Content: Thought content normal.         Judgment: Judgment normal.         Amaya Murrell PA-C        "

## 2024-07-12 ENCOUNTER — TELEMEDICINE (OUTPATIENT)
Dept: PSYCHIATRY | Facility: CLINIC | Age: 34
End: 2024-07-12
Payer: COMMERCIAL

## 2024-07-12 DIAGNOSIS — F33.1 MODERATE EPISODE OF RECURRENT MAJOR DEPRESSIVE DISORDER (HCC): Primary | ICD-10-CM

## 2024-07-12 DIAGNOSIS — F41.1 GENERALIZED ANXIETY DISORDER: ICD-10-CM

## 2024-07-12 PROCEDURE — 99214 OFFICE O/P EST MOD 30 MIN: CPT | Performed by: STUDENT IN AN ORGANIZED HEALTH CARE EDUCATION/TRAINING PROGRAM

## 2024-07-12 PROCEDURE — 90833 PSYTX W PT W E/M 30 MIN: CPT | Performed by: STUDENT IN AN ORGANIZED HEALTH CARE EDUCATION/TRAINING PROGRAM

## 2024-07-12 RX ORDER — VENLAFAXINE HYDROCHLORIDE 150 MG/1
150 CAPSULE, EXTENDED RELEASE ORAL DAILY
Qty: 90 CAPSULE | Refills: 1 | Status: SHIPPED | OUTPATIENT
Start: 2024-07-12

## 2024-07-12 RX ORDER — BUPROPION HYDROCHLORIDE 300 MG/1
300 TABLET ORAL DAILY
Qty: 90 TABLET | Refills: 1 | Status: SHIPPED | OUTPATIENT
Start: 2024-07-12

## 2024-07-12 RX ORDER — BUPROPION HYDROCHLORIDE 150 MG/1
150 TABLET ORAL DAILY
Qty: 90 TABLET | Refills: 1 | Status: SHIPPED | OUTPATIENT
Start: 2024-07-12

## 2024-07-12 NOTE — PSYCH
Virtual Regular Visit    Verification of patient location:    Patient is located at Home in the following state in which I hold an active license [place]      Assessment/Plan:    Problem List Items Addressed This Visit       Generalized anxiety disorder    Moderate episode of recurrent major depressive disorder (HCC) - Primary              Reason for visit is   Chief Complaint   Patient presents with    Virtual Regular Visit          Encounter provider Miguel A Mercado MD      Recent Visits  No visits were found meeting these conditions.  Showing recent visits within past 7 days and meeting all other requirements  Today's Visits  Date Type Provider Dept   07/12/24 Telemedicine Miguel A Mercado MD [facility] [address]   Showing today's visits and meeting all other requirements  Future Appointments  No visits were found meeting these conditions.  Showing future appointments within next 150 days and meeting all other requirements       The patient was identified by name and date of birth. Reynaldo Santoyo was informed that this is a telemedicine visit and that the visit is being conducted throughthe Epic Embedded platform. He agrees to proceed..  My office door was closed. No one else was in the room.  He acknowledged consent and understanding of privacy and security of the video platform. The patient has agreed to participate and understands they can discontinue the visit at any time.    Patient is aware this is a billable service.     Subjective  Reynaldo Santoyo is a 34 y.o. male .      HPI     Past Medical History:   Diagnosis Date    Anxiety     Carpal tunnel syndrome, bilateral     LA...9/12/17   R....9/12/17     COVID-19 5/27/2022    GERD without esophagitis     Moderate episode of recurrent major depressive disorder (HCC) 08/22/2018    Morbid obesity (HCC)     Obstructive sleep apnea     Pilonidal cyst with abscess     LA....10/25/13   R....6/10/14     Vitamin D deficiency        Past Surgical History:   Procedure  "Laterality Date    COLONOSCOPY      FOOT SURGERY      PILONIDAL CYST DRAINAGE      Incision and drainage of pilonidal cyst     ME NDSC WRST SURG W/RLS TRANSVRS CARPL LIGM Right 07/12/2018    Procedure: RELEASE CARPAL TUNNEL ENDOSCOPIC;  Surgeon: Emerson Infante MD;  Location:  MAIN OR;  Service: Orthopedics    ME NDSC WRST SURG W/RLS TRANSVRS CARPL LIGM Left 07/26/2018    Procedure: RELEASE CARPAL TUNNEL ENDOSCOPIC;  Surgeon: Emerson Infante MD;  Location:  MAIN OR;  Service: Orthopedics       Current Outpatient Medications   Medication Sig Dispense Refill    buPROPion (WELLBUTRIN XL) 150 mg 24 hr tablet Take 1 tablet (150 mg total) by mouth daily 90 tablet 1    buPROPion (WELLBUTRIN XL) 300 mg 24 hr tablet Take 1 tablet (300 mg total) by mouth daily 90 tablet 1    venlafaxine (EFFEXOR-XR) 150 mg 24 hr capsule Take 1 capsule (150 mg total) by mouth daily 90 capsule 1    Cholecalciferol (VITAMIN D-3) 1000 units CAPS Take 2 capsules by mouth daily      levothyroxine 25 mcg tablet Take 1 tablet (25 mcg total) by mouth daily in the early morning 90 tablet 0    multivitamin (THERAGRAN) TABS Take 1 tablet by mouth daily       No current facility-administered medications for this visit.        No Known Allergies    Review of Systems    Video Exam    There were no vitals filed for this visit.    Physical Exam     MEDICATION MANAGEMENT NOTE        [facility]      Name and Date of Birth:  Reynaldo Santoyo 34 y.o. 1990 MRN: [id number]    Date of Visit: July 12, 2024    Reason for Visit: Follow-up visit regarding medication management     Chief Complaint: \"I am doing pretty good\"    SUBJECTIVE:    Reynaldo Santoyo is a 34 y.o., male, with a past psychiatric history significant for MDD, JON, medically complicated by JIMI, obesity, HTN, who presents for follow-up appointment for medication management. Reynaldo states that since their previous outpatient psychiatric " "appointment, he is doing fairly well.  He reports that his mood has been relatively stable.  Not experiencing any significant problems with depression or anxiety.  He is feeling \"pretty good actually\".  Reports that he recently started a new relationship and is connecting well with his new girlfriend.  They have been talking for 2 months although she started dating 2 weeks ago.  He reports that his sleep overall has been fairly well-controlled.  Although notes that he does feel poor quality of sleep related to obstructive sleep apnea.  Still waiting from ENT evaluation for possible treatment for deviated septum.  He does report feeling tired during the day although does admit to being quite busy with long shifts at work.  He denies any SI, HI, AVH at this time.    Still continues to report some inattentive and focus issues.  Completed neuropsych testing although we have not received any copy from this as of yet.    Current Rating Scores:   Current PHQ-9   PHQ-2/9 Depression Screening             Psychiatric Review Of Systems:    Appetite: no  Adverse eating: no  Weight changes: no  Insomnia/sleeplessness: decreased  Fatigue/anergy: decreased  Anhedonia/lack of interest: no  Attention/concentration: decreased  Psychomotor agitation/retardation: no  Somatic symptoms: no  Anxiety/panic attack: no  Gillian/hypomania: no  Hopelessness/helplessness/worthlessness: no  Self-injurious behavior/high-risk behavior: no  Suicidal ideation: no  Homicidal ideation: no  Auditory hallucinations: no  Visual hallucinations: no  Other perceptual disturbances: no  Delusional thinking: no  Obsessive/compulsive symptoms: no    Review Of Systems:      Constitutional negative   ENT negative   Cardiovascular negative   Respiratory negative   Gastrointestinal negative   Genitourinary negative   Musculoskeletal negative   Integumentary negative   Neurological negative   Endocrine negative   Other Symptoms none, all other systems are negative "     History Review:   The following portions of the patient's history were reviewed and updated as appropriate: allergies, current medications, past family history, past medical history, past social history, past surgical history, and problem list. Previous progress notes/assessments from other providers reviewed as available.    Past Medical History:    Past Medical History:   Diagnosis Date    Anxiety     Carpal tunnel syndrome, bilateral     LA...9/12/17   R....9/12/17     COVID-19 5/27/2022    GERD without esophagitis     Moderate episode of recurrent major depressive disorder (HCC) 08/22/2018    Morbid obesity (HCC)     Obstructive sleep apnea     Pilonidal cyst with abscess     LA....10/25/13   R....6/10/14     Vitamin D deficiency         No Known Allergies    Substance Abuse History:    Social History     Substance and Sexual Activity   Alcohol Use Yes    Comment: Rarely     Social History     Substance and Sexual Activity   Drug Use No       Social History:    Social History     Socioeconomic History    Marital status: Legally      Spouse name: Not on file    Number of children: 0    Years of education: some college    Highest education level: Some college, no degree   Occupational History    Occupation: [employer]     Comment: employed full time   Tobacco Use    Smoking status: Never    Smokeless tobacco: Never   Vaping Use    Vaping status: Never Used   Substance and Sexual Activity    Alcohol use: Yes     Comment: Rarely    Drug use: No    Sexual activity: Yes     Partners: Female   Other Topics Concern    Not on file   Social History Narrative     since 20/12.   2/22.  She moved out.    No children.    Also has brother and sister-in-law and his niece living with him.And mother.     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    Going to [employer] for prerequisites for nursing school.    Also involved with the [employer].     Social Determinants of  Health     Financial Resource Strain: Not on file   Food Insecurity: Not on file   Transportation Needs: Not on file   Physical Activity: Not on file   Stress: Not on file   Social Connections: Not on file   Intimate Partner Violence: Not on file   Housing Stability: Not on file       Family Psychiatric History:     Family History   Problem Relation Age of Onset    Depression Mother     Obesity Mother     Stroke Mother         Syndrome     Diabetes Mother     Alcohol abuse Father     Liver disease Father         hepatic failure     Hypertension Father     Depression Brother     Thyroid disease Brother     Alcohol abuse Brother     Substance Abuse Brother     Depression Maternal Uncle     Substance Abuse Brother     Heart disease Neg Hx     Cancer Neg Hx     Thyroid cancer Neg Hx             OBJECTIVE:     Vital signs in last 24 hours:    There were no vitals filed for this visit.    Mental Status Evaluation:    Appearance age appropriate, casually dressed   Behavior cooperative, calm   Speech normal rate, normal volume, normal pitch   Mood euthymic   Affect normal range and intensity, appropriate   Thought Processes organized, goal directed   Associations intact associations   Thought Content no overt delusions   Perceptual Disturbances: no auditory hallucinations, no visual hallucinations   Abnormal Thoughts  Risk Potential Suicidal ideation - None  Homicidal ideation - None  Potential for aggression - No   Orientation oriented to person, place, time/date, and situation   Memory recent and remote memory grossly intact   Consciousness alert and awake   Attention Span Concentration Span attention span and concentration are age appropriate   Intellect appears to be of average intelligence   Insight intact   Judgement intact   Muscle Strength and  Gait normal muscle strength and normal muscle tone, normal gait and normal balance   Motor activity no abnormal movements   Fund of Knowledge adequate knowledge of current  events  adequate fund of knowledge regarding past history  adequate fund of knowledge regarding vocabulary    Pain none   Pain Scale 0       Laboratory Results: I have personally reviewed all pertinent laboratory/tests results    Recent Labs (last 4 months):   Appointment on 06/05/2024   Component Date Value    WBC 06/05/2024 7.70     RBC 06/05/2024 5.47     Hemoglobin 06/05/2024 15.2     Hematocrit 06/05/2024 47.2     MCV 06/05/2024 86     MCH 06/05/2024 27.8     MCHC 06/05/2024 32.2     RDW 06/05/2024 12.8     Platelets 06/05/2024 306     MPV 06/05/2024 9.5     Sodium 06/05/2024 140     Potassium 06/05/2024 4.1     Chloride 06/05/2024 104     CO2 06/05/2024 31     ANION GAP 06/05/2024 5     BUN 06/05/2024 15     Creatinine 06/05/2024 1.09     Glucose, Fasting 06/05/2024 98     Calcium 06/05/2024 9.1     AST 06/05/2024 20     ALT 06/05/2024 27     Alkaline Phosphatase 06/05/2024 82     Total Protein 06/05/2024 7.7     Albumin 06/05/2024 4.0     Total Bilirubin 06/05/2024 0.45     eGFR 06/05/2024 88     Hemoglobin A1C 06/05/2024 5.5     EAG 06/05/2024 111     Cholesterol 06/05/2024 182     Triglycerides 06/05/2024 108     HDL, Direct 06/05/2024 43     LDL Calculated 06/05/2024 117 (H)     Non-HDL-Chol (CHOL-HDL) 06/05/2024 139     TSH 3RD GENERATON 06/05/2024 3.998     Vit D, 25-Hydroxy 06/05/2024 40.2     Syphilis Total Antibody 06/05/2024 Non-reactive     HIV-1 p24 Antigen 06/05/2024 Non-Reactive     HIV-1 Antibody 06/05/2024 Non-Reactive     HIV-2 Antibody 06/05/2024 Non-Reactive     HIV Ag-Ab 5th Gen 06/05/2024 Non-Reactive     Hepatitis C Ab 06/05/2024 Non-reactive     N gonorrhoeae, DNA Probe 06/05/2024 Negative     Chlamydia trachomatis, D* 06/05/2024 Negative        Suicide/Homicide Risk Assessment:    The following interventions are recommended: no intervention changes needed.     Although patient's acute lethality risk is low, long-term/chronic lethality risk is mildly elevated in the presence of current  diagnoses.   At the current moment, Reynaldo is future-oriented, forward-thinking, and demonstrates ability to act in a self-preserving manner as evidenced by volitionally presenting to the clinic today, seeking treatment. To mitigate future risk, patient should adhere to the recommendations below, avoid alcohol/illicit substance use, utilize community-based resources and familiar support and prioritize mental health treatment. Presently, patient denies active suicidal/homicidal ideation in addition to thoughts of self-injury; contracts for safety. Patient is amenable to calling/contacting the outpatient office including this writer if any acute adverse effects of their medication regimen arise in addition to any comments or concerns pertaining to their psychiatric management.  Patient is amenable to calling/contacting crisis and/or attending to the nearest emergency department if their clinical condition deteriorates to assure their safety and stability, stating that they are able to appropriately confide in their family regarding their psychiatric state.    Assessment/Plan:     Reynaldo was personally seen and evaluated today at the [facility] outpatient clinic for follow-up regarding medication management.  He notes that he has experienced an increase in depressive symptoms, mainly some negative self image and ruminating negative thoughts.  He has experienced decreased motivation decreased energy level although this appears to coincide with poor sleep.  His sleep is most likely significantly impacted by his obstructive sleep apnea; he is in process to obtain a CPAP machine.  He also does correlate an increase in depressive symptoms around the time he ran out of the 150 mg Wellbutrin XL tablets; he wishes to restart this.  The plan will be to resume the medications, follow in 2 months, consider adjustments if needed.    1/27/22 Mild improvement in depressive symptoms. Sleep remains problematic,  though much of this seems related to obstructive sleep apnea.     4/29/22 Since previous appointment he did experience worsening depressive symptoms, suicidal ideations.  This was due mostly to wife  him and increased stress at work and home.  He attended PHP and feels much better now.  More happier, less depressed, more motivated.  No longer experiencing SI.  Feels the current medication regimen is helpful and would like to continue this.  He continues to work with sleep medicine for JIMI. I encourage him to continue this.    8/17/22 Patient feeling more depressed.  Passive suicidal ideations.  Increased feelings of hopelessness, guilt, ruminating stressors.  Much of this has worsened due to impending divorce.  Feels that he is calling out of work more frequently, feeling unsatisfied with his career, more feelings of low self-esteem.  Discussed medication changes and he agrees to taper Lexapro to Effexor.  He will also reach out to his psychotherapist, Ayse Bautista, and return to weekly sessions.    9/15/22 -Still experiencing depressive symptoms - mostly anergia, amotivation, sadness, isolation.  Ruminating more frequently about the divorce and past relationship. No suicidal thoughts. Tolerated increase in Effexor well, no side effects. Re-established with psychotherapy; still agreeable to return back to work on 9/19/22.    3/10/23 Reynaldo states that he is doing well.  Depressive symptoms are relatively stable.  Intermittently occurring at times, mostly related to rumination about his divorce.  He reports that sleep has been more difficult at times due to nightmares about his wife.  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  He is hopeful that he may become a nurse or a crisis worker in the near future.  Tolerating medications well and no side effects reported.    7/27/23 He is doing well.  Depression and anxiety are  stable.  Some stress still stemming from divorce proceedings although he is doing well and that relationship is improving with his ex-wife.  He is now in a new relationship with another female and is pleased about this, going well.  Putting in extra hours at work, continuing to do prerequisite classes for nursing school.  Tolerating medications well.  No side effects.    11/8/23 He is doing fairly well in terms of depression and anxiety symptoms.  The struggle with some depressed mood at times when ruminating about the divorce with his wife.  He considers planes of inattentive symptoms which she has noticed more recently now that he is taking prerequisites for nursing school.  The symptoms have been present for a long period of time although he is interested in receiving an evaluation by neuropsychology.    3/18/24 He is doing fairly well; anxiety is stable.  Depression does wax and wane at times, 1-2 times per month feeling anhedonic and anergic which results in him having to call out of work although this is happening less frequently.  We will fill out [facility] paperwork for him to accommodate.  He is still struggling with inattentive symptoms, feeling more forgetful and distracted.  He is pending neuropsychological evaluation which will better clarify if he has ADHD symptoms.  He is also working with ENT/sleep medicine to correct deviated nasal septum which may be playing a role in obstructive sleep apnea which could further impact his memory/focus issues.  He may be doing a clinical trial for a device to correct the nasal septum.    7/12/24 He is doing well; mood and anxiety are stable.  Continues to have some sleep difficulty mostly related to obstructive sleep apnea and is awaiting ENT evaluation for possible treatment for deviated septum.  He also recently gone to a new relationship and feels that he is connecting well with his new girlfriend.  He still complains of inattentive and concentration issues, did  complete neuropsychological testing at [facility] although we have not received a copy of the results yet.  He will reach out to them and have them fax us a new copy.  We will follow up in 1 month to determine medication adjustments pending neuropsych results.    DSM-5 Diagnoses:     Major depressive disorder, recurrent, moderate; generalized anxiety disorder; obstructive sleep apnea    Treatment Recommendations/Precautions:    Effexor 150mg for depression and anxiety.  Wellbutrin 450 mg for depression.  Medication management every 1 months  Continue psychotherapy with own therapist  Aware of need to follow up with family physician for medical issues  Aware of 24 hour and weekend coverage for urgent situations accessed by calling [facility] main practice number      Medications Risks/Benefits      Risks, Benefits And Possible Side Effects Of Medications:    Risks, benefits, and possible side effects of medications explained to Reynaldo and he verbalizes understanding and agreement for treatment..    Controlled Medication Discussion:     Not applicable    Psychotherapy Provided:     Individual psychotherapy provided: Yes  Counseling was provided during the session today for 16 minutes.  Recent stressor including relationship problems, job stress, school stress, health issues, medical problems, and everyday stressors discussed with Reynaldo.   Coping skills reviewed with Reynaldo.   Reassurance and supportive therapy provided.      Treatment Plan:    Completed and signed during the session: Not applicable - Treatment Plan not due at this session    This note was not shared with the patient due to reasonable likelihood of causing patient harm    Visit Time    Visit Start Time: 330pm  Visit Stop Time: 350pm  Total Visit Duration:  20 minutes        Miguel A Mercado MD 07/12/24

## 2024-07-19 ENCOUNTER — OFFICE VISIT (OUTPATIENT)
Dept: FAMILY MEDICINE CLINIC | Facility: CLINIC | Age: 34
End: 2024-07-19
Payer: COMMERCIAL

## 2024-07-19 VITALS
DIASTOLIC BLOOD PRESSURE: 82 MMHG | WEIGHT: 315 LBS | OXYGEN SATURATION: 99 % | HEIGHT: 74 IN | SYSTOLIC BLOOD PRESSURE: 126 MMHG | HEART RATE: 95 BPM | TEMPERATURE: 98.4 F | RESPIRATION RATE: 16 BRPM | BODY MASS INDEX: 40.43 KG/M2

## 2024-07-19 DIAGNOSIS — J02.9 PHARYNGITIS, UNSPECIFIED ETIOLOGY: Primary | ICD-10-CM

## 2024-07-19 LAB
S PYO AG THROAT QL: NEGATIVE
SARS-COV-2 AG UPPER RESP QL IA: NEGATIVE
VALID CONTROL: NORMAL

## 2024-07-19 PROCEDURE — 87811 SARS-COV-2 COVID19 W/OPTIC: CPT | Performed by: NURSE PRACTITIONER

## 2024-07-19 PROCEDURE — 87880 STREP A ASSAY W/OPTIC: CPT | Performed by: NURSE PRACTITIONER

## 2024-07-19 PROCEDURE — 87070 CULTURE OTHR SPECIMN AEROBIC: CPT | Performed by: NURSE PRACTITIONER

## 2024-07-19 PROCEDURE — 99213 OFFICE O/P EST LOW 20 MIN: CPT | Performed by: NURSE PRACTITIONER

## 2024-07-19 RX ORDER — PREDNISONE 10 MG/1
TABLET ORAL
Qty: 15 TABLET | Refills: 0 | Status: SHIPPED | OUTPATIENT
Start: 2024-07-19

## 2024-07-19 NOTE — PROGRESS NOTES
FAMILY PRACTICE OFFICE VISIT       NAME: Reynadlo Santoyo  AGE: 34 y.o. SEX: male       : 1990        MRN: [id number]    Assessment and Plan   1. Pharyngitis, unspecified etiology  -     POCT Rapid Covid Ag  -     POCT rapid ANTIGEN strepA  -     Throat culture  -     predniSONE 10 mg tablet; Take 5 tablets by mouth for 1 day, then 4 tablets for 1 day, then 3 tablets for 1 day, then 2 tablets for 1 day, then 1 tablet once.       Negative rapid strep and rapid COVID-19.   Has had strep in the past many times, will send throat culture.   Suspect post nasal drip from allergic rhinitis is cause of sore throat.   Will start prednisone taper, and start daily antihistamine such as Claritin or Zyrtec 10 mg daily, and flonase nasal spray 2 sprays each nostril daily.   Call me if symptoms are not improving over the next one week or if symptoms should worsen.         Chief Complaint     Chief Complaint   Patient presents with    Sore Throat     X1 week, congestion        History of Present Illness     Reynaldo Santoyo is a 34 year old patient presenting today for sore throat.    Sore throat and swelling started one week ago.     Ear infecition treated 2 weeks ago. This got better.     Takes tylenol and ibuprfen helps short term.  Low grade fever last week.  Has post nasal drip and cough from PND.            Review of Systems   Review of Systems   Constitutional: Negative.  Negative for chills, diaphoresis, fatigue and fever.   HENT:  Positive for postnasal drip and sore throat. Negative for congestion and sinus pressure.    Respiratory:  Positive for cough (from post nasal drip, not new).    Cardiovascular: Negative.    Gastrointestinal:  Negative for nausea and vomiting.   Neurological:  Negative for headaches.       I have reviewed the patient's medical history in detail; there are no changes to the history as noted in the electronic medical record.    Objective     Vitals:    24 1143   BP: 126/82   BP Location:  "Left arm   Patient Position: Sitting   Cuff Size: Large   Pulse: 95   Resp: 16   Temp: 98.4 °F (36.9 °C)   TempSrc: Temporal   SpO2: 99%   Weight: (!) 182 kg (402 lb)   Height: 6' 2\" (1.88 m)     Wt Readings from Last 3 Encounters:   07/19/24 (!) 182 kg (402 lb)   07/03/24 (!) 182 kg (401 lb)   06/07/24 (!) 182 kg (401 lb)     Physical Exam  Vitals and nursing note reviewed.   Constitutional:       General: He is not in acute distress.     Appearance: Normal appearance. He is obese. He is not ill-appearing.   HENT:      Right Ear: Tympanic membrane normal.      Left Ear: Tympanic membrane normal.      Nose: Congestion and rhinorrhea present.      Mouth/Throat:      Mouth: Mucous membranes are moist.      Pharynx: No oropharyngeal exudate or posterior oropharyngeal erythema.      Comments: Thick post nasal drip  Cardiovascular:      Rate and Rhythm: Normal rate and regular rhythm.      Heart sounds: No murmur heard.  Pulmonary:      Effort: Pulmonary effort is normal.      Breath sounds: Normal breath sounds.   Musculoskeletal:      Cervical back: Normal range of motion and neck supple.      Right lower leg: No edema.      Left lower leg: No edema.   Lymphadenopathy:      Cervical: No cervical adenopathy.   Neurological:      Mental Status: He is alert.   Psychiatric:         Mood and Affect: Mood normal.            ALLERGIES:  No Known Allergies    Current Medications     Current Outpatient Medications   Medication Sig Dispense Refill    buPROPion (WELLBUTRIN XL) 150 mg 24 hr tablet Take 1 tablet (150 mg total) by mouth daily 90 tablet 1    buPROPion (WELLBUTRIN XL) 300 mg 24 hr tablet Take 1 tablet (300 mg total) by mouth daily 90 tablet 1    Cholecalciferol (VITAMIN D-3) 1000 units CAPS Take 2 capsules by mouth daily      levothyroxine 25 mcg tablet Take 1 tablet (25 mcg total) by mouth daily in the early morning 90 tablet 0    multivitamin (THERAGRAN) TABS Take 1 tablet by mouth daily      predniSONE 10 mg tablet " Take 5 tablets by mouth for 1 day, then 4 tablets for 1 day, then 3 tablets for 1 day, then 2 tablets for 1 day, then 1 tablet once. 15 tablet 0    venlafaxine (EFFEXOR-XR) 150 mg 24 hr capsule Take 1 capsule (150 mg total) by mouth daily 90 capsule 1     No current facility-administered medications for this visit.         Health Maintenance     Health Maintenance   Topic Date Due    DTaP,Tdap,and Td Vaccines (2 - Td or Tdap) 02/11/2023    COVID-19 Vaccine (3 - 2023-24 season) 09/01/2023    Influenza Vaccine (1) 09/01/2024    Depression Screening  03/13/2025    Annual Physical  05/24/2025    Zoster Vaccine (1 of 2) 07/10/2040    RSV Vaccine Age 60+ Years (1 - 1-dose 60+ series) 07/10/2050    HIV Screening  Completed    Hepatitis C Screening  Completed    RSV Vaccine age 0-20 Months  Aged Out    Pneumococcal Vaccine: Pediatrics (0 to 5 Years) and At-Risk Patients (6 to 64 Years)  Aged Out    HIB Vaccine  Aged Out    IPV Vaccine  Aged Out    Hepatitis A Vaccine  Aged Out    Meningococcal ACWY Vaccine  Aged Out    HPV Vaccine  Aged Out     Immunization History   Administered Date(s) Administered    COVID-19 PFIZER VACCINE 0.3 ML IM 05/05/2021, 05/27/2021    INFLUENZA 10/19/2022, 10/01/2023    Influenza, recombinant, quadrivalent,injectable, preservative free 11/04/2019    Influenza, seasonal, injectable 01/04/2017    Influenza, seasonal, injectable, preservative free 10/01/2018    MMR 11/09/2022, 12/22/2022    Tdap 02/11/2013    Tuberculin Skin Test-PPD Intradermal 01/14/2022    Varicella 11/09/2022, 12/22/2022       AMRITA Alexander

## 2024-07-21 LAB — BACTERIA THROAT CULT: NORMAL

## 2024-07-22 ENCOUNTER — TELEPHONE (OUTPATIENT)
Dept: FAMILY MEDICINE CLINIC | Facility: CLINIC | Age: 34
End: 2024-07-22

## 2024-07-22 NOTE — TELEPHONE ENCOUNTER
----- Message from AMRITA Subramanian sent at 7/21/2024  7:12 PM EDT -----  Please call Reynaldo --throat culture is negative. Please call if not feeling better.  (He does not prefer [employer] messages)

## 2024-09-06 ENCOUNTER — TELEPHONE (OUTPATIENT)
Dept: PSYCHIATRY | Facility: CLINIC | Age: 34
End: 2024-09-06

## 2024-09-06 ENCOUNTER — TELEMEDICINE (OUTPATIENT)
Dept: PSYCHIATRY | Facility: CLINIC | Age: 34
End: 2024-09-06
Payer: COMMERCIAL

## 2024-09-06 DIAGNOSIS — F41.1 GENERALIZED ANXIETY DISORDER: ICD-10-CM

## 2024-09-06 DIAGNOSIS — F33.1 MODERATE EPISODE OF RECURRENT MAJOR DEPRESSIVE DISORDER (HCC): Primary | ICD-10-CM

## 2024-09-06 PROCEDURE — 99214 OFFICE O/P EST MOD 30 MIN: CPT | Performed by: STUDENT IN AN ORGANIZED HEALTH CARE EDUCATION/TRAINING PROGRAM

## 2024-09-06 PROCEDURE — 90833 PSYTX W PT W E/M 30 MIN: CPT | Performed by: STUDENT IN AN ORGANIZED HEALTH CARE EDUCATION/TRAINING PROGRAM

## 2024-09-06 RX ORDER — VENLAFAXINE HYDROCHLORIDE 150 MG/1
150 CAPSULE, EXTENDED RELEASE ORAL DAILY
Qty: 90 CAPSULE | Refills: 1 | Status: SHIPPED | OUTPATIENT
Start: 2024-09-06

## 2024-09-06 RX ORDER — BUPROPION HYDROCHLORIDE 150 MG/1
150 TABLET ORAL DAILY
Qty: 90 TABLET | Refills: 1 | Status: SHIPPED | OUTPATIENT
Start: 2024-09-06

## 2024-09-06 RX ORDER — BUPROPION HYDROCHLORIDE 300 MG/1
300 TABLET ORAL DAILY
Qty: 90 TABLET | Refills: 1 | Status: SHIPPED | OUTPATIENT
Start: 2024-09-06

## 2024-09-06 NOTE — ASSESSMENT & PLAN NOTE
Orders:    buPROPion (WELLBUTRIN XL) 150 mg 24 hr tablet; Take 1 tablet (150 mg total) by mouth daily    buPROPion (WELLBUTRIN XL) 300 mg 24 hr tablet; Take 1 tablet (300 mg total) by mouth daily    venlafaxine (EFFEXOR-XR) 150 mg 24 hr capsule; Take 1 capsule (150 mg total) by mouth daily

## 2024-09-06 NOTE — TELEPHONE ENCOUNTER
Writer called and left message to schedule 3 month follow up with Dr. Mercado. Writer scheduled patient for 12/9 at 1:30 Virtual Visit.

## 2024-09-06 NOTE — PSYCH
Virtual Regular Visit    Verification of patient location:    Patient is located at Home in the following state in which I hold an active license [place]      Assessment/Plan:    Problem List Items Addressed This Visit       Generalized anxiety disorder    Relevant Medications    buPROPion (WELLBUTRIN XL) 150 mg 24 hr tablet    buPROPion (WELLBUTRIN XL) 300 mg 24 hr tablet    venlafaxine (EFFEXOR-XR) 150 mg 24 hr capsule    Moderate episode of recurrent major depressive disorder (HCC) - Primary    Relevant Medications    buPROPion (WELLBUTRIN XL) 150 mg 24 hr tablet    buPROPion (WELLBUTRIN XL) 300 mg 24 hr tablet    venlafaxine (EFFEXOR-XR) 150 mg 24 hr capsule         Reason for visit is   Chief Complaint   Patient presents with    Virtual Regular Visit          Encounter provider Miguel A Mercado MD      Recent Visits  No visits were found meeting these conditions.  Showing recent visits within past 7 days and meeting all other requirements  Today's Visits  Date Type Provider Dept   09/06/24 Telephone Miguel A Mercado MD [facility]   09/06/24 Telemedicine Miguel A Mercado MD [facility] [facility]   Showing today's visits and meeting all other requirements  Future Appointments  No visits were found meeting these conditions.  Showing future appointments within next 150 days and meeting all other requirements       The patient was identified by name and date of birth. Reynaldo Santoyo was informed that this is a telemedicine visit and that the visit is being conducted throughthe Epic Embedded platform. He agrees to proceed..  My office door was closed. No one else was in the room.  He acknowledged consent and understanding of privacy and security of the video platform. The patient has agreed to participate and understands they can discontinue the visit at any time.    Patient is aware this is a billable service.     Subjective  Reynaldo Santoyo is a 34 y.o. male .      HPI     Past Medical History:   Diagnosis  Date    Anxiety     Carpal tunnel syndrome, bilateral     LA...9/12/17   R....9/12/17     COVID-19 5/27/2022    GERD without esophagitis     Moderate episode of recurrent major depressive disorder (HCC) 08/22/2018    Morbid obesity (HCC)     Obstructive sleep apnea     Pilonidal cyst with abscess     LA....10/25/13   R....6/10/14     Vitamin D deficiency        Past Surgical History:   Procedure Laterality Date    COLONOSCOPY      FOOT SURGERY      PILONIDAL CYST DRAINAGE      Incision and drainage of pilonidal cyst     VT NDSC WRST SURG W/RLS TRANSVRS CARPL LIGM Right 07/12/2018    Procedure: RELEASE CARPAL TUNNEL ENDOSCOPIC;  Surgeon: Emerson Infante MD;  Location: [facility];  Service: Orthopedics    VT NDSC WRST SURG W/RLS TRANSVRS CARPL LIGM Left 07/26/2018    Procedure: RELEASE CARPAL TUNNEL ENDOSCOPIC;  Surgeon: Emerson Infante MD;  Location: [facility];  Service: Orthopedics       Current Outpatient Medications   Medication Sig Dispense Refill    buPROPion (WELLBUTRIN XL) 150 mg 24 hr tablet Take 1 tablet (150 mg total) by mouth daily 90 tablet 1    buPROPion (WELLBUTRIN XL) 300 mg 24 hr tablet Take 1 tablet (300 mg total) by mouth daily 90 tablet 1    Cholecalciferol (VITAMIN D-3) 1000 units CAPS Take 2 capsules by mouth daily      levothyroxine 25 mcg tablet Take 1 tablet (25 mcg total) by mouth daily in the early morning 90 tablet 0    multivitamin (THERAGRAN) TABS Take 1 tablet by mouth daily      predniSONE 10 mg tablet Take 5 tablets by mouth for 1 day, then 4 tablets for 1 day, then 3 tablets for 1 day, then 2 tablets for 1 day, then 1 tablet once. 15 tablet 0    venlafaxine (EFFEXOR-XR) 150 mg 24 hr capsule Take 1 capsule (150 mg total) by mouth daily 90 capsule 1     No current facility-administered medications for this visit.        No Known Allergies    Review of Systems    Video Exam    There were no vitals filed for this visit.    Physical Exam     MEDICATION MANAGEMENT NOTE        [facility]      Name and Date of Birth:  Reynaldo Santoyo 34 y.o. 1990 MRN: [id number]    Date of Visit: September 6, 2024    Reason for Visit: Follow-up visit regarding medication management     Chief Complaint: \"I am doing pretty good\"    SUBJECTIVE:    Reynaldo Santoyo is a 34 y.o., male, with a past psychiatric history significant for MDD, JON, medically complicated by JIMI, obesity, HTN, who presents for follow-up appointment for medication management. Reynaldo states that since their previous outpatient psychiatric appointment, he is doing relatively well.  He reports that his mood and depression has been stable.  No significant problems with anxiety or feeling down/hopeless.  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  He is busy throughout the day most days although does not find any excessive worry or concerns.  He still complains of anergia, feeling tired and lethargic at times during the day and attributes this to his poor sleep at night.  He will sometimes sleep for 4 to 5 hours, often tossing and turning and has trouble maintaining his sleep.  He will nap at times during the day.  He still working with sleep medicine to adjust his CPAP machine although has not yet gotten the additional parts necessary.  Reports that his appetite level is stable, trying to diet and exercise more frequently to help with weight loss.  Denies any SI, HI, AVH.  No adverse effects to his medications.    Reports that he continues to do well in his relationship with his new girlfriend, no dating for 3 months and he has just met his partners child.    Current Rating Scores:   Current PHQ-9   PHQ-2/9 Depression Screening             Psychiatric Review Of Systems:    Appetite: no  Adverse eating: no  Weight changes: no  Insomnia/sleeplessness: decreased  Fatigue/anergy: decreased  Anhedonia/lack of interest: " no  Attention/concentration: no  Psychomotor agitation/retardation: no  Somatic symptoms: no  Anxiety/panic attack: yes, worrying  Gillian/hypomania: no  Hopelessness/helplessness/worthlessness: no  Self-injurious behavior/high-risk behavior: no  Suicidal ideation: no  Homicidal ideation: no  Auditory hallucinations: no  Visual hallucinations: no  Other perceptual disturbances: no  Delusional thinking: no  Obsessive/compulsive symptoms: no    Review Of Systems:      Constitutional negative   ENT negative   Cardiovascular negative   Respiratory negative   Gastrointestinal negative   Genitourinary negative   Musculoskeletal negative   Integumentary negative   Neurological negative   Endocrine negative   Other Symptoms none, all other systems are negative     History Review:   The following portions of the patient's history were reviewed and updated as appropriate: allergies, current medications, past family history, past medical history, past social history, past surgical history, and problem list. Previous progress notes/assessments from other providers reviewed as available.    Past Medical History:    Past Medical History:   Diagnosis Date    Anxiety     Carpal tunnel syndrome, bilateral     LA...9/12/17   R....9/12/17     COVID-19 5/27/2022    GERD without esophagitis     Moderate episode of recurrent major depressive disorder (HCC) 08/22/2018    Morbid obesity (HCC)     Obstructive sleep apnea     Pilonidal cyst with abscess     LA....10/25/13   R....6/10/14     Vitamin D deficiency         No Known Allergies    Substance Abuse History:    Social History     Substance and Sexual Activity   Alcohol Use Yes    Comment: Rarely     Social History     Substance and Sexual Activity   Drug Use No       Social History:    Social History     Socioeconomic History    Marital status: Legally      Spouse name: Not on file    Number of children: 0    Years of education: some college    Highest education level: Some  college, no degree   Occupational History    Occupation: [employer]     Comment: employed full time   Tobacco Use    Smoking status: Never    Smokeless tobacco: Never   Vaping Use    Vaping status: Never Used   Substance and Sexual Activity    Alcohol use: Yes     Comment: Rarely    Drug use: No    Sexual activity: Yes     Partners: Female   Other Topics Concern    Not on file   Social History Narrative     since 20/12.   2/22.  She moved out.    No children.    Also has brother and sister-in-law and his niece living with him.And mother.     Works as an [employer] at [employer] in [place] since 10/22.    Going to [employer] for prerequisites for nursing school.    Also involved with the [employer].     Social Determinants of Health     Financial Resource Strain: Not on file   Food Insecurity: Not on file   Transportation Needs: Not on file   Physical Activity: Not on file   Stress: Not on file   Social Connections: Not on file   Intimate Partner Violence: Not on file   Housing Stability: Not on file       Family Psychiatric History:     Family History   Problem Relation Age of Onset    Depression Mother     Obesity Mother     Stroke Mother         Syndrome     Diabetes Mother     Alcohol abuse Father     Liver disease Father         hepatic failure     Hypertension Father     Depression Brother     Thyroid disease Brother     Alcohol abuse Brother     Substance Abuse Brother     Depression Maternal Uncle     Substance Abuse Brother     Heart disease Neg Hx     Cancer Neg Hx     Thyroid cancer Neg Hx             OBJECTIVE:     Vital signs in last 24 hours:    There were no vitals filed for this visit.    Mental Status Evaluation:    Appearance age appropriate, casually dressed   Behavior cooperative, calm   Speech normal rate, normal volume, normal pitch   Mood euthymic   Affect normal range and intensity, appropriate   Thought Processes organized, goal directed   Associations intact  associations   Thought Content no overt delusions   Perceptual Disturbances: no auditory hallucinations, no visual hallucinations   Abnormal Thoughts  Risk Potential Suicidal ideation - None  Homicidal ideation - None  Potential for aggression - No   Orientation oriented to person, place, time/date, and situation   Memory recent and remote memory grossly intact   Consciousness alert and awake   Attention Span Concentration Span attention span and concentration are age appropriate   Intellect appears to be of average intelligence   Insight intact   Judgement intact   Muscle Strength and  Gait normal muscle strength and normal muscle tone, normal gait and normal balance   Motor activity no abnormal movements   Fund of Knowledge adequate knowledge of current events  adequate fund of knowledge regarding past history  adequate fund of knowledge regarding vocabulary    Pain none   Pain Scale 0       Laboratory Results: I have personally reviewed all pertinent laboratory/tests results    Recent Labs (last 4 months):   Office Visit on 07/19/2024   Component Date Value    POCT SARS-CoV-2 Ag 07/19/2024 Negative     VALID CONTROL 07/19/2024 Valid      RAPID STREP A 07/19/2024 Negative     Throat Culture 07/19/2024 Negative for beta-hemolytic Streptococcus    Appointment on 06/05/2024   Component Date Value    WBC 06/05/2024 7.70     RBC 06/05/2024 5.47     Hemoglobin 06/05/2024 15.2     Hematocrit 06/05/2024 47.2     MCV 06/05/2024 86     MCH 06/05/2024 27.8     MCHC 06/05/2024 32.2     RDW 06/05/2024 12.8     Platelets 06/05/2024 306     MPV 06/05/2024 9.5     Sodium 06/05/2024 140     Potassium 06/05/2024 4.1     Chloride 06/05/2024 104     CO2 06/05/2024 31     ANION GAP 06/05/2024 5     BUN 06/05/2024 15     Creatinine 06/05/2024 1.09     Glucose, Fasting 06/05/2024 98     Calcium 06/05/2024 9.1     AST 06/05/2024 20     ALT 06/05/2024 27     Alkaline Phosphatase 06/05/2024 82     Total Protein 06/05/2024 7.7     Albumin  06/05/2024 4.0     Total Bilirubin 06/05/2024 0.45     eGFR 06/05/2024 88     Hemoglobin A1C 06/05/2024 5.5     EAG 06/05/2024 111     Cholesterol 06/05/2024 182     Triglycerides 06/05/2024 108     HDL, Direct 06/05/2024 43     LDL Calculated 06/05/2024 117 (H)     Non-HDL-Chol (CHOL-HDL) 06/05/2024 139     TSH 3RD GENERATON 06/05/2024 3.998     Vit D, 25-Hydroxy 06/05/2024 40.2     Syphilis Total Antibody 06/05/2024 Non-reactive     HIV-1 p24 Antigen 06/05/2024 Non-Reactive     HIV-1 Antibody 06/05/2024 Non-Reactive     HIV-2 Antibody 06/05/2024 Non-Reactive     HIV Ag-Ab 5th Gen 06/05/2024 Non-Reactive     Hepatitis C Ab 06/05/2024 Non-reactive     N gonorrhoeae, DNA Probe 06/05/2024 Negative     Chlamydia trachomatis, D* 06/05/2024 Negative          Summary:  Reynaldo was personally seen and evaluated today at the [facility] outpatient clinic for follow-up regarding medication management.  He notes that he has experienced an increase in depressive symptoms, mainly some negative self image and ruminating negative thoughts.  He has experienced decreased motivation decreased energy level although this appears to coincide with poor sleep.  His sleep is most likely significantly impacted by his obstructive sleep apnea; he is in process to obtain a CPAP machine.  He also does correlate an increase in depressive symptoms around the time he ran out of the 150 mg Wellbutrin XL tablets; he wishes to restart this.  The plan will be to resume the medications, follow in 2 months, consider adjustments if needed.    1/27/22 Mild improvement in depressive symptoms. Sleep remains problematic, though much of this seems related to obstructive sleep apnea.     4/29/22 Since previous appointment he did experience worsening depressive symptoms, suicidal ideations.  This was due mostly to wife  him and increased stress at work and home.  He attended PHP and feels much better now.  More happier, less  depressed, more motivated.  No longer experiencing SI.  Feels the current medication regimen is helpful and would like to continue this.  He continues to work with sleep medicine for JIMI. I encourage him to continue this.    8/17/22 Patient feeling more depressed.  Passive suicidal ideations.  Increased feelings of hopelessness, guilt, ruminating stressors.  Much of this has worsened due to impending divorce.  Feels that he is calling out of work more frequently, feeling unsatisfied with his career, more feelings of low self-esteem.  Discussed medication changes and he agrees to taper Lexapro to Effexor.  He will also reach out to his psychotherapist, Ayse Bautista, and return to weekly sessions.    9/15/22 -Still experiencing depressive symptoms - mostly anergia, amotivation, sadness, isolation.  Ruminating more frequently about the divorce and past relationship. No suicidal thoughts. Tolerated increase in Effexor well, no side effects. Re-established with psychotherapy; still agreeable to return back to work on 9/19/22.    3/10/23 Reynaldo states that he is doing well.  Depressive symptoms are relatively stable.  Intermittently occurring at times, mostly related to rumination about his divorce.  He reports that sleep has been more difficult at times due to nightmares about his wife.  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  He is hopeful that he may become a nurse or a crisis worker in the near future.  Tolerating medications well and no side effects reported.    7/27/23 He is doing well.  Depression and anxiety are stable.  Some stress still stemming from divorce proceedings although he is doing well and that relationship is improving with his ex-wife.  He is now in a new relationship with another female and is pleased about this, going well.  Putting in extra hours at work, continuing to do prerequisite classes for nursing  school.  Tolerating medications well.  No side effects.    11/8/23 He is doing fairly well in terms of depression and anxiety symptoms.  The struggle with some depressed mood at times when ruminating about the divorce with his wife.  He considers planes of inattentive symptoms which she has noticed more recently now that he is taking prerequisites for nursing school.  The symptoms have been present for a long period of time although he is interested in receiving an evaluation by neuropsychology.    3/18/24 He is doing fairly well; anxiety is stable.  Depression does wax and wane at times, 1-2 times per month feeling anhedonic and anergic which results in him having to call out of work although this is happening less frequently.  We will fill out [facility] paperwork for him to accommodate.  He is still struggling with inattentive symptoms, feeling more forgetful and distracted.  He is pending neuropsychological evaluation which will better clarify if he has ADHD symptoms.  He is also working with ENT/sleep medicine to correct deviated nasal septum which may be playing a role in obstructive sleep apnea which could further impact his memory/focus issues.  He may be doing a clinical trial for a device to correct the nasal septum.    7/12/24 He is doing well; mood and anxiety are stable.  Continues to have some sleep difficulty mostly related to obstructive sleep apnea and is awaiting ENT evaluation for possible treatment for deviated septum.  He also recently gone to a new relationship and feels that he is connecting well with his new girlfriend.  He still complains of inattentive and concentration issues, did complete neuropsychological testing at [facility] although we have not received a copy of the results yet.  He will reach out to them and have them fax us a new copy.  We will follow up in 1 month to determine medication adjustments pending neuropsych results.    9/6/24 He is doing relatively well.  No major  issues or concerns.  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  Good relationship with his girlfriend of 3 months, just met his girlfriend's son.  We discussed results of his neuropsychological testing which did not indicate signs of ADHD.  He was accepting of this.    Assessment & Plan  Moderate episode of recurrent major depressive disorder (HCC)    Orders:    buPROPion (WELLBUTRIN XL) 150 mg 24 hr tablet; Take 1 tablet (150 mg total) by mouth daily    buPROPion (WELLBUTRIN XL) 300 mg 24 hr tablet; Take 1 tablet (300 mg total) by mouth daily    venlafaxine (EFFEXOR-XR) 150 mg 24 hr capsule; Take 1 capsule (150 mg total) by mouth daily    Generalized anxiety disorder    Orders:    venlafaxine (EFFEXOR-XR) 150 mg 24 hr capsule; Take 1 capsule (150 mg total) by mouth daily        Additonal Recommendations/Precautions:    Reestablish with therapy  Recommend follow with sleep medicine.  Aware of need to follow up with family physician for medical issues  Aware of 24 hour and weekend coverage for urgent situations accessed by calling [facility] main practice number    Medications Risks/Benefits      Risks, Benefits And Possible Side Effects Of Medications:    Risks, benefits, and possible side effects of medications explained to Reynaldo including risk of suicidality and serotonin syndrome related to treatment with antidepressants. He verbalizes understanding and agreement for treatment..    Controlled Medication Discussion:     Not applicable    Psychotherapy Provided:     Individual psychotherapy provided: Yes  Counseling was provided during the session today for 16 minutes.  Recent stressor including family problems, family conflict, relationship problems, job stress, health issues, medical problems, everyday stressors, and occasional anxiety discussed with Reynaldo.   Coping skills reviewed with Reynaldo.   Reassurance and  supportive therapy provided.      Treatment Plan:    Completed and signed during the session: Yes - with Reynaldo    This note was not shared with the patient due to reasonable likelihood of causing patient harm    Visit Time    Visit Start Time: 135pm  Visit Stop Time: 200pm  Total Visit Duration:  25 minutes        Miguel A Mercado MD 09/06/24

## 2024-09-06 NOTE — BH TREATMENT PLAN
TREATMENT PLAN (Medication Management Only)        [facility]    Name and Date of Birth:  Reynaldo Santoyo 34 y.o. 1990  Date of Treatment Plan: September 6, 2024  Diagnosis/Diagnoses:    1. Moderate episode of recurrent major depressive disorder (HCC)    2. Generalized anxiety disorder      Strengths/Personal Resources for Self-Care: supportive family, supportive friends, taking medications as prescribed.  Area/Areas of need (in own words): anxiety, depression  1. Long Term Goal: continue improvement in anxiety.  Target Date:6 months - 3/6/2025  Person/Persons responsible for completion of goal: Reynaldo  2.  Short Term Objective (s) - How will we reach this goal?:   A. Provider new recommended medication/dosage changes and/or continue medication(s): continue current medications as prescribed.  B. N/A.  C. N/A.  Target Date:6 months - 3/6/2025  Person/Persons Responsible for Completion of Goal: Reynaldo  Progress Towards Goals: continuing treatment  Treatment Modality: medication management every 3 months  Review due 180 days from date of this plan: 6 months - 3/6/2025  Expected length of service: maintenance  My Physician/PA/NP and I have developed this plan together and I agree to work on the goals and objectives. I understand the treatment goals that were developed for my treatment.

## 2024-09-06 NOTE — BH CRISIS PLAN
Client Name: Reynaldo Santoyo       Client YOB: 1990    [facility] Safety Plan      Creation Date: 9/6/24    Created By: Miguel A Mercado MD       Step 1: Warning Signs:   Warning Signs   Secluding Self   Less motivated            Step 2: Internal Coping Strategies:   Internal Coping Strategies   Listening to music   playing video games            Step 3: People and social settings that provide distraction:   Name Contact Information   Brother    Girlfriend             Step 4: People whom I can ask for help during a crisis:      Name Contact Information    Brother     Mother       Step 5: Professionals or agencies I can contact during a crisis:      Clinican/Agency Name Phone Emergency Contact    [facility] Emergency Department Emergency Department Phone Emergency Department Address    [facility]          Crisis Phone Numbers:   Suicide Prevention Lifeline: Call or Text  229 Crisis Text Line: Text HOME to 433-736   Please note: Some [facility] do not have a separate number for Child/Adolescent specific crisis. If your county is not listed under Child/Adolescent, please call the adult number for your county      Adult Crisis Numbers: Child/Adolescent Crisis Numbers   [facility]: [phone] [facility]: [phone]   [facility]: [phone] [facility]: [phone]   [facility]: [phone] [place]: [phone]   [facility]: [phone] [facility]: [phone]   [facility]: [phone]   [facility]: [phone]   [facility]: [phone]   [facility]: [phone] (daytime) [phone] (after hours, weekends, holidays)      Step 6: Making the environment safer (plan for lethal means safety):   Patient did not identify any lethal methods: Yes     Optional: What is most important to me and worth living for?      [facility] Safety Plan. Sandra Love and  Parish Patrick. Used with permission of the authors.

## 2024-09-06 NOTE — ASSESSMENT & PLAN NOTE
Orders:    venlafaxine (EFFEXOR-XR) 150 mg 24 hr capsule; Take 1 capsule (150 mg total) by mouth daily

## 2024-10-07 ENCOUNTER — OFFICE VISIT (OUTPATIENT)
Dept: GASTROENTEROLOGY | Facility: CLINIC | Age: 34
End: 2024-10-07
Payer: COMMERCIAL

## 2024-10-07 ENCOUNTER — LAB (OUTPATIENT)
Dept: LAB | Facility: CLINIC | Age: 34
End: 2024-10-07
Payer: COMMERCIAL

## 2024-10-07 VITALS
DIASTOLIC BLOOD PRESSURE: 66 MMHG | BODY MASS INDEX: 40.43 KG/M2 | SYSTOLIC BLOOD PRESSURE: 128 MMHG | HEIGHT: 74 IN | WEIGHT: 315 LBS | HEART RATE: 76 BPM

## 2024-10-07 DIAGNOSIS — R10.32 BILATERAL LOWER ABDOMINAL CRAMPING: Primary | ICD-10-CM

## 2024-10-07 DIAGNOSIS — R19.7 DIARRHEA, UNSPECIFIED TYPE: ICD-10-CM

## 2024-10-07 DIAGNOSIS — R10.32 BILATERAL LOWER ABDOMINAL CRAMPING: ICD-10-CM

## 2024-10-07 DIAGNOSIS — R10.31 BILATERAL LOWER ABDOMINAL CRAMPING: Primary | ICD-10-CM

## 2024-10-07 DIAGNOSIS — K58.0 IRRITABLE BOWEL SYNDROME WITH DIARRHEA: ICD-10-CM

## 2024-10-07 DIAGNOSIS — R10.31 BILATERAL LOWER ABDOMINAL CRAMPING: ICD-10-CM

## 2024-10-07 LAB — IGA SERPL-MCNC: 423 MG/DL (ref 66–433)

## 2024-10-07 PROCEDURE — 36415 COLL VENOUS BLD VENIPUNCTURE: CPT

## 2024-10-07 PROCEDURE — 99214 OFFICE O/P EST MOD 30 MIN: CPT | Performed by: PHYSICIAN ASSISTANT

## 2024-10-07 PROCEDURE — 82784 ASSAY IGA/IGD/IGG/IGM EACH: CPT

## 2024-10-07 PROCEDURE — 86364 TISS TRNSGLTMNASE EA IG CLAS: CPT

## 2024-10-07 RX ORDER — DICYCLOMINE HYDROCHLORIDE 10 MG/1
10 CAPSULE ORAL 3 TIMES DAILY PRN
Qty: 90 CAPSULE | Refills: 1 | Status: SHIPPED | OUTPATIENT
Start: 2024-10-07

## 2024-10-07 NOTE — PATIENT INSTRUCTIONS
Start metamucil OTC powder once daily to bulk and regulate stools   Drink at least 64 oz water/ day

## 2024-10-07 NOTE — PROGRESS NOTES
[facility] - Outpatient Follow-up Note  Reynaldo Santoyo 34 y.o. male MRN: [id number]  Encounter: [id number]  ASSESSMENT AND PLAN:    1. Bilateral lower abdominal cramping  2. Diarrhea, unspecified type  3. Irritable bowel syndrome with diarrhea  Patient with ongoing bilateral lower abdominal cramping, diarrhea.  Abdominal exam generally benign today.  We reviewed his colonoscopy which was completed in June 2024 which was normal.  Thankfully patient does not have any new complaints today or any associated alarming symptoms.    I had a long discussion with the patient today regarding possible etiologies of symptoms.  We discussed possible underlying infection, celiac disease, IBS.  I explained to the patient that I suspect his symptoms are likely due to IBS and/or diet choices.  I provided education on IBS.  I recommended patient undergo stool testing as previously ordered  I also recommended patient undergo blood testing for celiac disease  I encouraged patient to limit processed foods and artificial sweeteners, diet sodas.  I recommended patient begin drinking at least 64 ounces of water a day and increase fiber in diet.  We also discussed starting Metamucil supplement once daily at dinnertime to help bulk and regulate stools.  He will consider this.  Otherwise, I recommend patient trial dicyclomine 10 mg 3 times a day as needed for abdominal pain spasm    Will continue to monitor patient/symptoms.  May be a candidate for Xifaxan in follow-up should symptoms persist.    - Tissue transglutaminase, IgA; Future  - IgA; Future  - dicyclomine (BENTYL) 10 mg capsule; Take 1 capsule (10 mg total) by mouth 3 (three) times a day as needed (abdominal pain/ spasm)  Dispense: 90 capsule; Refill: 1      Patient was instructed to call the office with any questions, concerns, new/ worsening/ persisting GI symptoms. Advised patient go to the ER with any severe or worsening abdominal pain, fevers/ chills,  intractable N/V, chest pain, SOB, dizziness, lightheadedness, feeling something stuck in esophagus that will not go down. Patient expressed understanding and is in agreement with treatment plan.     Will plan to follow up after diagnostic tests in a few months   __________________________________________________________    SUBJECTIVE:      Patient with a past medical history of anxiety and depression, chronic bilateral low back pain, hypothyroidism, GERD, obstructive sleep apnea, BMI 51 presents to the office today for follow-up.  Patient was last seen in the office 6/7/2024 by Dr. oBss, previous office note was reviewed.  At last office visit stool testing and colonoscopy was ordered, does not appear stool testing was completed.  Colonoscopy was completed in June 2024, this was reviewed, this was completely normal.      Since last office visit patient continues to complain of lower abdominal pain described as cramping.   He has had abdominal cramping x 6 months.   With the abdominal cramping there is associated looser stools. He has ~ 3 loose Bms/ day.   No blood in stool.   No nocturnal stools.   He is eating and drinking well.   His weight is stable from last office visit.  Patient denies any fevers/ chills, unintentional weight loss, decreased appetite, nausea, vomiting,  black stools, heartburn, dysphagia, odynophagia.   Denies chest pain, SOB    Tobacco use- None  Alcohol use- Rare  NSAID use- None  Patient denies first-degree relative with colon cancer, inflammatory bowel disease, celiac disease     He admits to generally poor water intake and poor diet (fast food, diet soda, cookies) although he does admit to me he wants to try to start losing weight.     Review of Systems   Constitutional:  Negative for chills and fever.   HENT:  Negative for ear pain and sore throat.    Eyes:  Negative for pain and visual disturbance.   Respiratory:  Negative for cough and shortness of breath.    Cardiovascular:   Negative for chest pain and palpitations.   Gastrointestinal:  Positive for abdominal pain and diarrhea. Negative for vomiting.   Genitourinary:  Negative for dysuria and hematuria.   Musculoskeletal:  Negative for arthralgias and back pain.   Skin:  Negative for color change and rash.   Neurological:  Negative for seizures and syncope.   All other systems reviewed and are negative.     Historical Information   Past Medical History:   Diagnosis Date    Anxiety     Carpal tunnel syndrome, bilateral     LA...9/12/17   R....9/12/17     COVID-19 5/27/2022    GERD without esophagitis     Moderate episode of recurrent major depressive disorder (HCC) 08/22/2018    Morbid obesity (HCC)     Obstructive sleep apnea     Pilonidal cyst with abscess     LA....10/25/13   R....6/10/14     Vitamin D deficiency      Past Surgical History:   Procedure Laterality Date    COLONOSCOPY      FOOT SURGERY      PILONIDAL CYST DRAINAGE      Incision and drainage of pilonidal cyst     MS NDSC WRST SURG W/RLS TRANSVRS CARPL LIGM Right 07/12/2018    Procedure: RELEASE CARPAL TUNNEL ENDOSCOPIC;  Surgeon: Emerson Infante MD;  Location:  MAIN OR;  Service: Orthopedics    MS NDSC WRST SURG W/RLS TRANSVRS CARPL LIGM Left 07/26/2018    Procedure: RELEASE CARPAL TUNNEL ENDOSCOPIC;  Surgeon: Emerson Infante MD;  Location: [facility];  Service: Orthopedics     Social History   Social History     Substance and Sexual Activity   Alcohol Use Yes    Comment: Rarely     Social History     Substance and Sexual Activity   Drug Use No     Social History     Tobacco Use   Smoking Status Never   Smokeless Tobacco Never     Family History   Problem Relation Age of Onset    Depression Mother     Obesity Mother     Stroke Mother         Syndrome     Diabetes Mother     Alcohol abuse Father     Liver disease Father         hepatic failure     Hypertension Father     Depression Brother     Thyroid disease Brother     Alcohol abuse Brother     Substance Abuse  Brother     Depression Maternal Uncle     Substance Abuse Brother     Heart disease Neg Hx     Cancer Neg Hx     Thyroid cancer Neg Hx        Meds/Allergies       Current Outpatient Medications:     buPROPion (WELLBUTRIN XL) 150 mg 24 hr tablet    buPROPion (WELLBUTRIN XL) 300 mg 24 hr tablet    Cholecalciferol (VITAMIN D-3) 1000 units CAPS    levothyroxine 25 mcg tablet    multivitamin (THERAGRAN) TABS    predniSONE 10 mg tablet    venlafaxine (EFFEXOR-XR) 150 mg 24 hr capsule    No Known Allergies        Objective     Wt Readings from Last 3 Encounters:   07/19/24 (!) 182 kg (402 lb)   07/03/24 (!) 182 kg (401 lb)   06/07/24 (!) 182 kg (401 lb)     Temp Readings from Last 3 Encounters:   07/19/24 98.4 °F (36.9 °C) (Temporal)   07/03/24 99.5 °F (37.5 °C) (Tympanic)   06/25/24 (!) 97.4 °F (36.3 °C) (Temporal)     BP Readings from Last 3 Encounters:   07/19/24 126/82   06/25/24 138/69   06/07/24 134/78     Pulse Readings from Last 3 Encounters:   07/19/24 95   07/03/24 (!) 116   06/25/24 84          PHYSICAL EXAM:      Physical Exam  Vitals reviewed.   Constitutional:       General: He is not in acute distress.     Appearance: He is well-developed. He is obese. He is not ill-appearing or diaphoretic.   HENT:      Head: Normocephalic and atraumatic.   Eyes:      Conjunctiva/sclera: Conjunctivae normal.   Cardiovascular:      Rate and Rhythm: Normal rate and regular rhythm.      Heart sounds: No murmur heard.  Pulmonary:      Effort: Pulmonary effort is normal. No respiratory distress.      Breath sounds: Normal breath sounds.   Abdominal:      General: Bowel sounds are normal. There is no distension.      Palpations: Abdomen is soft.      Tenderness: There is abdominal tenderness (lower abdominal discomfort).   Musculoskeletal:         General: No swelling.      Cervical back: Neck supple.   Skin:     General: Skin is warm and dry.      Capillary Refill: Capillary refill takes less than 2 seconds.   Neurological:       General: No focal deficit present.      Mental Status: He is alert and oriented to person, place, and time.   Psychiatric:         Mood and Affect: Mood normal.         Behavior: Behavior normal.          Lab Results:   No visits with results within 1 Day(s) from this visit.   Latest known visit with results is:   Office Visit on 07/19/2024   Component Date Value    POCT SARS-CoV-2 Ag 07/19/2024 Negative     VALID CONTROL 07/19/2024 Valid      RAPID STREP A 07/19/2024 Negative     Throat Culture 07/19/2024 Negative for beta-hemolytic Streptococcus        Lab Results   Component Value Date    WBC 7.70 06/05/2024    HGB 15.2 06/05/2024    HCT 47.2 06/05/2024    MCV 86 06/05/2024     06/05/2024       Lab Results   Component Value Date     11/21/2017    SODIUM 140 06/05/2024    K 4.1 06/05/2024     06/05/2024    CO2 31 06/05/2024    AGAP 5 06/05/2024    BUN 15 06/05/2024    CREATININE 1.09 06/05/2024    GLUC 105 05/26/2022    GLUF 98 06/05/2024    CALCIUM 9.1 06/05/2024    AST 20 06/05/2024    ALT 27 06/05/2024    ALKPHOS 82 06/05/2024    PROT 7.7 11/21/2017    TP 7.7 06/05/2024    BILITOT 0.5 11/21/2017    TBILI 0.45 06/05/2024    EGFR 88 06/05/2024       Lab Results   Component Value Date    CRP 7.4 (H) 07/07/2018       Lab Results   Component Value Date    WZM9XQIGQOQZ 3.998 06/05/2024       Lab Results   Component Value Date    HEPCAB Non-reactive 06/05/2024       Prior Procedure Results/Reports   Patient underwent colonoscopy in June 2024 which was completely normal.    Tigist Major PA-C

## 2024-10-08 LAB
C COLI+JEJUNI TUF STL QL NAA+PROBE: NEGATIVE
EC STX1+STX2 GENES STL QL NAA+PROBE: NEGATIVE
G LAMBLIA AG STL QL IA: NEGATIVE
SALMONELLA SP SPAO STL QL NAA+PROBE: NEGATIVE
SHIGELLA SP+EIEC IPAH STL QL NAA+PROBE: NEGATIVE

## 2024-10-09 LAB
CALPROTECTIN STL-MCNC: 37.4 ΜG/G
ELASTASE PANC STL-MCNT: >800 UG/G
H PYLORI AG STL QL IA: NEGATIVE
WBC SPEC QL GRAM STN: NORMAL

## 2024-10-12 DIAGNOSIS — R79.89 ELEVATED TSH: ICD-10-CM

## 2024-10-12 RX ORDER — LEVOTHYROXINE SODIUM 25 UG/1
25 TABLET ORAL
Qty: 90 TABLET | Refills: 1 | Status: SHIPPED | OUTPATIENT
Start: 2024-10-12

## 2024-11-06 DIAGNOSIS — Z00.6 ENCOUNTER FOR EXAMINATION FOR NORMAL COMPARISON OR CONTROL IN CLINICAL RESEARCH PROGRAM: ICD-10-CM

## 2024-11-12 ENCOUNTER — TELEPHONE (OUTPATIENT)
Age: 34
End: 2024-11-12

## 2024-11-12 NOTE — TELEPHONE ENCOUNTER
Pt called asking about scheduling with Dr Villatoro for Sleep Medicine. Pt was advised will need a referral in order to do so. Pt understand and stated he will call his PCP.

## 2024-11-18 ENCOUNTER — OFFICE VISIT (OUTPATIENT)
Dept: FAMILY MEDICINE CLINIC | Facility: CLINIC | Age: 34
End: 2024-11-18
Payer: COMMERCIAL

## 2024-11-18 VITALS
HEART RATE: 81 BPM | SYSTOLIC BLOOD PRESSURE: 130 MMHG | OXYGEN SATURATION: 98 % | WEIGHT: 315 LBS | BODY MASS INDEX: 40.43 KG/M2 | DIASTOLIC BLOOD PRESSURE: 90 MMHG | RESPIRATION RATE: 16 BRPM | TEMPERATURE: 98 F | HEIGHT: 74 IN

## 2024-11-18 DIAGNOSIS — E66.813 CLASS 3 SEVERE OBESITY DUE TO EXCESS CALORIES WITH SERIOUS COMORBIDITY AND BODY MASS INDEX (BMI) OF 50.0 TO 59.9 IN ADULT (HCC): Primary | ICD-10-CM

## 2024-11-18 DIAGNOSIS — G47.33 OBSTRUCTIVE SLEEP APNEA: ICD-10-CM

## 2024-11-18 DIAGNOSIS — E66.01 CLASS 3 SEVERE OBESITY DUE TO EXCESS CALORIES WITH SERIOUS COMORBIDITY AND BODY MASS INDEX (BMI) OF 50.0 TO 59.9 IN ADULT (HCC): Primary | ICD-10-CM

## 2024-11-18 PROCEDURE — 99213 OFFICE O/P EST LOW 20 MIN: CPT | Performed by: NURSE PRACTITIONER

## 2024-11-18 RX ORDER — TIRZEPATIDE 2.5 MG/.5ML
2.5 INJECTION, SOLUTION SUBCUTANEOUS WEEKLY
Qty: 2 ML | Refills: 0 | Status: SHIPPED | OUTPATIENT
Start: 2024-11-18 | End: 2024-12-16

## 2024-11-18 NOTE — PROGRESS NOTES
Name: Reynaldo Santoyo      : 1990      MRN: [id number]  Encounter Provider: AMRITA Alexander  Encounter Date: 2024   Encounter department: [facility]  :  Assessment & Plan  Class 3 severe obesity due to excess calories with serious comorbidity and body mass index (BMI) of 50.0 to 59.9 in adult (HCC)  Prior Authorization Clinical Questions for Weight Management Pharmacotherapy    1. Does the patient have a contrainidcation to medication prescribed for weight management?: No  2. Does the patient have a diagnosis of obesity, confirmed by a BMI greater than or equal to 30 kg/m^2?: Yes  3. Does the patient have a BMI of greater than or equal to 27 kg/m^2 with at least one weight-related comorbidity/risk factor/complication (e.g. diabetes, dyslipidemia, coronary artery disease)?: Yes  4. Weight-related co-morbidities/risk factors: obstructive sleep apnea (JIMI)  5. Has the patient been on a weight loss regimen of low-calorie diet, increased physical activity, and lifestyle modifications for a minimum of 6 months?: Yes  6. Has the patient completed a comprehensive weight loss program (ie, Weight Watchers, Noom, Bariatrics, other brigida on phone)? If so, what?: Yes   -- Q6 Further Explanation: Bariatrics weight loss program, mynetdiary   7. Does the patient have a history of type 2 diabetes?: No  8. Has the member tried and failed other weight loss medication within the past 12 months?: No  9. Will the member use requested medication in combination with another GLP agonist or weight loss drug?: No  10. Is the medication a controlled substance?: No     Baseline weight (in pounds): 406 lbs       Will plan to start Zepbound.   Reviewed how medication works, administration, dose escalation, side effect profile.   Prior Auth needed, this generally takes 2-3 weeks.   Once medication starts follow up in 3-4 months.   He is on mychart and can contact me with any questions, concerns, updates.      Orders:    tirzepatide (Zepbound) 2.5 mg/0.5 mL auto-injector; Inject 0.5 mL (2.5 mg total) under the skin once a week for 28 days    Obstructive sleep apnea  Continue CPAP.     Orders:    Ambulatory Referral to Sleep Medicine; Future           History of Present Illness     Reynaldo Santoyo is a 34 year old male presenting today for weight loss discussion.     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     Zepbound or Wegovy--is interested in trying.     Referral for JIMI, due for sleep medicine follow up.    Has tried multiple things over the years--  Did weight management program, diet plan.   Did lose weight, but gained it back.   Cravings are hard.   Especially night shift working--snacking   Even when not hungry, craves food.   Has counted calories, used apps, just can't get the weight to stay off.     Has not taken any weight loss medications in the past.     No family history of thyroid cancer or MEN2.     He has never had any history of pancreatitis.     Aware of needed weight loss medication, plus diet and exercise.               Review of Systems   Constitutional: Negative.    HENT: Negative.     Respiratory: Negative.     Cardiovascular: Negative.    Gastrointestinal:  Positive for abdominal pain (cramping at times--using dicyclomine has IBS.).   Genitourinary: Negative.    Musculoskeletal: Negative.    Skin: Negative.    Neurological: Negative.    Psychiatric/Behavioral: Negative.       Current Outpatient Medications on File Prior to Visit   Medication Sig Dispense Refill    buPROPion (WELLBUTRIN XL) 150 mg 24 hr tablet Take 1 tablet (150 mg total) by mouth daily 90 tablet 1    buPROPion (WELLBUTRIN XL) 300 mg 24 hr tablet Take 1 tablet (300 mg total) by mouth daily 90 tablet 1    Cholecalciferol (VITAMIN D-3) 1000 units CAPS Take 2 capsules by mouth daily      dicyclomine (BENTYL) 10 mg capsule Take 1 capsule (10 mg total) by mouth 3 (three) times a day as needed (abdominal pain/ spasm) 90  "capsule 1    levothyroxine 25 mcg tablet Take 1 tablet (25 mcg total) by mouth daily in the early morning 90 tablet 1    venlafaxine (EFFEXOR-XR) 150 mg 24 hr capsule Take 1 capsule (150 mg total) by mouth daily 90 capsule 1    [DISCONTINUED] multivitamin (THERAGRAN) TABS Take 1 tablet by mouth daily (Patient not taking: Reported on 10/7/2024)      [DISCONTINUED] predniSONE 10 mg tablet Take 5 tablets by mouth for 1 day, then 4 tablets for 1 day, then 3 tablets for 1 day, then 2 tablets for 1 day, then 1 tablet once. 15 tablet 0     No current facility-administered medications on file prior to visit.         Objective   /90   Pulse 81   Temp 98 °F (36.7 °C) (Temporal)   Resp 16   Ht 6' 2\" (1.88 m)   Wt (!) 184 kg (406 lb)   SpO2 98%   BMI 52.13 kg/m²      Physical Exam  Vitals and nursing note reviewed.   Constitutional:       General: He is not in acute distress.     Appearance: Normal appearance. He is not ill-appearing.   Cardiovascular:      Rate and Rhythm: Normal rate and regular rhythm.      Heart sounds: No murmur heard.  Pulmonary:      Effort: Pulmonary effort is normal. No respiratory distress.      Breath sounds: Normal breath sounds. No wheezing or rales.   Abdominal:      General: Bowel sounds are normal.      Palpations: Abdomen is soft.      Tenderness: There is no abdominal tenderness.   Neurological:      Mental Status: He is alert.   Psychiatric:         Mood and Affect: Mood normal.         "

## 2024-11-20 ENCOUNTER — APPOINTMENT (OUTPATIENT)
Dept: LAB | Facility: CLINIC | Age: 34
End: 2024-11-20

## 2024-11-20 ENCOUNTER — TELEPHONE (OUTPATIENT)
Age: 34
End: 2024-11-20

## 2024-11-20 DIAGNOSIS — Z00.6 ENCOUNTER FOR EXAMINATION FOR NORMAL COMPARISON OR CONTROL IN CLINICAL RESEARCH PROGRAM: ICD-10-CM

## 2024-11-20 PROCEDURE — 36415 COLL VENOUS BLD VENIPUNCTURE: CPT

## 2024-11-20 NOTE — TELEPHONE ENCOUNTER
PA for Zepbound 2.5mg APPROVED     Date(s) approved November 20, 2024 to November 20, 2025     Case #: [id number]     Patient advised by          [x]MyChart Message  []Phone call   []LMOM  []L/M to call office as no active Communication consent on file  []Unable to leave detailed message as VM not approved on Communication consent       Pharmacy advised by    [x]Fax  []Phone call    Approval letter scanned into Media Yes

## 2024-11-20 NOTE — TELEPHONE ENCOUNTER
PA for Zepbound 2.5mg SUBMITTED to [facility]    via    []CMM-KEY:   [x]Surescripts-Case ID #: [id number]   []Availity-Auth ID #  []Faxed to plan   []Other website   []Phone call Case ID #     [x]PA sent as URGENT    All office notes, labs and other pertaining documents and studies sent. Clinical questions answered. Awaiting determination from insurance company.     Turnaround time for your insurance to make a decision on your Prior Authorization can take 7-21 business days.

## 2024-11-21 ENCOUNTER — OFFICE VISIT (OUTPATIENT)
Dept: SLEEP CENTER | Facility: CLINIC | Age: 34
End: 2024-11-21
Payer: COMMERCIAL

## 2024-11-21 VITALS
BODY MASS INDEX: 40.43 KG/M2 | HEART RATE: 84 BPM | TEMPERATURE: 99.2 F | DIASTOLIC BLOOD PRESSURE: 90 MMHG | OXYGEN SATURATION: 98 % | WEIGHT: 315 LBS | SYSTOLIC BLOOD PRESSURE: 139 MMHG | RESPIRATION RATE: 16 BRPM | HEIGHT: 74 IN

## 2024-11-21 DIAGNOSIS — G47.19 EXCESSIVE DAYTIME SLEEPINESS: ICD-10-CM

## 2024-11-21 DIAGNOSIS — R06.89 HYPERCAPNIA: ICD-10-CM

## 2024-11-21 DIAGNOSIS — G47.33 OBSTRUCTIVE SLEEP APNEA: Primary | ICD-10-CM

## 2024-11-21 DIAGNOSIS — G47.26 SHIFT WORK SLEEP DISORDER: ICD-10-CM

## 2024-11-21 PROCEDURE — 99214 OFFICE O/P EST MOD 30 MIN: CPT | Performed by: STUDENT IN AN ORGANIZED HEALTH CARE EDUCATION/TRAINING PROGRAM

## 2024-11-21 NOTE — PROGRESS NOTES
[facility]  Sleep Medicine Follow up/ Established Patient Visit      Assessment/Plan:  1. Obstructive sleep apnea  Ambulatory Referral to Sleep Medicine    Mask fitting only    PAP DME Resupply/Reorder    Comprehensive metabolic panel    CPAP Transcutaneous      2. Shift work sleep disorder        3. BMI 50.0-59.9, adult (HCC)  Comprehensive metabolic panel    CPAP Transcutaneous      4. Hypercapnia  Comprehensive metabolic panel    CPAP Transcutaneous      5. Excessive daytime sleepiness  Comprehensive metabolic panel    CPAP Transcutaneous        Reynaldo is a very pleasant 34-year-old gentleman with a PMHx of GERD, hypothyroidism, vitamin D deficiency, obesity, MDD, JON who presents in follow up for JIMI (DONAVON 69.2, O2 rocky 82% 4/2022) with residual EDS. despite the residual EDS, he does endorse substantial benefit from PAP therapy, citing that his daytime symptoms would be much worse without his AutoPap.  However, the residual symptoms are still bothersome to him.  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  Given the bicarbonate trend over the past several years via his CMP/BMP lab work, coupled with his obesity, I do feel that an evaluation for obesity hypoventilation syndrome/sleep-related hypoventilation is necessary.    Continue AutoPAP at settings of  9-14 cmH2O  Prescription for new supplies ordered today  Reviewed CMS/insurance requirements and resupply guidelines  Information provided on the above as well as general maintenance steps  Recommended maintaining good Sleep Hygiene  Reviewed the concept of anchor sleep in management of shiftwork sleep disorder  Mask fitting ordered to help with general mask comfort as well as management of humidification/heated tubing  We did discuss the  possibility of utilizing modafinil, as this is often used in shiftwork sleep disorder, however he is not interested at this time  I have placed an order for a repeat CMP  I have also ordered a PAP titration study with transcutaneous CO2 monitoring  He will Return in about 6 months (around 5/21/2025).      ________________________________________________________________________________________________    Per Last Visit Note (Date: 8/17/2022):  PLAN:  I reviewed results of prior studies and physiologic data with the patient.   I discussed treatment options with risks and benefits.  Treatment with  PAP is medically necessary and Reynaldo is agreable to continue use.   Care of equipment, methods to improve comfort using PAP and importance of compliance with therapy were discussed.  Pressure setting: continue 9-14 cmH2O.    Rx provided to replace  supplies and Care coordinated with DME provider.   Discussed strategies for weight reduction.    Multi component Cognitive behavioral therapy for Insomnia undertaken - Sleep Restriction, Stimulus control, Relaxation techniques and Sleep hygiene were discussed.  He may be able to wean of trazodone.              Nasal symptoms may improve with regular nasal saline rinse up to twice a day, followed by topical nasal steroid (e..g. OTC Flonase, Nasacort) once a day if necessary.                                      Follow-up is advised in 1 year or sooner if needed to monitor progress, compliance and to adjust therapy.       Sleep Studies:  -HSAT 7/31/2019: .4, DONAVON 11, O2 rocky 82%    -HSAT 4 /20/2022: TRT: 35.6 minutes, DONAVON: 69.2, O2 rocky: 82%     -PAP Titration Study 5/18/2022: Titration started at 4 cmH2O, titrated to 12 cmH2O. Best pressure noted to be 9-12 cmH2O.        Labs:  6/5/2024:  -TSH: 3.998  -HbA1c: 5.5  -Vitamin D: 40.2    4/21/2021:   -Vitamin B12: 472    CO2/HCO3 trend:  -6/5/2024: 31  -1/18/2023: 30  -5/26/2022: 25  -2/22/2022: 28  -4/21/2021: 27  -8/25/2020:  "29  ________________________________________________________________________________________________      Interval History: Reynaldo Santoyo is a 34 y.o. male with a PMHx of GERD, hypothyroidism, vitamin D deficiency, obesity, MDD, JON who presents in follow up for JIMI (DONAVON 69.2, O2 rocky 82% 2022) with residual EDS.    [employer]. Works overnight 1837-9661. HOWEVER: Did have EDS even with using the PAP Device when had a day shift job up until ~2 years ago. MAYBE got slightly worse after starting this job.       SDB:  -Current experience with PAP Therapy: \"Could be better.\" Does endorse benefit, does endorse some ongoing tiredness during the day and waking throughout the night.   -Mask type: Nasal   -Difficulties with mask: Denies, wonders if needs a different one. Has tried 3 different types (FFM full and hybrid, and current).  -Device: React Jennifer II; received 2022.  -Difficulties with device: Can't use the humidifier on it, states that water builds up in the tube. Says \"there's no place to plug a heated tube in.\"  -DME: [facility]  -Compliance:              [place] Sleepiness Scale:  What are your chances of dozing?   0= no chance  1= slight chance  2= moderate chance  3= high chance    Sitting and readin   Watching TV: 1  Sitting, inactive in a public place (e.g. a theatre or a meeting):1  As a passenger in a car for an hour without a break: 2  Lying down to rest in the afternoon when circumstances permit: 3   Sitting and talking to someone: 0  Sitting quietly after a lunch without alcohol: 1  In a car, while stopped for a few minutes in the traffic: 0       TOTAL  9/24  Greater or equal to 10 is positive for excessive daytime sleepiness        SLEEP HYGIENE QUESTIONS:  Work Days (4-5 days/week, not a fixed schedule):  Works from 0215-8182  Bedtime: 0900   Time it takes to fall sleep: 16-30 minutes  Wake up Time: 8394-6275   Estimated total sleep time ( in a 24 hour period of time): ~7 hours " "    Non-Work Days (depends on how many days off he has in a row):  -If off for 1-2 days, will try to keep his \"work day\"    Maybe every other week, for 2-3 days, will try to go back to a \"normal\" sleep schedule:  Bedtime: 2100/2200   Time it takes to fall sleep: 16-30 minutes, sometimes an hour  Wake up Time: 3185-5468  Estimated total sleep time (in a 24 hour period of time): \"too much.\"     There have been days when he's slept for 12-20 hours, after working 6-7 days in a row. Does not feel refreshed.     If on \"day shift\" when off then has to start work, may take a nap around 0900/1000 until ~1700.     Number of times patient wakes up per night: 2-4  Reason (s) why patient wakes up during the night: Unknown - tubing, adjusting due to movement, possibly dry mouth/nose.            Changes to PMH, PSH, SH: Getting approved for Zepbound to help with weight. On meds for hypothyroidism.     SLEEP RELATED ROS  Review of Systems  No Known Allergies    CURRENT MEDICATIONS:  Current Outpatient Medications   Medication Instructions    buPROPion (WELLBUTRIN XL) 150 mg, Oral, Daily    buPROPion (WELLBUTRIN XL) 300 mg, Oral, Daily    Cholecalciferol (VITAMIN D-3) 1000 units CAPS 2 capsules, Daily    dicyclomine (BENTYL) 10 mg, Oral, 3 times daily PRN    levothyroxine 25 mcg, Oral, Daily (early morning)    venlafaxine (EFFEXOR-XR) 150 mg, Oral, Daily    Zepbound 2.5 mg, Subcutaneous, Weekly           PHYSICAL EXAMINATION:  Vital Signs: /90 (BP Location: Left arm, Patient Position: Sitting, Cuff Size: Large)   Pulse 84   Temp 99.2 °F (37.3 °C) (Temporal)   Resp 16   Ht 6' 2\" (1.88 m)   Wt (!) 186 kg (409 lb 11.2 oz)   SpO2 98%   BMI 52.60 kg/m²     Constitutional: NAD, well appearing   Mental Status: AAOx3  Skin: Warm, dry, no rashes noted   Eyes: PERRL, normal conjunctiva  Posterior Airspace:   Fleming Tongue Position: 3  Retrognathia: absent  Overbite: present  High Arched Palate: absent  Tongue " Scalloping/Ridging: present  Uvula: normal  Tonsils: L3+, R2+  Chest: No evidence of respiratory distress, no accessory muscle use; no evidence of peripheral cyanosis  Abdomen: Soft, NT/ND  Extremities: No digital clubbing or pedal edema  Neuro: Strength 5/5 throughout, sensation grossly intact          Electronically signed by:    Humphrey Villatoro DO  Board-Certified Neurology and Sleep Medicine  [facility]  11/21/24

## 2024-11-21 NOTE — PATIENT INSTRUCTIONS
"Continue PAP Therapy  Continue AutoPap at settings of 9-14 cm H2O  Remember to clean your mask and equipment regularly, as directed.  I am ordering a formal mask fitting appointment; this is an appointment with the DME to ensure that you have the optimal mask and fit for your face structure. You can also discuss the humidification difficulties at this appointment.     I am also ordering a repeat PAP titration study, this time looking at your CO2 levels while sleeping, as if these are consistently elevated, they may need an adjustment in treatment and/or device in order to correct, and can cause daytime fatigue/sleepiness.  You should be eligible for new supplies approximately every 3-6 months, depending on your insurance coverage. Contact your Durable Medical Equipment (DME) company for new supplies as needed.  Practice good Sleep Hygiene, as outlined below.  Also ordering some repeat blood work  With Shift Work, even if this has to be slpit up, try to keep a set time period (3-4 hours) that you sleep every day regardless; this is referred to as \"anchor sleep,\" and can help your body keep a semblance of a circadian rhythm.  Follow up in 6 months.      Care and Maintenance  Headgear should be washed as needed. Daily inspection and weekly washings are recommended. Do not disassemble the straps. Machine wash in warm water, making sure to attach Velcro hooks and tabs before washing. Line dry or machine dry on a low setting.  Masks should be washed every day. Daily inspection is recommended. Leave the mask and tubing attached. Gently wash the mask with a soft cloth using warm water and mild detergent, concentrating on the mask cushion flaps. DO NOT use alcohol or bleach. Rinse thoroughly and air dry.  Tubing and headgear should be washed weekly. Daily inspection is recommended. Wash in warm water and mild detergent and rinse thoroughly. Hook the tubing to the machine and blow until dry.  Humidifier should be washed daily " and filled with DISTILLED water before use. Wash with warm water and mild detergent. Disinfect weekly by soaking with a solution of 1 part white vinegar and 3 parts water for 30 minutes. Rinse thoroughly and air dry.  Disposable filters should be replaced once a month. Wash reusable foam filters with warm water and mild detergent at least once a month. Rinse thoroughly and dry with paper towels.  Avoid  that contain fragrance or conditioners, as these will leave a residue.  NEVER iron any soft goods.      CMS Requirements    Your insurance requires a face-to-face follow up visit within a 31-90 day period after starting CPAP.  Your insurance requires compliance with CPAP, which is at least 4 hours per night for 70% of the time. This must be done over a 30 day period and must occur within the initial 31-90 day period after starting CPAP.  Your insurance also requires at least yearly follow ups to continue to pay for CPAP supplies.       PAP Supply Guidelines    Below are the guidelines for reordering your supplies. You will be responsible for your deductible, co payments, and out of pocket expenses.    Item Frequency   Nasal Mask (no headgear) 1 every 3 months   Nasal Mask Cushion 1 every 2 weeks   Full Face Mask (no headgear) 1 every 3 months   Full Face Mask Cushion 1 every month   Nasal Pillows 1 every 2 weeks   Headgear 1 every 6 months   hin Strap 1 every 6 months   keyana 1 every 3 months   Filters: Reusable 1 every 6 months   Filters: Disposable 1 every 2 weeks   Humidifier Chamber(disposable) 1 every 6 months         Good Sleep Hygiene    Wake up at the same time every day, even on the weekends.  Use your bed for sleep and intimacy only.  If you have been in bed awake for 30 minutes, get up and leave the bedroom. Choose a dull activity not involving a blue screen (TV, computer, handheld devices). Go back to bed when you feel sleepy.  Avoid caffeine, nicotine and alcohol before you go to bed.  Avoid large  meals before you go to bed.  Avoid using screens (computers, tablets, smartphones, etc.) for at least 1 hour before bedtime  Exercise regularly, but do not exercise right before you go to bed.  Avoid daytime naps. If you do take a nap, sleep for 20-40 minutes, and not after dinner.

## 2024-11-23 ENCOUNTER — TELEPHONE (OUTPATIENT)
Dept: SLEEP CENTER | Facility: CLINIC | Age: 34
End: 2024-11-23

## 2024-11-23 LAB
DME PARACHUTE DELIVERY DATE REQUESTED: NORMAL
DME PARACHUTE ITEM DESCRIPTION: NORMAL
DME PARACHUTE ORDER STATUS: NORMAL
DME PARACHUTE SUPPLIER NAME: NORMAL
DME PARACHUTE SUPPLIER PHONE: NORMAL

## 2024-11-29 LAB
APOB+LDLR+PCSK9 GENE MUT ANL BLD/T: NOT DETECTED
BRCA1+BRCA2 DEL+DUP + FULL MUT ANL BLD/T: NOT DETECTED
MLH1+MSH2+MSH6+PMS2 GN DEL+DUP+FUL M: NOT DETECTED

## 2024-11-29 NOTE — TELEPHONE ENCOUNTER
Patient called believes he's having a reaction to the zepbound .is very gassy, abdominal pain, vomiting.. started zepbound on Tuesday. Constant burping that started Thursday morning. Diarrhea..   Please advise if patient needs to be seen

## 2024-11-29 NOTE — TELEPHONE ENCOUNTER
Symptoms should improve over the next 1-2 days.   If symptoms should worsen, go to the emergency room.   Contact me if symptoms do not improve over the weekend. He should try his dose of Zepbound again next week, if still difficult to tolerate, we could consider a switch to Wegovy.

## 2024-11-29 NOTE — TELEPHONE ENCOUNTER
Please call to Triage--are his symptoms tolerable?, mild or severe?  Where is his abdominal pain, what is # of abdominal pain on 0/10 pain scale?  How much vomiting and diarrhea is he having?    These are common side effects of Zepbound--and medications like Zepbound.   Symptoms usually improve over a few days, and can improve as you continue the medication and your body adjusts.   However, if symptoms are not tolerable, then he needs to let me know.

## 2024-11-29 NOTE — TELEPHONE ENCOUNTER
Spoke to patient. He stated that he had diarrhea 4 time yesterday and once today. Vomiting 1. Abdominal pain scale a 4. Gas and burping is tolerable

## 2024-12-09 DIAGNOSIS — R11.0 NAUSEA: Primary | ICD-10-CM

## 2024-12-09 RX ORDER — ONDANSETRON 4 MG/1
4 TABLET, ORALLY DISINTEGRATING ORAL EVERY 8 HOURS PRN
Qty: 20 TABLET | Refills: 0 | Status: SHIPPED | OUTPATIENT
Start: 2024-12-09

## 2024-12-12 ENCOUNTER — HOSPITAL ENCOUNTER (OUTPATIENT)
Dept: SLEEP CENTER | Facility: CLINIC | Age: 34
Discharge: HOME | End: 2024-12-12
Payer: COMMERCIAL

## 2024-12-12 DIAGNOSIS — R06.89 HYPERCAPNIA: ICD-10-CM

## 2024-12-12 DIAGNOSIS — G47.19 EXCESSIVE DAYTIME SLEEPINESS: ICD-10-CM

## 2024-12-12 DIAGNOSIS — G47.33 OBSTRUCTIVE SLEEP APNEA: ICD-10-CM

## 2024-12-12 PROCEDURE — 95811 POLYSOM 6/>YRS CPAP 4/> PARM: CPT

## 2024-12-12 NOTE — PROGRESS NOTES
Sleep Study Documentation    Pre-Sleep Study       Sleep testing procedure explained to patient:YES    Patient napped prior to study:YES- less than 30 minutes. Napped after 2PM: no    Caffeine:Nightshift worker after midnight.  Caffeine use:YES- coffee  6 ounces and soda  12 to 26 ounces    Alcohol:Nightshift workers after 7AM: Alcohol use:NO    Typical day for patient:YES       Study Documentation    Sleep Study Indications: Patient here for CPAP Re-titration with Transcutaneous. Patient seen in clinic with impressions of JIMI, shift work sleep disorder, BMI>50, hypercapnia, and excessive daytime sleepiness.    Sleep Study: Treatment   Optimal PAP pressure: 16/11cm H2O  Leak:None  Snore:Eliminated  REM Obtained:no  Supplemental O2: no    Minimum SaO2 89.0%  Baseline SaO2 94.0%  PAP mask tried (list all)Patient uses N-30i medium nasal cradle mask.  PAP mask choice (final)N-30i medium nasal cradle mask.  PAP mask type:nasal  PAP pressure at which snoring was eliminated 16/11cm H2O  Minimum SaO2 at final PAP pressure 89.0%  Mode of Therapy: BIPAP    ETCO2:Yes. If yes comment on data Transcutaneous was used.  CPAP changed to BiPAP:Yes. If yes why Higher pressures and patient comfort.    Mode of Therapy:BiPAP    EKG abnormalities: no     EEG abnormalities: no    Were abnormal behaviors in sleep observed:NO    Is Total Sleep Study Recording Time < 2 hours: N/A    Is Total Sleep Study Recording Time > 2 hours but study is incomplete: N/A    Is Total Sleep Study Recording Time 6 hours or more but sleep was not obtained: NO    Patient classification: employed       Post-Sleep Study    Medication used at bedtime or during sleep study:NO    Patient reports time it took to fall asleep:30 to 60 minutes    Patient reports waking up during study:3 or more times.  Patient reports returning to sleep without difficulty.    Patient reports sleeping 4 to 6 hours without dreaming.    Does the Patient feel this is a typical night of  sleep:typical    Patient rated sleepiness: Somewhat sleepy or tired    PAP treatment:yes: Post PAP treatment patient reports feeling unchanged and would wear PAP mask at home.

## 2024-12-15 RX ORDER — TIRZEPATIDE 5 MG/.5ML
5 INJECTION, SOLUTION SUBCUTANEOUS WEEKLY
Qty: 2 ML | Refills: 0 | Status: SHIPPED | OUTPATIENT
Start: 2024-12-15 | End: 2024-12-15 | Stop reason: ALTCHOICE

## 2024-12-15 RX ORDER — TIRZEPATIDE 5 MG/.5ML
5 INJECTION, SOLUTION SUBCUTANEOUS WEEKLY
Qty: 2 ML | Refills: 0 | Status: SHIPPED | OUTPATIENT
Start: 2024-12-15

## 2025-01-07 ENCOUNTER — HOSPITAL ENCOUNTER (EMERGENCY)
Facility: HOSPITAL | Age: 35
Discharge: HOME | End: 2025-01-08
Attending: EMERGENCY MEDICINE
Payer: COMMERCIAL

## 2025-01-07 ENCOUNTER — APPOINTMENT (EMERGENCY)
Dept: CT IMAGING | Facility: HOSPITAL | Age: 35
End: 2025-01-07
Payer: COMMERCIAL

## 2025-01-07 DIAGNOSIS — K52.9 GASTROENTERITIS: Primary | ICD-10-CM

## 2025-01-07 DIAGNOSIS — R11.0 NAUSEA: ICD-10-CM

## 2025-01-07 LAB
ALBUMIN SERPL BCG-MCNC: 4.5 G/DL (ref 3.5–5)
ALP SERPL-CCNC: 90 U/L (ref 34–104)
ALT SERPL W P-5'-P-CCNC: 31 U/L (ref 7–52)
ANION GAP SERPL CALCULATED.3IONS-SCNC: 8 MMOL/L (ref 4–13)
AST SERPL W P-5'-P-CCNC: 23 U/L (ref 13–39)
BASOPHILS # BLD AUTO: 0.05 THOUSANDS/ΜL (ref 0–0.1)
BASOPHILS NFR BLD AUTO: 1 % (ref 0–1)
BILIRUB SERPL-MCNC: 0.58 MG/DL (ref 0.2–1)
BUN SERPL-MCNC: 18 MG/DL (ref 5–25)
CALCIUM SERPL-MCNC: 9.7 MG/DL (ref 8.4–10.2)
CHLORIDE SERPL-SCNC: 103 MMOL/L (ref 96–108)
CO2 SERPL-SCNC: 29 MMOL/L (ref 21–32)
CREAT SERPL-MCNC: 1.43 MG/DL (ref 0.6–1.3)
EOSINOPHIL # BLD AUTO: 0.21 THOUSAND/ΜL (ref 0–0.61)
EOSINOPHIL NFR BLD AUTO: 2 % (ref 0–6)
ERYTHROCYTE [DISTWIDTH] IN BLOOD BY AUTOMATED COUNT: 12.8 % (ref 11.6–15.1)
GFR SERPL CREATININE-BSD FRML MDRD: 63 ML/MIN/1.73SQ M
GLUCOSE SERPL-MCNC: 88 MG/DL (ref 65–140)
HCT VFR BLD AUTO: 49.5 % (ref 36.5–49.3)
HEMOCCULT STL QL IA: POSITIVE
HGB BLD-MCNC: 16.1 G/DL (ref 12–17)
IMM GRANULOCYTES # BLD AUTO: 0.05 THOUSAND/UL (ref 0–0.2)
IMM GRANULOCYTES NFR BLD AUTO: 1 % (ref 0–2)
LIPASE SERPL-CCNC: 17 U/L (ref 11–82)
LYMPHOCYTES # BLD AUTO: 1.18 THOUSANDS/ΜL (ref 0.6–4.47)
LYMPHOCYTES NFR BLD AUTO: 12 % (ref 14–44)
MCH RBC QN AUTO: 27.7 PG (ref 26.8–34.3)
MCHC RBC AUTO-ENTMCNC: 32.5 G/DL (ref 31.4–37.4)
MCV RBC AUTO: 85 FL (ref 82–98)
MONOCYTES # BLD AUTO: 0.74 THOUSAND/ΜL (ref 0.17–1.22)
MONOCYTES NFR BLD AUTO: 8 % (ref 4–12)
NEUTROPHILS # BLD AUTO: 7.46 THOUSANDS/ΜL (ref 1.85–7.62)
NEUTS SEG NFR BLD AUTO: 76 % (ref 43–75)
NRBC BLD AUTO-RTO: 0 /100 WBCS
PLATELET # BLD AUTO: 308 THOUSANDS/UL (ref 149–390)
PMV BLD AUTO: 8.8 FL (ref 8.9–12.7)
POTASSIUM SERPL-SCNC: 3.7 MMOL/L (ref 3.5–5.3)
PROT SERPL-MCNC: 8.5 G/DL (ref 6.4–8.4)
RBC # BLD AUTO: 5.82 MILLION/UL (ref 3.88–5.62)
SODIUM SERPL-SCNC: 140 MMOL/L (ref 135–147)
WBC # BLD AUTO: 9.69 THOUSAND/UL (ref 4.31–10.16)

## 2025-01-07 PROCEDURE — 83690 ASSAY OF LIPASE: CPT

## 2025-01-07 PROCEDURE — 85025 COMPLETE CBC W/AUTO DIFF WBC: CPT

## 2025-01-07 PROCEDURE — 80053 COMPREHEN METABOLIC PANEL: CPT

## 2025-01-07 PROCEDURE — 82272 OCCULT BLD FECES 1-3 TESTS: CPT

## 2025-01-07 PROCEDURE — 96374 THER/PROPH/DIAG INJ IV PUSH: CPT

## 2025-01-07 PROCEDURE — 96361 HYDRATE IV INFUSION ADD-ON: CPT

## 2025-01-07 PROCEDURE — 99284 EMERGENCY DEPT VISIT MOD MDM: CPT

## 2025-01-07 PROCEDURE — 96375 TX/PRO/DX INJ NEW DRUG ADDON: CPT

## 2025-01-07 PROCEDURE — G0328 FECAL BLOOD SCRN IMMUNOASSAY: HCPCS

## 2025-01-07 PROCEDURE — 74177 CT ABD & PELVIS W/CONTRAST: CPT

## 2025-01-07 PROCEDURE — 36415 COLL VENOUS BLD VENIPUNCTURE: CPT

## 2025-01-07 PROCEDURE — 99285 EMERGENCY DEPT VISIT HI MDM: CPT | Performed by: EMERGENCY MEDICINE

## 2025-01-07 RX ORDER — ONDANSETRON 2 MG/ML
4 INJECTION INTRAMUSCULAR; INTRAVENOUS ONCE
Status: COMPLETED | OUTPATIENT
Start: 2025-01-07 | End: 2025-01-07

## 2025-01-07 RX ORDER — KETOROLAC TROMETHAMINE 30 MG/ML
15 INJECTION, SOLUTION INTRAMUSCULAR; INTRAVENOUS ONCE
Status: COMPLETED | OUTPATIENT
Start: 2025-01-07 | End: 2025-01-07

## 2025-01-07 RX ADMIN — SODIUM CHLORIDE 1000 ML: 0.9 INJECTION, SOLUTION INTRAVENOUS at 23:56

## 2025-01-07 RX ADMIN — KETOROLAC TROMETHAMINE 15 MG: 30 INJECTION, SOLUTION INTRAMUSCULAR at 23:20

## 2025-01-07 RX ADMIN — ONDANSETRON 4 MG: 2 INJECTION INTRAMUSCULAR; INTRAVENOUS at 23:19

## 2025-01-07 NOTE — Clinical Note
Reynaldo Santoyo was seen and treated in our emergency department on 1/7/2025.                Diagnosis: Gastroenteritis    Reynaldo  .    He may return on this date:     No work until 24 hours without vomiting/ diarrhea      If you have any questions or concerns, please don't hesitate to call.      Maximus Marcus, DO    ______________________________           _______________          _______________  Hospital Representative                              Date                                Time

## 2025-01-08 ENCOUNTER — TELEPHONE (OUTPATIENT)
Dept: SLEEP CENTER | Facility: CLINIC | Age: 35
End: 2025-01-08

## 2025-01-08 VITALS
OXYGEN SATURATION: 93 % | RESPIRATION RATE: 20 BRPM | TEMPERATURE: 98.2 F | SYSTOLIC BLOOD PRESSURE: 140 MMHG | HEART RATE: 94 BPM | DIASTOLIC BLOOD PRESSURE: 67 MMHG

## 2025-01-08 DIAGNOSIS — G47.33 OBSTRUCTIVE SLEEP APNEA: Primary | ICD-10-CM

## 2025-01-08 RX ORDER — ONDANSETRON 4 MG/1
4 TABLET, ORALLY DISINTEGRATING ORAL EVERY 6 HOURS PRN
Qty: 20 TABLET | Refills: 0 | Status: SHIPPED | OUTPATIENT
Start: 2025-01-08

## 2025-01-08 RX ADMIN — IOHEXOL 100 ML: 350 INJECTION, SOLUTION INTRAVENOUS at 00:16

## 2025-01-08 NOTE — ED ATTENDING ATTESTATION
Final Diagnosis:  1. Gastroenteritis    2. Nausea           IMaximus DO, saw and evaluated the patient. All available labs and X-rays were ordered by me or the resident and have been reviewed by myself. I discussed the patient with the resident / non-physician and agree with the resident's / non-physician practitioner's findings and plan as documented in the resident's / non-physician practicitioner's note, except where noted.   At this point, I agree with the current assessment done in the ED.   I was present during key portions of all procedures performed unless otherwise stated.     Chief Complaint   Patient presents with    Abdominal Pain     Pt reports Lower abd pain and cramping with nausea and vomiting that began today. Pt also reports bring red blood after using the bathroom      This is a 34 y.o. male presenting for evaluation of nausea and vomiting starting today. NBNB emesis. Lower abd pain.    Reports many days of blood on toilet paper after wiping.    Most recent colonoscopy reviewed with no note of hemorrhoid though patient states he has history of hemorrhoid    PMH:   has a past medical history of Anxiety, Carpal tunnel syndrome, bilateral, COVID-19 (5/27/2022), GERD without esophagitis, Moderate episode of recurrent major depressive disorder (HCC) (08/22/2018), Morbid obesity (HCC), Obstructive sleep apnea, Pilonidal cyst with abscess, and Vitamin D deficiency.    PSH:   has a past surgical history that includes Foot surgery; Pilonidal cyst drainage; pr ndsc wrst surg w/rls transvrs carpl ligm (Right, 07/12/2018); pr ndsc wrst surg w/rls transvrs carpl ligm (Left, 07/26/2018); and Colonoscopy.    Social:  Social History     Substance and Sexual Activity   Alcohol Use Yes    Comment: Rarely     Social History     Tobacco Use   Smoking Status Never   Smokeless Tobacco Never     Social History     Substance and Sexual Activity   Drug Use No     PE:  Physical Exam  Vitals and nursing note  reviewed.   Constitutional:       General: He is not in acute distress.     Appearance: He is well-developed. He is not diaphoretic.   HENT:      Head: Normocephalic and atraumatic.      Right Ear: External ear normal.      Left Ear: External ear normal.   Eyes:      Conjunctiva/sclera: Conjunctivae normal.   Neck:      Trachea: No tracheal deviation.   Cardiovascular:      Rate and Rhythm: Normal rate and regular rhythm.      Heart sounds: Normal heart sounds. No murmur heard.  Pulmonary:      Effort: No respiratory distress.      Breath sounds: Normal breath sounds. No stridor. No wheezing or rales.   Abdominal:      General: Bowel sounds are normal. There is no distension.      Palpations: Abdomen is soft. There is no mass.      Tenderness: There is abdominal tenderness in the suprapubic area, left upper quadrant and left lower quadrant. There is no guarding or rebound.   Genitourinary:     Rectum: Guaiac result positive.      Comments: No BRB in rectal vault. No external hemorrhoid  Musculoskeletal:         General: No tenderness or deformity.   Skin:     General: Skin is dry.      Findings: No rash.   Neurological:      Motor: No abnormal muscle tone.      Coordination: Coordination normal.   Psychiatric:         Behavior: Behavior normal.         Thought Content: Thought content normal.         Judgment: Judgment normal.        Vitals:    01/07/25 2254 01/08/25 0100   BP: 139/79 140/67   BP Location: Left arm Left arm   Pulse: (!) 114 94   Resp: 20 20   Temp: 98.2 °F (36.8 °C)    TempSrc: Oral    SpO2: 96% 93%       A:  - Ddx including but not limited to: Rectal bleeding, anemia, electrolyte derangement, diverticulitis, viral illness, GLP-1 side effect, hemorrhoid  P:  - CT scan with gastroenteritis  - Slight Cr increase. Not LEÓN. Gave fluids and encouraged intake at home  - No anemia  - Recommend follow with GI regarding rectal bleeding. No obvious external hemorrhoid on exam.    - RTED precautions given.  -  13 point ROS was performed and all are normal unless stated in the history above.   - Nursing note reviewed. Vitals reviewed.   - Orders placed by myself and/or advanced practitioner / resident.    - Previous chart was reviewed  - No language barrier.   - History obtained from patient.   - There are no limitations to the history obtained. Reasons ROS could not be obtained: none      Code Status: No Order  Advance Directive and Living Will:      Power of :    POLST:      Medications   ondansetron (ZOFRAN) injection 4 mg (4 mg Intravenous Given 1/7/25 2319)   ketorolac (TORADOL) injection 15 mg (15 mg Intravenous Given 1/7/25 2320)   sodium chloride 0.9 % bolus 1,000 mL (0 mL Intravenous Stopped 1/8/25 0056)   iohexol (OMNIPAQUE) 350 MG/ML injection (SINGLE-DOSE) 100 mL (100 mL Intravenous Given 1/8/25 0016)     CT abdomen pelvis with contrast   Final Result      Mild fluid distention of multiple small bowel loops and distended stomach with air-fluid level. The findings are nonspecific and may reflect gastroenteritis.         Workstation performed: [id number]           Orders Placed This Encounter   Procedures    Occult Blood, Fecal Immunochemical    CT abdomen pelvis with contrast    CBC and differential    Comprehensive metabolic panel    Lipase     Labs Reviewed   OCCULT BLOOD, FECAL IMMUNOCHEMICAL - Abnormal       Result Value Ref Range Status    OCCULT BLD, FECAL IMMUNOLOGICAL Positive (*) Negative Final    Narrative:       Performed by Fecal Immunochemical Test.   CBC AND DIFFERENTIAL - Abnormal    WBC 9.69  4.31 - 10.16 Thousand/uL Final    RBC 5.82 (*) 3.88 - 5.62 Million/uL Final    Hemoglobin 16.1  12.0 - 17.0 g/dL Final    Hematocrit 49.5 (*) 36.5 - 49.3 % Final    MCV 85  82 - 98 fL Final    MCH 27.7  26.8 - 34.3 pg Final    MCHC 32.5  31.4 - 37.4 g/dL Final    RDW 12.8  11.6 - 15.1 % Final    MPV 8.8 (*) 8.9 - 12.7 fL Final    Platelets 308  149 - 390 Thousands/uL Final    nRBC 0  /100 WBCs Final     Segmented % 76 (*) 43 - 75 % Final    Immature Grans % 1  0 - 2 % Final    Lymphocytes % 12 (*) 14 - 44 % Final    Monocytes % 8  4 - 12 % Final    Eosinophils Relative 2  0 - 6 % Final    Basophils Relative 1  0 - 1 % Final    Absolute Neutrophils 7.46  1.85 - 7.62 Thousands/µL Final    Absolute Immature Grans 0.05  0.00 - 0.20 Thousand/uL Final    Absolute Lymphocytes 1.18  0.60 - 4.47 Thousands/µL Final    Absolute Monocytes 0.74  0.17 - 1.22 Thousand/µL Final    Eosinophils Absolute 0.21  0.00 - 0.61 Thousand/µL Final    Basophils Absolute 0.05  0.00 - 0.10 Thousands/µL Final   COMPREHENSIVE METABOLIC PANEL - Abnormal    Sodium 140  135 - 147 mmol/L Final    Potassium 3.7  3.5 - 5.3 mmol/L Final    Chloride 103  96 - 108 mmol/L Final    CO2 29  21 - 32 mmol/L Final    ANION GAP 8  4 - 13 mmol/L Final    BUN 18  5 - 25 mg/dL Final    Creatinine 1.43 (*) 0.60 - 1.30 mg/dL Final    Comment: Standardized to IDMS reference method    Glucose 88  65 - 140 mg/dL Final    Comment: If the patient is fasting, the ADA then defines impaired fasting glucose as > 100 mg/dL and diabetes as > or equal to 123 mg/dL.    Calcium 9.7  8.4 - 10.2 mg/dL Final    AST 23  13 - 39 U/L Final    ALT 31  7 - 52 U/L Final    Comment: Specimen collection should occur prior to Sulfasalazine administration due to the potential for falsely depressed results.     Alkaline Phosphatase 90  34 - 104 U/L Final    Total Protein 8.5 (*) 6.4 - 8.4 g/dL Final    Albumin 4.5  3.5 - 5.0 g/dL Final    Total Bilirubin 0.58  0.20 - 1.00 mg/dL Final    Comment: Use of this assay is not recommended for patients undergoing treatment with eltrombopag due to the potential for falsely elevated results.  N-acetyl-p-benzoquinone imine (metabolite of Acetaminophen) will generate erroneously low results in samples for patients that have taken an overdose of Acetaminophen.    eGFR 63  ml/min/1.73sq m Final    Narrative:     [employer]  guidelines for Chronic Kidney Disease (CKD):     Stage 1 with normal or high GFR (GFR > 90 mL/min/1.73 square meters)    Stage 2 Mild CKD (GFR = 60-89 mL/min/1.73 square meters)    Stage 3A Moderate CKD (GFR = 45-59 mL/min/1.73 square meters)    Stage 3B Moderate CKD (GFR = 30-44 mL/min/1.73 square meters)    Stage 4 Severe CKD (GFR = 15-29 mL/min/1.73 square meters)    Stage 5 End Stage CKD (GFR <15 mL/min/1.73 square meters)  Note: GFR calculation is accurate only with a steady state creatinine   LIPASE - Normal    Lipase 17  11 - 82 u/L Final     Time reflects when diagnosis was documented in both MDM as applicable and the Disposition within this note       Time User Action Codes Description Comment    1/8/2025  1:30 AM Ross Caldera Add [K52.9] Gastroenteritis     1/8/2025  1:30 AM Ross Caldera Add [R11.0] Nausea           ED Disposition       ED Disposition   Discharge    Condition   Stable    Date/Time   Wed Jan 8, 2025  1:30 AM    Comment   Reynaldo Santoyo discharge to home/self care.                   Follow-up Information       Follow up With Specialties Details Why Contact Info Additional Information    [facility] [address] [facility]   [address] [facility] [id number] [phone] [facility] [address] [facility],  [id number], [phone]          Discharge Medication List as of 1/8/2025  1:34 AM        START taking these medications    Details   !! ondansetron (ZOFRAN-ODT) 4 mg disintegrating tablet Take 1 tablet (4 mg total) by mouth every 6 (six) hours as needed for nausea or vomiting, Starting Wed 1/8/2025, Normal       !! - Potential duplicate medications found. Please discuss with provider.        CONTINUE these medications which have NOT CHANGED    Details   tirzepatide (Zepbound) 5 mg/0.5 mL auto-injector Inject 0.5 mL (5 mg total) under the skin once a week, Starting Sun 12/15/2024, Normal       !! buPROPion (WELLBUTRIN XL) 150 mg 24 hr tablet Take 1 tablet (150 mg total) by mouth daily, Starting Fri 9/6/2024, Normal      !! buPROPion (WELLBUTRIN XL) 300 mg 24 hr tablet Take 1 tablet (300 mg total) by mouth daily, Starting Fri 9/6/2024, Normal      Cholecalciferol (VITAMIN D-3) 1000 units CAPS Take 2 capsules by mouth daily, Historical Med      dicyclomine (BENTYL) 10 mg capsule Take 1 capsule (10 mg total) by mouth 3 (three) times a day as needed (abdominal pain/ spasm), Starting Mon 10/7/2024, Normal      levothyroxine 25 mcg tablet Take 1 tablet (25 mcg total) by mouth daily in the early morning, Starting Sat 10/12/2024, Normal      !! ondansetron (ZOFRAN-ODT) 4 mg disintegrating tablet Take 1 tablet (4 mg total) by mouth every 8 (eight) hours as needed for nausea or vomiting, Starting Mon 12/9/2024, Normal      venlafaxine (EFFEXOR-XR) 150 mg 24 hr capsule Take 1 capsule (150 mg total) by mouth daily, Starting Fri 9/6/2024, Normal       !! - Potential duplicate medications found. Please discuss with provider.        No discharge procedures on file.  Prior to Admission Medications   Prescriptions Last Dose Informant Patient Reported? Taking?   Cholecalciferol (VITAMIN D-3) 1000 units CAPS  Self Yes No   Sig: Take 2 capsules by mouth daily   buPROPion (WELLBUTRIN XL) 150 mg 24 hr tablet  Self No No   Sig: Take 1 tablet (150 mg total) by mouth daily   buPROPion (WELLBUTRIN XL) 300 mg 24 hr tablet  Self No No   Sig: Take 1 tablet (300 mg total) by mouth daily   dicyclomine (BENTYL) 10 mg capsule  Self No No   Sig: Take 1 capsule (10 mg total) by mouth 3 (three) times a day as needed (abdominal pain/ spasm)   levothyroxine 25 mcg tablet  Self No No   Sig: Take 1 tablet (25 mcg total) by mouth daily in the early morning   ondansetron (ZOFRAN-ODT) 4 mg disintegrating tablet   No No   Sig: Take 1 tablet (4 mg total) by mouth every 8 (eight) hours as needed for nausea or vomiting   tirzepatide (Zepbound) 5  "mg/0.5 mL auto-injector 1/7/2025  No Yes   Sig: Inject 0.5 mL (5 mg total) under the skin once a week   venlafaxine (EFFEXOR-XR) 150 mg 24 hr capsule  Self No No   Sig: Take 1 capsule (150 mg total) by mouth daily      Facility-Administered Medications: None       Portions of the record may have been created with voice recognition software. Occasional wrong word or \"sound a like\" substitutions may have occurred due to the inherent limitations of voice recognition software. Read the chart carefully and recognize, using context, where substitutions have occurred.    "

## 2025-01-08 NOTE — ED PROVIDER NOTES
Time reflects when diagnosis was documented in both MDM as applicable and the Disposition within this note       Time User Action Codes Description Comment    1/8/2025  1:30 AM Ross Caldera Add [K52.9] Gastroenteritis     1/8/2025  1:30 AM Ryan Calderaluwa Add [R11.0] Nausea           ED Disposition       ED Disposition   Discharge    Condition   Stable    Date/Time   Wed Jan 8, 2025  1:30 AM    Comment   Reynaldo Santoyo discharge to home/self care.                   Assessment & Plan       Medical Decision Making  34-year-old male presents for evaluation of nausea, vomiting and abdominal pain.    On initial evaluation, patient in no acute distress, VSS.  Differentials include but not limited to gastroenteritis, diverticulitis, IBS.  Generalized abdominal tenderness noted on examination.  Otherwise unremarkable physical examination.  On rectal exam, no hemorrhoids noted.  Fecal Hemoccult was positive.  CT sinus showed findings consistent with gastroenteritis.  Labs show mildly elevated creatinine from baseline, so IV fluids were given.  Toradol and Zofran also given for symptomatic management.  Recommended follow-up with gastroenterology, and PCP.  Zofran given for nausea.  Patient endorses understanding, and is in agreement with this plan.    Amount and/or Complexity of Data Reviewed  Labs: ordered.  Radiology: ordered.    Risk  Prescription drug management.             Medications   ondansetron (ZOFRAN) injection 4 mg (4 mg Intravenous Given 1/7/25 2319)   ketorolac (TORADOL) injection 15 mg (15 mg Intravenous Given 1/7/25 2320)   sodium chloride 0.9 % bolus 1,000 mL (0 mL Intravenous Stopped 1/8/25 0056)   iohexol (OMNIPAQUE) 350 MG/ML injection (SINGLE-DOSE) 100 mL (100 mL Intravenous Given 1/8/25 0016)       ED Risk Strat Scores                          SBIRT 22yo+      Flowsheet Row Most Recent Value   Initial Alcohol Screen: US AUDIT-C     1. How often do you have a drink containing alcohol? 1  Filed at: 01/07/2025 2247   2. How many drinks containing alcohol do you have on a typical day you are drinking?  0 Filed at: 01/07/2025 2247   3a. Male UNDER 65: How often do you have five or more drinks on one occasion? 0 Filed at: 01/07/2025 2247   3b. FEMALE Any Age, or MALE 65+: How often do you have 4 or more drinks on one occassion? 0 Filed at: 01/07/2025 2247   Audit-C Score 1 Filed at: 01/07/2025 2247   DIOGO: How many times in the past year have you...    Used an illegal drug or used a prescription medication for non-medical reasons? Never Filed at: 01/07/2025 2247                            History of Present Illness       Chief Complaint   Patient presents with    Abdominal Pain     Pt reports Lower abd pain and cramping with nausea and vomiting that began today. Pt also reports bring red blood after using the bathroom        Past Medical History:   Diagnosis Date    Anxiety     Carpal tunnel syndrome, bilateral     LA...9/12/17   R....9/12/17     COVID-19 5/27/2022    GERD without esophagitis     Moderate episode of recurrent major depressive disorder (HCC) 08/22/2018    Morbid obesity (HCC)     Obstructive sleep apnea     Pilonidal cyst with abscess     LA....10/25/13   R....6/10/14     Vitamin D deficiency       Past Surgical History:   Procedure Laterality Date    COLONOSCOPY      FOOT SURGERY      PILONIDAL CYST DRAINAGE      Incision and drainage of pilonidal cyst     ME NDSC WRST SURG W/RLS TRANSVRS CARPL LIGM Right 07/12/2018    Procedure: RELEASE CARPAL TUNNEL ENDOSCOPIC;  Surgeon: Emerson Infante MD;  Location:  MAIN OR;  Service: Orthopedics    ME NDSC WRST SURG W/RLS TRANSVRS CARPL LIGM Left 07/26/2018    Procedure: RELEASE CARPAL TUNNEL ENDOSCOPIC;  Surgeon: Emerson Infante MD;  Location:  MAIN OR;  Service: Orthopedics      Family History   Problem Relation Age of Onset    Depression Mother     Obesity Mother     Stroke Mother         Syndrome     Diabetes Mother     Alcohol abuse  Father     Liver disease Father         hepatic failure     Hypertension Father     Depression Brother     Thyroid disease Brother     Alcohol abuse Brother     Substance Abuse Brother     Depression Maternal Uncle     Substance Abuse Brother     Heart disease Neg Hx     Cancer Neg Hx     Thyroid cancer Neg Hx       Social History     Tobacco Use    Smoking status: Never    Smokeless tobacco: Never   Vaping Use    Vaping status: Never Used   Substance Use Topics    Alcohol use: Yes     Comment: Rarely    Drug use: No      E-Cigarette/Vaping    E-Cigarette Use Never User       E-Cigarette/Vaping Substances    Nicotine No     THC No     CBD No     Flavoring No     Other No     Unknown No       I have reviewed and agree with the history as documented.     34-year-old male presents for evaluation of abdominal pain.  He reports 2 to 3 days of lower abdominal pain cramping, and a few hours of nausea and vomiting.  He has a history of IBS, and has diarrhea at baseline.  He also reports 2 to 3 days of bright red blood per rectum when he wipes after a bowel movement.  He does state that he has a history of hemorrhoids.  He has been able to tolerate p.o. intake.  Denies bloody vomitus.  Denies chest pain, SOB, urinary symptoms.  No meds taken PTA.          Review of Systems   Constitutional:  Negative for fever.   Respiratory:  Negative for shortness of breath.    Cardiovascular:  Negative for chest pain.   Gastrointestinal:  Positive for abdominal pain, nausea and vomiting. Negative for abdominal distention.   Genitourinary:  Negative for dysuria and hematuria.   Neurological:  Negative for dizziness and headaches.           Objective       ED Triage Vitals   Temperature Pulse Blood Pressure Respirations SpO2 Patient Position - Orthostatic VS   01/07/25 2254 01/07/25 2254 01/07/25 2254 01/07/25 2254 01/07/25 2254 01/07/25 2254   98.2 °F (36.8 °C) (!) 114 139/79 20 96 % Sitting      Temp Source Heart Rate Source BP Location  FiO2 (%) Pain Score    01/07/25 2254 01/07/25 2254 01/07/25 2254 -- 01/07/25 2320    Oral Monitor Left arm  8      Vitals      Date and Time Temp Pulse SpO2 Resp BP Pain Score FACES Pain Rating User   01/08/25 0100 -- 94 93 % 20 140/67 -- -- MS   01/07/25 2350 -- -- -- -- -- 3 -- MS   01/07/25 2320 -- -- -- -- -- 8 -- MS   01/07/25 2254 98.2 °F (36.8 °C) 114 96 % 20 139/79 -- -- JK            Physical Exam  Vitals and nursing note reviewed.   Constitutional:       General: He is not in acute distress.     Appearance: He is well-developed.   HENT:      Head: Normocephalic and atraumatic.   Eyes:      Conjunctiva/sclera: Conjunctivae normal.   Cardiovascular:      Rate and Rhythm: Normal rate and regular rhythm.      Heart sounds: No murmur heard.  Pulmonary:      Effort: Pulmonary effort is normal. No respiratory distress.      Breath sounds: Normal breath sounds.   Abdominal:      Palpations: Abdomen is soft.      Tenderness: There is generalized abdominal tenderness.   Musculoskeletal:         General: No swelling.      Cervical back: Neck supple.   Skin:     General: Skin is warm and dry.      Capillary Refill: Capillary refill takes less than 2 seconds.   Neurological:      General: No focal deficit present.      Mental Status: He is alert.         Results Reviewed       Procedure Component Value Units Date/Time    Lipase [[id number]]  (Normal) Collected: 01/07/25 2315    Lab Status: Final result Specimen: Blood from Arm, Right Updated: 01/07/25 2338     Lipase 17 u/L     Comprehensive metabolic panel [[id number]]  (Abnormal) Collected: 01/07/25 2315    Lab Status: Final result Specimen: Blood from Arm, Right Updated: 01/07/25 2338     Sodium 140 mmol/L      Potassium 3.7 mmol/L      Chloride 103 mmol/L      CO2 29 mmol/L      ANION GAP 8 mmol/L      BUN 18 mg/dL      Creatinine 1.43 mg/dL      Glucose 88 mg/dL      Calcium 9.7 mg/dL      AST 23 U/L      ALT 31 U/L      Alkaline Phosphatase 90 U/L      Total  Protein 8.5 g/dL      Albumin 4.5 g/dL      Total Bilirubin 0.58 mg/dL      eGFR 63 ml/min/1.73sq m     Narrative:      National Kidney Disease Foundation guidelines for Chronic Kidney Disease (CKD):     Stage 1 with normal or high GFR (GFR > 90 mL/min/1.73 square meters)    Stage 2 Mild CKD (GFR = 60-89 mL/min/1.73 square meters)    Stage 3A Moderate CKD (GFR = 45-59 mL/min/1.73 square meters)    Stage 3B Moderate CKD (GFR = 30-44 mL/min/1.73 square meters)    Stage 4 Severe CKD (GFR = 15-29 mL/min/1.73 square meters)    Stage 5 End Stage CKD (GFR <15 mL/min/1.73 square meters)  Note: GFR calculation is accurate only with a steady state creatinine    Occult Blood, Fecal Immunochemical [[id number]]  (Abnormal) Collected: 01/07/25 2326    Lab Status: Final result Specimen: Stool from Per Rectum Updated: 01/07/25 2337     OCCULT BLD, FECAL IMMUNOLOGICAL Positive    Narrative:        Performed by Fecal Immunochemical Test.    CBC and differential [[id number]]  (Abnormal) Collected: 01/07/25 2315    Lab Status: Final result Specimen: Blood from Arm, Right Updated: 01/07/25 2322     WBC 9.69 Thousand/uL      RBC 5.82 Million/uL      Hemoglobin 16.1 g/dL      Hematocrit 49.5 %      MCV 85 fL      MCH 27.7 pg      MCHC 32.5 g/dL      RDW 12.8 %      MPV 8.8 fL      Platelets 308 Thousands/uL      nRBC 0 /100 WBCs      Segmented % 76 %      Immature Grans % 1 %      Lymphocytes % 12 %      Monocytes % 8 %      Eosinophils Relative 2 %      Basophils Relative 1 %      Absolute Neutrophils 7.46 Thousands/µL      Absolute Immature Grans 0.05 Thousand/uL      Absolute Lymphocytes 1.18 Thousands/µL      Absolute Monocytes 0.74 Thousand/µL      Eosinophils Absolute 0.21 Thousand/µL      Basophils Absolute 0.05 Thousands/µL             CT abdomen pelvis with contrast   Final Interpretation by Karissa Cisneros MD (01/08 0119)      Mild fluid distention of multiple small bowel loops and distended stomach with air-fluid level. The  findings are nonspecific and may reflect gastroenteritis.         Workstation performed: [id number]             Procedures    ED Medication and Procedure Management   Prior to Admission Medications   Prescriptions Last Dose Informant Patient Reported? Taking?   Cholecalciferol (VITAMIN D-3) 1000 units CAPS  Self Yes No   Sig: Take 2 capsules by mouth daily   buPROPion (WELLBUTRIN XL) 150 mg 24 hr tablet  Self No No   Sig: Take 1 tablet (150 mg total) by mouth daily   buPROPion (WELLBUTRIN XL) 300 mg 24 hr tablet  Self No No   Sig: Take 1 tablet (300 mg total) by mouth daily   dicyclomine (BENTYL) 10 mg capsule  Self No No   Sig: Take 1 capsule (10 mg total) by mouth 3 (three) times a day as needed (abdominal pain/ spasm)   levothyroxine 25 mcg tablet  Self No No   Sig: Take 1 tablet (25 mcg total) by mouth daily in the early morning   ondansetron (ZOFRAN-ODT) 4 mg disintegrating tablet   No No   Sig: Take 1 tablet (4 mg total) by mouth every 8 (eight) hours as needed for nausea or vomiting   tirzepatide (Zepbound) 5 mg/0.5 mL auto-injector 1/7/2025  No Yes   Sig: Inject 0.5 mL (5 mg total) under the skin once a week   venlafaxine (EFFEXOR-XR) 150 mg 24 hr capsule  Self No No   Sig: Take 1 capsule (150 mg total) by mouth daily      Facility-Administered Medications: None     Discharge Medication List as of 1/8/2025  1:34 AM        START taking these medications    Details   !! ondansetron (ZOFRAN-ODT) 4 mg disintegrating tablet Take 1 tablet (4 mg total) by mouth every 6 (six) hours as needed for nausea or vomiting, Starting Wed 1/8/2025, Normal       !! - Potential duplicate medications found. Please discuss with provider.        CONTINUE these medications which have NOT CHANGED    Details   tirzepatide (Zepbound) 5 mg/0.5 mL auto-injector Inject 0.5 mL (5 mg total) under the skin once a week, Starting Sun 12/15/2024, Normal      !! buPROPion (WELLBUTRIN XL) 150 mg 24 hr tablet Take 1 tablet (150 mg total) by mouth  daily, Starting Fri 9/6/2024, Normal      !! buPROPion (WELLBUTRIN XL) 300 mg 24 hr tablet Take 1 tablet (300 mg total) by mouth daily, Starting Fri 9/6/2024, Normal      Cholecalciferol (VITAMIN D-3) 1000 units CAPS Take 2 capsules by mouth daily, Historical Med      dicyclomine (BENTYL) 10 mg capsule Take 1 capsule (10 mg total) by mouth 3 (three) times a day as needed (abdominal pain/ spasm), Starting Mon 10/7/2024, Normal      levothyroxine 25 mcg tablet Take 1 tablet (25 mcg total) by mouth daily in the early morning, Starting Sat 10/12/2024, Normal      !! ondansetron (ZOFRAN-ODT) 4 mg disintegrating tablet Take 1 tablet (4 mg total) by mouth every 8 (eight) hours as needed for nausea or vomiting, Starting Mon 12/9/2024, Normal      venlafaxine (EFFEXOR-XR) 150 mg 24 hr capsule Take 1 capsule (150 mg total) by mouth daily, Starting Fri 9/6/2024, Normal       !! - Potential duplicate medications found. Please discuss with provider.        No discharge procedures on file.  ED SEPSIS DOCUMENTATION   Time reflects when diagnosis was documented in both MDM as applicable and the Disposition within this note       Time User Action Codes Description Comment    1/8/2025  1:30 AM Ross Caldera Add [K52.9] Gastroenteritis     1/8/2025  1:30 AM Ross Caldera Add [R11.0] Nausea                  Ross Caldera MD  01/08/25 0444

## 2025-01-08 NOTE — DISCHARGE INSTRUCTIONS
Please follow up with your PCP as soon as possible for a visit.     Please return to ED for new or worsening symptoms.

## 2025-01-08 NOTE — TELEPHONE ENCOUNTER
CPAP study resulted, previously confirmed sleep disordered breathing was effectively treated with PAP therapy.    Continued CPAP use is recommended at the setting of 13 to 16 cm h20.  (No pressure change prescription provided)    Call placed to patient, left call back message.    [employer] message sent providing results, recommendations and instructions.    Provider notified regarding prescription for pressure change.

## 2025-01-09 NOTE — TELEPHONE ENCOUNTER
Rx for pressure change provided.    Rx for pressure change sent to [facility] via [facility].

## 2025-01-15 LAB
DME PARACHUTE DELIVERY DATE ACTUAL: NORMAL
DME PARACHUTE DELIVERY DATE REQUESTED: NORMAL
DME PARACHUTE ITEM DESCRIPTION: NORMAL
DME PARACHUTE ORDER STATUS: NORMAL
DME PARACHUTE SUPPLIER NAME: NORMAL
DME PARACHUTE SUPPLIER PHONE: NORMAL

## 2025-01-17 RX ORDER — TIRZEPATIDE 7.5 MG/.5ML
7.5 INJECTION, SOLUTION SUBCUTANEOUS WEEKLY
Qty: 2 ML | Refills: 0 | Status: SHIPPED | OUTPATIENT
Start: 2025-01-17

## 2025-01-24 ENCOUNTER — RESULTS FOLLOW-UP (OUTPATIENT)
Dept: SLEEP CENTER | Facility: CLINIC | Age: 35
End: 2025-01-24

## 2025-01-24 DIAGNOSIS — G47.33 OBSTRUCTIVE SLEEP APNEA: Primary | ICD-10-CM

## 2025-01-27 ENCOUNTER — TELEPHONE (OUTPATIENT)
Dept: SLEEP CENTER | Facility: CLINIC | Age: 35
End: 2025-01-27

## 2025-01-27 NOTE — TELEPHONE ENCOUNTER
CPAP sleep study with transcutaneous resulted and shows an effective titration resulting in AHI of 0.0. PAP pressure change ordered.     Result read by patient.       Called patient and left message advising I will send a [employer] message with the sleep study results and next steps.  Provided sleep center number([phone]) to call if any questions.       See new encounter for PAP pressure change

## 2025-01-27 NOTE — TELEPHONE ENCOUNTER
----- Message from Humphrey Villatoro, DO sent at 1/24/2025  1:22 PM EST -----  Please let the patient know that I will be adjusting his pressure settings based on the results of his PAP titration study.    Thank you,  Dr. Villatoro

## 2025-01-28 DIAGNOSIS — F41.1 GENERALIZED ANXIETY DISORDER: ICD-10-CM

## 2025-01-28 DIAGNOSIS — R79.89 ELEVATED TSH: ICD-10-CM

## 2025-01-28 DIAGNOSIS — F33.1 MODERATE EPISODE OF RECURRENT MAJOR DEPRESSIVE DISORDER (HCC): ICD-10-CM

## 2025-01-28 RX ORDER — BUPROPION HYDROCHLORIDE 150 MG/1
150 TABLET ORAL DAILY
Qty: 90 TABLET | Refills: 0 | OUTPATIENT
Start: 2025-01-28

## 2025-01-28 RX ORDER — VENLAFAXINE HYDROCHLORIDE 150 MG/1
150 CAPSULE, EXTENDED RELEASE ORAL DAILY
Qty: 90 CAPSULE | Refills: 0 | OUTPATIENT
Start: 2025-01-28

## 2025-01-28 RX ORDER — LEVOTHYROXINE SODIUM 25 UG/1
25 TABLET ORAL
Qty: 90 TABLET | Refills: 1 | Status: SHIPPED | OUTPATIENT
Start: 2025-01-28

## 2025-01-28 RX ORDER — BUPROPION HYDROCHLORIDE 300 MG/1
300 TABLET ORAL DAILY
Qty: 90 TABLET | Refills: 0 | OUTPATIENT
Start: 2025-01-28

## 2025-01-28 NOTE — TELEPHONE ENCOUNTER
Reason for call:  Miguel A Mercado,( Psychiatric) manage this medication!  Patient said Pharmacy told him no refills left!! Confirmed medication and Dosage with Patient so no confusion!    [x] Refill   [] Prior Auth  [] Other:     Office:   [] PCP/Provider -   [x] Specialty/Provider -     Medication:     buPROPion (WELLBUTRIN XL) 150 mg 24 hr table       Dose/Frequency: Take 1 tablet (150 mg total) by mouth daily     Quantity: 90 tablet     Medication:    buPROPion (WELLBUTRIN XL) 300 mg 24 hr tablet     Dose/Frequency:Take 1 tablet (300 mg total) by mouth daily,     Quantity:90 tablet     Medication:    venlafaxine (EFFEXOR-XR) 150 mg 24 hr capsule     Dose/Frequency: Take 1 capsule (150 mg total) by mouth daily,     Quantity:90 capsule     Pharmacy: [facility]) - [place] - [address] [phone]     Does the patient have enough for 3 days?   [] Yes   [x] No - Send as HP to POD

## 2025-01-28 NOTE — TELEPHONE ENCOUNTER
Has refills on file of all three medications that were confirmed by pharmacy and they are not . Called Reynaldo ([phone]) and left  requesting a call back.

## 2025-03-03 ENCOUNTER — OFFICE VISIT (OUTPATIENT)
Dept: GASTROENTEROLOGY | Facility: CLINIC | Age: 35
End: 2025-03-03
Payer: COMMERCIAL

## 2025-03-03 VITALS
HEIGHT: 74 IN | TEMPERATURE: 99.2 F | WEIGHT: 315 LBS | DIASTOLIC BLOOD PRESSURE: 86 MMHG | BODY MASS INDEX: 40.43 KG/M2 | SYSTOLIC BLOOD PRESSURE: 120 MMHG

## 2025-03-03 DIAGNOSIS — K58.0 IRRITABLE BOWEL SYNDROME WITH DIARRHEA: Primary | ICD-10-CM

## 2025-03-03 PROCEDURE — 99213 OFFICE O/P EST LOW 20 MIN: CPT | Performed by: PHYSICIAN ASSISTANT

## 2025-03-03 NOTE — ASSESSMENT & PLAN NOTE
We reviewed his prior workup including labs, stool studies, colonoscopy results.  Patient expressed understanding to results.  All questions answered.  He has had significant improvement of symptoms with dicyclomine as needed.  We discussed and reviewed irritable bowel syndrome and the treatment for this.  I encouraged the patient to avoid trigger foods, get adequate sleep, keep stress levels low.  We also discussed the importance of hydration.  I recommend he continue to take dicyclomine 10 mg up to 3 times a day as needed for abdominal pain/cramping/spasm for now.  No plans for further workup at this time.  All questions answered.  Patient is appreciative of care.

## 2025-03-03 NOTE — PROGRESS NOTES
Name: Reynaldo Santoyo      : 1990      MRN: [id number]  Encounter Provider: Tigist Major PA-C  Encounter Date: 3/3/2025   Encounter department: [facility] [place]  :  Assessment & Plan  Irritable bowel syndrome with diarrhea  We reviewed his prior workup including labs, stool studies, colonoscopy results.  Patient expressed understanding to results.  All questions answered.  He has had significant improvement of symptoms with dicyclomine as needed.  We discussed and reviewed irritable bowel syndrome and the treatment for this.  I encouraged the patient to avoid trigger foods, get adequate sleep, keep stress levels low.  We also discussed the importance of hydration.  I recommend he continue to take dicyclomine 10 mg up to 3 times a day as needed for abdominal pain/cramping/spasm for now.  No plans for further workup at this time.  All questions answered.  Patient is appreciative of care.           Patient was instructed to call the office with any questions, concerns, new/ worsening/ persisting GI symptoms. Advised patient go to the ER with any severe or worsening abdominal pain, fevers/ chills, intractable N/V, chest pain, SOB, dizziness, lightheadedness, feeling something stuck in esophagus that will not go down. Patient expressed understanding and is in agreement with treatment plan.     Will plan to follow up in ~ 1 YEAR       History of Present Illness   HPI  Reynaldo Santoyo is a 34 y.o. male with a past medical history of anxiety and depression, chronic bilateral low back pain, hypothyroidism, GERD, obstructive sleep apnea, obesity presents to the office today for follow-up. Patient was last seen in the office by me 10/7/2024, previous office note was reviewed.  At last office visit I recommended patient undergo testing for celiac disease and also trial dicyclomine 10 mg 3 times a day as needed for abdominal pain/spasm.  I also recommended that he undergo stool  "testing as previously ordered.  Labs from 10/7/2024 reviewed and normal/negative for celiac disease  Stool studies were completed 10/7/2024, these were reviewed, notable for negative H. pylori, negative Giardia, negative ova and parasites, negative stool enteric bacterial panel by PCR.  Fecal calprotectin and pancreatic elastase also normal    Patient reports feeling better overall since last office visit.  No new complaints today.   He has been taking dicyclomine 10 mg as needed up to TID with significant improvement in abdominal pain/spasms.  He is so pleased.  He has about 3 bowel movements a day at baseline.  He denies nocturnal stools.  He is otherwise eating and drinking well.  Patient denies any current or recent fevers/ chills, unintentional weight loss, decreased appetite, nausea, vomiting, black or bloody stools, heartburn, dysphagia, odynophagia.   Denies chest pain, SOB    Colonoscopy was completed in June 2024, this was reviewed, this was completely normal.     Review of Systems   Constitutional:  Negative for chills and fever.   HENT:  Negative for ear pain and sore throat.    Eyes:  Negative for pain and visual disturbance.   Respiratory:  Negative for cough and shortness of breath.    Cardiovascular:  Negative for chest pain and palpitations.   Gastrointestinal:  Negative for abdominal pain and vomiting.   Genitourinary:  Negative for dysuria and hematuria.   Musculoskeletal:  Negative for arthralgias and back pain.   Skin:  Negative for color change and rash.   Neurological:  Negative for seizures and syncope.   All other systems reviewed and are negative.         Objective   /86 (BP Location: Right arm, Patient Position: Sitting, Cuff Size: Adult)   Temp 99.2 °F (37.3 °C) (Tympanic)   Ht 6' 2\" (1.88 m)   Wt (!) 177 kg (391 lb)   BMI 50.20 kg/m²      Physical Exam  Vitals reviewed.   Constitutional:       General: He is not in acute distress.     Appearance: He is well-developed. He is " obese. He is not ill-appearing.   HENT:      Head: Normocephalic and atraumatic.   Eyes:      Conjunctiva/sclera: Conjunctivae normal.   Cardiovascular:      Rate and Rhythm: Normal rate and regular rhythm.      Heart sounds: No murmur heard.  Pulmonary:      Effort: Pulmonary effort is normal. No respiratory distress.      Breath sounds: Normal breath sounds.   Abdominal:      General: Bowel sounds are normal.      Palpations: Abdomen is soft.      Tenderness: There is no abdominal tenderness.   Musculoskeletal:         General: No swelling or tenderness.      Cervical back: Neck supple.   Skin:     General: Skin is warm and dry.      Capillary Refill: Capillary refill takes less than 2 seconds.   Neurological:      General: No focal deficit present.      Mental Status: He is alert and oriented to person, place, and time. Mental status is at baseline.   Psychiatric:         Mood and Affect: Mood normal.         Behavior: Behavior normal.         Thought Content: Thought content normal.       Lab Results   Component Value Date    WBC 9.69 01/07/2025    HGB 16.1 01/07/2025    HCT 49.5 (H) 01/07/2025    MCV 85 01/07/2025     01/07/2025     Lab Results   Component Value Date     11/21/2017    SODIUM 140 01/07/2025    K 3.7 01/07/2025     01/07/2025    CO2 29 01/07/2025    AGAP 8 01/07/2025    BUN 18 01/07/2025    CREATININE 1.43 (H) 01/07/2025    GLUC 88 01/07/2025    GLUF 98 06/05/2024    CALCIUM 9.7 01/07/2025    AST 23 01/07/2025    ALT 31 01/07/2025    ALKPHOS 90 01/07/2025    PROT 7.7 11/21/2017    TP 8.5 (H) 01/07/2025    BILITOT 0.5 11/21/2017    TBILI 0.58 01/07/2025    EGFR 63 01/07/2025     Lab Results   Component Value Date    QMZ5OJOZHUKB 3.998 06/05/2024     Lab Results   Component Value Date    CRP 7.4 (H) 07/07/2018     Lab Results   Component Value Date    HEPCAB Non-reactive 06/05/2024

## 2025-04-02 ENCOUNTER — OFFICE VISIT (OUTPATIENT)
Dept: FAMILY MEDICINE CLINIC | Facility: CLINIC | Age: 35
End: 2025-04-02
Payer: COMMERCIAL

## 2025-04-02 VITALS
OXYGEN SATURATION: 97 % | HEIGHT: 74 IN | HEART RATE: 92 BPM | SYSTOLIC BLOOD PRESSURE: 130 MMHG | RESPIRATION RATE: 16 BRPM | WEIGHT: 315 LBS | BODY MASS INDEX: 40.43 KG/M2 | TEMPERATURE: 98 F | DIASTOLIC BLOOD PRESSURE: 90 MMHG

## 2025-04-02 DIAGNOSIS — H60.502 ACUTE OTITIS EXTERNA OF LEFT EAR, UNSPECIFIED TYPE: Primary | ICD-10-CM

## 2025-04-02 PROCEDURE — 99213 OFFICE O/P EST LOW 20 MIN: CPT | Performed by: NURSE PRACTITIONER

## 2025-04-02 RX ORDER — OFLOXACIN 3 MG/ML
10 SOLUTION AURICULAR (OTIC) DAILY
Qty: 10 ML | Refills: 0 | Status: SHIPPED | OUTPATIENT
Start: 2025-04-02

## 2025-04-02 NOTE — PROGRESS NOTES
"Name: Reynaldo Santoyo      : 1990      MRN: [id number]  Encounter Provider: AMRITA Alexander  Encounter Date: 2025   Encounter department: [facility]  :  Assessment & Plan  Acute otitis externa of left ear, unspecified type  Start ofloxacin otic drops.   Call if not feeling better over the next 3-4 days.     Orders:  •  ofloxacin (FLOXIN) 0.3 % otic solution; Administer 10 drops into the left ear daily For 7 days    BMI 50.0-59.9, adult (HCC)    Zepbound not tolerated due to GI side effects.    Weight was down to 388 pounds.   Trying to eat less.   Had a gingival graft at left lower central incisor, had to eat all soft foods.   Got off track with healthy eating with this.   Trying to get back on track now.   Wants to try to lose weight with diet and exercise.   He will let me know if he would like to try Wegovy in the future.              Depression Screening and Follow-up Plan: Patient was screened for depression during today's encounter. They screened negative with a PHQ-9 score of 0.        History of Present Illness   Reynaldo Santoyo is a 34 year old male presenting today for ear pain.     Left ear hurting.   He had water in it a few days ago.  Today feels swollen, muffled hearing.     Nasal congestion+.  Works in ER.                 Review of Systems   Constitutional:  Negative for chills, diaphoresis, fatigue and fever.   HENT:  Positive for congestion and ear pain. Negative for sore throat.    Respiratory:  Negative for cough.        Objective   /90   Pulse 92   Temp 98 °F (36.7 °C) (Temporal)   Resp 16   Ht 6' 2\" (1.88 m)   Wt (!) 179 kg (395 lb)   SpO2 97%   BMI 50.71 kg/m²      Physical Exam  Vitals and nursing note reviewed.   Constitutional:       General: He is not in acute distress.     Appearance: Normal appearance. He is not ill-appearing.   HENT:      Head: Atraumatic.      Right Ear: Tympanic membrane and ear canal normal.      Left Ear: Tympanic membrane " normal.      Ears:      Comments: Left canal erythematous, mild swelling.   Cardiovascular:      Rate and Rhythm: Normal rate and regular rhythm.      Heart sounds: No murmur heard.  Pulmonary:      Effort: Pulmonary effort is normal. No respiratory distress.      Breath sounds: Normal breath sounds. No wheezing or rales.   Musculoskeletal:      Cervical back: Normal range of motion and neck supple.   Lymphadenopathy:      Cervical: No cervical adenopathy.   Neurological:      Mental Status: He is alert.   Psychiatric:         Mood and Affect: Mood normal.           Current Outpatient Medications:   •  buPROPion (WELLBUTRIN XL) 150 mg 24 hr tablet, Take 1 tablet (150 mg total) by mouth daily, Disp: 90 tablet, Rfl: 1  •  buPROPion (WELLBUTRIN XL) 300 mg 24 hr tablet, Take 1 tablet (300 mg total) by mouth daily, Disp: 90 tablet, Rfl: 1  •  Cholecalciferol (VITAMIN D-3) 1000 units CAPS, Take 2 capsules by mouth daily, Disp: , Rfl:   •  dicyclomine (BENTYL) 10 mg capsule, Take 1 capsule (10 mg total) by mouth 3 (three) times a day as needed (abdominal pain/ spasm), Disp: 90 capsule, Rfl: 1  •  levothyroxine 25 mcg tablet, Take 1 tablet (25 mcg total) by mouth daily in the early morning, Disp: 90 tablet, Rfl: 1  •  ofloxacin (FLOXIN) 0.3 % otic solution, Administer 10 drops into the left ear daily For 7 days, Disp: 10 mL, Rfl: 0  •  venlafaxine (EFFEXOR-XR) 150 mg 24 hr capsule, Take 1 capsule (150 mg total) by mouth daily, Disp: 90 capsule, Rfl: 1

## 2025-04-03 NOTE — ASSESSMENT & PLAN NOTE
Zepbound not tolerated due to GI side effects.    Weight was down to 388 pounds.   Trying to eat less.   Had a gingival graft at left lower central incisor, had to eat all soft foods.   Got off track with healthy eating with this.   Trying to get back on track now.   Wants to try to lose weight with diet and exercise.   He will let me know if he would like to try Wegovy in the future.

## 2025-04-23 ENCOUNTER — OFFICE VISIT (OUTPATIENT)
Dept: SLEEP CENTER | Facility: CLINIC | Age: 35
End: 2025-04-23
Payer: COMMERCIAL

## 2025-04-23 VITALS
BODY MASS INDEX: 40.43 KG/M2 | WEIGHT: 315 LBS | HEIGHT: 74 IN | DIASTOLIC BLOOD PRESSURE: 80 MMHG | SYSTOLIC BLOOD PRESSURE: 130 MMHG

## 2025-04-23 DIAGNOSIS — G47.26 SHIFT WORK SLEEP DISORDER: ICD-10-CM

## 2025-04-23 DIAGNOSIS — G47.33 OBSTRUCTIVE SLEEP APNEA: Primary | ICD-10-CM

## 2025-04-23 DIAGNOSIS — G47.19 EXCESSIVE DAYTIME SLEEPINESS: ICD-10-CM

## 2025-04-23 PROCEDURE — 99214 OFFICE O/P EST MOD 30 MIN: CPT | Performed by: STUDENT IN AN ORGANIZED HEALTH CARE EDUCATION/TRAINING PROGRAM

## 2025-04-23 NOTE — ASSESSMENT & PLAN NOTE
Reynaldo is a pleasant 34-year-old gentleman with PMH GERD, hypothyroidism, vitamin D deficiency, obesity, MDD, JON who presents in follow up for JIMI (DONAVON 69.2, O2 rocky 82% 4/2022) with residual EDS. he does continue to endorse that, despite his slight residual daytime sleepiness, this would be substantially worse without the PAP device, endorsing substantial benefit from it.  His compliance report does appear to show good use and appropriate AHI with minimal leak.  However, following his visit upon meeting with the DME company, the device was noted to not be working properly; he then informed us that it had fallen off of his nightstand and had not quite been working properly since.  After extensive troubleshooting, the DME representative informed me that the device was broken beyond repair, and he requires a new device.    Compliance report reviewed and analyzed  Initial plan was for him to continue AutoPAP at adjusted settings of 13-16 cmH2O (pressure change had been ordered after his last PAP titration study, however not been done remotely)  However, based on the above, he does require a new device, thus an order for a new AutoPap at the above settings has been placed.  Prescription for new supplies ordered today  Reviewed CMS/insurance requirements and resupply guidelines  Information provided on the above as well as general maintenance steps  Recommended maintaining good Sleep Hygiene    Orders:    PAP DME Resupply/Reorder    Ambulatory referral to Weight Management; Future    PAP DME Pressure Change    Cpap DME

## 2025-04-23 NOTE — PROGRESS NOTES
Name: Reynaldo Santoyo      : 1990      MRN: [id number]  Encounter Provider: Humphrey Villatoro DO  Encounter Date: 2025   Encounter department: [facility] [place]  :  Assessment & Plan  Obstructive sleep apnea  Reynaldo is a pleasant 34-year-old gentleman with PMH GERD, hypothyroidism, vitamin D deficiency, obesity, MDD, JON who presents in follow up for JIMI (DONAVON 69.2, O2 rocky 82% 2022) with residual EDS. he does continue to endorse that, despite his slight residual daytime sleepiness, this would be substantially worse without the PAP device, endorsing substantial benefit from it.  His compliance report does appear to show good use and appropriate AHI with minimal leak.  However, following his visit upon meeting with the DME company, the device was noted to not be working properly; he then informed us that it had fallen off of his nightstand and had not quite been working properly since.  After extensive troubleshooting, the DME representative informed me that the device was broken beyond repair, and he requires a new device.    Compliance report reviewed and analyzed  Initial plan was for him to continue AutoPAP at adjusted settings of  13-16 cmH2O (pressure change had been ordered after his last PAP titration study, however not been done remotely)  However, based on the above, he does require a new device, thus an order for a new AutoPap at the above settings has been placed.  Prescription for new supplies ordered today  Reviewed CMS/insurance requirements and resupply guidelines  Information provided on the above as well as general maintenance steps  Recommended maintaining good Sleep Hygiene    Orders:    PAP DME Resupply/Reorder    Ambulatory referral to Weight Management; Future    PAP DME Pressure Change    Cpap DME    Excessive daytime sleepiness  I suspect this is multifactorial, with contributions from simply residual sleepiness despite treatment of JIMI, shiftwork sleep  disorder, and possibly some aspect of depression.    Defer depression management to the appropriate provider  Did review the above with him today; at present, this daytime sleepiness does not sound to be severe enough to merit pharmacologic management, but we could consider a wake promoting agent in the future if needed  May need to review the concept of anchor sleep again in the future    Orders:    PAP DME Pressure Change    Cpap DME    BMI 50.0-59.9, adult (HCC)    Discussed the importance of maintaining a healthy weight on SDB control/management, and vice versa.  Encouraged lifestyle practices to maintain a healthy weight (including diet, exercise).  Referral placed to Weight Management    Orders:    Ambulatory referral to Weight Management; Future    PAP DME Pressure Change    Cpap DME    Shift work sleep disorder  See excessive daytime sleepiness  Orders:    PAP DME Pressure Change    Cpap DME          Follow-up:  He will Return in about 6 months (around 10/23/2025).      ________________________________________________________________________________________________    Per Last Visit Note (Date: 11/21/2024):  Reynaldo is a very pleasant 34-year-old gentleman with a PMHx of GERD, hypothyroidism, vitamin D deficiency, obesity, MDD, JON who presents in follow up for JIMI (DONAVON 69.2, O2 rocky 82% 4/2022) with residual EDS. despite the residual EDS, he does endorse substantial benefit from PAP therapy, citing that his daytime symptoms would be much worse without his AutoPap.  However, the residual symptoms are still bothersome to him.  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  Given the bicarbonate trend over the past several years via his CMP/BMP lab work, coupled with his obesity, I do feel that an evaluation for obesity  hypoventilation syndrome/sleep-related hypoventilation is necessary.     Continue AutoPAP at settings of  9-14 cmH2O  Prescription for new supplies ordered today  Reviewed CMS/insurance requirements and resupply guidelines  Information provided on the above as well as general maintenance steps  Recommended maintaining good Sleep Hygiene  Reviewed the concept of anchor sleep in management of shiftwork sleep disorder  Mask fitting ordered to help with general mask comfort as well as management of humidification/heated tubing  We did discuss the possibility of utilizing modafinil, as this is often used in shiftwork sleep disorder, however he is not interested at this time  I have placed an order for a repeat CMP  I have also ordered a PAP titration study with transcutaneous CO2 monitoring  He will Return in about 6 months (around 5/21/2025).      Sleep Studies:  -HSAT 7/31/2019: .4, DONAVON 11, O2 rocky 82%     -HSAT 4 /20/2022: TRT: 35.6 minutes, DONAVON: 69.2, O2 rocky: 82%     -PAP Titration Study 5/18/2022: Titration started at 4 cmH2O, titrated to 12 cmH2O. Best pressure noted to be 9-12 cmH2O.        -PAP Titration 12/12/2024 with TC CO2: BMI 52.5.  PAP initiated at 5 cm H2O, titrated up to 14 cm H2O.  Was then swapped to BiPAP 15/11 cm H2O, titrated up to 16/11 cm H2O.  Best pressure setting noted to be 13 cm H2O CPAP.  Recommendation was for 13-16 cm H2O AutoPap.  PLM index was 110.4, PLM arousal index 3.6.  No sleep-related hypoventilation was noted on PAP therapy.     Labs:  6/5/2024:  -TSH: 3.998  -HbA1c: 5.5  -Vitamin D: 40.2     4/21/2021:   -Vitamin B12: 472     CO2/HCO3 trend:  -1/7/2025: 29  -6/5/2024: 31  -1/18/2023: 30  -5/26/2022: 25  -2/22/2022: 28  -4/21/2021: 27  -8/25/2020: 29     ________________________________________________________________________________________________      Interval History: Reynaldo Santoyo is a 34 y.o. male with a PMHx of GERD, hypothyroidism, vitamin D deficiency, obesity,  "MDD, JON who presents in follow up for JIMI (DONAVON 69.2, O2 rocky 82% 4/2022) with residual EDS    Note - had been seeing Weight Management for a time, was supposed to be getting weight loss surgery ~1-2 years ago, but then went to ED for depression and was then told that he needed to have his depression under control \"for a year or two\" before he will be considered.     SDB:  -Current experience with PAP Therapy: Tiredness hasn't been \"as bad,\" but still has some daytime sleepiness. Would be much worse without the CPAP, however  -Mask type: Nasal   -Difficulties with mask: Now using one with tube out top of head, much more comfortable (prone sleeper).  -Device: React Jennifer II; received 6/16/2022.  -Difficulties with device: Got humidification issue situated. Took several conversations to obtain a heated tube for his device.   -DME: [employer] [facility]  -Compliance:          SLEEP SCHEDULE:  Work Days (4-5 days/week, not a fixed schedule):  Works from [id number]  Bedtime: 0900             Time it takes to fall sleep: 16-30 minutes  Wake up Time: 8178-7804   Estimated total sleep time ( in a 24 hour period of time): ~7 hours        Non-Work Days (depends on how many days off he has in a row):  -If off for 1-2 days, will try to keep his \"work day\"     Maybe every other week, for 2-3 days, will try to go back to a \"normal\" sleep schedule:  Bedtime: 2100/2200    Time it takes to fall sleep: 16-30 minutes, sometimes an hour  Wake up Time: 4786-1129  Estimated total sleep time (in a 24 hour period of time): \"too much.\"      There have been days when he's slept for 12-20 hours, after working 6-7 days in a row. Does not feel refreshed.      If on \"day shift\" when off then has to start work, may take a nap around 0900/1000 until ~1700.        -Currently typically working 5-6 days/week.      Number of times patient wakes up per night: 2-3, after 4-5 hours of sleep  Reason (s) why patient wakes up during the night:         Changes to PMH, " "PSH, SH: Denies           Sitting and reading: Moderate chance of dozing  Watching TV: Moderate chance of dozing  Sitting, inactive in a public place (e.g. a theatre or a meeting): Slight chance of dozing  As a passenger in a car for an hour without a break: Moderate chance of dozing  Lying down to rest in the afternoon when circumstances permit: High chance of dozing  Sitting and talking to someone: Would never doze  Sitting quietly after a lunch without alcohol: Slight chance of dozing  In a car, while stopped for a few minutes in traffic: Would never doze  Total score: 11     Review of Systems  Pertinent positives/negatives included in HPI and also as noted:     Current Outpatient Medications on File Prior to Visit   Medication Sig Dispense Refill    buPROPion (WELLBUTRIN XL) 150 mg 24 hr tablet Take 1 tablet (150 mg total) by mouth daily 90 tablet 1    buPROPion (WELLBUTRIN XL) 300 mg 24 hr tablet Take 1 tablet (300 mg total) by mouth daily 90 tablet 1    Cholecalciferol (VITAMIN D-3) 1000 units CAPS Take 2 capsules by mouth daily      dicyclomine (BENTYL) 10 mg capsule Take 1 capsule (10 mg total) by mouth 3 (three) times a day as needed (abdominal pain/ spasm) 90 capsule 1    levothyroxine 25 mcg tablet Take 1 tablet (25 mcg total) by mouth daily in the early morning 90 tablet 1    ofloxacin (FLOXIN) 0.3 % otic solution Administer 10 drops into the left ear daily For 7 days 10 mL 0    venlafaxine (EFFEXOR-XR) 150 mg 24 hr capsule Take 1 capsule (150 mg total) by mouth daily 90 capsule 1     No current facility-administered medications on file prior to visit.      Objective   /80   Ht 6' 2\" (1.88 m)   Wt (!) 183 kg (403 lb 9.6 oz)   BMI 51.82 kg/m²        Physical Exam  PHYSICAL EXAMINATION:  Vital Signs: /80   Ht 6' 2\" (1.88 m)   Wt (!) 183 kg (403 lb 9.6 oz)   BMI 51.82 kg/m²     Constitutional: NAD, well appearing   Mental Status: AAOx3  Skin: Warm, dry, no rashes noted   Eyes: PERRL, " "normal conjunctiva  Chest: No evidence of respiratory distress, no accessory muscle use; no evidence of peripheral cyanosis  Abdomen: Soft, NT/ND  Extremities: No digital clubbing or pedal edema  Neuro: Strength 5/5 throughout, sensation grossly intact    Data  Lab Results   Component Value Date    HGB 16.1 01/07/2025    HCT 49.5 (H) 01/07/2025    MCV 85 01/07/2025      Lab Results   Component Value Date    CALCIUM 9.7 01/07/2025     11/21/2017    K 3.7 01/07/2025    CO2 29 01/07/2025     01/07/2025    BUN 18 01/07/2025    CREATININE 1.43 (H) 01/07/2025     No results found for: \"IRON\", \"TIBC\", \"FERRITIN\"  Lab Results   Component Value Date    AST 23 01/07/2025    ALT 31 01/07/2025           Electronically signed by:    Humphrey Villatoro DO  Board-Certified Neurology and Sleep Medicine  [facility]  04/23/25  "

## 2025-04-23 NOTE — ASSESSMENT & PLAN NOTE
I suspect this is multifactorial, with contributions from simply residual sleepiness despite treatment of JIMI, shiftwork sleep disorder, and possibly some aspect of depression.    Defer depression management to the appropriate provider  Did review the above with him today; at present, this daytime sleepiness does not sound to be severe enough to merit pharmacologic management, but we could consider a wake promoting agent in the future if needed  May need to review the concept of anchor sleep again in the future    Orders:    PAP DME Pressure Change    Cpap DME

## 2025-04-23 NOTE — ASSESSMENT & PLAN NOTE
Discussed the importance of maintaining a healthy weight on SDB control/management, and vice versa.  Encouraged lifestyle practices to maintain a healthy weight (including diet, exercise).  Referral placed to Weight Management    Orders:    Ambulatory referral to Weight Management; Future    PAP DME Pressure Change    Cpap DME

## 2025-04-24 ENCOUNTER — TELEPHONE (OUTPATIENT)
Dept: SLEEP CENTER | Facility: CLINIC | Age: 35
End: 2025-04-24

## 2025-04-24 LAB

## 2025-04-24 NOTE — TELEPHONE ENCOUNTER
Rx for replacement Cpap , pressure change and resupply sent to [facility] via [place].

## 2025-04-30 ENCOUNTER — TELEMEDICINE (OUTPATIENT)
Dept: PSYCHIATRY | Facility: CLINIC | Age: 35
End: 2025-04-30
Payer: COMMERCIAL

## 2025-04-30 ENCOUNTER — TELEPHONE (OUTPATIENT)
Age: 35
End: 2025-04-30

## 2025-04-30 DIAGNOSIS — G47.33 OBSTRUCTIVE SLEEP APNEA: ICD-10-CM

## 2025-04-30 DIAGNOSIS — F33.1 MODERATE EPISODE OF RECURRENT MAJOR DEPRESSIVE DISORDER (HCC): Primary | ICD-10-CM

## 2025-04-30 DIAGNOSIS — F41.1 GENERALIZED ANXIETY DISORDER: ICD-10-CM

## 2025-04-30 PROCEDURE — 99214 OFFICE O/P EST MOD 30 MIN: CPT | Performed by: STUDENT IN AN ORGANIZED HEALTH CARE EDUCATION/TRAINING PROGRAM

## 2025-04-30 PROCEDURE — 90833 PSYTX W PT W E/M 30 MIN: CPT | Performed by: STUDENT IN AN ORGANIZED HEALTH CARE EDUCATION/TRAINING PROGRAM

## 2025-04-30 RX ORDER — VENLAFAXINE HYDROCHLORIDE 150 MG/1
150 CAPSULE, EXTENDED RELEASE ORAL DAILY
Start: 2025-04-30

## 2025-04-30 RX ORDER — BUPROPION HYDROCHLORIDE 300 MG/1
300 TABLET ORAL DAILY
Start: 2025-04-30

## 2025-04-30 RX ORDER — BUPROPION HYDROCHLORIDE 150 MG/1
150 TABLET ORAL DAILY
Start: 2025-04-30

## 2025-04-30 NOTE — BH TREATMENT PLAN
TREATMENT PLAN (Medication Management Only)        [facility]    Name and Date of Birth:  Reynaldo Santoyo 34 y.o. 1990  MRN: [id number]  Date of Treatment Plan: April 30, 2025  Diagnosis/Diagnoses:    1. Moderate episode of recurrent major depressive disorder (HCC)    2. Generalized anxiety disorder    3. Obstructive sleep apnea      Strengths/Personal Resources for Self-Care: supportive family, supportive friends, taking medications as prescribed, ability to adapt to life changes, ability to communicate needs, ability to communicate well.  Area/Areas of need (in own words): anxiety, depression  1. Long Term Goal:   continue improvement in depression.  Target Date:6 months - 10/30/2025  Person/Persons responsible for completion of goal: Reynaldo  2.  Short Term Objective (s) - How will we reach this goal?:   A.  Provider new recommended medication/dosage changes and/or continue medication(s): continue current medications as prescribed.  B.  N/A.  C.  N/A.  Target Date:6 months - 10/30/2025  Person/Persons Responsible for Completion of Goal: Reynaldo  Progress Towards Goals: continuing treatment  Treatment Modality: medication management every 3 months  Review due 180 days from date of this plan: 6 months - 10/30/2025  Expected length of service: maintenance unless revised  My Physician/PA/NP and I have developed this plan together and I agree to work on the goals and objectives. I understand the treatment goals that were developed for my treatment.   Electronic Signatures: on file (unless signed below)    Miguel A Mercado MD 04/30/25

## 2025-04-30 NOTE — PSYCH
"MEDICATION MANAGEMENT NOTE    Name: Reynaldo Santoyo      : 1990      MRN: [id number]  Encounter Provider: Miguel A Mercado MD  Encounter Date: 2025   Encounter department: [facility]    Insurance: Payor: [employer] / Plan: [facility] NETWORK / Product Type: [facility] /      Reason for Visit: \"Things are good\"    Summary: Reynaldo was personally seen and evaluated today at the [facility] outpatient clinic for follow-up regarding medication management.  He notes that he has experienced an increase in depressive symptoms, mainly some negative self image and ruminating negative thoughts.  He has experienced decreased motivation decreased energy level although this appears to coincide with poor sleep.     2025 He is doing well at this time; depression and anxiety are stable. Good relationship with girlfriend and her son. 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 He is trying to lose weight, has referral to weight management. We discussed adding naltrexone to regimen to simulate Contrave given he is on bupropion though preferred to wait from starting this at present time.    :  Assessment & Plan  Moderate episode of recurrent major depressive disorder (HCC)    Orders:    buPROPion (WELLBUTRIN XL) 150 mg 24 hr tablet; Take 1 tablet (150 mg total) by mouth daily    buPROPion (WELLBUTRIN XL) 300 mg 24 hr tablet; Take 1 tablet (300 mg total) by mouth daily    venlafaxine (EFFEXOR-XR) 150 mg 24 hr capsule; Take 1 capsule (150 mg total) by mouth daily    Generalized anxiety disorder    Orders:    venlafaxine (EFFEXOR-XR) 150 mg 24 hr capsule; Take 1 capsule (150 mg total) by mouth daily    Obstructive sleep apnea  Follows with sleep medicine.           Treatment Recommendations:  Educated about diagnosis and treatment modalities. Verbalizes understanding and agreement with the treatment plan.  Discussed self monitoring of " symptoms, and symptom monitoring tools.  Discussed medications and if treatment adjustment was needed or desired.  Aware of 24 hour and weekend coverage for urgent situations accessed by calling [facility] main practice number  I am scheduling this patient out for greater than 3 months: Yes - If sooner appointment needed, patient will reach out    Medications Risks/Benefits:      Risks, Benefits And Possible Side Effects Of Medications:    Risks, benefits, and possible side effects of medications explained to Reynaldo including risk of suicidality and serotonin syndrome related to treatment with antidepressants. He (or legal representative) verbalizes understanding and agreement for treatment.    Controlled Medication Discussion:     Not applicable      History of Present Illness     Reynaldo reports that he is doing well. Relationship is stable with girlfriend, she is a good support. He met her son and feels that everything is good, feels accepting and has good connection. Doing pre-reqs for nursing school; taking chemistry now. Finds it difficult but is managing. Feels that depression and anxiety are relatively stable. Some intermittent anxiety from work/school/financial issues. Sleep is stable; had sleep study repeated and they are modifying his machine/settings. Appetite is adequate, still trying to lose weight; has referral to weight management. Plans on doing some meal prep and modifying diet. Denies any SI, HI, A/VH.    Reynaldo denies any side effects from medications unless noted above.    Review Of Systems: A review of systems is obtained and is negative except for the pertinent positives listed in HPI/Subjective above.      Current Rating Scores:       Areas of Improvement: reviewed in HPI/Subjective Section and reviewed in Assessment and Plan Section      Past Medical History:   Diagnosis Date    Anxiety     Carpal tunnel syndrome, bilateral     LA...9/12/17   R....9/12/17     COVID-19  5/27/2022    GERD without esophagitis     Moderate episode of recurrent major depressive disorder (HCC) 08/22/2018    Morbid obesity (HCC)     Obstructive sleep apnea     Pilonidal cyst with abscess     LA....10/25/13   R....6/10/14     Vitamin D deficiency         Past Surgical History:   Procedure Laterality Date    COLONOSCOPY      FOOT SURGERY      PILONIDAL CYST DRAINAGE      Incision and drainage of pilonidal cyst     LA NDSC WRST SURG W/RLS TRANSVRS CARPL LIGM Right 07/12/2018    Procedure: RELEASE CARPAL TUNNEL ENDOSCOPIC;  Surgeon: Emerson Infante MD;  Location: [facility];  Service: Orthopedics    LA NDSC WRST SURG W/RLS TRANSVRS CARPL LIGM Left 07/26/2018    Procedure: RELEASE CARPAL TUNNEL ENDOSCOPIC;  Surgeon: Emerson Infante MD;  Location: [facility];  Service: Orthopedics     Allergies: No Known Allergies    Current Outpatient Medications   Medication Sig Dispense Refill    buPROPion (WELLBUTRIN XL) 150 mg 24 hr tablet Take 1 tablet (150 mg total) by mouth daily      buPROPion (WELLBUTRIN XL) 300 mg 24 hr tablet Take 1 tablet (300 mg total) by mouth daily      Cholecalciferol (VITAMIN D-3) 1000 units CAPS Take 2 capsules by mouth daily      dicyclomine (BENTYL) 10 mg capsule Take 1 capsule (10 mg total) by mouth 3 (three) times a day as needed (abdominal pain/ spasm) 90 capsule 1    levothyroxine 25 mcg tablet Take 1 tablet (25 mcg total) by mouth daily in the early morning 90 tablet 1    ofloxacin (FLOXIN) 0.3 % otic solution Administer 10 drops into the left ear daily For 7 days 10 mL 0    venlafaxine (EFFEXOR-XR) 150 mg 24 hr capsule Take 1 capsule (150 mg total) by mouth daily       No current facility-administered medications for this visit.       Substance Abuse History:    Social History     Substance and Sexual Activity   Alcohol Use Yes    Comment: Rarely     Social History     Substance and Sexual Activity   Drug Use No       Social History:    Social History     Socioeconomic History     Marital status: Legally      Spouse name: Not on file    Number of children: 0    Years of education: some college    Highest education level: Some college, no degree   Occupational History    Occupation: [employer]     Comment: employed full time   Tobacco Use    Smoking status: Never    Smokeless tobacco: Never   Vaping Use    Vaping status: Never Used   Substance and Sexual Activity    Alcohol use: Yes     Comment: Rarely    Drug use: No    Sexual activity: Yes     Partners: Female   Other Topics Concern    Not on file   Social History Narrative     since 20/12.   2/22.  She moved out.    No children.    Also has brother and sister-in-law and his niece living with him.And mother.     Works as an [employer] at [employer] in [place] since 10/22.    Going to [employer] for prerequisites for nursing school.    Also involved with the [employer].     Social Drivers of Health     Financial Resource Strain: Not on file   Food Insecurity: Not on file   Transportation Needs: Not on file   Physical Activity: Not on file   Stress: Not on file   Social Connections: Not on file   Intimate Partner Violence: Not on file   Housing Stability: Not on file       Family Psychiatric History:     Family History   Problem Relation Age of Onset    Depression Mother     Obesity Mother     Stroke Mother         Syndrome     Diabetes Mother     Alcohol abuse Father     Liver disease Father         hepatic failure     Hypertension Father     Depression Brother     Thyroid disease Brother     Alcohol abuse Brother     Substance Abuse Brother     Depression Maternal Uncle     Substance Abuse Brother     Heart disease Neg Hx     Cancer Neg Hx     Thyroid cancer Neg Hx        Medical History Reviewed by provider this encounter:          Objective   There were no vitals taken for this visit.     Mental Status Evaluation:    Appearance age appropriate, casually dressed   Behavior cooperative, calm   Speech  normal rate, normal volume, normal pitch, spontaneous   Mood euthymic   Affect normal range and intensity, appropriate   Thought Processes organized, goal directed   Thought Content no overt delusions   Perceptual Disturbances: no auditory hallucinations, no visual hallucinations   Abnormal Thoughts  Risk Potential Suicidal ideation - None  Homicidal ideation - None  Potential for aggression - No   Orientation oriented to person, place, time/date, and situation   Memory recent and remote memory grossly intact   Consciousness alert and awake   Attention Span Concentration Span attention span and concentration are age appropriate   Intellect appears to be of average intelligence   Insight intact   Judgement intact   Muscle Strength and  Gait normal muscle strength and normal muscle tone, normal gait and normal balance   Motor activity no abnormal movements   Language no difficulty naming common objects, no difficulty repeating a phrase, no difficulty writing a sentence   Fund of Knowledge adequate knowledge of current events  adequate fund of knowledge regarding past history  adequate fund of knowledge regarding vocabulary    Pain none   Pain Scale 0       Laboratory Results: I have personally reviewed all pertinent laboratory/tests results    Recent Labs (last 2 months):   Telephone on 04/24/2025   Component Date Value    Supplier Name 04/24/2025 [employer]     Supplier Phone Number 04/24/2025 [phone]     Order Status 04/24/2025 Contacting Patient     Delivery Request Date 04/24/2025 04/24/2025     Item Description 04/24/2025 CPAP Machine, Resmed (5+ year Replacement)     Item Description 04/24/2025 PAP Mask, Nasal, Generic, Fit Upon Setup, 1 per 3 months     Item Description 04/24/2025 PAP Mask Interface Cushion, Nasal, 2 per 1 month     Item Description 04/24/2025 PAP Headgear, 1 per 6 months     Item Description 04/24/2025 PAP Humidifier, Heated     Item Description 04/24/2025  Disposable PAP Filter, 2 per 1 month     Item Description 04/24/2025 Non-Disposable PAP Filter, 1 per 6 months     Item Description 04/24/2025 PAP Machine Tubing, Fit to Comfort, 1 per 3 months     Item Description 04/24/2025 Humidifier Water Chamber, 1 per 6 months     Item Description 04/24/2025 Heated PAP Tubing, 1 per 3 months     Item Description 04/24/2025 PAP Monitoring Modem     Item Description 04/24/2025 PAP Chinstrap, 1 per 6 months     Item Description 04/24/2025 PAP Accessory     Item Description 04/24/2025 PAP Mask, Nasal, Fit Upon Setup, N/A, 1 per 3 months     Item Description 04/24/2025 Humidifier Water Chamber, 1 per 6 months     Item Description 04/24/2025 PAP Headgear, 1 per 6 months     Item Description 04/24/2025 PAP Chinstrap, 1 per 6 months     Item Description 04/24/2025 PAP Humidifier, Heated     Item Description 04/24/2025 PAP Monitoring Modem     Item Description 04/24/2025 Heated PAP Tubing, 1 per 3 months     Item Description 04/24/2025 Disposable PAP Filter, 2 per 1 month     Item Description 04/24/2025 Non-Disposable PAP Filter, 1 per 6 months     Item Description 04/24/2025 PAP Mask Interface Cushion, Nasal, 2 per 1 month     Item Description 04/24/2025 Pressure Change        Suicide/Homicide Risk Assessment:    Risk of Harm to Self:  The following ratings are based on assessment at the time of the interview  Demographic Risk Factors include: , male  Historical Risk Factors include: history of depression, history of anxiety  Recent Specific Risk Factors include: diagnosis of depression  Protective Factors: no current suicidal ideation, ability to adapt to change, able to make plans for the future, able to manage anger well, access to mental health treatment, compliant with medications, compliant with mental health treatment, effective coping skills, future oriented  Weapons/Firearms: none. The following steps have been taken to ensure weapons are properly secured: not  applicable  Based on today's assessment, Reynaldo presents the following risk of harm to self: minimal    Risk of Harm to Others:  The following ratings are based on assessment at the time of the interview  Demographic Risk Factors include: male  Historical Risk Factors include: none  Recent Specific Risk Factors include: none  Protective Factors: no current homicidal ideation  Weapons/Firearms: none. The following steps have been taken to ensure weapons are properly secured: not applicable  Based on today's assessment, Reynaldo presents the following risk of harm to others: minimal    The following interventions are recommended: Continue medication management. No other intervention changes indicated at this time.    Psychotherapy Provided:     Individual psychotherapy provided: Yes   Counseling was provided during the session today for 17 minutes.  Medication education provided to Reynaldo.  Recent stressors discussed with Reynaldo including school stress, health issues, everyday stressors, and occasional anxiety.  Coping  skills  reviewed with Reynaldo.   Supportive therapy provided.     Treatment Plan:    Completed and signed during the session: Yes - with Reynaldo.    Goals: Progress towards Treatment Plan goals - Yes, progressing, as evidenced by subjective findings in HPI/Subjective Section and in Assessment and Plan Section    Depression Follow-up Plan Completed: Not applicable    Note Share:    This note was not shared with the patient due to reasonable likelihood of causing patient harm    Administrative Statements   Administrative Statements   Encounter provider Miguel A Mercado MD    The Patient is located at Home and in the following state in which I hold an active license [place].    The patient was identified by name and date of birth. Reynaldo Santoyo was informed that this is a telemedicine visit and that the visit is being conducted through the Epic Embedded platform. He agrees to proceed..  My office door was closed. No  one else was in the room.  He acknowledged consent and understanding of privacy and security of the video platform. The patient has agreed to participate and understands they can discontinue the visit at any time.    I have spent a total time of 23 minutes in caring for this patient on the day of the visit/encounter including Impressions, Counseling / Coordination of care, Documenting in the medical record, Reviewing/placing orders in the medical record (including tests, medications, and/or procedures), and Obtaining or reviewing history  , not including the time spent for establishing the audio/video connection.    Visit Time  Visit Start Time: 210pm   Visit Stop Time: 233pm   Total Visit Duration:  23 minutes    Miguel A Mercado MD 04/30/25

## 2025-05-01 LAB

## 2025-05-07 LAB

## 2025-06-03 DIAGNOSIS — F33.1 MODERATE EPISODE OF RECURRENT MAJOR DEPRESSIVE DISORDER (HCC): ICD-10-CM

## 2025-06-03 RX ORDER — BUPROPION HYDROCHLORIDE 300 MG/1
300 TABLET ORAL DAILY
Qty: 90 TABLET | Refills: 0 | Status: SHIPPED | OUTPATIENT
Start: 2025-06-03

## 2025-06-03 RX ORDER — BUPROPION HYDROCHLORIDE 150 MG/1
150 TABLET ORAL DAILY
Qty: 90 TABLET | Refills: 0 | Status: SHIPPED | OUTPATIENT
Start: 2025-06-03

## 2025-06-03 NOTE — TELEPHONE ENCOUNTER
Reason for call:   [x] Refill   [] Prior Auth  [] Other:     Office:   [] PCP/Provider -   [x] Specialty/Provider - psych/Miguel A Mercado    Medication:     buPROPion (WELLBUTRIN XL) 150 mg 24 hr tablet Take 1 tablet (150 mg total) by mouth daily       buPROPion (WELLBUTRIN XL) 300 mg 24 hr tablet Take 1 tablet (300 mg total) by mouth daily       venlafaxine (EFFEXOR-XR) 150 mg 24 hr capsule Take 1 capsule (150 mg total) by mouth daily       Pharmacy: [facility]) - [place] - [address] [phone]     Local Pharmacy   Does the patient have enough for 3 days?   [] Yes   [x] No - Send as HP to POD    Mail Away Pharmacy   Does the patient have enough for 10 days?   [] Yes   [] No - Send as HP to POD

## 2025-06-05 DIAGNOSIS — F33.1 MODERATE EPISODE OF RECURRENT MAJOR DEPRESSIVE DISORDER (HCC): ICD-10-CM

## 2025-06-05 DIAGNOSIS — F41.1 GENERALIZED ANXIETY DISORDER: ICD-10-CM

## 2025-06-05 RX ORDER — VENLAFAXINE HYDROCHLORIDE 150 MG/1
150 CAPSULE, EXTENDED RELEASE ORAL DAILY
Qty: 90 CAPSULE | Refills: 0 | Status: SHIPPED | OUTPATIENT
Start: 2025-06-05

## 2025-06-30 DIAGNOSIS — R79.89 ELEVATED TSH: ICD-10-CM

## 2025-07-02 ENCOUNTER — OFFICE VISIT (OUTPATIENT)
Dept: FAMILY MEDICINE CLINIC | Facility: CLINIC | Age: 35
End: 2025-07-02
Payer: COMMERCIAL

## 2025-07-02 VITALS
BODY MASS INDEX: 40.43 KG/M2 | SYSTOLIC BLOOD PRESSURE: 132 MMHG | DIASTOLIC BLOOD PRESSURE: 100 MMHG | HEART RATE: 98 BPM | OXYGEN SATURATION: 98 % | TEMPERATURE: 97.6 F | HEIGHT: 74 IN | RESPIRATION RATE: 16 BRPM | WEIGHT: 315 LBS

## 2025-07-02 DIAGNOSIS — N50.811 TESTICULAR PAIN, RIGHT: Primary | ICD-10-CM

## 2025-07-02 PROCEDURE — 99213 OFFICE O/P EST LOW 20 MIN: CPT | Performed by: NURSE PRACTITIONER

## 2025-07-02 RX ORDER — LEVOTHYROXINE SODIUM 25 UG/1
25 TABLET ORAL
Qty: 30 TABLET | Refills: 0 | Status: SHIPPED | OUTPATIENT
Start: 2025-07-02

## 2025-07-02 NOTE — PROGRESS NOTES
Name: Reynaldo Santoyo      : 1990      MRN: [id number]  Encounter Provider: AMRITA Alexander  Encounter Date: 2025   Encounter department: [facility]  :  Assessment & Plan  Testicular pain, right  Right testicular pain persisting over last 2-4 weeks.   No other associated symptoms.   Will complete US scrotum and testicles and refer to urology.   Call or go to the ER for worsening pain.   Orders:  •  US scrotum and testicles; Future  •  Ambulatory Referral to Urology; Future           History of Present Illness   Reynaldo Santoyo is a 34 year old male presenting today for right testicular pain.    Weight management appointment upcoming, gaining, weight.     Started 2-4 weeks ago. Not improving.   Right testicular pain constant, dull throbbing pain.   Varies in intensity.   Increases with more moving and lifting.   Nothing makes it better.     No bumps, bruises, rashes.   Maybe a little swollen. Not sure.   No trauma.   No urinary symptoms, no changes or difficulty with sexual function.  No penile discharge.   No fevers.   Otherwise feels well.     Has right testicular pain when lying flat on back, this has been ongoing for several years.   Normal US in . Did not follow up with urology.  Just avoids laying flat on back.     Has been with his girlfriend, monogamous relationship for one year now.                     Review of Systems   Constitutional: Negative.    Genitourinary:  Positive for testicular pain. Negative for decreased urine volume, difficulty urinating, dysuria, frequency, genital sores, hematuria, penile discharge, penile pain, penile swelling, scrotal swelling and urgency.   Musculoskeletal:  Positive for back pain (attributes this to muscle strain, does not feel it is related to testicular pain).   Neurological: Negative.        Objective   /100 (BP Location: Left arm, Patient Position: Sitting, Cuff Size: Large)   Pulse 98   Temp 97.6 °F (36.4 °C) (Temporal)    "Resp 16   Ht 6' 2\" (1.88 m)   Wt (!) 187 kg (412 lb 6.4 oz)   SpO2 98%   BMI 52.95 kg/m²      Physical Exam  Vitals and nursing note reviewed. Exam conducted with a chaperone present (Kayla Ortiz MA).   Constitutional:       General: He is not in acute distress.     Appearance: Normal appearance. He is not ill-appearing.     Cardiovascular:      Rate and Rhythm: Normal rate.      Heart sounds: No murmur heard.  Pulmonary:      Effort: Pulmonary effort is normal.      Breath sounds: Normal breath sounds.   Genitourinary:     Penis: Normal.       Testes:         Right: Tenderness (right) present. Mass, swelling, testicular hydrocele or varicocele not present. Right testis is descended.         Left: Mass, tenderness, swelling, testicular hydrocele or varicocele not present. Left testis is descended.      Epididymis:      Right: Normal.      Left: Normal.      Comments: No scrotal skin changes or warmth.     Neurological:      Mental Status: He is alert.     Psychiatric:         Mood and Affect: Mood normal.       Current Medications[1]          [1]    Current Outpatient Medications:   •  buPROPion (WELLBUTRIN XL) 150 mg 24 hr tablet, Take 1 tablet (150 mg total) by mouth daily, Disp: 90 tablet, Rfl: 0  •  buPROPion (WELLBUTRIN XL) 300 mg 24 hr tablet, Take 1 tablet (300 mg total) by mouth daily, Disp: 90 tablet, Rfl: 0  •  Cholecalciferol (VITAMIN D-3) 1000 units CAPS, Take 2 capsules by mouth in the morning., Disp: , Rfl:   •  dicyclomine (BENTYL) 10 mg capsule, Take 1 capsule (10 mg total) by mouth 3 (three) times a day as needed (abdominal pain/ spasm), Disp: 90 capsule, Rfl: 1  •  levothyroxine 25 mcg tablet, Take 1 tablet (25 mcg total) by mouth daily in the early morning, Disp: 90 tablet, Rfl: 1  •  venlafaxine (EFFEXOR-XR) 150 mg 24 hr capsule, Take 1 capsule (150 mg total) by mouth daily, Disp: 90 capsule, Rfl: 0" FAMILY HISTORY:  No pertinent family history in first degree relatives

## 2025-07-03 ENCOUNTER — TELEPHONE (OUTPATIENT)
Dept: FAMILY MEDICINE CLINIC | Facility: CLINIC | Age: 35
End: 2025-07-03

## 2025-07-03 DIAGNOSIS — Z13.1 DIABETES MELLITUS SCREENING: ICD-10-CM

## 2025-07-03 DIAGNOSIS — Z13.220 LIPID SCREENING: ICD-10-CM

## 2025-07-03 DIAGNOSIS — F41.1 GENERALIZED ANXIETY DISORDER: ICD-10-CM

## 2025-07-03 DIAGNOSIS — E03.9 ACQUIRED HYPOTHYROIDISM: ICD-10-CM

## 2025-07-07 ENCOUNTER — HOSPITAL ENCOUNTER (OUTPATIENT)
Dept: ULTRASOUND IMAGING | Facility: HOSPITAL | Age: 35
Discharge: HOME | End: 2025-07-07
Payer: COMMERCIAL

## 2025-07-07 DIAGNOSIS — N50.811 TESTICULAR PAIN, RIGHT: ICD-10-CM

## 2025-07-07 PROCEDURE — 76870 US EXAM SCROTUM: CPT

## 2025-07-09 ENCOUNTER — APPOINTMENT (OUTPATIENT)
Dept: URGENT CARE | Facility: CLINIC | Age: 35
End: 2025-07-09
Payer: OTHER MISCELLANEOUS

## 2025-07-09 ENCOUNTER — TELEPHONE (OUTPATIENT)
Age: 35
End: 2025-07-09

## 2025-07-09 PROCEDURE — 99285 EMERGENCY DEPT VISIT HI MDM: CPT | Performed by: EMERGENCY MEDICINE

## 2025-07-09 PROCEDURE — G0384 LEV 5 HOSP TYPE B ED VISIT: HCPCS | Performed by: EMERGENCY MEDICINE

## 2025-07-09 NOTE — TELEPHONE ENCOUNTER
New Patient      Insurance   Current Insurance? [employer]    Insurance E-verified? yes    History   Reason for appointment/active symptoms?  Testicular pain, right      Has the patient had any previous Urologist(s)? No      Was the patient seen in the ED? no      Labs/Imaging(Including Out Of Network)? U/S      Records Requested?   Records Visible in EPIC? yes      Personal history of cancer? no      Appointment   Office location preference:    ?   Appointment Details   Date:  8/13  Time:  7:20 am   Location:  [place]   Provider:  Olesya   Does the appointment need further review?

## 2025-07-10 ENCOUNTER — OFFICE VISIT (OUTPATIENT)
Age: 35
End: 2025-07-10
Payer: COMMERCIAL

## 2025-07-10 ENCOUNTER — TELEPHONE (OUTPATIENT)
Age: 35
End: 2025-07-10

## 2025-07-10 VITALS
RESPIRATION RATE: 16 BRPM | DIASTOLIC BLOOD PRESSURE: 70 MMHG | HEART RATE: 89 BPM | BODY MASS INDEX: 42.66 KG/M2 | WEIGHT: 315 LBS | TEMPERATURE: 97.1 F | HEIGHT: 72 IN | SYSTOLIC BLOOD PRESSURE: 126 MMHG

## 2025-07-10 DIAGNOSIS — E66.813 OBESITY, CLASS III, BMI 40-49.9 (MORBID OBESITY): Primary | ICD-10-CM

## 2025-07-10 DIAGNOSIS — G47.33 OBSTRUCTIVE SLEEP APNEA: ICD-10-CM

## 2025-07-10 PROCEDURE — 99244 OFF/OP CNSLTJ NEW/EST MOD 40: CPT | Performed by: PHYSICIAN ASSISTANT

## 2025-07-10 RX ORDER — TIRZEPATIDE 2.5 MG/.5ML
2.5 INJECTION, SOLUTION SUBCUTANEOUS WEEKLY
Qty: 2 ML | Refills: 0 | Status: SHIPPED | OUTPATIENT
Start: 2025-07-10 | End: 2025-08-07

## 2025-07-10 NOTE — TELEPHONE ENCOUNTER
Patient has a prior auth on file with [facility] til 11/2025 fpr [employer]. Lmom informing patient about his prior auth and he can contact his pharmacy to see when his medication will be available for him to .

## 2025-07-10 NOTE — PROGRESS NOTES
Assessment/Plan:  Reynaldo was seen today for consult.    Diagnoses and all orders for this visit:    Obstructive sleep apnea  -     Ambulatory referral to Weight Management    BMI 50.0-59.9, adult (HCC)  -     Ambulatory referral to Weight Management       No problem-specific Assessment & Plan notes found for this encounter.     - Discussed options of HealthyCORE-Intensive Lifestyle Intervention Program, Very Low Calorie Diet-VLCD, and Conservative Program and the role of weight loss medications.  - Explained the importance of making lifestyle changes first before starting anti-obesity medications.  - Patient should demonstrate lifestyle changes first before anti-obesity medication initiated.   - Patient is interested in pursuing Conservative Program  - Initial weight loss goal of 5-10% weight loss for improved health as studies have shown this is where we see the greatest impact on improving health and decreasing risk of obesity related conditions.  - Weight loss can improve patient's co-morbid conditions and/or prevent weight-related complications.  - JIMI  - Labs reviewed: As below.      General Recommendations:  Nutrition:  Eat breakfast daily.  Do not skip meals.     Food log (ie.) www.[employer].com, sparkpeople.com, loseit.com, calorieking.com, etc.    Practice mindful eating.  Be sure to set aside time to eat, eat slowly, and savor your food.    Hydration:    At least 64oz of water daily.  No sugar sweetened beverages.  No juice (eat the fruit instead).    Exercise:  Studies have shown that the ideal exercise goal is somewhere between 150 to 300 minutes of moderate intensity exercise a week.  Start with exercising 10 minutes every other day and gradually increase physical activity with a goal of at least 150 minutes of moderate intensity exercise a week, divided over at least 3 days a week.  An example of this would be exercising 30 minutes a day, 5 days a week.  Resistance training can increase muscle mass  and increase our resting metabolic rate.   FULL BODY resistance training is recommended 2-3 times a week.  Do not do this on consecutive days to allow for muscle recovery.    Aim for a bare minimum 5000 steps, even on days you do not exercise.    Monitoring:   Weigh yourself daily.  If this causes undue stress, then just weigh yourself once a week.  Weigh yourself the same time of the day with the same amount of clothing on.  Preferably this should be done after waking up, before you eat, and with no clothing or minimal clothing on.    Specific Goals:  Protein goals start with 100-120 grams daily   Food log (ie.) www.[email or web address],sparkpeople.com,loseit.com,calorieking.com,etc.   Gradually increase physical activity to a goal of 5 days per week for 30 minutes of MODERATE intensity PLUS 2 days per week of FULL BODY resistance training    Calorie goal:  (Provided with meal plan to follow).    Return visit:  4 months     Nutrition RX:  - start tracking intake  - focus on protein- goal is 30 grams per meal; 90 grams per day  - focus on increasing fiber first by increasing vegetable servings per day  - do not skip breakfast and goal water intake 64 oz daily    Physical Activity RX:  - start 3 (1hr) sessions per week at gym- 30 mins on treadmill and then 30mins of strength training   - Goal is 150-200 mins of activity weekly.  Try to include at least 2 strength training sessions; increasing lean body mass will really help you to lose weight and maintain weight loss.        Total time spent reviewing chart, interviewing patient, examining patient, discussing plan, answering all questions, and documentin min.       ______________________________________________________________________        Subjective:   Chief Complaint   Patient presents with    Consult     MWM- Consult; GW-275lb; Waist- 60in; Patient has JIMI       HPI: Reynaldo Santoyo  is a 35 y.o. male with history of JIMI, and excess weight, here to pursue weight  loss management.  Previous notes and records have been reviewed.    Patient started on Zepbound about 1 year ago. He was able to get up to 7.5mg but was having a lot of GI side effects.     Physical Activity:  Night shifts- 4-5 days per week   Has a membership at gym for [employer]- will start going for resistance training          HPI  Wt Readings from Last 20 Encounters:   07/10/25 (!) 187 kg (413 lb)   07/02/25 (!) 187 kg (412 lb 6.4 oz)   04/23/25 (!) 183 kg (403 lb 9.6 oz)   04/02/25 (!) 179 kg (395 lb)   03/03/25 (!) 177 kg (391 lb)   11/21/24 (!) 186 kg (409 lb 11.2 oz)   11/18/24 (!) 184 kg (406 lb)   10/07/24 (!) 181 kg (400 lb)   07/19/24 (!) 182 kg (402 lb)   07/03/24 (!) 182 kg (401 lb)   06/07/24 (!) 182 kg (401 lb)   05/24/24 (!) 183 kg (403 lb)   03/13/24 (!) 184 kg (405 lb)   02/21/24 (!) 183 kg (404 lb)   02/09/24 (!) 185 kg (407 lb)   11/28/23 (!) 181 kg (400 lb)   10/13/23 (!) 185 kg (408 lb)   10/04/23 (!) 183 kg (404 lb)   09/26/23 (!) 179 kg (395 lb)   06/14/23 (!) 178 kg (392 lb)               Dining out:  Hydration:  Alcohol:  Smoking:  Exercise:  Weight Monitoring:  Sleep:  STOP-BANG Score:  Occupation: [employer]       Past Medical History[1]  Patient denies personal and family history of  pancreatitis, thyroid cancer, MEN-2 tumors.  Denies any hx of glaucoma, seizures, kidney stones, gallstones.  Denies Hx of CAD, PAD, palpitations, arrhythmia.   Denies uncontrolled anxiety or depression, suicidal behavior or thinking , insomnia or sleep disturbance.   Past Surgical History[2]  The following portions of the patient's history were reviewed and updated as appropriate: allergies, current medications, past family history, past medical history, past social history, past surgical history, and problem list.    Review Of Systems:  Review of Systems   Constitutional:  Positive for fatigue.   HENT: Negative.     Eyes: Negative.    Gastrointestinal:  Negative for constipation, diarrhea and  "nausea.   Genitourinary: Negative.    Musculoskeletal: Negative.    Skin: Negative.    Allergic/Immunologic: Negative.    Neurological: Negative.  Negative for headaches.   Hematological: Negative.    Psychiatric/Behavioral: Negative.         Objective:  /70 (BP Location: Left arm, Patient Position: Sitting)   Pulse 89   Temp (!) 97.1 °F (36.2 °C) (Tympanic)   Resp 16   Ht 6' 0.05\" (1.83 m)   Wt (!) 187 kg (413 lb)   BMI 55.94 kg/m²   Physical Exam  Vitals reviewed.   Constitutional:       Appearance: He is obese.   HENT:      Head: Normocephalic.      Mouth/Throat:      Mouth: Mucous membranes are moist.     Eyes:      Pupils: Pupils are equal, round, and reactive to light.       Cardiovascular:      Rate and Rhythm: Normal rate and regular rhythm.   Pulmonary:      Effort: Pulmonary effort is normal.      Breath sounds: Normal breath sounds.   Abdominal:      General: Bowel sounds are normal.      Palpations: Abdomen is soft.     Musculoskeletal:         General: Normal range of motion.      Cervical back: Normal range of motion.     Skin:     General: Skin is warm and dry.     Neurological:      General: No focal deficit present.      Mental Status: He is alert.     Psychiatric:         Mood and Affect: Mood normal.         Thought Content: Thought content normal.         Judgment: Judgment normal.         Labs and Imaging  Recent labs and imaging have been personally reviewed.  Lab Results   Component Value Date    WBC 9.69 01/07/2025    HGB 16.1 01/07/2025    HCT 49.5 (H) 01/07/2025    MCV 85 01/07/2025     01/07/2025     Lab Results   Component Value Date     11/21/2017    SODIUM 140 01/07/2025    K 3.7 01/07/2025     01/07/2025    CO2 29 01/07/2025    AGAP 8 01/07/2025    BUN 18 01/07/2025    CREATININE 1.43 (H) 01/07/2025    GLUC 88 01/07/2025    GLUF 98 06/05/2024    CALCIUM 9.7 01/07/2025    AST 23 01/07/2025    ALT 31 01/07/2025    ALKPHOS 90 01/07/2025    PROT 7.7 " 11/21/2017    TP 8.5 (H) 01/07/2025    BILITOT 0.5 11/21/2017    TBILI 0.58 01/07/2025    EGFR 63 01/07/2025     Lab Results   Component Value Date    HGBA1C 5.5 06/05/2024     Lab Results   Component Value Date    WBA6QWVABFMX 3.998 06/05/2024     Lab Results   Component Value Date    CHOLESTEROL 182 06/05/2024     Lab Results   Component Value Date    HDL 43 06/05/2024     Lab Results   Component Value Date    TRIG 108 06/05/2024     Lab Results   Component Value Date    LDLCALC 117 (H) 06/05/2024          [1]   Past Medical History:  Diagnosis Date    Anxiety     Carpal tunnel syndrome, bilateral     LA...9/12/17   R....9/12/17     COVID-19 5/27/2022    GERD without esophagitis     Moderate episode of recurrent major depressive disorder (HCC) 08/22/2018    Morbid obesity (HCC)     Obstructive sleep apnea     Pilonidal cyst with abscess     LA....10/25/13   R....6/10/14     Vitamin D deficiency    [2]   Past Surgical History:  Procedure Laterality Date    COLONOSCOPY      FOOT SURGERY      PILONIDAL CYST DRAINAGE      Incision and drainage of pilonidal cyst     GA NDSC WRST SURG W/RLS TRANSVRS CARPL LIGM Right 07/12/2018    Procedure: RELEASE CARPAL TUNNEL ENDOSCOPIC;  Surgeon: Emerson Infante MD;  Location: [facility];  Service: Orthopedics    GA NDSC WRST SURG W/RLS TRANSVRS CARPL LIGM Left 07/26/2018    Procedure: RELEASE CARPAL TUNNEL ENDOSCOPIC;  Surgeon: Emerson Infante MD;  Location: [facility];  Service: Orthopedics

## 2025-07-14 ENCOUNTER — APPOINTMENT (OUTPATIENT)
Dept: LAB | Facility: CLINIC | Age: 35
End: 2025-07-14
Payer: COMMERCIAL

## 2025-07-14 DIAGNOSIS — E66.813 OBESITY, CLASS III, BMI 40-49.9 (MORBID OBESITY): ICD-10-CM

## 2025-07-14 DIAGNOSIS — R06.89 HYPERCAPNIA: ICD-10-CM

## 2025-07-14 DIAGNOSIS — F41.1 GENERALIZED ANXIETY DISORDER: ICD-10-CM

## 2025-07-14 DIAGNOSIS — E03.9 ACQUIRED HYPOTHYROIDISM: ICD-10-CM

## 2025-07-14 DIAGNOSIS — G47.33 OBSTRUCTIVE SLEEP APNEA: ICD-10-CM

## 2025-07-14 DIAGNOSIS — Z13.220 LIPID SCREENING: ICD-10-CM

## 2025-07-14 DIAGNOSIS — G47.19 EXCESSIVE DAYTIME SLEEPINESS: ICD-10-CM

## 2025-07-14 DIAGNOSIS — Z13.1 DIABETES MELLITUS SCREENING: ICD-10-CM

## 2025-07-14 LAB
ALBUMIN SERPL BCG-MCNC: 4 G/DL (ref 3.5–5)
ALP SERPL-CCNC: 91 U/L (ref 34–104)
ALT SERPL W P-5'-P-CCNC: 27 U/L (ref 7–52)
ANION GAP SERPL CALCULATED.3IONS-SCNC: 9 MMOL/L (ref 4–13)
AST SERPL W P-5'-P-CCNC: 24 U/L (ref 13–39)
BILIRUB SERPL-MCNC: 0.46 MG/DL (ref 0.2–1)
BUN SERPL-MCNC: 17 MG/DL (ref 5–25)
CALCIUM SERPL-MCNC: 9.1 MG/DL (ref 8.4–10.2)
CHLORIDE SERPL-SCNC: 104 MMOL/L (ref 96–108)
CHOLEST SERPL-MCNC: 185 MG/DL (ref ?–200)
CO2 SERPL-SCNC: 27 MMOL/L (ref 21–32)
CREAT SERPL-MCNC: 1.03 MG/DL (ref 0.6–1.3)
ERYTHROCYTE [DISTWIDTH] IN BLOOD BY AUTOMATED COUNT: 13.1 % (ref 11.6–15.1)
EST. AVERAGE GLUCOSE BLD GHB EST-MCNC: 111 MG/DL
GFR SERPL CREATININE-BSD FRML MDRD: 93 ML/MIN/1.73SQ M
GLUCOSE P FAST SERPL-MCNC: 85 MG/DL (ref 65–99)
HBA1C MFR BLD: 5.5 %
HCT VFR BLD AUTO: 45.9 % (ref 36.5–49.3)
HDLC SERPL-MCNC: 47 MG/DL
HGB BLD-MCNC: 15.8 G/DL (ref 12–17)
INSULIN SERPL-ACNC: 35.55 UIU/ML (ref 1.9–23)
LDLC SERPL CALC-MCNC: 113 MG/DL (ref 0–100)
MCH RBC QN AUTO: 29.4 PG (ref 26.8–34.3)
MCHC RBC AUTO-ENTMCNC: 34.4 G/DL (ref 31.4–37.4)
MCV RBC AUTO: 85 FL (ref 82–98)
NONHDLC SERPL-MCNC: 138 MG/DL
PLATELET # BLD AUTO: 362 THOUSANDS/UL (ref 149–390)
PMV BLD AUTO: 10 FL (ref 8.9–12.7)
POTASSIUM SERPL-SCNC: 4 MMOL/L (ref 3.5–5.3)
PROT SERPL-MCNC: 7.7 G/DL (ref 6.4–8.4)
RBC # BLD AUTO: 5.38 MILLION/UL (ref 3.88–5.62)
SODIUM SERPL-SCNC: 140 MMOL/L (ref 135–147)
TRIGL SERPL-MCNC: 124 MG/DL (ref ?–150)
TSH SERPL DL<=0.05 MIU/L-ACNC: 3.26 UIU/ML (ref 0.45–4.5)
WBC # BLD AUTO: 8.85 THOUSAND/UL (ref 4.31–10.16)

## 2025-07-14 PROCEDURE — 80061 LIPID PANEL: CPT

## 2025-07-14 PROCEDURE — 84443 ASSAY THYROID STIM HORMONE: CPT

## 2025-07-14 PROCEDURE — 83036 HEMOGLOBIN GLYCOSYLATED A1C: CPT

## 2025-07-14 PROCEDURE — 36415 COLL VENOUS BLD VENIPUNCTURE: CPT

## 2025-07-14 PROCEDURE — 80053 COMPREHEN METABOLIC PANEL: CPT

## 2025-07-14 PROCEDURE — 83525 ASSAY OF INSULIN: CPT

## 2025-07-14 PROCEDURE — 85027 COMPLETE CBC AUTOMATED: CPT

## 2025-07-16 ENCOUNTER — APPOINTMENT (OUTPATIENT)
Dept: URGENT CARE | Facility: CLINIC | Age: 35
End: 2025-07-16
Payer: OTHER MISCELLANEOUS

## 2025-07-16 PROCEDURE — 99213 OFFICE O/P EST LOW 20 MIN: CPT

## 2025-07-17 ENCOUNTER — RESULTS FOLLOW-UP (OUTPATIENT)
Dept: BARIATRICS | Facility: CLINIC | Age: 35
End: 2025-07-17

## 2025-07-28 ENCOUNTER — OFFICE VISIT (OUTPATIENT)
Dept: SLEEP CENTER | Facility: CLINIC | Age: 35
End: 2025-07-28
Payer: COMMERCIAL

## 2025-07-28 VITALS
BODY MASS INDEX: 42.66 KG/M2 | OXYGEN SATURATION: 96 % | HEIGHT: 72 IN | DIASTOLIC BLOOD PRESSURE: 76 MMHG | WEIGHT: 315 LBS | HEART RATE: 91 BPM | SYSTOLIC BLOOD PRESSURE: 108 MMHG

## 2025-07-28 DIAGNOSIS — G47.33 OBSTRUCTIVE SLEEP APNEA: Primary | ICD-10-CM

## 2025-07-28 DIAGNOSIS — G47.26 SHIFT WORK SLEEP DISORDER: ICD-10-CM

## 2025-07-28 PROCEDURE — 99214 OFFICE O/P EST MOD 30 MIN: CPT | Performed by: STUDENT IN AN ORGANIZED HEALTH CARE EDUCATION/TRAINING PROGRAM

## 2025-07-30 ENCOUNTER — TELEPHONE (OUTPATIENT)
Dept: SLEEP CENTER | Facility: CLINIC | Age: 35
End: 2025-07-30

## 2025-07-30 ENCOUNTER — OFFICE VISIT (OUTPATIENT)
Dept: UROLOGY | Facility: AMBULATORY SURGERY CENTER | Age: 35
End: 2025-07-30
Payer: COMMERCIAL

## 2025-07-30 VITALS
HEART RATE: 91 BPM | HEIGHT: 72 IN | WEIGHT: 315 LBS | DIASTOLIC BLOOD PRESSURE: 80 MMHG | BODY MASS INDEX: 42.66 KG/M2 | OXYGEN SATURATION: 96 % | SYSTOLIC BLOOD PRESSURE: 112 MMHG

## 2025-07-30 DIAGNOSIS — N50.811 TESTICULAR PAIN, RIGHT: ICD-10-CM

## 2025-07-30 DIAGNOSIS — N50.811 PAIN IN RIGHT TESTICLE: Primary | ICD-10-CM

## 2025-07-30 DIAGNOSIS — N45.2 ORCHITIS OF RIGHT TESTICLE: ICD-10-CM

## 2025-07-30 LAB
DME PARACHUTE DELIVERY DATE REQUESTED: NORMAL
DME PARACHUTE ITEM DESCRIPTION: NORMAL
DME PARACHUTE ITEM DESCRIPTION: NORMAL
DME PARACHUTE ORDER STATUS: NORMAL
DME PARACHUTE SUPPLIER NAME: NORMAL
DME PARACHUTE SUPPLIER PHONE: NORMAL

## 2025-07-30 PROCEDURE — 99213 OFFICE O/P EST LOW 20 MIN: CPT

## 2025-08-04 DIAGNOSIS — E66.813 OBESITY, CLASS III, BMI 40-49.9 (MORBID OBESITY): ICD-10-CM

## 2025-08-04 DIAGNOSIS — G47.33 OBSTRUCTIVE SLEEP APNEA: Primary | ICD-10-CM

## 2025-08-04 RX ORDER — TIRZEPATIDE 5 MG/.5ML
5 INJECTION, SOLUTION SUBCUTANEOUS WEEKLY
Qty: 2 ML | Refills: 2 | Status: SHIPPED | OUTPATIENT
Start: 2025-08-04 | End: 2025-10-27

## 2025-08-13 ENCOUNTER — CLINICAL SUPPORT (OUTPATIENT)
Age: 35
End: 2025-08-13
Payer: COMMERCIAL

## 2025-08-19 DIAGNOSIS — R79.89 ELEVATED TSH: ICD-10-CM

## 2025-08-21 RX ORDER — LEVOTHYROXINE SODIUM 25 UG/1
25 TABLET ORAL
Qty: 90 TABLET | Refills: 1 | Status: SHIPPED | OUTPATIENT
Start: 2025-08-21

## (undated) DEVICE — RETROGRADE KNIFE BOX OF 6: Brand: ECTRA

## (undated) DEVICE — STERILE COTTON TIPPED APPLICATORS: Brand: PURITAN

## (undated) DEVICE — GLOVE SRG BIOGEL 7.5

## (undated) DEVICE — STERILE BETHLEHEM PLASTIC HAND: Brand: CARDINAL HEALTH

## (undated) DEVICE — CUFF TOURNIQUET 24 X 4 IN QUICK CONNECT DISP 1BLA

## (undated) DEVICE — GLOVE INDICATOR PI UNDERGLOVE SZ 7 BLUE

## (undated) DEVICE — SPONGE PVP SCRUB WING STERILE

## (undated) DEVICE — ADHESIVE SKN CLSR HISTOACRYL FLEX 0.5ML LF

## (undated) DEVICE — GLOVE INDICATOR PI UNDERGLOVE SZ 6.5 BLUE

## (undated) DEVICE — INTENDED FOR TISSUE SEPARATION, AND OTHER PROCEDURES THAT REQUIRE A SHARP SURGICAL BLADE TO PUNCTURE OR CUT.: Brand: BARD-PARKER SAFETY BLADES SIZE 15, STERILE

## (undated) DEVICE — GLOVE SRG BIOGEL 7

## (undated) DEVICE — CHLORAPREP HI-LITE 26ML ORANGE

## (undated) DEVICE — GLOVE SRG BIOGEL 6.5

## (undated) DEVICE — SUT PROLENE 4-0 PC-3 18 IN 8634G

## (undated) DEVICE — GLOVE INDICATOR PI UNDERGLOVE SZ 8 BLUE